# Patient Record
Sex: FEMALE | Race: BLACK OR AFRICAN AMERICAN | NOT HISPANIC OR LATINO | Employment: OTHER | URBAN - NONMETROPOLITAN AREA
[De-identification: names, ages, dates, MRNs, and addresses within clinical notes are randomized per-mention and may not be internally consistent; named-entity substitution may affect disease eponyms.]

---

## 2017-01-04 ENCOUNTER — OUTSIDE FACILITY SERVICE (OUTPATIENT)
Dept: FAMILY MEDICINE CLINIC | Facility: CLINIC | Age: 72
End: 2017-01-04

## 2017-01-04 PROCEDURE — G0179 MD RECERTIFICATION HHA PT: HCPCS | Performed by: INTERNAL MEDICINE

## 2017-01-09 ENCOUNTER — TELEPHONE (OUTPATIENT)
Dept: FAMILY MEDICINE CLINIC | Facility: CLINIC | Age: 72
End: 2017-01-09

## 2017-01-09 NOTE — TELEPHONE ENCOUNTER
----- Message from Talita Thomas sent at 1/9/2017  4:05 PM EST -----  Contact: stephany [facility]  Pt had a fall on Saturday with no physical injuries and need order for additional assesment post fall this week. Call them at [phone]

## 2017-02-06 ENCOUNTER — TELEPHONE (OUTPATIENT)
Dept: FAMILY MEDICINE CLINIC | Facility: CLINIC | Age: 72
End: 2017-02-06

## 2017-02-06 NOTE — TELEPHONE ENCOUNTER
Patient went to ER and is not feeling well she is the same but has appointment on 02/10 wanted us to be aware

## 2017-02-16 ENCOUNTER — TELEPHONE (OUTPATIENT)
Dept: FAMILY MEDICINE CLINIC | Facility: CLINIC | Age: 72
End: 2017-02-16

## 2017-02-16 NOTE — TELEPHONE ENCOUNTER
Laura with [facility] called and said Ms. Smith blood sugar is 550.  Patient is not symptomatic, however she just finished treatment for UTI.  Laura feels that her infection may not be gone.  Patient has an appointment with you in the clinic tomorrow, but if she does not make it Laura is going to collect another UA.  Is there anything else you would like for Ms. Smith to do at this time?

## 2017-02-17 ENCOUNTER — TELEPHONE (OUTPATIENT)
Dept: FAMILY MEDICINE CLINIC | Facility: CLINIC | Age: 72
End: 2017-02-17

## 2017-02-17 ENCOUNTER — OFFICE VISIT (OUTPATIENT)
Dept: FAMILY MEDICINE CLINIC | Facility: CLINIC | Age: 72
End: 2017-02-17

## 2017-02-17 ENCOUNTER — APPOINTMENT (OUTPATIENT)
Dept: ULTRASOUND IMAGING | Facility: HOSPITAL | Age: 72
End: 2017-02-17

## 2017-02-17 ENCOUNTER — HOSPITAL ENCOUNTER (OUTPATIENT)
Dept: ULTRASOUND IMAGING | Facility: HOSPITAL | Age: 72
Discharge: HOME | End: 2017-02-17
Admitting: INTERNAL MEDICINE

## 2017-02-17 VITALS
HEART RATE: 92 BPM | BODY MASS INDEX: 43.32 KG/M2 | WEIGHT: 276 LBS | SYSTOLIC BLOOD PRESSURE: 166 MMHG | TEMPERATURE: 98.9 F | DIASTOLIC BLOOD PRESSURE: 71 MMHG | OXYGEN SATURATION: 95 % | RESPIRATION RATE: 16 BRPM | HEIGHT: 67 IN

## 2017-02-17 DIAGNOSIS — R82.90 ABNORMAL FINDING IN URINE: ICD-10-CM

## 2017-02-17 DIAGNOSIS — IMO0002 UNCONTROLLED TYPE 2 DIABETES MELLITUS WITH OTHER CIRCULATORY COMPLICATION, WITH LONG-TERM CURRENT USE OF INSULIN: ICD-10-CM

## 2017-02-17 DIAGNOSIS — I25.118 CORONARY ARTERY DISEASE OF NATIVE HEART WITH STABLE ANGINA PECTORIS, UNSPECIFIED VESSEL OR LESION TYPE (HCC): Primary | ICD-10-CM

## 2017-02-17 DIAGNOSIS — R09.02 HYPOXIA: ICD-10-CM

## 2017-02-17 DIAGNOSIS — B35.6 TINEA CRURIS: ICD-10-CM

## 2017-02-17 DIAGNOSIS — R09.89 LEFT CAROTID BRUIT: Primary | ICD-10-CM

## 2017-02-17 DIAGNOSIS — I25.118 CORONARY ARTERY DISEASE OF NATIVE HEART WITH STABLE ANGINA PECTORIS, UNSPECIFIED VESSEL OR LESION TYPE (HCC): ICD-10-CM

## 2017-02-17 DIAGNOSIS — R09.89 LEFT CAROTID BRUIT: ICD-10-CM

## 2017-02-17 LAB
ALBUMIN SERPL-MCNC: 3.4 G/DL (ref 3.2–4.8)
ALBUMIN/GLOB SERPL: 1.1 G/DL (ref 1.5–2.5)
ALP SERPL-CCNC: 72 U/L (ref 25–100)
ALT SERPL W P-5'-P-CCNC: 9 U/L (ref 7–40)
ANION GAP SERPL CALCULATED.3IONS-SCNC: 5 MMOL/L (ref 3–11)
ARTICHOKE IGE QN: 107 MG/DL (ref 0–130)
AST SERPL-CCNC: 12 U/L (ref 0–33)
BACTERIA UR QL AUTO: ABNORMAL /HPF
BASOPHILS # BLD AUTO: 0.03 10*3/MM3 (ref 0–0.2)
BASOPHILS NFR BLD AUTO: 0.3 % (ref 0–1)
BILIRUB BLD-MCNC: NEGATIVE MG/DL
BILIRUB SERPL-MCNC: 0.5 MG/DL (ref 0.3–1.2)
BILIRUB UR QL STRIP: NEGATIVE
BUN BLD-MCNC: 14 MG/DL (ref 9–23)
BUN/CREAT SERPL: 15.6 (ref 7–25)
CALCIUM SPEC-SCNC: 9 MG/DL (ref 8.7–10.4)
CHLORIDE SERPL-SCNC: 101 MMOL/L (ref 99–109)
CHOLEST SERPL-MCNC: 183 MG/DL (ref 0–200)
CLARITY UR: CLEAR
CLARITY, POC: CLEAR
CO2 SERPL-SCNC: 32 MMOL/L (ref 20–31)
COLOR UR: YELLOW
COLOR UR: YELLOW
CREAT BLD-MCNC: 0.9 MG/DL (ref 0.6–1.3)
DEPRECATED RDW RBC AUTO: 48.9 FL (ref 37–54)
EOSINOPHIL # BLD AUTO: 0.12 10*3/MM3 (ref 0.1–0.3)
EOSINOPHIL NFR BLD AUTO: 1.3 % (ref 0–3)
ERYTHROCYTE [DISTWIDTH] IN BLOOD BY AUTOMATED COUNT: 14.9 % (ref 11.3–14.5)
GFR SERPL CREATININE-BSD FRML MDRD: 74 ML/MIN/1.73
GLOBULIN UR ELPH-MCNC: 3 GM/DL
GLUCOSE BLD-MCNC: 398 MG/DL (ref 70–100)
GLUCOSE UR STRIP-MCNC: ABNORMAL MG/DL
GLUCOSE UR STRIP-MCNC: ABNORMAL MG/DL
HBA1C MFR BLD: 11.4 % (ref 4.8–5.6)
HCT VFR BLD AUTO: 39 % (ref 34.5–44)
HDLC SERPL-MCNC: 41 MG/DL (ref 40–60)
HGB BLD-MCNC: 12.6 G/DL (ref 11.5–15.5)
HGB UR QL STRIP.AUTO: ABNORMAL
HYALINE CASTS UR QL AUTO: ABNORMAL /LPF
IMM GRANULOCYTES # BLD: 0.02 10*3/MM3 (ref 0–0.03)
IMM GRANULOCYTES NFR BLD: 0.2 % (ref 0–0.6)
KETONES UR QL STRIP: NEGATIVE
KETONES UR QL: NEGATIVE
LEUKOCYTE EST, POC: NEGATIVE
LEUKOCYTE ESTERASE UR QL STRIP.AUTO: NEGATIVE
LYMPHOCYTES # BLD AUTO: 1.77 10*3/MM3 (ref 0.6–4.8)
LYMPHOCYTES NFR BLD AUTO: 19.1 % (ref 24–44)
MCH RBC QN AUTO: 29.1 PG (ref 27–31)
MCHC RBC AUTO-ENTMCNC: 32.3 G/DL (ref 32–36)
MCV RBC AUTO: 90.1 FL (ref 80–99)
MONOCYTES # BLD AUTO: 0.7 10*3/MM3 (ref 0–1)
MONOCYTES NFR BLD AUTO: 7.5 % (ref 0–12)
NEUTROPHILS # BLD AUTO: 6.64 10*3/MM3 (ref 1.5–8.3)
NEUTROPHILS NFR BLD AUTO: 71.6 % (ref 41–71)
NITRITE UR QL STRIP: NEGATIVE
NITRITE UR-MCNC: NEGATIVE MG/ML
PH UR STRIP.AUTO: 6 [PH] (ref 5–8)
PH UR: 6 [PH] (ref 5–8)
PLATELET # BLD AUTO: 343 10*3/MM3 (ref 150–450)
PMV BLD AUTO: 10.9 FL (ref 6–12)
POTASSIUM BLD-SCNC: 4 MMOL/L (ref 3.5–5.5)
PROT SERPL-MCNC: 6.4 G/DL (ref 5.7–8.2)
PROT UR QL STRIP: ABNORMAL
PROT UR STRIP-MCNC: ABNORMAL MG/DL
RBC # BLD AUTO: 4.33 10*6/MM3 (ref 3.89–5.14)
RBC # UR STRIP: ABNORMAL /UL
RBC # UR: ABNORMAL /HPF
REF LAB TEST METHOD: ABNORMAL
SODIUM BLD-SCNC: 138 MMOL/L (ref 132–146)
SP GR UR STRIP: >=1.03 (ref 1–1.03)
SP GR UR: 1.02 (ref 1–1.03)
SQUAMOUS #/AREA URNS HPF: ABNORMAL /HPF
T4 FREE SERPL-MCNC: 1.17 NG/DL (ref 0.89–1.76)
TRIGL SERPL-MCNC: 240 MG/DL (ref 0–150)
TSH SERPL DL<=0.05 MIU/L-ACNC: 1.98 MIU/ML (ref 0.35–5.35)
UROBILINOGEN UR QL STRIP: ABNORMAL
UROBILINOGEN UR QL: NORMAL
VIT B12 BLD-MCNC: 802 PG/ML (ref 211–911)
WBC NRBC COR # BLD: 9.28 10*3/MM3 (ref 3.5–10.8)
WBC UR QL AUTO: ABNORMAL /HPF
YEAST URNS QL MICRO: ABNORMAL /HPF

## 2017-02-17 PROCEDURE — 84443 ASSAY THYROID STIM HORMONE: CPT | Performed by: INTERNAL MEDICINE

## 2017-02-17 PROCEDURE — 81003 URINALYSIS AUTO W/O SCOPE: CPT | Performed by: INTERNAL MEDICINE

## 2017-02-17 PROCEDURE — 80061 LIPID PANEL: CPT | Performed by: INTERNAL MEDICINE

## 2017-02-17 PROCEDURE — 82043 UR ALBUMIN QUANTITATIVE: CPT | Performed by: INTERNAL MEDICINE

## 2017-02-17 PROCEDURE — 80053 COMPREHEN METABOLIC PANEL: CPT | Performed by: INTERNAL MEDICINE

## 2017-02-17 PROCEDURE — 83036 HEMOGLOBIN GLYCOSYLATED A1C: CPT | Performed by: INTERNAL MEDICINE

## 2017-02-17 PROCEDURE — 81001 URINALYSIS AUTO W/SCOPE: CPT | Performed by: INTERNAL MEDICINE

## 2017-02-17 PROCEDURE — 84439 ASSAY OF FREE THYROXINE: CPT | Performed by: INTERNAL MEDICINE

## 2017-02-17 PROCEDURE — 87086 URINE CULTURE/COLONY COUNT: CPT | Performed by: INTERNAL MEDICINE

## 2017-02-17 PROCEDURE — 85025 COMPLETE CBC W/AUTO DIFF WBC: CPT | Performed by: INTERNAL MEDICINE

## 2017-02-17 PROCEDURE — 99214 OFFICE O/P EST MOD 30 MIN: CPT | Performed by: INTERNAL MEDICINE

## 2017-02-17 PROCEDURE — 82607 VITAMIN B-12: CPT | Performed by: INTERNAL MEDICINE

## 2017-02-17 PROCEDURE — 93880 EXTRACRANIAL BILAT STUDY: CPT

## 2017-02-17 RX ORDER — CLOTRIMAZOLE 1 G/ML
SOLUTION TOPICAL 2 TIMES DAILY
Qty: 60 ML | Refills: 11 | Status: SHIPPED | OUTPATIENT
Start: 2017-02-17 | End: 2017-02-17 | Stop reason: SDUPTHER

## 2017-02-17 RX ORDER — CLOTRIMAZOLE 1 G/ML
SOLUTION TOPICAL 2 TIMES DAILY
Qty: 60 ML | Refills: 11 | Status: SHIPPED | OUTPATIENT
Start: 2017-02-17 | End: 2017-12-27 | Stop reason: HOSPADM

## 2017-02-17 NOTE — PROGRESS NOTES
Please let them know the ua is clear of infection. Still waiting on cult.   Carotid us look good.

## 2017-02-17 NOTE — PROGRESS NOTES
"Subjective   Patient ID: Leila Smith is a 71 y.o. female Pt is here for management of multiple medical problems.  History of Present Illness  Pt is here for management of multiple medical problems.    Pt never got o2. Going and is now willing to get restudied and treat.    Get up all night to urinate.  Never had burning or dysruia.   Now with adult dipper rash.      BS poor control. Diet not going well.    Now with rash in groin and butt ox form adult diper.      The following portions of the patient's history were reviewed and updated as appropriate: allergies, current medications, past family history, past medical history, past social history, past surgical history and problem list.      Review of Systems   Constitutional: Positive for fatigue.   All other systems reviewed and are negative.      Objective     Visit Vitals   • /71 (BP Location: Right arm, Patient Position: Sitting, Cuff Size: Large Adult)   • Pulse 92   • Temp 98.9 °F (37.2 °C) (Oral)   • Resp 16   • Ht 67\" (170.2 cm)   • Wt 276 lb (125 kg)   • SpO2 95%   • BMI 43.23 kg/m2     Physical Exam  General Appearance:    Alert, cooperative, no distress, appears stated age   Head:    Normocephalic, without obvious abnormality, atraumatic   Eyes:    PERRL, conjunctiva/corneas clear, EOM's intact           Ears:    Normal TM's and external ear canals, both ears   Nose:   Nares normal, septum midline, mucosa normal, no drainage    or sinus tenderness   Throat:   Lips, mucosa, and tongue normal; teeth and gums normal   Neck:   Carotid bruit left. Supple, symmetrical, trachea midline, no adenopathy;        thyroid:  No enlargement/tenderness/nodules; no carotid    bruit or JVD   Back:     Symmetric, no curvature, ROM normal, no CVA tenderness   Lungs:     Clear to auscultation bilaterally, respirations unlabored   Chest wall:    No tenderness or deformity   Heart:    Regular rate and rhythm, S1 and S2 normal, no murmur, rub   or gallop   Abdomen:     " Soft, non-tender, bowel sounds active all four quadrants,     no masses, no organomegaly           Extremities:  + 4 pitting/. Aleksandra boot on.      Extremities normal, atraumatic, no cyanosis or edema   Pulses:   2+ and symmetric all extremities   Skin:   Skin color, texture, turgor normal, no rashes or lesions   Lymph nodes:   Cervical, supraclavicular, and axillary nodes normal   Neurologic:   CNII-XII intact. Normal strength, sensation and reflexes       throughout       Assessment/Plan      stopped smoking last summer.       Leila was seen today for diabetes mellitus, urinary tract infection and rash.    Diagnoses and all orders for this visit:    Left carotid bruit  -     Duplex Carotid Ultrasound CAR; Future    Coronary artery disease of native heart with stable angina pectoris, unspecified vessel or lesion type  -     MicroAlbumin, Urine, Random  -     Urinalysis With Microscopic  -     Urine Culture  -     Urinalysis  -     Urinalysis, Microscopic Only    Uncontrolled type 2 diabetes mellitus with other circulatory complication, with long-term current use of insulin  -     MicroAlbumin, Urine, Random  -     Urinalysis With Microscopic  -     Urine Culture  -     Urinalysis  -     Urinalysis, Microscopic Only    Abnormal finding in urine   -     Urine Culture  -     POCT urinalysis dipstick, automated    Tinea cruris  -     clotrimazole (LOTRIMIN) 1 % external solution; Apply  topically 2 (Two) Times a Day.    Hypoxia  -     Overnight Sleep Oximetry Study; Future      No Follow-up on file.                   Patient Instructions   Get D-Mannose from [employer] and take twice a day.

## 2017-02-19 LAB
BACTERIA SPEC AEROBE CULT: NORMAL
MICROALBUMIN UR-MCNC: 701 UG/ML

## 2017-02-20 ENCOUNTER — TELEPHONE (OUTPATIENT)
Dept: FAMILY MEDICINE CLINIC | Facility: CLINIC | Age: 72
End: 2017-02-20

## 2017-02-22 ENCOUNTER — TELEPHONE (OUTPATIENT)
Dept: FAMILY MEDICINE CLINIC | Facility: CLINIC | Age: 72
End: 2017-02-22

## 2017-02-22 RX ORDER — CLOTRIMAZOLE 1 %
CREAM (GRAM) TOPICAL 2 TIMES DAILY
Qty: 40 G | Refills: 3 | Status: SHIPPED | OUTPATIENT
Start: 2017-02-22 | End: 2017-12-27 | Stop reason: HOSPADM

## 2017-02-22 NOTE — TELEPHONE ENCOUNTER
----- Message from HAL Chan sent at 2/22/2017  2:20 PM EST -----  Contact: PATIENTS DAUGHTER  Patients daughter called stating that a Lomitrin solution was prescribed. Patient needs this in a cream form. Patient called the pharmacy and they had stated the only way they can give this to her in a cream form is if a new prescription sent over. Patient needs this sent to [facility] [address]. Thanks.

## 2017-03-01 RX ORDER — FLUCONAZOLE 150 MG/1
150 TABLET ORAL ONCE
Qty: 1 TABLET | Refills: 0 | Status: SHIPPED | OUTPATIENT
Start: 2017-03-01 | End: 2017-03-01

## 2017-03-22 ENCOUNTER — OUTSIDE FACILITY SERVICE (OUTPATIENT)
Dept: FAMILY MEDICINE CLINIC | Facility: CLINIC | Age: 72
End: 2017-03-22

## 2017-03-22 PROCEDURE — G0179 MD RECERTIFICATION HHA PT: HCPCS | Performed by: INTERNAL MEDICINE

## 2017-10-27 ENCOUNTER — APPOINTMENT (OUTPATIENT)
Dept: GENERAL RADIOLOGY | Facility: HOSPITAL | Age: 72
End: 2017-10-27

## 2017-10-27 ENCOUNTER — APPOINTMENT (OUTPATIENT)
Dept: CT IMAGING | Facility: HOSPITAL | Age: 72
End: 2017-10-27

## 2017-10-27 ENCOUNTER — HOSPITAL ENCOUNTER (EMERGENCY)
Facility: HOSPITAL | Age: 72
Discharge: HOME | End: 2017-10-27
Attending: EMERGENCY MEDICINE | Admitting: EMERGENCY MEDICINE

## 2017-10-27 VITALS
SYSTOLIC BLOOD PRESSURE: 132 MMHG | OXYGEN SATURATION: 97 % | TEMPERATURE: 97.5 F | WEIGHT: 240 LBS | DIASTOLIC BLOOD PRESSURE: 89 MMHG | BODY MASS INDEX: 36.37 KG/M2 | RESPIRATION RATE: 20 BRPM | HEART RATE: 59 BPM | HEIGHT: 68 IN

## 2017-10-27 DIAGNOSIS — N39.0 ACUTE UTI: Primary | ICD-10-CM

## 2017-10-27 LAB
ALBUMIN SERPL-MCNC: 3.4 G/DL (ref 3.2–4.8)
ALBUMIN/GLOB SERPL: 1 G/DL (ref 1.5–2.5)
ALP SERPL-CCNC: 156 U/L (ref 25–100)
ALT SERPL W P-5'-P-CCNC: 17 U/L (ref 7–40)
ANION GAP SERPL CALCULATED.3IONS-SCNC: 14 MMOL/L (ref 3–11)
AST SERPL-CCNC: 18 U/L (ref 0–33)
BACTERIA UR QL AUTO: ABNORMAL /HPF
BASOPHILS # BLD AUTO: 0.04 10*3/MM3 (ref 0–0.2)
BASOPHILS NFR BLD AUTO: 0.6 % (ref 0–1)
BILIRUB SERPL-MCNC: 0.9 MG/DL (ref 0.3–1.2)
BILIRUB UR QL STRIP: NEGATIVE
BNP SERPL-MCNC: 534 PG/ML (ref 0–100)
BUN BLD-MCNC: 23 MG/DL (ref 9–23)
BUN/CREAT SERPL: 19.2 (ref 7–25)
CALCIUM SPEC-SCNC: 8.9 MG/DL (ref 8.7–10.4)
CHLORIDE SERPL-SCNC: 110 MMOL/L (ref 99–109)
CLARITY UR: ABNORMAL
CO2 SERPL-SCNC: 16 MMOL/L (ref 20–31)
COLOR UR: YELLOW
CREAT BLD-MCNC: 1.2 MG/DL (ref 0.6–1.3)
DEPRECATED RDW RBC AUTO: 57.6 FL (ref 37–54)
EOSINOPHIL # BLD AUTO: 0.11 10*3/MM3 (ref 0–0.3)
EOSINOPHIL NFR BLD AUTO: 1.6 % (ref 0–3)
ERYTHROCYTE [DISTWIDTH] IN BLOOD BY AUTOMATED COUNT: 17.7 % (ref 11.3–14.5)
GFR SERPL CREATININE-BSD FRML MDRD: 53 ML/MIN/1.73
GLOBULIN UR ELPH-MCNC: 3.5 GM/DL
GLUCOSE BLD-MCNC: 142 MG/DL (ref 70–100)
GLUCOSE UR STRIP-MCNC: NEGATIVE MG/DL
HCT VFR BLD AUTO: 41.9 % (ref 34.5–44)
HGB BLD-MCNC: 13 G/DL (ref 11.5–15.5)
HGB UR QL STRIP.AUTO: ABNORMAL
HOLD SPECIMEN: NORMAL
HOLD SPECIMEN: NORMAL
HYALINE CASTS UR QL AUTO: ABNORMAL /LPF
IMM GRANULOCYTES # BLD: 0.03 10*3/MM3 (ref 0–0.03)
IMM GRANULOCYTES NFR BLD: 0.4 % (ref 0–0.6)
KETONES UR QL STRIP: NEGATIVE
LEUKOCYTE ESTERASE UR QL STRIP.AUTO: ABNORMAL
LIPASE SERPL-CCNC: 48 U/L (ref 6–51)
LYMPHOCYTES # BLD AUTO: 1.74 10*3/MM3 (ref 0.6–4.8)
LYMPHOCYTES NFR BLD AUTO: 25.6 % (ref 24–44)
MAGNESIUM SERPL-MCNC: 1.9 MG/DL (ref 1.3–2.7)
MCH RBC QN AUTO: 27.3 PG (ref 27–31)
MCHC RBC AUTO-ENTMCNC: 31 G/DL (ref 32–36)
MCV RBC AUTO: 88 FL (ref 80–99)
MONOCYTES # BLD AUTO: 0.57 10*3/MM3 (ref 0–1)
MONOCYTES NFR BLD AUTO: 8.4 % (ref 0–12)
NEUTROPHILS # BLD AUTO: 4.32 10*3/MM3 (ref 1.5–8.3)
NEUTROPHILS NFR BLD AUTO: 63.4 % (ref 41–71)
NITRITE UR QL STRIP: POSITIVE
PH UR STRIP.AUTO: 5.5 [PH] (ref 5–8)
PLAT MORPH BLD: NORMAL
PLATELET # BLD AUTO: 298 10*3/MM3 (ref 150–450)
PMV BLD AUTO: 9.7 FL (ref 6–12)
POTASSIUM BLD-SCNC: 4.8 MMOL/L (ref 3.5–5.5)
PROT SERPL-MCNC: 6.9 G/DL (ref 5.7–8.2)
PROT UR QL STRIP: ABNORMAL
RBC # BLD AUTO: 4.76 10*6/MM3 (ref 3.89–5.14)
RBC # UR: ABNORMAL /HPF
RBC MORPH BLD: NORMAL
REF LAB TEST METHOD: ABNORMAL
SODIUM BLD-SCNC: 140 MMOL/L (ref 132–146)
SP GR UR STRIP: 1.02 (ref 1–1.03)
SQUAMOUS #/AREA URNS HPF: ABNORMAL /HPF
TROPONIN I SERPL-MCNC: 0.03 NG/ML (ref 0–0.07)
TROPONIN I SERPL-MCNC: 0.04 NG/ML
TSH SERPL DL<=0.05 MIU/L-ACNC: 1.47 MIU/ML (ref 0.35–5.35)
UROBILINOGEN UR QL STRIP: ABNORMAL
WBC MORPH BLD: NORMAL
WBC NRBC COR # BLD: 6.81 10*3/MM3 (ref 3.5–10.8)
WBC UR QL AUTO: ABNORMAL /HPF
WHOLE BLOOD HOLD SPECIMEN: NORMAL
WHOLE BLOOD HOLD SPECIMEN: NORMAL

## 2017-10-27 PROCEDURE — 87186 SC STD MICRODIL/AGAR DIL: CPT | Performed by: EMERGENCY MEDICINE

## 2017-10-27 PROCEDURE — 25010000002 CEFTRIAXONE PER 250 MG: Performed by: NURSE PRACTITIONER

## 2017-10-27 PROCEDURE — 83735 ASSAY OF MAGNESIUM: CPT | Performed by: NURSE PRACTITIONER

## 2017-10-27 PROCEDURE — 80053 COMPREHEN METABOLIC PANEL: CPT | Performed by: EMERGENCY MEDICINE

## 2017-10-27 PROCEDURE — 81001 URINALYSIS AUTO W/SCOPE: CPT | Performed by: EMERGENCY MEDICINE

## 2017-10-27 PROCEDURE — 99284 EMERGENCY DEPT VISIT MOD MDM: CPT

## 2017-10-27 PROCEDURE — 85025 COMPLETE CBC W/AUTO DIFF WBC: CPT | Performed by: EMERGENCY MEDICINE

## 2017-10-27 PROCEDURE — 96365 THER/PROPH/DIAG IV INF INIT: CPT

## 2017-10-27 PROCEDURE — 83880 ASSAY OF NATRIURETIC PEPTIDE: CPT | Performed by: NURSE PRACTITIONER

## 2017-10-27 PROCEDURE — 87086 URINE CULTURE/COLONY COUNT: CPT | Performed by: EMERGENCY MEDICINE

## 2017-10-27 PROCEDURE — 84443 ASSAY THYROID STIM HORMONE: CPT | Performed by: NURSE PRACTITIONER

## 2017-10-27 PROCEDURE — P9612 CATHETERIZE FOR URINE SPEC: HCPCS

## 2017-10-27 PROCEDURE — 87077 CULTURE AEROBIC IDENTIFY: CPT | Performed by: EMERGENCY MEDICINE

## 2017-10-27 PROCEDURE — 96361 HYDRATE IV INFUSION ADD-ON: CPT

## 2017-10-27 PROCEDURE — 93005 ELECTROCARDIOGRAM TRACING: CPT | Performed by: EMERGENCY MEDICINE

## 2017-10-27 PROCEDURE — 84484 ASSAY OF TROPONIN QUANT: CPT | Performed by: EMERGENCY MEDICINE

## 2017-10-27 PROCEDURE — 83690 ASSAY OF LIPASE: CPT | Performed by: EMERGENCY MEDICINE

## 2017-10-27 PROCEDURE — 72131 CT LUMBAR SPINE W/O DYE: CPT

## 2017-10-27 PROCEDURE — 84484 ASSAY OF TROPONIN QUANT: CPT

## 2017-10-27 PROCEDURE — 74176 CT ABD & PELVIS W/O CONTRAST: CPT

## 2017-10-27 PROCEDURE — 85007 BL SMEAR W/DIFF WBC COUNT: CPT | Performed by: EMERGENCY MEDICINE

## 2017-10-27 PROCEDURE — 71010 HC CHEST PA OR AP: CPT

## 2017-10-27 RX ORDER — TRAMADOL HYDROCHLORIDE 50 MG/1
50 TABLET ORAL EVERY 8 HOURS PRN
Status: ON HOLD | COMMUNITY
End: 2017-12-27

## 2017-10-27 RX ORDER — MELATONIN 200 MCG
3 TABLET ORAL NIGHTLY
COMMUNITY

## 2017-10-27 RX ORDER — SODIUM CHLORIDE 0.9 % (FLUSH) 0.9 %
10 SYRINGE (ML) INJECTION AS NEEDED
Status: DISCONTINUED | OUTPATIENT
Start: 2017-10-27 | End: 2017-10-27 | Stop reason: HOSPADM

## 2017-10-27 RX ORDER — AMMONIUM LACTATE 120 MG/G
CREAM TOPICAL 2 TIMES DAILY PRN
COMMUNITY
End: 2017-12-27 | Stop reason: HOSPADM

## 2017-10-27 RX ORDER — FAMOTIDINE 20 MG/1
20 TABLET, FILM COATED ORAL DAILY
COMMUNITY
End: 2018-01-01

## 2017-10-27 RX ORDER — HEPARIN SODIUM 5000 [USP'U]/ML
5000 INJECTION, SOLUTION INTRAVENOUS; SUBCUTANEOUS EVERY 8 HOURS SCHEDULED
COMMUNITY
End: 2017-12-27 | Stop reason: HOSPADM

## 2017-10-27 RX ORDER — METOPROLOL SUCCINATE 200 MG/1
200 TABLET, EXTENDED RELEASE ORAL DAILY
COMMUNITY
End: 2017-12-27 | Stop reason: HOSPADM

## 2017-10-27 RX ORDER — SODIUM CHLORIDE 9 MG/ML
125 INJECTION, SOLUTION INTRAVENOUS CONTINUOUS
Status: DISCONTINUED | OUTPATIENT
Start: 2017-10-27 | End: 2017-10-27 | Stop reason: HOSPADM

## 2017-10-27 RX ORDER — DOCUSATE SODIUM 250 MG
250 CAPSULE ORAL DAILY
COMMUNITY
End: 2018-01-01

## 2017-10-27 RX ORDER — CEFTRIAXONE SODIUM 1 G/50ML
1 INJECTION, SOLUTION INTRAVENOUS ONCE
Status: COMPLETED | OUTPATIENT
Start: 2017-10-27 | End: 2017-10-27

## 2017-10-27 RX ORDER — ATORVASTATIN CALCIUM 10 MG/1
10 TABLET, FILM COATED ORAL NIGHTLY
COMMUNITY

## 2017-10-27 RX ORDER — ACETAMINOPHEN 500 MG
1000 TABLET ORAL EVERY 6 HOURS PRN
COMMUNITY
End: 2018-01-01 | Stop reason: HOSPADM

## 2017-10-27 RX ORDER — HYDROCODONE BITARTRATE AND ACETAMINOPHEN 5; 325 MG/1; MG/1
1 TABLET ORAL ONCE
Status: DISCONTINUED | OUTPATIENT
Start: 2017-10-27 | End: 2017-10-27 | Stop reason: HOSPADM

## 2017-10-27 RX ORDER — DILTIAZEM HYDROCHLORIDE 360 MG/1
360 CAPSULE, EXTENDED RELEASE ORAL DAILY
COMMUNITY
End: 2017-12-27 | Stop reason: HOSPADM

## 2017-10-27 RX ORDER — CEFDINIR 300 MG/1
300 CAPSULE ORAL 2 TIMES DAILY
Qty: 20 CAPSULE | Refills: 0 | Status: SHIPPED | OUTPATIENT
Start: 2017-10-27 | End: 2017-12-27 | Stop reason: HOSPADM

## 2017-10-27 RX ADMIN — CEFTRIAXONE SODIUM 1 G: 1 INJECTION, SOLUTION INTRAVENOUS at 14:26

## 2017-10-27 RX ADMIN — SODIUM CHLORIDE 125 ML/HR: 9 INJECTION, SOLUTION INTRAVENOUS at 12:12

## 2017-10-27 NOTE — DISCHARGE INSTRUCTIONS
Drink plenty of fluids and take medication as prescribed.  Follow with primary care provider in 2-3 days.  Return to ER with worsening of symptoms or development of new symptoms.

## 2017-10-27 NOTE — ED PROVIDER NOTES
Subjective   Patient is a 72 y.o. female presenting with back pain.   Back Pain   Location:  Lumbar spine  Quality:  Aching  Radiates to:  Does not radiate  Pain severity:  Severe  Pain is:  Same all the time  Onset quality:  Gradual  Duration:  2 days  Timing:  Constant  Progression:  Waxing and waning  Chronicity:  Recurrent  Context: not falling and not lifting heavy objects    Relieved by:  Nothing  Worsened by:  Movement  Ineffective treatments: Ultram.  Associated symptoms: abdominal pain    Associated symptoms: no bladder incontinence, no bowel incontinence, no chest pain, no dysuria, no fever, no numbness, no paresthesias and no tingling    Tramadol PTA.    Review of Systems   Constitutional: Negative for chills and fever.   Respiratory: Negative for chest tightness and shortness of breath.    Cardiovascular: Negative for chest pain.   Gastrointestinal: Positive for abdominal pain and constipation (Occasional). Negative for blood in stool, bowel incontinence, diarrhea and vomiting.   Genitourinary: Negative for bladder incontinence, dysuria, flank pain, frequency and hematuria.   Musculoskeletal: Positive for back pain.   Neurological: Negative for dizziness, tingling, light-headedness, numbness and paresthesias.   All other systems reviewed and are negative.      Past Medical History:   Diagnosis Date   • Atrial fibrillation    • Chronic ulcer of right leg    • Cognitive communication deficit    • COPD (chronic obstructive pulmonary disease)    • Diabetes mellitus    • Difficulty in walking    • Encephalopathy    • Generalized muscle weakness    • Hematuria    • Hyperlipidemia    • Hypertension    • Hypothyroidism    • Urinary tract infection        Allergies   Allergen Reactions   • Codeine    • Tetracyclines & Related        Past Surgical History:   Procedure Laterality Date   • CATARACT EXTRACTION     • CHOLECYSTECTOMY     • FOOT SURGERY Right    • HERNIA REPAIR     • HYSTERECTOMY     • TOTAL KNEE  ARTHROPLASTY Right        Family History   Problem Relation Age of Onset   • Stomach cancer Mother    • Alcohol abuse Other    • Cancer Other    • Diabetes Other    • Hypertension Other    • Other Other        Social History     Social History   • Marital status:      Spouse name: N/A   • Number of children: N/A   • Years of education: N/A     Social History Main Topics   • Smoking status: Unknown If Ever Smoked   • Smokeless tobacco: Never Used   • Alcohol use No   • Drug use: No   • Sexual activity: Defer     Other Topics Concern   • None     Social History Narrative   • None           Objective   Physical Exam   Constitutional: She appears well-developed and well-nourished.   HENT:   Right Ear: External ear normal.   Left Ear: External ear normal.   Eyes: Conjunctivae are normal.   Cardiovascular: Normal rate.  An irregular rhythm present.   No Lower extremity swelling   Pulmonary/Chest: Effort normal and breath sounds normal.   Abdominal: Soft. Bowel sounds are normal. There is generalized tenderness. There is no rebound, no guarding, no tenderness at McBurney's point and negative Dsouza's sign.   Neurological: She is alert. She is disoriented (place/time).   Skin: Skin is warm and dry.   Psychiatric: She has a normal mood and affect. Her behavior is normal.   Vitals reviewed.      Procedures         ED Course  ED Course      Family arrived. Updated on pt status.   1137: Pt reports improvement in pain. Pt has refused oral pain meds here.     1400: Pt resting quietly upright in bed in no distress. Pt reports that her pain is gone. Abdomen nontender, Lungs clear.     Discussed CT and lab findings with patient and family member.  We will send her back to the nursing home with prescription for antibiotics that she will need to take and follow-up with primary care provider in 2-3 days.  Family verbalized understanding            MDM    Final diagnoses:   Acute UTI            Meeta Marquez, APRN  10/27/17  1753

## 2017-10-29 LAB
BACTERIA SPEC AEROBE CULT: ABNORMAL
BACTERIA SPEC AEROBE CULT: ABNORMAL

## 2017-12-19 ENCOUNTER — APPOINTMENT (OUTPATIENT)
Dept: CT IMAGING | Facility: HOSPITAL | Age: 72
End: 2017-12-19

## 2017-12-19 ENCOUNTER — APPOINTMENT (OUTPATIENT)
Dept: GENERAL RADIOLOGY | Facility: HOSPITAL | Age: 72
End: 2017-12-19

## 2017-12-19 ENCOUNTER — HOSPITAL ENCOUNTER (INPATIENT)
Facility: HOSPITAL | Age: 72
LOS: 7 days | Discharge: NURSING, REHAB OR LONG-TERM CARE | End: 2017-12-27
Attending: EMERGENCY MEDICINE | Admitting: HOSPITALIST

## 2017-12-19 DIAGNOSIS — E11.59 TYPE 2 DIABETES MELLITUS WITH OTHER CIRCULATORY COMPLICATION, WITH LONG-TERM CURRENT USE OF INSULIN (HCC): ICD-10-CM

## 2017-12-19 DIAGNOSIS — N28.9 RENAL INSUFFICIENCY: ICD-10-CM

## 2017-12-19 DIAGNOSIS — R41.82 ALTERED MENTAL STATUS, UNSPECIFIED ALTERED MENTAL STATUS TYPE: Primary | ICD-10-CM

## 2017-12-19 DIAGNOSIS — N39.0 URINARY TRACT INFECTION IN FEMALE: ICD-10-CM

## 2017-12-19 DIAGNOSIS — Z79.4 TYPE 2 DIABETES MELLITUS WITH OTHER CIRCULATORY COMPLICATION, WITH LONG-TERM CURRENT USE OF INSULIN (HCC): ICD-10-CM

## 2017-12-19 DIAGNOSIS — E86.0 DEHYDRATION: ICD-10-CM

## 2017-12-19 DIAGNOSIS — R53.1 WEAKNESS: ICD-10-CM

## 2017-12-19 PROBLEM — N30.90 CYSTITIS: Status: ACTIVE | Noted: 2017-12-19

## 2017-12-19 PROBLEM — D72.9 NEUTROPHILIC LEUKOCYTOSIS: Status: ACTIVE | Noted: 2017-12-19

## 2017-12-19 LAB
ALBUMIN SERPL-MCNC: 3.7 G/DL (ref 3.2–4.8)
ALBUMIN/GLOB SERPL: 1.1 G/DL (ref 1.5–2.5)
ALP SERPL-CCNC: 195 U/L (ref 25–100)
ALT SERPL W P-5'-P-CCNC: 21 U/L (ref 7–40)
AMMONIA BLD-SCNC: 31 UMOL/L (ref 19–60)
ANION GAP SERPL CALCULATED.3IONS-SCNC: 13 MMOL/L (ref 3–11)
AST SERPL-CCNC: 17 U/L (ref 0–33)
BACTERIA UR QL AUTO: ABNORMAL /HPF
BASOPHILS # BLD AUTO: 0.02 10*3/MM3 (ref 0–0.2)
BASOPHILS NFR BLD AUTO: 0.2 % (ref 0–1)
BILIRUB SERPL-MCNC: 0.8 MG/DL (ref 0.3–1.2)
BILIRUB UR QL STRIP: NEGATIVE
BUN BLD-MCNC: 62 MG/DL (ref 9–23)
BUN/CREAT SERPL: 29.5 (ref 7–25)
CALCIUM SPEC-SCNC: 9.1 MG/DL (ref 8.7–10.4)
CHLORIDE SERPL-SCNC: 113 MMOL/L (ref 99–109)
CLARITY UR: ABNORMAL
CO2 SERPL-SCNC: 12 MMOL/L (ref 20–31)
COLOR UR: YELLOW
CREAT BLD-MCNC: 2.1 MG/DL (ref 0.6–1.3)
CRP SERPL-MCNC: 2.5 MG/DL (ref 0–1)
DEPRECATED RDW RBC AUTO: 54.2 FL (ref 37–54)
EOSINOPHIL # BLD AUTO: 0.01 10*3/MM3 (ref 0–0.3)
EOSINOPHIL NFR BLD AUTO: 0.1 % (ref 0–3)
ERYTHROCYTE [DISTWIDTH] IN BLOOD BY AUTOMATED COUNT: 17.2 % (ref 11.3–14.5)
GFR SERPL CREATININE-BSD FRML MDRD: 28 ML/MIN/1.73
GLOBULIN UR ELPH-MCNC: 3.4 GM/DL
GLUCOSE BLD-MCNC: 182 MG/DL (ref 70–100)
GLUCOSE BLDC GLUCOMTR-MCNC: 163 MG/DL (ref 70–130)
GLUCOSE UR STRIP-MCNC: NEGATIVE MG/DL
HCT VFR BLD AUTO: 54.2 % (ref 34.5–44)
HGB BLD-MCNC: 17.5 G/DL (ref 11.5–15.5)
HGB UR QL STRIP.AUTO: ABNORMAL
HOLD SPECIMEN: NORMAL
HYALINE CASTS UR QL AUTO: ABNORMAL /LPF
IMM GRANULOCYTES # BLD: 0.07 10*3/MM3 (ref 0–0.03)
IMM GRANULOCYTES NFR BLD: 0.6 % (ref 0–0.6)
KETONES UR QL STRIP: NEGATIVE
LEUKOCYTE ESTERASE UR QL STRIP.AUTO: ABNORMAL
LIPASE SERPL-CCNC: 57 U/L (ref 6–51)
LYMPHOCYTES # BLD AUTO: 1.67 10*3/MM3 (ref 0.6–4.8)
LYMPHOCYTES NFR BLD AUTO: 15.4 % (ref 24–44)
MAGNESIUM SERPL-MCNC: 2.3 MG/DL (ref 1.3–2.7)
MCH RBC QN AUTO: 28.1 PG (ref 27–31)
MCHC RBC AUTO-ENTMCNC: 32.3 G/DL (ref 32–36)
MCV RBC AUTO: 87.1 FL (ref 80–99)
MONOCYTES # BLD AUTO: 0.77 10*3/MM3 (ref 0–1)
MONOCYTES NFR BLD AUTO: 7.1 % (ref 0–12)
NEUTROPHILS # BLD AUTO: 8.29 10*3/MM3 (ref 1.5–8.3)
NEUTROPHILS NFR BLD AUTO: 76.6 % (ref 41–71)
NITRITE UR QL STRIP: NEGATIVE
PH UR STRIP.AUTO: <=5 [PH] (ref 5–8)
PLATELET # BLD AUTO: 231 10*3/MM3 (ref 150–450)
PMV BLD AUTO: 10.6 FL (ref 6–12)
POTASSIUM BLD-SCNC: 5.5 MMOL/L (ref 3.5–5.5)
PROT SERPL-MCNC: 7.1 G/DL (ref 5.7–8.2)
PROT UR QL STRIP: ABNORMAL
RBC # BLD AUTO: 6.22 10*6/MM3 (ref 3.89–5.14)
RBC # UR: ABNORMAL /HPF
REF LAB TEST METHOD: ABNORMAL
SODIUM BLD-SCNC: 138 MMOL/L (ref 132–146)
SP GR UR STRIP: 1.02 (ref 1–1.03)
SQUAMOUS #/AREA URNS HPF: ABNORMAL /HPF
UROBILINOGEN UR QL STRIP: ABNORMAL
WBC NRBC COR # BLD: 10.83 10*3/MM3 (ref 3.5–10.8)
WBC UR QL AUTO: ABNORMAL /HPF
WHOLE BLOOD HOLD SPECIMEN: NORMAL
YEAST URNS QL MICRO: ABNORMAL /HPF

## 2017-12-19 PROCEDURE — 83735 ASSAY OF MAGNESIUM: CPT | Performed by: EMERGENCY MEDICINE

## 2017-12-19 PROCEDURE — 25010000003 CEFTRIAXONE PER 250 MG: Performed by: EMERGENCY MEDICINE

## 2017-12-19 PROCEDURE — 99285 EMERGENCY DEPT VISIT HI MDM: CPT

## 2017-12-19 PROCEDURE — 70450 CT HEAD/BRAIN W/O DYE: CPT

## 2017-12-19 PROCEDURE — 99223 1ST HOSP IP/OBS HIGH 75: CPT | Performed by: HOSPITALIST

## 2017-12-19 PROCEDURE — 87106 FUNGI IDENTIFICATION YEAST: CPT | Performed by: EMERGENCY MEDICINE

## 2017-12-19 PROCEDURE — 87086 URINE CULTURE/COLONY COUNT: CPT | Performed by: EMERGENCY MEDICINE

## 2017-12-19 PROCEDURE — 85025 COMPLETE CBC W/AUTO DIFF WBC: CPT | Performed by: EMERGENCY MEDICINE

## 2017-12-19 PROCEDURE — 86140 C-REACTIVE PROTEIN: CPT | Performed by: EMERGENCY MEDICINE

## 2017-12-19 PROCEDURE — 83690 ASSAY OF LIPASE: CPT | Performed by: EMERGENCY MEDICINE

## 2017-12-19 PROCEDURE — 82140 ASSAY OF AMMONIA: CPT | Performed by: EMERGENCY MEDICINE

## 2017-12-19 PROCEDURE — P9612 CATHETERIZE FOR URINE SPEC: HCPCS

## 2017-12-19 PROCEDURE — 82962 GLUCOSE BLOOD TEST: CPT

## 2017-12-19 PROCEDURE — 80053 COMPREHEN METABOLIC PANEL: CPT | Performed by: EMERGENCY MEDICINE

## 2017-12-19 PROCEDURE — 71010 HC CHEST PA OR AP: CPT

## 2017-12-19 PROCEDURE — 81001 URINALYSIS AUTO W/SCOPE: CPT | Performed by: EMERGENCY MEDICINE

## 2017-12-19 RX ORDER — SODIUM CHLORIDE 0.9 % (FLUSH) 0.9 %
10 SYRINGE (ML) INJECTION AS NEEDED
Status: DISCONTINUED | OUTPATIENT
Start: 2017-12-19 | End: 2017-12-27 | Stop reason: HOSPADM

## 2017-12-19 RX ORDER — SODIUM CHLORIDE 9 MG/ML
125 INJECTION, SOLUTION INTRAVENOUS CONTINUOUS
Status: DISCONTINUED | OUTPATIENT
Start: 2017-12-19 | End: 2017-12-22

## 2017-12-19 RX ORDER — CEFTRIAXONE SODIUM 2 G/50ML
2 INJECTION, SOLUTION INTRAVENOUS ONCE
Status: COMPLETED | OUTPATIENT
Start: 2017-12-19 | End: 2017-12-19

## 2017-12-19 RX ADMIN — CEFTRIAXONE SODIUM 2 G: 2 INJECTION, SOLUTION INTRAVENOUS at 23:05

## 2017-12-19 RX ADMIN — SODIUM CHLORIDE 125 ML/HR: 9 INJECTION, SOLUTION INTRAVENOUS at 21:20

## 2017-12-19 RX ADMIN — SODIUM CHLORIDE 1000 ML: 9 INJECTION, SOLUTION INTRAVENOUS at 19:00

## 2017-12-19 RX ADMIN — SODIUM CHLORIDE 2070 ML: 9 INJECTION, SOLUTION INTRAVENOUS at 22:15

## 2017-12-20 PROBLEM — R41.82 ALTERED MENTAL STATUS: Status: ACTIVE | Noted: 2017-12-20

## 2017-12-20 PROBLEM — I48.91 ATRIAL FIBRILLATION (HCC): Status: ACTIVE | Noted: 2017-12-20

## 2017-12-20 LAB
ALBUMIN SERPL-MCNC: 3.3 G/DL (ref 3.2–4.8)
ANION GAP SERPL CALCULATED.3IONS-SCNC: 12 MMOL/L (ref 3–11)
BASOPHILS # BLD MANUAL: 0 10*3/MM3 (ref 0–0.2)
BASOPHILS NFR BLD AUTO: 0 % (ref 0–1)
BUN BLD-MCNC: 58 MG/DL (ref 9–23)
BUN/CREAT SERPL: 32.2 (ref 7–25)
CALCIUM SPEC-SCNC: 8.4 MG/DL (ref 8.7–10.4)
CHLORIDE SERPL-SCNC: 115 MMOL/L (ref 99–109)
CO2 SERPL-SCNC: 14 MMOL/L (ref 20–31)
CREAT BLD-MCNC: 1.8 MG/DL (ref 0.6–1.3)
DEPRECATED RDW RBC AUTO: 55.2 FL (ref 37–54)
EOSINOPHIL # BLD MANUAL: 0 10*3/MM3 (ref 0.1–0.3)
EOSINOPHIL NFR BLD MANUAL: 0 % (ref 0–3)
ERYTHROCYTE [DISTWIDTH] IN BLOOD BY AUTOMATED COUNT: 17.3 % (ref 11.3–14.5)
GFR SERPL CREATININE-BSD FRML MDRD: 33 ML/MIN/1.73
GLUCOSE BLD-MCNC: 127 MG/DL (ref 70–100)
GLUCOSE BLDC GLUCOMTR-MCNC: 133 MG/DL (ref 70–130)
GLUCOSE BLDC GLUCOMTR-MCNC: 168 MG/DL (ref 70–130)
GLUCOSE BLDC GLUCOMTR-MCNC: 240 MG/DL (ref 70–130)
GLUCOSE BLDC GLUCOMTR-MCNC: 99 MG/DL (ref 70–130)
HBA1C MFR BLD: 10.5 % (ref 4.8–5.6)
HCT VFR BLD AUTO: 51.8 % (ref 34.5–44)
HGB BLD-MCNC: 16.5 G/DL (ref 11.5–15.5)
HOLD SPECIMEN: NORMAL
HOLD SPECIMEN: NORMAL
INR PPP: 1.1
LARGE PLATELETS: ABNORMAL
LYMPHOCYTES # BLD MANUAL: 1.53 10*3/MM3 (ref 0.6–4.8)
LYMPHOCYTES NFR BLD MANUAL: 3 % (ref 0–12)
LYMPHOCYTES NFR BLD MANUAL: 9 % (ref 24–44)
MCH RBC QN AUTO: 28 PG (ref 27–31)
MCHC RBC AUTO-ENTMCNC: 31.9 G/DL (ref 32–36)
MCV RBC AUTO: 87.8 FL (ref 80–99)
MONOCYTES # BLD AUTO: 0.51 10*3/MM3 (ref 0–1)
NEUTROPHILS # BLD AUTO: 14.94 10*3/MM3 (ref 1.5–8.3)
NEUTROPHILS NFR BLD MANUAL: 86 % (ref 41–71)
NEUTS BAND NFR BLD MANUAL: 2 % (ref 0–5)
PHOSPHATE SERPL-MCNC: 4.5 MG/DL (ref 2.4–5.1)
PLATELET # BLD AUTO: 239 10*3/MM3 (ref 150–450)
PMV BLD AUTO: 10.6 FL (ref 6–12)
POTASSIUM BLD-SCNC: 5.4 MMOL/L (ref 3.5–5.5)
PROTHROMBIN TIME: 12 SECONDS (ref 9.6–11.5)
RBC # BLD AUTO: 5.9 10*6/MM3 (ref 3.89–5.14)
RBC MORPH BLD: NORMAL
SODIUM BLD-SCNC: 141 MMOL/L (ref 132–146)
WBC MORPH BLD: NORMAL
WBC NRBC COR # BLD: 16.98 10*3/MM3 (ref 3.5–10.8)
WHOLE BLOOD HOLD SPECIMEN: NORMAL

## 2017-12-20 PROCEDURE — 25010000002 CEFTRIAXONE PER 250 MG: Performed by: NURSE PRACTITIONER

## 2017-12-20 PROCEDURE — 83036 HEMOGLOBIN GLYCOSYLATED A1C: CPT | Performed by: HOSPITALIST

## 2017-12-20 PROCEDURE — 63710000001 INSULIN LISPRO (HUMAN) PER 5 UNITS: Performed by: HOSPITALIST

## 2017-12-20 PROCEDURE — 93010 ELECTROCARDIOGRAM REPORT: CPT | Performed by: INTERNAL MEDICINE

## 2017-12-20 PROCEDURE — 94799 UNLISTED PULMONARY SVC/PX: CPT

## 2017-12-20 PROCEDURE — 99232 SBSQ HOSP IP/OBS MODERATE 35: CPT | Performed by: FAMILY MEDICINE

## 2017-12-20 PROCEDURE — 25010000002 HEPARIN (PORCINE) PER 1000 UNITS: Performed by: HOSPITALIST

## 2017-12-20 PROCEDURE — 85007 BL SMEAR W/DIFF WBC COUNT: CPT | Performed by: HOSPITALIST

## 2017-12-20 PROCEDURE — 93005 ELECTROCARDIOGRAM TRACING: CPT | Performed by: FAMILY MEDICINE

## 2017-12-20 PROCEDURE — 85610 PROTHROMBIN TIME: CPT | Performed by: EMERGENCY MEDICINE

## 2017-12-20 PROCEDURE — 82962 GLUCOSE BLOOD TEST: CPT

## 2017-12-20 PROCEDURE — 80069 RENAL FUNCTION PANEL: CPT | Performed by: HOSPITALIST

## 2017-12-20 PROCEDURE — 85025 COMPLETE CBC W/AUTO DIFF WBC: CPT | Performed by: HOSPITALIST

## 2017-12-20 RX ORDER — CEFTRIAXONE SODIUM 1 G/50ML
1 INJECTION, SOLUTION INTRAVENOUS EVERY 24 HOURS
Status: DISCONTINUED | OUTPATIENT
Start: 2017-12-20 | End: 2017-12-21

## 2017-12-20 RX ORDER — BUDESONIDE AND FORMOTEROL FUMARATE DIHYDRATE 160; 4.5 UG/1; UG/1
2 AEROSOL RESPIRATORY (INHALATION)
Status: DISCONTINUED | OUTPATIENT
Start: 2017-12-20 | End: 2017-12-27 | Stop reason: HOSPADM

## 2017-12-20 RX ORDER — CARVEDILOL 12.5 MG/1
25 TABLET ORAL 2 TIMES DAILY WITH MEALS
Status: DISCONTINUED | OUTPATIENT
Start: 2017-12-20 | End: 2017-12-20

## 2017-12-20 RX ORDER — DILTIAZEM HYDROCHLORIDE 240 MG/1
240 CAPSULE, COATED, EXTENDED RELEASE ORAL
Status: DISCONTINUED | OUTPATIENT
Start: 2017-12-20 | End: 2017-12-22 | Stop reason: SDUPTHER

## 2017-12-20 RX ORDER — SODIUM CHLORIDE 9 MG/ML
50 INJECTION, SOLUTION INTRAVENOUS CONTINUOUS
Status: DISCONTINUED | OUTPATIENT
Start: 2017-12-20 | End: 2017-12-20

## 2017-12-20 RX ORDER — METOPROLOL SUCCINATE 100 MG/1
100 TABLET, EXTENDED RELEASE ORAL DAILY
Status: DISCONTINUED | OUTPATIENT
Start: 2017-12-20 | End: 2017-12-21

## 2017-12-20 RX ORDER — HEPARIN SODIUM 5000 [USP'U]/ML
5000 INJECTION, SOLUTION INTRAVENOUS; SUBCUTANEOUS EVERY 8 HOURS SCHEDULED
Status: DISCONTINUED | OUTPATIENT
Start: 2017-12-20 | End: 2017-12-27 | Stop reason: HOSPADM

## 2017-12-20 RX ORDER — DEXTROSE MONOHYDRATE 25 G/50ML
25 INJECTION, SOLUTION INTRAVENOUS
Status: DISCONTINUED | OUTPATIENT
Start: 2017-12-20 | End: 2017-12-27 | Stop reason: HOSPADM

## 2017-12-20 RX ORDER — ASPIRIN 81 MG/1
81 TABLET, CHEWABLE ORAL DAILY
Status: DISCONTINUED | OUTPATIENT
Start: 2017-12-20 | End: 2017-12-27 | Stop reason: HOSPADM

## 2017-12-20 RX ORDER — SODIUM CHLORIDE 0.9 % (FLUSH) 0.9 %
1-10 SYRINGE (ML) INJECTION AS NEEDED
Status: DISCONTINUED | OUTPATIENT
Start: 2017-12-20 | End: 2017-12-27 | Stop reason: HOSPADM

## 2017-12-20 RX ORDER — L.ACID,PARA/B.BIFIDUM/S.THERM 8B CELL
1 CAPSULE ORAL 3 TIMES DAILY
Status: DISCONTINUED | OUTPATIENT
Start: 2017-12-20 | End: 2017-12-27 | Stop reason: HOSPADM

## 2017-12-20 RX ORDER — ATORVASTATIN CALCIUM 10 MG/1
10 TABLET, FILM COATED ORAL NIGHTLY
Status: DISCONTINUED | OUTPATIENT
Start: 2017-12-20 | End: 2017-12-27 | Stop reason: HOSPADM

## 2017-12-20 RX ORDER — NICOTINE POLACRILEX 4 MG
15 LOZENGE BUCCAL
Status: DISCONTINUED | OUTPATIENT
Start: 2017-12-20 | End: 2017-12-27 | Stop reason: HOSPADM

## 2017-12-20 RX ADMIN — Medication 1 CAPSULE: at 16:51

## 2017-12-20 RX ADMIN — Medication 1 CAPSULE: at 21:05

## 2017-12-20 RX ADMIN — METOPROLOL SUCCINATE 100 MG: 100 TABLET, EXTENDED RELEASE ORAL at 15:35

## 2017-12-20 RX ADMIN — DILTIAZEM HYDROCHLORIDE 240 MG: 240 CAPSULE, COATED, EXTENDED RELEASE ORAL at 13:22

## 2017-12-20 RX ADMIN — BUDESONIDE AND FORMOTEROL FUMARATE DIHYDRATE 2 PUFF: 160; 4.5 AEROSOL RESPIRATORY (INHALATION) at 21:30

## 2017-12-20 RX ADMIN — HEPARIN SODIUM 5000 UNITS: 5000 INJECTION, SOLUTION INTRAVENOUS; SUBCUTANEOUS at 13:23

## 2017-12-20 RX ADMIN — HEPARIN SODIUM 5000 UNITS: 5000 INJECTION, SOLUTION INTRAVENOUS; SUBCUTANEOUS at 05:02

## 2017-12-20 RX ADMIN — INSULIN LISPRO 2 UNITS: 100 INJECTION, SOLUTION INTRAVENOUS; SUBCUTANEOUS at 16:51

## 2017-12-20 RX ADMIN — SODIUM CHLORIDE 125 ML/HR: 9 INJECTION, SOLUTION INTRAVENOUS at 01:47

## 2017-12-20 RX ADMIN — ASPIRIN 81 MG CHEWABLE TABLET 81 MG: 81 TABLET CHEWABLE at 13:22

## 2017-12-20 RX ADMIN — INSULIN LISPRO 4 UNITS: 100 INJECTION, SOLUTION INTRAVENOUS; SUBCUTANEOUS at 21:18

## 2017-12-20 RX ADMIN — CEFTRIAXONE SODIUM 1 G: 1 INJECTION, SOLUTION INTRAVENOUS at 21:16

## 2017-12-20 RX ADMIN — SILVER SULFADIAZINE: 10 CREAM TOPICAL at 21:18

## 2017-12-20 RX ADMIN — ATORVASTATIN CALCIUM 10 MG: 10 TABLET, FILM COATED ORAL at 21:17

## 2017-12-20 RX ADMIN — SODIUM CHLORIDE 3270 ML: 9 INJECTION, SOLUTION INTRAVENOUS at 01:44

## 2017-12-20 RX ADMIN — SODIUM CHLORIDE 125 ML/HR: 9 INJECTION, SOLUTION INTRAVENOUS at 18:09

## 2017-12-20 RX ADMIN — SODIUM CHLORIDE 125 ML/HR: 9 INJECTION, SOLUTION INTRAVENOUS at 12:27

## 2017-12-20 RX ADMIN — SILVER SULFADIAZINE: 10 CREAM TOPICAL at 15:12

## 2017-12-21 LAB
ANION GAP SERPL CALCULATED.3IONS-SCNC: 9 MMOL/L (ref 3–11)
BASOPHILS # BLD AUTO: 0.02 10*3/MM3 (ref 0–0.2)
BASOPHILS NFR BLD AUTO: 0.1 % (ref 0–1)
BUN BLD-MCNC: 40 MG/DL (ref 9–23)
BUN/CREAT SERPL: 33.3 (ref 7–25)
CALCIUM SPEC-SCNC: 8.2 MG/DL (ref 8.7–10.4)
CHLORIDE SERPL-SCNC: 119 MMOL/L (ref 99–109)
CO2 SERPL-SCNC: 16 MMOL/L (ref 20–31)
CREAT BLD-MCNC: 1.2 MG/DL (ref 0.6–1.3)
DEPRECATED RDW RBC AUTO: 55.5 FL (ref 37–54)
EOSINOPHIL # BLD AUTO: 0.03 10*3/MM3 (ref 0–0.3)
EOSINOPHIL NFR BLD AUTO: 0.2 % (ref 0–3)
ERYTHROCYTE [DISTWIDTH] IN BLOOD BY AUTOMATED COUNT: 17.3 % (ref 11.3–14.5)
GFR SERPL CREATININE-BSD FRML MDRD: 53 ML/MIN/1.73
GLUCOSE BLD-MCNC: 273 MG/DL (ref 70–100)
GLUCOSE BLDC GLUCOMTR-MCNC: 122 MG/DL (ref 70–130)
GLUCOSE BLDC GLUCOMTR-MCNC: 219 MG/DL (ref 70–130)
GLUCOSE BLDC GLUCOMTR-MCNC: 221 MG/DL (ref 70–130)
HCT VFR BLD AUTO: 43.7 % (ref 34.5–44)
HGB BLD-MCNC: 13.9 G/DL (ref 11.5–15.5)
IMM GRANULOCYTES # BLD: 0.09 10*3/MM3 (ref 0–0.03)
IMM GRANULOCYTES NFR BLD: 0.5 % (ref 0–0.6)
LYMPHOCYTES # BLD AUTO: 1.69 10*3/MM3 (ref 0.6–4.8)
LYMPHOCYTES NFR BLD AUTO: 9.6 % (ref 24–44)
MCH RBC QN AUTO: 27.8 PG (ref 27–31)
MCHC RBC AUTO-ENTMCNC: 31.8 G/DL (ref 32–36)
MCV RBC AUTO: 87.4 FL (ref 80–99)
MONOCYTES # BLD AUTO: 1.27 10*3/MM3 (ref 0–1)
MONOCYTES NFR BLD AUTO: 7.2 % (ref 0–12)
NEUTROPHILS # BLD AUTO: 14.57 10*3/MM3 (ref 1.5–8.3)
NEUTROPHILS NFR BLD AUTO: 82.4 % (ref 41–71)
PLATELET # BLD AUTO: 216 10*3/MM3 (ref 150–450)
PMV BLD AUTO: 10 FL (ref 6–12)
POTASSIUM BLD-SCNC: 4.5 MMOL/L (ref 3.5–5.5)
RBC # BLD AUTO: 5 10*6/MM3 (ref 3.89–5.14)
SODIUM BLD-SCNC: 144 MMOL/L (ref 132–146)
WBC NRBC COR # BLD: 17.67 10*3/MM3 (ref 3.5–10.8)

## 2017-12-21 PROCEDURE — 85025 COMPLETE CBC W/AUTO DIFF WBC: CPT | Performed by: FAMILY MEDICINE

## 2017-12-21 PROCEDURE — 99232 SBSQ HOSP IP/OBS MODERATE 35: CPT | Performed by: FAMILY MEDICINE

## 2017-12-21 PROCEDURE — 82962 GLUCOSE BLOOD TEST: CPT

## 2017-12-21 PROCEDURE — 94799 UNLISTED PULMONARY SVC/PX: CPT

## 2017-12-21 PROCEDURE — 25010000002 HEPARIN (PORCINE) PER 1000 UNITS: Performed by: HOSPITALIST

## 2017-12-21 PROCEDURE — 80048 BASIC METABOLIC PNL TOTAL CA: CPT | Performed by: FAMILY MEDICINE

## 2017-12-21 RX ORDER — METOPROLOL SUCCINATE 100 MG/1
100 TABLET, EXTENDED RELEASE ORAL DAILY
Status: DISCONTINUED | OUTPATIENT
Start: 2017-12-22 | End: 2017-12-26

## 2017-12-21 RX ORDER — METOPROLOL SUCCINATE 50 MG/1
50 TABLET, EXTENDED RELEASE ORAL ONCE
Status: DISCONTINUED | OUTPATIENT
Start: 2017-12-21 | End: 2017-12-21

## 2017-12-21 RX ADMIN — SILVER SULFADIAZINE: 10 CREAM TOPICAL at 08:20

## 2017-12-21 RX ADMIN — Medication 1 CAPSULE: at 08:20

## 2017-12-21 RX ADMIN — BUDESONIDE AND FORMOTEROL FUMARATE DIHYDRATE 2 PUFF: 160; 4.5 AEROSOL RESPIRATORY (INHALATION) at 19:36

## 2017-12-21 RX ADMIN — HEPARIN SODIUM 5000 UNITS: 5000 INJECTION, SOLUTION INTRAVENOUS; SUBCUTANEOUS at 14:32

## 2017-12-21 RX ADMIN — INSULIN LISPRO 3 UNITS: 100 INJECTION, SOLUTION INTRAVENOUS; SUBCUTANEOUS at 12:37

## 2017-12-21 RX ADMIN — INSULIN LISPRO 3 UNITS: 100 INJECTION, SOLUTION INTRAVENOUS; SUBCUTANEOUS at 16:52

## 2017-12-21 RX ADMIN — ASPIRIN 81 MG CHEWABLE TABLET 81 MG: 81 TABLET CHEWABLE at 08:20

## 2017-12-21 RX ADMIN — Medication 1 CAPSULE: at 16:52

## 2017-12-21 RX ADMIN — METOPROLOL SUCCINATE 100 MG: 100 TABLET, EXTENDED RELEASE ORAL at 08:20

## 2017-12-21 RX ADMIN — DILTIAZEM HYDROCHLORIDE 240 MG: 240 CAPSULE, COATED, EXTENDED RELEASE ORAL at 08:20

## 2017-12-22 ENCOUNTER — APPOINTMENT (OUTPATIENT)
Dept: CARDIOLOGY | Facility: HOSPITAL | Age: 72
End: 2017-12-22
Attending: FAMILY MEDICINE

## 2017-12-22 LAB
BASOPHILS # BLD AUTO: 0.02 10*3/MM3 (ref 0–0.2)
BASOPHILS NFR BLD AUTO: 0.2 % (ref 0–1)
BH CV ECHO MEAS - AO ROOT AREA (BSA CORRECTED): 1.6
BH CV ECHO MEAS - AO ROOT AREA: 7.5 CM^2
BH CV ECHO MEAS - AO ROOT DIAM: 3.1 CM
BH CV ECHO MEAS - BSA(HAYCOCK): 2 M^2
BH CV ECHO MEAS - BSA: 2 M^2
BH CV ECHO MEAS - BZI_BMI: 29.8 KILOGRAMS/M^2
BH CV ECHO MEAS - BZI_METRIC_HEIGHT: 170.2 CM
BH CV ECHO MEAS - BZI_METRIC_WEIGHT: 86.2 KG
BH CV ECHO MEAS - EDV(CUBED): 49.8 ML
BH CV ECHO MEAS - EDV(TEICH): 57.4 ML
BH CV ECHO MEAS - EF(CUBED): 67.1 %
BH CV ECHO MEAS - EF(TEICH): 59.5 %
BH CV ECHO MEAS - ESV(CUBED): 16.4 ML
BH CV ECHO MEAS - ESV(TEICH): 23.2 ML
BH CV ECHO MEAS - FS: 31 %
BH CV ECHO MEAS - IVS/LVPW: 1
BH CV ECHO MEAS - IVSD: 1.5 CM
BH CV ECHO MEAS - LA DIMENSION: 3.8 CM
BH CV ECHO MEAS - LA/AO: 1.2
BH CV ECHO MEAS - LV MASS(C)D: 200.5 GRAMS
BH CV ECHO MEAS - LV MASS(C)DI: 101.3 GRAMS/M^2
BH CV ECHO MEAS - LVIDD: 3.7 CM
BH CV ECHO MEAS - LVIDS: 2.5 CM
BH CV ECHO MEAS - LVOT AREA (M): 3.1 CM^2
BH CV ECHO MEAS - LVOT AREA: 3.1 CM^2
BH CV ECHO MEAS - LVOT DIAM: 2 CM
BH CV ECHO MEAS - LVPWD: 1.5 CM
BH CV ECHO MEAS - MV A MAX VEL: 40 CM/SEC
BH CV ECHO MEAS - MV DEC SLOPE: 450 CM/SEC^2
BH CV ECHO MEAS - MV DEC TIME: 0.16 SEC
BH CV ECHO MEAS - MV E MAX VEL: 110 CM/SEC
BH CV ECHO MEAS - MV E/A: 2.8
BH CV ECHO MEAS - MV P1/2T MAX VEL: 106 CM/SEC
BH CV ECHO MEAS - MV P1/2T: 69 MSEC
BH CV ECHO MEAS - MVA P1/2T LCG: 2.1 CM^2
BH CV ECHO MEAS - MVA(P1/2T): 3.2 CM^2
BH CV ECHO MEAS - PA ACC SLOPE: 908 CM/SEC^2
BH CV ECHO MEAS - PA ACC TIME: 0.09 SEC
BH CV ECHO MEAS - PA PR(ACCEL): 37.6 MMHG
BH CV ECHO MEAS - PI END-D VEL: 158 CM/SEC
BH CV ECHO MEAS - RAP SYSTOLE: 3 MMHG
BH CV ECHO MEAS - RV MAX PG: 1.6 MMHG
BH CV ECHO MEAS - RV V1 MAX: 62.7 CM/SEC
BH CV ECHO MEAS - RVSP: 23 MMHG
BH CV ECHO MEAS - SI(CUBED): 16.9 ML/M^2
BH CV ECHO MEAS - SI(TEICH): 17.3 ML/M^2
BH CV ECHO MEAS - SV(CUBED): 33.4 ML
BH CV ECHO MEAS - SV(TEICH): 34.2 ML
BH CV ECHO MEAS - TAPSE (>1.6): 8.63 CM2
BH CV ECHO MEAS - TR MAX VEL: 225 CM/SEC
BH CV XLRA - RV BASE: 4.6 CM
BH CV XLRA - RV LENGTH: 7.7 CM
BH CV XLRA - RV MID: 4.2 CM
BH CV XLRA - TDI S': 1.6 CM/SEC
DEPRECATED RDW RBC AUTO: 53.6 FL (ref 37–54)
EOSINOPHIL # BLD AUTO: 0.11 10*3/MM3 (ref 0–0.3)
EOSINOPHIL NFR BLD AUTO: 0.9 % (ref 0–3)
ERYTHROCYTE [DISTWIDTH] IN BLOOD BY AUTOMATED COUNT: 17.3 % (ref 11.3–14.5)
GLUCOSE BLDC GLUCOMTR-MCNC: 162 MG/DL (ref 70–130)
GLUCOSE BLDC GLUCOMTR-MCNC: 177 MG/DL (ref 70–130)
GLUCOSE BLDC GLUCOMTR-MCNC: 195 MG/DL (ref 70–130)
GLUCOSE BLDC GLUCOMTR-MCNC: 213 MG/DL (ref 70–130)
GLUCOSE BLDC GLUCOMTR-MCNC: 225 MG/DL (ref 70–130)
GLUCOSE BLDC GLUCOMTR-MCNC: 245 MG/DL (ref 70–130)
GLUCOSE BLDC GLUCOMTR-MCNC: 358 MG/DL (ref 70–130)
HCT VFR BLD AUTO: 45.4 % (ref 34.5–44)
HGB BLD-MCNC: 14.6 G/DL (ref 11.5–15.5)
IMM GRANULOCYTES # BLD: 0.08 10*3/MM3 (ref 0–0.03)
IMM GRANULOCYTES NFR BLD: 0.7 % (ref 0–0.6)
LV EF 2D ECHO EST: 70 %
LYMPHOCYTES # BLD AUTO: 1.85 10*3/MM3 (ref 0.6–4.8)
LYMPHOCYTES NFR BLD AUTO: 15.1 % (ref 24–44)
MAXIMAL PREDICTED HEART RATE: 146 BPM
MCH RBC QN AUTO: 27.5 PG (ref 27–31)
MCHC RBC AUTO-ENTMCNC: 32.2 G/DL (ref 32–36)
MCV RBC AUTO: 85.7 FL (ref 80–99)
MONOCYTES # BLD AUTO: 0.94 10*3/MM3 (ref 0–1)
MONOCYTES NFR BLD AUTO: 7.7 % (ref 0–12)
NEUTROPHILS # BLD AUTO: 9.26 10*3/MM3 (ref 1.5–8.3)
NEUTROPHILS NFR BLD AUTO: 75.4 % (ref 41–71)
PLATELET # BLD AUTO: 164 10*3/MM3 (ref 150–450)
PMV BLD AUTO: 10.7 FL (ref 6–12)
RBC # BLD AUTO: 5.3 10*6/MM3 (ref 3.89–5.14)
STRESS TARGET HR: 124 BPM
WBC NRBC COR # BLD: 12.26 10*3/MM3 (ref 3.5–10.8)

## 2017-12-22 PROCEDURE — 93005 ELECTROCARDIOGRAM TRACING: CPT | Performed by: NURSE PRACTITIONER

## 2017-12-22 PROCEDURE — 99222 1ST HOSP IP/OBS MODERATE 55: CPT | Performed by: INTERNAL MEDICINE

## 2017-12-22 PROCEDURE — 93306 TTE W/DOPPLER COMPLETE: CPT | Performed by: INTERNAL MEDICINE

## 2017-12-22 PROCEDURE — 25010000002 LORAZEPAM PER 2 MG: Performed by: FAMILY MEDICINE

## 2017-12-22 PROCEDURE — 93306 TTE W/DOPPLER COMPLETE: CPT

## 2017-12-22 PROCEDURE — 93010 ELECTROCARDIOGRAM REPORT: CPT | Performed by: INTERNAL MEDICINE

## 2017-12-22 PROCEDURE — 94799 UNLISTED PULMONARY SVC/PX: CPT

## 2017-12-22 PROCEDURE — 99233 SBSQ HOSP IP/OBS HIGH 50: CPT | Performed by: FAMILY MEDICINE

## 2017-12-22 PROCEDURE — 82962 GLUCOSE BLOOD TEST: CPT

## 2017-12-22 PROCEDURE — 94640 AIRWAY INHALATION TREATMENT: CPT

## 2017-12-22 PROCEDURE — 25010000002 HEPARIN (PORCINE) PER 1000 UNITS: Performed by: HOSPITALIST

## 2017-12-22 PROCEDURE — 63710000001 INSULIN DETEMIR PER 5 UNITS: Performed by: FAMILY MEDICINE

## 2017-12-22 PROCEDURE — 85025 COMPLETE CBC W/AUTO DIFF WBC: CPT | Performed by: FAMILY MEDICINE

## 2017-12-22 RX ORDER — LORAZEPAM 2 MG/ML
0.5 INJECTION INTRAMUSCULAR EVERY 6 HOURS PRN
Status: DISCONTINUED | OUTPATIENT
Start: 2017-12-22 | End: 2017-12-27 | Stop reason: HOSPADM

## 2017-12-22 RX ORDER — DILTIAZEM HCL IN NACL,ISO-OSM 125 MG/125
5-15 PLASTIC BAG, INJECTION (ML) INTRAVENOUS
Status: DISCONTINUED | OUTPATIENT
Start: 2017-12-22 | End: 2017-12-26

## 2017-12-22 RX ORDER — QUETIAPINE FUMARATE 25 MG/1
12.5 TABLET, FILM COATED ORAL EVERY 8 HOURS PRN
Status: DISCONTINUED | OUTPATIENT
Start: 2017-12-22 | End: 2017-12-27 | Stop reason: HOSPADM

## 2017-12-22 RX ADMIN — METOPROLOL SUCCINATE 100 MG: 100 TABLET, FILM COATED, EXTENDED RELEASE ORAL at 13:30

## 2017-12-22 RX ADMIN — INSULIN LISPRO 3 UNITS: 100 INJECTION, SOLUTION INTRAVENOUS; SUBCUTANEOUS at 01:25

## 2017-12-22 RX ADMIN — SILVER SULFADIAZINE: 10 CREAM TOPICAL at 09:19

## 2017-12-22 RX ADMIN — LORAZEPAM 0.5 MG: 2 INJECTION, SOLUTION INTRAMUSCULAR; INTRAVENOUS at 11:42

## 2017-12-22 RX ADMIN — DILTIAZEM HCL-SODIUM CHLORIDE IV SOLN 125 MG/125ML-0.9% 5 MG/HR: 125-0.9/125 SOLUTION at 01:55

## 2017-12-22 RX ADMIN — SILVER SULFADIAZINE: 10 CREAM TOPICAL at 02:06

## 2017-12-22 RX ADMIN — DILTIAZEM HCL-SODIUM CHLORIDE IV SOLN 125 MG/125ML-0.9% 5 MG/HR: 125-0.9/125 SOLUTION at 14:44

## 2017-12-22 RX ADMIN — Medication 1 CAPSULE: at 21:08

## 2017-12-22 RX ADMIN — INSULIN DETEMIR 10 UNITS: 100 INJECTION, SOLUTION SUBCUTANEOUS at 21:08

## 2017-12-22 RX ADMIN — INSULIN LISPRO 2 UNITS: 100 INJECTION, SOLUTION INTRAVENOUS; SUBCUTANEOUS at 13:30

## 2017-12-22 RX ADMIN — SILVER SULFADIAZINE: 10 CREAM TOPICAL at 21:09

## 2017-12-22 RX ADMIN — INSULIN LISPRO 6 UNITS: 100 INJECTION, SOLUTION INTRAVENOUS; SUBCUTANEOUS at 16:34

## 2017-12-22 RX ADMIN — INSULIN LISPRO 2 UNITS: 100 INJECTION, SOLUTION INTRAVENOUS; SUBCUTANEOUS at 21:08

## 2017-12-22 RX ADMIN — ATORVASTATIN CALCIUM 10 MG: 10 TABLET, FILM COATED ORAL at 21:08

## 2017-12-22 RX ADMIN — ATORVASTATIN CALCIUM 10 MG: 10 TABLET, FILM COATED ORAL at 02:05

## 2017-12-22 RX ADMIN — Medication 1 CAPSULE: at 02:05

## 2017-12-22 RX ADMIN — INSULIN LISPRO 3 UNITS: 100 INJECTION, SOLUTION INTRAVENOUS; SUBCUTANEOUS at 18:42

## 2017-12-22 RX ADMIN — BUDESONIDE AND FORMOTEROL FUMARATE DIHYDRATE 2 PUFF: 160; 4.5 AEROSOL RESPIRATORY (INHALATION) at 18:55

## 2017-12-23 LAB
ANION GAP SERPL CALCULATED.3IONS-SCNC: 9 MMOL/L (ref 3–11)
ARTICHOKE IGE QN: 52 MG/DL (ref 0–130)
BACTERIA SPEC AEROBE CULT: ABNORMAL
BACTERIA SPEC AEROBE CULT: ABNORMAL
BASOPHILS # BLD AUTO: 0.02 10*3/MM3 (ref 0–0.2)
BASOPHILS NFR BLD AUTO: 0.2 % (ref 0–1)
BUN BLD-MCNC: 22 MG/DL (ref 9–23)
BUN/CREAT SERPL: 27.5 (ref 7–25)
CA-I SERPL ISE-MCNC: 1.26 MMOL/L (ref 1.12–1.32)
CALCIUM SPEC-SCNC: 8.5 MG/DL (ref 8.7–10.4)
CHLORIDE SERPL-SCNC: 113 MMOL/L (ref 99–109)
CHOLEST SERPL-MCNC: 115 MG/DL (ref 0–200)
CO2 SERPL-SCNC: 18 MMOL/L (ref 20–31)
CREAT BLD-MCNC: 0.8 MG/DL (ref 0.6–1.3)
DEPRECATED RDW RBC AUTO: 56.7 FL (ref 37–54)
EOSINOPHIL # BLD AUTO: 0.17 10*3/MM3 (ref 0–0.3)
EOSINOPHIL NFR BLD AUTO: 1.4 % (ref 0–3)
ERYTHROCYTE [DISTWIDTH] IN BLOOD BY AUTOMATED COUNT: 17.7 % (ref 11.3–14.5)
GFR SERPL CREATININE-BSD FRML MDRD: 85 ML/MIN/1.73
GLUCOSE BLD-MCNC: 129 MG/DL (ref 70–100)
GLUCOSE BLDC GLUCOMTR-MCNC: 112 MG/DL (ref 70–130)
GLUCOSE BLDC GLUCOMTR-MCNC: 123 MG/DL (ref 70–130)
GLUCOSE BLDC GLUCOMTR-MCNC: 189 MG/DL (ref 70–130)
HCT VFR BLD AUTO: 44.7 % (ref 34.5–44)
HDLC SERPL-MCNC: 44 MG/DL (ref 40–60)
HGB BLD-MCNC: 14.2 G/DL (ref 11.5–15.5)
IMM GRANULOCYTES # BLD: 0.07 10*3/MM3 (ref 0–0.03)
IMM GRANULOCYTES NFR BLD: 0.6 % (ref 0–0.6)
LYMPHOCYTES # BLD AUTO: 2.26 10*3/MM3 (ref 0.6–4.8)
LYMPHOCYTES NFR BLD AUTO: 19 % (ref 24–44)
MAGNESIUM SERPL-MCNC: 1.4 MG/DL (ref 1.3–2.7)
MCH RBC QN AUTO: 28 PG (ref 27–31)
MCHC RBC AUTO-ENTMCNC: 31.8 G/DL (ref 32–36)
MCV RBC AUTO: 88.2 FL (ref 80–99)
MONOCYTES # BLD AUTO: 0.81 10*3/MM3 (ref 0–1)
MONOCYTES NFR BLD AUTO: 6.8 % (ref 0–12)
NEUTROPHILS # BLD AUTO: 8.56 10*3/MM3 (ref 1.5–8.3)
NEUTROPHILS NFR BLD AUTO: 72 % (ref 41–71)
PHOSPHATE SERPL-MCNC: 1.8 MG/DL (ref 2.4–5.1)
PLAT MORPH BLD: NORMAL
PLATELET # BLD AUTO: 233 10*3/MM3 (ref 150–450)
PMV BLD AUTO: 10.7 FL (ref 6–12)
POTASSIUM BLD-SCNC: 3.4 MMOL/L (ref 3.5–5.5)
RBC # BLD AUTO: 5.07 10*6/MM3 (ref 3.89–5.14)
RBC MORPH BLD: NORMAL
SODIUM BLD-SCNC: 140 MMOL/L (ref 132–146)
TRIGL SERPL-MCNC: 120 MG/DL (ref 0–150)
WBC MORPH BLD: NORMAL
WBC NRBC COR # BLD: 11.89 10*3/MM3 (ref 3.5–10.8)

## 2017-12-23 PROCEDURE — 63710000001 INSULIN DETEMIR PER 5 UNITS: Performed by: FAMILY MEDICINE

## 2017-12-23 PROCEDURE — 80048 BASIC METABOLIC PNL TOTAL CA: CPT | Performed by: FAMILY MEDICINE

## 2017-12-23 PROCEDURE — 82962 GLUCOSE BLOOD TEST: CPT

## 2017-12-23 PROCEDURE — 83735 ASSAY OF MAGNESIUM: CPT | Performed by: FAMILY MEDICINE

## 2017-12-23 PROCEDURE — 84100 ASSAY OF PHOSPHORUS: CPT | Performed by: FAMILY MEDICINE

## 2017-12-23 PROCEDURE — 99232 SBSQ HOSP IP/OBS MODERATE 35: CPT | Performed by: INTERNAL MEDICINE

## 2017-12-23 PROCEDURE — 82330 ASSAY OF CALCIUM: CPT | Performed by: FAMILY MEDICINE

## 2017-12-23 PROCEDURE — 85007 BL SMEAR W/DIFF WBC COUNT: CPT | Performed by: FAMILY MEDICINE

## 2017-12-23 PROCEDURE — 85025 COMPLETE CBC W/AUTO DIFF WBC: CPT | Performed by: FAMILY MEDICINE

## 2017-12-23 PROCEDURE — 99232 SBSQ HOSP IP/OBS MODERATE 35: CPT | Performed by: FAMILY MEDICINE

## 2017-12-23 PROCEDURE — 80061 LIPID PANEL: CPT | Performed by: PHYSICIAN ASSISTANT

## 2017-12-23 RX ORDER — MAGNESIUM SULFATE HEPTAHYDRATE 40 MG/ML
2 INJECTION, SOLUTION INTRAVENOUS AS NEEDED
Status: DISCONTINUED | OUTPATIENT
Start: 2017-12-23 | End: 2017-12-27 | Stop reason: HOSPADM

## 2017-12-23 RX ORDER — MAGNESIUM SULFATE HEPTAHYDRATE 40 MG/ML
4 INJECTION, SOLUTION INTRAVENOUS AS NEEDED
Status: DISCONTINUED | OUTPATIENT
Start: 2017-12-23 | End: 2017-12-27 | Stop reason: HOSPADM

## 2017-12-23 RX ORDER — POTASSIUM CHLORIDE 750 MG/1
40 CAPSULE, EXTENDED RELEASE ORAL AS NEEDED
Status: DISCONTINUED | OUTPATIENT
Start: 2017-12-23 | End: 2017-12-23

## 2017-12-23 RX ORDER — POTASSIUM CHLORIDE 1.5 G/1.77G
40 POWDER, FOR SOLUTION ORAL AS NEEDED
Status: DISCONTINUED | OUTPATIENT
Start: 2017-12-23 | End: 2017-12-23

## 2017-12-23 RX ORDER — POTASSIUM CHLORIDE 7.45 MG/ML
10 INJECTION INTRAVENOUS
Status: DISCONTINUED | OUTPATIENT
Start: 2017-12-23 | End: 2017-12-23

## 2017-12-23 RX ADMIN — QUETIAPINE FUMARATE 12.5 MG: 25 TABLET ORAL at 21:40

## 2017-12-23 RX ADMIN — Medication 1 CAPSULE: at 21:39

## 2017-12-23 RX ADMIN — ATORVASTATIN CALCIUM 10 MG: 10 TABLET, FILM COATED ORAL at 21:39

## 2017-12-23 RX ADMIN — INSULIN DETEMIR 10 UNITS: 100 INJECTION, SOLUTION SUBCUTANEOUS at 21:38

## 2017-12-23 RX ADMIN — SILVER SULFADIAZINE: 10 CREAM TOPICAL at 21:57

## 2017-12-23 RX ADMIN — Medication 1 CAPSULE: at 16:09

## 2017-12-23 RX ADMIN — INSULIN LISPRO 2 UNITS: 100 INJECTION, SOLUTION INTRAVENOUS; SUBCUTANEOUS at 13:38

## 2017-12-23 RX ADMIN — SILVER SULFADIAZINE: 10 CREAM TOPICAL at 08:56

## 2017-12-23 RX ADMIN — DILTIAZEM HCL-SODIUM CHLORIDE IV SOLN 125 MG/125ML-0.9% 5 MG/HR: 125-0.9/125 SOLUTION at 18:29

## 2017-12-24 LAB
GLUCOSE BLDC GLUCOMTR-MCNC: 103 MG/DL (ref 70–130)
GLUCOSE BLDC GLUCOMTR-MCNC: 180 MG/DL (ref 70–130)
GLUCOSE BLDC GLUCOMTR-MCNC: 235 MG/DL (ref 70–130)
GLUCOSE BLDC GLUCOMTR-MCNC: 259 MG/DL (ref 70–130)

## 2017-12-24 PROCEDURE — 99232 SBSQ HOSP IP/OBS MODERATE 35: CPT | Performed by: INTERNAL MEDICINE

## 2017-12-24 PROCEDURE — 25010000002 HEPARIN (PORCINE) PER 1000 UNITS: Performed by: HOSPITALIST

## 2017-12-24 PROCEDURE — 63710000001 INSULIN DETEMIR PER 5 UNITS: Performed by: FAMILY MEDICINE

## 2017-12-24 PROCEDURE — 94799 UNLISTED PULMONARY SVC/PX: CPT

## 2017-12-24 PROCEDURE — 99233 SBSQ HOSP IP/OBS HIGH 50: CPT | Performed by: NURSE PRACTITIONER

## 2017-12-24 PROCEDURE — 82962 GLUCOSE BLOOD TEST: CPT

## 2017-12-24 PROCEDURE — 94640 AIRWAY INHALATION TREATMENT: CPT

## 2017-12-24 RX ORDER — HALOPERIDOL 2 MG/ML
2 SOLUTION ORAL EVERY 6 HOURS PRN
Status: DISCONTINUED | OUTPATIENT
Start: 2017-12-24 | End: 2017-12-27 | Stop reason: HOSPADM

## 2017-12-24 RX ADMIN — INSULIN LISPRO 3 UNITS: 100 INJECTION, SOLUTION INTRAVENOUS; SUBCUTANEOUS at 12:45

## 2017-12-24 RX ADMIN — BUDESONIDE AND FORMOTEROL FUMARATE DIHYDRATE 2 PUFF: 160; 4.5 AEROSOL RESPIRATORY (INHALATION) at 08:54

## 2017-12-24 RX ADMIN — QUETIAPINE FUMARATE 12.5 MG: 25 TABLET ORAL at 21:58

## 2017-12-24 RX ADMIN — HEPARIN SODIUM 5000 UNITS: 5000 INJECTION, SOLUTION INTRAVENOUS; SUBCUTANEOUS at 21:59

## 2017-12-24 RX ADMIN — INSULIN DETEMIR 10 UNITS: 100 INJECTION, SOLUTION SUBCUTANEOUS at 21:57

## 2017-12-24 RX ADMIN — DILTIAZEM HCL-SODIUM CHLORIDE IV SOLN 125 MG/125ML-0.9% 10 MG/HR: 125-0.9/125 SOLUTION at 19:51

## 2017-12-24 RX ADMIN — SILVER SULFADIAZINE: 10 CREAM TOPICAL at 09:43

## 2017-12-24 RX ADMIN — INSULIN LISPRO 4 UNITS: 100 INJECTION, SOLUTION INTRAVENOUS; SUBCUTANEOUS at 18:29

## 2017-12-24 RX ADMIN — SILVER SULFADIAZINE: 10 CREAM TOPICAL at 21:59

## 2017-12-24 RX ADMIN — ATORVASTATIN CALCIUM 10 MG: 10 TABLET, FILM COATED ORAL at 21:58

## 2017-12-24 RX ADMIN — BUDESONIDE AND FORMOTEROL FUMARATE DIHYDRATE 2 PUFF: 160; 4.5 AEROSOL RESPIRATORY (INHALATION) at 19:31

## 2017-12-24 RX ADMIN — Medication 1 CAPSULE: at 21:58

## 2017-12-25 LAB
GLUCOSE BLDC GLUCOMTR-MCNC: 119 MG/DL (ref 70–130)
GLUCOSE BLDC GLUCOMTR-MCNC: 231 MG/DL (ref 70–130)
GLUCOSE BLDC GLUCOMTR-MCNC: 249 MG/DL (ref 70–130)
GLUCOSE BLDC GLUCOMTR-MCNC: 292 MG/DL (ref 70–130)

## 2017-12-25 PROCEDURE — 25010000002 HEPARIN (PORCINE) PER 1000 UNITS: Performed by: HOSPITALIST

## 2017-12-25 PROCEDURE — 82962 GLUCOSE BLOOD TEST: CPT

## 2017-12-25 PROCEDURE — 63710000001 INSULIN DETEMIR PER 5 UNITS: Performed by: FAMILY MEDICINE

## 2017-12-25 PROCEDURE — 63710000001 INSULIN LISPRO (HUMAN) PER 5 UNITS: Performed by: NURSE PRACTITIONER

## 2017-12-25 PROCEDURE — 99231 SBSQ HOSP IP/OBS SF/LOW 25: CPT | Performed by: INTERNAL MEDICINE

## 2017-12-25 PROCEDURE — 99232 SBSQ HOSP IP/OBS MODERATE 35: CPT | Performed by: NURSE PRACTITIONER

## 2017-12-25 PROCEDURE — 94799 UNLISTED PULMONARY SVC/PX: CPT

## 2017-12-25 PROCEDURE — 63710000001 INSULIN DETEMIR PER 5 UNITS: Performed by: NURSE PRACTITIONER

## 2017-12-25 RX ADMIN — INSULIN LISPRO 3 UNITS: 100 INJECTION, SOLUTION INTRAVENOUS; SUBCUTANEOUS at 11:41

## 2017-12-25 RX ADMIN — INSULIN LISPRO 3 UNITS: 100 INJECTION, SOLUTION INTRAVENOUS; SUBCUTANEOUS at 17:29

## 2017-12-25 RX ADMIN — INSULIN LISPRO 4 UNITS: 100 INJECTION, SOLUTION INTRAVENOUS; SUBCUTANEOUS at 17:29

## 2017-12-25 RX ADMIN — DILTIAZEM HCL-SODIUM CHLORIDE IV SOLN 125 MG/125ML-0.9% 10 MG/HR: 125-0.9/125 SOLUTION at 21:48

## 2017-12-25 RX ADMIN — METOPROLOL SUCCINATE 100 MG: 100 TABLET, FILM COATED, EXTENDED RELEASE ORAL at 11:43

## 2017-12-25 RX ADMIN — SILVER SULFADIAZINE: 10 CREAM TOPICAL at 09:22

## 2017-12-25 RX ADMIN — INSULIN DETEMIR 10 UNITS: 100 INJECTION, SOLUTION SUBCUTANEOUS at 21:22

## 2017-12-25 RX ADMIN — INSULIN LISPRO 3 UNITS: 100 INJECTION, SOLUTION INTRAVENOUS; SUBCUTANEOUS at 11:42

## 2017-12-25 RX ADMIN — SILVER SULFADIAZINE: 10 CREAM TOPICAL at 21:26

## 2017-12-25 RX ADMIN — BUDESONIDE AND FORMOTEROL FUMARATE DIHYDRATE 2 PUFF: 160; 4.5 AEROSOL RESPIRATORY (INHALATION) at 19:02

## 2017-12-25 RX ADMIN — INSULIN LISPRO 3 UNITS: 100 INJECTION, SOLUTION INTRAVENOUS; SUBCUTANEOUS at 21:23

## 2017-12-25 RX ADMIN — ASPIRIN 81 MG CHEWABLE TABLET 81 MG: 81 TABLET CHEWABLE at 11:42

## 2017-12-25 RX ADMIN — HEPARIN SODIUM 5000 UNITS: 5000 INJECTION, SOLUTION INTRAVENOUS; SUBCUTANEOUS at 21:22

## 2017-12-25 RX ADMIN — INSULIN DETEMIR 5 UNITS: 100 INJECTION, SOLUTION SUBCUTANEOUS at 17:31

## 2017-12-26 LAB
GLUCOSE BLDC GLUCOMTR-MCNC: 143 MG/DL (ref 70–130)
GLUCOSE BLDC GLUCOMTR-MCNC: 224 MG/DL (ref 70–130)
GLUCOSE BLDC GLUCOMTR-MCNC: 269 MG/DL (ref 70–130)
GLUCOSE BLDC GLUCOMTR-MCNC: 368 MG/DL (ref 70–130)

## 2017-12-26 PROCEDURE — 99231 SBSQ HOSP IP/OBS SF/LOW 25: CPT | Performed by: INTERNAL MEDICINE

## 2017-12-26 PROCEDURE — 63710000001 INSULIN LISPRO (HUMAN) PER 5 UNITS: Performed by: NURSE PRACTITIONER

## 2017-12-26 PROCEDURE — 25010000002 HEPARIN (PORCINE) PER 1000 UNITS: Performed by: HOSPITALIST

## 2017-12-26 PROCEDURE — 63710000001 INSULIN DETEMIR PER 5 UNITS: Performed by: NURSE PRACTITIONER

## 2017-12-26 PROCEDURE — 99233 SBSQ HOSP IP/OBS HIGH 50: CPT | Performed by: NURSE PRACTITIONER

## 2017-12-26 PROCEDURE — 94640 AIRWAY INHALATION TREATMENT: CPT

## 2017-12-26 PROCEDURE — 82962 GLUCOSE BLOOD TEST: CPT

## 2017-12-26 PROCEDURE — 63710000001 INSULIN DETEMIR PER 5 UNITS: Performed by: FAMILY MEDICINE

## 2017-12-26 RX ORDER — HALOPERIDOL 1 MG/1
0.5 TABLET ORAL EVERY 12 HOURS SCHEDULED
Status: DISCONTINUED | OUTPATIENT
Start: 2017-12-26 | End: 2017-12-27 | Stop reason: HOSPADM

## 2017-12-26 RX ORDER — DILTIAZEM HYDROCHLORIDE 60 MG/1
60 TABLET, FILM COATED ORAL EVERY 6 HOURS SCHEDULED
Status: DISCONTINUED | OUTPATIENT
Start: 2017-12-26 | End: 2017-12-27 | Stop reason: HOSPADM

## 2017-12-26 RX ORDER — SPIRONOLACTONE 50 MG/1
50 TABLET, FILM COATED ORAL DAILY
Status: DISCONTINUED | OUTPATIENT
Start: 2017-12-26 | End: 2017-12-27 | Stop reason: HOSPADM

## 2017-12-26 RX ADMIN — INSULIN LISPRO 5 UNITS: 100 INJECTION, SOLUTION INTRAVENOUS; SUBCUTANEOUS at 17:16

## 2017-12-26 RX ADMIN — METOPROLOL SUCCINATE 150 MG: 100 TABLET, EXTENDED RELEASE ORAL at 12:10

## 2017-12-26 RX ADMIN — SILVER SULFADIAZINE: 10 CREAM TOPICAL at 08:30

## 2017-12-26 RX ADMIN — INSULIN DETEMIR 5 UNITS: 100 INJECTION, SOLUTION SUBCUTANEOUS at 07:35

## 2017-12-26 RX ADMIN — HEPARIN SODIUM 5000 UNITS: 5000 INJECTION, SOLUTION INTRAVENOUS; SUBCUTANEOUS at 13:13

## 2017-12-26 RX ADMIN — ATORVASTATIN CALCIUM 10 MG: 10 TABLET, FILM COATED ORAL at 23:49

## 2017-12-26 RX ADMIN — SILVER SULFADIAZINE: 10 CREAM TOPICAL at 23:51

## 2017-12-26 RX ADMIN — Medication 1 CAPSULE: at 23:51

## 2017-12-26 RX ADMIN — INSULIN DETEMIR 5 UNITS: 100 INJECTION, SOLUTION SUBCUTANEOUS at 10:04

## 2017-12-26 RX ADMIN — Medication 1 CAPSULE: at 16:52

## 2017-12-26 RX ADMIN — INSULIN LISPRO 6 UNITS: 100 INJECTION, SOLUTION INTRAVENOUS; SUBCUTANEOUS at 12:11

## 2017-12-26 RX ADMIN — DILTIAZEM HCL-SODIUM CHLORIDE IV SOLN 125 MG/125ML-0.9% 10 MG/HR: 125-0.9/125 SOLUTION at 07:35

## 2017-12-26 RX ADMIN — BUDESONIDE AND FORMOTEROL FUMARATE DIHYDRATE 2 PUFF: 160; 4.5 AEROSOL RESPIRATORY (INHALATION) at 09:04

## 2017-12-26 RX ADMIN — INSULIN LISPRO 3 UNITS: 100 INJECTION, SOLUTION INTRAVENOUS; SUBCUTANEOUS at 08:29

## 2017-12-26 RX ADMIN — INSULIN LISPRO 5 UNITS: 100 INJECTION, SOLUTION INTRAVENOUS; SUBCUTANEOUS at 12:12

## 2017-12-26 RX ADMIN — INSULIN LISPRO 3 UNITS: 100 INJECTION, SOLUTION INTRAVENOUS; SUBCUTANEOUS at 17:15

## 2017-12-26 RX ADMIN — DILTIAZEM HYDROCHLORIDE 60 MG: 60 TABLET, FILM COATED ORAL at 12:09

## 2017-12-26 RX ADMIN — HEPARIN SODIUM 5000 UNITS: 5000 INJECTION, SOLUTION INTRAVENOUS; SUBCUTANEOUS at 06:22

## 2017-12-26 RX ADMIN — SPIRONOLACTONE 50 MG: 50 TABLET ORAL at 12:10

## 2017-12-26 RX ADMIN — HALOPERIDOL 0.5 MG: 1 TABLET ORAL at 13:13

## 2017-12-26 RX ADMIN — DILTIAZEM HYDROCHLORIDE 60 MG: 60 TABLET, FILM COATED ORAL at 17:15

## 2017-12-26 RX ADMIN — HALOPERIDOL 0.5 MG: 1 TABLET ORAL at 23:50

## 2017-12-27 VITALS
TEMPERATURE: 98.5 F | BODY MASS INDEX: 28.96 KG/M2 | SYSTOLIC BLOOD PRESSURE: 133 MMHG | DIASTOLIC BLOOD PRESSURE: 77 MMHG | OXYGEN SATURATION: 98 % | RESPIRATION RATE: 18 BRPM | HEART RATE: 80 BPM | WEIGHT: 191.1 LBS | HEIGHT: 68 IN

## 2017-12-27 LAB
GLUCOSE BLDC GLUCOMTR-MCNC: 250 MG/DL (ref 70–130)
GLUCOSE BLDC GLUCOMTR-MCNC: 316 MG/DL (ref 70–130)

## 2017-12-27 PROCEDURE — 63710000001 INSULIN DETEMIR PER 5 UNITS: Performed by: NURSE PRACTITIONER

## 2017-12-27 PROCEDURE — 25010000002 HEPARIN (PORCINE) PER 1000 UNITS: Performed by: HOSPITALIST

## 2017-12-27 PROCEDURE — 82962 GLUCOSE BLOOD TEST: CPT

## 2017-12-27 PROCEDURE — 99231 SBSQ HOSP IP/OBS SF/LOW 25: CPT | Performed by: INTERNAL MEDICINE

## 2017-12-27 PROCEDURE — 99239 HOSP IP/OBS DSCHRG MGMT >30: CPT | Performed by: NURSE PRACTITIONER

## 2017-12-27 RX ORDER — METOPROLOL SUCCINATE 50 MG/1
150 TABLET, EXTENDED RELEASE ORAL DAILY
Start: 2017-12-28 | End: 2018-01-01 | Stop reason: HOSPADM

## 2017-12-27 RX ORDER — TRAMADOL HYDROCHLORIDE 50 MG/1
50 TABLET ORAL EVERY 8 HOURS PRN
Qty: 9 TABLET | Refills: 0 | Status: SHIPPED | OUTPATIENT
Start: 2017-12-27 | End: 2018-01-01 | Stop reason: HOSPADM

## 2017-12-27 RX ORDER — DILTIAZEM HYDROCHLORIDE 60 MG/1
60 TABLET, FILM COATED ORAL EVERY 6 HOURS SCHEDULED
Start: 2017-12-27 | End: 2018-01-01 | Stop reason: HOSPADM

## 2017-12-27 RX ORDER — HALOPERIDOL 0.5 MG/1
0.5 TABLET ORAL EVERY 12 HOURS SCHEDULED
Start: 2017-12-27 | End: 2018-01-01

## 2017-12-27 RX ADMIN — INSULIN LISPRO 5 UNITS: 100 INJECTION, SOLUTION INTRAVENOUS; SUBCUTANEOUS at 11:52

## 2017-12-27 RX ADMIN — DILTIAZEM HYDROCHLORIDE 60 MG: 60 TABLET, FILM COATED ORAL at 11:52

## 2017-12-27 RX ADMIN — INSULIN LISPRO 5 UNITS: 100 INJECTION, SOLUTION INTRAVENOUS; SUBCUTANEOUS at 11:53

## 2017-12-27 RX ADMIN — SPIRONOLACTONE 50 MG: 50 TABLET ORAL at 08:27

## 2017-12-27 RX ADMIN — METOPROLOL SUCCINATE 150 MG: 100 TABLET, EXTENDED RELEASE ORAL at 08:27

## 2017-12-27 RX ADMIN — HALOPERIDOL 0.5 MG: 1 TABLET ORAL at 09:23

## 2017-12-27 RX ADMIN — INSULIN LISPRO 5 UNITS: 100 INJECTION, SOLUTION INTRAVENOUS; SUBCUTANEOUS at 08:28

## 2017-12-27 RX ADMIN — Medication 1 CAPSULE: at 08:27

## 2017-12-27 RX ADMIN — INSULIN DETEMIR 10 UNITS: 100 INJECTION, SOLUTION SUBCUTANEOUS at 06:17

## 2017-12-27 RX ADMIN — SILVER SULFADIAZINE: 10 CREAM TOPICAL at 08:27

## 2017-12-27 RX ADMIN — DILTIAZEM HYDROCHLORIDE 60 MG: 60 TABLET, FILM COATED ORAL at 06:17

## 2017-12-27 RX ADMIN — HEPARIN SODIUM 5000 UNITS: 5000 INJECTION, SOLUTION INTRAVENOUS; SUBCUTANEOUS at 06:17

## 2017-12-27 RX ADMIN — HEPARIN SODIUM 5000 UNITS: 5000 INJECTION, SOLUTION INTRAVENOUS; SUBCUTANEOUS at 13:27

## 2017-12-27 RX ADMIN — ASPIRIN 81 MG CHEWABLE TABLET 81 MG: 81 TABLET CHEWABLE at 08:27

## 2017-12-27 RX ADMIN — INSULIN LISPRO 4 UNITS: 100 INJECTION, SOLUTION INTRAVENOUS; SUBCUTANEOUS at 08:28

## 2017-12-28 LAB — GLUCOSE BLDC GLUCOMTR-MCNC: 145 MG/DL (ref 70–130)

## 2018-01-01 ENCOUNTER — APPOINTMENT (OUTPATIENT)
Dept: CARDIOLOGY | Facility: HOSPITAL | Age: 73
End: 2018-01-01

## 2018-01-01 ENCOUNTER — APPOINTMENT (OUTPATIENT)
Dept: ULTRASOUND IMAGING | Facility: HOSPITAL | Age: 73
End: 2018-01-01

## 2018-01-01 ENCOUNTER — APPOINTMENT (OUTPATIENT)
Dept: CARDIOLOGY | Facility: HOSPITAL | Age: 73
End: 2018-01-01
Attending: INTERNAL MEDICINE

## 2018-01-01 ENCOUNTER — APPOINTMENT (OUTPATIENT)
Dept: CT IMAGING | Facility: HOSPITAL | Age: 73
End: 2018-01-01

## 2018-01-01 ENCOUNTER — APPOINTMENT (OUTPATIENT)
Dept: ULTRASOUND IMAGING | Facility: HOSPITAL | Age: 73
End: 2018-01-01
Attending: INTERNAL MEDICINE

## 2018-01-01 ENCOUNTER — LAB REQUISITION (OUTPATIENT)
Dept: LAB | Facility: HOSPITAL | Age: 73
End: 2018-01-01

## 2018-01-01 ENCOUNTER — HOSPITAL ENCOUNTER (INPATIENT)
Facility: HOSPITAL | Age: 73
LOS: 16 days | Discharge: NURSING, REHAB OR LONG-TERM CARE | End: 2018-12-14
Attending: EMERGENCY MEDICINE | Admitting: INTERNAL MEDICINE

## 2018-01-01 ENCOUNTER — APPOINTMENT (OUTPATIENT)
Dept: GENERAL RADIOLOGY | Facility: HOSPITAL | Age: 73
End: 2018-01-01

## 2018-01-01 ENCOUNTER — HOSPITAL ENCOUNTER (INPATIENT)
Facility: HOSPITAL | Age: 73
LOS: 13 days | Discharge: NURSING, REHAB OR LONG-TERM CARE | End: 2018-02-13
Attending: EMERGENCY MEDICINE | Admitting: INTERNAL MEDICINE

## 2018-01-01 ENCOUNTER — HOSPITAL ENCOUNTER (INPATIENT)
Facility: HOSPITAL | Age: 73
LOS: 11 days | Discharge: NURSING, REHAB OR LONG-TERM CARE | End: 2018-07-13
Attending: EMERGENCY MEDICINE | Admitting: INTERNAL MEDICINE

## 2018-01-01 VITALS
SYSTOLIC BLOOD PRESSURE: 123 MMHG | BODY MASS INDEX: 27.21 KG/M2 | HEART RATE: 85 BPM | RESPIRATION RATE: 18 BRPM | HEIGHT: 65 IN | DIASTOLIC BLOOD PRESSURE: 91 MMHG | TEMPERATURE: 98.6 F | OXYGEN SATURATION: 95 % | WEIGHT: 163.31 LBS

## 2018-01-01 VITALS
WEIGHT: 191.6 LBS | BODY MASS INDEX: 26.82 KG/M2 | TEMPERATURE: 97.5 F | HEART RATE: 88 BPM | DIASTOLIC BLOOD PRESSURE: 102 MMHG | RESPIRATION RATE: 18 BRPM | HEIGHT: 71 IN | SYSTOLIC BLOOD PRESSURE: 149 MMHG | OXYGEN SATURATION: 80 %

## 2018-01-01 VITALS
OXYGEN SATURATION: 98 % | DIASTOLIC BLOOD PRESSURE: 50 MMHG | HEIGHT: 64 IN | BODY MASS INDEX: 29.19 KG/M2 | HEART RATE: 73 BPM | TEMPERATURE: 98.2 F | RESPIRATION RATE: 18 BRPM | SYSTOLIC BLOOD PRESSURE: 104 MMHG | WEIGHT: 171 LBS

## 2018-01-01 DIAGNOSIS — Z78.9 IMPAIRED MOBILITY AND ADLS: ICD-10-CM

## 2018-01-01 DIAGNOSIS — D72.829 LEUKOCYTOSIS, UNSPECIFIED TYPE: ICD-10-CM

## 2018-01-01 DIAGNOSIS — Z00.00 ROUTINE GENERAL MEDICAL EXAMINATION AT A HEALTH CARE FACILITY: ICD-10-CM

## 2018-01-01 DIAGNOSIS — N39.0 URINARY TRACT INFECTION WITH HEMATURIA, SITE UNSPECIFIED: ICD-10-CM

## 2018-01-01 DIAGNOSIS — R73.9 HYPERGLYCEMIA: ICD-10-CM

## 2018-01-01 DIAGNOSIS — Z86.79 HISTORY OF HYPERTENSION: ICD-10-CM

## 2018-01-01 DIAGNOSIS — I48.91 ATRIAL FIBRILLATION WITH RAPID VENTRICULAR RESPONSE (HCC): ICD-10-CM

## 2018-01-01 DIAGNOSIS — I48.91 ATRIAL FIBRILLATION WITH RVR (HCC): ICD-10-CM

## 2018-01-01 DIAGNOSIS — R11.10 VOMITING: ICD-10-CM

## 2018-01-01 DIAGNOSIS — R13.10 DYSPHAGIA, UNSPECIFIED TYPE: ICD-10-CM

## 2018-01-01 DIAGNOSIS — I47.20 VENTRICULAR TACHYCARDIA (HCC): ICD-10-CM

## 2018-01-01 DIAGNOSIS — E87.5 HYPERKALEMIA: ICD-10-CM

## 2018-01-01 DIAGNOSIS — E11.65 TYPE 2 DIABETES MELLITUS WITH HYPERGLYCEMIA, UNSPECIFIED LONG TERM INSULIN USE STATUS: ICD-10-CM

## 2018-01-01 DIAGNOSIS — Z74.09 IMPAIRED MOBILITY AND ADLS: ICD-10-CM

## 2018-01-01 DIAGNOSIS — Z87.440 HISTORY OF RECURRENT UTI (URINARY TRACT INFECTION): ICD-10-CM

## 2018-01-01 DIAGNOSIS — R41.82 ALTERED MENTAL STATUS, UNSPECIFIED ALTERED MENTAL STATUS TYPE: ICD-10-CM

## 2018-01-01 DIAGNOSIS — R31.9 URINARY TRACT INFECTION WITH HEMATURIA, SITE UNSPECIFIED: ICD-10-CM

## 2018-01-01 DIAGNOSIS — A41.9 SEPSIS DUE TO URINARY TRACT INFECTION (HCC): ICD-10-CM

## 2018-01-01 DIAGNOSIS — A41.9 SEPSIS, DUE TO UNSPECIFIED ORGANISM: ICD-10-CM

## 2018-01-01 DIAGNOSIS — Z74.09 IMPAIRED FUNCTIONAL MOBILITY, BALANCE, GAIT, AND ENDURANCE: ICD-10-CM

## 2018-01-01 DIAGNOSIS — N17.9 ACUTE RENAL FAILURE, UNSPECIFIED ACUTE RENAL FAILURE TYPE (HCC): Primary | ICD-10-CM

## 2018-01-01 DIAGNOSIS — Z74.09 IMPAIRED FUNCTIONAL MOBILITY, BALANCE, GAIT, AND ENDURANCE: Primary | ICD-10-CM

## 2018-01-01 DIAGNOSIS — R40.0 SOMNOLENCE: Primary | ICD-10-CM

## 2018-01-01 DIAGNOSIS — N39.0 SEPSIS DUE TO URINARY TRACT INFECTION (HCC): ICD-10-CM

## 2018-01-01 LAB
ALBUMIN SERPL-MCNC: 2.6 G/DL (ref 3.2–4.8)
ALBUMIN SERPL-MCNC: 2.7 G/DL (ref 3.2–4.8)
ALBUMIN SERPL-MCNC: 2.79 G/DL (ref 3.2–4.8)
ALBUMIN SERPL-MCNC: 2.8 G/DL (ref 3.2–4.8)
ALBUMIN SERPL-MCNC: 3 G/DL (ref 3.2–4.8)
ALBUMIN SERPL-MCNC: 3.04 G/DL (ref 3.2–4.8)
ALBUMIN SERPL-MCNC: 3.09 G/DL (ref 3.2–4.8)
ALBUMIN SERPL-MCNC: 3.17 G/DL (ref 3.2–4.8)
ALBUMIN SERPL-MCNC: 3.52 G/DL (ref 3.2–4.8)
ALBUMIN SERPL-MCNC: 3.7 G/DL (ref 3.2–4.8)
ALBUMIN/GLOB SERPL: 1 G/DL (ref 1.5–2.5)
ALBUMIN/GLOB SERPL: 1.1 G/DL (ref 1.5–2.5)
ALBUMIN/GLOB SERPL: 1.1 G/DL (ref 1.5–2.5)
ALP SERPL-CCNC: 114 U/L (ref 25–100)
ALP SERPL-CCNC: 140 U/L (ref 25–100)
ALP SERPL-CCNC: 141 U/L (ref 25–100)
ALP SERPL-CCNC: 163 U/L (ref 25–100)
ALP SERPL-CCNC: 177 U/L (ref 25–100)
ALP SERPL-CCNC: 204 U/L (ref 25–100)
ALP SERPL-CCNC: 206 U/L (ref 25–100)
ALP SERPL-CCNC: 284 U/L (ref 25–100)
ALT SERPL W P-5'-P-CCNC: 15 U/L (ref 7–40)
ALT SERPL W P-5'-P-CCNC: 16 U/L (ref 7–40)
ALT SERPL W P-5'-P-CCNC: 21 U/L (ref 7–40)
ALT SERPL W P-5'-P-CCNC: 22 U/L (ref 7–40)
ALT SERPL W P-5'-P-CCNC: 26 U/L (ref 7–40)
ALT SERPL W P-5'-P-CCNC: 34 U/L (ref 7–40)
ALT SERPL W P-5'-P-CCNC: 38 U/L (ref 7–40)
ALT SERPL W P-5'-P-CCNC: 38 U/L (ref 7–40)
AMMONIA BLD-SCNC: 30 UMOL/L (ref 19–60)
AMORPH URATE CRY URNS QL MICRO: ABNORMAL /HPF
AMPHET+METHAMPHET UR QL: NEGATIVE
AMPHETAMINES UR QL: NEGATIVE
ANION GAP SERPL CALCULATED.3IONS-SCNC: 10 MMOL/L (ref 3–11)
ANION GAP SERPL CALCULATED.3IONS-SCNC: 11 MMOL/L (ref 3–11)
ANION GAP SERPL CALCULATED.3IONS-SCNC: 12 MMOL/L (ref 3–11)
ANION GAP SERPL CALCULATED.3IONS-SCNC: 13 MMOL/L (ref 3–11)
ANION GAP SERPL CALCULATED.3IONS-SCNC: 13 MMOL/L (ref 3–11)
ANION GAP SERPL CALCULATED.3IONS-SCNC: 15 MMOL/L (ref 3–11)
ANION GAP SERPL CALCULATED.3IONS-SCNC: 15 MMOL/L (ref 3–11)
ANION GAP SERPL CALCULATED.3IONS-SCNC: 16 MMOL/L (ref 3–11)
ANION GAP SERPL CALCULATED.3IONS-SCNC: 16 MMOL/L (ref 3–11)
ANION GAP SERPL CALCULATED.3IONS-SCNC: 19 MMOL/L (ref 3–11)
ANION GAP SERPL CALCULATED.3IONS-SCNC: 4 MMOL/L (ref 3–11)
ANION GAP SERPL CALCULATED.3IONS-SCNC: 5 MMOL/L (ref 3–11)
ANION GAP SERPL CALCULATED.3IONS-SCNC: 5 MMOL/L (ref 3–11)
ANION GAP SERPL CALCULATED.3IONS-SCNC: 6 MMOL/L (ref 3–11)
ANION GAP SERPL CALCULATED.3IONS-SCNC: 7 MMOL/L (ref 3–11)
ANION GAP SERPL CALCULATED.3IONS-SCNC: 8 MMOL/L (ref 3–11)
ANION GAP SERPL CALCULATED.3IONS-SCNC: 9 MMOL/L (ref 3–11)
ANION GAP SERPL CALCULATED.3IONS-SCNC: 9 MMOL/L (ref 3–11)
APAP SERPL-MCNC: <10 MCG/ML (ref 0–30)
ARTERIAL PATENCY WRIST A: ABNORMAL
ASCENDING AORTA: 4.2 CM
AST SERPL-CCNC: 11 U/L (ref 0–33)
AST SERPL-CCNC: 14 U/L (ref 0–33)
AST SERPL-CCNC: 16 U/L (ref 0–33)
AST SERPL-CCNC: 18 U/L (ref 0–33)
AST SERPL-CCNC: 24 U/L (ref 0–33)
AST SERPL-CCNC: 27 U/L (ref 0–33)
AST SERPL-CCNC: 40 U/L (ref 0–33)
AST SERPL-CCNC: 43 U/L (ref 0–33)
ATMOSPHERIC PRESS: ABNORMAL MMHG
AV VENA CONTRACTA: 0.4 CM
BACTERIA BLD CULT: ABNORMAL
BACTERIA BLD CULT: NORMAL
BACTERIA SPEC AEROBE CULT: ABNORMAL
BACTERIA SPEC AEROBE CULT: NORMAL
BACTERIA UR QL AUTO: ABNORMAL /HPF
BARBITURATES UR QL SCN: NEGATIVE
BASE EXCESS BLDA CALC-SCNC: -15.5 MMOL/L (ref 0–2)
BASE EXCESS BLDA CALC-SCNC: -17.2 MMOL/L (ref 0–2)
BASE EXCESS BLDA CALC-SCNC: -7.8 MMOL/L (ref 0–2)
BASE EXCESS BLDA CALC-SCNC: -8.7 MMOL/L (ref 0–2)
BASOPHILS # BLD AUTO: 0.01 10*3/MM3 (ref 0–0.2)
BASOPHILS # BLD AUTO: 0.02 10*3/MM3 (ref 0–0.2)
BASOPHILS # BLD AUTO: 0.02 10*3/MM3 (ref 0–0.2)
BASOPHILS # BLD AUTO: 0.03 10*3/MM3 (ref 0–0.2)
BASOPHILS # BLD AUTO: 0.03 10*3/MM3 (ref 0–0.2)
BASOPHILS # BLD AUTO: 0.04 10*3/MM3 (ref 0–0.2)
BASOPHILS # BLD AUTO: 0.08 10*3/MM3 (ref 0–0.2)
BASOPHILS # BLD MANUAL: 0 10*3/MM3 (ref 0–0.2)
BASOPHILS # BLD MANUAL: 0.19 10*3/MM3 (ref 0–0.2)
BASOPHILS # BLD MANUAL: 0.19 10*3/MM3 (ref 0–0.2)
BASOPHILS NFR BLD AUTO: 0 % (ref 0–1)
BASOPHILS NFR BLD AUTO: 0.1 % (ref 0–1)
BASOPHILS NFR BLD AUTO: 0.2 % (ref 0–1)
BASOPHILS NFR BLD AUTO: 0.2 % (ref 0–1)
BASOPHILS NFR BLD AUTO: 0.7 % (ref 0–1)
BASOPHILS NFR BLD AUTO: 2 % (ref 0–1)
BASOPHILS NFR BLD AUTO: 2 % (ref 0–1)
BDY SITE: ABNORMAL
BENZODIAZ UR QL SCN: NEGATIVE
BH CV ECHO MEAS - AI DEC SLOPE: 294.5 CM/SEC^2
BH CV ECHO MEAS - AI DEC SLOPE: 392.8 CM/SEC^2
BH CV ECHO MEAS - AI MAX PG: 72.6 MMHG
BH CV ECHO MEAS - AI MAX PG: 73.2 MMHG
BH CV ECHO MEAS - AI MAX VEL: 425 CM/SEC
BH CV ECHO MEAS - AI MAX VEL: 426 CM/SEC
BH CV ECHO MEAS - AI P1/2T: 316.9 MSEC
BH CV ECHO MEAS - AI P1/2T: 423.7 MSEC
BH CV ECHO MEAS - AO MAX PG (FULL): 4.7 MMHG
BH CV ECHO MEAS - AO MAX PG (FULL): 5.3 MMHG
BH CV ECHO MEAS - AO MAX PG: 6 MMHG
BH CV ECHO MEAS - AO MAX PG: 6.8 MMHG
BH CV ECHO MEAS - AO MEAN PG (FULL): 2.4 MMHG
BH CV ECHO MEAS - AO MEAN PG: 3.2 MMHG
BH CV ECHO MEAS - AO ROOT AREA (BSA CORRECTED): 2
BH CV ECHO MEAS - AO ROOT AREA (BSA CORRECTED): 2.3
BH CV ECHO MEAS - AO ROOT AREA: 11.7 CM^2
BH CV ECHO MEAS - AO ROOT AREA: 11.8 CM^2
BH CV ECHO MEAS - AO ROOT DIAM: 3.9 CM
BH CV ECHO MEAS - AO ROOT DIAM: 3.9 CM
BH CV ECHO MEAS - AO V2 MAX: 124.2 CM/SEC
BH CV ECHO MEAS - AO V2 MAX: 130.9 CM/SEC
BH CV ECHO MEAS - AO V2 MEAN: 79.9 CM/SEC
BH CV ECHO MEAS - AO V2 VTI: 23.9 CM
BH CV ECHO MEAS - ASC AORTA: 4.2 CM
BH CV ECHO MEAS - AVA(I,A): 1.5 CM^2
BH CV ECHO MEAS - AVA(I,D): 1.5 CM^2
BH CV ECHO MEAS - AVA(V,A): 1.5 CM^2
BH CV ECHO MEAS - AVA(V,A): 1.7 CM^2
BH CV ECHO MEAS - AVA(V,D): 1.5 CM^2
BH CV ECHO MEAS - AVA(V,D): 1.7 CM^2
BH CV ECHO MEAS - BSA(HAYCOCK): 1.6 M^2
BH CV ECHO MEAS - BSA(HAYCOCK): 1.9 M^2
BH CV ECHO MEAS - BSA(HAYCOCK): 1.9 M^2
BH CV ECHO MEAS - BSA(HAYCOCK): 2.1 M^2
BH CV ECHO MEAS - BSA: 1.6 M^2
BH CV ECHO MEAS - BSA: 1.8 M^2
BH CV ECHO MEAS - BSA: 1.8 M^2
BH CV ECHO MEAS - BSA: 2 M^2
BH CV ECHO MEAS - BZI_BMI: 22.7 KILOGRAMS/M^2
BH CV ECHO MEAS - BZI_BMI: 27.1 KILOGRAMS/M^2
BH CV ECHO MEAS - BZI_BMI: 29.9 KILOGRAMS/M^2
BH CV ECHO MEAS - BZI_BMI: 33.3 KILOGRAMS/M^2
BH CV ECHO MEAS - BZI_METRIC_HEIGHT: 162 CM
BH CV ECHO MEAS - BZI_METRIC_HEIGHT: 162.6 CM
BH CV ECHO MEAS - BZI_METRIC_HEIGHT: 165.1 CM
BH CV ECHO MEAS - BZI_METRIC_HEIGHT: 165.1 CM
BH CV ECHO MEAS - BZI_METRIC_WEIGHT: 59.9 KG
BH CV ECHO MEAS - BZI_METRIC_WEIGHT: 73.9 KG
BH CV ECHO MEAS - BZI_METRIC_WEIGHT: 78.5 KG
BH CV ECHO MEAS - BZI_METRIC_WEIGHT: 90.7 KG
BH CV ECHO MEAS - EDV(CUBED): 29.9 ML
BH CV ECHO MEAS - EDV(CUBED): 47.1 ML
BH CV ECHO MEAS - EDV(MOD-SP2): 141 ML
BH CV ECHO MEAS - EDV(MOD-SP4): 118 ML
BH CV ECHO MEAS - EDV(TEICH): 38 ML
BH CV ECHO MEAS - EDV(TEICH): 54.8 ML
BH CV ECHO MEAS - EF(CUBED): 59.7 %
BH CV ECHO MEAS - EF(CUBED): 76.7 %
BH CV ECHO MEAS - EF(MOD-SP2): 46.1 %
BH CV ECHO MEAS - EF(MOD-SP4): 44.9 %
BH CV ECHO MEAS - EF(TEICH): 52.8 %
BH CV ECHO MEAS - EF(TEICH): 69.7 %
BH CV ECHO MEAS - ESV(CUBED): 11 ML
BH CV ECHO MEAS - ESV(CUBED): 12 ML
BH CV ECHO MEAS - ESV(MOD-SP2): 76 ML
BH CV ECHO MEAS - ESV(MOD-SP4): 65 ML
BH CV ECHO MEAS - ESV(TEICH): 16.6 ML
BH CV ECHO MEAS - ESV(TEICH): 18 ML
BH CV ECHO MEAS - FS: 26.2 %
BH CV ECHO MEAS - FS: 38.5 %
BH CV ECHO MEAS - IVS/LVPW: 0.8
BH CV ECHO MEAS - IVS/LVPW: 1.1
BH CV ECHO MEAS - IVSD: 1.5 CM
BH CV ECHO MEAS - IVSD: 1.6 CM
BH CV ECHO MEAS - LA DIMENSION: 4.1 CM
BH CV ECHO MEAS - LA DIMENSION: 4.7 CM
BH CV ECHO MEAS - LA/AO: 1.1
BH CV ECHO MEAS - LA/AO: 1.2
BH CV ECHO MEAS - LAD MAJOR: 6.3 CM
BH CV ECHO MEAS - LAT PEAK E' VEL: 4.6 CM/SEC
BH CV ECHO MEAS - LAT PEAK E' VEL: 6.3 CM/SEC
BH CV ECHO MEAS - LATERAL E/E' RATIO: 17.1
BH CV ECHO MEAS - LV DIASTOLIC VOL/BSA (35-75): 72 ML/M^2
BH CV ECHO MEAS - LV MASS(C)D: 201.6 GRAMS
BH CV ECHO MEAS - LV MASS(C)D: 219.1 GRAMS
BH CV ECHO MEAS - LV MASS(C)DI: 110.7 GRAMS/M^2
BH CV ECHO MEAS - LV MASS(C)DI: 123 GRAMS/M^2
BH CV ECHO MEAS - LV MAX PG: 1.3 MMHG
BH CV ECHO MEAS - LV MAX PG: 1.6 MMHG
BH CV ECHO MEAS - LV MEAN PG: 0.78 MMHG
BH CV ECHO MEAS - LV SYSTOLIC VOL/BSA (12-30): 39.6 ML/M^2
BH CV ECHO MEAS - LV V1 MAX: 56.9 CM/SEC
BH CV ECHO MEAS - LV V1 MAX: 62.3 CM/SEC
BH CV ECHO MEAS - LV V1 MEAN: 39.8 CM/SEC
BH CV ECHO MEAS - LV V1 VTI: 11.2 CM
BH CV ECHO MEAS - LVIDD: 3.1 CM
BH CV ECHO MEAS - LVIDD: 3.6 CM
BH CV ECHO MEAS - LVIDS: 2.2 CM
BH CV ECHO MEAS - LVIDS: 2.3 CM
BH CV ECHO MEAS - LVLD AP2: 7.4 CM
BH CV ECHO MEAS - LVLD AP4: 7.4 CM
BH CV ECHO MEAS - LVLS AP2: 6.6 CM
BH CV ECHO MEAS - LVLS AP4: 6.9 CM
BH CV ECHO MEAS - LVOT AREA (M): 3.1 CM^2
BH CV ECHO MEAS - LVOT AREA (M): 3.8 CM^2
BH CV ECHO MEAS - LVOT AREA: 3.2 CM^2
BH CV ECHO MEAS - LVOT AREA: 3.7 CM^2
BH CV ECHO MEAS - LVOT DIAM: 2 CM
BH CV ECHO MEAS - LVOT DIAM: 2.2 CM
BH CV ECHO MEAS - LVPWD: 1.5 CM
BH CV ECHO MEAS - LVPWD: 1.9 CM
BH CV ECHO MEAS - MED PEAK E' VEL: 3.95 CM/SEC
BH CV ECHO MEAS - MED PEAK E' VEL: 4.32 CM/SEC
BH CV ECHO MEAS - MEDIAL E/E' RATIO: 15.3
BH CV ECHO MEAS - MV A MAX VEL: 97.2 CM/SEC
BH CV ECHO MEAS - MV DEC TIME: 0.09 SEC
BH CV ECHO MEAS - MV DEC TIME: 0.14 SEC
BH CV ECHO MEAS - MV E MAX VEL: 125.4 CM/SEC
BH CV ECHO MEAS - MV E MAX VEL: 80.9 CM/SEC
BH CV ECHO MEAS - MV E/A: 0.83
BH CV ECHO MEAS - MV MAX PG: 3.3 MMHG
BH CV ECHO MEAS - MV MEAN PG: 2.2 MMHG
BH CV ECHO MEAS - MV V2 MAX: 90.5 CM/SEC
BH CV ECHO MEAS - MV V2 MEAN: 73.3 CM/SEC
BH CV ECHO MEAS - MV V2 VTI: 16.4 CM
BH CV ECHO MEAS - MVA(VTI): 2.2 CM^2
BH CV ECHO MEAS - PA ACC SLOPE: 422.4 CM/SEC^2
BH CV ECHO MEAS - PA ACC SLOPE: 564.8 CM/SEC^2
BH CV ECHO MEAS - PA ACC TIME: 0.1 SEC
BH CV ECHO MEAS - PA ACC TIME: 0.13 SEC
BH CV ECHO MEAS - PA MAX PG: 3.6 MMHG
BH CV ECHO MEAS - PA PR(ACCEL): 19.6 MMHG
BH CV ECHO MEAS - PA PR(ACCEL): 35 MMHG
BH CV ECHO MEAS - PA V2 MAX: 95 CM/SEC
BH CV ECHO MEAS - PI END-D VEL: 153.4 CM/SEC
BH CV ECHO MEAS - RAP SYSTOLE: 8 MMHG
BH CV ECHO MEAS - RVDD: 2.7 CM
BH CV ECHO MEAS - RVSP: 26.4 MMHG
BH CV ECHO MEAS - SI(AO): 169.6 ML/M^2
BH CV ECHO MEAS - SI(CUBED): 10.9 ML/M^2
BH CV ECHO MEAS - SI(CUBED): 18.3 ML/M^2
BH CV ECHO MEAS - SI(LVOT): 21.9 ML/M^2
BH CV ECHO MEAS - SI(MOD-SP2): 39.6 ML/M^2
BH CV ECHO MEAS - SI(MOD-SP4): 32.3 ML/M^2
BH CV ECHO MEAS - SI(TEICH): 12.2 ML/M^2
BH CV ECHO MEAS - SI(TEICH): 19.3 ML/M^2
BH CV ECHO MEAS - SV(AO): 278 ML
BH CV ECHO MEAS - SV(CUBED): 17.9 ML
BH CV ECHO MEAS - SV(CUBED): 36.1 ML
BH CV ECHO MEAS - SV(LVOT): 35.8 ML
BH CV ECHO MEAS - SV(MOD-SP2): 65 ML
BH CV ECHO MEAS - SV(MOD-SP4): 53 ML
BH CV ECHO MEAS - SV(TEICH): 20.1 ML
BH CV ECHO MEAS - SV(TEICH): 38.2 ML
BH CV ECHO MEAS - TAPSE (>1.6): 1.2 CM2
BH CV ECHO MEAS - TAPSE (>1.6): 2.3 CM2
BH CV ECHO MEAS - TR MAX PG: 18.4 MMHG
BH CV ECHO MEAS - TR MAX VEL: 214.6 CM/SEC
BH CV ECHO MEAS - TR MAX VEL: 261 CM/SEC
BH CV ECHO MEASUREMENTS AVERAGE E/E' RATIO: 18.92
BH CV UPPER VENOUS LEFT INTERNAL JUGULAR AUGMENT: NORMAL
BH CV UPPER VENOUS LEFT INTERNAL JUGULAR COMPRESS: NORMAL
BH CV UPPER VENOUS LEFT INTERNAL JUGULAR PHASIC: NORMAL
BH CV UPPER VENOUS LEFT INTERNAL JUGULAR SPONT: NORMAL
BH CV UPPER VENOUS RIGHT AXILLARY AUGMENT: NORMAL
BH CV UPPER VENOUS RIGHT AXILLARY COMPRESS: NORMAL
BH CV UPPER VENOUS RIGHT AXILLARY PHASIC: NORMAL
BH CV UPPER VENOUS RIGHT AXILLARY SPONT: NORMAL
BH CV UPPER VENOUS RIGHT BASILIC UPPER AUGMENT: NORMAL
BH CV UPPER VENOUS RIGHT BASILIC UPPER COMPRESS: NORMAL
BH CV UPPER VENOUS RIGHT BRACHIAL AUGMENT: NORMAL
BH CV UPPER VENOUS RIGHT BRACHIAL COMPRESS: NORMAL
BH CV UPPER VENOUS RIGHT CEPHALIC FOREARM COLOR: 1
BH CV UPPER VENOUS RIGHT CEPHALIC FOREARM COMPRESS: NORMAL
BH CV UPPER VENOUS RIGHT RADIAL AUGMENT: NORMAL
BH CV UPPER VENOUS RIGHT RADIAL COMPRESS: NORMAL
BH CV UPPER VENOUS RIGHT ULNAR AUGMENT: NORMAL
BH CV UPPER VENOUS RIGHT ULNAR COMPRESS: NORMAL
BH CV VAS BP LEFT ARM: NORMAL MMHG
BH CV VAS BP LEFT ARM: NORMAL MMHG
BH CV VAS BP RIGHT ARM: NORMAL MMHG
BH CV XLRA - RV BASE: 3.8 CM
BH CV XLRA - RV BASE: 4.6 CM
BH CV XLRA - RV LENGTH: 6.8 CM
BH CV XLRA - RV LENGTH: 7 CM
BH CV XLRA - RV MID: 3.1 CM
BH CV XLRA - RV MID: 4.1 CM
BH CV XLRA - TDI S': 10.8 CM/SEC
BH CV XLRA - TDI S': 9.76 CM/SEC
BILIRUB SERPL-MCNC: 0.4 MG/DL (ref 0.3–1.2)
BILIRUB SERPL-MCNC: 0.4 MG/DL (ref 0.3–1.2)
BILIRUB SERPL-MCNC: 0.5 MG/DL (ref 0.3–1.2)
BILIRUB SERPL-MCNC: 0.5 MG/DL (ref 0.3–1.2)
BILIRUB SERPL-MCNC: 0.6 MG/DL (ref 0.3–1.2)
BILIRUB SERPL-MCNC: 0.7 MG/DL (ref 0.3–1.2)
BILIRUB SERPL-MCNC: 0.8 MG/DL (ref 0.3–1.2)
BILIRUB SERPL-MCNC: 0.8 MG/DL (ref 0.3–1.2)
BILIRUB UR QL STRIP: NEGATIVE
BNP SERPL-MCNC: 441 PG/ML (ref 0–100)
BNP SERPL-MCNC: 925 PG/ML (ref 0–100)
BODY TEMPERATURE: 37 C
BUN BLD-MCNC: 120 MG/DL (ref 9–23)
BUN BLD-MCNC: 130 MG/DL (ref 9–23)
BUN BLD-MCNC: 135 MG/DL (ref 9–23)
BUN BLD-MCNC: 144 MG/DL (ref 9–23)
BUN BLD-MCNC: 149 MG/DL (ref 9–23)
BUN BLD-MCNC: 152 MG/DL (ref 9–23)
BUN BLD-MCNC: 175 MG/DL (ref 9–23)
BUN BLD-MCNC: 25 MG/DL (ref 9–23)
BUN BLD-MCNC: 26 MG/DL (ref 9–23)
BUN BLD-MCNC: 26 MG/DL (ref 9–23)
BUN BLD-MCNC: 30 MG/DL (ref 9–23)
BUN BLD-MCNC: 30 MG/DL (ref 9–23)
BUN BLD-MCNC: 31 MG/DL (ref 9–23)
BUN BLD-MCNC: 31 MG/DL (ref 9–23)
BUN BLD-MCNC: 32 MG/DL (ref 9–23)
BUN BLD-MCNC: 33 MG/DL (ref 9–23)
BUN BLD-MCNC: 34 MG/DL (ref 9–23)
BUN BLD-MCNC: 34 MG/DL (ref 9–23)
BUN BLD-MCNC: 43 MG/DL (ref 9–23)
BUN BLD-MCNC: 43 MG/DL (ref 9–23)
BUN BLD-MCNC: 48 MG/DL (ref 9–23)
BUN BLD-MCNC: 48 MG/DL (ref 9–23)
BUN BLD-MCNC: 51 MG/DL (ref 9–23)
BUN BLD-MCNC: 56 MG/DL (ref 9–23)
BUN BLD-MCNC: 63 MG/DL (ref 9–23)
BUN BLD-MCNC: 66 MG/DL (ref 9–23)
BUN BLD-MCNC: 76 MG/DL (ref 9–23)
BUN BLD-MCNC: 82 MG/DL (ref 9–23)
BUN BLD-MCNC: 85 MG/DL (ref 9–23)
BUN BLD-MCNC: 86 MG/DL (ref 9–23)
BUN BLD-MCNC: 94 MG/DL (ref 9–23)
BUN BLDA-MCNC: >140 MG/DL (ref 8–26)
BUN BLDA-MCNC: >140 MG/DL (ref 8–26)
BUN/CREAT SERPL: 19.9 (ref 7–25)
BUN/CREAT SERPL: 20 (ref 7–25)
BUN/CREAT SERPL: 20.4 (ref 7–25)
BUN/CREAT SERPL: 20.6 (ref 7–25)
BUN/CREAT SERPL: 21.1 (ref 7–25)
BUN/CREAT SERPL: 22.1 (ref 7–25)
BUN/CREAT SERPL: 22.9 (ref 7–25)
BUN/CREAT SERPL: 24.3 (ref 7–25)
BUN/CREAT SERPL: 25.3 (ref 7–25)
BUN/CREAT SERPL: 26.3 (ref 7–25)
BUN/CREAT SERPL: 26.8 (ref 7–25)
BUN/CREAT SERPL: 26.8 (ref 7–25)
BUN/CREAT SERPL: 27.4 (ref 7–25)
BUN/CREAT SERPL: 28.3 (ref 7–25)
BUN/CREAT SERPL: 30.4 (ref 7–25)
BUN/CREAT SERPL: 32 (ref 7–25)
BUN/CREAT SERPL: 32.3 (ref 7–25)
BUN/CREAT SERPL: 32.3 (ref 7–25)
BUN/CREAT SERPL: 32.8 (ref 7–25)
BUN/CREAT SERPL: 33 (ref 7–25)
BUN/CREAT SERPL: 33.3 (ref 7–25)
BUN/CREAT SERPL: 34.6 (ref 7–25)
BUN/CREAT SERPL: 34.9 (ref 7–25)
BUN/CREAT SERPL: 36.1 (ref 7–25)
BUN/CREAT SERPL: 36.2 (ref 7–25)
BUN/CREAT SERPL: 36.6 (ref 7–25)
BUN/CREAT SERPL: 37.3 (ref 7–25)
BUN/CREAT SERPL: 45.8 (ref 7–25)
BUN/CREAT SERPL: 47.2 (ref 7–25)
BUPRENORPHINE SERPL-MCNC: NEGATIVE NG/ML
C DIFF TOX GENS STL QL NAA+PROBE: NOT DETECTED
CA-I BLDA-SCNC: 1.28 MMOL/L (ref 1.2–1.32)
CA-I BLDA-SCNC: 1.31 MMOL/L (ref 1.2–1.32)
CALCIUM SPEC-SCNC: 6.9 MG/DL (ref 8.7–10.4)
CALCIUM SPEC-SCNC: 7.3 MG/DL (ref 8.7–10.4)
CALCIUM SPEC-SCNC: 7.5 MG/DL (ref 8.7–10.4)
CALCIUM SPEC-SCNC: 7.6 MG/DL (ref 8.7–10.4)
CALCIUM SPEC-SCNC: 7.7 MG/DL (ref 8.7–10.4)
CALCIUM SPEC-SCNC: 7.8 MG/DL (ref 8.7–10.4)
CALCIUM SPEC-SCNC: 7.9 MG/DL (ref 8.7–10.4)
CALCIUM SPEC-SCNC: 8 MG/DL (ref 8.7–10.4)
CALCIUM SPEC-SCNC: 8 MG/DL (ref 8.7–10.4)
CALCIUM SPEC-SCNC: 8.1 MG/DL (ref 8.7–10.4)
CALCIUM SPEC-SCNC: 8.2 MG/DL (ref 8.7–10.4)
CALCIUM SPEC-SCNC: 8.3 MG/DL (ref 8.7–10.4)
CALCIUM SPEC-SCNC: 8.4 MG/DL (ref 8.7–10.4)
CALCIUM SPEC-SCNC: 8.6 MG/DL (ref 8.7–10.4)
CALCIUM SPEC-SCNC: 8.7 MG/DL (ref 8.7–10.4)
CALCIUM SPEC-SCNC: 8.8 MG/DL (ref 8.7–10.4)
CALCIUM SPEC-SCNC: 8.9 MG/DL (ref 8.7–10.4)
CALCIUM SPEC-SCNC: 9.2 MG/DL (ref 8.7–10.4)
CALCIUM SPEC-SCNC: 9.3 MG/DL (ref 8.7–10.4)
CALCIUM SPEC-SCNC: 9.5 MG/DL (ref 8.7–10.4)
CALCIUM SPEC-SCNC: 9.7 MG/DL (ref 8.7–10.4)
CANNABINOIDS SERPL QL: NEGATIVE
CHLORIDE BLDA-SCNC: 128 MMOL/L (ref 98–109)
CHLORIDE BLDA-SCNC: 130 MMOL/L (ref 98–109)
CHLORIDE SERPL-SCNC: 105 MMOL/L (ref 99–109)
CHLORIDE SERPL-SCNC: 105 MMOL/L (ref 99–109)
CHLORIDE SERPL-SCNC: 107 MMOL/L (ref 99–109)
CHLORIDE SERPL-SCNC: 107 MMOL/L (ref 99–109)
CHLORIDE SERPL-SCNC: 108 MMOL/L (ref 99–109)
CHLORIDE SERPL-SCNC: 109 MMOL/L (ref 99–109)
CHLORIDE SERPL-SCNC: 109 MMOL/L (ref 99–109)
CHLORIDE SERPL-SCNC: 110 MMOL/L (ref 99–109)
CHLORIDE SERPL-SCNC: 110 MMOL/L (ref 99–109)
CHLORIDE SERPL-SCNC: 111 MMOL/L (ref 99–109)
CHLORIDE SERPL-SCNC: 113 MMOL/L (ref 99–109)
CHLORIDE SERPL-SCNC: 113 MMOL/L (ref 99–109)
CHLORIDE SERPL-SCNC: 114 MMOL/L (ref 99–109)
CHLORIDE SERPL-SCNC: 114 MMOL/L (ref 99–109)
CHLORIDE SERPL-SCNC: 115 MMOL/L (ref 99–109)
CHLORIDE SERPL-SCNC: 115 MMOL/L (ref 99–109)
CHLORIDE SERPL-SCNC: 116 MMOL/L (ref 99–109)
CHLORIDE SERPL-SCNC: 117 MMOL/L (ref 99–109)
CHLORIDE SERPL-SCNC: 118 MMOL/L (ref 99–109)
CHLORIDE SERPL-SCNC: 119 MMOL/L (ref 99–109)
CHLORIDE SERPL-SCNC: 119 MMOL/L (ref 99–109)
CHLORIDE SERPL-SCNC: 120 MMOL/L (ref 99–109)
CHLORIDE SERPL-SCNC: 121 MMOL/L (ref 99–109)
CHLORIDE SERPL-SCNC: 123 MMOL/L (ref 99–109)
CHOLEST SERPL-MCNC: 113 MG/DL (ref 0–200)
CHOLEST SERPL-MCNC: 91 MG/DL (ref 0–200)
CK SERPL-CCNC: 34 U/L (ref 26–174)
CLARITY UR: ABNORMAL
CO2 BLDA-SCNC: 10.2 MMOL/L (ref 22–33)
CO2 BLDA-SCNC: 13 MMOL/L (ref 24–29)
CO2 BLDA-SCNC: 13 MMOL/L (ref 24–29)
CO2 BLDA-SCNC: 14.5 MMOL/L (ref 22–33)
CO2 BLDA-SCNC: 9.5 MMOL/L (ref 22–33)
CO2 SERPL-SCNC: 11 MMOL/L (ref 20–31)
CO2 SERPL-SCNC: 12 MMOL/L (ref 20–31)
CO2 SERPL-SCNC: 13 MMOL/L (ref 20–31)
CO2 SERPL-SCNC: 14 MMOL/L (ref 20–31)
CO2 SERPL-SCNC: 16 MMOL/L (ref 20–31)
CO2 SERPL-SCNC: 17 MMOL/L (ref 20–31)
CO2 SERPL-SCNC: 18 MMOL/L (ref 20–31)
CO2 SERPL-SCNC: 18 MMOL/L (ref 20–31)
CO2 SERPL-SCNC: 19 MMOL/L (ref 20–31)
CO2 SERPL-SCNC: 20 MMOL/L (ref 20–31)
CO2 SERPL-SCNC: 20 MMOL/L (ref 20–31)
CO2 SERPL-SCNC: 22 MMOL/L (ref 20–31)
CO2 SERPL-SCNC: 23 MMOL/L (ref 20–31)
CO2 SERPL-SCNC: 23 MMOL/L (ref 20–31)
CO2 SERPL-SCNC: 24 MMOL/L (ref 20–31)
CO2 SERPL-SCNC: 25 MMOL/L (ref 20–31)
CO2 SERPL-SCNC: 25 MMOL/L (ref 20–31)
CO2 SERPL-SCNC: 26 MMOL/L (ref 20–31)
CO2 SERPL-SCNC: 26 MMOL/L (ref 20–31)
CO2 SERPL-SCNC: 27 MMOL/L (ref 20–31)
CO2 SERPL-SCNC: 28 MMOL/L (ref 20–31)
CO2 SERPL-SCNC: 31 MMOL/L (ref 20–31)
COARSE GRAN CASTS URNS QL MICRO: ABNORMAL /LPF
COCAINE UR QL: NEGATIVE
COHGB MFR BLD: 1.2 % (ref 0–2)
COHGB MFR BLD: 1.2 % (ref 0–2)
COHGB MFR BLD: 1.3 % (ref 0–2)
COHGB MFR BLD: 1.3 % (ref 0–2)
COLOR UR: ABNORMAL
COLOR UR: ABNORMAL
COLOR UR: YELLOW
CREAT BLD-MCNC: 0.96 MG/DL (ref 0.6–1.3)
CREAT BLD-MCNC: 0.99 MG/DL (ref 0.6–1.3)
CREAT BLD-MCNC: 1.09 MG/DL (ref 0.6–1.3)
CREAT BLD-MCNC: 1.1 MG/DL (ref 0.6–1.3)
CREAT BLD-MCNC: 1.3 MG/DL (ref 0.6–1.3)
CREAT BLD-MCNC: 1.31 MG/DL (ref 0.6–1.3)
CREAT BLD-MCNC: 1.4 MG/DL (ref 0.6–1.3)
CREAT BLD-MCNC: 1.4 MG/DL (ref 0.6–1.3)
CREAT BLD-MCNC: 1.42 MG/DL (ref 0.6–1.3)
CREAT BLD-MCNC: 1.47 MG/DL (ref 0.6–1.3)
CREAT BLD-MCNC: 1.5 MG/DL (ref 0.6–1.3)
CREAT BLD-MCNC: 1.53 MG/DL (ref 0.6–1.3)
CREAT BLD-MCNC: 1.55 MG/DL (ref 0.6–1.3)
CREAT BLD-MCNC: 1.7 MG/DL (ref 0.6–1.3)
CREAT BLD-MCNC: 1.71 MG/DL (ref 0.6–1.3)
CREAT BLD-MCNC: 1.75 MG/DL (ref 0.6–1.3)
CREAT BLD-MCNC: 1.9 MG/DL (ref 0.6–1.3)
CREAT BLD-MCNC: 2 MG/DL (ref 0.6–1.3)
CREAT BLD-MCNC: 2.02 MG/DL (ref 0.6–1.3)
CREAT BLD-MCNC: 2.07 MG/DL (ref 0.6–1.3)
CREAT BLD-MCNC: 2.2 MG/DL (ref 0.6–1.3)
CREAT BLD-MCNC: 2.6 MG/DL (ref 0.6–1.3)
CREAT BLD-MCNC: 2.69 MG/DL (ref 0.6–1.3)
CREAT BLD-MCNC: 3.05 MG/DL (ref 0.6–1.3)
CREAT BLD-MCNC: 3.21 MG/DL (ref 0.6–1.3)
CREAT BLD-MCNC: 3.6 MG/DL (ref 0.6–1.3)
CREAT BLD-MCNC: 3.6 MG/DL (ref 0.6–1.3)
CREAT BLD-MCNC: 3.82 MG/DL (ref 0.6–1.3)
CREAT BLD-MCNC: 3.9 MG/DL (ref 0.6–1.3)
CREAT BLD-MCNC: 4.27 MG/DL (ref 0.6–1.3)
CREAT BLD-MCNC: 4.7 MG/DL (ref 0.6–1.3)
CREAT BLDA-MCNC: 4.1 MG/DL (ref 0.6–1.3)
CREAT BLDA-MCNC: 4.8 MG/DL (ref 0.6–1.3)
CREAT UR-MCNC: 37 MG/DL
CREAT UR-MCNC: 83.5 MG/DL
CRP SERPL-MCNC: 2.2 MG/DL (ref 0–1)
CRP SERPL-MCNC: 7.84 MG/DL (ref 0–1)
D-LACTATE SERPL-SCNC: 2.6 MMOL/L (ref 0.5–2)
D-LACTATE SERPL-SCNC: 2.7 MMOL/L (ref 0.5–2)
D-LACTATE SERPL-SCNC: 2.8 MMOL/L (ref 0.5–2)
D-LACTATE SERPL-SCNC: 3.6 MMOL/L (ref 0.5–2)
D-LACTATE SERPL-SCNC: 3.8 MMOL/L (ref 0.5–2)
D-LACTATE SERPL-SCNC: 4.6 MMOL/L (ref 0.5–2)
D-LACTATE SERPL-SCNC: 6.3 MMOL/L (ref 0.5–2)
DEPRECATED RDW RBC AUTO: 54.3 FL (ref 37–54)
DEPRECATED RDW RBC AUTO: 55.6 FL (ref 37–54)
DEPRECATED RDW RBC AUTO: 56.5 FL (ref 37–54)
DEPRECATED RDW RBC AUTO: 56.7 FL (ref 37–54)
DEPRECATED RDW RBC AUTO: 58.6 FL (ref 37–54)
DEPRECATED RDW RBC AUTO: 59.5 FL (ref 37–54)
DEPRECATED RDW RBC AUTO: 60.3 FL (ref 37–54)
DEPRECATED RDW RBC AUTO: 61.4 FL (ref 37–54)
DEPRECATED RDW RBC AUTO: 61.8 FL (ref 37–54)
DEPRECATED RDW RBC AUTO: 62.1 FL (ref 37–54)
DEPRECATED RDW RBC AUTO: 62.2 FL (ref 37–54)
DEPRECATED RDW RBC AUTO: 62.6 FL (ref 37–54)
DEPRECATED RDW RBC AUTO: 63 FL (ref 37–54)
DEPRECATED RDW RBC AUTO: 63.3 FL (ref 37–54)
DEPRECATED RDW RBC AUTO: 63.9 FL (ref 37–54)
DEPRECATED RDW RBC AUTO: 64 FL (ref 37–54)
DEPRECATED RDW RBC AUTO: 65.9 FL (ref 37–54)
DEPRECATED RDW RBC AUTO: 66.1 FL (ref 37–54)
DEPRECATED RDW RBC AUTO: 66.2 FL (ref 37–54)
DEPRECATED RDW RBC AUTO: 66.5 FL (ref 37–54)
DEPRECATED RDW RBC AUTO: 67.2 FL (ref 37–54)
DEPRECATED RDW RBC AUTO: 70.1 FL (ref 37–54)
E/E' RATIO: 24.4
EOSINOPHIL # BLD AUTO: 0 10*3/MM3 (ref 0–0.3)
EOSINOPHIL # BLD AUTO: 0 10*3/MM3 (ref 0–0.3)
EOSINOPHIL # BLD AUTO: 0.01 10*3/MM3 (ref 0–0.3)
EOSINOPHIL # BLD AUTO: 0.01 10*3/MM3 (ref 0–0.3)
EOSINOPHIL # BLD AUTO: 0.04 10*3/MM3 (ref 0–0.3)
EOSINOPHIL # BLD AUTO: 0.11 10*3/MM3 (ref 0–0.3)
EOSINOPHIL # BLD AUTO: 0.12 10*3/MM3 (ref 0–0.3)
EOSINOPHIL # BLD AUTO: 0.16 10*3/MM3 (ref 0–0.3)
EOSINOPHIL # BLD AUTO: 0.19 10*3/MM3 (ref 0–0.3)
EOSINOPHIL # BLD AUTO: 0.24 10*3/MM3 (ref 0–0.3)
EOSINOPHIL # BLD AUTO: 0.26 10*3/MM3 (ref 0–0.3)
EOSINOPHIL # BLD MANUAL: 0 10*3/MM3 (ref 0.1–0.3)
EOSINOPHIL # BLD MANUAL: 0.09 10*3/MM3 (ref 0.1–0.3)
EOSINOPHIL # BLD MANUAL: 0.38 10*3/MM3 (ref 0.1–0.3)
EOSINOPHIL NFR BLD AUTO: 0 % (ref 0–3)
EOSINOPHIL NFR BLD AUTO: 0 % (ref 0–3)
EOSINOPHIL NFR BLD AUTO: 0.1 % (ref 0–3)
EOSINOPHIL NFR BLD AUTO: 0.1 % (ref 0–3)
EOSINOPHIL NFR BLD AUTO: 0.3 % (ref 0–3)
EOSINOPHIL NFR BLD AUTO: 0.7 % (ref 0–3)
EOSINOPHIL NFR BLD AUTO: 0.9 % (ref 0–3)
EOSINOPHIL NFR BLD AUTO: 1.2 % (ref 0–3)
EOSINOPHIL NFR BLD AUTO: 1.4 % (ref 0–3)
EOSINOPHIL NFR BLD AUTO: 1.4 % (ref 0–3)
EOSINOPHIL NFR BLD AUTO: 1.7 % (ref 0–3)
EOSINOPHIL NFR BLD MANUAL: 0 % (ref 0–3)
EOSINOPHIL NFR BLD MANUAL: 1 % (ref 0–3)
EOSINOPHIL NFR BLD MANUAL: 4 % (ref 0–3)
ERYTHROCYTE [DISTWIDTH] IN BLOOD BY AUTOMATED COUNT: 16.1 % (ref 11.3–14.5)
ERYTHROCYTE [DISTWIDTH] IN BLOOD BY AUTOMATED COUNT: 17.5 % (ref 11.3–14.5)
ERYTHROCYTE [DISTWIDTH] IN BLOOD BY AUTOMATED COUNT: 17.5 % (ref 11.3–14.5)
ERYTHROCYTE [DISTWIDTH] IN BLOOD BY AUTOMATED COUNT: 17.8 % (ref 11.3–14.5)
ERYTHROCYTE [DISTWIDTH] IN BLOOD BY AUTOMATED COUNT: 18.3 % (ref 11.3–14.5)
ERYTHROCYTE [DISTWIDTH] IN BLOOD BY AUTOMATED COUNT: 18.4 % (ref 11.3–14.5)
ERYTHROCYTE [DISTWIDTH] IN BLOOD BY AUTOMATED COUNT: 18.7 % (ref 11.3–14.5)
ERYTHROCYTE [DISTWIDTH] IN BLOOD BY AUTOMATED COUNT: 18.7 % (ref 11.3–14.5)
ERYTHROCYTE [DISTWIDTH] IN BLOOD BY AUTOMATED COUNT: 18.8 % (ref 11.3–14.5)
ERYTHROCYTE [DISTWIDTH] IN BLOOD BY AUTOMATED COUNT: 19 % (ref 11.3–14.5)
ERYTHROCYTE [DISTWIDTH] IN BLOOD BY AUTOMATED COUNT: 19.1 % (ref 11.3–14.5)
ERYTHROCYTE [DISTWIDTH] IN BLOOD BY AUTOMATED COUNT: 19.2 % (ref 11.3–14.5)
ERYTHROCYTE [DISTWIDTH] IN BLOOD BY AUTOMATED COUNT: 19.3 % (ref 11.3–14.5)
ERYTHROCYTE [DISTWIDTH] IN BLOOD BY AUTOMATED COUNT: 19.4 % (ref 11.3–14.5)
ERYTHROCYTE [DISTWIDTH] IN BLOOD BY AUTOMATED COUNT: 19.6 % (ref 11.3–14.5)
ERYTHROCYTE [DISTWIDTH] IN BLOOD BY AUTOMATED COUNT: 19.6 % (ref 11.3–14.5)
ERYTHROCYTE [DISTWIDTH] IN BLOOD BY AUTOMATED COUNT: 19.9 % (ref 11.3–14.5)
ERYTHROCYTE [DISTWIDTH] IN BLOOD BY AUTOMATED COUNT: 20.1 % (ref 11.3–14.5)
ERYTHROCYTE [DISTWIDTH] IN BLOOD BY AUTOMATED COUNT: 20.2 % (ref 11.3–14.5)
ERYTHROCYTE [DISTWIDTH] IN BLOOD BY AUTOMATED COUNT: 20.2 % (ref 11.3–14.5)
ERYTHROCYTE [DISTWIDTH] IN BLOOD BY AUTOMATED COUNT: 20.3 % (ref 11.3–14.5)
ERYTHROCYTE [DISTWIDTH] IN BLOOD BY AUTOMATED COUNT: 20.6 % (ref 11.3–14.5)
ERYTHROCYTE [SEDIMENTATION RATE] IN BLOOD: 74 MM/HR (ref 0–30)
ETHANOL BLD-MCNC: <10 MG/DL (ref 0–10)
FLUAV SUBTYP SPEC NAA+PROBE: NOT DETECTED
FLUBV RNA ISLT QL NAA+PROBE: NOT DETECTED
GFR SERPL CREATININE-BSD FRML MDRD: 11 ML/MIN/1.73
GFR SERPL CREATININE-BSD FRML MDRD: 12 ML/MIN/1.73
GFR SERPL CREATININE-BSD FRML MDRD: 14 ML/MIN/1.73
GFR SERPL CREATININE-BSD FRML MDRD: 14 ML/MIN/1.73
GFR SERPL CREATININE-BSD FRML MDRD: 15 ML/MIN/1.73
GFR SERPL CREATININE-BSD FRML MDRD: 15 ML/MIN/1.73
GFR SERPL CREATININE-BSD FRML MDRD: 17 ML/MIN/1.73
GFR SERPL CREATININE-BSD FRML MDRD: 18 ML/MIN/1.73
GFR SERPL CREATININE-BSD FRML MDRD: 21 ML/MIN/1.73
GFR SERPL CREATININE-BSD FRML MDRD: 22 ML/MIN/1.73
GFR SERPL CREATININE-BSD FRML MDRD: 26 ML/MIN/1.73
GFR SERPL CREATININE-BSD FRML MDRD: 28 ML/MIN/1.73
GFR SERPL CREATININE-BSD FRML MDRD: 30 ML/MIN/1.73
GFR SERPL CREATININE-BSD FRML MDRD: 31 ML/MIN/1.73
GFR SERPL CREATININE-BSD FRML MDRD: 34 ML/MIN/1.73
GFR SERPL CREATININE-BSD FRML MDRD: 35 ML/MIN/1.73
GFR SERPL CREATININE-BSD FRML MDRD: 36 ML/MIN/1.73
GFR SERPL CREATININE-BSD FRML MDRD: 40 ML/MIN/1.73
GFR SERPL CREATININE-BSD FRML MDRD: 40 ML/MIN/1.73
GFR SERPL CREATININE-BSD FRML MDRD: 41 ML/MIN/1.73
GFR SERPL CREATININE-BSD FRML MDRD: 42 ML/MIN/1.73
GFR SERPL CREATININE-BSD FRML MDRD: 44 ML/MIN/1.73
GFR SERPL CREATININE-BSD FRML MDRD: 45 ML/MIN/1.73
GFR SERPL CREATININE-BSD FRML MDRD: 45 ML/MIN/1.73
GFR SERPL CREATININE-BSD FRML MDRD: 48 ML/MIN/1.73
GFR SERPL CREATININE-BSD FRML MDRD: 49 ML/MIN/1.73
GFR SERPL CREATININE-BSD FRML MDRD: 59 ML/MIN/1.73
GFR SERPL CREATININE-BSD FRML MDRD: 66 ML/MIN/1.73
GFR SERPL CREATININE-BSD FRML MDRD: 69 ML/MIN/1.73
GLOBULIN UR ELPH-MCNC: 2.5 GM/DL
GLOBULIN UR ELPH-MCNC: 2.8 GM/DL
GLOBULIN UR ELPH-MCNC: 3.1 GM/DL
GLOBULIN UR ELPH-MCNC: 3.2 GM/DL
GLOBULIN UR ELPH-MCNC: 3.3 GM/DL
GLOBULIN UR ELPH-MCNC: 3.5 GM/DL
GLUCOSE BLD-MCNC: 101 MG/DL (ref 70–100)
GLUCOSE BLD-MCNC: 114 MG/DL (ref 70–100)
GLUCOSE BLD-MCNC: 122 MG/DL (ref 70–100)
GLUCOSE BLD-MCNC: 125 MG/DL (ref 70–100)
GLUCOSE BLD-MCNC: 133 MG/DL (ref 70–100)
GLUCOSE BLD-MCNC: 138 MG/DL (ref 70–100)
GLUCOSE BLD-MCNC: 163 MG/DL (ref 70–100)
GLUCOSE BLD-MCNC: 168 MG/DL (ref 70–100)
GLUCOSE BLD-MCNC: 172 MG/DL (ref 70–100)
GLUCOSE BLD-MCNC: 179 MG/DL (ref 70–100)
GLUCOSE BLD-MCNC: 182 MG/DL (ref 70–100)
GLUCOSE BLD-MCNC: 210 MG/DL (ref 70–100)
GLUCOSE BLD-MCNC: 214 MG/DL (ref 70–100)
GLUCOSE BLD-MCNC: 214 MG/DL (ref 70–100)
GLUCOSE BLD-MCNC: 229 MG/DL (ref 70–100)
GLUCOSE BLD-MCNC: 237 MG/DL (ref 70–100)
GLUCOSE BLD-MCNC: 253 MG/DL (ref 70–100)
GLUCOSE BLD-MCNC: 284 MG/DL (ref 70–100)
GLUCOSE BLD-MCNC: 297 MG/DL (ref 70–100)
GLUCOSE BLD-MCNC: 300 MG/DL (ref 70–100)
GLUCOSE BLD-MCNC: 311 MG/DL (ref 70–100)
GLUCOSE BLD-MCNC: 324 MG/DL (ref 70–100)
GLUCOSE BLD-MCNC: 327 MG/DL (ref 70–100)
GLUCOSE BLD-MCNC: 329 MG/DL (ref 70–100)
GLUCOSE BLD-MCNC: 350 MG/DL (ref 70–100)
GLUCOSE BLD-MCNC: 381 MG/DL (ref 70–100)
GLUCOSE BLD-MCNC: 384 MG/DL (ref 70–100)
GLUCOSE BLD-MCNC: 84 MG/DL (ref 70–100)
GLUCOSE BLD-MCNC: 90 MG/DL (ref 70–100)
GLUCOSE BLD-MCNC: 93 MG/DL (ref 70–100)
GLUCOSE BLD-MCNC: 99 MG/DL (ref 70–100)
GLUCOSE BLDC GLUCOMTR-MCNC: 100 MG/DL (ref 70–130)
GLUCOSE BLDC GLUCOMTR-MCNC: 100 MG/DL (ref 70–130)
GLUCOSE BLDC GLUCOMTR-MCNC: 101 MG/DL (ref 70–130)
GLUCOSE BLDC GLUCOMTR-MCNC: 105 MG/DL (ref 70–130)
GLUCOSE BLDC GLUCOMTR-MCNC: 108 MG/DL (ref 70–130)
GLUCOSE BLDC GLUCOMTR-MCNC: 111 MG/DL (ref 70–130)
GLUCOSE BLDC GLUCOMTR-MCNC: 112 MG/DL (ref 70–130)
GLUCOSE BLDC GLUCOMTR-MCNC: 118 MG/DL (ref 70–130)
GLUCOSE BLDC GLUCOMTR-MCNC: 120 MG/DL (ref 70–130)
GLUCOSE BLDC GLUCOMTR-MCNC: 121 MG/DL (ref 70–130)
GLUCOSE BLDC GLUCOMTR-MCNC: 121 MG/DL (ref 70–130)
GLUCOSE BLDC GLUCOMTR-MCNC: 122 MG/DL (ref 70–130)
GLUCOSE BLDC GLUCOMTR-MCNC: 125 MG/DL (ref 70–130)
GLUCOSE BLDC GLUCOMTR-MCNC: 125 MG/DL (ref 70–130)
GLUCOSE BLDC GLUCOMTR-MCNC: 127 MG/DL (ref 70–130)
GLUCOSE BLDC GLUCOMTR-MCNC: 128 MG/DL (ref 70–130)
GLUCOSE BLDC GLUCOMTR-MCNC: 129 MG/DL (ref 70–130)
GLUCOSE BLDC GLUCOMTR-MCNC: 129 MG/DL (ref 70–130)
GLUCOSE BLDC GLUCOMTR-MCNC: 130 MG/DL (ref 70–130)
GLUCOSE BLDC GLUCOMTR-MCNC: 131 MG/DL (ref 70–130)
GLUCOSE BLDC GLUCOMTR-MCNC: 132 MG/DL (ref 70–130)
GLUCOSE BLDC GLUCOMTR-MCNC: 132 MG/DL (ref 70–130)
GLUCOSE BLDC GLUCOMTR-MCNC: 134 MG/DL (ref 70–130)
GLUCOSE BLDC GLUCOMTR-MCNC: 136 MG/DL (ref 70–130)
GLUCOSE BLDC GLUCOMTR-MCNC: 136 MG/DL (ref 70–130)
GLUCOSE BLDC GLUCOMTR-MCNC: 138 MG/DL (ref 70–130)
GLUCOSE BLDC GLUCOMTR-MCNC: 139 MG/DL (ref 70–130)
GLUCOSE BLDC GLUCOMTR-MCNC: 140 MG/DL (ref 70–130)
GLUCOSE BLDC GLUCOMTR-MCNC: 140 MG/DL (ref 70–130)
GLUCOSE BLDC GLUCOMTR-MCNC: 141 MG/DL (ref 70–130)
GLUCOSE BLDC GLUCOMTR-MCNC: 141 MG/DL (ref 70–130)
GLUCOSE BLDC GLUCOMTR-MCNC: 143 MG/DL (ref 70–130)
GLUCOSE BLDC GLUCOMTR-MCNC: 144 MG/DL (ref 70–130)
GLUCOSE BLDC GLUCOMTR-MCNC: 146 MG/DL (ref 70–130)
GLUCOSE BLDC GLUCOMTR-MCNC: 148 MG/DL (ref 70–130)
GLUCOSE BLDC GLUCOMTR-MCNC: 149 MG/DL (ref 70–130)
GLUCOSE BLDC GLUCOMTR-MCNC: 150 MG/DL (ref 70–130)
GLUCOSE BLDC GLUCOMTR-MCNC: 151 MG/DL (ref 70–130)
GLUCOSE BLDC GLUCOMTR-MCNC: 152 MG/DL (ref 70–130)
GLUCOSE BLDC GLUCOMTR-MCNC: 152 MG/DL (ref 70–130)
GLUCOSE BLDC GLUCOMTR-MCNC: 157 MG/DL (ref 70–130)
GLUCOSE BLDC GLUCOMTR-MCNC: 157 MG/DL (ref 70–130)
GLUCOSE BLDC GLUCOMTR-MCNC: 159 MG/DL (ref 70–130)
GLUCOSE BLDC GLUCOMTR-MCNC: 159 MG/DL (ref 70–130)
GLUCOSE BLDC GLUCOMTR-MCNC: 160 MG/DL (ref 70–130)
GLUCOSE BLDC GLUCOMTR-MCNC: 161 MG/DL (ref 70–130)
GLUCOSE BLDC GLUCOMTR-MCNC: 161 MG/DL (ref 70–130)
GLUCOSE BLDC GLUCOMTR-MCNC: 163 MG/DL (ref 70–130)
GLUCOSE BLDC GLUCOMTR-MCNC: 164 MG/DL (ref 70–130)
GLUCOSE BLDC GLUCOMTR-MCNC: 166 MG/DL (ref 70–130)
GLUCOSE BLDC GLUCOMTR-MCNC: 167 MG/DL (ref 70–130)
GLUCOSE BLDC GLUCOMTR-MCNC: 168 MG/DL (ref 70–130)
GLUCOSE BLDC GLUCOMTR-MCNC: 168 MG/DL (ref 70–130)
GLUCOSE BLDC GLUCOMTR-MCNC: 169 MG/DL (ref 70–130)
GLUCOSE BLDC GLUCOMTR-MCNC: 170 MG/DL (ref 70–130)
GLUCOSE BLDC GLUCOMTR-MCNC: 170 MG/DL (ref 70–130)
GLUCOSE BLDC GLUCOMTR-MCNC: 171 MG/DL (ref 70–130)
GLUCOSE BLDC GLUCOMTR-MCNC: 172 MG/DL (ref 70–130)
GLUCOSE BLDC GLUCOMTR-MCNC: 173 MG/DL (ref 70–130)
GLUCOSE BLDC GLUCOMTR-MCNC: 175 MG/DL (ref 70–130)
GLUCOSE BLDC GLUCOMTR-MCNC: 178 MG/DL (ref 70–130)
GLUCOSE BLDC GLUCOMTR-MCNC: 179 MG/DL (ref 70–130)
GLUCOSE BLDC GLUCOMTR-MCNC: 181 MG/DL (ref 70–130)
GLUCOSE BLDC GLUCOMTR-MCNC: 182 MG/DL (ref 70–130)
GLUCOSE BLDC GLUCOMTR-MCNC: 182 MG/DL (ref 70–130)
GLUCOSE BLDC GLUCOMTR-MCNC: 187 MG/DL (ref 70–130)
GLUCOSE BLDC GLUCOMTR-MCNC: 189 MG/DL (ref 70–130)
GLUCOSE BLDC GLUCOMTR-MCNC: 190 MG/DL (ref 70–130)
GLUCOSE BLDC GLUCOMTR-MCNC: 191 MG/DL (ref 70–130)
GLUCOSE BLDC GLUCOMTR-MCNC: 192 MG/DL (ref 70–130)
GLUCOSE BLDC GLUCOMTR-MCNC: 195 MG/DL (ref 70–130)
GLUCOSE BLDC GLUCOMTR-MCNC: 197 MG/DL (ref 70–130)
GLUCOSE BLDC GLUCOMTR-MCNC: 198 MG/DL (ref 70–130)
GLUCOSE BLDC GLUCOMTR-MCNC: 198 MG/DL (ref 70–130)
GLUCOSE BLDC GLUCOMTR-MCNC: 200 MG/DL (ref 70–130)
GLUCOSE BLDC GLUCOMTR-MCNC: 206 MG/DL (ref 70–130)
GLUCOSE BLDC GLUCOMTR-MCNC: 210 MG/DL (ref 70–130)
GLUCOSE BLDC GLUCOMTR-MCNC: 210 MG/DL (ref 70–130)
GLUCOSE BLDC GLUCOMTR-MCNC: 213 MG/DL (ref 70–130)
GLUCOSE BLDC GLUCOMTR-MCNC: 214 MG/DL (ref 70–130)
GLUCOSE BLDC GLUCOMTR-MCNC: 215 MG/DL (ref 70–130)
GLUCOSE BLDC GLUCOMTR-MCNC: 215 MG/DL (ref 70–130)
GLUCOSE BLDC GLUCOMTR-MCNC: 216 MG/DL (ref 70–130)
GLUCOSE BLDC GLUCOMTR-MCNC: 217 MG/DL (ref 70–130)
GLUCOSE BLDC GLUCOMTR-MCNC: 218 MG/DL (ref 70–130)
GLUCOSE BLDC GLUCOMTR-MCNC: 220 MG/DL (ref 70–130)
GLUCOSE BLDC GLUCOMTR-MCNC: 221 MG/DL (ref 70–130)
GLUCOSE BLDC GLUCOMTR-MCNC: 223 MG/DL (ref 70–130)
GLUCOSE BLDC GLUCOMTR-MCNC: 228 MG/DL (ref 70–130)
GLUCOSE BLDC GLUCOMTR-MCNC: 229 MG/DL (ref 70–130)
GLUCOSE BLDC GLUCOMTR-MCNC: 229 MG/DL (ref 70–130)
GLUCOSE BLDC GLUCOMTR-MCNC: 230 MG/DL (ref 70–130)
GLUCOSE BLDC GLUCOMTR-MCNC: 233 MG/DL (ref 70–130)
GLUCOSE BLDC GLUCOMTR-MCNC: 234 MG/DL (ref 70–130)
GLUCOSE BLDC GLUCOMTR-MCNC: 235 MG/DL (ref 70–130)
GLUCOSE BLDC GLUCOMTR-MCNC: 237 MG/DL (ref 70–130)
GLUCOSE BLDC GLUCOMTR-MCNC: 238 MG/DL (ref 70–130)
GLUCOSE BLDC GLUCOMTR-MCNC: 241 MG/DL (ref 70–130)
GLUCOSE BLDC GLUCOMTR-MCNC: 246 MG/DL (ref 70–130)
GLUCOSE BLDC GLUCOMTR-MCNC: 247 MG/DL (ref 70–130)
GLUCOSE BLDC GLUCOMTR-MCNC: 251 MG/DL (ref 70–130)
GLUCOSE BLDC GLUCOMTR-MCNC: 252 MG/DL (ref 70–130)
GLUCOSE BLDC GLUCOMTR-MCNC: 256 MG/DL (ref 70–130)
GLUCOSE BLDC GLUCOMTR-MCNC: 257 MG/DL (ref 70–130)
GLUCOSE BLDC GLUCOMTR-MCNC: 257 MG/DL (ref 70–130)
GLUCOSE BLDC GLUCOMTR-MCNC: 258 MG/DL (ref 70–130)
GLUCOSE BLDC GLUCOMTR-MCNC: 258 MG/DL (ref 70–130)
GLUCOSE BLDC GLUCOMTR-MCNC: 260 MG/DL (ref 70–130)
GLUCOSE BLDC GLUCOMTR-MCNC: 262 MG/DL (ref 70–130)
GLUCOSE BLDC GLUCOMTR-MCNC: 263 MG/DL (ref 70–130)
GLUCOSE BLDC GLUCOMTR-MCNC: 264 MG/DL (ref 70–130)
GLUCOSE BLDC GLUCOMTR-MCNC: 264 MG/DL (ref 70–130)
GLUCOSE BLDC GLUCOMTR-MCNC: 267 MG/DL (ref 70–130)
GLUCOSE BLDC GLUCOMTR-MCNC: 268 MG/DL (ref 70–130)
GLUCOSE BLDC GLUCOMTR-MCNC: 269 MG/DL (ref 70–130)
GLUCOSE BLDC GLUCOMTR-MCNC: 269 MG/DL (ref 70–130)
GLUCOSE BLDC GLUCOMTR-MCNC: 270 MG/DL (ref 70–130)
GLUCOSE BLDC GLUCOMTR-MCNC: 271 MG/DL (ref 70–130)
GLUCOSE BLDC GLUCOMTR-MCNC: 274 MG/DL (ref 70–130)
GLUCOSE BLDC GLUCOMTR-MCNC: 277 MG/DL (ref 70–130)
GLUCOSE BLDC GLUCOMTR-MCNC: 278 MG/DL (ref 70–130)
GLUCOSE BLDC GLUCOMTR-MCNC: 279 MG/DL (ref 70–130)
GLUCOSE BLDC GLUCOMTR-MCNC: 284 MG/DL (ref 70–130)
GLUCOSE BLDC GLUCOMTR-MCNC: 285 MG/DL (ref 70–130)
GLUCOSE BLDC GLUCOMTR-MCNC: 286 MG/DL (ref 70–130)
GLUCOSE BLDC GLUCOMTR-MCNC: 287 MG/DL (ref 70–130)
GLUCOSE BLDC GLUCOMTR-MCNC: 287 MG/DL (ref 70–130)
GLUCOSE BLDC GLUCOMTR-MCNC: 291 MG/DL (ref 70–130)
GLUCOSE BLDC GLUCOMTR-MCNC: 293 MG/DL (ref 70–130)
GLUCOSE BLDC GLUCOMTR-MCNC: 293 MG/DL (ref 70–130)
GLUCOSE BLDC GLUCOMTR-MCNC: 294 MG/DL (ref 70–130)
GLUCOSE BLDC GLUCOMTR-MCNC: 294 MG/DL (ref 70–130)
GLUCOSE BLDC GLUCOMTR-MCNC: 296 MG/DL (ref 70–130)
GLUCOSE BLDC GLUCOMTR-MCNC: 300 MG/DL (ref 70–130)
GLUCOSE BLDC GLUCOMTR-MCNC: 303 MG/DL (ref 70–130)
GLUCOSE BLDC GLUCOMTR-MCNC: 307 MG/DL (ref 70–130)
GLUCOSE BLDC GLUCOMTR-MCNC: 311 MG/DL (ref 70–130)
GLUCOSE BLDC GLUCOMTR-MCNC: 319 MG/DL (ref 70–130)
GLUCOSE BLDC GLUCOMTR-MCNC: 319 MG/DL (ref 70–130)
GLUCOSE BLDC GLUCOMTR-MCNC: 322 MG/DL (ref 70–130)
GLUCOSE BLDC GLUCOMTR-MCNC: 325 MG/DL (ref 70–130)
GLUCOSE BLDC GLUCOMTR-MCNC: 348 MG/DL (ref 70–130)
GLUCOSE BLDC GLUCOMTR-MCNC: 351 MG/DL (ref 70–130)
GLUCOSE BLDC GLUCOMTR-MCNC: 356 MG/DL (ref 70–130)
GLUCOSE BLDC GLUCOMTR-MCNC: 372 MG/DL (ref 70–130)
GLUCOSE BLDC GLUCOMTR-MCNC: 425 MG/DL (ref 70–130)
GLUCOSE BLDC GLUCOMTR-MCNC: 62 MG/DL (ref 70–130)
GLUCOSE BLDC GLUCOMTR-MCNC: 64 MG/DL (ref 70–130)
GLUCOSE BLDC GLUCOMTR-MCNC: 70 MG/DL (ref 70–130)
GLUCOSE BLDC GLUCOMTR-MCNC: 72 MG/DL (ref 70–130)
GLUCOSE BLDC GLUCOMTR-MCNC: 72 MG/DL (ref 70–130)
GLUCOSE BLDC GLUCOMTR-MCNC: 73 MG/DL (ref 70–130)
GLUCOSE BLDC GLUCOMTR-MCNC: 73 MG/DL (ref 70–130)
GLUCOSE BLDC GLUCOMTR-MCNC: 75 MG/DL (ref 70–130)
GLUCOSE BLDC GLUCOMTR-MCNC: 78 MG/DL (ref 70–130)
GLUCOSE BLDC GLUCOMTR-MCNC: 79 MG/DL (ref 70–130)
GLUCOSE BLDC GLUCOMTR-MCNC: 83 MG/DL (ref 70–130)
GLUCOSE BLDC GLUCOMTR-MCNC: 84 MG/DL (ref 70–130)
GLUCOSE BLDC GLUCOMTR-MCNC: 86 MG/DL (ref 70–130)
GLUCOSE BLDC GLUCOMTR-MCNC: 86 MG/DL (ref 70–130)
GLUCOSE BLDC GLUCOMTR-MCNC: 87 MG/DL (ref 70–130)
GLUCOSE BLDC GLUCOMTR-MCNC: 88 MG/DL (ref 70–130)
GLUCOSE BLDC GLUCOMTR-MCNC: 90 MG/DL (ref 70–130)
GLUCOSE BLDC GLUCOMTR-MCNC: 90 MG/DL (ref 70–130)
GLUCOSE BLDC GLUCOMTR-MCNC: 92 MG/DL (ref 70–130)
GLUCOSE BLDC GLUCOMTR-MCNC: 94 MG/DL (ref 70–130)
GLUCOSE BLDC GLUCOMTR-MCNC: 97 MG/DL (ref 70–130)
GLUCOSE BLDC GLUCOMTR-MCNC: 97 MG/DL (ref 70–130)
GLUCOSE BLDC GLUCOMTR-MCNC: 99 MG/DL (ref 70–130)
GLUCOSE BLDC GLUCOMTR-MCNC: 99 MG/DL (ref 70–130)
GLUCOSE UR STRIP-MCNC: ABNORMAL MG/DL
GLUCOSE UR STRIP-MCNC: ABNORMAL MG/DL
GLUCOSE UR STRIP-MCNC: NEGATIVE MG/DL
GRAM STN SPEC: ABNORMAL
GRAM STN SPEC: NORMAL
HBA1C MFR BLD: 10.2 % (ref 4.8–5.6)
HBA1C MFR BLD: 7.9 % (ref 4.8–5.6)
HBA1C MFR BLD: 9.3 % (ref 4.8–5.6)
HCO3 BLDA-SCNC: 13.9 MMOL/L (ref 20–26)
HCO3 BLDA-SCNC: 15.5 MMOL/L (ref 20–26)
HCO3 BLDA-SCNC: 8.8 MMOL/L (ref 20–26)
HCO3 BLDA-SCNC: 9.5 MMOL/L (ref 20–26)
HCT VFR BLD AUTO: 33.2 % (ref 34.5–44)
HCT VFR BLD AUTO: 33.6 % (ref 34.5–44)
HCT VFR BLD AUTO: 34.2 % (ref 34.5–44)
HCT VFR BLD AUTO: 34.3 % (ref 34.5–44)
HCT VFR BLD AUTO: 34.3 % (ref 34.5–44)
HCT VFR BLD AUTO: 34.8 % (ref 34.5–44)
HCT VFR BLD AUTO: 34.9 % (ref 34.5–44)
HCT VFR BLD AUTO: 35 % (ref 34.5–44)
HCT VFR BLD AUTO: 36.3 % (ref 34.5–44)
HCT VFR BLD AUTO: 36.6 % (ref 34.5–44)
HCT VFR BLD AUTO: 37.1 % (ref 34.5–44)
HCT VFR BLD AUTO: 37.6 % (ref 34.5–44)
HCT VFR BLD AUTO: 38.8 % (ref 34.5–44)
HCT VFR BLD AUTO: 39.3 % (ref 34.5–44)
HCT VFR BLD AUTO: 40.2 % (ref 34.5–44)
HCT VFR BLD AUTO: 40.6 % (ref 34.5–44)
HCT VFR BLD AUTO: 41.1 % (ref 34.5–44)
HCT VFR BLD AUTO: 41.2 % (ref 34.5–44)
HCT VFR BLD AUTO: 41.4 % (ref 34.5–44)
HCT VFR BLD AUTO: 41.6 % (ref 34.5–44)
HCT VFR BLD AUTO: 42.8 % (ref 34.5–44)
HCT VFR BLD AUTO: 42.9 % (ref 34.5–44)
HCT VFR BLD AUTO: 43.7 % (ref 34.5–44)
HCT VFR BLD AUTO: 47.3 % (ref 34.5–44)
HCT VFR BLD AUTO: 49.3 % (ref 34.5–44)
HCT VFR BLD AUTO: 55.5 % (ref 34.5–44)
HCT VFR BLD CALC: 37.1 %
HCT VFR BLD CALC: 45.1 %
HCT VFR BLD CALC: 48.4 %
HCT VFR BLD CALC: 49.9 %
HCT VFR BLDA CALC: 41 % (ref 38–51)
HCT VFR BLDA CALC: 48 % (ref 38–51)
HGB BLD-MCNC: 10 G/DL (ref 11.5–15.5)
HGB BLD-MCNC: 10.4 G/DL (ref 11.5–15.5)
HGB BLD-MCNC: 10.5 G/DL (ref 11.5–15.5)
HGB BLD-MCNC: 11.1 G/DL (ref 11.5–15.5)
HGB BLD-MCNC: 11.1 G/DL (ref 11.5–15.5)
HGB BLD-MCNC: 11.2 G/DL (ref 11.5–15.5)
HGB BLD-MCNC: 11.3 G/DL (ref 11.5–15.5)
HGB BLD-MCNC: 11.5 G/DL (ref 11.5–15.5)
HGB BLD-MCNC: 12.4 G/DL (ref 11.5–15.5)
HGB BLD-MCNC: 12.4 G/DL (ref 11.5–15.5)
HGB BLD-MCNC: 12.5 G/DL (ref 11.5–15.5)
HGB BLD-MCNC: 12.6 G/DL (ref 11.5–15.5)
HGB BLD-MCNC: 12.9 G/DL (ref 11.5–15.5)
HGB BLD-MCNC: 13 G/DL (ref 11.5–15.5)
HGB BLD-MCNC: 13.1 G/DL (ref 11.5–15.5)
HGB BLD-MCNC: 13.3 G/DL (ref 11.5–15.5)
HGB BLD-MCNC: 13.4 G/DL (ref 11.5–15.5)
HGB BLD-MCNC: 14.1 G/DL (ref 11.5–15.5)
HGB BLD-MCNC: 14.5 G/DL (ref 11.5–15.5)
HGB BLD-MCNC: 14.8 G/DL (ref 11.5–15.5)
HGB BLD-MCNC: 18.1 G/DL (ref 11.5–15.5)
HGB BLD-MCNC: 9.8 G/DL (ref 11.5–15.5)
HGB BLD-MCNC: 9.9 G/DL (ref 11.5–15.5)
HGB BLDA-MCNC: 12.1 G/DL (ref 14–18)
HGB BLDA-MCNC: 13.9 G/DL (ref 12–17)
HGB BLDA-MCNC: 14.7 G/DL (ref 14–18)
HGB BLDA-MCNC: 15.8 G/DL (ref 14–18)
HGB BLDA-MCNC: 16.3 G/DL (ref 12–17)
HGB BLDA-MCNC: 16.3 G/DL (ref 14–18)
HGB UR QL STRIP.AUTO: ABNORMAL
HGB UR QL STRIP.AUTO: NEGATIVE
HOLD SPECIMEN: NORMAL
HOROWITZ INDEX BLD+IHG-RTO: 21 %
HOROWITZ INDEX BLD+IHG-RTO: 21 %
HOROWITZ INDEX BLD+IHG-RTO: 36 %
HOROWITZ INDEX BLD+IHG-RTO: 44 %
HYALINE CASTS UR QL AUTO: ABNORMAL /LPF
HYPOCHROMIA BLD QL: ABNORMAL
HYPOCHROMIA BLD QL: NORMAL
HYPOCHROMIA BLD QL: NORMAL
IMM GRANULOCYTES # BLD: 0.05 10*3/MM3 (ref 0–0.03)
IMM GRANULOCYTES # BLD: 0.05 10*3/MM3 (ref 0–0.03)
IMM GRANULOCYTES # BLD: 0.07 10*3/MM3 (ref 0–0.03)
IMM GRANULOCYTES # BLD: 0.12 10*3/MM3 (ref 0–0.03)
IMM GRANULOCYTES # BLD: 0.13 10*3/MM3 (ref 0–0.03)
IMM GRANULOCYTES # BLD: 0.13 10*3/MM3 (ref 0–0.03)
IMM GRANULOCYTES # BLD: 0.19 10*3/MM3 (ref 0–0.03)
IMM GRANULOCYTES # BLD: 0.2 10*3/MM3 (ref 0–0.03)
IMM GRANULOCYTES # BLD: 0.23 10*3/MM3 (ref 0–0.03)
IMM GRANULOCYTES # BLD: 0.29 10*3/MM3 (ref 0–0.03)
IMM GRANULOCYTES # BLD: 0.31 10*3/MM3 (ref 0–0.03)
IMM GRANULOCYTES NFR BLD: 0.4 % (ref 0–0.6)
IMM GRANULOCYTES NFR BLD: 0.4 % (ref 0–0.6)
IMM GRANULOCYTES NFR BLD: 0.7 % (ref 0–0.6)
IMM GRANULOCYTES NFR BLD: 0.8 % (ref 0–0.6)
IMM GRANULOCYTES NFR BLD: 0.9 % (ref 0–0.6)
IMM GRANULOCYTES NFR BLD: 1 % (ref 0–0.6)
IMM GRANULOCYTES NFR BLD: 1.3 % (ref 0–0.6)
IMM GRANULOCYTES NFR BLD: 1.7 % (ref 0–0.6)
IMM GRANULOCYTES NFR BLD: 1.7 % (ref 0–0.6)
INR PPP: 1.01 (ref 0.91–1.09)
INR PPP: 1.15
ISOLATED FROM: ABNORMAL
KETONES UR QL STRIP: NEGATIVE
LEFT ATRIUM VOLUME INDEX: 39.9 ML/M2
LEFT ATRIUM VOLUME INDEX: 40.3 ML/M^2
LEFT ATRIUM VOLUME: 66 ML
LEUKOCYTE ESTERASE UR QL STRIP.AUTO: ABNORMAL
LIPASE SERPL-CCNC: 44 U/L (ref 6–51)
LV EF 2D ECHO EST: 55 %
LV EF 2D ECHO EST: 55 %
LYMPHOCYTES # BLD AUTO: 0.81 10*3/MM3 (ref 0.6–4.8)
LYMPHOCYTES # BLD AUTO: 1.07 10*3/MM3 (ref 0.6–4.8)
LYMPHOCYTES # BLD AUTO: 1.12 10*3/MM3 (ref 0.6–4.8)
LYMPHOCYTES # BLD AUTO: 1.13 10*3/MM3 (ref 0.6–4.8)
LYMPHOCYTES # BLD AUTO: 1.16 10*3/MM3 (ref 0.6–4.8)
LYMPHOCYTES # BLD AUTO: 1.22 10*3/MM3 (ref 0.6–4.8)
LYMPHOCYTES # BLD AUTO: 1.36 10*3/MM3 (ref 0.6–4.8)
LYMPHOCYTES # BLD AUTO: 1.43 10*3/MM3 (ref 0.6–4.8)
LYMPHOCYTES # BLD AUTO: 1.66 10*3/MM3 (ref 0.6–4.8)
LYMPHOCYTES # BLD AUTO: 1.77 10*3/MM3 (ref 0.6–4.8)
LYMPHOCYTES # BLD AUTO: 1.96 10*3/MM3 (ref 0.6–4.8)
LYMPHOCYTES # BLD MANUAL: 1.47 10*3/MM3 (ref 0.6–4.8)
LYMPHOCYTES # BLD MANUAL: 1.62 10*3/MM3 (ref 0.6–4.8)
LYMPHOCYTES # BLD MANUAL: 1.86 10*3/MM3 (ref 0.6–4.8)
LYMPHOCYTES NFR BLD AUTO: 11.3 % (ref 24–44)
LYMPHOCYTES NFR BLD AUTO: 11.4 % (ref 24–44)
LYMPHOCYTES NFR BLD AUTO: 14.6 % (ref 24–44)
LYMPHOCYTES NFR BLD AUTO: 15.7 % (ref 24–44)
LYMPHOCYTES NFR BLD AUTO: 2.8 % (ref 24–44)
LYMPHOCYTES NFR BLD AUTO: 6.2 % (ref 24–44)
LYMPHOCYTES NFR BLD AUTO: 6.5 % (ref 24–44)
LYMPHOCYTES NFR BLD AUTO: 7 % (ref 24–44)
LYMPHOCYTES NFR BLD AUTO: 7.1 % (ref 24–44)
LYMPHOCYTES NFR BLD AUTO: 7.9 % (ref 24–44)
LYMPHOCYTES NFR BLD AUTO: 8.5 % (ref 24–44)
LYMPHOCYTES NFR BLD MANUAL: 12 % (ref 24–44)
LYMPHOCYTES NFR BLD MANUAL: 17 % (ref 24–44)
LYMPHOCYTES NFR BLD MANUAL: 20 % (ref 24–44)
LYMPHOCYTES NFR BLD MANUAL: 4 % (ref 0–12)
LYMPHOCYTES NFR BLD MANUAL: 9 % (ref 0–12)
LYMPHOCYTES NFR BLD MANUAL: 9 % (ref 0–12)
MAGNESIUM SERPL-MCNC: 1.6 MG/DL (ref 1.3–2.7)
MAGNESIUM SERPL-MCNC: 1.6 MG/DL (ref 1.3–2.7)
MAGNESIUM SERPL-MCNC: 1.7 MG/DL (ref 1.3–2.7)
MAGNESIUM SERPL-MCNC: 1.7 MG/DL (ref 1.3–2.7)
MAGNESIUM SERPL-MCNC: 1.8 MG/DL (ref 1.3–2.7)
MAGNESIUM SERPL-MCNC: 1.9 MG/DL (ref 1.3–2.7)
MAGNESIUM SERPL-MCNC: 1.9 MG/DL (ref 1.3–2.7)
MAGNESIUM SERPL-MCNC: 2 MG/DL (ref 1.3–2.7)
MAGNESIUM SERPL-MCNC: 2.1 MG/DL (ref 1.3–2.7)
MAGNESIUM SERPL-MCNC: 2.2 MG/DL (ref 1.3–2.7)
MAGNESIUM SERPL-MCNC: 2.2 MG/DL (ref 1.3–2.7)
MAGNESIUM SERPL-MCNC: 2.3 MG/DL (ref 1.3–2.7)
MAGNESIUM SERPL-MCNC: 2.7 MG/DL (ref 1.3–2.7)
MCH RBC QN AUTO: 26.1 PG (ref 27–31)
MCH RBC QN AUTO: 26.4 PG (ref 27–31)
MCH RBC QN AUTO: 26.4 PG (ref 27–31)
MCH RBC QN AUTO: 26.5 PG (ref 27–31)
MCH RBC QN AUTO: 26.6 PG (ref 27–31)
MCH RBC QN AUTO: 26.7 PG (ref 27–31)
MCH RBC QN AUTO: 26.9 PG (ref 27–31)
MCH RBC QN AUTO: 27.1 PG (ref 27–31)
MCH RBC QN AUTO: 27.3 PG (ref 27–31)
MCH RBC QN AUTO: 27.8 PG (ref 27–31)
MCH RBC QN AUTO: 27.9 PG (ref 27–31)
MCH RBC QN AUTO: 28.1 PG (ref 27–31)
MCH RBC QN AUTO: 28.2 PG (ref 27–31)
MCH RBC QN AUTO: 28.5 PG (ref 27–31)
MCH RBC QN AUTO: 28.6 PG (ref 27–31)
MCH RBC QN AUTO: 29.1 PG (ref 27–31)
MCH RBC QN AUTO: 29.5 PG (ref 27–31)
MCHC RBC AUTO-ENTMCNC: 29.2 G/DL (ref 32–36)
MCHC RBC AUTO-ENTMCNC: 29.2 G/DL (ref 32–36)
MCHC RBC AUTO-ENTMCNC: 29.5 G/DL (ref 32–36)
MCHC RBC AUTO-ENTMCNC: 29.6 G/DL (ref 32–36)
MCHC RBC AUTO-ENTMCNC: 29.8 G/DL (ref 32–36)
MCHC RBC AUTO-ENTMCNC: 29.8 G/DL (ref 32–36)
MCHC RBC AUTO-ENTMCNC: 29.9 G/DL (ref 32–36)
MCHC RBC AUTO-ENTMCNC: 30 G/DL (ref 32–36)
MCHC RBC AUTO-ENTMCNC: 30.1 G/DL (ref 32–36)
MCHC RBC AUTO-ENTMCNC: 30.2 G/DL (ref 32–36)
MCHC RBC AUTO-ENTMCNC: 30.3 G/DL (ref 32–36)
MCHC RBC AUTO-ENTMCNC: 30.4 G/DL (ref 32–36)
MCHC RBC AUTO-ENTMCNC: 30.6 G/DL (ref 32–36)
MCHC RBC AUTO-ENTMCNC: 30.6 G/DL (ref 32–36)
MCHC RBC AUTO-ENTMCNC: 30.7 G/DL (ref 32–36)
MCHC RBC AUTO-ENTMCNC: 30.7 G/DL (ref 32–36)
MCHC RBC AUTO-ENTMCNC: 30.8 G/DL (ref 32–36)
MCHC RBC AUTO-ENTMCNC: 31 G/DL (ref 32–36)
MCHC RBC AUTO-ENTMCNC: 31.5 G/DL (ref 32–36)
MCHC RBC AUTO-ENTMCNC: 31.6 G/DL (ref 32–36)
MCHC RBC AUTO-ENTMCNC: 32.1 G/DL (ref 32–36)
MCHC RBC AUTO-ENTMCNC: 32.3 G/DL (ref 32–36)
MCHC RBC AUTO-ENTMCNC: 32.4 G/DL (ref 32–36)
MCHC RBC AUTO-ENTMCNC: 32.6 G/DL (ref 32–36)
MCV RBC AUTO: 86.8 FL (ref 80–99)
MCV RBC AUTO: 87.1 FL (ref 80–99)
MCV RBC AUTO: 87.6 FL (ref 80–99)
MCV RBC AUTO: 87.9 FL (ref 80–99)
MCV RBC AUTO: 88.1 FL (ref 80–99)
MCV RBC AUTO: 88.1 FL (ref 80–99)
MCV RBC AUTO: 88.4 FL (ref 80–99)
MCV RBC AUTO: 88.6 FL (ref 80–99)
MCV RBC AUTO: 88.9 FL (ref 80–99)
MCV RBC AUTO: 89 FL (ref 80–99)
MCV RBC AUTO: 89 FL (ref 80–99)
MCV RBC AUTO: 89.1 FL (ref 80–99)
MCV RBC AUTO: 89.2 FL (ref 80–99)
MCV RBC AUTO: 89.3 FL (ref 80–99)
MCV RBC AUTO: 89.4 FL (ref 80–99)
MCV RBC AUTO: 89.5 FL (ref 80–99)
MCV RBC AUTO: 89.8 FL (ref 80–99)
MCV RBC AUTO: 90 FL (ref 80–99)
MCV RBC AUTO: 90.2 FL (ref 80–99)
MCV RBC AUTO: 90.4 FL (ref 80–99)
MCV RBC AUTO: 90.6 FL (ref 80–99)
MCV RBC AUTO: 90.8 FL (ref 80–99)
MCV RBC AUTO: 91.8 FL (ref 80–99)
MCV RBC AUTO: 92.7 FL (ref 80–99)
METAMYELOCYTES NFR BLD MANUAL: 1 % (ref 0–0)
METHADONE UR QL SCN: NEGATIVE
METHGB BLD QL: 0.4 % (ref 0–1.5)
METHGB BLD QL: 0.6 % (ref 0–1.5)
METHGB BLD QL: 0.7 % (ref 0–1.5)
METHGB BLD QL: 0.8 % (ref 0–1.5)
MODALITY: ABNORMAL
MONOCYTES # BLD AUTO: 0.46 10*3/MM3 (ref 0–1)
MONOCYTES # BLD AUTO: 0.46 10*3/MM3 (ref 0–1)
MONOCYTES # BLD AUTO: 0.49 10*3/MM3 (ref 0–1)
MONOCYTES # BLD AUTO: 0.61 10*3/MM3 (ref 0–1)
MONOCYTES # BLD AUTO: 0.74 10*3/MM3 (ref 0–1)
MONOCYTES # BLD AUTO: 0.84 10*3/MM3 (ref 0–1)
MONOCYTES # BLD AUTO: 0.86 10*3/MM3 (ref 0–1)
MONOCYTES # BLD AUTO: 0.89 10*3/MM3 (ref 0–1)
MONOCYTES # BLD AUTO: 0.93 10*3/MM3 (ref 0–1)
MONOCYTES # BLD AUTO: 0.96 10*3/MM3 (ref 0–1)
MONOCYTES # BLD AUTO: 1.04 10*3/MM3 (ref 0–1)
MONOCYTES # BLD AUTO: 1.09 10*3/MM3 (ref 0–1)
MONOCYTES # BLD AUTO: 1.42 10*3/MM3 (ref 0–1)
MONOCYTES # BLD AUTO: 1.42 10*3/MM3 (ref 0–1)
MONOCYTES NFR BLD AUTO: 1.8 % (ref 0–12)
MONOCYTES NFR BLD AUTO: 4 % (ref 0–12)
MONOCYTES NFR BLD AUTO: 4.3 % (ref 0–12)
MONOCYTES NFR BLD AUTO: 5.2 % (ref 0–12)
MONOCYTES NFR BLD AUTO: 5.4 % (ref 0–12)
MONOCYTES NFR BLD AUTO: 5.6 % (ref 0–12)
MONOCYTES NFR BLD AUTO: 6 % (ref 0–12)
MONOCYTES NFR BLD AUTO: 6.5 % (ref 0–12)
MONOCYTES NFR BLD AUTO: 8 % (ref 0–12)
MONOCYTES NFR BLD AUTO: 8.3 % (ref 0–12)
MONOCYTES NFR BLD AUTO: 8.9 % (ref 0–12)
MYELOCYTES NFR BLD MANUAL: 1 % (ref 0–0)
NEUTROPHILS # BLD AUTO: 10.25 10*3/MM3 (ref 1.5–8.3)
NEUTROPHILS # BLD AUTO: 10.3 10*3/MM3 (ref 1.5–8.3)
NEUTROPHILS # BLD AUTO: 11.16 10*3/MM3 (ref 1.5–8.3)
NEUTROPHILS # BLD AUTO: 13.08 10*3/MM3 (ref 1.5–8.3)
NEUTROPHILS # BLD AUTO: 13.96 10*3/MM3 (ref 1.5–8.3)
NEUTROPHILS # BLD AUTO: 14.66 10*3/MM3 (ref 1.5–8.3)
NEUTROPHILS # BLD AUTO: 15.03 10*3/MM3 (ref 1.5–8.3)
NEUTROPHILS # BLD AUTO: 18.74 10*3/MM3 (ref 1.5–8.3)
NEUTROPHILS # BLD AUTO: 38.81 10*3/MM3 (ref 1.5–8.3)
NEUTROPHILS # BLD AUTO: 6.24 10*3/MM3 (ref 1.5–8.3)
NEUTROPHILS # BLD AUTO: 6.38 10*3/MM3 (ref 1.5–8.3)
NEUTROPHILS # BLD AUTO: 8.19 10*3/MM3 (ref 1.5–8.3)
NEUTROPHILS # BLD AUTO: 8.79 10*3/MM3 (ref 1.5–8.3)
NEUTROPHILS # BLD AUTO: 9 10*3/MM3 (ref 1.5–8.3)
NEUTROPHILS NFR BLD AUTO: 72.8 % (ref 41–71)
NEUTROPHILS NFR BLD AUTO: 77.2 % (ref 41–71)
NEUTROPHILS NFR BLD AUTO: 81.5 % (ref 41–71)
NEUTROPHILS NFR BLD AUTO: 81.5 % (ref 41–71)
NEUTROPHILS NFR BLD AUTO: 82.4 % (ref 41–71)
NEUTROPHILS NFR BLD AUTO: 83.6 % (ref 41–71)
NEUTROPHILS NFR BLD AUTO: 85.3 % (ref 41–71)
NEUTROPHILS NFR BLD AUTO: 85.7 % (ref 41–71)
NEUTROPHILS NFR BLD AUTO: 86.8 % (ref 41–71)
NEUTROPHILS NFR BLD AUTO: 88.1 % (ref 41–71)
NEUTROPHILS NFR BLD AUTO: 95.3 % (ref 41–71)
NEUTROPHILS NFR BLD MANUAL: 66 % (ref 41–71)
NEUTROPHILS NFR BLD MANUAL: 67 % (ref 41–71)
NEUTROPHILS NFR BLD MANUAL: 84 % (ref 41–71)
NEUTS BAND NFR BLD MANUAL: 1 % (ref 0–5)
NITRITE UR QL STRIP: NEGATIVE
NOTE: ABNORMAL
NRBC BLD MANUAL-RTO: 0 /100 WBC (ref 0–0)
NRBC BLD MANUAL-RTO: 0.2 /100 WBC (ref 0–0)
OPIATES UR QL: NEGATIVE
OXYCODONE UR QL SCN: NEGATIVE
OXYHGB MFR BLDV: 96.3 % (ref 94–99)
OXYHGB MFR BLDV: 96.7 % (ref 94–99)
OXYHGB MFR BLDV: 96.9 % (ref 94–99)
OXYHGB MFR BLDV: 97.4 % (ref 94–99)
PCO2 BLDA: 22 MM HG (ref 35–45)
PCO2 BLDA: 22.4 MM HG (ref 35–45)
PCO2 BLDA: 22.7 MM HG (ref 35–45)
PCO2 BLDA: 25.3 MM HG (ref 35–45)
PCO2 TEMP ADJ BLD: 25.3 MM HG (ref 35–45)
PCP UR QL SCN: NEGATIVE
PH BLDA: 7.2 PH UNITS (ref 7.35–7.45)
PH BLDA: 7.25 PH UNITS (ref 7.35–7.45)
PH BLDA: 7.39 PH UNITS (ref 7.35–7.45)
PH BLDA: 7.4 PH UNITS (ref 7.35–7.45)
PH UR STRIP.AUTO: <=5 [PH] (ref 5–8)
PH, TEMP CORRECTED: 7.39 PH UNITS
PHOSPHATE SERPL-MCNC: 1.1 MG/DL (ref 2.4–5.1)
PHOSPHATE SERPL-MCNC: 1.2 MG/DL (ref 2.4–5.1)
PHOSPHATE SERPL-MCNC: 1.6 MG/DL (ref 2.4–5.1)
PHOSPHATE SERPL-MCNC: 1.9 MG/DL (ref 2.4–5.1)
PHOSPHATE SERPL-MCNC: 2.3 MG/DL (ref 2.4–5.1)
PHOSPHATE SERPL-MCNC: 2.3 MG/DL (ref 2.4–5.1)
PHOSPHATE SERPL-MCNC: 2.4 MG/DL (ref 2.4–5.1)
PHOSPHATE SERPL-MCNC: 2.4 MG/DL (ref 2.4–5.1)
PHOSPHATE SERPL-MCNC: 2.6 MG/DL (ref 2.4–5.1)
PHOSPHATE SERPL-MCNC: 2.9 MG/DL (ref 2.4–5.1)
PHOSPHATE SERPL-MCNC: 3.4 MG/DL (ref 2.4–5.1)
PHOSPHATE SERPL-MCNC: 4 MG/DL (ref 2.4–5.1)
PHOSPHATE SERPL-MCNC: 4.3 MG/DL (ref 2.4–5.1)
PHOSPHATE SERPL-MCNC: 4.9 MG/DL (ref 2.4–5.1)
PLAT MORPH BLD: NORMAL
PLATELET # BLD AUTO: 171 10*3/MM3 (ref 150–450)
PLATELET # BLD AUTO: 179 10*3/MM3 (ref 150–450)
PLATELET # BLD AUTO: 186 10*3/MM3 (ref 150–450)
PLATELET # BLD AUTO: 192 10*3/MM3 (ref 150–450)
PLATELET # BLD AUTO: 192 10*3/MM3 (ref 150–450)
PLATELET # BLD AUTO: 195 10*3/MM3 (ref 150–450)
PLATELET # BLD AUTO: 201 10*3/MM3 (ref 150–450)
PLATELET # BLD AUTO: 209 10*3/MM3 (ref 150–450)
PLATELET # BLD AUTO: 210 10*3/MM3 (ref 150–450)
PLATELET # BLD AUTO: 237 10*3/MM3 (ref 150–450)
PLATELET # BLD AUTO: 249 10*3/MM3 (ref 150–450)
PLATELET # BLD AUTO: 262 10*3/MM3 (ref 150–450)
PLATELET # BLD AUTO: 271 10*3/MM3 (ref 150–450)
PLATELET # BLD AUTO: 273 10*3/MM3 (ref 150–450)
PLATELET # BLD AUTO: 282 10*3/MM3 (ref 150–450)
PLATELET # BLD AUTO: 314 10*3/MM3 (ref 150–450)
PLATELET # BLD AUTO: 331 10*3/MM3 (ref 150–450)
PLATELET # BLD AUTO: 343 10*3/MM3 (ref 150–450)
PLATELET # BLD AUTO: 368 10*3/MM3 (ref 150–450)
PLATELET # BLD AUTO: 398 10*3/MM3 (ref 150–450)
PLATELET # BLD AUTO: 455 10*3/MM3 (ref 150–450)
PLATELET # BLD AUTO: 462 10*3/MM3 (ref 150–450)
PLATELET # BLD AUTO: 502 10*3/MM3 (ref 150–450)
PLATELET # BLD AUTO: 505 10*3/MM3 (ref 150–450)
PMV BLD AUTO: 10 FL (ref 6–12)
PMV BLD AUTO: 10 FL (ref 6–12)
PMV BLD AUTO: 10.2 FL (ref 6–12)
PMV BLD AUTO: 10.4 FL (ref 6–12)
PMV BLD AUTO: 10.5 FL (ref 6–12)
PMV BLD AUTO: 10.7 FL (ref 6–12)
PMV BLD AUTO: 10.8 FL (ref 6–12)
PMV BLD AUTO: 11.1 FL (ref 6–12)
PMV BLD AUTO: 11.3 FL (ref 6–12)
PMV BLD AUTO: 11.3 FL (ref 6–12)
PMV BLD AUTO: 11.4 FL (ref 6–12)
PMV BLD AUTO: 11.4 FL (ref 6–12)
PMV BLD AUTO: 11.5 FL (ref 6–12)
PMV BLD AUTO: 11.6 FL (ref 6–12)
PMV BLD AUTO: 11.6 FL (ref 6–12)
PMV BLD AUTO: 11.7 FL (ref 6–12)
PMV BLD AUTO: 11.7 FL (ref 6–12)
PMV BLD AUTO: 9.3 FL (ref 6–12)
PMV BLD AUTO: 9.5 FL (ref 6–12)
PMV BLD AUTO: 9.5 FL (ref 6–12)
PMV BLD AUTO: 9.7 FL (ref 6–12)
PMV BLD AUTO: 9.9 FL (ref 6–12)
PO2 BLDA: 129 MM HG (ref 83–108)
PO2 BLDA: 148 MM HG (ref 83–108)
PO2 BLDA: 93 MM HG (ref 83–108)
PO2 BLDA: 94.9 MM HG (ref 83–108)
PO2 TEMP ADJ BLD: 94.9 MM HG (ref 83–108)
POLYCHROMASIA BLD QL SMEAR: ABNORMAL
POTASSIUM BLD-SCNC: 3 MMOL/L (ref 3.5–5.5)
POTASSIUM BLD-SCNC: 3.1 MMOL/L (ref 3.5–5.5)
POTASSIUM BLD-SCNC: 3.3 MMOL/L (ref 3.5–5.5)
POTASSIUM BLD-SCNC: 3.5 MMOL/L (ref 3.5–5.5)
POTASSIUM BLD-SCNC: 3.5 MMOL/L (ref 3.5–5.5)
POTASSIUM BLD-SCNC: 3.6 MMOL/L (ref 3.5–5.5)
POTASSIUM BLD-SCNC: 3.7 MMOL/L (ref 3.5–5.5)
POTASSIUM BLD-SCNC: 3.8 MMOL/L (ref 3.5–5.5)
POTASSIUM BLD-SCNC: 4 MMOL/L (ref 3.5–5.5)
POTASSIUM BLD-SCNC: 4 MMOL/L (ref 3.5–5.5)
POTASSIUM BLD-SCNC: 4.1 MMOL/L (ref 3.5–5.5)
POTASSIUM BLD-SCNC: 4.1 MMOL/L (ref 3.5–5.5)
POTASSIUM BLD-SCNC: 4.2 MMOL/L (ref 3.5–5.5)
POTASSIUM BLD-SCNC: 4.3 MMOL/L (ref 3.5–5.5)
POTASSIUM BLD-SCNC: 4.3 MMOL/L (ref 3.5–5.5)
POTASSIUM BLD-SCNC: 4.4 MMOL/L (ref 3.5–5.5)
POTASSIUM BLD-SCNC: 4.4 MMOL/L (ref 3.5–5.5)
POTASSIUM BLD-SCNC: 4.5 MMOL/L (ref 3.5–5.5)
POTASSIUM BLD-SCNC: 4.8 MMOL/L (ref 3.5–5.5)
POTASSIUM BLD-SCNC: 5 MMOL/L (ref 3.5–5.5)
POTASSIUM BLD-SCNC: 5.1 MMOL/L (ref 3.5–5.5)
POTASSIUM BLD-SCNC: 5.2 MMOL/L (ref 3.5–5.5)
POTASSIUM BLD-SCNC: 5.2 MMOL/L (ref 3.5–5.5)
POTASSIUM BLD-SCNC: 5.4 MMOL/L (ref 3.5–5.5)
POTASSIUM BLD-SCNC: 5.5 MMOL/L (ref 3.5–5.5)
POTASSIUM BLD-SCNC: 6.2 MMOL/L (ref 3.5–5.5)
POTASSIUM BLD-SCNC: 6.6 MMOL/L (ref 3.5–5.5)
POTASSIUM BLD-SCNC: 6.7 MMOL/L (ref 3.5–5.5)
POTASSIUM BLD-SCNC: 7.3 MMOL/L (ref 3.5–5.5)
POTASSIUM BLDA-SCNC: 5.6 MMOL/L (ref 3.5–4.9)
POTASSIUM BLDA-SCNC: 7.7 MMOL/L (ref 3.5–4.9)
PREALB SERPL-MCNC: 11 MG/DL (ref 10–40)
PROCALCITONIN SERPL-MCNC: 0.54 NG/ML
PROCALCITONIN SERPL-MCNC: 1.06 NG/ML
PROPOXYPH UR QL: NEGATIVE
PROT SERPL-MCNC: 5 G/DL (ref 5.7–8.2)
PROT SERPL-MCNC: 5.1 G/DL (ref 5.7–8.2)
PROT SERPL-MCNC: 5.3 G/DL (ref 5.7–8.2)
PROT SERPL-MCNC: 5.9 G/DL (ref 5.7–8.2)
PROT SERPL-MCNC: 6.1 G/DL (ref 5.7–8.2)
PROT SERPL-MCNC: 6.4 G/DL (ref 5.7–8.2)
PROT SERPL-MCNC: 7 G/DL (ref 5.7–8.2)
PROT SERPL-MCNC: 7 G/DL (ref 5.7–8.2)
PROT UR QL STRIP: ABNORMAL
PROT UR QL STRIP: NEGATIVE
PROT UR QL STRIP: NEGATIVE
PROT UR-MCNC: 52 MG/DL (ref 1–14)
PROTHROMBIN TIME: 10.6 SECONDS (ref 9.6–11.5)
PROTHROMBIN TIME: 12.6 SECONDS (ref 9.6–11.5)
RBC # BLD AUTO: 3.7 10*6/MM3 (ref 3.89–5.14)
RBC # BLD AUTO: 3.79 10*6/MM3 (ref 3.89–5.14)
RBC # BLD AUTO: 3.79 10*6/MM3 (ref 3.89–5.14)
RBC # BLD AUTO: 3.91 10*6/MM3 (ref 3.89–5.14)
RBC # BLD AUTO: 3.91 10*6/MM3 (ref 3.89–5.14)
RBC # BLD AUTO: 3.94 10*6/MM3 (ref 3.89–5.14)
RBC # BLD AUTO: 3.95 10*6/MM3 (ref 3.89–5.14)
RBC # BLD AUTO: 4.07 10*6/MM3 (ref 3.89–5.14)
RBC # BLD AUTO: 4.16 10*6/MM3 (ref 3.89–5.14)
RBC # BLD AUTO: 4.21 10*6/MM3 (ref 3.89–5.14)
RBC # BLD AUTO: 4.32 10*6/MM3 (ref 3.89–5.14)
RBC # BLD AUTO: 4.38 10*6/MM3 (ref 3.89–5.14)
RBC # BLD AUTO: 4.39 10*6/MM3 (ref 3.89–5.14)
RBC # BLD AUTO: 4.48 10*6/MM3 (ref 3.89–5.14)
RBC # BLD AUTO: 4.58 10*6/MM3 (ref 3.89–5.14)
RBC # BLD AUTO: 4.62 10*6/MM3 (ref 3.89–5.14)
RBC # BLD AUTO: 4.67 10*6/MM3 (ref 3.89–5.14)
RBC # BLD AUTO: 4.71 10*6/MM3 (ref 3.89–5.14)
RBC # BLD AUTO: 4.84 10*6/MM3 (ref 3.89–5.14)
RBC # BLD AUTO: 4.87 10*6/MM3 (ref 3.89–5.14)
RBC # BLD AUTO: 4.93 10*6/MM3 (ref 3.89–5.14)
RBC # BLD AUTO: 5.32 10*6/MM3 (ref 3.89–5.14)
RBC # BLD AUTO: 5.32 10*6/MM3 (ref 3.89–5.14)
RBC # BLD AUTO: 6.21 10*6/MM3 (ref 3.89–5.14)
RBC # UR: ABNORMAL /HPF
RBC MORPH BLD: NORMAL
REF LAB TEST METHOD: ABNORMAL
SALICYLATES SERPL-MCNC: 2.6 MG/DL (ref 0–29)
SAO2 % BLDCOA: 96.9 %
SAO2 % BLDCOA: 96.9 %
SODIUM BLD-SCNC: 135 MMOL/L (ref 132–146)
SODIUM BLD-SCNC: 138 MMOL/L (ref 132–146)
SODIUM BLD-SCNC: 139 MMOL/L (ref 132–146)
SODIUM BLD-SCNC: 140 MMOL/L (ref 132–146)
SODIUM BLD-SCNC: 141 MMOL/L (ref 132–146)
SODIUM BLD-SCNC: 142 MMOL/L (ref 132–146)
SODIUM BLD-SCNC: 143 MMOL/L (ref 132–146)
SODIUM BLD-SCNC: 143 MMOL/L (ref 132–146)
SODIUM BLD-SCNC: 144 MMOL/L (ref 132–146)
SODIUM BLD-SCNC: 144 MMOL/L (ref 132–146)
SODIUM BLD-SCNC: 145 MMOL/L (ref 132–146)
SODIUM BLD-SCNC: 146 MMOL/L (ref 132–146)
SODIUM BLD-SCNC: 147 MMOL/L (ref 132–146)
SODIUM BLD-SCNC: 150 MMOL/L (ref 132–146)
SODIUM BLD-SCNC: 151 MMOL/L (ref 132–146)
SODIUM BLDA-SCNC: 150 MMOL/L (ref 138–146)
SODIUM BLDA-SCNC: 154 MMOL/L (ref 138–146)
SODIUM UR-SCNC: 60 MMOL/L (ref 30–90)
SP GR UR STRIP: 1.02 (ref 1–1.03)
SQUAMOUS #/AREA URNS HPF: ABNORMAL /HPF
T4 FREE SERPL-MCNC: 1.53 NG/DL (ref 0.89–1.76)
TRICYCLICS UR QL SCN: NEGATIVE
TRIGL SERPL-MCNC: 137 MG/DL (ref 0–150)
TRIGL SERPL-MCNC: 161 MG/DL (ref 0–150)
TROPONIN I SERPL-MCNC: 0.05 NG/ML (ref 0–0.07)
TROPONIN I SERPL-MCNC: 0.06 NG/ML (ref 0–0.07)
TROPONIN I SERPL-MCNC: 4.39 NG/ML (ref 0–0.07)
TROPONIN I SERPL-MCNC: 5.43 NG/ML
TSH SERPL DL<=0.05 MIU/L-ACNC: 0.92 MIU/ML (ref 0.35–5.35)
TSH SERPL DL<=0.05 MIU/L-ACNC: 1.04 MIU/ML (ref 0.35–5.35)
TSH SERPL DL<=0.05 MIU/L-ACNC: 1.27 MIU/ML (ref 0.35–5.35)
TSH SERPL DL<=0.05 MIU/L-ACNC: 2.38 MIU/ML (ref 0.35–5.35)
UROBILINOGEN UR QL STRIP: ABNORMAL
VALPROATE SERPL-MCNC: 1 MCG/ML (ref 50–150)
VANCOMYCIN SERPL-MCNC: 13.3 MCG/ML (ref 5–40)
VANCOMYCIN SERPL-MCNC: 13.6 MCG/ML (ref 5–40)
VANCOMYCIN SERPL-MCNC: 14.5 MCG/ML (ref 5–40)
VANCOMYCIN SERPL-MCNC: 21.1 MCG/ML (ref 5–40)
VANCOMYCIN SERPL-MCNC: 21.4 MCG/ML (ref 5–40)
VANCOMYCIN SERPL-MCNC: 22.8 MCG/ML (ref 5–40)
VANCOMYCIN SERPL-MCNC: 27 MCG/ML (ref 5–40)
VENTILATOR MODE: ABNORMAL
WBC MORPH BLD: NORMAL
WBC NRBC COR # BLD: 10.48 10*3/MM3 (ref 3.5–10.8)
WBC NRBC COR # BLD: 10.76 10*3/MM3 (ref 3.5–10.8)
WBC NRBC COR # BLD: 11.25 10*3/MM3 (ref 3.5–10.8)
WBC NRBC COR # BLD: 11.38 10*3/MM3 (ref 3.5–10.8)
WBC NRBC COR # BLD: 11.63 10*3/MM3 (ref 3.5–10.8)
WBC NRBC COR # BLD: 12.26 10*3/MM3 (ref 3.5–10.8)
WBC NRBC COR # BLD: 13.06 10*3/MM3 (ref 3.5–10.8)
WBC NRBC COR # BLD: 13.15 10*3/MM3 (ref 3.5–10.8)
WBC NRBC COR # BLD: 13.67 10*3/MM3 (ref 3.5–10.8)
WBC NRBC COR # BLD: 15.87 10*3/MM3 (ref 3.5–10.8)
WBC NRBC COR # BLD: 16.15 10*3/MM3 (ref 3.5–10.8)
WBC NRBC COR # BLD: 17.14 10*3/MM3 (ref 3.5–10.8)
WBC NRBC COR # BLD: 17.18 10*3/MM3 (ref 3.5–10.8)
WBC NRBC COR # BLD: 17.29 10*3/MM3 (ref 3.5–10.8)
WBC NRBC COR # BLD: 21.88 10*3/MM3 (ref 3.5–10.8)
WBC NRBC COR # BLD: 28.75 10*3/MM3 (ref 3.5–10.8)
WBC NRBC COR # BLD: 33.37 10*3/MM3 (ref 3.5–10.8)
WBC NRBC COR # BLD: 38.4 10*3/MM3 (ref 3.5–10.8)
WBC NRBC COR # BLD: 40.71 10*3/MM3 (ref 3.5–10.8)
WBC NRBC COR # BLD: 9.17 10*3/MM3 (ref 3.5–10.8)
WBC NRBC COR # BLD: 9.32 10*3/MM3 (ref 3.5–10.8)
WBC NRBC COR # BLD: 9.52 10*3/MM3 (ref 3.5–10.8)
WBC NRBC COR # BLD: 9.59 10*3/MM3 (ref 3.5–10.8)
WBC NRBC COR # BLD: 9.74 10*3/MM3 (ref 3.5–10.8)
WBC UR QL AUTO: ABNORMAL /HPF
WHOLE BLOOD HOLD SPECIMEN: NORMAL
YEAST URNS QL MICRO: ABNORMAL /HPF
YEAST URNS QL MICRO: ABNORMAL /HPF

## 2018-01-01 PROCEDURE — 80202 ASSAY OF VANCOMYCIN: CPT | Performed by: INTERNAL MEDICINE

## 2018-01-01 PROCEDURE — 25010000002 PIPERACILLIN SOD-TAZOBACTAM PER 1 G: Performed by: INTERNAL MEDICINE

## 2018-01-01 PROCEDURE — 36556 INSERT NON-TUNNEL CV CATH: CPT | Performed by: NURSE PRACTITIONER

## 2018-01-01 PROCEDURE — 63710000001 INSULIN LISPRO (HUMAN) PER 5 UNITS: Performed by: INTERNAL MEDICINE

## 2018-01-01 PROCEDURE — 99232 SBSQ HOSP IP/OBS MODERATE 35: CPT | Performed by: INTERNAL MEDICINE

## 2018-01-01 PROCEDURE — 83605 ASSAY OF LACTIC ACID: CPT | Performed by: EMERGENCY MEDICINE

## 2018-01-01 PROCEDURE — 93325 DOPPLER ECHO COLOR FLOW MAPG: CPT

## 2018-01-01 PROCEDURE — 85018 HEMOGLOBIN: CPT | Performed by: FAMILY MEDICINE

## 2018-01-01 PROCEDURE — C1894 INTRO/SHEATH, NON-LASER: HCPCS

## 2018-01-01 PROCEDURE — 84100 ASSAY OF PHOSPHORUS: CPT | Performed by: INTERNAL MEDICINE

## 2018-01-01 PROCEDURE — 84443 ASSAY THYROID STIM HORMONE: CPT | Performed by: NURSE PRACTITIONER

## 2018-01-01 PROCEDURE — 99232 SBSQ HOSP IP/OBS MODERATE 35: CPT | Performed by: NURSE PRACTITIONER

## 2018-01-01 PROCEDURE — 99225 PR SBSQ OBSERVATION CARE/DAY 25 MINUTES: CPT | Performed by: FAMILY MEDICINE

## 2018-01-01 PROCEDURE — 80202 ASSAY OF VANCOMYCIN: CPT

## 2018-01-01 PROCEDURE — 99233 SBSQ HOSP IP/OBS HIGH 50: CPT | Performed by: INTERNAL MEDICINE

## 2018-01-01 PROCEDURE — 87040 BLOOD CULTURE FOR BACTERIA: CPT | Performed by: INTERNAL MEDICINE

## 2018-01-01 PROCEDURE — 85610 PROTHROMBIN TIME: CPT | Performed by: EMERGENCY MEDICINE

## 2018-01-01 PROCEDURE — 80048 BASIC METABOLIC PNL TOTAL CA: CPT | Performed by: FAMILY MEDICINE

## 2018-01-01 PROCEDURE — 81001 URINALYSIS AUTO W/SCOPE: CPT | Performed by: EMERGENCY MEDICINE

## 2018-01-01 PROCEDURE — 76775 US EXAM ABDO BACK WALL LIM: CPT

## 2018-01-01 PROCEDURE — 82962 GLUCOSE BLOOD TEST: CPT

## 2018-01-01 PROCEDURE — 25010000002 VANCOMYCIN 10 G RECONSTITUTED SOLUTION: Performed by: INTERNAL MEDICINE

## 2018-01-01 PROCEDURE — 25010000002 HEPARIN (PORCINE) PER 1000 UNITS: Performed by: INTERNAL MEDICINE

## 2018-01-01 PROCEDURE — 25010000002 HALOPERIDOL LACTATE PER 5 MG: Performed by: NURSE PRACTITIONER

## 2018-01-01 PROCEDURE — 93312 ECHO TRANSESOPHAGEAL: CPT | Performed by: INTERNAL MEDICINE

## 2018-01-01 PROCEDURE — 83735 ASSAY OF MAGNESIUM: CPT

## 2018-01-01 PROCEDURE — 82805 BLOOD GASES W/O2 SATURATION: CPT | Performed by: NURSE PRACTITIONER

## 2018-01-01 PROCEDURE — 94640 AIRWAY INHALATION TREATMENT: CPT

## 2018-01-01 PROCEDURE — 63710000001 INSULIN DETEMIR PER 5 UNITS: Performed by: INTERNAL MEDICINE

## 2018-01-01 PROCEDURE — 25010000002 CALCIUM GLUCONATE PER 10 ML: Performed by: EMERGENCY MEDICINE

## 2018-01-01 PROCEDURE — 71045 X-RAY EXAM CHEST 1 VIEW: CPT

## 2018-01-01 PROCEDURE — C1751 CATH, INF, PER/CENT/MIDLINE: HCPCS

## 2018-01-01 PROCEDURE — 85025 COMPLETE CBC W/AUTO DIFF WBC: CPT | Performed by: INTERNAL MEDICINE

## 2018-01-01 PROCEDURE — 87077 CULTURE AEROBIC IDENTIFY: CPT | Performed by: EMERGENCY MEDICINE

## 2018-01-01 PROCEDURE — 97110 THERAPEUTIC EXERCISES: CPT

## 2018-01-01 PROCEDURE — 93005 ELECTROCARDIOGRAM TRACING: CPT | Performed by: EMERGENCY MEDICINE

## 2018-01-01 PROCEDURE — 83735 ASSAY OF MAGNESIUM: CPT | Performed by: EMERGENCY MEDICINE

## 2018-01-01 PROCEDURE — 80053 COMPREHEN METABOLIC PANEL: CPT | Performed by: EMERGENCY MEDICINE

## 2018-01-01 PROCEDURE — 92610 EVALUATE SWALLOWING FUNCTION: CPT

## 2018-01-01 PROCEDURE — 93320 DOPPLER ECHO COMPLETE: CPT

## 2018-01-01 PROCEDURE — 85025 COMPLETE CBC W/AUTO DIFF WBC: CPT | Performed by: EMERGENCY MEDICINE

## 2018-01-01 PROCEDURE — 84100 ASSAY OF PHOSPHORUS: CPT

## 2018-01-01 PROCEDURE — 25010000002 VANCOMYCIN PER 500 MG

## 2018-01-01 PROCEDURE — 25010000002 MAGNESIUM SULFATE 2 GM/50ML SOLUTION: Performed by: NURSE PRACTITIONER

## 2018-01-01 PROCEDURE — 99239 HOSP IP/OBS DSCHRG MGMT >30: CPT | Performed by: NURSE PRACTITIONER

## 2018-01-01 PROCEDURE — 80053 COMPREHEN METABOLIC PANEL: CPT | Performed by: FAMILY MEDICINE

## 2018-01-01 PROCEDURE — 83605 ASSAY OF LACTIC ACID: CPT | Performed by: NURSE PRACTITIONER

## 2018-01-01 PROCEDURE — 84132 ASSAY OF SERUM POTASSIUM: CPT | Performed by: EMERGENCY MEDICINE

## 2018-01-01 PROCEDURE — 93306 TTE W/DOPPLER COMPLETE: CPT | Performed by: INTERNAL MEDICINE

## 2018-01-01 PROCEDURE — 81001 URINALYSIS AUTO W/SCOPE: CPT | Performed by: INTERNAL MEDICINE

## 2018-01-01 PROCEDURE — 97530 THERAPEUTIC ACTIVITIES: CPT | Performed by: PHYSICAL THERAPIST

## 2018-01-01 PROCEDURE — 93971 EXTREMITY STUDY: CPT | Performed by: INTERNAL MEDICINE

## 2018-01-01 PROCEDURE — 87176 TISSUE HOMOGENIZATION CULTR: CPT | Performed by: SURGERY

## 2018-01-01 PROCEDURE — 85027 COMPLETE CBC AUTOMATED: CPT | Performed by: FAMILY MEDICINE

## 2018-01-01 PROCEDURE — 83690 ASSAY OF LIPASE: CPT | Performed by: EMERGENCY MEDICINE

## 2018-01-01 PROCEDURE — 85007 BL SMEAR W/DIFF WBC COUNT: CPT

## 2018-01-01 PROCEDURE — 84443 ASSAY THYROID STIM HORMONE: CPT | Performed by: INTERNAL MEDICINE

## 2018-01-01 PROCEDURE — 80069 RENAL FUNCTION PANEL: CPT | Performed by: INTERNAL MEDICINE

## 2018-01-01 PROCEDURE — 84484 ASSAY OF TROPONIN QUANT: CPT | Performed by: INTERNAL MEDICINE

## 2018-01-01 PROCEDURE — 83735 ASSAY OF MAGNESIUM: CPT | Performed by: NURSE PRACTITIONER

## 2018-01-01 PROCEDURE — 83036 HEMOGLOBIN GLYCOSYLATED A1C: CPT | Performed by: NURSE PRACTITIONER

## 2018-01-01 PROCEDURE — 84145 PROCALCITONIN (PCT): CPT | Performed by: EMERGENCY MEDICINE

## 2018-01-01 PROCEDURE — 63710000001 INSULIN REGULAR HUMAN PER 5 UNITS: Performed by: NURSE PRACTITIONER

## 2018-01-01 PROCEDURE — 97530 THERAPEUTIC ACTIVITIES: CPT

## 2018-01-01 PROCEDURE — 25010000002 PIPERACILLIN SOD-TAZOBACTAM PER 1 G

## 2018-01-01 PROCEDURE — 25010000002 DAPTOMYCIN PER 1 MG: Performed by: INTERNAL MEDICINE

## 2018-01-01 PROCEDURE — 25010000002 MAGNESIUM SULFATE 2 GM/50ML SOLUTION: Performed by: INTERNAL MEDICINE

## 2018-01-01 PROCEDURE — 93005 ELECTROCARDIOGRAM TRACING: CPT | Performed by: NURSE PRACTITIONER

## 2018-01-01 PROCEDURE — 63710000001 INSULIN REGULAR HUMAN PER 5 UNITS: Performed by: HOSPITALIST

## 2018-01-01 PROCEDURE — 80053 COMPREHEN METABOLIC PANEL: CPT | Performed by: INTERNAL MEDICINE

## 2018-01-01 PROCEDURE — 25010000002 AMIODARONE IN DEXTROSE 5% 360-4.14 MG/200ML-% SOLUTION: Performed by: FAMILY MEDICINE

## 2018-01-01 PROCEDURE — 85007 BL SMEAR W/DIFF WBC COUNT: CPT | Performed by: INTERNAL MEDICINE

## 2018-01-01 PROCEDURE — 25010000002 HEPARIN (PORCINE) PER 1000 UNITS: Performed by: NURSE PRACTITIONER

## 2018-01-01 PROCEDURE — 93971 EXTREMITY STUDY: CPT

## 2018-01-01 PROCEDURE — 87186 SC STD MICRODIL/AGAR DIL: CPT | Performed by: EMERGENCY MEDICINE

## 2018-01-01 PROCEDURE — 99285 EMERGENCY DEPT VISIT HI MDM: CPT

## 2018-01-01 PROCEDURE — 99291 CRITICAL CARE FIRST HOUR: CPT | Performed by: INTERNAL MEDICINE

## 2018-01-01 PROCEDURE — 80048 BASIC METABOLIC PNL TOTAL CA: CPT | Performed by: INTERNAL MEDICINE

## 2018-01-01 PROCEDURE — 85027 COMPLETE CBC AUTOMATED: CPT | Performed by: INTERNAL MEDICINE

## 2018-01-01 PROCEDURE — 25010000002 INSULIN REGULAR HUMAN PER 5 UNITS

## 2018-01-01 PROCEDURE — 87106 FUNGI IDENTIFICATION YEAST: CPT | Performed by: INTERNAL MEDICINE

## 2018-01-01 PROCEDURE — 80053 COMPREHEN METABOLIC PANEL: CPT

## 2018-01-01 PROCEDURE — 99233 SBSQ HOSP IP/OBS HIGH 50: CPT | Performed by: NURSE PRACTITIONER

## 2018-01-01 PROCEDURE — 83735 ASSAY OF MAGNESIUM: CPT | Performed by: INTERNAL MEDICINE

## 2018-01-01 PROCEDURE — 87147 CULTURE TYPE IMMUNOLOGIC: CPT | Performed by: OTOLARYNGOLOGY

## 2018-01-01 PROCEDURE — 63710000001 INSULIN LISPRO (HUMAN) PER 5 UNITS: Performed by: NURSE PRACTITIONER

## 2018-01-01 PROCEDURE — 84100 ASSAY OF PHOSPHORUS: CPT | Performed by: NURSE PRACTITIONER

## 2018-01-01 PROCEDURE — 36600 WITHDRAWAL OF ARTERIAL BLOOD: CPT | Performed by: NURSE PRACTITIONER

## 2018-01-01 PROCEDURE — 63710000001 INSULIN DETEMIR PER 5 UNITS: Performed by: HOSPITALIST

## 2018-01-01 PROCEDURE — 93010 ELECTROCARDIOGRAM REPORT: CPT | Performed by: INTERNAL MEDICINE

## 2018-01-01 PROCEDURE — 87086 URINE CULTURE/COLONY COUNT: CPT | Performed by: EMERGENCY MEDICINE

## 2018-01-01 PROCEDURE — 84134 ASSAY OF PREALBUMIN: CPT

## 2018-01-01 PROCEDURE — P9612 CATHETERIZE FOR URINE SPEC: HCPCS

## 2018-01-01 PROCEDURE — 25010000002 PIPERACILLIN SOD-TAZOBACTAM PER 1 G: Performed by: EMERGENCY MEDICINE

## 2018-01-01 PROCEDURE — 85014 HEMATOCRIT: CPT | Performed by: FAMILY MEDICINE

## 2018-01-01 PROCEDURE — G8979 MOBILITY GOAL STATUS: HCPCS

## 2018-01-01 PROCEDURE — 84478 ASSAY OF TRIGLYCERIDES: CPT

## 2018-01-01 PROCEDURE — 25010000002 MIDAZOLAM PER 1 MG: Performed by: INTERNAL MEDICINE

## 2018-01-01 PROCEDURE — 85007 BL SMEAR W/DIFF WBC COUNT: CPT | Performed by: EMERGENCY MEDICINE

## 2018-01-01 PROCEDURE — 82465 ASSAY BLD/SERUM CHOLESTEROL: CPT

## 2018-01-01 PROCEDURE — 25010000002 AMIODARONE IN DEXTROSE 5% 360-4.14 MG/200ML-% SOLUTION: Performed by: INTERNAL MEDICINE

## 2018-01-01 PROCEDURE — 94799 UNLISTED PULMONARY SVC/PX: CPT

## 2018-01-01 PROCEDURE — 99223 1ST HOSP IP/OBS HIGH 75: CPT | Performed by: INTERNAL MEDICINE

## 2018-01-01 PROCEDURE — 25010000002 VANCOMYCIN: Performed by: EMERGENCY MEDICINE

## 2018-01-01 PROCEDURE — 63710000001 INSULIN REGULAR HUMAN PER 5 UNITS: Performed by: INTERNAL MEDICINE

## 2018-01-01 PROCEDURE — 97162 PT EVAL MOD COMPLEX 30 MIN: CPT

## 2018-01-01 PROCEDURE — 93306 TTE W/DOPPLER COMPLETE: CPT

## 2018-01-01 PROCEDURE — 85025 COMPLETE CBC W/AUTO DIFF WBC: CPT | Performed by: NURSE PRACTITIONER

## 2018-01-01 PROCEDURE — 87205 SMEAR GRAM STAIN: CPT | Performed by: SURGERY

## 2018-01-01 PROCEDURE — 80307 DRUG TEST PRSMV CHEM ANLYZR: CPT | Performed by: EMERGENCY MEDICINE

## 2018-01-01 PROCEDURE — 05HN33Z INSERTION OF INFUSION DEVICE INTO LEFT INTERNAL JUGULAR VEIN, PERCUTANEOUS APPROACH: ICD-10-PCS | Performed by: INTERNAL MEDICINE

## 2018-01-01 PROCEDURE — 87150 DNA/RNA AMPLIFIED PROBE: CPT | Performed by: EMERGENCY MEDICINE

## 2018-01-01 PROCEDURE — 74230 X-RAY XM SWLNG FUNCJ C+: CPT

## 2018-01-01 PROCEDURE — 36600 WITHDRAWAL OF ARTERIAL BLOOD: CPT

## 2018-01-01 PROCEDURE — 25010000002 AMIODARONE PER 30 MG: Performed by: EMERGENCY MEDICINE

## 2018-01-01 PROCEDURE — 92611 MOTION FLUOROSCOPY/SWALLOW: CPT

## 2018-01-01 PROCEDURE — 99291 CRITICAL CARE FIRST HOUR: CPT

## 2018-01-01 PROCEDURE — 99233 SBSQ HOSP IP/OBS HIGH 50: CPT | Performed by: FAMILY MEDICINE

## 2018-01-01 PROCEDURE — 51702 INSERT TEMP BLADDER CATH: CPT

## 2018-01-01 PROCEDURE — 80048 BASIC METABOLIC PNL TOTAL CA: CPT | Performed by: HOSPITALIST

## 2018-01-01 PROCEDURE — 84300 ASSAY OF URINE SODIUM: CPT | Performed by: INTERNAL MEDICINE

## 2018-01-01 PROCEDURE — 92526 ORAL FUNCTION THERAPY: CPT

## 2018-01-01 PROCEDURE — 82570 ASSAY OF URINE CREATININE: CPT | Performed by: INTERNAL MEDICINE

## 2018-01-01 PROCEDURE — 80164 ASSAY DIPROPYLACETIC ACD TOT: CPT | Performed by: NURSE PRACTITIONER

## 2018-01-01 PROCEDURE — 87493 C DIFF AMPLIFIED PROBE: CPT | Performed by: NURSE PRACTITIONER

## 2018-01-01 PROCEDURE — 85025 COMPLETE CBC W/AUTO DIFF WBC: CPT | Performed by: HOSPITALIST

## 2018-01-01 PROCEDURE — 86140 C-REACTIVE PROTEIN: CPT

## 2018-01-01 PROCEDURE — 93312 ECHO TRANSESOPHAGEAL: CPT

## 2018-01-01 PROCEDURE — 87086 URINE CULTURE/COLONY COUNT: CPT | Performed by: INTERNAL MEDICINE

## 2018-01-01 PROCEDURE — 99222 1ST HOSP IP/OBS MODERATE 55: CPT | Performed by: INTERNAL MEDICINE

## 2018-01-01 PROCEDURE — 85014 HEMATOCRIT: CPT

## 2018-01-01 PROCEDURE — 25010000002 CEFTRIAXONE PER 250 MG: Performed by: NURSE PRACTITIONER

## 2018-01-01 PROCEDURE — 84484 ASSAY OF TROPONIN QUANT: CPT

## 2018-01-01 PROCEDURE — 87106 FUNGI IDENTIFICATION YEAST: CPT | Performed by: EMERGENCY MEDICINE

## 2018-01-01 PROCEDURE — 05HY33Z INSERTION OF INFUSION DEVICE INTO UPPER VEIN, PERCUTANEOUS APPROACH: ICD-10-PCS | Performed by: HOSPITALIST

## 2018-01-01 PROCEDURE — 94760 N-INVAS EAR/PLS OXIMETRY 1: CPT

## 2018-01-01 PROCEDURE — 87147 CULTURE TYPE IMMUNOLOGIC: CPT | Performed by: EMERGENCY MEDICINE

## 2018-01-01 PROCEDURE — 70450 CT HEAD/BRAIN W/O DYE: CPT

## 2018-01-01 PROCEDURE — 85007 BL SMEAR W/DIFF WBC COUNT: CPT | Performed by: HOSPITALIST

## 2018-01-01 PROCEDURE — 80306 DRUG TEST PRSMV INSTRMNT: CPT | Performed by: EMERGENCY MEDICINE

## 2018-01-01 PROCEDURE — 85025 COMPLETE CBC W/AUTO DIFF WBC: CPT

## 2018-01-01 PROCEDURE — 25010000002 VANCOMYCIN

## 2018-01-01 PROCEDURE — 87205 SMEAR GRAM STAIN: CPT | Performed by: OTOLARYNGOLOGY

## 2018-01-01 PROCEDURE — 25010000002 AMIODARONE IN DEXTROSE 5% 360-4.14 MG/200ML-% SOLUTION: Performed by: EMERGENCY MEDICINE

## 2018-01-01 PROCEDURE — B24BZZ4 ULTRASONOGRAPHY OF HEART WITH AORTA, TRANSESOPHAGEAL: ICD-10-PCS | Performed by: INTERNAL MEDICINE

## 2018-01-01 PROCEDURE — 87040 BLOOD CULTURE FOR BACTERIA: CPT | Performed by: EMERGENCY MEDICINE

## 2018-01-01 PROCEDURE — 25010000002 MAGNESIUM SULFATE 2 GM/50ML SOLUTION

## 2018-01-01 PROCEDURE — 97165 OT EVAL LOW COMPLEX 30 MIN: CPT

## 2018-01-01 PROCEDURE — 97530 THERAPEUTIC ACTIVITIES: CPT | Performed by: OCCUPATIONAL THERAPIST

## 2018-01-01 PROCEDURE — 25010000002 AMIODARONE IN DEXTROSE 5% 150-4.21 MG/100ML-% SOLUTION: Performed by: FAMILY MEDICINE

## 2018-01-01 PROCEDURE — 84132 ASSAY OF SERUM POTASSIUM: CPT | Performed by: INTERNAL MEDICINE

## 2018-01-01 PROCEDURE — 83036 HEMOGLOBIN GLYCOSYLATED A1C: CPT | Performed by: INTERNAL MEDICINE

## 2018-01-01 PROCEDURE — 84156 ASSAY OF PROTEIN URINE: CPT | Performed by: INTERNAL MEDICINE

## 2018-01-01 PROCEDURE — 99233 SBSQ HOSP IP/OBS HIGH 50: CPT | Performed by: HOSPITALIST

## 2018-01-01 PROCEDURE — 93320 DOPPLER ECHO COMPLETE: CPT | Performed by: INTERNAL MEDICINE

## 2018-01-01 PROCEDURE — 97535 SELF CARE MNGMENT TRAINING: CPT | Performed by: OCCUPATIONAL THERAPIST

## 2018-01-01 PROCEDURE — 86140 C-REACTIVE PROTEIN: CPT | Performed by: INTERNAL MEDICINE

## 2018-01-01 PROCEDURE — 87502 INFLUENZA DNA AMP PROBE: CPT | Performed by: INTERNAL MEDICINE

## 2018-01-01 PROCEDURE — B544ZZA ULTRASONOGRAPHY OF LEFT JUGULAR VEINS, GUIDANCE: ICD-10-PCS | Performed by: INTERNAL MEDICINE

## 2018-01-01 PROCEDURE — 93321 DOPPLER ECHO F-UP/LMTD STD: CPT | Performed by: INTERNAL MEDICINE

## 2018-01-01 PROCEDURE — 87070 CULTURE OTHR SPECIMN AEROBIC: CPT | Performed by: OTOLARYNGOLOGY

## 2018-01-01 PROCEDURE — 63710000001 INSULIN REGULAR HUMAN PER 5 UNITS: Performed by: EMERGENCY MEDICINE

## 2018-01-01 PROCEDURE — 87070 CULTURE OTHR SPECIMN AEROBIC: CPT | Performed by: SURGERY

## 2018-01-01 PROCEDURE — 76882 US LMTD JT/FCL EVL NVASC XTR: CPT

## 2018-01-01 PROCEDURE — 80048 BASIC METABOLIC PNL TOTAL CA: CPT

## 2018-01-01 PROCEDURE — 84439 ASSAY OF FREE THYROXINE: CPT | Performed by: EMERGENCY MEDICINE

## 2018-01-01 PROCEDURE — 83735 ASSAY OF MAGNESIUM: CPT | Performed by: HOSPITALIST

## 2018-01-01 PROCEDURE — 82550 ASSAY OF CK (CPK): CPT | Performed by: INTERNAL MEDICINE

## 2018-01-01 PROCEDURE — 25010000002 CEFTRIAXONE PER 250 MG: Performed by: EMERGENCY MEDICINE

## 2018-01-01 PROCEDURE — 88312 SPECIAL STAINS GROUP 1: CPT | Performed by: EMERGENCY MEDICINE

## 2018-01-01 PROCEDURE — 80047 BASIC METABLC PNL IONIZED CA: CPT

## 2018-01-01 PROCEDURE — G8978 MOBILITY CURRENT STATUS: HCPCS

## 2018-01-01 PROCEDURE — 83880 ASSAY OF NATRIURETIC PEPTIDE: CPT | Performed by: EMERGENCY MEDICINE

## 2018-01-01 PROCEDURE — 97166 OT EVAL MOD COMPLEX 45 MIN: CPT

## 2018-01-01 PROCEDURE — 97535 SELF CARE MNGMENT TRAINING: CPT

## 2018-01-01 PROCEDURE — 93325 DOPPLER ECHO COLOR FLOW MAPG: CPT | Performed by: INTERNAL MEDICINE

## 2018-01-01 PROCEDURE — 93005 ELECTROCARDIOGRAM TRACING: CPT | Performed by: FAMILY MEDICINE

## 2018-01-01 PROCEDURE — 85652 RBC SED RATE AUTOMATED: CPT | Performed by: INTERNAL MEDICINE

## 2018-01-01 PROCEDURE — 82805 BLOOD GASES W/O2 SATURATION: CPT

## 2018-01-01 PROCEDURE — 25010000002 DIGOXIN PER 500 MCG: Performed by: NURSE PRACTITIONER

## 2018-01-01 PROCEDURE — 82140 ASSAY OF AMMONIA: CPT | Performed by: INTERNAL MEDICINE

## 2018-01-01 PROCEDURE — 84443 ASSAY THYROID STIM HORMONE: CPT | Performed by: EMERGENCY MEDICINE

## 2018-01-01 PROCEDURE — 70490 CT SOFT TISSUE NECK W/O DYE: CPT

## 2018-01-01 PROCEDURE — 84145 PROCALCITONIN (PCT): CPT | Performed by: NURSE PRACTITIONER

## 2018-01-01 PROCEDURE — 74018 RADEX ABDOMEN 1 VIEW: CPT

## 2018-01-01 PROCEDURE — 0H9KXZZ DRAINAGE OF RIGHT LOWER LEG SKIN, EXTERNAL APPROACH: ICD-10-PCS | Performed by: SURGERY

## 2018-01-01 PROCEDURE — 93321 DOPPLER ECHO F-UP/LMTD STD: CPT

## 2018-01-01 PROCEDURE — 74176 CT ABD & PELVIS W/O CONTRAST: CPT

## 2018-01-01 PROCEDURE — 85610 PROTHROMBIN TIME: CPT | Performed by: FAMILY MEDICINE

## 2018-01-01 PROCEDURE — 84132 ASSAY OF SERUM POTASSIUM: CPT

## 2018-01-01 RX ORDER — NICOTINE POLACRILEX 4 MG
15 LOZENGE BUCCAL
Status: DISCONTINUED | OUTPATIENT
Start: 2018-01-01 | End: 2018-01-01 | Stop reason: HOSPADM

## 2018-01-01 RX ORDER — FAMOTIDINE 10 MG/ML
20 INJECTION, SOLUTION INTRAVENOUS DAILY
Status: DISCONTINUED | OUTPATIENT
Start: 2018-01-01 | End: 2018-01-01

## 2018-01-01 RX ORDER — HALOPERIDOL 1 MG/1
1 TABLET ORAL 2 TIMES DAILY
COMMUNITY
End: 2018-01-01

## 2018-01-01 RX ORDER — IPRATROPIUM BROMIDE AND ALBUTEROL SULFATE 2.5; .5 MG/3ML; MG/3ML
3 SOLUTION RESPIRATORY (INHALATION)
Status: DISCONTINUED | OUTPATIENT
Start: 2018-01-01 | End: 2018-01-01

## 2018-01-01 RX ORDER — DILTIAZEM HYDROCHLORIDE 240 MG/1
240 CAPSULE, COATED, EXTENDED RELEASE ORAL
Status: DISCONTINUED | OUTPATIENT
Start: 2018-01-01 | End: 2018-01-01

## 2018-01-01 RX ORDER — ONDANSETRON 2 MG/ML
4 INJECTION INTRAMUSCULAR; INTRAVENOUS EVERY 6 HOURS PRN
Status: DISCONTINUED | OUTPATIENT
Start: 2018-01-01 | End: 2018-01-01 | Stop reason: HOSPADM

## 2018-01-01 RX ORDER — DIVALPROEX SODIUM 125 MG/1
125 CAPSULE, COATED PELLETS ORAL NIGHTLY
Status: ON HOLD | COMMUNITY
End: 2019-01-01

## 2018-01-01 RX ORDER — METOPROLOL TARTRATE 50 MG/1
50 TABLET, FILM COATED ORAL EVERY 12 HOURS SCHEDULED
Status: DISCONTINUED | OUTPATIENT
Start: 2018-01-01 | End: 2018-01-01

## 2018-01-01 RX ORDER — FAMOTIDINE 10 MG/ML
20 INJECTION, SOLUTION INTRAVENOUS EVERY 12 HOURS SCHEDULED
Status: DISCONTINUED | OUTPATIENT
Start: 2018-01-01 | End: 2018-01-01

## 2018-01-01 RX ORDER — DEXTROSE MONOHYDRATE 25 G/50ML
25 INJECTION, SOLUTION INTRAVENOUS
Status: DISCONTINUED | OUTPATIENT
Start: 2018-01-01 | End: 2018-01-01 | Stop reason: SDUPTHER

## 2018-01-01 RX ORDER — SODIUM CHLORIDE 9 MG/ML
100 INJECTION, SOLUTION INTRAVENOUS CONTINUOUS
Status: DISCONTINUED | OUTPATIENT
Start: 2018-01-01 | End: 2018-01-01

## 2018-01-01 RX ORDER — FAMOTIDINE 20 MG/1
20 TABLET, FILM COATED ORAL DAILY
Status: DISCONTINUED | OUTPATIENT
Start: 2018-01-01 | End: 2018-01-01 | Stop reason: HOSPADM

## 2018-01-01 RX ORDER — CEFTRIAXONE SODIUM 1 G/50ML
1 INJECTION, SOLUTION INTRAVENOUS EVERY 24 HOURS
Status: DISCONTINUED | OUTPATIENT
Start: 2018-01-01 | End: 2018-01-01

## 2018-01-01 RX ORDER — PHENYLEPHRINE HCL IN 0.9% NACL 0.5 MG/5ML
.5-3 SYRINGE (ML) INTRAVENOUS
Status: DISCONTINUED | OUTPATIENT
Start: 2018-01-01 | End: 2018-01-01 | Stop reason: SDUPTHER

## 2018-01-01 RX ORDER — DILTIAZEM HCL IN NACL,ISO-OSM 125 MG/125
5-15 PLASTIC BAG, INJECTION (ML) INTRAVENOUS
Status: DISCONTINUED | OUTPATIENT
Start: 2018-01-01 | End: 2018-01-01

## 2018-01-01 RX ORDER — DEXTROSE MONOHYDRATE 25 G/50ML
25 INJECTION, SOLUTION INTRAVENOUS
Status: DISCONTINUED | OUTPATIENT
Start: 2018-01-01 | End: 2018-01-01

## 2018-01-01 RX ORDER — SODIUM CHLORIDE 0.9 % (FLUSH) 0.9 %
10 SYRINGE (ML) INJECTION AS NEEDED
Status: DISCONTINUED | OUTPATIENT
Start: 2018-01-01 | End: 2018-01-01 | Stop reason: HOSPADM

## 2018-01-01 RX ORDER — ESCITALOPRAM OXALATE 10 MG/1
10 TABLET ORAL DAILY
COMMUNITY
End: 2018-01-01

## 2018-01-01 RX ORDER — MAGNESIUM SULFATE HEPTAHYDRATE 40 MG/ML
2 INJECTION, SOLUTION INTRAVENOUS ONCE
Status: COMPLETED | OUTPATIENT
Start: 2018-01-01 | End: 2018-01-01

## 2018-01-01 RX ORDER — MAGNESIUM SULFATE HEPTAHYDRATE 40 MG/ML
2 INJECTION, SOLUTION INTRAVENOUS AS NEEDED
Status: DISCONTINUED | OUTPATIENT
Start: 2018-01-01 | End: 2018-01-01 | Stop reason: HOSPADM

## 2018-01-01 RX ORDER — ASPIRIN 81 MG/1
324 TABLET, CHEWABLE ORAL DAILY
Status: DISCONTINUED | OUTPATIENT
Start: 2018-01-01 | End: 2018-01-01

## 2018-01-01 RX ORDER — DEXTROSE MONOHYDRATE 25 G/50ML
25 INJECTION, SOLUTION INTRAVENOUS
Status: DISCONTINUED | OUTPATIENT
Start: 2018-01-01 | End: 2018-01-01 | Stop reason: HOSPADM

## 2018-01-01 RX ORDER — ASPIRIN 81 MG/1
81 TABLET ORAL DAILY
Status: DISCONTINUED | OUTPATIENT
Start: 2018-01-01 | End: 2018-01-01 | Stop reason: HOSPADM

## 2018-01-01 RX ORDER — NICOTINE POLACRILEX 4 MG
15 LOZENGE BUCCAL
Status: DISCONTINUED | OUTPATIENT
Start: 2018-01-01 | End: 2018-01-01 | Stop reason: SDUPTHER

## 2018-01-01 RX ORDER — AMLODIPINE BESYLATE 2.5 MG/1
2.5 TABLET ORAL
Status: DISCONTINUED | OUTPATIENT
Start: 2018-01-01 | End: 2018-01-01

## 2018-01-01 RX ORDER — VANCOMYCIN HYDROCHLORIDE 1 G/200ML
1000 INJECTION, SOLUTION INTRAVENOUS ONCE
Status: COMPLETED | OUTPATIENT
Start: 2018-01-01 | End: 2018-01-01

## 2018-01-01 RX ORDER — DEXTROSE MONOHYDRATE 50 MG/ML
75 INJECTION, SOLUTION INTRAVENOUS CONTINUOUS
Status: DISCONTINUED | OUTPATIENT
Start: 2018-01-01 | End: 2018-01-01

## 2018-01-01 RX ORDER — DILTIAZEM HYDROCHLORIDE 60 MG/1
60 TABLET, FILM COATED ORAL EVERY 6 HOURS SCHEDULED
Status: DISCONTINUED | OUTPATIENT
Start: 2018-01-01 | End: 2018-01-01 | Stop reason: HOSPADM

## 2018-01-01 RX ORDER — MIDAZOLAM HYDROCHLORIDE 1 MG/ML
INJECTION INTRAMUSCULAR; INTRAVENOUS
Status: COMPLETED | OUTPATIENT
Start: 2018-01-01 | End: 2018-01-01

## 2018-01-01 RX ORDER — SODIUM CHLORIDE 0.9 % (FLUSH) 0.9 %
1-10 SYRINGE (ML) INJECTION AS NEEDED
Status: DISCONTINUED | OUTPATIENT
Start: 2018-01-01 | End: 2018-01-01 | Stop reason: HOSPADM

## 2018-01-01 RX ORDER — IPRATROPIUM BROMIDE AND ALBUTEROL SULFATE 2.5; .5 MG/3ML; MG/3ML
3 SOLUTION RESPIRATORY (INHALATION) EVERY 6 HOURS PRN
Status: DISCONTINUED | OUTPATIENT
Start: 2018-01-01 | End: 2018-01-01 | Stop reason: HOSPADM

## 2018-01-01 RX ORDER — FLUCONAZOLE 200 MG/1
200 TABLET ORAL EVERY 24 HOURS
Status: DISPENSED | OUTPATIENT
Start: 2018-01-01 | End: 2018-01-01

## 2018-01-01 RX ORDER — DEXTROSE MONOHYDRATE 25 G/50ML
25 INJECTION, SOLUTION INTRAVENOUS ONCE
Status: COMPLETED | OUTPATIENT
Start: 2018-01-01 | End: 2018-01-01

## 2018-01-01 RX ORDER — LEVOFLOXACIN 500 MG/1
500 TABLET, FILM COATED ORAL DAILY
COMMUNITY
Start: 2018-01-01 | End: 2018-01-01 | Stop reason: HOSPADM

## 2018-01-01 RX ORDER — HYDRALAZINE HYDROCHLORIDE 10 MG/1
10 TABLET, FILM COATED ORAL EVERY 8 HOURS PRN
Status: DISCONTINUED | OUTPATIENT
Start: 2018-01-01 | End: 2018-01-01 | Stop reason: HOSPADM

## 2018-01-01 RX ORDER — BUDESONIDE AND FORMOTEROL FUMARATE DIHYDRATE 160; 4.5 UG/1; UG/1
2 AEROSOL RESPIRATORY (INHALATION)
Status: DISCONTINUED | OUTPATIENT
Start: 2018-01-01 | End: 2018-01-01

## 2018-01-01 RX ORDER — AMIODARONE HYDROCHLORIDE 50 MG/ML
150 INJECTION, SOLUTION INTRAVENOUS ONCE
Status: COMPLETED | OUTPATIENT
Start: 2018-01-01 | End: 2018-01-01

## 2018-01-01 RX ORDER — AMIODARONE HYDROCHLORIDE 200 MG/1
200 TABLET ORAL
Status: DISCONTINUED | OUTPATIENT
Start: 2018-01-01 | End: 2018-01-01

## 2018-01-01 RX ORDER — DEXTROSE MONOHYDRATE 50 MG/ML
75 INJECTION, SOLUTION INTRAVENOUS ONCE
Status: COMPLETED | OUTPATIENT
Start: 2018-01-01 | End: 2018-01-01

## 2018-01-01 RX ORDER — SODIUM CHLORIDE, SODIUM LACTATE, POTASSIUM CHLORIDE, CALCIUM CHLORIDE 600; 310; 30; 20 MG/100ML; MG/100ML; MG/100ML; MG/100ML
100 INJECTION, SOLUTION INTRAVENOUS CONTINUOUS
Status: DISCONTINUED | OUTPATIENT
Start: 2018-01-01 | End: 2018-01-01

## 2018-01-01 RX ORDER — LOPERAMIDE HYDROCHLORIDE 2 MG/1
2 CAPSULE ORAL 3 TIMES DAILY PRN
Status: DISCONTINUED | OUTPATIENT
Start: 2018-01-01 | End: 2018-01-01 | Stop reason: HOSPADM

## 2018-01-01 RX ORDER — ALBUTEROL SULFATE 2.5 MG/3ML
2.5 SOLUTION RESPIRATORY (INHALATION) ONCE
Status: COMPLETED | OUTPATIENT
Start: 2018-01-01 | End: 2018-01-01

## 2018-01-01 RX ORDER — ONDANSETRON 2 MG/ML
4 INJECTION INTRAMUSCULAR; INTRAVENOUS EVERY 6 HOURS PRN
Status: DISCONTINUED | OUTPATIENT
Start: 2018-01-01 | End: 2018-01-01 | Stop reason: ALTCHOICE

## 2018-01-01 RX ORDER — ATORVASTATIN CALCIUM 10 MG/1
10 TABLET, FILM COATED ORAL NIGHTLY
Status: DISCONTINUED | OUTPATIENT
Start: 2018-01-01 | End: 2018-01-01 | Stop reason: HOSPADM

## 2018-01-01 RX ORDER — ACETAMINOPHEN 650 MG
TABLET, EXTENDED RELEASE ORAL DAILY
Status: DISCONTINUED | OUTPATIENT
Start: 2018-01-01 | End: 2018-01-01

## 2018-01-01 RX ORDER — CEFTRIAXONE SODIUM 1 G/50ML
1 INJECTION, SOLUTION INTRAVENOUS ONCE
Status: COMPLETED | OUTPATIENT
Start: 2018-01-01 | End: 2018-01-01

## 2018-01-01 RX ORDER — DOXYCYCLINE 100 MG/1
100 CAPSULE ORAL EVERY 12 HOURS SCHEDULED
Status: DISCONTINUED | OUTPATIENT
Start: 2018-01-01 | End: 2018-01-01 | Stop reason: HOSPADM

## 2018-01-01 RX ORDER — FAMOTIDINE 20 MG/1
20 TABLET, FILM COATED ORAL 2 TIMES DAILY
Status: DISCONTINUED | OUTPATIENT
Start: 2018-01-01 | End: 2018-01-01

## 2018-01-01 RX ORDER — METOPROLOL TARTRATE 50 MG/1
50 TABLET, FILM COATED ORAL EVERY 12 HOURS SCHEDULED
Status: DISCONTINUED | OUTPATIENT
Start: 2018-01-01 | End: 2018-01-01 | Stop reason: HOSPADM

## 2018-01-01 RX ORDER — LOPERAMIDE HYDROCHLORIDE 2 MG/1
2 CAPSULE ORAL 3 TIMES DAILY
Status: DISCONTINUED | OUTPATIENT
Start: 2018-01-01 | End: 2018-01-01 | Stop reason: HOSPADM

## 2018-01-01 RX ORDER — POTASSIUM CHLORIDE 1.5 G/1.77G
40 POWDER, FOR SOLUTION ORAL ONCE
Status: COMPLETED | OUTPATIENT
Start: 2018-01-01 | End: 2018-01-01

## 2018-01-01 RX ORDER — ACETAMINOPHEN 325 MG/1
650 TABLET ORAL EVERY 4 HOURS PRN
Status: DISCONTINUED | OUTPATIENT
Start: 2018-01-01 | End: 2018-01-01 | Stop reason: HOSPADM

## 2018-01-01 RX ORDER — SODIUM CHLORIDE 9 MG/ML
150 INJECTION, SOLUTION INTRAVENOUS CONTINUOUS
Status: DISCONTINUED | OUTPATIENT
Start: 2018-01-01 | End: 2018-01-01

## 2018-01-01 RX ORDER — ONDANSETRON 4 MG/1
4 TABLET, FILM COATED ORAL EVERY 6 HOURS PRN
Status: DISCONTINUED | OUTPATIENT
Start: 2018-01-01 | End: 2018-01-01 | Stop reason: ALTCHOICE

## 2018-01-01 RX ORDER — CASTOR OIL AND BALSAM, PERU 788; 87 MG/G; MG/G
1 OINTMENT TOPICAL EVERY 12 HOURS SCHEDULED
Start: 2018-01-01

## 2018-01-01 RX ORDER — SULFAMETHOXAZOLE AND TRIMETHOPRIM 200; 40 MG/5ML; MG/5ML
10 SUSPENSION ORAL 2 TIMES DAILY
Qty: 280 ML | Refills: 0
Start: 2018-01-01 | End: 2018-01-01

## 2018-01-01 RX ORDER — MAGNESIUM SULFATE HEPTAHYDRATE 40 MG/ML
4 INJECTION, SOLUTION INTRAVENOUS AS NEEDED
Status: DISCONTINUED | OUTPATIENT
Start: 2018-01-01 | End: 2018-01-01 | Stop reason: HOSPADM

## 2018-01-01 RX ORDER — CASTOR OIL AND BALSAM, PERU 788; 87 MG/G; MG/G
OINTMENT TOPICAL EVERY 12 HOURS SCHEDULED
Status: DISCONTINUED | OUTPATIENT
Start: 2018-01-01 | End: 2018-01-01 | Stop reason: HOSPADM

## 2018-01-01 RX ORDER — SODIUM CHLORIDE 0.9 % (FLUSH) 0.9 %
10 SYRINGE (ML) INJECTION EVERY 12 HOURS SCHEDULED
Status: DISCONTINUED | OUTPATIENT
Start: 2018-01-01 | End: 2018-01-01 | Stop reason: HOSPADM

## 2018-01-01 RX ORDER — SODIUM CHLORIDE 0.9 % (FLUSH) 0.9 %
3-10 SYRINGE (ML) INJECTION AS NEEDED
Status: DISCONTINUED | OUTPATIENT
Start: 2018-01-01 | End: 2018-01-01 | Stop reason: HOSPADM

## 2018-01-01 RX ORDER — POTASSIUM CHLORIDE 7.45 MG/ML
10 INJECTION INTRAVENOUS
Status: DISCONTINUED | OUTPATIENT
Start: 2018-01-01 | End: 2018-01-01 | Stop reason: HOSPADM

## 2018-01-01 RX ORDER — FAMOTIDINE 20 MG/1
20 TABLET, FILM COATED ORAL DAILY
Status: DISCONTINUED | OUTPATIENT
Start: 2018-01-01 | End: 2018-01-01

## 2018-01-01 RX ORDER — AMMONIUM LACTATE 120 MG/G
1 CREAM TOPICAL 2 TIMES DAILY
COMMUNITY
End: 2018-01-01 | Stop reason: HOSPADM

## 2018-01-01 RX ORDER — DEXTROSE AND SODIUM CHLORIDE 5; .45 G/100ML; G/100ML
75 INJECTION, SOLUTION INTRAVENOUS CONTINUOUS
Status: DISCONTINUED | OUTPATIENT
Start: 2018-01-01 | End: 2018-01-01

## 2018-01-01 RX ORDER — VANCOMYCIN HYDROCHLORIDE 1 G/200ML
1000 INJECTION, SOLUTION INTRAVENOUS EVERY 24 HOURS
Status: DISCONTINUED | OUTPATIENT
Start: 2018-01-01 | End: 2018-01-01

## 2018-01-01 RX ORDER — AMLODIPINE BESYLATE 5 MG/1
5 TABLET ORAL
Status: DISCONTINUED | OUTPATIENT
Start: 2018-01-01 | End: 2018-01-01

## 2018-01-01 RX ORDER — DIGOXIN 0.25 MG/ML
125 INJECTION INTRAMUSCULAR; INTRAVENOUS
Status: COMPLETED | OUTPATIENT
Start: 2018-01-01 | End: 2018-01-01

## 2018-01-01 RX ORDER — SODIUM CHLORIDE 9 MG/ML
75 INJECTION, SOLUTION INTRAVENOUS CONTINUOUS
Status: DISCONTINUED | OUTPATIENT
Start: 2018-01-01 | End: 2018-01-01

## 2018-01-01 RX ORDER — HALOPERIDOL 5 MG/ML
1 INJECTION INTRAMUSCULAR 2 TIMES DAILY
Status: DISCONTINUED | OUTPATIENT
Start: 2018-01-01 | End: 2018-01-01

## 2018-01-01 RX ORDER — ESCITALOPRAM OXALATE 20 MG/1
20 TABLET ORAL DAILY
COMMUNITY
End: 2019-01-01 | Stop reason: ALTCHOICE

## 2018-01-01 RX ORDER — AMIODARONE HYDROCHLORIDE 200 MG/1
200 TABLET ORAL EVERY 12 HOURS SCHEDULED
Status: DISCONTINUED | OUTPATIENT
Start: 2018-01-01 | End: 2018-01-01

## 2018-01-01 RX ORDER — DILTIAZEM HYDROCHLORIDE 240 MG/1
240 CAPSULE, COATED, EXTENDED RELEASE ORAL
Status: DISCONTINUED | OUTPATIENT
Start: 2018-01-01 | End: 2018-01-01 | Stop reason: HOSPADM

## 2018-01-01 RX ORDER — HALOPERIDOL 1 MG/1
1 TABLET ORAL EVERY 12 HOURS SCHEDULED
Status: DISCONTINUED | OUTPATIENT
Start: 2018-01-01 | End: 2018-01-01 | Stop reason: HOSPADM

## 2018-01-01 RX ORDER — DEXTROSE MONOHYDRATE 25 G/50ML
25-50 INJECTION, SOLUTION INTRAVENOUS
Status: DISCONTINUED | OUTPATIENT
Start: 2018-01-01 | End: 2018-01-01 | Stop reason: CLARIF

## 2018-01-01 RX ORDER — L. ACIDOPHILUS/L.BULGARICUS 1MM CELL
1 TABLET ORAL 3 TIMES DAILY
COMMUNITY
End: 2018-01-01 | Stop reason: HOSPADM

## 2018-01-01 RX ORDER — HEPARIN SODIUM 5000 [USP'U]/ML
5000 INJECTION, SOLUTION INTRAVENOUS; SUBCUTANEOUS EVERY 8 HOURS SCHEDULED
Status: DISCONTINUED | OUTPATIENT
Start: 2018-01-01 | End: 2018-01-01

## 2018-01-01 RX ORDER — ACETAMINOPHEN 325 MG/1
650 TABLET ORAL EVERY 6 HOURS PRN
Start: 2018-01-01

## 2018-01-01 RX ORDER — SODIUM CHLORIDE 0.9 % (FLUSH) 0.9 %
3 SYRINGE (ML) INJECTION EVERY 12 HOURS SCHEDULED
Status: DISCONTINUED | OUTPATIENT
Start: 2018-01-01 | End: 2018-01-01

## 2018-01-01 RX ORDER — MAGNESIUM SULFATE 1 G/100ML
1 INJECTION INTRAVENOUS AS NEEDED
Status: DISCONTINUED | OUTPATIENT
Start: 2018-01-01 | End: 2018-01-01 | Stop reason: HOSPADM

## 2018-01-01 RX ORDER — POTASSIUM CHLORIDE 1.5 G/1.77G
40 POWDER, FOR SOLUTION ORAL AS NEEDED
Status: DISCONTINUED | OUTPATIENT
Start: 2018-01-01 | End: 2018-01-01 | Stop reason: HOSPADM

## 2018-01-01 RX ORDER — ESMOLOL HYDROCHLORIDE 10 MG/ML
50-300 INJECTION, SOLUTION INTRAVENOUS
Status: DISCONTINUED | OUTPATIENT
Start: 2018-01-01 | End: 2018-01-01

## 2018-01-01 RX ORDER — THIAMINE MONONITRATE (VIT B1) 100 MG
100 TABLET ORAL DAILY
Status: DISCONTINUED | OUTPATIENT
Start: 2018-01-01 | End: 2018-01-01 | Stop reason: HOSPADM

## 2018-01-01 RX ORDER — ONDANSETRON 4 MG/1
4 TABLET, FILM COATED ORAL EVERY 8 HOURS PRN
COMMUNITY

## 2018-01-01 RX ORDER — ASPIRIN 300 MG/1
300 SUPPOSITORY RECTAL ONCE
Status: COMPLETED | OUTPATIENT
Start: 2018-01-01 | End: 2018-01-01

## 2018-01-01 RX ORDER — ACETAMINOPHEN 325 MG/1
650 TABLET ORAL EVERY 6 HOURS PRN
Status: DISCONTINUED | OUTPATIENT
Start: 2018-01-01 | End: 2018-01-01 | Stop reason: HOSPADM

## 2018-01-01 RX ORDER — ASPIRIN 81 MG/1
81 TABLET, CHEWABLE ORAL DAILY
COMMUNITY

## 2018-01-01 RX ORDER — NICOTINE POLACRILEX 4 MG
15 LOZENGE BUCCAL
Status: DISCONTINUED | OUTPATIENT
Start: 2018-01-01 | End: 2018-01-01

## 2018-01-01 RX ORDER — SULFAMETHOXAZOLE AND TRIMETHOPRIM 800; 160 MG/1; MG/1
0.5 TABLET ORAL EVERY 12 HOURS
Status: DISCONTINUED | OUTPATIENT
Start: 2018-01-01 | End: 2018-01-01 | Stop reason: HOSPADM

## 2018-01-01 RX ORDER — POTASSIUM CHLORIDE 750 MG/1
40 CAPSULE, EXTENDED RELEASE ORAL AS NEEDED
Status: DISCONTINUED | OUTPATIENT
Start: 2018-01-01 | End: 2018-01-01 | Stop reason: HOSPADM

## 2018-01-01 RX ORDER — ATORVASTATIN CALCIUM 10 MG/1
10 TABLET, FILM COATED ORAL NIGHTLY
Status: DISCONTINUED | OUTPATIENT
Start: 2018-01-01 | End: 2018-01-01

## 2018-01-01 RX ORDER — DILTIAZEM HYDROCHLORIDE 60 MG/1
60 TABLET, FILM COATED ORAL EVERY 6 HOURS SCHEDULED
Start: 2018-01-01

## 2018-01-01 RX ORDER — ALBUTEROL SULFATE 2.5 MG/3ML
10 SOLUTION RESPIRATORY (INHALATION) ONCE
Status: COMPLETED | OUTPATIENT
Start: 2018-01-01 | End: 2018-01-01

## 2018-01-01 RX ORDER — VANCOMYCIN HYDROCHLORIDE 1 G/200ML
1000 INJECTION, SOLUTION INTRAVENOUS
Status: DISCONTINUED | OUTPATIENT
Start: 2018-01-01 | End: 2018-01-01

## 2018-01-01 RX ORDER — DILTIAZEM HYDROCHLORIDE 60 MG/1
60 TABLET, FILM COATED ORAL ONCE
Status: COMPLETED | OUTPATIENT
Start: 2018-01-01 | End: 2018-01-01

## 2018-01-01 RX ORDER — DILTIAZEM HYDROCHLORIDE 60 MG/1
60 TABLET, FILM COATED ORAL EVERY 6 HOURS SCHEDULED
Status: DISCONTINUED | OUTPATIENT
Start: 2018-01-01 | End: 2018-01-01

## 2018-01-01 RX ORDER — PHENYLEPHRINE HCL IN 0.9% NACL 0.5 MG/5ML
.5-3 SYRINGE (ML) INTRAVENOUS
Status: DISCONTINUED | OUTPATIENT
Start: 2018-01-01 | End: 2018-01-01

## 2018-01-01 RX ORDER — AMMONIUM LACTATE 12 G/100G
1 LOTION TOPICAL 2 TIMES DAILY PRN
COMMUNITY

## 2018-01-01 RX ORDER — RANITIDINE 150 MG/1
150 TABLET ORAL DAILY
COMMUNITY
End: 2018-01-01 | Stop reason: HOSPADM

## 2018-01-01 RX ORDER — LANOLIN ALCOHOL/MO/W.PET/CERES
100 CREAM (GRAM) TOPICAL DAILY
Start: 2018-01-01

## 2018-01-01 RX ORDER — HALOPERIDOL 1 MG/1
1 TABLET ORAL 2 TIMES DAILY
Status: DISCONTINUED | OUTPATIENT
Start: 2018-01-01 | End: 2018-01-01

## 2018-01-01 RX ORDER — ACETAMINOPHEN 650 MG/1
650 SUPPOSITORY RECTAL EVERY 4 HOURS PRN
Status: DISCONTINUED | OUTPATIENT
Start: 2018-01-01 | End: 2018-01-01 | Stop reason: HOSPADM

## 2018-01-01 RX ORDER — DEXTROSE MONOHYDRATE 25 G/50ML
50 INJECTION, SOLUTION INTRAVENOUS ONCE
Status: COMPLETED | OUTPATIENT
Start: 2018-01-01 | End: 2018-01-01

## 2018-01-01 RX ORDER — METOPROLOL TARTRATE 50 MG/1
50 TABLET, FILM COATED ORAL EVERY 12 HOURS SCHEDULED
Start: 2018-01-01

## 2018-01-01 RX ORDER — DILTIAZEM HYDROCHLORIDE 240 MG/1
240 CAPSULE, COATED, EXTENDED RELEASE ORAL
Start: 2018-01-01 | End: 2018-01-01 | Stop reason: HOSPADM

## 2018-01-01 RX ORDER — ASPIRIN 81 MG/1
81 TABLET, CHEWABLE ORAL DAILY
Status: DISCONTINUED | OUTPATIENT
Start: 2018-01-01 | End: 2018-01-01 | Stop reason: HOSPADM

## 2018-01-01 RX ORDER — LIDOCAINE HYDROCHLORIDE 40 MG/ML
1 SOLUTION TOPICAL ONCE
Status: DISCONTINUED | OUTPATIENT
Start: 2018-01-01 | End: 2018-01-01

## 2018-01-01 RX ORDER — LOPERAMIDE HYDROCHLORIDE 2 MG/1
2 CAPSULE ORAL 3 TIMES DAILY
Start: 2018-01-01

## 2018-01-01 RX ORDER — SODIUM CHLORIDE 9 MG/ML
100 INJECTION, SOLUTION INTRAVENOUS CONTINUOUS
Status: DISCONTINUED | OUTPATIENT
Start: 2018-01-01 | End: 2018-01-01 | Stop reason: HOSPADM

## 2018-01-01 RX ORDER — SODIUM CHLORIDE 450 MG/100ML
75 INJECTION, SOLUTION INTRAVENOUS CONTINUOUS
Status: DISCONTINUED | OUTPATIENT
Start: 2018-01-01 | End: 2018-01-01

## 2018-01-01 RX ADMIN — HEPARIN SODIUM 5000 UNITS: 5000 INJECTION, SOLUTION INTRAVENOUS; SUBCUTANEOUS at 05:18

## 2018-01-01 RX ADMIN — HALOPERIDOL 1 MG: 1 TABLET ORAL at 08:39

## 2018-01-01 RX ADMIN — DILTIAZEM HYDROCHLORIDE 240 MG: 240 CAPSULE, COATED, EXTENDED RELEASE ORAL at 08:35

## 2018-01-01 RX ADMIN — POVIDONE-IODINE: 10 SOLUTION TOPICAL at 09:00

## 2018-01-01 RX ADMIN — ATORVASTATIN CALCIUM 10 MG: 10 TABLET, FILM COATED ORAL at 20:52

## 2018-01-01 RX ADMIN — IPRATROPIUM BROMIDE AND ALBUTEROL SULFATE 3 ML: .5; 3 SOLUTION RESPIRATORY (INHALATION) at 13:08

## 2018-01-01 RX ADMIN — INSULIN DETEMIR 10 UNITS: 100 INJECTION, SOLUTION SUBCUTANEOUS at 21:04

## 2018-01-01 RX ADMIN — DILTIAZEM HYDROCHLORIDE 60 MG: 60 TABLET, FILM COATED ORAL at 00:08

## 2018-01-01 RX ADMIN — METOPROLOL TARTRATE 50 MG: 50 TABLET ORAL at 21:19

## 2018-01-01 RX ADMIN — SODIUM CHLORIDE 150 ML/HR: 9 INJECTION, SOLUTION INTRAVENOUS at 22:45

## 2018-01-01 RX ADMIN — Medication 100 MG: at 09:05

## 2018-01-01 RX ADMIN — HALOPERIDOL 1 MG: 1 TABLET ORAL at 08:29

## 2018-01-01 RX ADMIN — Medication 100 MG: at 09:11

## 2018-01-01 RX ADMIN — DILTIAZEM HYDROCHLORIDE 60 MG: 60 TABLET, FILM COATED ORAL at 23:53

## 2018-01-01 RX ADMIN — INSULIN DETEMIR 10 UNITS: 100 INJECTION, SOLUTION SUBCUTANEOUS at 22:32

## 2018-01-01 RX ADMIN — DOXYCYCLINE 100 MG: 100 CAPSULE ORAL at 10:43

## 2018-01-01 RX ADMIN — ASPIRIN 81 MG: 81 TABLET, COATED ORAL at 17:19

## 2018-01-01 RX ADMIN — SODIUM CHLORIDE 1000 ML: 9 INJECTION, SOLUTION INTRAVENOUS at 15:33

## 2018-01-01 RX ADMIN — DILTIAZEM HYDROCHLORIDE 60 MG: 60 TABLET, FILM COATED ORAL at 12:05

## 2018-01-01 RX ADMIN — INSULIN LISPRO 4 UNITS: 100 INJECTION, SOLUTION INTRAVENOUS; SUBCUTANEOUS at 20:55

## 2018-01-01 RX ADMIN — CALCIUM GLUCONATE 1 G: 94 INJECTION, SOLUTION INTRAVENOUS at 15:41

## 2018-01-01 RX ADMIN — ASPIRIN 81 MG: 81 TABLET, COATED ORAL at 08:34

## 2018-01-01 RX ADMIN — METOPROLOL TARTRATE 50 MG: 50 TABLET ORAL at 21:16

## 2018-01-01 RX ADMIN — TAZOBACTAM SODIUM AND PIPERACILLIN SODIUM 3.38 G: 375; 3 INJECTION, SOLUTION INTRAVENOUS at 05:04

## 2018-01-01 RX ADMIN — CASTOR OIL AND BALSAM, PERU: 788; 87 OINTMENT TOPICAL at 10:18

## 2018-01-01 RX ADMIN — METOPROLOL TARTRATE 50 MG: 50 TABLET ORAL at 21:09

## 2018-01-01 RX ADMIN — METOPROLOL TARTRATE 5 MG: 5 INJECTION, SOLUTION INTRAVENOUS at 06:03

## 2018-01-01 RX ADMIN — INSULIN LISPRO 3 UNITS: 100 INJECTION, SOLUTION INTRAVENOUS; SUBCUTANEOUS at 17:17

## 2018-01-01 RX ADMIN — SODIUM CHLORIDE, PRESERVATIVE FREE 10 ML: 5 INJECTION INTRAVENOUS at 21:54

## 2018-01-01 RX ADMIN — INSULIN LISPRO 3 UNITS: 100 INJECTION, SOLUTION INTRAVENOUS; SUBCUTANEOUS at 14:13

## 2018-01-01 RX ADMIN — FLUCONAZOLE 200 MG: 200 TABLET ORAL at 08:02

## 2018-01-01 RX ADMIN — HEPARIN SODIUM 5000 UNITS: 5000 INJECTION, SOLUTION INTRAVENOUS; SUBCUTANEOUS at 05:03

## 2018-01-01 RX ADMIN — INSULIN DETEMIR 10 UNITS: 100 INJECTION, SOLUTION SUBCUTANEOUS at 08:28

## 2018-01-01 RX ADMIN — AMLODIPINE BESYLATE 5 MG: 5 TABLET ORAL at 08:21

## 2018-01-01 RX ADMIN — SODIUM CHLORIDE, PRESERVATIVE FREE 3 ML: 5 INJECTION INTRAVENOUS at 08:27

## 2018-01-01 RX ADMIN — DILTIAZEM HYDROCHLORIDE 240 MG: 240 CAPSULE, COATED, EXTENDED RELEASE ORAL at 08:18

## 2018-01-01 RX ADMIN — DOXYCYCLINE 100 MG: 100 CAPSULE ORAL at 21:25

## 2018-01-01 RX ADMIN — METOPROLOL TARTRATE 5 MG: 5 INJECTION, SOLUTION INTRAVENOUS at 22:45

## 2018-01-01 RX ADMIN — INSULIN LISPRO 4 UNITS: 100 INJECTION, SOLUTION INTRAVENOUS; SUBCUTANEOUS at 13:00

## 2018-01-01 RX ADMIN — DILTIAZEM HYDROCHLORIDE 60 MG: 60 TABLET, FILM COATED ORAL at 12:03

## 2018-01-01 RX ADMIN — DILTIAZEM HYDROCHLORIDE 60 MG: 60 TABLET, FILM COATED ORAL at 16:54

## 2018-01-01 RX ADMIN — METOPROLOL TARTRATE 5 MG: 5 INJECTION, SOLUTION INTRAVENOUS at 05:33

## 2018-01-01 RX ADMIN — LIDOCAINE HYDROCHLORIDE 1 ML: 10; .005 INJECTION, SOLUTION EPIDURAL; INFILTRATION; INTRACAUDAL; PERINEURAL at 17:39

## 2018-01-01 RX ADMIN — TAZOBACTAM SODIUM AND PIPERACILLIN SODIUM 3.38 G: 375; 3 INJECTION, SOLUTION INTRAVENOUS at 02:15

## 2018-01-01 RX ADMIN — METOPROLOL TARTRATE 50 MG: 50 TABLET ORAL at 09:19

## 2018-01-01 RX ADMIN — METOPROLOL TARTRATE 50 MG: 50 TABLET ORAL at 08:39

## 2018-01-01 RX ADMIN — DILTIAZEM HYDROCHLORIDE 60 MG: 60 TABLET, FILM COATED ORAL at 05:30

## 2018-01-01 RX ADMIN — INSULIN HUMAN 5 UNITS: 100 INJECTION, SOLUTION PARENTERAL at 17:44

## 2018-01-01 RX ADMIN — DILTIAZEM HYDROCHLORIDE 60 MG: 60 TABLET, FILM COATED ORAL at 06:33

## 2018-01-01 RX ADMIN — METOPROLOL TARTRATE 50 MG: 50 TABLET ORAL at 22:11

## 2018-01-01 RX ADMIN — FAMOTIDINE 20 MG: 10 INJECTION, SOLUTION INTRAVENOUS at 08:21

## 2018-01-01 RX ADMIN — INSULIN HUMAN 8 UNITS: 100 INJECTION, SOLUTION PARENTERAL at 11:41

## 2018-01-01 RX ADMIN — TAZOBACTAM SODIUM AND PIPERACILLIN SODIUM 3.38 G: 375; 3 INJECTION, SOLUTION INTRAVENOUS at 18:01

## 2018-01-01 RX ADMIN — ATORVASTATIN CALCIUM 10 MG: 10 TABLET, FILM COATED ORAL at 22:13

## 2018-01-01 RX ADMIN — SODIUM CHLORIDE 100 ML/HR: 9 INJECTION, SOLUTION INTRAVENOUS at 22:38

## 2018-01-01 RX ADMIN — HEPARIN SODIUM 5000 UNITS: 5000 INJECTION, SOLUTION INTRAVENOUS; SUBCUTANEOUS at 16:41

## 2018-01-01 RX ADMIN — METOPROLOL TARTRATE 50 MG: 50 TABLET ORAL at 00:00

## 2018-01-01 RX ADMIN — SODIUM CHLORIDE 60 ML/HR: 4.5 INJECTION, SOLUTION INTRAVENOUS at 04:43

## 2018-01-01 RX ADMIN — CASTOR OIL AND BALSAM, PERU: 788; 87 OINTMENT TOPICAL at 20:26

## 2018-01-01 RX ADMIN — ATORVASTATIN CALCIUM 10 MG: 10 TABLET, FILM COATED ORAL at 21:42

## 2018-01-01 RX ADMIN — APIXABAN 5 MG: 5 TABLET, FILM COATED ORAL at 08:39

## 2018-01-01 RX ADMIN — AMIODARONE HYDROCHLORIDE 1 MG/MIN: 1.8 INJECTION, SOLUTION INTRAVENOUS at 18:27

## 2018-01-01 RX ADMIN — DILTIAZEM HYDROCHLORIDE 60 MG: 60 TABLET, FILM COATED ORAL at 17:43

## 2018-01-01 RX ADMIN — SODIUM BICARBONATE 75 ML/HR: 84 INJECTION, SOLUTION INTRAVENOUS at 22:35

## 2018-01-01 RX ADMIN — ASPIRIN 300 MG: 300 SUPPOSITORY RECTAL at 18:14

## 2018-01-01 RX ADMIN — INSULIN HUMAN 5 UNITS: 100 INJECTION, SOLUTION PARENTERAL at 00:18

## 2018-01-01 RX ADMIN — INSULIN HUMAN 10 UNITS: 100 INJECTION, SOLUTION PARENTERAL at 06:12

## 2018-01-01 RX ADMIN — MIDAZOLAM HYDROCHLORIDE 2 MG: 1 INJECTION, SOLUTION INTRAMUSCULAR; INTRAVENOUS at 10:01

## 2018-01-01 RX ADMIN — INSULIN HUMAN 8 UNITS: 100 INJECTION, SOLUTION PARENTERAL at 00:13

## 2018-01-01 RX ADMIN — DILTIAZEM HYDROCHLORIDE 240 MG: 240 CAPSULE, COATED, EXTENDED RELEASE ORAL at 09:17

## 2018-01-01 RX ADMIN — INSULIN HUMAN 5 UNITS: 100 INJECTION, SOLUTION PARENTERAL at 11:49

## 2018-01-01 RX ADMIN — APIXABAN 5 MG: 5 TABLET, FILM COATED ORAL at 11:14

## 2018-01-01 RX ADMIN — DILTIAZEM HYDROCHLORIDE 60 MG: 60 TABLET, FILM COATED ORAL at 17:15

## 2018-01-01 RX ADMIN — ASPIRIN 81 MG: 81 TABLET, COATED ORAL at 10:13

## 2018-01-01 RX ADMIN — AMLODIPINE BESYLATE 5 MG: 5 TABLET ORAL at 10:13

## 2018-01-01 RX ADMIN — INSULIN LISPRO 8 UNITS: 100 INJECTION, SOLUTION INTRAVENOUS; SUBCUTANEOUS at 17:16

## 2018-01-01 RX ADMIN — METOPROLOL TARTRATE 50 MG: 50 TABLET ORAL at 09:22

## 2018-01-01 RX ADMIN — SODIUM CHLORIDE, POTASSIUM CHLORIDE, SODIUM LACTATE AND CALCIUM CHLORIDE 100 ML/HR: 600; 310; 30; 20 INJECTION, SOLUTION INTRAVENOUS at 07:49

## 2018-01-01 RX ADMIN — APIXABAN 5 MG: 5 TABLET, FILM COATED ORAL at 22:07

## 2018-01-01 RX ADMIN — DILTIAZEM HYDROCHLORIDE 60 MG: 60 TABLET, FILM COATED ORAL at 12:29

## 2018-01-01 RX ADMIN — VANCOMYCIN HYDROCHLORIDE 1750 MG: 10 INJECTION, POWDER, LYOPHILIZED, FOR SOLUTION INTRAVENOUS at 14:42

## 2018-01-01 RX ADMIN — DILTIAZEM HYDROCHLORIDE 60 MG: 60 TABLET, FILM COATED ORAL at 12:26

## 2018-01-01 RX ADMIN — DEXTROSE MONOHYDRATE 50 ML: 25 INJECTION, SOLUTION INTRAVENOUS at 15:39

## 2018-01-01 RX ADMIN — FAMOTIDINE 20 MG: 10 INJECTION, SOLUTION INTRAVENOUS at 09:00

## 2018-01-01 RX ADMIN — DILTIAZEM HYDROCHLORIDE 60 MG: 60 TABLET, FILM COATED ORAL at 06:17

## 2018-01-01 RX ADMIN — DEXTROSE AND SODIUM CHLORIDE 75 ML/HR: 5; 450 INJECTION, SOLUTION INTRAVENOUS at 22:31

## 2018-01-01 RX ADMIN — INSULIN DETEMIR 15 UNITS: 100 INJECTION, SOLUTION SUBCUTANEOUS at 21:49

## 2018-01-01 RX ADMIN — APIXABAN 2.5 MG: 2.5 TABLET, FILM COATED ORAL at 00:00

## 2018-01-01 RX ADMIN — SODIUM CHLORIDE 250 ML: 9 INJECTION, SOLUTION INTRAVENOUS at 10:40

## 2018-01-01 RX ADMIN — TAZOBACTAM SODIUM AND PIPERACILLIN SODIUM 3.38 G: 375; 3 INJECTION, SOLUTION INTRAVENOUS at 23:10

## 2018-01-01 RX ADMIN — APIXABAN 5 MG: 5 TABLET, FILM COATED ORAL at 21:42

## 2018-01-01 RX ADMIN — METOPROLOL TARTRATE 50 MG: 50 TABLET ORAL at 20:22

## 2018-01-01 RX ADMIN — METOPROLOL TARTRATE 50 MG: 50 TABLET ORAL at 20:11

## 2018-01-01 RX ADMIN — INSULIN DETEMIR 15 UNITS: 100 INJECTION, SOLUTION SUBCUTANEOUS at 20:38

## 2018-01-01 RX ADMIN — APIXABAN 5 MG: 5 TABLET, FILM COATED ORAL at 21:50

## 2018-01-01 RX ADMIN — DILTIAZEM HYDROCHLORIDE 60 MG: 60 TABLET, FILM COATED ORAL at 00:12

## 2018-01-01 RX ADMIN — ASPIRIN 81 MG CHEWABLE TABLET 81 MG: 81 TABLET CHEWABLE at 08:28

## 2018-01-01 RX ADMIN — CASTOR OIL AND BALSAM, PERU: 788; 87 OINTMENT TOPICAL at 10:58

## 2018-01-01 RX ADMIN — DILTIAZEM HYDROCHLORIDE 240 MG: 240 CAPSULE, COATED, EXTENDED RELEASE ORAL at 18:27

## 2018-01-01 RX ADMIN — CASTOR OIL AND BALSAM, PERU: 788; 87 OINTMENT TOPICAL at 21:55

## 2018-01-01 RX ADMIN — SODIUM CHLORIDE, PRESERVATIVE FREE 3 ML: 5 INJECTION INTRAVENOUS at 10:18

## 2018-01-01 RX ADMIN — METOPROLOL TARTRATE 5 MG: 5 INJECTION, SOLUTION INTRAVENOUS at 23:19

## 2018-01-01 RX ADMIN — METOPROLOL TARTRATE 5 MG: 5 INJECTION, SOLUTION INTRAVENOUS at 12:02

## 2018-01-01 RX ADMIN — SODIUM CHLORIDE, PRESERVATIVE FREE 3 ML: 5 INJECTION INTRAVENOUS at 09:20

## 2018-01-01 RX ADMIN — LOPERAMIDE HYDROCHLORIDE 2 MG: 2 CAPSULE ORAL at 16:54

## 2018-01-01 RX ADMIN — Medication 100 MG: at 08:01

## 2018-01-01 RX ADMIN — INSULIN HUMAN 10 UNITS: 100 INJECTION, SOLUTION PARENTERAL at 12:03

## 2018-01-01 RX ADMIN — POVIDONE-IODINE: 10 SOLUTION TOPICAL at 15:30

## 2018-01-01 RX ADMIN — FAMOTIDINE 20 MG: 10 INJECTION, SOLUTION INTRAVENOUS at 20:45

## 2018-01-01 RX ADMIN — SODIUM CHLORIDE 50 ML/HR: 9 INJECTION, SOLUTION INTRAVENOUS at 18:30

## 2018-01-01 RX ADMIN — DAPTOMYCIN 500 MG: 500 INJECTION, POWDER, LYOPHILIZED, FOR SOLUTION INTRAVENOUS at 12:29

## 2018-01-01 RX ADMIN — INSULIN DETEMIR 15 UNITS: 100 INJECTION, SOLUTION SUBCUTANEOUS at 10:39

## 2018-01-01 RX ADMIN — FAMOTIDINE 20 MG: 20 TABLET, FILM COATED ORAL at 09:17

## 2018-01-01 RX ADMIN — TAZOBACTAM SODIUM AND PIPERACILLIN SODIUM 3.38 G: 375; 3 INJECTION, SOLUTION INTRAVENOUS at 01:21

## 2018-01-01 RX ADMIN — INSULIN HUMAN 5 UNITS: 100 INJECTION, SOLUTION PARENTERAL at 00:37

## 2018-01-01 RX ADMIN — METOPROLOL TARTRATE 50 MG: 50 TABLET ORAL at 20:27

## 2018-01-01 RX ADMIN — TAZOBACTAM SODIUM AND PIPERACILLIN SODIUM 3.38 G: 375; 3 INJECTION, SOLUTION INTRAVENOUS at 03:50

## 2018-01-01 RX ADMIN — DILTIAZEM HYDROCHLORIDE 60 MG: 60 TABLET, FILM COATED ORAL at 05:19

## 2018-01-01 RX ADMIN — INSULIN LISPRO 5 UNITS: 100 INJECTION, SOLUTION INTRAVENOUS; SUBCUTANEOUS at 20:53

## 2018-01-01 RX ADMIN — ATORVASTATIN CALCIUM 10 MG: 10 TABLET, FILM COATED ORAL at 21:22

## 2018-01-01 RX ADMIN — METOPROLOL TARTRATE 5 MG: 5 INJECTION, SOLUTION INTRAVENOUS at 11:00

## 2018-01-01 RX ADMIN — DILTIAZEM HYDROCHLORIDE 60 MG: 60 TABLET, FILM COATED ORAL at 17:46

## 2018-01-01 RX ADMIN — CASTOR OIL AND BALSAM, PERU: 788; 87 OINTMENT TOPICAL at 11:44

## 2018-01-01 RX ADMIN — SODIUM CHLORIDE, PRESERVATIVE FREE 10 ML: 5 INJECTION INTRAVENOUS at 21:25

## 2018-01-01 RX ADMIN — INSULIN DETEMIR 13 UNITS: 100 INJECTION, SOLUTION SUBCUTANEOUS at 09:18

## 2018-01-01 RX ADMIN — SODIUM BICARBONATE 50 ML/HR: 84 INJECTION, SOLUTION INTRAVENOUS at 19:50

## 2018-01-01 RX ADMIN — TAZOBACTAM SODIUM AND PIPERACILLIN SODIUM 3.38 G: 375; 3 INJECTION, SOLUTION INTRAVENOUS at 17:49

## 2018-01-01 RX ADMIN — INSULIN LISPRO 2 UNITS: 100 INJECTION, SOLUTION INTRAVENOUS; SUBCUTANEOUS at 11:43

## 2018-01-01 RX ADMIN — DILTIAZEM HYDROCHLORIDE 60 MG: 60 TABLET, FILM COATED ORAL at 18:10

## 2018-01-01 RX ADMIN — HALOPERIDOL 1 MG: 1 TABLET ORAL at 20:46

## 2018-01-01 RX ADMIN — DILTIAZEM HYDROCHLORIDE 60 MG: 60 TABLET, FILM COATED ORAL at 00:11

## 2018-01-01 RX ADMIN — INSULIN LISPRO 10 UNITS: 100 INJECTION, SOLUTION INTRAVENOUS; SUBCUTANEOUS at 12:20

## 2018-01-01 RX ADMIN — AMIODARONE HYDROCHLORIDE 200 MG: 200 TABLET ORAL at 20:26

## 2018-01-01 RX ADMIN — CASTOR OIL AND BALSAM, PERU: 788; 87 OINTMENT TOPICAL at 21:25

## 2018-01-01 RX ADMIN — INSULIN HUMAN 5 UNITS: 100 INJECTION, SOLUTION PARENTERAL at 06:12

## 2018-01-01 RX ADMIN — DILTIAZEM HYDROCHLORIDE 60 MG: 60 TABLET, FILM COATED ORAL at 14:12

## 2018-01-01 RX ADMIN — DEXTROSE MONOHYDRATE 75 ML/HR: 50 INJECTION, SOLUTION INTRAVENOUS at 23:36

## 2018-01-01 RX ADMIN — APIXABAN 2.5 MG: 2.5 TABLET, FILM COATED ORAL at 09:11

## 2018-01-01 RX ADMIN — HEPARIN SODIUM 5000 UNITS: 5000 INJECTION, SOLUTION INTRAVENOUS; SUBCUTANEOUS at 14:21

## 2018-01-01 RX ADMIN — INSULIN LISPRO 2 UNITS: 100 INJECTION, SOLUTION INTRAVENOUS; SUBCUTANEOUS at 12:00

## 2018-01-01 RX ADMIN — DILTIAZEM HYDROCHLORIDE 60 MG: 60 TABLET, FILM COATED ORAL at 05:04

## 2018-01-01 RX ADMIN — FLUCONAZOLE 200 MG: 200 TABLET ORAL at 09:00

## 2018-01-01 RX ADMIN — TAZOBACTAM SODIUM AND PIPERACILLIN SODIUM 3.38 G: 375; 3 INJECTION, SOLUTION INTRAVENOUS at 01:58

## 2018-01-01 RX ADMIN — DILTIAZEM HYDROCHLORIDE 60 MG: 60 TABLET, FILM COATED ORAL at 18:34

## 2018-01-01 RX ADMIN — Medication 100 MG: at 08:21

## 2018-01-01 RX ADMIN — TAZOBACTAM SODIUM AND PIPERACILLIN SODIUM 3.38 G: 375; 3 INJECTION, SOLUTION INTRAVENOUS at 10:06

## 2018-01-01 RX ADMIN — INSULIN HUMAN 5 UNITS: 100 INJECTION, SOLUTION PARENTERAL at 05:43

## 2018-01-01 RX ADMIN — APIXABAN 5 MG: 5 TABLET, FILM COATED ORAL at 20:18

## 2018-01-01 RX ADMIN — METOPROLOL TARTRATE 50 MG: 50 TABLET ORAL at 21:05

## 2018-01-01 RX ADMIN — DILTIAZEM HYDROCHLORIDE 60 MG: 60 TABLET, FILM COATED ORAL at 05:46

## 2018-01-01 RX ADMIN — METOPROLOL TARTRATE 50 MG: 50 TABLET ORAL at 21:28

## 2018-01-01 RX ADMIN — APIXABAN 5 MG: 5 TABLET, FILM COATED ORAL at 10:16

## 2018-01-01 RX ADMIN — INSULIN LISPRO 2 UNITS: 100 INJECTION, SOLUTION INTRAVENOUS; SUBCUTANEOUS at 20:50

## 2018-01-01 RX ADMIN — DAPTOMYCIN 500 MG: 500 INJECTION, POWDER, LYOPHILIZED, FOR SOLUTION INTRAVENOUS at 12:08

## 2018-01-01 RX ADMIN — ASPIRIN 81 MG: 81 TABLET, COATED ORAL at 09:05

## 2018-01-01 RX ADMIN — SODIUM CHLORIDE 500 ML: 9 INJECTION, SOLUTION INTRAVENOUS at 17:59

## 2018-01-01 RX ADMIN — AMIODARONE HYDROCHLORIDE 200 MG: 200 TABLET ORAL at 09:19

## 2018-01-01 RX ADMIN — MIDAZOLAM HYDROCHLORIDE 2 MG: 1 INJECTION, SOLUTION INTRAMUSCULAR; INTRAVENOUS at 10:11

## 2018-01-01 RX ADMIN — APIXABAN 5 MG: 5 TABLET, FILM COATED ORAL at 21:09

## 2018-01-01 RX ADMIN — METOPROLOL TARTRATE 50 MG: 50 TABLET ORAL at 10:39

## 2018-01-01 RX ADMIN — DILTIAZEM HYDROCHLORIDE 60 MG: 60 TABLET, FILM COATED ORAL at 12:24

## 2018-01-01 RX ADMIN — HEPARIN SODIUM 5000 UNITS: 5000 INJECTION, SOLUTION INTRAVENOUS; SUBCUTANEOUS at 14:51

## 2018-01-01 RX ADMIN — DILTIAZEM HYDROCHLORIDE 60 MG: 60 TABLET, FILM COATED ORAL at 17:48

## 2018-01-01 RX ADMIN — FAMOTIDINE 20 MG: 20 TABLET ORAL at 08:18

## 2018-01-01 RX ADMIN — BARIUM SULFATE 100 ML: 0.81 POWDER, FOR SUSPENSION ORAL at 13:49

## 2018-01-01 RX ADMIN — DILTIAZEM HYDROCHLORIDE 60 MG: 60 TABLET, FILM COATED ORAL at 07:05

## 2018-01-01 RX ADMIN — APIXABAN 5 MG: 5 TABLET, FILM COATED ORAL at 09:22

## 2018-01-01 RX ADMIN — METOPROLOL TARTRATE 50 MG: 50 TABLET ORAL at 08:18

## 2018-01-01 RX ADMIN — DILTIAZEM HYDROCHLORIDE 60 MG: 60 TABLET, FILM COATED ORAL at 00:27

## 2018-01-01 RX ADMIN — MAGNESIUM SULFATE HEPTAHYDRATE 2 G: 40 INJECTION, SOLUTION INTRAVENOUS at 06:37

## 2018-01-01 RX ADMIN — DILTIAZEM HYDROCHLORIDE 60 MG: 60 TABLET, FILM COATED ORAL at 23:13

## 2018-01-01 RX ADMIN — DAPTOMYCIN 500 MG: 500 INJECTION, POWDER, LYOPHILIZED, FOR SOLUTION INTRAVENOUS at 09:57

## 2018-01-01 RX ADMIN — METOPROLOL TARTRATE 50 MG: 50 TABLET ORAL at 08:34

## 2018-01-01 RX ADMIN — HALOPERIDOL 1 MG: 1 TABLET ORAL at 21:30

## 2018-01-01 RX ADMIN — DILTIAZEM HYDROCHLORIDE 60 MG: 60 TABLET, FILM COATED ORAL at 14:20

## 2018-01-01 RX ADMIN — SODIUM CHLORIDE 75 ML/HR: 4.5 INJECTION, SOLUTION INTRAVENOUS at 09:50

## 2018-01-01 RX ADMIN — APIXABAN 2.5 MG: 2.5 TABLET, FILM COATED ORAL at 22:38

## 2018-01-01 RX ADMIN — SODIUM CHLORIDE, POTASSIUM CHLORIDE, SODIUM LACTATE AND CALCIUM CHLORIDE 100 ML/HR: 600; 310; 30; 20 INJECTION, SOLUTION INTRAVENOUS at 21:54

## 2018-01-01 RX ADMIN — INSULIN LISPRO 4 UNITS: 100 INJECTION, SOLUTION INTRAVENOUS; SUBCUTANEOUS at 21:06

## 2018-01-01 RX ADMIN — TAZOBACTAM SODIUM AND PIPERACILLIN SODIUM 3.38 G: 375; 3 INJECTION, SOLUTION INTRAVENOUS at 17:46

## 2018-01-01 RX ADMIN — INSULIN LISPRO 3 UNITS: 100 INJECTION, SOLUTION INTRAVENOUS; SUBCUTANEOUS at 09:20

## 2018-01-01 RX ADMIN — INSULIN LISPRO 2 UNITS: 100 INJECTION, SOLUTION INTRAVENOUS; SUBCUTANEOUS at 17:26

## 2018-01-01 RX ADMIN — SODIUM BICARBONATE 50 MEQ: 84 INJECTION INTRAVENOUS at 12:16

## 2018-01-01 RX ADMIN — CASTOR OIL AND BALSAM, PERU: 788; 87 OINTMENT TOPICAL at 08:22

## 2018-01-01 RX ADMIN — SODIUM CHLORIDE, PRESERVATIVE FREE 10 ML: 5 INJECTION INTRAVENOUS at 21:00

## 2018-01-01 RX ADMIN — METOPROLOL TARTRATE 50 MG: 50 TABLET ORAL at 08:28

## 2018-01-01 RX ADMIN — HEPARIN SODIUM 5000 UNITS: 5000 INJECTION, SOLUTION INTRAVENOUS; SUBCUTANEOUS at 21:03

## 2018-01-01 RX ADMIN — Medication 125 ML/HR: at 22:17

## 2018-01-01 RX ADMIN — DILTIAZEM HYDROCHLORIDE 60 MG: 60 TABLET, FILM COATED ORAL at 00:20

## 2018-01-01 RX ADMIN — TAZOBACTAM SODIUM AND PIPERACILLIN SODIUM 3.38 G: 375; 3 INJECTION, SOLUTION INTRAVENOUS at 17:38

## 2018-01-01 RX ADMIN — ASPIRIN 81 MG: 81 TABLET, COATED ORAL at 09:19

## 2018-01-01 RX ADMIN — POVIDONE-IODINE: 10 SOLUTION TOPICAL at 09:13

## 2018-01-01 RX ADMIN — DILTIAZEM HYDROCHLORIDE 60 MG: 60 TABLET, FILM COATED ORAL at 23:10

## 2018-01-01 RX ADMIN — IPRATROPIUM BROMIDE AND ALBUTEROL SULFATE 3 ML: .5; 3 SOLUTION RESPIRATORY (INHALATION) at 19:03

## 2018-01-01 RX ADMIN — HALOPERIDOL 1 MG: 1 TABLET ORAL at 22:06

## 2018-01-01 RX ADMIN — METOPROLOL TARTRATE 50 MG: 50 TABLET ORAL at 21:22

## 2018-01-01 RX ADMIN — INSULIN DETEMIR 15 UNITS: 100 INJECTION, SOLUTION SUBCUTANEOUS at 21:19

## 2018-01-01 RX ADMIN — APIXABAN 5 MG: 5 TABLET, FILM COATED ORAL at 08:35

## 2018-01-01 RX ADMIN — ACETAMINOPHEN 650 MG: 325 TABLET, FILM COATED ORAL at 23:49

## 2018-01-01 RX ADMIN — INSULIN DETEMIR 10 UNITS: 100 INJECTION, SOLUTION SUBCUTANEOUS at 21:21

## 2018-01-01 RX ADMIN — AMIODARONE HYDROCHLORIDE 0.5 MG/MIN: 1.8 INJECTION, SOLUTION INTRAVENOUS at 06:19

## 2018-01-01 RX ADMIN — DILTIAZEM HYDROCHLORIDE 60 MG: 60 TABLET, FILM COATED ORAL at 12:00

## 2018-01-01 RX ADMIN — MAGNESIUM SULFATE HEPTAHYDRATE 2 G: 40 INJECTION, SOLUTION INTRAVENOUS at 09:59

## 2018-01-01 RX ADMIN — SODIUM CHLORIDE, PRESERVATIVE FREE 10 ML: 5 INJECTION INTRAVENOUS at 08:35

## 2018-01-01 RX ADMIN — INSULIN HUMAN 10 UNITS: 100 INJECTION, SOLUTION PARENTERAL at 20:37

## 2018-01-01 RX ADMIN — FAMOTIDINE 20 MG: 20 TABLET, FILM COATED ORAL at 08:35

## 2018-01-01 RX ADMIN — TAZOBACTAM SODIUM AND PIPERACILLIN SODIUM 3.38 G: 375; 3 INJECTION, SOLUTION INTRAVENOUS at 09:48

## 2018-01-01 RX ADMIN — HEPARIN SODIUM 5000 UNITS: 5000 INJECTION, SOLUTION INTRAVENOUS; SUBCUTANEOUS at 22:43

## 2018-01-01 RX ADMIN — INSULIN DETEMIR 15 UNITS: 100 INJECTION, SOLUTION SUBCUTANEOUS at 10:45

## 2018-01-01 RX ADMIN — FAMOTIDINE 20 MG: 20 TABLET, FILM COATED ORAL at 21:22

## 2018-01-01 RX ADMIN — IPRATROPIUM BROMIDE AND ALBUTEROL SULFATE 3 ML: .5; 3 SOLUTION RESPIRATORY (INHALATION) at 07:46

## 2018-01-01 RX ADMIN — POTASSIUM CHLORIDE 40 MEQ: 1.5 POWDER, FOR SOLUTION ORAL at 17:14

## 2018-01-01 RX ADMIN — Medication 100 MG: at 10:39

## 2018-01-01 RX ADMIN — FAMOTIDINE 20 MG: 20 TABLET, FILM COATED ORAL at 08:16

## 2018-01-01 RX ADMIN — INSULIN DETEMIR 12 UNITS: 100 INJECTION, SOLUTION SUBCUTANEOUS at 21:16

## 2018-01-01 RX ADMIN — LOPERAMIDE HYDROCHLORIDE 2 MG: 2 CAPSULE ORAL at 21:25

## 2018-01-01 RX ADMIN — ATORVASTATIN CALCIUM 10 MG: 10 TABLET, FILM COATED ORAL at 21:05

## 2018-01-01 RX ADMIN — TAZOBACTAM SODIUM AND PIPERACILLIN SODIUM 3.38 G: 375; 3 INJECTION, SOLUTION INTRAVENOUS at 09:49

## 2018-01-01 RX ADMIN — AMLODIPINE BESYLATE 2.5 MG: 2.5 TABLET ORAL at 10:18

## 2018-01-01 RX ADMIN — DILTIAZEM HYDROCHLORIDE 60 MG: 60 TABLET, FILM COATED ORAL at 05:48

## 2018-01-01 RX ADMIN — HEPARIN SODIUM 5000 UNITS: 5000 INJECTION, SOLUTION INTRAVENOUS; SUBCUTANEOUS at 21:26

## 2018-01-01 RX ADMIN — DILTIAZEM HYDROCHLORIDE 60 MG: 60 TABLET, FILM COATED ORAL at 02:46

## 2018-01-01 RX ADMIN — TAZOBACTAM SODIUM AND PIPERACILLIN SODIUM 3.38 G: 375; 3 INJECTION, SOLUTION INTRAVENOUS at 04:17

## 2018-01-01 RX ADMIN — DILTIAZEM HYDROCHLORIDE 60 MG: 60 TABLET, FILM COATED ORAL at 12:18

## 2018-01-01 RX ADMIN — AMIODARONE HYDROCHLORIDE 200 MG: 200 TABLET ORAL at 10:46

## 2018-01-01 RX ADMIN — FAMOTIDINE 20 MG: 10 INJECTION, SOLUTION INTRAVENOUS at 09:19

## 2018-01-01 RX ADMIN — TAZOBACTAM SODIUM AND PIPERACILLIN SODIUM 3.38 G: 375; 3 INJECTION, SOLUTION INTRAVENOUS at 04:15

## 2018-01-01 RX ADMIN — SODIUM BICARBONATE 50 MEQ: 84 INJECTION INTRAVENOUS at 19:16

## 2018-01-01 RX ADMIN — INSULIN HUMAN 5 UNITS: 100 INJECTION, SOLUTION PARENTERAL at 18:06

## 2018-01-01 RX ADMIN — FAMOTIDINE 20 MG: 20 TABLET, FILM COATED ORAL at 08:17

## 2018-01-01 RX ADMIN — INSULIN LISPRO 3 UNITS: 100 INJECTION, SOLUTION INTRAVENOUS; SUBCUTANEOUS at 16:49

## 2018-01-01 RX ADMIN — INSULIN LISPRO 5 UNITS: 100 INJECTION, SOLUTION INTRAVENOUS; SUBCUTANEOUS at 12:33

## 2018-01-01 RX ADMIN — DILTIAZEM HYDROCHLORIDE 60 MG: 60 TABLET, FILM COATED ORAL at 23:57

## 2018-01-01 RX ADMIN — METOPROLOL TARTRATE 50 MG: 50 TABLET ORAL at 22:37

## 2018-01-01 RX ADMIN — DILTIAZEM HYDROCHLORIDE 60 MG: 60 TABLET, FILM COATED ORAL at 06:00

## 2018-01-01 RX ADMIN — AMLODIPINE BESYLATE 5 MG: 5 TABLET ORAL at 10:46

## 2018-01-01 RX ADMIN — SODIUM CHLORIDE 100 ML/HR: 9 INJECTION, SOLUTION INTRAVENOUS at 12:17

## 2018-01-01 RX ADMIN — DILTIAZEM HYDROCHLORIDE 60 MG: 60 TABLET, FILM COATED ORAL at 13:17

## 2018-01-01 RX ADMIN — CASTOR OIL AND BALSAM, PERU: 788; 87 OINTMENT TOPICAL at 20:38

## 2018-01-01 RX ADMIN — DEXTROSE AND SODIUM CHLORIDE 75 ML/HR: 5; 450 INJECTION, SOLUTION INTRAVENOUS at 06:56

## 2018-01-01 RX ADMIN — INSULIN DETEMIR 15 UNITS: 100 INJECTION, SOLUTION SUBCUTANEOUS at 10:15

## 2018-01-01 RX ADMIN — INSULIN HUMAN 14 UNITS: 100 INJECTION, SOLUTION PARENTERAL at 23:41

## 2018-01-01 RX ADMIN — APIXABAN 5 MG: 5 TABLET, FILM COATED ORAL at 08:28

## 2018-01-01 RX ADMIN — METOPROLOL TARTRATE 50 MG: 50 TABLET ORAL at 09:05

## 2018-01-01 RX ADMIN — Medication 125 ML/HR: at 06:20

## 2018-01-01 RX ADMIN — INSULIN LISPRO 5 UNITS: 100 INJECTION, SOLUTION INTRAVENOUS; SUBCUTANEOUS at 12:10

## 2018-01-01 RX ADMIN — INSULIN LISPRO 5 UNITS: 100 INJECTION, SOLUTION INTRAVENOUS; SUBCUTANEOUS at 16:54

## 2018-01-01 RX ADMIN — Medication 125 ML/HR: at 15:35

## 2018-01-01 RX ADMIN — VANCOMYCIN HYDROCHLORIDE 1000 MG: 1 INJECTION, SOLUTION INTRAVENOUS at 13:48

## 2018-01-01 RX ADMIN — VANCOMYCIN HYDROCHLORIDE 1000 MG: 1 INJECTION, SOLUTION INTRAVENOUS at 10:42

## 2018-01-01 RX ADMIN — Medication 100 MG: at 08:26

## 2018-01-01 RX ADMIN — IPRATROPIUM BROMIDE AND ALBUTEROL SULFATE 3 ML: .5; 3 SOLUTION RESPIRATORY (INHALATION) at 19:09

## 2018-01-01 RX ADMIN — TAZOBACTAM SODIUM AND PIPERACILLIN SODIUM 4.5 G: 500; 4 INJECTION, SOLUTION INTRAVENOUS at 20:11

## 2018-01-01 RX ADMIN — TAZOBACTAM SODIUM AND PIPERACILLIN SODIUM 3.38 G: 375; 3 INJECTION, SOLUTION INTRAVENOUS at 22:03

## 2018-01-01 RX ADMIN — INSULIN LISPRO 2 UNITS: 100 INJECTION, SOLUTION INTRAVENOUS; SUBCUTANEOUS at 21:29

## 2018-01-01 RX ADMIN — Medication 100 MG: at 08:34

## 2018-01-01 RX ADMIN — DILTIAZEM HYDROCHLORIDE 60 MG: 60 TABLET, FILM COATED ORAL at 23:49

## 2018-01-01 RX ADMIN — HEPARIN SODIUM 5000 UNITS: 5000 INJECTION, SOLUTION INTRAVENOUS; SUBCUTANEOUS at 05:33

## 2018-01-01 RX ADMIN — SODIUM CHLORIDE 100 ML/HR: 9 INJECTION, SOLUTION INTRAVENOUS at 18:48

## 2018-01-01 RX ADMIN — HALOPERIDOL 1 MG: 1 TABLET ORAL at 10:18

## 2018-01-01 RX ADMIN — DAPTOMYCIN 500 MG: 500 INJECTION, POWDER, LYOPHILIZED, FOR SOLUTION INTRAVENOUS at 12:56

## 2018-01-01 RX ADMIN — METOPROLOL TARTRATE 50 MG: 50 TABLET ORAL at 22:24

## 2018-01-01 RX ADMIN — DEXTROSE MONOHYDRATE 25 G: 25 INJECTION, SOLUTION INTRAVENOUS at 05:03

## 2018-01-01 RX ADMIN — AMIODARONE HYDROCHLORIDE 200 MG: 200 TABLET ORAL at 08:01

## 2018-01-01 RX ADMIN — METOPROLOL TARTRATE 50 MG: 50 TABLET ORAL at 20:51

## 2018-01-01 RX ADMIN — INSULIN DETEMIR 13 UNITS: 100 INJECTION, SOLUTION SUBCUTANEOUS at 09:52

## 2018-01-01 RX ADMIN — DILTIAZEM HYDROCHLORIDE 60 MG: 60 TABLET, FILM COATED ORAL at 17:22

## 2018-01-01 RX ADMIN — ASPIRIN 81 MG 324 MG: 81 TABLET ORAL at 08:59

## 2018-01-01 RX ADMIN — SODIUM CHLORIDE, PRESERVATIVE FREE 10 ML: 5 INJECTION INTRAVENOUS at 20:23

## 2018-01-01 RX ADMIN — DILTIAZEM HYDROCHLORIDE 60 MG: 60 TABLET, FILM COATED ORAL at 05:34

## 2018-01-01 RX ADMIN — TAZOBACTAM SODIUM AND PIPERACILLIN SODIUM 3.38 G: 375; 3 INJECTION, SOLUTION INTRAVENOUS at 20:18

## 2018-01-01 RX ADMIN — DILTIAZEM HYDROCHLORIDE 60 MG: 60 TABLET, FILM COATED ORAL at 17:38

## 2018-01-01 RX ADMIN — CASTOR OIL AND BALSAM, PERU: 788; 87 OINTMENT TOPICAL at 21:49

## 2018-01-01 RX ADMIN — HEPARIN SODIUM 5000 UNITS: 5000 INJECTION, SOLUTION INTRAVENOUS; SUBCUTANEOUS at 13:18

## 2018-01-01 RX ADMIN — APIXABAN 5 MG: 5 TABLET, FILM COATED ORAL at 20:53

## 2018-01-01 RX ADMIN — METOPROLOL TARTRATE 25 MG: 25 TABLET ORAL at 09:23

## 2018-01-01 RX ADMIN — MAGNESIUM SULFATE HEPTAHYDRATE 2 G: 40 INJECTION, SOLUTION INTRAVENOUS at 08:22

## 2018-01-01 RX ADMIN — FAMOTIDINE 20 MG: 10 INJECTION, SOLUTION INTRAVENOUS at 20:17

## 2018-01-01 RX ADMIN — DILTIAZEM HYDROCHLORIDE 60 MG: 60 TABLET, FILM COATED ORAL at 12:56

## 2018-01-01 RX ADMIN — ATORVASTATIN CALCIUM 10 MG: 10 TABLET, FILM COATED ORAL at 20:50

## 2018-01-01 RX ADMIN — CASTOR OIL AND BALSAM, PERU: 788; 87 OINTMENT TOPICAL at 10:14

## 2018-01-01 RX ADMIN — DILTIAZEM HYDROCHLORIDE 60 MG: 60 TABLET, FILM COATED ORAL at 22:46

## 2018-01-01 RX ADMIN — Medication 100 MG: at 10:17

## 2018-01-01 RX ADMIN — METOPROLOL TARTRATE 50 MG: 50 TABLET ORAL at 10:17

## 2018-01-01 RX ADMIN — APIXABAN 5 MG: 5 TABLET, FILM COATED ORAL at 10:04

## 2018-01-01 RX ADMIN — METOPROLOL TARTRATE 50 MG: 50 TABLET ORAL at 08:02

## 2018-01-01 RX ADMIN — AMLODIPINE BESYLATE 5 MG: 5 TABLET ORAL at 09:10

## 2018-01-01 RX ADMIN — SODIUM CHLORIDE 150 ML/HR: 9 INJECTION, SOLUTION INTRAVENOUS at 08:53

## 2018-01-01 RX ADMIN — FAMOTIDINE 20 MG: 20 TABLET ORAL at 09:22

## 2018-01-01 RX ADMIN — APIXABAN 5 MG: 5 TABLET, FILM COATED ORAL at 20:27

## 2018-01-01 RX ADMIN — METOPROLOL TARTRATE 12.5 MG: 25 TABLET, FILM COATED ORAL at 08:00

## 2018-01-01 RX ADMIN — INSULIN LISPRO 4 UNITS: 100 INJECTION, SOLUTION INTRAVENOUS; SUBCUTANEOUS at 13:30

## 2018-01-01 RX ADMIN — FLUCONAZOLE 200 MG: 200 TABLET ORAL at 09:17

## 2018-01-01 RX ADMIN — DILTIAZEM HYDROCHLORIDE 60 MG: 60 TABLET, FILM COATED ORAL at 00:45

## 2018-01-01 RX ADMIN — INSULIN HUMAN 10 UNITS: 100 INJECTION, SOLUTION PARENTERAL at 18:07

## 2018-01-01 RX ADMIN — CALCIUM GLUCONATE: 98 INJECTION, SOLUTION INTRAVENOUS at 20:06

## 2018-01-01 RX ADMIN — CASTOR OIL AND BALSAM, PERU: 788; 87 OINTMENT TOPICAL at 23:09

## 2018-01-01 RX ADMIN — AMIODARONE HYDROCHLORIDE 150 MG: 1.5 INJECTION, SOLUTION INTRAVENOUS at 20:19

## 2018-01-01 RX ADMIN — SODIUM CHLORIDE 250 ML: 9 INJECTION, SOLUTION INTRAVENOUS at 05:25

## 2018-01-01 RX ADMIN — ATORVASTATIN CALCIUM 10 MG: 10 TABLET, FILM COATED ORAL at 20:18

## 2018-01-01 RX ADMIN — METOPROLOL TARTRATE 50 MG: 50 TABLET ORAL at 10:22

## 2018-01-01 RX ADMIN — INSULIN HUMAN 5 UNITS: 100 INJECTION, SOLUTION PARENTERAL at 23:57

## 2018-01-01 RX ADMIN — TAZOBACTAM SODIUM AND PIPERACILLIN SODIUM 3.38 G: 375; 3 INJECTION, SOLUTION INTRAVENOUS at 05:19

## 2018-01-01 RX ADMIN — INSULIN LISPRO 2 UNITS: 100 INJECTION, SOLUTION INTRAVENOUS; SUBCUTANEOUS at 11:10

## 2018-01-01 RX ADMIN — SODIUM CHLORIDE, PRESERVATIVE FREE 3 ML: 5 INJECTION INTRAVENOUS at 21:28

## 2018-01-01 RX ADMIN — ASPIRIN 81 MG CHEWABLE TABLET 81 MG: 81 TABLET CHEWABLE at 11:14

## 2018-01-01 RX ADMIN — DILTIAZEM HYDROCHLORIDE 60 MG: 60 TABLET, FILM COATED ORAL at 22:37

## 2018-01-01 RX ADMIN — CASTOR OIL AND BALSAM, PERU: 788; 87 OINTMENT TOPICAL at 20:23

## 2018-01-01 RX ADMIN — DILTIAZEM HYDROCHLORIDE 60 MG: 60 TABLET, FILM COATED ORAL at 17:09

## 2018-01-01 RX ADMIN — INSULIN LISPRO 2 UNITS: 100 INJECTION, SOLUTION INTRAVENOUS; SUBCUTANEOUS at 08:50

## 2018-01-01 RX ADMIN — METOPROLOL TARTRATE 12.5 MG: 25 TABLET, FILM COATED ORAL at 20:26

## 2018-01-01 RX ADMIN — ASPIRIN 81 MG: 81 TABLET, COATED ORAL at 08:26

## 2018-01-01 RX ADMIN — FAMOTIDINE 20 MG: 20 TABLET ORAL at 10:17

## 2018-01-01 RX ADMIN — VANCOMYCIN HYDROCHLORIDE 1250 MG: 10 INJECTION, POWDER, LYOPHILIZED, FOR SOLUTION INTRAVENOUS at 10:07

## 2018-01-01 RX ADMIN — INSULIN HUMAN 10 UNITS: 100 INJECTION, SOLUTION PARENTERAL at 06:05

## 2018-01-01 RX ADMIN — CASTOR OIL AND BALSAM, PERU: 788; 87 OINTMENT TOPICAL at 10:28

## 2018-01-01 RX ADMIN — ATORVASTATIN CALCIUM 10 MG: 10 TABLET, FILM COATED ORAL at 20:39

## 2018-01-01 RX ADMIN — SODIUM CHLORIDE 1000 ML: 9 INJECTION, SOLUTION INTRAVENOUS at 12:11

## 2018-01-01 RX ADMIN — DILTIAZEM HYDROCHLORIDE 60 MG: 60 TABLET, FILM COATED ORAL at 13:56

## 2018-01-01 RX ADMIN — SODIUM CHLORIDE, PRESERVATIVE FREE 10 ML: 5 INJECTION INTRAVENOUS at 20:38

## 2018-01-01 RX ADMIN — INSULIN HUMAN 3 UNITS: 100 INJECTION, SOLUTION PARENTERAL at 12:20

## 2018-01-01 RX ADMIN — HEPARIN SODIUM 5000 UNITS: 5000 INJECTION, SOLUTION INTRAVENOUS; SUBCUTANEOUS at 21:28

## 2018-01-01 RX ADMIN — INSULIN LISPRO 3 UNITS: 100 INJECTION, SOLUTION INTRAVENOUS; SUBCUTANEOUS at 18:26

## 2018-01-01 RX ADMIN — DILTIAZEM HYDROCHLORIDE 60 MG: 60 TABLET, FILM COATED ORAL at 06:03

## 2018-01-01 RX ADMIN — DILTIAZEM HYDROCHLORIDE 60 MG: 60 TABLET, FILM COATED ORAL at 12:21

## 2018-01-01 RX ADMIN — DILTIAZEM HYDROCHLORIDE 60 MG: 60 TABLET, FILM COATED ORAL at 10:43

## 2018-01-01 RX ADMIN — APIXABAN 5 MG: 5 TABLET, FILM COATED ORAL at 22:13

## 2018-01-01 RX ADMIN — METOPROLOL TARTRATE 5 MG: 5 INJECTION, SOLUTION INTRAVENOUS at 00:20

## 2018-01-01 RX ADMIN — SODIUM CHLORIDE 1000 ML: 9 INJECTION, SOLUTION INTRAVENOUS at 17:46

## 2018-01-01 RX ADMIN — SODIUM CHLORIDE 1000 ML: 9 INJECTION, SOLUTION INTRAVENOUS at 18:50

## 2018-01-01 RX ADMIN — APIXABAN 5 MG: 5 TABLET, FILM COATED ORAL at 21:06

## 2018-01-01 RX ADMIN — DAPTOMYCIN 500 MG: 500 INJECTION, POWDER, LYOPHILIZED, FOR SOLUTION INTRAVENOUS at 13:18

## 2018-01-01 RX ADMIN — INSULIN HUMAN 10 UNITS: 100 INJECTION, SOLUTION PARENTERAL at 00:12

## 2018-01-01 RX ADMIN — SODIUM CHLORIDE, PRESERVATIVE FREE 3 ML: 5 INJECTION INTRAVENOUS at 20:11

## 2018-01-01 RX ADMIN — INSULIN DETEMIR 5 UNITS: 100 INJECTION, SOLUTION SUBCUTANEOUS at 21:26

## 2018-01-01 RX ADMIN — INSULIN LISPRO 6 UNITS: 100 INJECTION, SOLUTION INTRAVENOUS; SUBCUTANEOUS at 21:55

## 2018-01-01 RX ADMIN — DILTIAZEM HYDROCHLORIDE 60 MG: 60 TABLET, FILM COATED ORAL at 00:04

## 2018-01-01 RX ADMIN — HALOPERIDOL 1 MG: 1 TABLET ORAL at 08:16

## 2018-01-01 RX ADMIN — DILTIAZEM HYDROCHLORIDE 60 MG: 60 TABLET, FILM COATED ORAL at 00:58

## 2018-01-01 RX ADMIN — DILTIAZEM HYDROCHLORIDE 60 MG: 60 TABLET, FILM COATED ORAL at 13:57

## 2018-01-01 RX ADMIN — TAZOBACTAM SODIUM AND PIPERACILLIN SODIUM 3.38 G: 375; 3 INJECTION, SOLUTION INTRAVENOUS at 16:50

## 2018-01-01 RX ADMIN — IPRATROPIUM BROMIDE AND ALBUTEROL SULFATE 3 ML: .5; 3 SOLUTION RESPIRATORY (INHALATION) at 00:02

## 2018-01-01 RX ADMIN — SODIUM BICARBONATE 75 ML/HR: 84 INJECTION, SOLUTION INTRAVENOUS at 14:10

## 2018-01-01 RX ADMIN — Medication 100 MG: at 09:19

## 2018-01-01 RX ADMIN — INSULIN LISPRO 12 UNITS: 100 INJECTION, SOLUTION INTRAVENOUS; SUBCUTANEOUS at 20:40

## 2018-01-01 RX ADMIN — IPRATROPIUM BROMIDE AND ALBUTEROL SULFATE 3 ML: .5; 3 SOLUTION RESPIRATORY (INHALATION) at 06:55

## 2018-01-01 RX ADMIN — INSULIN HUMAN 8 UNITS: 100 INJECTION, SOLUTION PARENTERAL at 12:20

## 2018-01-01 RX ADMIN — CASTOR OIL AND BALSAM, PERU: 788; 87 OINTMENT TOPICAL at 08:40

## 2018-01-01 RX ADMIN — INSULIN DETEMIR 15 UNITS: 100 INJECTION, SOLUTION SUBCUTANEOUS at 08:41

## 2018-01-01 RX ADMIN — FAMOTIDINE 20 MG: 10 INJECTION, SOLUTION INTRAVENOUS at 08:30

## 2018-01-01 RX ADMIN — ATORVASTATIN CALCIUM 10 MG: 10 TABLET, FILM COATED ORAL at 21:09

## 2018-01-01 RX ADMIN — METOPROLOL TARTRATE 50 MG: 50 TABLET ORAL at 10:44

## 2018-01-01 RX ADMIN — CASTOR OIL AND BALSAM, PERU: 788; 87 OINTMENT TOPICAL at 10:24

## 2018-01-01 RX ADMIN — DAPTOMYCIN 500 MG: 500 INJECTION, POWDER, LYOPHILIZED, FOR SOLUTION INTRAVENOUS at 09:17

## 2018-01-01 RX ADMIN — SODIUM CHLORIDE 500 ML: 9 INJECTION, SOLUTION INTRAVENOUS at 21:09

## 2018-01-01 RX ADMIN — INSULIN LISPRO 4 UNITS: 100 INJECTION, SOLUTION INTRAVENOUS; SUBCUTANEOUS at 21:03

## 2018-01-01 RX ADMIN — INSULIN DETEMIR 15 UNITS: 100 INJECTION, SOLUTION SUBCUTANEOUS at 10:59

## 2018-01-01 RX ADMIN — DAPTOMYCIN 500 MG: 500 INJECTION, POWDER, LYOPHILIZED, FOR SOLUTION INTRAVENOUS at 08:35

## 2018-01-01 RX ADMIN — INSULIN HUMAN 10 UNITS: 100 INJECTION, SOLUTION PARENTERAL at 05:43

## 2018-01-01 RX ADMIN — SODIUM CHLORIDE 75 ML/HR: 4.5 INJECTION, SOLUTION INTRAVENOUS at 03:19

## 2018-01-01 RX ADMIN — INSULIN DETEMIR 15 UNITS: 100 INJECTION, SOLUTION SUBCUTANEOUS at 09:24

## 2018-01-01 RX ADMIN — CASTOR OIL AND BALSAM, PERU: 788; 87 OINTMENT TOPICAL at 09:05

## 2018-01-01 RX ADMIN — INSULIN DETEMIR 15 UNITS: 100 INJECTION, SOLUTION SUBCUTANEOUS at 08:37

## 2018-01-01 RX ADMIN — SODIUM CHLORIDE 500 ML: 9 INJECTION, SOLUTION INTRAVENOUS at 14:50

## 2018-01-01 RX ADMIN — ASPIRIN 81 MG 324 MG: 81 TABLET ORAL at 08:30

## 2018-01-01 RX ADMIN — INSULIN LISPRO 8 UNITS: 100 INJECTION, SOLUTION INTRAVENOUS; SUBCUTANEOUS at 08:27

## 2018-01-01 RX ADMIN — DILTIAZEM HYDROCHLORIDE 60 MG: 60 TABLET, FILM COATED ORAL at 11:08

## 2018-01-01 RX ADMIN — APIXABAN 5 MG: 5 TABLET, FILM COATED ORAL at 20:50

## 2018-01-01 RX ADMIN — SODIUM BICARBONATE 100 ML/HR: 84 INJECTION, SOLUTION INTRAVENOUS at 22:56

## 2018-01-01 RX ADMIN — ATORVASTATIN CALCIUM 10 MG: 10 TABLET, FILM COATED ORAL at 21:28

## 2018-01-01 RX ADMIN — AMIODARONE HYDROCHLORIDE 0.5 MG/MIN: 1.8 INJECTION, SOLUTION INTRAVENOUS at 07:31

## 2018-01-01 RX ADMIN — APIXABAN 5 MG: 5 TABLET, FILM COATED ORAL at 08:50

## 2018-01-01 RX ADMIN — DILTIAZEM HYDROCHLORIDE 60 MG: 60 TABLET, FILM COATED ORAL at 05:33

## 2018-01-01 RX ADMIN — SODIUM CHLORIDE 250 ML: 9 INJECTION, SOLUTION INTRAVENOUS at 03:01

## 2018-01-01 RX ADMIN — METOPROLOL TARTRATE 50 MG: 50 TABLET ORAL at 09:10

## 2018-01-01 RX ADMIN — HEPARIN SODIUM 5000 UNITS: 5000 INJECTION, SOLUTION INTRAVENOUS; SUBCUTANEOUS at 05:31

## 2018-01-01 RX ADMIN — INSULIN HUMAN 8 UNITS: 100 INJECTION, SOLUTION PARENTERAL at 00:09

## 2018-01-01 RX ADMIN — DILTIAZEM HYDROCHLORIDE 60 MG: 60 TABLET, FILM COATED ORAL at 18:25

## 2018-01-01 RX ADMIN — Medication 100 MG: at 10:43

## 2018-01-01 RX ADMIN — INSULIN LISPRO 8 UNITS: 100 INJECTION, SOLUTION INTRAVENOUS; SUBCUTANEOUS at 17:20

## 2018-01-01 RX ADMIN — ASPIRIN 81 MG 324 MG: 81 TABLET ORAL at 08:21

## 2018-01-01 RX ADMIN — ATORVASTATIN CALCIUM 10 MG: 10 TABLET, FILM COATED ORAL at 20:55

## 2018-01-01 RX ADMIN — METOPROLOL TARTRATE 50 MG: 50 TABLET ORAL at 22:46

## 2018-01-01 RX ADMIN — DILTIAZEM HYDROCHLORIDE 60 MG: 60 TABLET, FILM COATED ORAL at 18:36

## 2018-01-01 RX ADMIN — DILTIAZEM HYDROCHLORIDE 60 MG: 60 TABLET, FILM COATED ORAL at 05:43

## 2018-01-01 RX ADMIN — METOPROLOL TARTRATE 50 MG: 50 TABLET ORAL at 21:42

## 2018-01-01 RX ADMIN — METOPROLOL TARTRATE 50 MG: 50 TABLET ORAL at 20:39

## 2018-01-01 RX ADMIN — FAMOTIDINE 20 MG: 20 TABLET, FILM COATED ORAL at 22:07

## 2018-01-01 RX ADMIN — DILTIAZEM HYDROCHLORIDE 60 MG: 60 TABLET, FILM COATED ORAL at 18:50

## 2018-01-01 RX ADMIN — POVIDONE-IODINE 1 APPLICATION: 10 SOLUTION TOPICAL at 11:52

## 2018-01-01 RX ADMIN — INSULIN LISPRO 4 UNITS: 100 INJECTION, SOLUTION INTRAVENOUS; SUBCUTANEOUS at 11:47

## 2018-01-01 RX ADMIN — INSULIN HUMAN 8 UNITS: 100 INJECTION, SOLUTION PARENTERAL at 00:08

## 2018-01-01 RX ADMIN — Medication 100 MG: at 09:50

## 2018-01-01 RX ADMIN — TAZOBACTAM SODIUM AND PIPERACILLIN SODIUM 3.38 G: 375; 3 INJECTION, SOLUTION INTRAVENOUS at 10:02

## 2018-01-01 RX ADMIN — DILTIAZEM HYDROCHLORIDE 60 MG: 60 TABLET, FILM COATED ORAL at 06:14

## 2018-01-01 RX ADMIN — INSULIN LISPRO 2 UNITS: 100 INJECTION, SOLUTION INTRAVENOUS; SUBCUTANEOUS at 17:09

## 2018-01-01 RX ADMIN — INSULIN HUMAN 5 UNITS: 100 INJECTION, SOLUTION PARENTERAL at 00:04

## 2018-01-01 RX ADMIN — METOPROLOL TARTRATE 5 MG: 5 INJECTION, SOLUTION INTRAVENOUS at 11:46

## 2018-01-01 RX ADMIN — TAZOBACTAM SODIUM AND PIPERACILLIN SODIUM 3.38 G: 375; 3 INJECTION, SOLUTION INTRAVENOUS at 04:08

## 2018-01-01 RX ADMIN — CASTOR OIL AND BALSAM, PERU: 788; 87 OINTMENT TOPICAL at 09:11

## 2018-01-01 RX ADMIN — IPRATROPIUM BROMIDE AND ALBUTEROL SULFATE 3 ML: .5; 3 SOLUTION RESPIRATORY (INHALATION) at 13:20

## 2018-01-01 RX ADMIN — INSULIN DETEMIR 15 UNITS: 100 INJECTION, SOLUTION SUBCUTANEOUS at 10:04

## 2018-01-01 RX ADMIN — AMIODARONE HYDROCHLORIDE 150 MG: 50 INJECTION, SOLUTION INTRAVENOUS at 11:48

## 2018-01-01 RX ADMIN — APIXABAN 5 MG: 5 TABLET, FILM COATED ORAL at 08:16

## 2018-01-01 RX ADMIN — TAZOBACTAM SODIUM AND PIPERACILLIN SODIUM 3.38 G: 375; 3 INJECTION, SOLUTION INTRAVENOUS at 10:00

## 2018-01-01 RX ADMIN — SODIUM CHLORIDE, POTASSIUM CHLORIDE, SODIUM LACTATE AND CALCIUM CHLORIDE 100 ML/HR: 600; 310; 30; 20 INJECTION, SOLUTION INTRAVENOUS at 23:13

## 2018-01-01 RX ADMIN — DILTIAZEM HYDROCHLORIDE 60 MG: 60 TABLET, FILM COATED ORAL at 11:50

## 2018-01-01 RX ADMIN — DEXTROSE AND SODIUM CHLORIDE 75 ML/HR: 5; 450 INJECTION, SOLUTION INTRAVENOUS at 11:11

## 2018-01-01 RX ADMIN — INSULIN LISPRO 3 UNITS: 100 INJECTION, SOLUTION INTRAVENOUS; SUBCUTANEOUS at 12:03

## 2018-01-01 RX ADMIN — INSULIN HUMAN 8 UNITS: 100 INJECTION, SOLUTION PARENTERAL at 11:49

## 2018-01-01 RX ADMIN — FAMOTIDINE 20 MG: 20 TABLET ORAL at 08:28

## 2018-01-01 RX ADMIN — DILTIAZEM HYDROCHLORIDE 60 MG: 60 TABLET, FILM COATED ORAL at 17:31

## 2018-01-01 RX ADMIN — ALBUTEROL SULFATE 2.5 MG: 2.5 SOLUTION RESPIRATORY (INHALATION) at 17:39

## 2018-01-01 RX ADMIN — TAZOBACTAM SODIUM AND PIPERACILLIN SODIUM 3.38 G: 375; 3 INJECTION, SOLUTION INTRAVENOUS at 09:13

## 2018-01-01 RX ADMIN — DILTIAZEM HYDROCHLORIDE 60 MG: 60 TABLET, FILM COATED ORAL at 17:10

## 2018-01-01 RX ADMIN — INSULIN LISPRO 3 UNITS: 100 INJECTION, SOLUTION INTRAVENOUS; SUBCUTANEOUS at 17:51

## 2018-01-01 RX ADMIN — METOPROLOL TARTRATE 50 MG: 50 TABLET ORAL at 20:52

## 2018-01-01 RX ADMIN — POVIDONE-IODINE: 10 SOLUTION TOPICAL at 14:47

## 2018-01-01 RX ADMIN — POTASSIUM PHOSPHATE, MONOBASIC AND POTASSIUM PHOSPHATE, DIBASIC 45 MMOL: 224; 236 INJECTION, SOLUTION INTRAVENOUS at 17:13

## 2018-01-01 RX ADMIN — MAGNESIUM SULFATE IN WATER 4 G: 40 INJECTION, SOLUTION INTRAVENOUS at 12:18

## 2018-01-01 RX ADMIN — INSULIN LISPRO 10 UNITS: 100 INJECTION, SOLUTION INTRAVENOUS; SUBCUTANEOUS at 17:31

## 2018-01-01 RX ADMIN — LOPERAMIDE HYDROCHLORIDE 2 MG: 2 CAPSULE ORAL at 10:44

## 2018-01-01 RX ADMIN — DILTIAZEM HYDROCHLORIDE 60 MG: 60 TABLET, FILM COATED ORAL at 17:56

## 2018-01-01 RX ADMIN — SODIUM CHLORIDE 75 ML/HR: 9 INJECTION, SOLUTION INTRAVENOUS at 18:57

## 2018-01-01 RX ADMIN — DILTIAZEM HYDROCHLORIDE 240 MG: 240 CAPSULE, COATED, EXTENDED RELEASE ORAL at 12:10

## 2018-01-01 RX ADMIN — DILTIAZEM HYDROCHLORIDE 60 MG: 60 TABLET, FILM COATED ORAL at 18:58

## 2018-01-01 RX ADMIN — DILTIAZEM HYDROCHLORIDE 60 MG: 60 TABLET, FILM COATED ORAL at 23:58

## 2018-01-01 RX ADMIN — LOPERAMIDE HYDROCHLORIDE 2 MG: 2 CAPSULE ORAL at 22:37

## 2018-01-01 RX ADMIN — TAZOBACTAM SODIUM AND PIPERACILLIN SODIUM 3.38 G: 375; 3 INJECTION, SOLUTION INTRAVENOUS at 18:10

## 2018-01-01 RX ADMIN — APIXABAN 5 MG: 5 TABLET, FILM COATED ORAL at 09:18

## 2018-01-01 RX ADMIN — DAPTOMYCIN 500 MG: 500 INJECTION, POWDER, LYOPHILIZED, FOR SOLUTION INTRAVENOUS at 08:18

## 2018-01-01 RX ADMIN — CASTOR OIL AND BALSAM, PERU: 788; 87 OINTMENT TOPICAL at 09:24

## 2018-01-01 RX ADMIN — INSULIN DETEMIR 15 UNITS: 100 INJECTION, SOLUTION SUBCUTANEOUS at 22:17

## 2018-01-01 RX ADMIN — APIXABAN 2.5 MG: 2.5 TABLET, FILM COATED ORAL at 10:39

## 2018-01-01 RX ADMIN — METHOHEXITAL SODIUM 30 MG: 500 INJECTION, POWDER, LYOPHILIZED, FOR SOLUTION INTRAMUSCULAR; INTRAVENOUS; RECTAL at 10:01

## 2018-01-01 RX ADMIN — DIGOXIN 125 MCG: 0.25 INJECTION INTRAMUSCULAR; INTRAVENOUS at 03:55

## 2018-01-01 RX ADMIN — HALOPERIDOL 1 MG: 1 TABLET ORAL at 20:53

## 2018-01-01 RX ADMIN — ASPIRIN 81 MG CHEWABLE TABLET 81 MG: 81 TABLET CHEWABLE at 09:12

## 2018-01-01 RX ADMIN — IPRATROPIUM BROMIDE AND ALBUTEROL SULFATE 3 ML: .5; 3 SOLUTION RESPIRATORY (INHALATION) at 20:09

## 2018-01-01 RX ADMIN — INSULIN HUMAN 8 UNITS: 100 INJECTION, SOLUTION PARENTERAL at 18:43

## 2018-01-01 RX ADMIN — AMIODARONE HYDROCHLORIDE 200 MG: 200 TABLET ORAL at 08:26

## 2018-01-01 RX ADMIN — DILTIAZEM HYDROCHLORIDE 60 MG: 60 TABLET, FILM COATED ORAL at 05:03

## 2018-01-01 RX ADMIN — METOPROLOL TARTRATE 50 MG: 50 TABLET ORAL at 09:50

## 2018-01-01 RX ADMIN — DILTIAZEM HYDROCHLORIDE 60 MG: 60 TABLET, FILM COATED ORAL at 18:27

## 2018-01-01 RX ADMIN — HALOPERIDOL LACTATE 1 MG: 5 INJECTION, SOLUTION INTRAMUSCULAR at 20:17

## 2018-01-01 RX ADMIN — APIXABAN 5 MG: 5 TABLET, FILM COATED ORAL at 09:23

## 2018-01-01 RX ADMIN — METOPROLOL TARTRATE 50 MG: 50 TABLET ORAL at 21:51

## 2018-01-01 RX ADMIN — INSULIN LISPRO 4 UNITS: 100 INJECTION, SOLUTION INTRAVENOUS; SUBCUTANEOUS at 12:01

## 2018-01-01 RX ADMIN — APIXABAN 5 MG: 5 TABLET, FILM COATED ORAL at 12:43

## 2018-01-01 RX ADMIN — LOPERAMIDE HYDROCHLORIDE 2 MG: 2 CAPSULE ORAL at 22:24

## 2018-01-01 RX ADMIN — TAZOBACTAM SODIUM AND PIPERACILLIN SODIUM 4.5 G: 500; 4 INJECTION, SOLUTION INTRAVENOUS at 15:33

## 2018-01-01 RX ADMIN — HEPARIN SODIUM 5000 UNITS: 5000 INJECTION, SOLUTION INTRAVENOUS; SUBCUTANEOUS at 14:09

## 2018-01-01 RX ADMIN — TAZOBACTAM SODIUM AND PIPERACILLIN SODIUM 3.38 G: 375; 3 INJECTION, SOLUTION INTRAVENOUS at 09:19

## 2018-01-01 RX ADMIN — DILTIAZEM HYDROCHLORIDE 60 MG: 60 TABLET, FILM COATED ORAL at 17:16

## 2018-01-01 RX ADMIN — Medication 100 MG: at 10:12

## 2018-01-01 RX ADMIN — AMLODIPINE BESYLATE 5 MG: 5 TABLET ORAL at 09:05

## 2018-01-01 RX ADMIN — INSULIN LISPRO 3 UNITS: 100 INJECTION, SOLUTION INTRAVENOUS; SUBCUTANEOUS at 12:01

## 2018-01-01 RX ADMIN — INSULIN HUMAN 5 UNITS: 100 INJECTION, SOLUTION PARENTERAL at 17:45

## 2018-01-01 RX ADMIN — HALOPERIDOL 1 MG: 1 TABLET ORAL at 21:37

## 2018-01-01 RX ADMIN — METHOHEXITAL SODIUM 30 MG: 500 INJECTION, POWDER, LYOPHILIZED, FOR SOLUTION INTRAMUSCULAR; INTRAVENOUS; RECTAL at 10:11

## 2018-01-01 RX ADMIN — DILTIAZEM HYDROCHLORIDE 60 MG: 60 TABLET, FILM COATED ORAL at 00:15

## 2018-01-01 RX ADMIN — DILTIAZEM HYDROCHLORIDE 60 MG: 60 TABLET, FILM COATED ORAL at 11:37

## 2018-01-01 RX ADMIN — ATORVASTATIN CALCIUM 10 MG: 10 TABLET, FILM COATED ORAL at 21:37

## 2018-01-01 RX ADMIN — HEPARIN SODIUM 5000 UNITS: 5000 INJECTION, SOLUTION INTRAVENOUS; SUBCUTANEOUS at 15:47

## 2018-01-01 RX ADMIN — INSULIN LISPRO 7 UNITS: 100 INJECTION, SOLUTION INTRAVENOUS; SUBCUTANEOUS at 07:57

## 2018-01-01 RX ADMIN — ASPIRIN 81 MG: 81 TABLET, COATED ORAL at 08:00

## 2018-01-01 RX ADMIN — METOPROLOL TARTRATE 50 MG: 50 TABLET ORAL at 09:00

## 2018-01-01 RX ADMIN — DILTIAZEM HYDROCHLORIDE 60 MG: 60 TABLET, FILM COATED ORAL at 06:05

## 2018-01-01 RX ADMIN — AMIODARONE HYDROCHLORIDE 200 MG: 200 TABLET ORAL at 11:37

## 2018-01-01 RX ADMIN — FAMOTIDINE 20 MG: 10 INJECTION, SOLUTION INTRAVENOUS at 22:16

## 2018-01-01 RX ADMIN — DILTIAZEM HYDROCHLORIDE 5 MG/HR: 5 INJECTION INTRAVENOUS at 11:32

## 2018-01-01 RX ADMIN — DEXTROSE MONOHYDRATE 75 ML/HR: 50 INJECTION, SOLUTION INTRAVENOUS at 10:06

## 2018-01-01 RX ADMIN — HEPARIN SODIUM 5000 UNITS: 5000 INJECTION, SOLUTION INTRAVENOUS; SUBCUTANEOUS at 13:02

## 2018-01-01 RX ADMIN — INSULIN DETEMIR 15 UNITS: 100 INJECTION, SOLUTION SUBCUTANEOUS at 20:23

## 2018-01-01 RX ADMIN — FAMOTIDINE 20 MG: 10 INJECTION, SOLUTION INTRAVENOUS at 09:01

## 2018-01-01 RX ADMIN — DAPTOMYCIN 500 MG: 500 INJECTION, POWDER, LYOPHILIZED, FOR SOLUTION INTRAVENOUS at 12:24

## 2018-01-01 RX ADMIN — POTASSIUM CHLORIDE 40 MEQ: 1.5 POWDER, FOR SOLUTION ORAL at 09:51

## 2018-01-01 RX ADMIN — METOPROLOL TARTRATE 5 MG: 5 INJECTION, SOLUTION INTRAVENOUS at 05:03

## 2018-01-01 RX ADMIN — FAMOTIDINE 20 MG: 20 TABLET, FILM COATED ORAL at 20:42

## 2018-01-01 RX ADMIN — ATORVASTATIN CALCIUM 10 MG: 10 TABLET, FILM COATED ORAL at 20:42

## 2018-01-01 RX ADMIN — INSULIN LISPRO 4 UNITS: 100 INJECTION, SOLUTION INTRAVENOUS; SUBCUTANEOUS at 21:27

## 2018-01-01 RX ADMIN — CASTOR OIL AND BALSAM, PERU: 788; 87 OINTMENT TOPICAL at 21:28

## 2018-01-01 RX ADMIN — HALOPERIDOL 1 MG: 1 TABLET ORAL at 21:16

## 2018-01-01 RX ADMIN — ATORVASTATIN CALCIUM 10 MG: 10 TABLET, FILM COATED ORAL at 20:29

## 2018-01-01 RX ADMIN — DAPTOMYCIN 500 MG: 500 INJECTION, POWDER, LYOPHILIZED, FOR SOLUTION INTRAVENOUS at 11:59

## 2018-01-01 RX ADMIN — IPRATROPIUM BROMIDE AND ALBUTEROL SULFATE 3 ML: .5; 3 SOLUTION RESPIRATORY (INHALATION) at 00:40

## 2018-01-01 RX ADMIN — APIXABAN 2.5 MG: 2.5 TABLET, FILM COATED ORAL at 10:43

## 2018-01-01 RX ADMIN — SODIUM CHLORIDE, PRESERVATIVE FREE 10 ML: 5 INJECTION INTRAVENOUS at 22:33

## 2018-01-01 RX ADMIN — ASPIRIN 81 MG: 81 TABLET, COATED ORAL at 09:11

## 2018-01-01 RX ADMIN — FAMOTIDINE 20 MG: 10 INJECTION, SOLUTION INTRAVENOUS at 09:12

## 2018-01-01 RX ADMIN — ASPIRIN 81 MG: 81 TABLET, COATED ORAL at 10:46

## 2018-01-01 RX ADMIN — METOPROLOL TARTRATE 50 MG: 50 TABLET ORAL at 10:03

## 2018-01-01 RX ADMIN — INSULIN DETEMIR 12 UNITS: 100 INJECTION, SOLUTION SUBCUTANEOUS at 22:45

## 2018-01-01 RX ADMIN — FAMOTIDINE 20 MG: 20 TABLET, FILM COATED ORAL at 10:03

## 2018-01-01 RX ADMIN — CASTOR OIL AND BALSAM, PERU: 788; 87 OINTMENT TOPICAL at 09:19

## 2018-01-01 RX ADMIN — SODIUM CHLORIDE 1000 ML: 9 INJECTION, SOLUTION INTRAVENOUS at 12:19

## 2018-01-01 RX ADMIN — AMIODARONE HYDROCHLORIDE 1 MG/MIN: 1.8 INJECTION, SOLUTION INTRAVENOUS at 17:20

## 2018-01-01 RX ADMIN — DAPTOMYCIN 500 MG: 500 INJECTION, POWDER, LYOPHILIZED, FOR SOLUTION INTRAVENOUS at 12:18

## 2018-01-01 RX ADMIN — HALOPERIDOL 1 MG: 1 TABLET ORAL at 08:49

## 2018-01-01 RX ADMIN — TAZOBACTAM SODIUM AND PIPERACILLIN SODIUM 3.38 G: 375; 3 INJECTION, SOLUTION INTRAVENOUS at 15:11

## 2018-01-01 RX ADMIN — APIXABAN 5 MG: 5 TABLET, FILM COATED ORAL at 08:18

## 2018-01-01 RX ADMIN — DILTIAZEM HYDROCHLORIDE 60 MG: 60 TABLET, FILM COATED ORAL at 00:09

## 2018-01-01 RX ADMIN — INSULIN DETEMIR 10 UNITS: 100 INJECTION, SOLUTION SUBCUTANEOUS at 11:50

## 2018-01-01 RX ADMIN — ASPIRIN 81 MG: 81 TABLET, COATED ORAL at 10:18

## 2018-01-01 RX ADMIN — METOPROLOL TARTRATE 25 MG: 25 TABLET ORAL at 21:04

## 2018-01-01 RX ADMIN — CASTOR OIL AND BALSAM, PERU: 788; 87 OINTMENT TOPICAL at 08:00

## 2018-01-01 RX ADMIN — INSULIN LISPRO 3 UNITS: 100 INJECTION, SOLUTION INTRAVENOUS; SUBCUTANEOUS at 08:20

## 2018-01-01 RX ADMIN — AMIODARONE HYDROCHLORIDE 0.5 MG/MIN: 1.8 INJECTION, SOLUTION INTRAVENOUS at 20:27

## 2018-01-01 RX ADMIN — FAMOTIDINE 20 MG: 20 TABLET, FILM COATED ORAL at 08:02

## 2018-01-01 RX ADMIN — HALOPERIDOL 1 MG: 1 TABLET ORAL at 09:23

## 2018-01-01 RX ADMIN — LOPERAMIDE HYDROCHLORIDE 2 MG: 2 CAPSULE ORAL at 10:39

## 2018-01-01 RX ADMIN — INSULIN LISPRO 3 UNITS: 100 INJECTION, SOLUTION INTRAVENOUS; SUBCUTANEOUS at 11:10

## 2018-01-01 RX ADMIN — INSULIN HUMAN 5 UNITS: 100 INJECTION, SOLUTION PARENTERAL at 11:42

## 2018-01-01 RX ADMIN — DILTIAZEM HYDROCHLORIDE 60 MG: 60 TABLET, FILM COATED ORAL at 17:42

## 2018-01-01 RX ADMIN — INSULIN LISPRO 2 UNITS: 100 INJECTION, SOLUTION INTRAVENOUS; SUBCUTANEOUS at 17:39

## 2018-01-01 RX ADMIN — FAMOTIDINE 20 MG: 10 INJECTION, SOLUTION INTRAVENOUS at 20:36

## 2018-01-01 RX ADMIN — APIXABAN 5 MG: 5 TABLET, FILM COATED ORAL at 22:46

## 2018-01-01 RX ADMIN — CASTOR OIL AND BALSAM, PERU: 788; 87 OINTMENT TOPICAL at 20:11

## 2018-01-01 RX ADMIN — ATORVASTATIN CALCIUM 10 MG: 10 TABLET, FILM COATED ORAL at 20:27

## 2018-01-01 RX ADMIN — HALOPERIDOL LACTATE 1 MG: 5 INJECTION, SOLUTION INTRAMUSCULAR at 08:50

## 2018-01-01 RX ADMIN — CASTOR OIL AND BALSAM, PERU: 788; 87 OINTMENT TOPICAL at 08:26

## 2018-01-01 RX ADMIN — DILTIAZEM HYDROCHLORIDE 60 MG: 60 TABLET, FILM COATED ORAL at 13:29

## 2018-01-01 RX ADMIN — TAZOBACTAM SODIUM AND PIPERACILLIN SODIUM 3.38 G: 375; 3 INJECTION, SOLUTION INTRAVENOUS at 09:50

## 2018-01-01 RX ADMIN — DILTIAZEM HYDROCHLORIDE 60 MG: 60 TABLET, FILM COATED ORAL at 05:45

## 2018-01-01 RX ADMIN — CEFTRIAXONE SODIUM 1 G: 1 INJECTION, SOLUTION INTRAVENOUS at 22:39

## 2018-01-01 RX ADMIN — CEFTRIAXONE SODIUM 1 G: 1 INJECTION, SOLUTION INTRAVENOUS at 14:51

## 2018-01-01 RX ADMIN — APIXABAN 5 MG: 5 TABLET, FILM COATED ORAL at 20:28

## 2018-01-01 RX ADMIN — INSULIN DETEMIR 15 UNITS: 100 INJECTION, SOLUTION SUBCUTANEOUS at 09:26

## 2018-01-01 RX ADMIN — CASTOR OIL AND BALSAM, PERU: 788; 87 OINTMENT TOPICAL at 23:10

## 2018-01-01 RX ADMIN — INSULIN LISPRO 5 UNITS: 100 INJECTION, SOLUTION INTRAVENOUS; SUBCUTANEOUS at 18:58

## 2018-01-01 RX ADMIN — INSULIN HUMAN 5 UNITS: 100 INJECTION, SOLUTION PARENTERAL at 12:03

## 2018-01-01 RX ADMIN — AMIODARONE HYDROCHLORIDE 0.5 MG/MIN: 1.8 INJECTION, SOLUTION INTRAVENOUS at 05:03

## 2018-01-01 RX ADMIN — SODIUM CHLORIDE, PRESERVATIVE FREE 3 ML: 5 INJECTION INTRAVENOUS at 20:51

## 2018-01-01 RX ADMIN — ASPIRIN 81 MG CHEWABLE TABLET 81 MG: 81 TABLET CHEWABLE at 08:49

## 2018-01-01 RX ADMIN — SODIUM CHLORIDE, PRESERVATIVE FREE 3 ML: 5 INJECTION INTRAVENOUS at 21:50

## 2018-01-01 RX ADMIN — INSULIN DETEMIR 5 UNITS: 100 INJECTION, SOLUTION SUBCUTANEOUS at 17:38

## 2018-01-01 RX ADMIN — INSULIN DETEMIR 5 UNITS: 100 INJECTION, SOLUTION SUBCUTANEOUS at 22:28

## 2018-01-01 RX ADMIN — APIXABAN 5 MG: 5 TABLET, FILM COATED ORAL at 09:50

## 2018-01-01 RX ADMIN — FAMOTIDINE 20 MG: 10 INJECTION, SOLUTION INTRAVENOUS at 11:14

## 2018-01-01 RX ADMIN — Medication 100 ML/HR: at 15:08

## 2018-01-01 RX ADMIN — INSULIN LISPRO 8 UNITS: 100 INJECTION, SOLUTION INTRAVENOUS; SUBCUTANEOUS at 21:25

## 2018-01-01 RX ADMIN — TAZOBACTAM SODIUM AND PIPERACILLIN SODIUM 3.38 G: 375; 3 INJECTION, SOLUTION INTRAVENOUS at 11:25

## 2018-01-01 RX ADMIN — INSULIN HUMAN 8 UNITS: 100 INJECTION, SOLUTION PARENTERAL at 00:15

## 2018-01-01 RX ADMIN — APIXABAN 5 MG: 5 TABLET, FILM COATED ORAL at 21:28

## 2018-01-01 RX ADMIN — METOPROLOL TARTRATE 50 MG: 50 TABLET ORAL at 08:26

## 2018-01-01 RX ADMIN — INSULIN HUMAN 3 UNITS: 100 INJECTION, SOLUTION PARENTERAL at 18:16

## 2018-01-01 RX ADMIN — ALBUTEROL SULFATE 2.5 MG: 2.5 SOLUTION RESPIRATORY (INHALATION) at 12:31

## 2018-01-01 RX ADMIN — METOPROLOL TARTRATE 5 MG: 5 INJECTION, SOLUTION INTRAVENOUS at 12:06

## 2018-01-01 RX ADMIN — DEXTROSE MONOHYDRATE 25 G: 25 INJECTION, SOLUTION INTRAVENOUS at 12:15

## 2018-01-01 RX ADMIN — METOPROLOL TARTRATE 50 MG: 50 TABLET ORAL at 09:17

## 2018-01-01 RX ADMIN — INSULIN HUMAN 8 UNITS: 100 INJECTION, SOLUTION PARENTERAL at 17:43

## 2018-01-01 RX ADMIN — HEPARIN SODIUM 5000 UNITS: 5000 INJECTION, SOLUTION INTRAVENOUS; SUBCUTANEOUS at 21:43

## 2018-01-01 RX ADMIN — APIXABAN 5 MG: 5 TABLET, FILM COATED ORAL at 21:22

## 2018-01-01 RX ADMIN — FAMOTIDINE 20 MG: 20 TABLET, FILM COATED ORAL at 09:51

## 2018-01-01 RX ADMIN — INSULIN HUMAN 10 UNITS: 100 INJECTION, SOLUTION PARENTERAL at 15:38

## 2018-01-01 RX ADMIN — SODIUM CHLORIDE 6.3 UNITS/HR: 9 INJECTION, SOLUTION INTRAVENOUS at 20:55

## 2018-01-01 RX ADMIN — DILTIAZEM HYDROCHLORIDE 60 MG: 60 TABLET, FILM COATED ORAL at 23:37

## 2018-01-01 RX ADMIN — DILTIAZEM HYDROCHLORIDE 60 MG: 60 TABLET, FILM COATED ORAL at 11:16

## 2018-01-01 RX ADMIN — VANCOMYCIN HYDROCHLORIDE 1000 MG: 1 INJECTION, SOLUTION INTRAVENOUS at 14:02

## 2018-01-01 RX ADMIN — DILTIAZEM HYDROCHLORIDE 60 MG: 60 TABLET, FILM COATED ORAL at 05:36

## 2018-01-01 RX ADMIN — INSULIN HUMAN 5 UNITS: 100 INJECTION, SOLUTION PARENTERAL at 23:43

## 2018-01-01 RX ADMIN — INSULIN HUMAN 8 UNITS: 100 INJECTION, SOLUTION PARENTERAL at 00:37

## 2018-01-01 RX ADMIN — FAMOTIDINE 20 MG: 20 TABLET, FILM COATED ORAL at 08:39

## 2018-01-01 RX ADMIN — METOPROLOL TARTRATE 50 MG: 50 TABLET ORAL at 10:13

## 2018-01-01 RX ADMIN — APIXABAN 5 MG: 5 TABLET, FILM COATED ORAL at 20:39

## 2018-01-01 RX ADMIN — PATIROMER 2 PACKET: 8.4 POWDER, FOR SUSPENSION ORAL at 15:06

## 2018-01-01 RX ADMIN — AMIODARONE HYDROCHLORIDE 1 MG/MIN: 1.8 INJECTION, SOLUTION INTRAVENOUS at 22:57

## 2018-01-01 RX ADMIN — ASPIRIN 81 MG CHEWABLE TABLET 81 MG: 81 TABLET CHEWABLE at 08:18

## 2018-01-01 RX ADMIN — ASPIRIN 81 MG: 81 TABLET, COATED ORAL at 10:39

## 2018-01-01 RX ADMIN — INSULIN LISPRO 8 UNITS: 100 INJECTION, SOLUTION INTRAVENOUS; SUBCUTANEOUS at 18:48

## 2018-01-01 RX ADMIN — CASTOR OIL AND BALSAM, PERU: 788; 87 OINTMENT TOPICAL at 22:30

## 2018-01-01 RX ADMIN — METOPROLOL TARTRATE 50 MG: 50 TABLET ORAL at 22:13

## 2018-01-01 RX ADMIN — SODIUM CHLORIDE, PRESERVATIVE FREE 10 ML: 5 INJECTION INTRAVENOUS at 22:38

## 2018-01-01 RX ADMIN — Medication 100 MG: at 10:44

## 2018-01-01 RX ADMIN — SODIUM CHLORIDE 75 ML/HR: 9 INJECTION, SOLUTION INTRAVENOUS at 22:23

## 2018-01-01 RX ADMIN — INSULIN LISPRO 3 UNITS: 100 INJECTION, SOLUTION INTRAVENOUS; SUBCUTANEOUS at 11:46

## 2018-01-01 RX ADMIN — HALOPERIDOL 1 MG: 1 TABLET ORAL at 08:18

## 2018-01-01 RX ADMIN — INSULIN LISPRO 4 UNITS: 100 INJECTION, SOLUTION INTRAVENOUS; SUBCUTANEOUS at 17:46

## 2018-01-01 RX ADMIN — AMIODARONE HYDROCHLORIDE 1 MG/MIN: 1.8 INJECTION, SOLUTION INTRAVENOUS at 22:58

## 2018-01-01 RX ADMIN — DILTIAZEM HYDROCHLORIDE 60 MG: 60 TABLET, FILM COATED ORAL at 05:37

## 2018-01-01 RX ADMIN — FAMOTIDINE 20 MG: 20 TABLET, FILM COATED ORAL at 08:49

## 2018-01-01 RX ADMIN — SODIUM CHLORIDE 75 ML/HR: 4.5 INJECTION, SOLUTION INTRAVENOUS at 14:21

## 2018-01-01 RX ADMIN — IPRATROPIUM BROMIDE AND ALBUTEROL SULFATE 3 ML: .5; 3 SOLUTION RESPIRATORY (INHALATION) at 07:28

## 2018-01-01 RX ADMIN — HALOPERIDOL 1 MG: 1 TABLET ORAL at 22:46

## 2018-01-01 RX ADMIN — DILTIAZEM HYDROCHLORIDE 240 MG: 240 CAPSULE, COATED, EXTENDED RELEASE ORAL at 09:51

## 2018-01-01 RX ADMIN — SODIUM CHLORIDE 1000 ML: 9 INJECTION, SOLUTION INTRAVENOUS at 16:52

## 2018-01-01 RX ADMIN — INSULIN HUMAN 8 UNITS: 100 INJECTION, SOLUTION PARENTERAL at 05:47

## 2018-01-01 RX ADMIN — BARIUM SULFATE 20 ML: 400 PASTE ORAL at 13:49

## 2018-01-01 RX ADMIN — DILTIAZEM HYDROCHLORIDE 60 MG: 60 TABLET, FILM COATED ORAL at 22:24

## 2018-01-01 RX ADMIN — INSULIN LISPRO 4 UNITS: 100 INJECTION, SOLUTION INTRAVENOUS; SUBCUTANEOUS at 20:43

## 2018-01-01 RX ADMIN — INSULIN HUMAN 5 UNITS: 100 INJECTION, SOLUTION PARENTERAL at 11:40

## 2018-01-01 RX ADMIN — SODIUM CHLORIDE 25 ML/HR: 9 INJECTION, SOLUTION INTRAVENOUS at 09:00

## 2018-01-01 RX ADMIN — SODIUM CHLORIDE, POTASSIUM CHLORIDE, SODIUM LACTATE AND CALCIUM CHLORIDE 1000 ML: 600; 310; 30; 20 INJECTION, SOLUTION INTRAVENOUS at 10:56

## 2018-01-01 RX ADMIN — SODIUM CHLORIDE 4.2 UNITS/HR: 9 INJECTION, SOLUTION INTRAVENOUS at 00:13

## 2018-01-01 RX ADMIN — AMIODARONE HYDROCHLORIDE 0.5 MG/MIN: 1.8 INJECTION, SOLUTION INTRAVENOUS at 20:32

## 2018-01-01 RX ADMIN — HEPARIN SODIUM 5000 UNITS: 5000 INJECTION, SOLUTION INTRAVENOUS; SUBCUTANEOUS at 05:56

## 2018-01-01 RX ADMIN — INSULIN LISPRO 8 UNITS: 100 INJECTION, SOLUTION INTRAVENOUS; SUBCUTANEOUS at 17:30

## 2018-01-01 RX ADMIN — ASPIRIN 81 MG CHEWABLE TABLET 81 MG: 81 TABLET CHEWABLE at 10:17

## 2018-01-01 RX ADMIN — HEPARIN SODIUM 5000 UNITS: 5000 INJECTION, SOLUTION INTRAVENOUS; SUBCUTANEOUS at 06:03

## 2018-01-01 RX ADMIN — INSULIN LISPRO 8 UNITS: 100 INJECTION, SOLUTION INTRAVENOUS; SUBCUTANEOUS at 12:26

## 2018-01-01 RX ADMIN — INSULIN DETEMIR 10 UNITS: 100 INJECTION, SOLUTION SUBCUTANEOUS at 09:16

## 2018-01-01 RX ADMIN — SODIUM CHLORIDE, POTASSIUM CHLORIDE, SODIUM LACTATE AND CALCIUM CHLORIDE 100 ML/HR: 600; 310; 30; 20 INJECTION, SOLUTION INTRAVENOUS at 11:59

## 2018-01-01 RX ADMIN — INSULIN DETEMIR 13 UNITS: 100 INJECTION, SOLUTION SUBCUTANEOUS at 08:18

## 2018-01-01 RX ADMIN — INSULIN LISPRO 3 UNITS: 100 INJECTION, SOLUTION INTRAVENOUS; SUBCUTANEOUS at 17:39

## 2018-01-01 RX ADMIN — INSULIN HUMAN 10 UNITS: 100 INJECTION, SOLUTION PARENTERAL at 12:15

## 2018-01-01 RX ADMIN — ASPIRIN 81 MG CHEWABLE TABLET 81 MG: 81 TABLET CHEWABLE at 08:50

## 2018-01-01 RX ADMIN — INSULIN LISPRO 3 UNITS: 100 INJECTION, SOLUTION INTRAVENOUS; SUBCUTANEOUS at 09:29

## 2018-01-01 RX ADMIN — DIGOXIN 125 MCG: 0.25 INJECTION INTRAMUSCULAR; INTRAVENOUS at 00:20

## 2018-01-01 RX ADMIN — DILTIAZEM HYDROCHLORIDE 60 MG: 60 TABLET, FILM COATED ORAL at 00:25

## 2018-01-01 RX ADMIN — INSULIN LISPRO 2 UNITS: 100 INJECTION, SOLUTION INTRAVENOUS; SUBCUTANEOUS at 20:28

## 2018-01-01 RX ADMIN — AMLODIPINE BESYLATE 2.5 MG: 2.5 TABLET ORAL at 15:11

## 2018-01-01 RX ADMIN — INSULIN HUMAN 5 UNITS: 100 INJECTION, SOLUTION PARENTERAL at 18:39

## 2018-01-01 RX ADMIN — FAMOTIDINE 20 MG: 20 TABLET, FILM COATED ORAL at 09:23

## 2018-01-01 RX ADMIN — ASPIRIN 81 MG: 81 TABLET, COATED ORAL at 10:57

## 2018-01-01 RX ADMIN — METOPROLOL TARTRATE 5 MG: 5 INJECTION, SOLUTION INTRAVENOUS at 17:48

## 2018-01-01 RX ADMIN — SODIUM CHLORIDE, PRESERVATIVE FREE 10 ML: 5 INJECTION INTRAVENOUS at 10:19

## 2018-01-01 RX ADMIN — HALOPERIDOL 1 MG: 1 TABLET ORAL at 11:14

## 2018-01-01 RX ADMIN — DILTIAZEM HYDROCHLORIDE 5 MG/HR: 5 INJECTION INTRAVENOUS at 05:49

## 2018-01-01 RX ADMIN — HALOPERIDOL LACTATE 1 MG: 5 INJECTION, SOLUTION INTRAMUSCULAR at 09:12

## 2018-01-01 RX ADMIN — ALBUTEROL SULFATE 10 MG: 2.5 SOLUTION RESPIRATORY (INHALATION) at 15:15

## 2018-01-01 RX ADMIN — SODIUM CHLORIDE 1000 ML: 9 INJECTION, SOLUTION INTRAVENOUS at 17:06

## 2018-01-01 RX ADMIN — SODIUM CHLORIDE, PRESERVATIVE FREE 10 ML: 5 INJECTION INTRAVENOUS at 10:28

## 2018-01-01 RX ADMIN — DILTIAZEM HYDROCHLORIDE 60 MG: 60 TABLET, FILM COATED ORAL at 01:35

## 2018-01-01 RX ADMIN — HEPARIN SODIUM 5000 UNITS: 5000 INJECTION, SOLUTION INTRAVENOUS; SUBCUTANEOUS at 06:05

## 2018-01-01 RX ADMIN — ASPIRIN 81 MG: 81 TABLET, COATED ORAL at 10:44

## 2018-01-01 RX ADMIN — DILTIAZEM HYDROCHLORIDE 60 MG: 60 TABLET, FILM COATED ORAL at 18:01

## 2018-01-01 RX ADMIN — METOPROLOL TARTRATE 50 MG: 50 TABLET ORAL at 08:21

## 2018-01-01 RX ADMIN — INSULIN LISPRO 4 UNITS: 100 INJECTION, SOLUTION INTRAVENOUS; SUBCUTANEOUS at 22:58

## 2018-01-01 RX ADMIN — ASPIRIN 81 MG 324 MG: 81 TABLET ORAL at 09:19

## 2018-01-01 RX ADMIN — SODIUM CHLORIDE, PRESERVATIVE FREE 10 ML: 5 INJECTION INTRAVENOUS at 09:11

## 2018-01-01 RX ADMIN — IPRATROPIUM BROMIDE AND ALBUTEROL SULFATE 3 ML: .5; 3 SOLUTION RESPIRATORY (INHALATION) at 13:38

## 2018-01-01 RX ADMIN — DILTIAZEM HYDROCHLORIDE 60 MG: 60 TABLET, FILM COATED ORAL at 06:12

## 2018-01-01 RX ADMIN — AMIODARONE HYDROCHLORIDE 200 MG: 200 TABLET ORAL at 10:17

## 2018-01-01 RX ADMIN — APIXABAN 2.5 MG: 2.5 TABLET, FILM COATED ORAL at 22:24

## 2018-01-01 RX ADMIN — SODIUM BICARBONATE 75 ML/HR: 84 INJECTION, SOLUTION INTRAVENOUS at 10:08

## 2018-01-01 RX ADMIN — DILTIAZEM HYDROCHLORIDE 60 MG: 60 TABLET, FILM COATED ORAL at 11:47

## 2018-01-01 RX ADMIN — SODIUM CHLORIDE, PRESERVATIVE FREE 3 ML: 5 INJECTION INTRAVENOUS at 16:45

## 2018-01-01 RX ADMIN — POVIDONE-IODINE: 10 SOLUTION TOPICAL at 11:22

## 2018-01-01 RX ADMIN — INSULIN HUMAN 10 UNITS: 100 INJECTION, SOLUTION PARENTERAL at 17:44

## 2018-01-01 RX ADMIN — INSULIN DETEMIR 5 UNITS: 100 INJECTION, SOLUTION SUBCUTANEOUS at 21:43

## 2018-01-01 RX ADMIN — DILTIAZEM HYDROCHLORIDE 60 MG: 60 TABLET, FILM COATED ORAL at 23:19

## 2018-01-01 RX ADMIN — INSULIN HUMAN 5 UNITS: 100 INJECTION, SOLUTION PARENTERAL at 05:47

## 2018-01-01 RX ADMIN — HALOPERIDOL 1 MG: 1 TABLET ORAL at 09:22

## 2018-01-01 RX ADMIN — DILTIAZEM HYDROCHLORIDE 60 MG: 60 TABLET, FILM COATED ORAL at 05:23

## 2018-01-01 RX ADMIN — INSULIN LISPRO 4 UNITS: 100 INJECTION, SOLUTION INTRAVENOUS; SUBCUTANEOUS at 08:17

## 2018-01-01 RX ADMIN — TAZOBACTAM SODIUM AND PIPERACILLIN SODIUM 3.38 G: 375; 3 INJECTION, SOLUTION INTRAVENOUS at 16:54

## 2018-01-01 RX ADMIN — DILTIAZEM HYDROCHLORIDE 60 MG: 60 TABLET, FILM COATED ORAL at 00:48

## 2018-01-01 RX ADMIN — AMLODIPINE BESYLATE 5 MG: 5 TABLET ORAL at 08:34

## 2018-01-01 RX ADMIN — AMIODARONE HYDROCHLORIDE 1 MG/MIN: 1.8 INJECTION, SOLUTION INTRAVENOUS at 11:59

## 2018-01-01 RX ADMIN — HALOPERIDOL 1 MG: 1 TABLET ORAL at 20:39

## 2018-01-01 RX ADMIN — INSULIN LISPRO 3 UNITS: 100 INJECTION, SOLUTION INTRAVENOUS; SUBCUTANEOUS at 12:37

## 2018-01-01 RX ADMIN — SODIUM CHLORIDE, PRESERVATIVE FREE 10 ML: 5 INJECTION INTRAVENOUS at 09:04

## 2018-01-01 RX ADMIN — HEPARIN SODIUM 5000 UNITS: 5000 INJECTION, SOLUTION INTRAVENOUS; SUBCUTANEOUS at 07:03

## 2018-01-01 RX ADMIN — SODIUM CHLORIDE 100 ML/HR: 9 INJECTION, SOLUTION INTRAVENOUS at 04:21

## 2018-01-01 RX ADMIN — INSULIN HUMAN 5 UNITS: 100 INJECTION, SOLUTION PARENTERAL at 00:16

## 2018-01-01 RX ADMIN — INSULIN LISPRO 2 UNITS: 100 INJECTION, SOLUTION INTRAVENOUS; SUBCUTANEOUS at 17:48

## 2018-01-01 RX ADMIN — METOPROLOL TARTRATE 5 MG: 5 INJECTION, SOLUTION INTRAVENOUS at 18:01

## 2018-01-01 RX ADMIN — ASPIRIN 81 MG 324 MG: 81 TABLET ORAL at 08:02

## 2018-01-01 RX ADMIN — TAZOBACTAM SODIUM AND PIPERACILLIN SODIUM 3.38 G: 375; 3 INJECTION, SOLUTION INTRAVENOUS at 21:36

## 2018-01-01 RX ADMIN — DILTIAZEM HYDROCHLORIDE 60 MG: 60 TABLET, FILM COATED ORAL at 06:01

## 2018-01-01 RX ADMIN — ASPIRIN 81 MG: 81 TABLET, COATED ORAL at 09:10

## 2018-01-01 RX ADMIN — IPRATROPIUM BROMIDE AND ALBUTEROL SULFATE 3 ML: .5; 3 SOLUTION RESPIRATORY (INHALATION) at 13:57

## 2018-01-01 RX ADMIN — METOPROLOL TARTRATE 5 MG: 5 INJECTION, SOLUTION INTRAVENOUS at 23:36

## 2018-01-01 RX ADMIN — INSULIN DETEMIR 10 UNITS: 100 INJECTION, SOLUTION SUBCUTANEOUS at 20:52

## 2018-01-01 RX ADMIN — DILTIAZEM HYDROCHLORIDE 60 MG: 60 TABLET, FILM COATED ORAL at 18:59

## 2018-01-01 RX ADMIN — SODIUM CHLORIDE, PRESERVATIVE FREE 10 ML: 5 INJECTION INTRAVENOUS at 10:58

## 2018-01-01 RX ADMIN — DILTIAZEM HYDROCHLORIDE 60 MG: 60 TABLET, FILM COATED ORAL at 12:59

## 2018-01-01 RX ADMIN — INSULIN DETEMIR 13 UNITS: 100 INJECTION, SOLUTION SUBCUTANEOUS at 08:35

## 2018-01-01 RX ADMIN — METHOHEXITAL SODIUM 20 MG: 500 INJECTION, POWDER, LYOPHILIZED, FOR SOLUTION INTRAMUSCULAR; INTRAVENOUS; RECTAL at 10:10

## 2018-01-01 RX ADMIN — METHOHEXITAL SODIUM 10 MG: 500 INJECTION, POWDER, LYOPHILIZED, FOR SOLUTION INTRAMUSCULAR; INTRAVENOUS; RECTAL at 10:13

## 2018-01-01 RX ADMIN — INSULIN LISPRO 3 UNITS: 100 INJECTION, SOLUTION INTRAVENOUS; SUBCUTANEOUS at 16:54

## 2018-01-01 RX ADMIN — CASTOR OIL AND BALSAM, PERU: 788; 87 OINTMENT TOPICAL at 22:13

## 2018-01-01 RX ADMIN — DILTIAZEM HYDROCHLORIDE 60 MG: 60 TABLET, FILM COATED ORAL at 11:59

## 2018-01-01 RX ADMIN — ATORVASTATIN CALCIUM 10 MG: 10 TABLET, FILM COATED ORAL at 22:07

## 2018-01-01 RX ADMIN — APIXABAN 5 MG: 5 TABLET, FILM COATED ORAL at 21:19

## 2018-01-01 RX ADMIN — METOPROLOL TARTRATE 25 MG: 25 TABLET ORAL at 14:35

## 2018-01-01 RX ADMIN — ASPIRIN 81 MG: 81 TABLET, COATED ORAL at 08:01

## 2018-01-01 RX ADMIN — INSULIN HUMAN 5 UNITS: 100 INJECTION, SOLUTION PARENTERAL at 07:13

## 2018-01-01 RX ADMIN — FLUCONAZOLE 200 MG: 200 TABLET ORAL at 10:03

## 2018-01-01 RX ADMIN — INSULIN LISPRO 2 UNITS: 100 INJECTION, SOLUTION INTRAVENOUS; SUBCUTANEOUS at 14:00

## 2018-01-01 RX ADMIN — POTASSIUM CHLORIDE 40 MEQ: 1.5 POWDER, FOR SOLUTION ORAL at 12:19

## 2018-01-01 RX ADMIN — CASTOR OIL AND BALSAM, PERU: 788; 87 OINTMENT TOPICAL at 21:42

## 2018-01-01 RX ADMIN — METOPROLOL TARTRATE 50 MG: 50 TABLET ORAL at 21:50

## 2018-01-01 RX ADMIN — SODIUM CHLORIDE, PRESERVATIVE FREE 3 ML: 5 INJECTION INTRAVENOUS at 20:26

## 2018-01-01 RX ADMIN — DEXTROSE AND SODIUM CHLORIDE 75 ML/HR: 5; 450 INJECTION, SOLUTION INTRAVENOUS at 01:17

## 2018-01-01 RX ADMIN — APIXABAN 5 MG: 5 TABLET, FILM COATED ORAL at 21:37

## 2018-01-01 RX ADMIN — HEPARIN SODIUM 5000 UNITS: 5000 INJECTION, SOLUTION INTRAVENOUS; SUBCUTANEOUS at 22:14

## 2018-01-01 RX ADMIN — ATORVASTATIN CALCIUM 10 MG: 10 TABLET, FILM COATED ORAL at 21:16

## 2018-01-01 RX ADMIN — INSULIN DETEMIR 15 UNITS: 100 INJECTION, SOLUTION SUBCUTANEOUS at 21:50

## 2018-01-01 RX ADMIN — INSULIN LISPRO 2 UNITS: 100 INJECTION, SOLUTION INTRAVENOUS; SUBCUTANEOUS at 20:59

## 2018-01-01 RX ADMIN — APIXABAN 5 MG: 5 TABLET, FILM COATED ORAL at 21:16

## 2018-01-01 RX ADMIN — DILTIAZEM HYDROCHLORIDE 60 MG: 60 TABLET, FILM COATED ORAL at 18:38

## 2018-01-01 RX ADMIN — MAGNESIUM SULFATE HEPTAHYDRATE 2 G: 40 INJECTION, SOLUTION INTRAVENOUS at 09:50

## 2018-01-01 RX ADMIN — DILTIAZEM HYDROCHLORIDE 60 MG: 60 TABLET, FILM COATED ORAL at 12:49

## 2018-01-01 RX ADMIN — ATORVASTATIN CALCIUM 10 MG: 10 TABLET, FILM COATED ORAL at 21:03

## 2018-01-01 RX ADMIN — INSULIN LISPRO 3 UNITS: 100 INJECTION, SOLUTION INTRAVENOUS; SUBCUTANEOUS at 08:22

## 2018-01-01 RX ADMIN — INSULIN LISPRO 4 UNITS: 100 INJECTION, SOLUTION INTRAVENOUS; SUBCUTANEOUS at 08:58

## 2018-01-01 RX ADMIN — ASPIRIN 81 MG CHEWABLE TABLET 81 MG: 81 TABLET CHEWABLE at 08:16

## 2018-01-01 RX ADMIN — HEPARIN SODIUM 5000 UNITS: 5000 INJECTION, SOLUTION INTRAVENOUS; SUBCUTANEOUS at 14:35

## 2018-01-01 RX ADMIN — ASPIRIN 81 MG CHEWABLE TABLET 81 MG: 81 TABLET CHEWABLE at 09:22

## 2018-01-01 RX ADMIN — CALCIUM GLUCONATE: 94 INJECTION, SOLUTION INTRAVENOUS at 12:48

## 2018-01-01 RX ADMIN — INSULIN DETEMIR 15 UNITS: 100 INJECTION, SOLUTION SUBCUTANEOUS at 09:14

## 2018-01-01 RX ADMIN — ESMOLOL HYDROCHLORIDE 50 MCG/KG/MIN: 10 INJECTION INTRAVENOUS at 18:54

## 2018-01-01 RX ADMIN — ATORVASTATIN CALCIUM 10 MG: 10 TABLET, FILM COATED ORAL at 22:46

## 2018-01-01 RX ADMIN — DILTIAZEM HYDROCHLORIDE 60 MG: 60 TABLET, FILM COATED ORAL at 17:47

## 2018-01-01 RX ADMIN — VANCOMYCIN HYDROCHLORIDE 1000 MG: 1 INJECTION, SOLUTION INTRAVENOUS at 11:57

## 2018-01-01 RX ADMIN — METOPROLOL TARTRATE 5 MG: 5 INJECTION, SOLUTION INTRAVENOUS at 07:02

## 2018-01-01 RX ADMIN — POTASSIUM PHOSPHATE, MONOBASIC AND POTASSIUM PHOSPHATE, DIBASIC 45 MMOL: 224; 236 INJECTION, SOLUTION INTRAVENOUS at 12:01

## 2018-01-01 RX ADMIN — METOPROLOL TARTRATE 12.5 MG: 25 TABLET, FILM COATED ORAL at 23:08

## 2018-01-01 RX ADMIN — ASPIRIN 81 MG CHEWABLE TABLET 81 MG: 81 TABLET CHEWABLE at 08:40

## 2018-01-01 RX ADMIN — INSULIN HUMAN 8 UNITS: 100 INJECTION, SOLUTION PARENTERAL at 07:14

## 2018-01-01 RX ADMIN — DAPTOMYCIN 500 MG: 500 INJECTION, POWDER, LYOPHILIZED, FOR SOLUTION INTRAVENOUS at 13:03

## 2018-01-01 RX ADMIN — INSULIN LISPRO 3 UNITS: 100 INJECTION, SOLUTION INTRAVENOUS; SUBCUTANEOUS at 11:37

## 2018-01-01 RX ADMIN — INSULIN HUMAN 5 UNITS: 100 INJECTION, SOLUTION PARENTERAL at 06:06

## 2018-01-01 RX ADMIN — HALOPERIDOL 1 MG: 1 TABLET ORAL at 20:42

## 2018-01-01 RX ADMIN — ATORVASTATIN CALCIUM 10 MG: 10 TABLET, FILM COATED ORAL at 20:45

## 2018-01-01 RX ADMIN — APIXABAN 2.5 MG: 2.5 TABLET, FILM COATED ORAL at 21:25

## 2018-01-01 RX ADMIN — SODIUM CHLORIDE 75 ML/HR: 9 INJECTION, SOLUTION INTRAVENOUS at 09:11

## 2018-01-01 RX ADMIN — DAPTOMYCIN 500 MG: 500 INJECTION, POWDER, LYOPHILIZED, FOR SOLUTION INTRAVENOUS at 12:46

## 2018-01-01 RX ADMIN — DILTIAZEM HYDROCHLORIDE 60 MG: 60 TABLET, FILM COATED ORAL at 05:54

## 2018-01-01 RX ADMIN — CASTOR OIL AND BALSAM, PERU: 788; 87 OINTMENT TOPICAL at 22:31

## 2018-01-01 RX ADMIN — VANCOMYCIN HYDROCHLORIDE 1750 MG: 10 INJECTION, POWDER, LYOPHILIZED, FOR SOLUTION INTRAVENOUS at 15:32

## 2018-01-01 RX ADMIN — INSULIN LISPRO 3 UNITS: 100 INJECTION, SOLUTION INTRAVENOUS; SUBCUTANEOUS at 22:07

## 2018-01-01 RX ADMIN — Medication 100 ML/HR: at 01:44

## 2018-01-01 RX ADMIN — IPRATROPIUM BROMIDE AND ALBUTEROL SULFATE 3 ML: .5; 3 SOLUTION RESPIRATORY (INHALATION) at 00:05

## 2018-01-01 RX ADMIN — APIXABAN 5 MG: 5 TABLET, FILM COATED ORAL at 09:12

## 2018-01-01 RX ADMIN — INSULIN DETEMIR 15 UNITS: 100 INJECTION, SOLUTION SUBCUTANEOUS at 22:31

## 2018-01-01 RX ADMIN — METOPROLOL TARTRATE 50 MG: 50 TABLET ORAL at 20:50

## 2018-01-01 RX ADMIN — INSULIN DETEMIR 5 UNITS: 100 INJECTION, SOLUTION SUBCUTANEOUS at 23:09

## 2018-01-01 RX ADMIN — DEXTROSE MONOHYDRATE 75 ML/HR: 50 INJECTION, SOLUTION INTRAVENOUS at 09:22

## 2018-01-01 RX ADMIN — APIXABAN 5 MG: 5 TABLET, FILM COATED ORAL at 20:43

## 2018-01-01 RX ADMIN — INSULIN DETEMIR 15 UNITS: 100 INJECTION, SOLUTION SUBCUTANEOUS at 08:01

## 2018-01-01 RX ADMIN — INSULIN HUMAN 10 UNITS: 100 INJECTION, SOLUTION PARENTERAL at 11:42

## 2018-01-01 RX ADMIN — SODIUM CHLORIDE, PRESERVATIVE FREE 3 ML: 5 INJECTION INTRAVENOUS at 22:13

## 2018-01-31 PROBLEM — A41.9 SEPSIS (HCC): Status: ACTIVE | Noted: 2018-01-01

## 2018-01-31 PROBLEM — N17.9 ACUTE RENAL FAILURE (HCC): Status: ACTIVE | Noted: 2018-01-01

## 2018-01-31 PROBLEM — R77.8 ELEVATED TROPONIN: Status: ACTIVE | Noted: 2018-01-01

## 2018-01-31 PROBLEM — E87.5 HYPERKALEMIA: Status: ACTIVE | Noted: 2018-01-01

## 2018-01-31 PROBLEM — N39.0 SEPSIS DUE TO URINARY TRACT INFECTION (HCC): Status: ACTIVE | Noted: 2018-01-01

## 2018-01-31 PROBLEM — D72.829 LEUKOCYTOSIS: Status: ACTIVE | Noted: 2018-01-01

## 2018-02-01 NOTE — CONSULTS
NAL Consult Note    Leila Smith  1945  [id number]    Date of Admit:  1/31/2018    Date of Consult: 2/1/2018        Requesting Provider: No ref. provider found  Evaluating Physician: Zane Muniz MD        Reason for Consultation: Acute kidney injury    History of present illness:    Patient is a 72 y.o.  Yr old female who is mentally challenged at baseline and who presents from [facility] EMS for altered mental status. The patient's daughter states that she was called by the rehab when the patient was more lethargic than her baseline which is minimally verbal, and occasionally confused though the patient was able to live at home by herself until recently. Patient has a history of COPD, CHF, A fib, DM2, and frequent UTIs.  Her baseline creatinine 0.8 but on arrival her BUN was 150 and her creatinine was about 4 and urine output was very low with low blood pressure    Past Medical History:   Diagnosis Date   • Atrial fibrillation    • Chronic ulcer of right leg    • Cognitive communication deficit    • COPD (chronic obstructive pulmonary disease)    • Diabetes mellitus    • Difficulty in walking    • Encephalopathy    • Generalized muscle weakness    • Hematuria    • Hyperlipidemia    • Hypertension    • Hypothyroidism    • Urinary tract infection        Past Surgical History:   Procedure Laterality Date   • CATARACT EXTRACTION     • CHOLECYSTECTOMY     • FOOT SURGERY Right    • HERNIA REPAIR     • HYSTERECTOMY     • TOTAL KNEE ARTHROPLASTY Right        Social History     Social History   • Marital status:      Spouse name: N/A   • Number of children: N/A   • Years of education: N/A     Social History Main Topics   • Smoking status: Former Smoker     Packs/day: 0.00     Years: 50.00     Types: Cigarettes   • Smokeless tobacco: Never Used   • Alcohol use No   • Drug use: No   • Sexual activity: Defer     Other Topics Concern   • None     Social History Narrative       family history  includes Alcohol abuse in her other; Cancer in her other; Diabetes in her other; Hypertension in her other; Other in her other; Stomach cancer in her mother.    Allergies   Allergen Reactions   • Codeine    • Tetracyclines & Related        Medication:    Current Facility-Administered Medications:   •  acetaminophen (TYLENOL) tablet 650 mg, 650 mg, Oral, Q4H PRN **OR** acetaminophen (TYLENOL) suppository 650 mg, 650 mg, Rectal, Q4H PRN, DANIEL Ludwig  •  aspirin chewable tablet 324 mg, 324 mg, Oral, Daily, DANIEL Ludwig  •  atorvastatin (LIPITOR) tablet 10 mg, 10 mg, Oral, Nightly, DANIEL Mustafa  •  cefTRIAXone (ROCEPHIN) IVPB 1 g, 1 g, Intravenous, Q24H, DANIEL Ludwig, Last Rate: 100 mL/hr at 01/31/18 2239, 1 g at 01/31/18 2239  •  dextrose (D50W) solution 25 g, 25 g, Intravenous, Q15 Min PRN, DANIEL Mustafa  •  dextrose (GLUTOSE) oral gel 15 g, 15 g, Oral, Q15 Min PRN, DANIEL Mustafa  •  diltiaZEM (CARDIZEM) 125mg/125 mL infusion, 5-15 mg/hr, Intravenous, Titrated, DANIEL Person, Last Rate: 2.5 mL/hr at 02/01/18 1144, 2.5 mg/hr at 02/01/18 1144  •  diltiaZEM (CARDIZEM) tablet 60 mg, 60 mg, Oral, Q6H, DANIEL Ludwig, 60 mg at 02/01/18 1108  •  famotidine (PEPCID) injection 20 mg, 20 mg, Intravenous, Q12H, DANIEL Mustafa, 20 mg at 02/01/18 0900  •  heparin (porcine) 5000 UNIT/ML injection 5,000 Units, 5,000 Units, Subcutaneous, Q8H, DANIEL Ludwig, 5,000 Units at 02/01/18 0703  •  insulin regular (HumuLIN R,NovoLIN R) 100 Units in sodium chloride 0.9 % 100 mL (1 Units/mL) infusion, 0-50 Units/hr, Intravenous, Titrated, Gadiel Alvarado, [facility], Last Rate: 1.6 mL/hr at 02/01/18 1108, 1.6 Units/hr at 02/01/18 1108  •  ipratropium-albuterol (DUO-NEB) nebulizer solution 3 mL, 3 mL, Nebulization, Q6H - RT, Alexandra Galdamez, DANIEL, 3 mL at 02/01/18 0746  •  lactated ringers bolus 1,000 mL, 1,000 mL, Intravenous, Once, Jasmina  V. Darrion, DO, Last Rate: 1,000 mL/hr at 02/01/18 1056, 1,000 mL at 02/01/18 1056  •  magnesium sulfate 4 gram infusion - Mg less than or equal to 1mg/dL, 4 g, Intravenous, PRN **OR** magnesium sulfate 3 gram infusion (1gm x 3) - Mg 1.1 - 1.5 mg/dL, 1 g, Intravenous, PRN **OR** Magnesium Sulfate 2 gram infusion- Mg 1.6 - 1.9 mg/dL, 2 g, Intravenous, PRN, DANIEL Mustafa, Last Rate: 25 mL/hr at 02/01/18 0637, 2 g at 02/01/18 0637  •  metoprolol tartrate (LOPRESSOR) injection 5 mg, 5 mg, Intravenous, Q6H, DANIEL Mustafa, 5 mg at 02/01/18 1100  •  phenylephrine (TRESA-SYNEPHRINE) 50 mg/250 mL (0.2 mg/mL) in 0.9% NS  infusion, 0.5-3 mcg/kg/min, Intravenous, Titrated, DANIEL Mustafa  •  sodium bicarbonate 8.4 % 150 mEq in dextrose (D5W) 5 % 1,000 mL infusion (greater than 75 mEq), 75 mL/hr, Intravenous, Continuous, DANIEL Mustafa, Last Rate: 75 mL/hr at 02/01/18 1008, 75 mL/hr at 02/01/18 1008  •  sodium chloride 0.9 % flush 1-10 mL, 1-10 mL, Intravenous, PRN, DANIEL Ludwig  •  sodium chloride 0.9 % flush 10 mL, 10 mL, Intravenous, PRN, Blayne Moe MD  •  sodium chloride 0.9 % infusion, 25 mL/hr, Intravenous, Continuous, DANIEL Mustafa, Last Rate: 25 mL/hr at 02/01/18 0900, 25 mL/hr at 02/01/18 0900    Prescriptions Prior to Admission   Medication Sig Dispense Refill Last Dose   • acetaminophen (TYLENOL) 500 MG tablet Take 1,000 mg by mouth Every 6 (Six) Hours As Needed for Mild Pain .      • acidophilus (FLORANEX) tablet tablet Take 1 tablet by mouth 3 (Three) Times a Day.      • ammonium lactate (AMLACTIN) 12 % cream Apply 1 application topically 2 (Two) Times a Day.      • Artificial Tear Solution (TEARS NATURALE OP) Apply 1 application to eye 2 (Two) Times a Day.      • aspirin 81 MG chewable tablet Chew 81 mg Daily.      • atorvastatin (LIPITOR) 10 MG tablet Take 10 mg by mouth Every Night.      • diltiaZEM (CARDIZEM) 60 MG tablet Take 1 tablet by mouth Every 6 (Six) Hours.     "  • famotidine (PEPCID) 20 MG tablet Take 20 mg by mouth Daily.      • haloperidol (HALDOL) 0.5 MG tablet Take 1 tablet by mouth Every 12 (Twelve) Hours.      • Melatonin 3 MG tablet Take 3 mg by mouth Every Night.      • metoprolol succinate XL (TOPROL-XL) 50 MG 24 hr tablet Take 3 tablets by mouth Daily.      • mometasone-formoterol (DULERA) 100-5 MCG/ACT inhaler Inhale 2 puffs 2 (Two) Times a Day.      • spironolactone (ALDACTONE) 50 MG tablet Take 1 tablet by mouth daily. 30 tablet 5 Taking   • traMADol (ULTRAM) 50 MG tablet Take 1 tablet by mouth Every 8 (Eight) Hours As Needed for Moderate Pain . 9 tablet 0        Review of Systems:    She denies any pain or shortness of breath but unable to get much of the other review of system due to mental status baseline    Physical Exam:   Vital Signs   Blood pressure 95/83, pulse 106, temperature 97.5 °F (36.4 °C), temperature source Core, resp. rate 18, height 165.1 cm (65\"), weight 90.7 kg (200 lb), SpO2 97 %.     GENERAL: Awake and alert, in no acute distress.   HEENT: Normocephalic, atraumatic.  PER.  No conjunctivitis. No icterus. Oropharynx clear without evidence of thrush or exudate. No evidence of peridontal disease.  Dry mucosa   NECK: Supple without nuchal rigidity. No mass.  LYMPH: No painful cervical, axillary or inguinal lymphadenopathy.  HEART: RRR; No murmur, rubs, gallops. No bruits in neck, abdomen, or groins, no edema  LUNGS: Normal respiratory effort. Nonlabored. No dullness.  No crepitus.  Clear to auscultation bilaterally without wheezing, rales, rhonchi.  ABDOMEN: Soft, nontender, nondistended. Positive bowel sounds. No rebound or guarding. NO mass or HSM.  JOINTS:  Full range of motion, no redness or tenderness.  EXT:  She does not have much swelling right now but seems like her that she probably had quite a bit of swelling in the past with chronic skin changes present  :  External genitalia without swelling or lesion  SKIN: Warm and dry without " rash  NEURO: Oriented to PPT. No focal neurological deficits. Strength equal bilateral  PSYCHIATRIC: Normal insight and judgement. Cooperative with PE    Laboratory Data    Results from last 7 days  Lab Units 02/01/18  0344 01/31/18  1337   HEMOGLOBIN g/dL 14.1 18.1*   HEMATOCRIT % 43.7 55.5*       Results from last 7 days  Lab Units 02/01/18  0344 01/31/18  2353 01/31/18  1939 01/31/18  1337   SODIUM mmol/L 142 141 142 141   POTASSIUM mmol/L 4.3 5.1 4.8 6.6*   CHLORIDE mmol/L 117* 117* 117* 113*   CO2 mmol/L 17.0* 12.0* 14.0* 13.0*   BUN mg/dL 130* 120* 135* 152*   CREATININE mg/dL 3.60* 3.60* 3.90* 4.70*   CALCIUM mg/dL 8.3* 8.1* 8.3* 9.7   PHOSPHORUS mg/dL 4.3  --   --   --    MAGNESIUM mg/dL 1.7  --   --  2.2   ALBUMIN g/dL  --   --   --  3.70       Results from last 7 days  Lab Units 02/01/18  0344   GLUCOSE mg/dL 237*       Results from last 7 days  Lab Units 01/31/18  1643   COLOR UA  Yellow   CLARITY UA  Cloudy*   PH, URINE  <=5.0   SPECIFIC GRAVITY, URINE  1.025   GLUCOSE UA  100 mg/dL (Trace)*   KETONES UA  Negative   BILIRUBIN UA  Negative   PROTEIN UA  Negative   BLOOD UA  Trace*   LEUKOCYTES UA  Small (1+)*   NITRITE UA  Negative       Results from last 7 days  Lab Units 01/31/18  1337   ALK PHOS U/L 284*   BILIRUBIN mg/dL 0.8   ALT (SGPT) U/L 34   AST (SGOT) U/L 40*     Estimated Creatinine Clearance: 15.3 mL/min (by C-G formula based on Cr of 3.6).    Radiology:      Renal Imaging:    I personally read  the radiographic studies    Impression:   Acute kidney injury-likely acute kidney injury due to volume depletion and ATN.  Creatinine is improving with fluid resuscitation  Chronic kidney disease-her baseline creatinine is about 0.8 just within the last 6-8 weeks.  She possibly has baseline diabetic nephropathy  Metabolic acidosis-improving with IV fluids  Hemoconcentration-hematocrit is quite high likely because awaiting depletion  Hypertension blood pressure is very low needing IV fluids    PLAN:  Thank you for asking us to see Leila Smith, I recommend the following:   At this time continue IV fluids for the time being no need for renal replacement therapy.  She is quite hemoconcentrated continue monitor electrolytes avoid any nephrotoxic agents and adjust medication according to creatinine clearance  Discussed the case with staff RN    Zane Muniz MD  2/1/2018  11:48 AM

## 2018-02-01 NOTE — PLAN OF CARE
Problem: Patient Care Overview (Adult)  Goal: Plan of Care Review   02/01/18 0745   Coping/Psychosocial Response Interventions   Plan Of Care Reviewed With daughter   Patient Care Overview   Progress improving   Outcome Evaluation   Outcome Summary/Follow up Plan Pt's labs this a.m. are improved. Pt was started on an insulin gtt, last night, her bicarb gtt was weaned this a.m. Pt's heartrate is still in the 100's-140's after receiving 2 doses of dixogin and lopressor IV. Pt's mag was replaced. Pt does not follow commands and can become easily agitated and restless. Daughter called this a.m and was updated.        Problem: Renal Failure/Kidney Injury, Acute (Adult)  Goal: Signs and Symptoms of Listed Potential Problems Will be Absent or Manageable (Renal Failure/Kidney Injury, Acute)  Outcome: Ongoing (interventions implemented as appropriate)   02/01/18 0745   Renal Failure/Kidney Injury, Acute   Problems Assessed (Acute Renal Failure/Kidney Injury) acid-base imbalance;electrolyte imbalance;fluid imbalance   Problems Present (Acute Renal Failure/Kidney Injury) acid-base imbalance;electrolyte imbalance;fluid imbalance       Problem: Cardiac Output, Decreased (Adult)  Goal: Identify Related Risk Factors and Signs and Symptoms  Outcome: Ongoing (interventions implemented as appropriate)   01/31/18 2015   Cardiac Output, Decreased   Cardiac Output, Decreased: Related Risk Factors heart rate altered;heart rhythm altered     Goal: Adequate Cardiac Output/Effective Tissue Perfusion  Outcome: Ongoing (interventions implemented as appropriate)   01/31/18 2015   Cardiac Output, Decreased (Adult)   Adequate Cardiac Output/Effective Tissue Perfusion making progress toward outcome       Problem: Sepsis (Adult)  Goal: Signs and Symptoms of Listed Potential Problems Will be Absent or Manageable (Sepsis)  Outcome: Ongoing (interventions implemented as appropriate)      Problem: Skin Integrity Impairment, Risk/Actual (Adult)  Goal:  Identify Related Risk Factors and Signs and Symptoms  Outcome: Ongoing (interventions implemented as appropriate)   02/01/18 0745   Skin Integrity Impairment, Risk/Actual   Skin Integrity Impairment, Risk/Actual: Related Risk Factors cognitive impairment;age extremes;infection/disease process;metabolic imbalance;moisture;tissue perfusion impaired     Goal: Skin Integrity/Wound Healing  Outcome: Ongoing (interventions implemented as appropriate)   02/01/18 0745   Skin Integrity Impairment, Risk/Actual (Adult)   Skin Integrity/Wound Healing making progress toward outcome       Problem: Fall Risk (Adult)  Goal: Identify Related Risk Factors and Signs and Symptoms  Outcome: Ongoing (interventions implemented as appropriate)   02/01/18 0745   Fall Risk   Fall Risk: Related Risk Factors age-related changes;confusion/agitation;gait/mobility problems     Goal: Absence of Falls  Outcome: Ongoing (interventions implemented as appropriate)   02/01/18 0745   Fall Risk (Adult)   Absence of Falls making progress toward outcome

## 2018-02-01 NOTE — PAYOR COMM NOTE
"Ana Johnson RN Utilization Review [phone]  Fax# [phone]  Ref# [id number]       Leila Smith (72 y.o. Female)     Date of Birth Social Security Number Address Home Phone MRN    1945  [id number] [facility] [address] [phone] [id number]    Yazidi Marital Status          Unknown        Admission Date Admission Type Admitting Provider Attending Provider Department, Room/Bed    1/31/18 Emergency Brian Samson MD Case, Theresa V., DO [facility] 2A ICU, N201/1    Discharge Date Discharge Disposition Discharge Destination                      Attending Provider: Jasmina Maier DO     Allergies:  Codeine, Tetracyclines & Related    Isolation:  None   Infection:  None   Code Status:  FULL    Ht:  165.1 cm (65\")   Wt:  90.7 kg (200 lb)    Admission Cmt:  None   Principal Problem:  Sepsis due to urinary tract infection [A41.9,N39.0]                 Active Insurance as of 1/31/2018     Primary Coverage     Payor Plan Insurance Group Employer/Plan Group    ANTHEM MEDICARE REPLACEMENT ANTHEM MEDICARE ADVANTAGE [id number]     Payor Plan Address Payor Plan Phone Number Effective From Effective To    [address] [phone] 1/1/2017     [place] [address]       Subscriber Name Subscriber Birth Date Member ID       LEILA SMITH 1945 [id number]                 Emergency Contacts      (Rel.) Home Phone Work Phone Mobile Phone    JayyBhavnamillicent (Daughter) [phone] -- --    Kleber Johsnon (Son) [phone] -- --               History & Physical      Brian Samson MD at 1/31/2018  3:48 PM            Chief Complaint     Sepsis due to urinary tract infection    History of Present Illness     Ms. Smith is a 72 year old female who is mentally challenged at baseline and who presents from [facility] via EMS for altered mental status. The patient's daughter states that she was called by the rehab when the patient was more " lethargic than her baseline which is minimally verbal, and occasionally confused though the patient was able to live at home by herself until recently. Patient has a history of COPD, CHF, A fib, DM2, and frequent UTIs. Patient's daughter states that she has declined quickly since colectomy with colostomy placement at an outlying facility in April 2017.     VS on presentation to ED: HR 84 (now 140s), /69, RR 22, O2 98% on 4 L per NC, T 97.5. Patient received 500 ml NS bolus, Rocephin, albuterol neb, ASA, 10 units insulin, calcium gluconate, and D50,. Troponin 4.39 , , Cr 4.7, , K+ 6.6, lactic 3.6, WBC 17.18, UDS negative, PT/INR 12.6/1.15. Patient is arousable and will answer yes or no questions. Denies pain. Code status was discussed with the patient's daughter who states she doesn't know her mother's wishes and feels unable to make a decision at this time.     Problem List, Surgical History, Family, Social History, and ROS     Patient Active Problem List   Diagnosis   • Diabetes mellitus, type 2   • Hypertension   • Hyperlipidemia   • Hypothyroidism   • Chronic obstructive pulmonary disease   • CAD (coronary artery disease)   • History of edema   • History of obesity   • Venous insufficiency   • Open wound of lower extremity   • Urinary tract infection   • T2DM (type 2 diabetes mellitus)   • Dyspnea on exertion   • Peripheral vascular disease   • Obstructive sleep apnea syndrome   • Ulcer of lower extremity   • Hypoxemia   • Hematuria   • Diabetic peripheral neuropathy   • Edema   • Chronic obstructive pulmonary disease with acute exacerbation   • Congestive heart failure   • Arthritis   • Neutrophilic leukocytosis   • Cystitis   • Atrial fibrillation with rapid ventricular response   • Altered mental status   • Sepsis due to urinary tract infection   • Acute renal failure   • Hyperkalemia   • Leukocytosis   • Elevated troponin     Past Surgical History:   Procedure Laterality Date   • CATARACT  "EXTRACTION     • CHOLECYSTECTOMY     • FOOT SURGERY Right    • HERNIA REPAIR     • HYSTERECTOMY     • TOTAL KNEE ARTHROPLASTY Right        Allergies   Allergen Reactions   • Codeine    • Tetracyclines & Related      No current facility-administered medications on file prior to encounter.      Current Outpatient Prescriptions on File Prior to Encounter   Medication Sig   • acetaminophen (TYLENOL) 500 MG tablet Take 1,000 mg by mouth Every 6 (Six) Hours As Needed for Mild Pain .   • atorvastatin (LIPITOR) 10 MG tablet Take 10 mg by mouth Every Night.   • diltiaZEM (CARDIZEM) 60 MG tablet Take 1 tablet by mouth Every 6 (Six) Hours.   • famotidine (PEPCID) 20 MG tablet Take 20 mg by mouth Daily.   • haloperidol (HALDOL) 0.5 MG tablet Take 1 tablet by mouth Every 12 (Twelve) Hours.   • Melatonin 3 MG tablet Take 3 mg by mouth Every Night.   • metoprolol succinate XL (TOPROL-XL) 50 MG 24 hr tablet Take 3 tablets by mouth Daily.   • mometasone-formoterol (DULERA) 100-5 MCG/ACT inhaler Inhale 2 puffs 2 (Two) Times a Day.   • spironolactone (ALDACTONE) 50 MG tablet Take 1 tablet by mouth daily.   • traMADol (ULTRAM) 50 MG tablet Take 1 tablet by mouth Every 8 (Eight) Hours As Needed for Moderate Pain .   • [DISCONTINUED] docusate sodium (COLACE) 250 MG capsule Take 250 mg by mouth Daily.   • [DISCONTINUED] aspirin (ASPIRIN LOW DOSE) 81 MG tablet Take  by mouth daily.   • [DISCONTINUED] BD INSULIN SYRINGE ULTRAFINE 31G X 15/64\" 0.5 ML misc USE AS DIRECTED.   • [DISCONTINUED] budesonide-formoterol (SYMBICORT) 160-4.5 MCG/ACT inhaler Symbicort 160-4.5 MCG/ACT Inhalation Aerosol; Patient Sig: Symbicort 160-4.5 MCG/ACT Inhalation Aerosol INHALE 2 PUFFS TWICE A DAY. RINSE MOUTH AFTER USE; 3; 3; 24-Apr-2015; Active   • [DISCONTINUED] glucose blood test strip 3 (three) times a day.   • [DISCONTINUED] glucose blood test strip Use as instructed   • [DISCONTINUED] insulin detemir (LEVEMIR) 100 UNIT/ML injection Inject 10 Units under " "the skin Every Night.   • [DISCONTINUED] insulin detemir (LEVEMIR) 100 UNIT/ML injection Inject 10 Units under the skin Every Morning.   • [DISCONTINUED] insulin lispro (humaLOG) 100 UNIT/ML injection Inject 5 Units under the skin 3 (Three) Times a Day With Meals.   • [DISCONTINUED] insulin lispro (humaLOG) 100 UNIT/ML injection Inject 0-7 Units under the skin 4 (Four) Times a Day With Meals & at Bedtime.   • [DISCONTINUED] Insulin Pen Needle (EASY TOUCH PEN NEEDLES) 31G X 8 MM misc    • [DISCONTINUED] Insulin Syringe-Needle U-100 (B-D INS SYR ULTRAFINE 1CC/31G) 31G X 5/16\" 1 ML misc 80 Units Daily.   • [DISCONTINUED] Lactobacillus (FLORANEX PO) Take 1 tablet by mouth 3 (Three) Times a Day As Needed.   • [DISCONTINUED] silver sulfadiazine (SILVADENE, SSD) 1 % cream Apply  topically 2 (two) times a day.     MEDICATION LIST AND ALLERGIES REVIEWED.    Family History   Problem Relation Age of Onset   • Stomach cancer Mother    • Alcohol abuse Other    • Cancer Other    • Diabetes Other    • Hypertension Other    • Other Other      Social History   Substance Use Topics   • Smoking status: Former Smoker     Packs/day: 0.00     Years: 50.00     Types: Cigarettes   • Smokeless tobacco: Never Used   • Alcohol use No     Social History     Social History Narrative     FAMILY AND SOCIAL HISTORY REVIEWED.    Review of Systems   Unable to perform ROS: Mental status change     ALL OTHER SYSTEMS REVIEWED AND ARE NEGATIVE.    Physical Exam and Clinical Information   BP (!) 113/101  Pulse (!) 144  Temp 97.5 °F (36.4 °C) (Rectal)   Resp 28  Ht 165.1 cm (65\")  Wt 90.7 kg (200 lb)  SpO2 96%  BMI 33.28 kg/m2  Physical Exam  Objective:  General Appearance:  Ill-appearing and uncomfortable.    Vital signs: (most recent): Blood pressure (!) 113/101, pulse (!) 144, temperature 97.5 °F (36.4 °C), temperature source Rectal, resp. rate 28, height 165.1 cm (65\"), weight 90.7 kg (200 lb), SpO2 96 %.    HEENT: Normal HEENT exam.    Heart: " Irregular rhythm.    Chest: Symmetric chest wall expansion.   Extremities: There is dependent edema.  (Chronic venous stasis changes)  Neurological: (Confused, drowsy, and agitated at times).                Results from last 7 days  Lab Units 01/31/18  1337   WBC 10*3/mm3 17.18*   HEMOGLOBIN g/dL 18.1*   PLATELETS 10*3/mm3 262       Results from last 7 days  Lab Units 01/31/18  1337   SODIUM mmol/L 141   POTASSIUM mmol/L 6.6*   CO2 mmol/L 13.0*   BUN mg/dL 152*   CREATININE mg/dL 4.70*   MAGNESIUM mg/dL 2.2   GLUCOSE mg/dL 284*     Estimated Creatinine Clearance: 11.7 mL/min (by C-G formula based on Cr of 4.7).          Lab Results   Component Value Date    LACTATE 3.6 (C) 01/31/2018        IMAGES:       I reviewed the patient's results and images.     [facility] Problem List     * (Principal)Sepsis due to urinary tract infection    Atrial fibrillation    Altered mental status    Diabetes mellitus, type 2    Overview Signed 8/3/2016  2:13 PM by Ama Wolf     With foot ulcer             Chronic obstructive pulmonary disease        CAD (coronary artery disease)    Overview Signed 7/15/2016 10:27 AM by Yue Royal     History of  Coronary Artery Disease V12.59               Congestive heart failure        Hyperlipidemia        Hypothyroidism    Peripheral vascular disease    Obstructive sleep apnea syndrome        Plan/Recommendations       72-year-old female who presents with UTI and sepsis syndrome.  This is complicated by a rapid ventricular response in her apparently chronic atrial fibrillation, elevated troponin, acute kidney injury and hyperkalemia.  Will initially concentrate on aggressive fluid resuscitation and expect improvement in her laboratory studies with better renal perfusion.  If this does not occur will contact nephrology.  I will also have cardiology evaluate her tomorrow but suspect the elevated troponin is due to a type II non-STEMI due to physiologic stress.  Will initiate  empiric antibiotics.    1. ICU admission  2. Fluid resuscitation  3. Serial renal labs  4. Empiric broad-spectrum antibiotics  5. Serial lactic acid  6. Nephrology consultation if renal clearance does not improve with fluids  7. Cardiology to see in a.m. regarding atrial fibrillation and elevated troponin         I have seen and examined patient, performing a face-to-face diagnostic evaluation with plan of care reviewed and developed with APRN and nursing staff. I have addended and modified the above history of present illness, physical examination, and assessment and plan to reflect my findings and impressions.    Critical Care time spent in direct patient care: 40 minutes (excluding procedure time, if applicable) including high complexity decision making to assess, manipulate, and support vital organ system failure in this individual who has impairment of one or more vital organ systems such that there is a high probability of imminent or life threatening deterioration in the patient’s condition.      Brian Samson MD  Pulmonary and Critical Care Medicine         Electronically signed by Brian Samson MD at 1/31/2018  4:54 PM           Emergency Department Notes      Blayne Moe MD at 1/31/2018  1:25 PM      Procedure Orders:    1. Critical Care [[id number]] ordered by Blayne Moe MD at 01/31/18 1510                Subjective   HPI Comments: 72-year-old female with a history of MR presents for evaluation of altered mental status.  The patient is currently very limited historian and is speaking only and profanities.  According to EMS, the patient was noted to be altered compared to her baseline earlier this morning according to nursing home staff.  EMS was subsequently called and the patient was brought to the ED for further evaluation and treatment.  Glucose normal per EMS.    Patient is a 72 y.o. female presenting with altered mental status.   History provided by:  EMS  personnel  History limited by:  Mental status change  Altered Mental Status   Presenting symptoms: behavior changes, confusion and lethargy    Most recent episode:  Today  Duration:  1 day  Timing:  Constant  Progression:  Unchanged  Chronicity:  New  Context: nursing home resident    Associated symptoms: weakness        Review of Systems   Unable to perform ROS: Mental status change   Constitutional: Positive for fatigue.   Neurological: Positive for weakness.   Psychiatric/Behavioral: Positive for confusion.   All other systems reviewed and are negative.      Past Medical History:   Diagnosis Date   • Atrial fibrillation    • Chronic ulcer of right leg    • Cognitive communication deficit    • COPD (chronic obstructive pulmonary disease)    • Diabetes mellitus    • Difficulty in walking    • Encephalopathy    • Generalized muscle weakness    • Hematuria    • Hyperlipidemia    • Hypertension    • Hypothyroidism    • Urinary tract infection        Allergies   Allergen Reactions   • Codeine    • Tetracyclines & Related        Past Surgical History:   Procedure Laterality Date   • CATARACT EXTRACTION     • CHOLECYSTECTOMY     • FOOT SURGERY Right    • HERNIA REPAIR     • HYSTERECTOMY     • TOTAL KNEE ARTHROPLASTY Right        Family History   Problem Relation Age of Onset   • Stomach cancer Mother    • Alcohol abuse Other    • Cancer Other    • Diabetes Other    • Hypertension Other    • Other Other        Social History     Social History   • Marital status:      Spouse name: N/A   • Number of children: N/A   • Years of education: N/A     Social History Main Topics   • Smoking status: Former Smoker     Packs/day: 0.00     Years: 50.00     Types: Cigarettes   • Smokeless tobacco: Never Used   • Alcohol use No   • Drug use: No   • Sexual activity: Defer     Other Topics Concern   • Not on file     Social History Narrative         Objective   Physical Exam   Constitutional: She appears well-developed and  well-nourished. No distress.   Chronically ill-appearing, altered female   HENT:   Head: Normocephalic and atraumatic.   Eyes: EOM are normal. Pupils are equal, round, and reactive to light.   Cardiovascular: Normal heart sounds.  Exam reveals no gallop and no friction rub.    No murmur heard.  Irregularly irregular tachycardia   Pulmonary/Chest: Effort normal and breath sounds normal. No respiratory distress. She has no wheezes. She has no rales.   Abdominal: Soft. Bowel sounds are normal. She exhibits no distension and no mass. There is no tenderness. There is no rebound and no guarding.   Musculoskeletal: Normal range of motion. She exhibits no edema.   Neurological:   Altered LOC, moving all fours, neurovascularly intact distally in all fours   Skin: Skin is warm and dry. No rash noted. She is not diaphoretic. No erythema.   Psychiatric:   Unable to evaluate   Nursing note and vitals reviewed.      Critical Care  Performed by: LORA DE LA TORRE  Authorized by: LORA DE LA TORRE     Critical care provider statement:     Critical care time (minutes):  60    Critical care time was exclusive of:  Separately billable procedures and treating other patients    Critical care was necessary to treat or prevent imminent or life-threatening deterioration of the following conditions:  CNS failure or compromise and sepsis    Critical care was time spent personally by me on the following activities:  Development of treatment plan with patient or surrogate, evaluation of patient's response to treatment, examination of patient, obtaining history from patient or surrogate, ordering and performing treatments and interventions, ordering and review of laboratory studies, ordering and review of radiographic studies, pulse oximetry and re-evaluation of patient's condition              ED Course  ED Course   Comment By Time   72-year-old female with a history of MR presents from the nursing home for evaluation of altered mental  status.  The patient was last seen normal last night and was poorly altered compared to her baseline according to nursing home staff this morning.  EMS was called.  Glucose normal.  Patient markedly tachycardic and route and brought to the ED for further evaluation and treatment.  On arrival to the ED, patient with altered LOC.  Her initial EKG revealed a wide-complex irregularly irregular tachycardia with a heart rate of 184.  Normotensive initially.  Labs remarkable for white blood cell count of 17, UA suggestive of urinary tract infection, and a troponin of 4.  Rocephin given.  Blood and urine cultures pending. Blayne Moe MD 01/31 1441   Labs also remarkable for acute renal failure and hyperkalemia, explaining the patient's QRS widening.  Insulin, D50, calcium, and albuterol given for hyperkalemia.  IV fluids given gently (15 cc/kg) given pt's CXR findings, hx of CHF, and elevated BNP.  Patient's heart rate ranging from 130 to 155.  I withheld any rate control medicines at this time as I feel that the patient's tachycardia is compensatory for her sepsis and because giving her an AV gricel blocker or beta blocker given her wide complex tachycardia could result in catastrophic sequela.  Aspirin given for elevated troponin.  I discussed the patient's case with  and will admit for further evaluation and treatment.  The patient is hemodynamically stable at this time and her daughter is aware/agreeable with the plan. Blayne Moe MD 01/31 1664     Recent Results (from the past 24 hour(s))   Comprehensive Metabolic Panel    Collection Time: 01/31/18  1:37 PM   Result Value Ref Range    Glucose 284 (H) 70 - 100 mg/dL     (H) 9 - 23 mg/dL    Creatinine 4.70 (H) 0.60 - 1.30 mg/dL    Sodium 141 132 - 146 mmol/L    Potassium 6.6 (C) 3.5 - 5.5 mmol/L    Chloride 113 (H) 99 - 109 mmol/L    CO2 13.0 (L) 20.0 - 31.0 mmol/L    Calcium 9.7 8.7 - 10.4 mg/dL    Total Protein 7.0 5.7 - 8.2 g/dL     Albumin 3.70 3.20 - 4.80 g/dL    ALT (SGPT) 34 7 - 40 U/L    AST (SGOT) 40 (H) 0 - 33 U/L    Alkaline Phosphatase 284 (H) 25 - 100 U/L    Total Bilirubin 0.8 0.3 - 1.2 mg/dL    eGFR  African Amer 11 (L) >60 mL/min/1.73    Globulin 3.3 gm/dL    A/G Ratio 1.1 (L) 1.5 - 2.5 g/dL    BUN/Creatinine Ratio 32.3 (H) 7.0 - 25.0    Anion Gap 15.0 (H) 3.0 - 11.0 mmol/L   Magnesium    Collection Time: 01/31/18  1:37 PM   Result Value Ref Range    Magnesium 2.2 1.3 - 2.7 mg/dL   Light Blue Top    Collection Time: 01/31/18  1:37 PM   Result Value Ref Range    Extra Tube hold for add-on    Green Top (Gel)    Collection Time: 01/31/18  1:37 PM   Result Value Ref Range    Extra Tube Hold for add-ons.    Lavender Top    Collection Time: 01/31/18  1:37 PM   Result Value Ref Range    Extra Tube hold for add-on    Gold Top - SST    Collection Time: 01/31/18  1:37 PM   Result Value Ref Range    Extra Tube Hold for add-ons.    CBC Auto Differential    Collection Time: 01/31/18  1:37 PM   Result Value Ref Range    WBC 17.18 (H) 3.50 - 10.80 10*3/mm3    RBC 6.21 (H) 3.89 - 5.14 10*6/mm3    Hemoglobin 18.1 (H) 11.5 - 15.5 g/dL    Hematocrit 55.5 (H) 34.5 - 44.0 %    MCV 89.4 80.0 - 99.0 fL    MCH 29.1 27.0 - 31.0 pg    MCHC 32.6 32.0 - 36.0 g/dL    RDW 19.6 (H) 11.3 - 14.5 %    RDW-SD 63.3 (H) 37.0 - 54.0 fl    MPV 11.7 6.0 - 12.0 fL    Platelets 262 150 - 450 10*3/mm3    Neutrophil % 85.3 (H) 41.0 - 71.0 %    Lymphocyte % 7.9 (L) 24.0 - 44.0 %    Monocyte % 5.2 0.0 - 12.0 %    Eosinophil % 0.1 0.0 - 3.0 %    Basophil % 0.2 0.0 - 1.0 %    Immature Grans % 1.3 (H) 0.0 - 0.6 %    Neutrophils, Absolute 14.66 (H) 1.50 - 8.30 10*3/mm3    Lymphocytes, Absolute 1.36 0.60 - 4.80 10*3/mm3    Monocytes, Absolute 0.89 0.00 - 1.00 10*3/mm3    Eosinophils, Absolute 0.01 0.00 - 0.30 10*3/mm3    Basophils, Absolute 0.03 0.00 - 0.20 10*3/mm3    Immature Grans, Absolute 0.23 (H) 0.00 - 0.03 10*3/mm3   Lactic Acid, Plasma    Collection Time: 01/31/18  1:37 PM    Result Value Ref Range    Lactate 3.6 (C) 0.5 - 2.0 mmol/L   T4, Free    Collection Time: 01/31/18  1:37 PM   Result Value Ref Range    Free T4 1.53 0.89 - 1.76 ng/dL   TSH    Collection Time: 01/31/18  1:37 PM   Result Value Ref Range    TSH 2.380 0.350 - 5.350 mIU/mL   Acetaminophen Level    Collection Time: 01/31/18  1:37 PM   Result Value Ref Range    Acetaminophen <10.0 0.0 - 30.0 mcg/mL   Ethanol    Collection Time: 01/31/18  1:37 PM   Result Value Ref Range    Ethanol <10 0 - 10 mg/dL   Salicylate Level    Collection Time: 01/31/18  1:37 PM   Result Value Ref Range    Salicylate 2.6 0.0 - 29.0 mg/dL   Protime-INR    Collection Time: 01/31/18  1:37 PM   Result Value Ref Range    Protime 12.6 (H) 9.6 - 11.5 Seconds    INR 1.15    Scan Slide    Collection Time: 01/31/18  1:37 PM   Result Value Ref Range    RBC Morphology Normal Normal    WBC Morphology Normal Normal    Platelet Morphology Normal Normal   POC Troponin, Rapid    Collection Time: 01/31/18  1:45 PM   Result Value Ref Range    Troponin I 4.39 (C) 0.00 - 0.07 ng/mL   Urinalysis With / Culture If Indicated - Urine, Catheter    Collection Time: 01/31/18  1:46 PM   Result Value Ref Range    Color, UA Yellow Yellow, Straw    Appearance, UA Cloudy (A) Clear    pH, UA <=5.0 5.0 - 8.0    Specific Gravity, UA 1.025 1.005 - 1.030    Glucose, UA Negative Negative    Ketones, UA Negative Negative    Bilirubin, UA Negative Negative    Blood, UA Negative Negative    Protein, UA Negative Negative    Leuk Esterase, UA Moderate (2+) (A) Negative    Nitrite, UA Negative Negative    Urobilinogen, UA 0.2 E.U./dL 0.2 - 1.0 E.U./dL   Urine Drug Screen - Urine, Catheter    Collection Time: 01/31/18  1:46 PM   Result Value Ref Range    THC, Screen, Urine Negative Negative    Phencyclidine (PCP), Urine Negative Negative    Cocaine Screen, Urine Negative Negative    Methamphetamine, Urine Negative Negative    Opiate Screen Negative Negative    Amphetamine Screen, Urine  Negative Negative    Benzodiazepine Screen, Urine Negative Negative    Tricyclic Antidepressants Screen Negative Negative    Methadone Screen, Urine Negative Negative    Barbiturates Screen, Urine Negative Negative    Oxycodone Screen, Urine Negative Negative    Propoxyphene Screen Negative Negative    Buprenorphine, Screen, Urine Negative Negative   Urinalysis, Microscopic Only - Urine, Clean Catch    Collection Time: 01/31/18  1:46 PM   Result Value Ref Range    RBC, UA 0-2 None Seen, 0-2 /HPF    WBC, UA 21-30 (A) None Seen /HPF    Bacteria, UA Trace None Seen, Trace /HPF    Squamous Epithelial Cells, UA 3-6 (A) None Seen, 0-2 /HPF    Yeast, UA Moderate/2+ Budding Yeast None Seen /HPF    Hyaline Casts, UA 0-6 0 - 6 /LPF    Amorphous Crystals, UA Small/1+ None Seen /HPF    Methodology Automated Microscopy      Note: In addition to lab results from this visit, the labs listed above may include labs taken at another facility or during a different encounter within the last 24 hours. Please correlate lab times with ED admission and discharge times for further clarification of the services performed during this visit.    XR Chest 1 View   Preliminary Result   Mild pulmonary venous hypertension unchanged. No new chest   disease is identified.       D:  01/31/2018   E:  01/31/2018                  CT Head Without Contrast    (Results Pending)     Vitals:    01/31/18 1347 01/31/18 1430 01/31/18 1446 01/31/18 1500   BP:  100/69  (!) 113/101   Pulse:   (!) 142 (!) 148   Resp:       Temp: 97.5 °F (36.4 °C)      TempSrc: Rectal      SpO2:   95% 99%   Weight:       Height:         Medications   sodium chloride 0.9 % flush 10 mL (not administered)   sodium chloride 0.9 % bolus 500 mL (500 mL Intravenous New Bag 1/31/18 1450)   cefTRIAXone (ROCEPHIN) IVPB 1 g (1 g Intravenous New Bag 1/31/18 1451)   insulin regular (humuLIN R,novoLIN R) injection 10 Units (not administered)   dextrose (D50W) solution 50 mL (not administered)    calcium gluconate 1 g in sodium chloride 0.9 % 100 mL IVPB (not administered)   albuterol (PROVENTIL) nebulizer solution 0.083% 2.5 mg/3mL (not administered)   sodium chloride 0.9 % bolus 1,000 mL (not administered)     ECG/EMG Results (last 24 hours)     Procedure Component Value Units Date/Time    ECG 12 Lead [[id number]] Collected:  01/31/18 1320     Updated:  01/31/18 1323                    Recent Results (from the past 24 hour(s))   Comprehensive Metabolic Panel    Collection Time: 01/31/18  1:37 PM   Result Value Ref Range    Glucose 284 (H) 70 - 100 mg/dL     (H) 9 - 23 mg/dL    Creatinine 4.70 (H) 0.60 - 1.30 mg/dL    Sodium 141 132 - 146 mmol/L    Potassium 6.6 (C) 3.5 - 5.5 mmol/L    Chloride 113 (H) 99 - 109 mmol/L    CO2 13.0 (L) 20.0 - 31.0 mmol/L    Calcium 9.7 8.7 - 10.4 mg/dL    Total Protein 7.0 5.7 - 8.2 g/dL    Albumin 3.70 3.20 - 4.80 g/dL    ALT (SGPT) 34 7 - 40 U/L    AST (SGOT) 40 (H) 0 - 33 U/L    Alkaline Phosphatase 284 (H) 25 - 100 U/L    Total Bilirubin 0.8 0.3 - 1.2 mg/dL    eGFR  African Amer 11 (L) >60 mL/min/1.73    Globulin 3.3 gm/dL    A/G Ratio 1.1 (L) 1.5 - 2.5 g/dL    BUN/Creatinine Ratio 32.3 (H) 7.0 - 25.0    Anion Gap 15.0 (H) 3.0 - 11.0 mmol/L   Magnesium    Collection Time: 01/31/18  1:37 PM   Result Value Ref Range    Magnesium 2.2 1.3 - 2.7 mg/dL   Light Blue Top    Collection Time: 01/31/18  1:37 PM   Result Value Ref Range    Extra Tube hold for add-on    Green Top (Gel)    Collection Time: 01/31/18  1:37 PM   Result Value Ref Range    Extra Tube Hold for add-ons.    Lavender Top    Collection Time: 01/31/18  1:37 PM   Result Value Ref Range    Extra Tube hold for add-on    Gold Top - SST    Collection Time: 01/31/18  1:37 PM   Result Value Ref Range    Extra Tube Hold for add-ons.    CBC Auto Differential    Collection Time: 01/31/18  1:37 PM   Result Value Ref Range    WBC 17.18 (H) 3.50 - 10.80 10*3/mm3    RBC 6.21 (H) 3.89 - 5.14 10*6/mm3    Hemoglobin 18.1  (H) 11.5 - 15.5 g/dL    Hematocrit 55.5 (H) 34.5 - 44.0 %    MCV 89.4 80.0 - 99.0 fL    MCH 29.1 27.0 - 31.0 pg    MCHC 32.6 32.0 - 36.0 g/dL    RDW 19.6 (H) 11.3 - 14.5 %    RDW-SD 63.3 (H) 37.0 - 54.0 fl    MPV 11.7 6.0 - 12.0 fL    Platelets 262 150 - 450 10*3/mm3    Neutrophil % 85.3 (H) 41.0 - 71.0 %    Lymphocyte % 7.9 (L) 24.0 - 44.0 %    Monocyte % 5.2 0.0 - 12.0 %    Eosinophil % 0.1 0.0 - 3.0 %    Basophil % 0.2 0.0 - 1.0 %    Immature Grans % 1.3 (H) 0.0 - 0.6 %    Neutrophils, Absolute 14.66 (H) 1.50 - 8.30 10*3/mm3    Lymphocytes, Absolute 1.36 0.60 - 4.80 10*3/mm3    Monocytes, Absolute 0.89 0.00 - 1.00 10*3/mm3    Eosinophils, Absolute 0.01 0.00 - 0.30 10*3/mm3    Basophils, Absolute 0.03 0.00 - 0.20 10*3/mm3    Immature Grans, Absolute 0.23 (H) 0.00 - 0.03 10*3/mm3   Lactic Acid, Plasma    Collection Time: 01/31/18  1:37 PM   Result Value Ref Range    Lactate 3.6 (C) 0.5 - 2.0 mmol/L   T4, Free    Collection Time: 01/31/18  1:37 PM   Result Value Ref Range    Free T4 1.53 0.89 - 1.76 ng/dL   TSH    Collection Time: 01/31/18  1:37 PM   Result Value Ref Range    TSH 2.380 0.350 - 5.350 mIU/mL   Acetaminophen Level    Collection Time: 01/31/18  1:37 PM   Result Value Ref Range    Acetaminophen <10.0 0.0 - 30.0 mcg/mL   Ethanol    Collection Time: 01/31/18  1:37 PM   Result Value Ref Range    Ethanol <10 0 - 10 mg/dL   Salicylate Level    Collection Time: 01/31/18  1:37 PM   Result Value Ref Range    Salicylate 2.6 0.0 - 29.0 mg/dL   Protime-INR    Collection Time: 01/31/18  1:37 PM   Result Value Ref Range    Protime 12.6 (H) 9.6 - 11.5 Seconds    INR 1.15    Scan Slide    Collection Time: 01/31/18  1:37 PM   Result Value Ref Range    RBC Morphology Normal Normal    WBC Morphology Normal Normal    Platelet Morphology Normal Normal   BNP    Collection Time: 01/31/18  1:37 PM   Result Value Ref Range    .0 (H) 0.0 - 100.0 pg/mL   POC Troponin, Rapid    Collection Time: 01/31/18  1:45 PM   Result  Value Ref Range    Troponin I 4.39 (C) 0.00 - 0.07 ng/mL   Urinalysis With / Culture If Indicated - Urine, Catheter    Collection Time: 01/31/18  1:46 PM   Result Value Ref Range    Color, UA Yellow Yellow, Straw    Appearance, UA Cloudy (A) Clear    pH, UA <=5.0 5.0 - 8.0    Specific Gravity, UA 1.025 1.005 - 1.030    Glucose, UA Negative Negative    Ketones, UA Negative Negative    Bilirubin, UA Negative Negative    Blood, UA Negative Negative    Protein, UA Negative Negative    Leuk Esterase, UA Moderate (2+) (A) Negative    Nitrite, UA Negative Negative    Urobilinogen, UA 0.2 E.U./dL 0.2 - 1.0 E.U./dL   Urine Drug Screen - Urine, Catheter    Collection Time: 01/31/18  1:46 PM   Result Value Ref Range    THC, Screen, Urine Negative Negative    Phencyclidine (PCP), Urine Negative Negative    Cocaine Screen, Urine Negative Negative    Methamphetamine, Urine Negative Negative    Opiate Screen Negative Negative    Amphetamine Screen, Urine Negative Negative    Benzodiazepine Screen, Urine Negative Negative    Tricyclic Antidepressants Screen Negative Negative    Methadone Screen, Urine Negative Negative    Barbiturates Screen, Urine Negative Negative    Oxycodone Screen, Urine Negative Negative    Propoxyphene Screen Negative Negative    Buprenorphine, Screen, Urine Negative Negative   Urinalysis, Microscopic Only - Urine, Clean Catch    Collection Time: 01/31/18  1:46 PM   Result Value Ref Range    RBC, UA 0-2 None Seen, 0-2 /HPF    WBC, UA 21-30 (A) None Seen /HPF    Bacteria, UA Trace None Seen, Trace /HPF    Squamous Epithelial Cells, UA 3-6 (A) None Seen, 0-2 /HPF    Yeast, UA Moderate/2+ Budding Yeast None Seen /HPF    Hyaline Casts, UA 0-6 0 - 6 /LPF    Amorphous Crystals, UA Small/1+ None Seen /HPF    Methodology Automated Microscopy      Note: In addition to lab results from this visit, the labs listed above may include labs taken at another facility or during a different encounter within the last 24 hours.  Please correlate lab times with ED admission and discharge times for further clarification of the services performed during this visit.    XR Chest 1 View   Preliminary Result   Mild pulmonary venous hypertension unchanged. No new chest   disease is identified.       D:  01/31/2018   E:  01/31/2018                  CT Head Without Contrast    (Results Pending)     Vitals:    01/31/18 1446 01/31/18 1500 01/31/18 1515 01/31/18 1517   BP:  (!) 113/101     Pulse: (!) 142 (!) 148 (!) 140 (!) 144   Resp:   28    Temp:       TempSrc:       SpO2: 95% 99% 95% 96%   Weight:       Height:         Medications   sodium chloride 0.9 % flush 10 mL (not administered)   calcium gluconate 1 g in sodium chloride 0.9 % 100 mL IVPB (not administered)   sodium chloride 0.9 % bolus 1,000 mL (1,000 mL Intravenous New Bag 1/31/18 1533)   aspirin suppository 300 mg (not administered)   sodium chloride 0.9 % bolus 500 mL (500 mL Intravenous New Bag 1/31/18 1450)   cefTRIAXone (ROCEPHIN) IVPB 1 g (0 g Intravenous Stopped 1/31/18 1522)   insulin regular (humuLIN R,novoLIN R) injection 10 Units (10 Units Intravenous Given 1/31/18 1538)   dextrose (D50W) solution 50 mL (50 mL Intravenous Given 1/31/18 1539)   albuterol (PROVENTIL) nebulizer solution 0.083% 2.5 mg/3mL (10 mg Nebulization Given 1/31/18 1515)     ECG/EMG Results (last 24 hours)     Procedure Component Value Units Date/Time    ECG 12 Lead [[id number]] Collected:  01/31/18 1320     Updated:  01/31/18 1349            MDM  Number of Diagnoses or Management Options     Amount and/or Complexity of Data Reviewed  Decide to obtain previous medical records or to obtain history from someone other than the patient: yes    Critical Care  Total time providing critical care: 30-74 minutes      Final diagnoses:   Acute renal failure, unspecified acute renal failure type   Hyperkalemia   Sepsis, due to unspecified organism   Urinary tract infection with hematuria, site unspecified   Hyperglycemia  "  Leukocytosis, unspecified type   Altered mental status, unspecified altered mental status type   Atrial fibrillation with RVR       Documentation assistance provided by lam De Dios.  Information recorded by the scribe was done at my direction and has been verified and validated by me.     Brittney De Dios  01/31/18 1327       Brittney De Dios  01/31/18 1511       Blayne Moe MD  01/31/18 1540       Electronically signed by Blayne Moe MD at 1/31/2018  3:40 PM        Vital Signs (last 24 hours)       01/31 0700  -  02/01 0659 02/01 0700  -  02/01 0927   Most Recent    Temp (°F) 96.6 -  97.7       97.5 (36.4)    Heart Rate 108 -  (!)170      115     115    Resp 16 -  28      16     16    BP (!)81/56 -  136/89       98/61    SpO2 (%) (!)63 -  100      96     96          Hospital Medications (active)       Dose Frequency Start End    acetaminophen (TYLENOL) suppository 650 mg 650 mg Every 4 Hours PRN 1/31/2018     Sig - Route: Insert 1 suppository into the rectum Every 4 (Four) Hours As Needed for Fever (temperature greater than 101.5 F or headache). - Rectal    Linked Group 1:  \"Or\" Linked Group Details        acetaminophen (TYLENOL) tablet 650 mg 650 mg Every 4 Hours PRN 1/31/2018     Sig - Route: Take 2 tablets by mouth Every 4 (Four) Hours As Needed for Headache or Fever (fever greater than 101.5 F). - Oral    Linked Group 1:  \"Or\" Linked Group Details        albuterol (PROVENTIL) nebulizer solution 0.083% 2.5 mg/3mL 10 mg Once 1/31/2018 1/31/2018    Sig - Route: Take 10 mg by nebulization 1 (One) Time. - Nebulization    amiodarone (NEXTERONE) 360 mg/200 mL (1.8 mg/mL) infusion 1 mg/min Continuous 1/31/2018 2/1/2018    Sig - Route: Infuse 1 mg/min into a venous catheter Continuous. - Intravenous    Linked Group 2:  \"Followed by\" Linked Group Details        amiodarone (NEXTERONE) 360 mg/200 mL (1.8 mg/mL) infusion 0.5 mg/min Continuous 2/1/2018     Sig - Route: Infuse 0.5 mg/min into a " "venous catheter Continuous. - Intravenous    Linked Group 2:  \"Followed by\" Linked Group Details        aspirin chewable tablet 324 mg 324 mg Daily 2/1/2018     Sig - Route: Chew 4 tablets Daily. - Oral    aspirin suppository 300 mg 300 mg Once 1/31/2018 1/31/2018    Sig - Route: Insert 1 suppository into the rectum 1 (One) Time. - Rectal    atorvastatin (LIPITOR) tablet 10 mg 10 mg Nightly 2/1/2018     Sig - Route: Take 1 tablet by mouth Every Night. - Oral    calcium gluconate 1 g in sodium chloride 0.9 % 100 mL IVPB 1 g Once 1/31/2018 1/31/2018    Sig - Route: Infuse 1 g into a venous catheter 1 (One) Time. - Intravenous    cefTRIAXone (ROCEPHIN) IVPB 1 g 1 g Once 1/31/2018 1/31/2018    Sig - Route: Infuse 50 mL into a venous catheter 1 (One) Time. - Intravenous    cefTRIAXone (ROCEPHIN) IVPB 1 g 1 g Every 24 Hours 1/31/2018 2/7/2018    Sig - Route: Infuse 50 mL into a venous catheter Daily. - Intravenous    dextrose (D50W) solution 25 g 25 g Every 15 Minutes PRN 1/31/2018     Sig - Route: Infuse 50 mL into a venous catheter Every 15 (Fifteen) Minutes As Needed for Low Blood Sugar (Blood Sugar Less Than 70, Patient Has IV Access - Unresponsive, NPO or Unable To Safely Swallow). - Intravenous    dextrose (D50W) solution 50 mL 50 mL Once 1/31/2018 1/31/2018    Sig - Route: Infuse 50 mL into a venous catheter 1 (One) Time. - Intravenous    dextrose (GLUTOSE) oral gel 15 g 15 g Every 15 Minutes PRN 1/31/2018     Sig - Route: Take 15 g by mouth Every 15 (Fifteen) Minutes As Needed for Low Blood Sugar (Blood Sugar Less Than 70, Patient Alert, Is Not NPO & Can Safely Swallow). - Oral    digoxin (LANOXIN) injection 125 mcg 125 mcg Every 3 Hours 2/1/2018 2/1/2018    Sig - Route: Infuse 0.5 mL into a venous catheter Every 3 (Three) Hours. - Intravenous    diltiaZEM (CARDIZEM) tablet 60 mg 60 mg Every 6 Hours Scheduled 1/31/2018     Sig - Route: Take 1 tablet by mouth Every 6 (Six) Hours. - Oral    famotidine (PEPCID) " "injection 20 mg 20 mg Every 12 Hours Scheduled 1/31/2018     Sig - Route: Infuse 2 mL into a venous catheter Every 12 (Twelve) Hours. - Intravenous    heparin (porcine) 5000 UNIT/ML injection 5,000 Units 5,000 Units Every 8 Hours Scheduled 1/31/2018     Sig - Route: Inject 1 mL under the skin Every 8 (Eight) Hours. - Subcutaneous    insulin regular (HumuLIN R,NovoLIN R) 100 Units in sodium chloride 0.9 % 100 mL (1 Units/mL) infusion 0-50 Units/hr Titrated 2/1/2018     Sig - Route: Infuse 0-50 Units/hr into a venous catheter Dose Adjusted By Provider As Needed. - Intravenous    insulin regular (humuLIN R,novoLIN R) injection 10 Units 10 Units Once 1/31/2018 1/31/2018    Sig - Route: Infuse 10 Units into a venous catheter 1 (One) Time. - Intravenous    ipratropium-albuterol (DUO-NEB) nebulizer solution 3 mL 3 mL Every 6 Hours - RT 1/31/2018     Sig - Route: Take 3 mL by nebulization Every 6 (Six) Hours. - Nebulization    Magnesium Sulfate 2 gram infusion- Mg 1.6 - 1.9 mg/dL 2 g As Needed 2/1/2018     Sig - Route: Infuse 50 mL into a venous catheter As Needed (Mg 1.6 - 1.9 mg/dL). - Intravenous    Linked Group 3:  \"Or\" Linked Group Details        magnesium sulfate 3 gram infusion (1gm x 3) - Mg 1.1 - 1.5 mg/dL 1 g As Needed 2/1/2018     Sig - Route: Infuse 100 mL into a venous catheter As Needed (Mg 1.1 - 1.5 mg/dL). - Intravenous    Linked Group 3:  \"Or\" Linked Group Details        magnesium sulfate 4 gram infusion - Mg less than or equal to 1mg/dL 4 g As Needed 2/1/2018     Sig - Route: Infuse 100 mL into a venous catheter As Needed (Mg less than or equal to 1mg/dL). - Intravenous    Linked Group 3:  \"Or\" Linked Group Details        metoprolol tartrate (LOPRESSOR) injection 5 mg 5 mg Every 6 Hours Scheduled 1/31/2018     Sig - Route: Infuse 5 mL into a venous catheter Every 6 (Six) Hours. - Intravenous    phenylephrine (TRESA-SYNEPHRINE) 50 mg/250 mL (0.2 mg/mL) in 0.9% NS  infusion 0.5-3 mcg/kg/min × 90.7 kg Titrated " 1/31/2018     Sig - Route: Infuse 45..1 mcg/min into a venous catheter Dose Adjusted By Provider As Needed. - Intravenous    sodium bicarbonate 8.4 % 150 mEq in dextrose (D5W) 5 % 1,000 mL infusion (greater than 75 mEq) 75 mL/hr Continuous 1/31/2018     Sig - Route: Infuse 75 mL/hr into a venous catheter Continuous. - Intravenous    sodium bicarbonate injection 8.4% 50 mEq 50 mEq Once 1/31/2018 1/31/2018    Sig - Route: Infuse 50 mL into a venous catheter 1 (One) Time. - Intravenous    sodium chloride 0.9 % bolus 1,000 mL 1,000 mL Once 1/31/2018 1/31/2018    Sig - Route: Infuse 1,000 mL into a venous catheter 1 (One) Time. - Intravenous    sodium chloride 0.9 % bolus 1,000 mL 1,000 mL Once 1/31/2018 1/31/2018    Sig - Route: Infuse 1,000 mL into a venous catheter 1 (One) Time. - Intravenous    sodium chloride 0.9 % bolus 1,000 mL 1,000 mL Once 1/31/2018 1/31/2018    Sig - Route: Infuse 1,000 mL into a venous catheter 1 (One) Time. - Intravenous    sodium chloride 0.9 % bolus 500 mL 500 mL Once 1/31/2018 1/31/2018    Sig - Route: Infuse 500 mL into a venous catheter 1 (One) Time. - Intravenous    sodium chloride 0.9 % bolus 500 mL 500 mL Once 1/31/2018 1/31/2018    Sig - Route: Infuse 500 mL into a venous catheter 1 (One) Time. - Intravenous    sodium chloride 0.9 % flush 1-10 mL 1-10 mL As Needed 1/31/2018     Sig - Route: Infuse 1-10 mL into a venous catheter As Needed for Line Care. - Intravenous    sodium chloride 0.9 % flush 10 mL 10 mL As Needed 1/31/2018     Sig - Route: Infuse 10 mL into a venous catheter As Needed for Line Care. - Intravenous    Cosign for Ordering: Accepted by Blayen Moe MD on 1/31/2018  8:49 PM    sodium chloride 0.9 % infusion 25 mL/hr Continuous 1/31/2018     Sig - Route: Infuse 25 mL/hr into a venous catheter Continuous. - Intravenous    atorvastatin (LIPITOR) tablet 10 mg (Discontinued) 10 mg Nightly 1/31/2018 1/31/2018    Sig - Route: Take 1 tablet by mouth Every Night.  "- Oral    dextrose (D50W) solution 25-50 mL (Discontinued) 25-50 mL Every 30 Minutes PRN 1/31/2018 1/31/2018    Sig - Route: Infuse 25-50 mL into a venous catheter Every 30 (Thirty) Minutes As Needed for Low Blood Sugar (Blood Glucose <70 mg/dL; Per Insulin Infusion Protocol). - Intravenous    Reason for Discontinue: Formulary change    famotidine (PEPCID) tablet 20 mg (Discontinued) 20 mg Daily 2/1/2018 1/31/2018    Sig - Route: Take 1 tablet by mouth Daily. - Oral    insulin regular (HumuLIN R,NovoLIN R) 100 Units in sodium chloride 0.9 % 100 mL (1 Units/mL) infusion (Discontinued) 1-20 Units/hr Titrated 2/1/2018 1/31/2018    Sig - Route: Infuse 1-20 Units/hr into a venous catheter Dose Adjusted By Provider As Needed. - Intravenous    Reason for Discontinue: Formulary change    insulin regular (humuLIN R,novoLIN R) injection 0-14 Units (Discontinued) 0-14 Units Every 6 Hours Scheduled 1/31/2018 2/1/2018    Sig - Route: Inject 0-14 Units under the skin Every 6 (Six) Hours. - Subcutaneous    Reason for Discontinue: Alternate therapy    metoprolol tartrate (LOPRESSOR) injection 5 mg (Discontinued) 5 mg Every 6 Hours 2/1/2018 1/31/2018    Sig - Route: Infuse 5 mL into a venous catheter Every 6 (Six) Hours. - Intravenous    Reason for Discontinue: Reorder    metoprolol tartrate (LOPRESSOR) tablet 50 mg (Discontinued) 50 mg Every 12 Hours Scheduled 1/31/2018 1/31/2018    Sig - Route: Take 1 tablet by mouth Every 12 (Twelve) Hours. - Oral    ondansetron (ZOFRAN) injection 4 mg (Discontinued) 4 mg Every 6 Hours PRN 1/31/2018 1/31/2018    Sig - Route: Infuse 2 mL into a venous catheter Every 6 (Six) Hours As Needed for Nausea or Vomiting. - Intravenous    Reason for Discontinue: Discontinued by another clinician    Linked Group 4:  \"Or\" Linked Group Details        ondansetron (ZOFRAN) tablet 4 mg (Discontinued) 4 mg Every 6 Hours PRN 1/31/2018 1/31/2018    Sig - Route: Take 1 tablet by mouth Every 6 (Six) Hours As Needed " "for Nausea or Vomiting. - Oral    Reason for Discontinue: Discontinued by another clinician    Linked Group 4:  \"Or\" Linked Group Details        sodium bicarbonate 8.4 % 50 mEq in dextrose (D5W) 5 % 1,000 mL infusion (less than 75 mEq) (Discontinued) 50 mL/hr Continuous 1/31/2018 1/31/2018    Sig - Route: Infuse 50 mL/hr into a venous catheter Continuous. - Intravenous             Operative/Procedure Notes (all)      DANIEL Ludwig at 1/31/2018  7:26 PM  Version 1 of 1     Procedure Orders:    1. Insert Central Line At Bedside [[id number]] ordered by DANIEL Ludwig at 01/31/18 1926            Post-procedure Diagnoses:    1. Sepsis due to urinary tract infection [A41.9, N39.0]               Insert Central Line At Bedside  Date/Time: 1/31/2018 7:26 PM  Performed by: CECELIA JOHNSON  Authorized by: CECELIA JOHNSON   Consent: Verbal consent obtained. Written consent obtained.  Consent given by: power of   Patient states understanding of procedure being performed: Patient with chronic altered mental status. Discussed procedure and complications with the daughter and answered questions to the best of my ability.   Patient identity confirmed: arm band and provided demographic data  Time out: Immediately prior to procedure a \"time out\" was called to verify the correct patient, procedure, equipment, support staff and site/side marked as required.  Indications: vascular access  Anesthesia: local infiltration    Anesthesia:  Local Anesthetic: lidocaine 1% without epinephrine  Anesthetic total: 7 mL    Sedation:  Patient sedated: no  Preparation: skin prepped with 2% chlorhexidine  Skin prep agent dried: skin prep agent completely dried prior to procedure  Sterile barriers: all five maximum sterile barriers used - cap, mask, sterile gown, sterile gloves, and large sterile sheet  Hand hygiene: hand hygiene performed prior to central venous catheter insertion  Location details: left internal " jugular  Patient position: Trendelenburg  Catheter type: triple lumen  Catheter size: 7 Fr  Pre-procedure: landmarks identified  Ultrasound guidance: yes  Sterile ultrasound techniques: sterile gel and sterile probe covers were used  Number of attempts: 2 (Initial attempt with right IJ but unsuccesful secondary to scar tissue from previous central line.  Moved to left IJ with success. )  Successful placement: yes  Post-procedure: line sutured and dressing applied  Assessment: blood return through all ports,  placement verified by x-ray and no pneumothorax on x-ray  Patient tolerance: Patient tolerated the procedure well with no immediate complications  Comments: Dark red, non-pulsatile blood upon cannulation central line inserted into left IJ to 20cm. Dark red, non-pulsatile blood through each port. Line sutured in x3 and sterile dressing applied. CXR with no PTX. Patient tolerated procedure well.             DANIEL Ludwig, ACNP-BC  01/31/18 7:33 PM  Pulmonary & Critical Care           Electronically signed by DANIEL Ludwig at 1/31/2018  7:38 PM        Physician Progress Notes (all)     No notes of this type exist for this encounter.        Consult Notes (all)     No notes of this type exist for this encounter.

## 2018-02-01 NOTE — PLAN OF CARE
Problem: Patient Care Overview (Adult)  Goal: Plan of Care Review  Outcome: Ongoing (interventions implemented as appropriate)   02/01/18 3536   Coping/Psychosocial Response Interventions   Plan Of Care Reviewed With patient   Patient Care Overview   Progress improving   Outcome Evaluation   Outcome Summary/Follow up Plan bicarb drip continues. critical care insulin drip continues. diet advanced to regular. afib continues, rate better controlled by diltiazem rather than amiodarone. blood culture from anaerobic bottle came back gram+ cocci in pairs and clusters.        Problem: Renal Failure/Kidney Injury, Acute (Adult)  Goal: Signs and Symptoms of Listed Potential Problems Will be Absent or Manageable (Renal Failure/Kidney Injury, Acute)  Outcome: Ongoing (interventions implemented as appropriate)      Problem: Sepsis (Adult)  Goal: Signs and Symptoms of Listed Potential Problems Will be Absent or Manageable (Sepsis)  Outcome: Ongoing (interventions implemented as appropriate)      Problem: Skin Integrity Impairment, Risk/Actual (Adult)  Goal: Identify Related Risk Factors and Signs and Symptoms  Outcome: Ongoing (interventions implemented as appropriate)    Goal: Skin Integrity/Wound Healing  Outcome: Ongoing (interventions implemented as appropriate)      Problem: Fall Risk (Adult)  Goal: Identify Related Risk Factors and Signs and Symptoms  Outcome: Ongoing (interventions implemented as appropriate)    Goal: Absence of Falls  Outcome: Ongoing (interventions implemented as appropriate)
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
## 2018-02-01 NOTE — PLAN OF CARE
Problem: Patient Care Overview (Adult)  Goal: Plan of Care Review  Outcome: Ongoing (interventions implemented as appropriate)   01/31/18 2015   Coping/Psychosocial Response Interventions   Plan Of Care Reviewed With daughter   Patient Care Overview   Progress unable to show any progress toward functional goals   Outcome Evaluation   Outcome Summary/Follow up Plan received patient from ED. insulin, dextrose and 500 ns bolus given for treatment of potassium. CT head. pt in rapid atrial fibb with rate in 160's. 2 liters ns bolus given and then started amio gtt without bolus. echo done. unable to obtain venous lab draw..central line access placed in left IJ. 1 amp of bicarb given and bicarb gtt initated. daughter at bedside.      Goal: Adult Individualization and Mutuality  Outcome: Ongoing (interventions implemented as appropriate)      Problem: Renal Failure/Kidney Injury, Acute (Adult)  Goal: Signs and Symptoms of Listed Potential Problems Will be Absent or Manageable (Renal Failure/Kidney Injury, Acute)  Outcome: Ongoing (interventions implemented as appropriate)      Problem: Cardiac Output, Decreased (Adult)  Goal: Identify Related Risk Factors and Signs and Symptoms  Outcome: Ongoing (interventions implemented as appropriate)    Goal: Adequate Cardiac Output/Effective Tissue Perfusion  Outcome: Ongoing (interventions implemented as appropriate)      Problem: Sepsis (Adult)  Goal: Signs and Symptoms of Listed Potential Problems Will be Absent or Manageable (Sepsis)  Outcome: Ongoing (interventions implemented as appropriate)

## 2018-02-01 NOTE — PROGRESS NOTES
"Pharmacy Consult-Vancomycin Dosing  Leila Smith is a  72 y.o. female receiving vancomycin therapy.     Indication: Bacteremia   Consulting Provider: Intensivist   ID Consult: No    Goal Trough: 15-20 mcg/mL    Current Antimicrobial Therapy  Anti-Infectives       Ordered     Dose/Rate Route Frequency Start Stop      02/01/18 1413  vancomycin 1750 mg/500 mL 0.9% NS IVPB ([facility])     Ordering Provider:  Jasmina Maier, DO    20 mg/kg × 90.7 kg  over 120 Minutes Intravenous Once 02/01/18 1445      02/01/18 1413  piperacillin-tazobactam (ZOSYN) 4.5 g in iso-osmotic dextrose 100 mL IVPB (premix)     Ordering Provider:  Jasmina Maier, DO    4.5 g  200 mL/hr over 0.5 Hours Intravenous Once 02/01/18 1445      02/01/18 1401  Pharmacy to dose vancomycin     Ordering Provider:  Jasmina RACHEL Case, DO     Does not apply Continuous PRN 02/01/18 1400 02/15/18 1359    01/31/18 1418  cefTRIAXone (ROCEPHIN) IVPB 1 g     Ordering Provider:  Blayne Moe MD    1 g  100 mL/hr over 30 Minutes Intravenous Once 01/31/18 1430 01/31/18 1522            Allergies  Allergies as of 01/31/2018 - Rohan as Reviewed 01/31/2018   Allergen Reaction Noted   • Codeine  08/03/2016   • Tetracyclines & related  07/15/2016       Labs      Results from last 7 days     Lab Units 02/01/18  0344 01/31/18  2353 01/31/18  1939   BUN mg/dL 130* 120* 135*   CREATININE mg/dL 3.60* 3.60* 3.90*       Results from last 7 days     Lab Units 02/01/18  0344 01/31/18  1337   WBC 10*3/mm3 17.29* 17.18*     Evaluation of Dosing     Ht - 165.1 cm (65\")  Wt - 90.7 kg (200 lb)    Estimated Creatinine Clearance: 15.3 mL/min (by C-G formula based on Cr of 3.6).    Intake & Output (last 3 days)         01/29 0701 - 01/30 0700 01/30 0701 - 01/31 0700 01/31 0701 - 02/01 0700 02/01 0701 - 02/02 0700      I.V. (mL/kg)   1937.8 (21.4) 928.6 (10.2)    IV Piggyback   650 422.5    Total Intake(mL/kg)   2587.8 (28.5) 1351.1 (14.9)    Urine (mL/kg/hr)   605 540 (0.7)    Stool   0     " Total Output     605 540    Net     +1982.8 +811.1            Unmeasured Stool Occurrence   1 x             Microbiology and Radiology  Microbiology Results (last 10 days)       Procedure Component Value - Date/Time      Urine Culture - Urine, Urine, Clean Catch [[id number]]  (Normal) Collected:  01/31/18 1643    Lab Status:  Preliminary result Specimen:  Urine from Urine, Catheter Updated:  02/01/18 0817     Urine Culture No growth    Influenza A & B, RT PCR - Swab, Nasopharynx [[id number]]  (Normal) Collected:  01/31/18 1630    Lab Status:  Final result Specimen:  Swab from Nasopharynx Updated:  01/31/18 1748     Influenza A PCR Not Detected     Influenza B PCR Not Detected    Urine Culture - Urine, Urine, Clean Catch [[id number]]  (Abnormal) Collected:  01/31/18 1346    Lab Status:  Preliminary result Specimen:  Urine from Urine, Catheter Updated:  02/01/18 1102     Urine Culture --      40,000-50,000 CFU/mL Yeast isolated (A)    Blood Culture - Blood, [[id number]]  (Abnormal) Collected:  01/31/18 1337    Lab Status:  Preliminary result Specimen:  Blood from Arm, Right Updated:  02/01/18 1443     Blood Culture Abnormal Stain (A)     Gram Stain Result Anaerobic Bottle Gram positive cocci in pairs and clusters    Blood Culture - Blood, [[id number]]  (Normal) Collected:  01/31/18 1337    Lab Status:  Preliminary result Specimen:  Blood from Arm, Left Updated:  02/01/18 1431     Blood Culture No growth at 24 hours            Evaluation of Level    No prior vancomycin use at [facility]    Assessment/Plan:    1. Pharmacy to dose vancomycin for bacteremia. Blood culture growing GPC in pairs and clusters. Will order vancomycin 1750 mg (20 mg/kg) IV x 1. Pharmacy will initially dose vancomycin based on levels due to advanced age and poor renal function.  2. Random vancomycin level tomorrow afternoon @ 1300 to assess clearance.   3. Pharmacy will continue to monitor and adjust vancomycin dose as necessary based on renal function,  cultures, labs, and clinical status.     Thank you,  Ciera Silver, PharmD  Pharmacy Resident  2/1/2018  3:01 PM

## 2018-02-01 NOTE — PROCEDURES
"Insert Central Line At Bedside  Date/Time: 1/31/2018 7:26 PM  Performed by: CECELIA JOHNSON  Authorized by: CECELIA JOHNSON   Consent: Verbal consent obtained. Written consent obtained.  Consent given by: power of   Patient states understanding of procedure being performed: Patient with chronic altered mental status. Discussed procedure and complications with the daughter and answered questions to the best of my ability.   Patient identity confirmed: arm band and provided demographic data  Time out: Immediately prior to procedure a \"time out\" was called to verify the correct patient, procedure, equipment, support staff and site/side marked as required.  Indications: vascular access  Anesthesia: local infiltration    Anesthesia:  Local Anesthetic: lidocaine 1% without epinephrine  Anesthetic total: 7 mL    Sedation:  Patient sedated: no  Preparation: skin prepped with 2% chlorhexidine  Skin prep agent dried: skin prep agent completely dried prior to procedure  Sterile barriers: all five maximum sterile barriers used - cap, mask, sterile gown, sterile gloves, and large sterile sheet  Hand hygiene: hand hygiene performed prior to central venous catheter insertion  Location details: left internal jugular  Patient position: Trendelenburg  Catheter type: triple lumen  Catheter size: 7 Fr  Pre-procedure: landmarks identified  Ultrasound guidance: yes  Sterile ultrasound techniques: sterile gel and sterile probe covers were used  Number of attempts: 2 (Initial attempt with right IJ but unsuccesful secondary to scar tissue from previous central line.  Moved to left IJ with success. )  Successful placement: yes  Post-procedure: line sutured and dressing applied  Assessment: blood return through all ports,  placement verified by x-ray and no pneumothorax on x-ray  Patient tolerance: Patient tolerated the procedure well with no immediate complications  Comments: Dark red, non-pulsatile blood upon cannulation " central line inserted into left IJ to 20cm. Dark red, non-pulsatile blood through each port. Line sutured in x3 and sterile dressing applied. CXR with no PTX. Patient tolerated procedure well.             DANIEL Ludwig, ACNP-BC  01/31/18 7:33 PM  Pulmonary & Critical Care

## 2018-02-01 NOTE — PROGRESS NOTES
Adult Nutrition  Assessment/PES    Patient Name:  Leila Smith  YOB: 1945  MRN: [id number]  Admit Date:  1/31/2018    Assessment Date:  2/1/2018    Comments:  RD completed initial nutrition assessment r/t MST score. RD to continue to follow.             Reason for Assessment       02/01/18 1418    Reason for Assessment    Reason For Assessment/Visit multidisciplinary rounds;identified at risk by screening criteria    Identified At Risk By Screening Criteria MST SCORE 2+   Unsure    Time Spent (min) 30    Diagnosis Diagnosis    Cardiac CAD;HTN;Dyslipidemia    Endocrine DM Type 2, hyerglycemia    Gastrointestinal --   PMH:colectomy/colostomy    Infectious Disease Sepsis;UTI    Neurological AMS   PMH:mentally challenged @ baseline    Pulmonary/Critical Care --   PMH COPD, KEILY    Renal ANGELA;CKD    Skin Other (comment)   skin tear to coccyx   Principal Problem:    Sepsis due to urinary tract infection  Active Problems:    Diabetes mellitus, type 2    Hyperlipidemia    Hypothyroidism    Chronic obstructive pulmonary disease    CAD (coronary artery disease)    Peripheral vascular disease    Obstructive sleep apnea syndrome    Congestive heart failure    Atrial fibrillation with rapid ventricular response    Altered mental status    Acute renal failure    Hyperkalemia    Leukocytosis    Elevated troponin               Nutrition/Diet History       02/01/18 1425    Nutrition/Diet History    Factors Affecting Nutritional Intake Factors    Reported/Observed By RN;Other    Other Per MD pt with hx of refusing mediactions. RD spoke with employee at [facility] where pt had been residing P[facility] who stated pt had not been eating very well x 1-2weeks, he stated he did have access to weight history data. He stated pt had been receiving fortified shakes on all trays and would drink these, he includes that pt has no known dysphagia or food allergies from his knowledge. Per MDR-okay to advance diet.            "  Anthropometrics       02/01/18 1428    Anthropometrics (Special Considerations)    Height Used for Calculations 1.651 m (5' 5\")    Weight Used for Calculations 90.7 kg (200 lb)   Est 12/27 RD Calculated BMI (kg/m2) 33.28    Body Mass Index (BMI)    BMI Grade 30 - 34.9- obesity - grade I            Labs/Tests/Procedures/Meds       02/01/18 1429    Labs/Tests/Procedures/Meds    Labs/Tests Review Reviewed;BUN;Glucose;Creat;Hgb A1C    Medication Review Reviewed, pertinent   GTT:NaCl@25mL/hr, NaBibcarb, Insulin 2.4units/hr Other meds:ABX     Results from last 7 days  Lab Units 02/01/18  0344  01/31/18  1337   SODIUM mmol/L 142  < > 141   POTASSIUM mmol/L 4.3  < > 6.6*   CHLORIDE mmol/L 117*  < > 113*   CO2 mmol/L 17.0*  < > 13.0*   BUN mg/dL 130*  < > 152*   CREATININE mg/dL 3.60*  < > 4.70*   CALCIUM mg/dL 8.3*  < > 9.7   BILIRUBIN mg/dL  --   --  0.8   ALK PHOS U/L  --   --  284*   ALT (SGPT) U/L  --   --  34   AST (SGOT) U/L  --   --  40*   GLUCOSE mg/dL 237*  < > 284*   < > = values in this interval not displayed.       Results from last 7 days  Lab Units 02/01/18  1353 02/01/18  1241 02/01/18  1155 02/01/18  1058 02/01/18  0958 02/01/18  0850   GLUCOSE mg/dL 140* 127 161* 134* 120 125       Lab Results  Lab Value Date/Time   HGBA1C 10.20 (H) 01/31/2018 1939   HGBA1C 10.50 (H) 12/20/2017 0427           Intake & Output (last day)       01/31 0701 - 02/01 0700 02/01 0701 - 02/02 0700    I.V. (mL/kg) 1937.8 (21.4) 928.6 (10.2)    IV Piggyback 650 422.5    Total Intake(mL/kg) 2587.8 (28.5) 1351.1 (14.9)    Urine (mL/kg/hr) 605 540 (0.8)    Stool 0     Total Output 605 540    Net +1982.8 +811.1          Unmeasured Stool Occurrence 1 x                      Nutrition Prescription Ordered       02/01/18 1430    Nutrition Prescription PO    Current PO Diet NPO              Problem/Interventions:        Problem 1       02/01/18 1430    Nutrition Diagnoses Problem 1    Problem 1 Predicted Suboptimal Intake    Etiology " (related to) --   clinical status,, previous poor PO intake    Signs/Symptoms (evidenced by) Other (comment)   poor PO intake x 1-2weeks,                     Intervention Goal       02/01/18 1431    Intervention Goal    General Nutrition support treatment    PO Initiate feeding            Nutrition Intervention       02/01/18 1431    Nutrition Intervention    RD/Tech Action Follow Tx progress;Care plan reviewd;Recommend/ordered    Recommended/Ordered Diet;Supplement            Nutrition Prescription       02/01/18 1431    Nutrition Prescription PO    PO Prescription Begin/change supplement    Supplement Boost Glucose Control    Supplement Frequency 3 times a day    Common Modifiers Cardiac;Consistent Carbohydrate    New PO Prescription Ordered? Yes            Education/Evaluation       02/01/18 1431    Monitor/Evaluation    Monitor Per protocol;I&O;PO intake;Supplement intake;Pertinent labs        Electronically signed by:  Mayra Blake RDN, JENA  02/01/18 2:33 PM

## 2018-02-01 NOTE — H&P
Cardiology Consult/H&P     Leila Smith  1945  [phone]      02/01/18    DATE OF ADMISSION: 1/31/2018  [facility] 2A ICU    Ciaran Wakefield MD  [address] / [place] [id number]    Chief Complaint: Atrial fibrillation, elevated troponin    Patient Active Problem List   Diagnosis   • Diabetes mellitus, type 2   • Hypertension   • Hyperlipidemia   • Hypothyroidism   • Chronic obstructive pulmonary disease   • CAD (coronary artery disease)   • History of edema   • History of obesity   • Venous insufficiency   • Open wound of lower extremity   • Urinary tract infection   • T2DM (type 2 diabetes mellitus)   • Dyspnea on exertion   • Peripheral vascular disease   • Obstructive sleep apnea syndrome   • Ulcer of lower extremity   • Hypoxemia   • Hematuria   • Diabetic peripheral neuropathy   • Edema   • Chronic obstructive pulmonary disease with acute exacerbation   • Congestive heart failure   • Arthritis   • Neutrophilic leukocytosis   • Cystitis   • Atrial fibrillation with rapid ventricular response   • Altered mental status   • Sepsis due to urinary tract infection   • Acute renal failure   • Hyperkalemia   • Leukocytosis   • Elevated troponin         History of Present Illness:   Patient is a 72 year old AAF with a past medical history notable for COPD, CHF, atrial fibrillation, diabetes mellitus type II, and frequent UTIs who presented to [facility] ED from her rehab facility with worsening altered mental status.  At baseline, she is mentally challenged and is minimally verbal.  Upon arrival to the ED, she was noted to have a urinary tract infection with an elevated WBC of 17K, elevated potassium of 6.6, Cr 4.7,  and troponin of 4.39.  She was noted to be in atrial fibrillation with RVR with wide QRS in the 140's.  Repeat troponin 5.428.  Previous admission with similar presentation.  Upon admission, she was placed on amiodarone gtt and have attempted to rate  control with IV metoprolol and PO cardizem.  She remains tachycardic in the 120's-140's.  There is no family available at time of my exam and patient is not able to answer most questions appropriately.      Allergies   Allergen Reactions   • Codeine    • Tetracyclines & Related        Prior to Admission Medications     Prescriptions Last Dose Informant Patient Reported? Taking?    acetaminophen (TYLENOL) 500 MG tablet  Nursing Home Yes Yes    Take 1,000 mg by mouth Every 6 (Six) Hours As Needed for Mild Pain .    acidophilus (FLORANEX) tablet tablet  Nursing Home Yes Yes    Take 1 tablet by mouth 3 (Three) Times a Day.    ammonium lactate (AMLACTIN) 12 % cream  Nursing Home Yes Yes    Apply 1 application topically 2 (Two) Times a Day.    Artificial Tear Solution (TEARS NATURALE OP)  Nursing Home Yes Yes    Apply 1 application to eye 2 (Two) Times a Day.    aspirin 81 MG chewable tablet  Nursing Home Yes Yes    Chew 81 mg Daily.    atorvastatin (LIPITOR) 10 MG tablet  Nursing Home Yes Yes    Take 10 mg by mouth Every Night.    diltiaZEM (CARDIZEM) 60 MG tablet  Nursing Home No Yes    Take 1 tablet by mouth Every 6 (Six) Hours.    famotidine (PEPCID) 20 MG tablet  Nursing Home Yes Yes    Take 20 mg by mouth Daily.    haloperidol (HALDOL) 0.5 MG tablet  Nursing Home No Yes    Take 1 tablet by mouth Every 12 (Twelve) Hours.    Melatonin 3 MG tablet  Nursing Home Yes Yes    Take 3 mg by mouth Every Night.    metoprolol succinate XL (TOPROL-XL) 50 MG 24 hr tablet  Nursing Home No Yes    Take 3 tablets by mouth Daily.    mometasone-formoterol (DULERA) 100-5 MCG/ACT inhaler  Nursing Home Yes Yes    Inhale 2 puffs 2 (Two) Times a Day.    spironolactone (ALDACTONE) 50 MG tablet  Nursing Home No Yes    Take 1 tablet by mouth daily.    traMADol (ULTRAM) 50 MG tablet  Nursing Home No Yes    Take 1 tablet by mouth Every 8 (Eight) Hours As Needed for Moderate Pain .            Current Facility-Administered Medications:   •   acetaminophen (TYLENOL) tablet 650 mg, 650 mg, Oral, Q4H PRN **OR** acetaminophen (TYLENOL) suppository 650 mg, 650 mg, Rectal, Q4H PRN, DANIEL Ludwig  •  aspirin chewable tablet 324 mg, 324 mg, Oral, Daily, DANIEL Ludwig  •  atorvastatin (LIPITOR) tablet 10 mg, 10 mg, Oral, Nightly, DANIEL Mustafa  •  cefTRIAXone (ROCEPHIN) IVPB 1 g, 1 g, Intravenous, Q24H, DANIEL Ludwig, Last Rate: 100 mL/hr at 01/31/18 2239, 1 g at 01/31/18 2239  •  dextrose (D50W) solution 25 g, 25 g, Intravenous, Q15 Min PRN, DANIEL Mustafa  •  dextrose (GLUTOSE) oral gel 15 g, 15 g, Oral, Q15 Min PRN, DANIEL Mustafa  •  diltiaZEM (CARDIZEM) 125mg/125 mL infusion, 5-15 mg/hr, Intravenous, Titrated, Brandie Masters, APRN  •  diltiaZEM (CARDIZEM) tablet 60 mg, 60 mg, Oral, Q6H, DANIEL Ludwig, 60 mg at 02/01/18 1108  •  famotidine (PEPCID) injection 20 mg, 20 mg, Intravenous, Q12H, DANIEL Mustafa, 20 mg at 02/01/18 0900  •  heparin (porcine) 5000 UNIT/ML injection 5,000 Units, 5,000 Units, Subcutaneous, Q8H, DANIEL Ludwig, 5,000 Units at 02/01/18 0703  •  insulin regular (HumuLIN R,NovoLIN R) 100 Units in sodium chloride 0.9 % 100 mL (1 Units/mL) infusion, 0-50 Units/hr, Intravenous, Titrated, Gadiel Alvarado, [facility], Last Rate: 1.6 mL/hr at 02/01/18 1108, 1.6 Units/hr at 02/01/18 1108  •  ipratropium-albuterol (DUO-NEB) nebulizer solution 3 mL, 3 mL, Nebulization, Q6H - RT, Alexandra Galdamez, APRN, 3 mL at 02/01/18 0746  •  lactated ringers bolus 1,000 mL, 1,000 mL, Intravenous, Once, Jasmina VLaura Maier, DO, Last Rate: 1,000 mL/hr at 02/01/18 1056, 1,000 mL at 02/01/18 1056  •  magnesium sulfate 4 gram infusion - Mg less than or equal to 1mg/dL, 4 g, Intravenous, PRN **OR** magnesium sulfate 3 gram infusion (1gm x 3) - Mg 1.1 - 1.5 mg/dL, 1 g, Intravenous, PRN **OR** Magnesium Sulfate 2 gram infusion- Mg 1.6 - 1.9 mg/dL, 2 g, Intravenous, PRN, Frieda Alfredo,  DANIEL, Last Rate: 25 mL/hr at 02/01/18 0637, 2 g at 02/01/18 0637  •  metoprolol tartrate (LOPRESSOR) injection 5 mg, 5 mg, Intravenous, Q6H, DANIEL Mustafa, 5 mg at 02/01/18 1100  •  phenylephrine (TRESA-SYNEPHRINE) 50 mg/250 mL (0.2 mg/mL) in 0.9% NS  infusion, 0.5-3 mcg/kg/min, Intravenous, Titrated, DANIEL Mustafa  •  sodium bicarbonate 8.4 % 150 mEq in dextrose (D5W) 5 % 1,000 mL infusion (greater than 75 mEq), 75 mL/hr, Intravenous, Continuous, DANIEL Mustafa, Last Rate: 75 mL/hr at 02/01/18 1008, 75 mL/hr at 02/01/18 1008  •  sodium chloride 0.9 % flush 1-10 mL, 1-10 mL, Intravenous, PRN, DANIEL Ludwig  •  sodium chloride 0.9 % flush 10 mL, 10 mL, Intravenous, PRN, Blayne Moe MD  •  sodium chloride 0.9 % infusion, 25 mL/hr, Intravenous, Continuous, DANIEL Mustafa, Last Rate: 25 mL/hr at 02/01/18 0900, 25 mL/hr at 02/01/18 0900    Social History     Social History   • Marital status:      Spouse name: N/A   • Number of children: N/A   • Years of education: N/A     Social History Main Topics   • Smoking status: Former Smoker     Packs/day: 0.00     Years: 50.00     Types: Cigarettes   • Smokeless tobacco: Never Used   • Alcohol use No   • Drug use: No   • Sexual activity: Defer     Other Topics Concern   • None     Social History Narrative       Family History   Problem Relation Age of Onset   • Stomach cancer Mother    • Alcohol abuse Other    • Cancer Other    • Diabetes Other    • Hypertension Other    • Other Other        REVIEW OF SYSTEMS:   Denies pain, otherwise, unable to complete full review of systems due to patient's mental status.    Vitals:    02/01/18 0800 02/01/18 0900 02/01/18 1000 02/01/18 1100   BP: 90/52 (!) 83/71 (!) 86/76 95/83   BP Location: Left arm  Left arm    Patient Position: Lying  Lying    Pulse: (!) 131 117 (!) 129 106   Resp: 18  18    Temp: 97.5 °F (36.4 °C)      TempSrc: Core      SpO2: 99% 97% 98% 97%   Weight:       Height:              Vital Sign Min/Max for last 24 hours  Temp  Min: 96.6 °F (35.9 °C)  Max: 97.7 °F (36.5 °C)   BP  Min: 81/56  Max: 136/89   Pulse  Min: 106  Max: 170   Resp  Min: 16  Max: 28   SpO2  Min: 63 %  Max: 100 %   Flow (L/min)  Min: 2  Max: 6      Intake/Output Summary (Last 24 hours) at 02/01/18 1126  Last data filed at 02/01/18 1056   Gross per 24 hour   Intake           3516.5 ml   Output              925 ml   Net           2591.5 ml             Physical Exam:  GEN: Obese and poorly groomed, no acute distress  HEENT: Normocephalic, Atraumatic, PERRLA, moist mucous membranes  NECK: supple, NO JVD, no thyromegaly, no lymphadenopathy  CARD: Irregular rhythm and rate, S1S2, no murmur, gallop, rub  LUNGS: Clear to auscultataion, normal respiratory effort  ABDOMEN: Soft, nontender, normal bowel sounds  EXTREMITIES: Chronic venous stasis changes noted, 1+ bilat LE edema  SKIN: Warm, dry  NEURO: Confused at baseline  PSYCHIATRIC: Agitated      Data:     Results from last 7 days  Lab Units 02/01/18  0344 01/31/18  1337   WBC 10*3/mm3 17.29* 17.18*   HEMOGLOBIN g/dL 14.1 18.1*   HEMATOCRIT % 43.7 55.5*   PLATELETS 10*3/mm3 210 262       Results from last 7 days  Lab Units 02/01/18  0344 01/31/18  2353 01/31/18 1939   SODIUM mmol/L 142 141 142   POTASSIUM mmol/L 4.3 5.1 4.8   CHLORIDE mmol/L 117* 117* 117*   CO2 mmol/L 17.0* 12.0* 14.0*   BUN mg/dL 130* 120* 135*   CREATININE mg/dL 3.60* 3.60* 3.90*   GLUCOSE mg/dL 237* 327* 300*        Results from last 7 days  Lab Units 01/31/18 1939   HEMOGLOBIN A1C % 10.20*       Results from last 7 days  Lab Units 01/31/18  1939 01/31/18  1337   TSH mIU/mL 0.917 2.380   FREE T4 ng/dL  --  1.53           Results from last 7 days  Lab Units 01/31/18  1337   PROTIME Seconds 12.6*   INR  1.15       Results from last 7 days  Lab Units 01/31/18  1939   TROPONIN I ng/mL 5.428*               Intake/Output Summary (Last 24 hours) at 02/01/18 1126  Last data filed at 02/01/18 1056   Gross per  24 hour   Intake           3516.5 ml   Output              925 ml   Net           2591.5 ml       Chest X-Ray:  Imaging Results (last 24 hours)     Procedure Component Value Units Date/Time    XR Chest 1 View [[id number]] Collected:  01/31/18 1900     Updated:  01/31/18 1935    Narrative:       EXAM:    XR Chest, 1 View    CLINICAL HISTORY:    72 years old, female; Sepsis, unspecified organism; Elevated white blood cell   count, unspecified; Hyperkalemia; Unspecified atrial fibrillation; Acute kidney   failure, unspecified; Urinary tract infection, site not specified; Hematuria,   unspecified; Altered mental status, unspecified; Hyperglycemia, unspecified;   Device placement; Other: Central line placement    TECHNIQUE:    Frontal view of the chest.    COMPARISON:    CR - XR CHEST 1 VW 2018-01-31 13:45    FINDINGS:    Lungs:  Unremarkable.  No consolidation.    Pleural space:  Unremarkable.  No pneumothorax.    Heart:  The heart demonstrates diffuse enlargement.    Mediastinum:  Unremarkable.    Bones/joints:  Unremarkable.    Tubes, lines and devices:  A left internal jugular central venous catheter is   present, with its tip overlying the region of the superior vena cava.      Impression:         A left internal jugular central venous catheter is present, with its tip   overlying the region of the superior vena cava.    THIS DOCUMENT HAS BEEN ELECTRONICALLY SIGNED BY URSULA ARGUELLO MD    XR Chest 1 View [[id number]] Collected:  01/31/18 1447     Updated:  01/31/18 2150    Narrative:       EXAMINATION: XR CHEST 1 -01/31/2018:      INDICATION: Weak/Dizzy/AMS, triage protocol.      COMPARISON: 12/19/2017.     FINDINGS: The patient is again rotated to the left. The heart shadow  appears borderline enlarged. The vasculature appears upper limits of  normal. Mild chronic appearing interstitial lung changes and old  granulomatous calcifications are again noted and appear stable.           Impression:       Mild pulmonary  venous hypertension unchanged. No new chest  disease is identified.     D:  01/31/2018  E:  01/31/2018     This report was finalized on 1/31/2018 9:48 PM by DR. Brian Cosme MD.       CT Head Without Contrast [[id number]] Collected:  01/31/18 1648     Updated:  01/31/18 2223    Narrative:       EXAMINATION: CT HEAD WO CONTRAST- 01/31/2018     INDICATION: Decreased alertness; N17.9-Acute kidney failure,  unspecified; E87.5-Hyperkalemia; A41.9-Sepsis, unspecified organism;  N39.0-Urinary tract infection, site not specified; R31.9-Hematuria,  unspecified; R73.9-Hyperglycemia, unspecified; D72.829-Elevated white  blood cell count, unspecified; R41.82-Altered mental status,  unspecified; I48.91-Unspecified atrial fibrillation         TECHNIQUE: 5 mm unenhanced images through the brain     The radiation dose reduction device was turned on for each scan per the  ALARA (As Low as Reasonably Achievable) protocol.     COMPARISON: 12/19/2017     FINDINGS: Previous exam report from 12/19/2017 indicates no acute  findings. History today indicates decreased level of alertness, altered  mental status.     The calvarium appears intact. Included paranasal sinuses mastoid air  cells and middle ear spaces appear clear. Soft tissue window images show  advanced generalized cerebral atrophy and chronic appearing central  white matter changes stable in pattern from previous study. There is no  evidence of mass, mass effect, hemorrhage, edema, hydrocephalus, or  abnormal extra-axial collection.       Impression:       Stable head CT scan with chronic appearing age related  changes. No evidence of acute intracranial disease.        D:  01/31/2018  E:  01/31/2018     This report was finalized on 1/31/2018 10:21 PM by DR. Brian Cosme MD.             Telemetry: Atrial fibrillation, -170  EKG 1/31/18: Atrial fibrillation with RVR,     Assessment and Plan:   1. Atrial fibrillation with RVR:  - Recurrent atrial fibrillation with RVR in  setting of urosepsis  - Recommend discontinuing amiodarone gtt as atrial fibrillation likely chronic in nature, will focus on rate control at this point  - Will initiate cardizem gtt for improved rate control, titrate to HR <120, may need neosynephrine gtt for pressure support  - Continue metoprolol IVP PRN, no digoxin at this time given ANGELA    2. Elevated troponin:  - Elevated troponin likely in setting of atrial fibrillation with RVR and sepsis, no further ischemic workup needed at this time    3. UTI/Sepsis:  - Support per intensivists    No family available at time of exam to discuss goals of care and treatment options.      Scribed for Balwinder Marquez III, MD by Brandie Masters, APRN. 2/1/2018  11:26 AM     I,Balwinder Marquez M.D., personally performed the services described in this documentation as scribed by the above named individual in my presence, and it is both accurate and complete.  2/1/2018  11:29 AM

## 2018-02-02 NOTE — PROGRESS NOTES
INTENSIVIST   PROGRESS NOTE     Hospital:  LOS: 2 days     Ms. Leila Smith, 72 y.o. female is followed for a Chief Complaint of: Sepsis, ANGELA      Subjective   S   Ms. Smith is a 72 year old female who is mentally challenged at baseline and who [facility] for altered mental status. The patient's daughter states that she was called by the rehab when the patient was more lethargic than her baseline which is minimally verbal, and occasionally confused though the patient was able to live at home by herself until recently. Patient has a history of COPD, CHF, A fib, DM2, and frequent UTIs. Patient's daughter states that she has declined quickly since colectomy with colostomy placement at an outlying facility in April 2017.      VS on presentation to ED: HR 84 (now 140s), /69, RR 22, O2 98% on 4 L per NC, T 97.5. Patient received 500 ml NS bolus, Rocephin, albuterol neb, ASA, 10 units insulin, calcium gluconate, and D50,. Troponin 4.39 , , Cr 4.7, , K+ 6.6, lactic 3.6, WBC 17.18, UDS negative, PT/INR 12.6/1.15. Patient is arousable and will answer yes or no questions. Denies pain. Code status was discussed with the patient's daughter who states she doesn't know her mother's wishes and feels unable to make a decision at this time.       Interval History:  No events overnight. She is agitated and cursing this morning, which appears to be baseline.        The patient's relevant past medical, surgical and social history were reviewed and updated in Epic as appropriate.      ROS: Unable to obtain secondary to mental status.     Objective   O     Vitals:  Temp  Min: 97.5 °F (36.4 °C)  Max: 98.8 °F (37.1 °C)  BP  Min: 91/48  Max: 136/82  Pulse  Min: 51  Max: 110  Resp  Min: 16  Max: 20  SpO2  Min: 69 %  Max: 100 % No Data Recorded    Intake/Ouptut 24 hrs (7:00AM - 6:59 AM)  Intake & Output (last 3 days)       01/30 0701 - 01/31 0700 01/31 0701 - 02/01 0700 02/01 0701 - 02/02 0700  02/02 0701 - 02/03 0700    P.O.   550 350    I.V. (mL/kg)  1937.8 (21.4) 2651.5 (29.2) 424 (4.7)    IV Piggyback  650 541     Total Intake(mL/kg)  2587.8 (28.5) 3742.5 (41.3) 774 (8.5)    Urine (mL/kg/hr)  605 1440 (0.7) 220 (0.6)    Stool  0 300 (0.1)     Total Output   605 1740 220    Net   +1982.8 +2002.5 +554            Unmeasured Stool Occurrence  1 x            Medications (drips):    Pharmacy to dose vancomycin    phenylephrine    sodium bicarbonate drip (greater than 75 mEq/bag) Last Rate: 75 mL/hr (02/01/18 2235)   sodium chloride Last Rate: 25 mL/hr (02/01/18 0900)       Physical Examination  Telemetry:  Sinus Rhythm:  (UNDETERMINTED)  Atrial Rhythm: atrial fibrillation      Constitutional:  No acute distress. Cursing.    Cardiovascular: Normal rate, regular and rhythm. Normal heart sounds.  No murmurs, gallop or rub.   Respiratory: No respiratory distress. Normal respiratory effort.  Normal breath sounds  Clear to ascultation.    Abdominal:  Soft. No masses. Non-tender. No distension. No HSM.  Colostomy with stool output.    Extremities: No digital cyanosis. No clubbing.  No peripheral edema.  Scaly,dry skin on lower legs bilaterally.    Neurological:   Alert.  Does not answer orientation questions.              Results from last 7 days  Lab Units 02/02/18  0755 02/01/18  0344 01/31/18  1337   WBC 10*3/mm3 16.15* 17.29* 17.18*   HEMOGLOBIN g/dL 13.4 14.1 18.1*   MCV fL 87.9 88.6 89.4   PLATELETS 10*3/mm3 186 210 262       Results from last 7 days  Lab Units 02/02/18  0805 02/01/18  0344 01/31/18  2353  01/31/18  1337   SODIUM mmol/L 145 142 141  < > 141   POTASSIUM mmol/L 3.8 4.3 5.1  < > 6.6*   CO2 mmol/L 25.0 17.0* 12.0*  < > 13.0*   CREATININE mg/dL 2.60* 3.60* 3.60*  < > 4.70*   GLUCOSE mg/dL 179* 237* 327*  < > 284*   MAGNESIUM mg/dL 2.0 1.7  --   --  2.2   PHOSPHORUS mg/dL 2.9 4.3  --   --   --    < > = values in this interval not displayed.  Estimated Creatinine Clearance: 21.1 mL/min (by C-G  formula based on Cr of 2.6).    Results from last 7 days  Lab Units 01/31/18  1337   ALK PHOS U/L 284*   BILIRUBIN mg/dL 0.8   ALT (SGPT) U/L 34   AST (SGOT) U/L 40*         Results from last 7 days  Lab Units 02/01/18  0438 01/31/18  2201 01/31/18  1832   PH, ARTERIAL pH units 7.400 7.245* 7.198*   PCO2, ARTERIAL mm Hg 22.4* 22.0* 22.7*   PO2 ART mm Hg 93.0 148.0* 129.0*   FIO2 % 21 36 44       Images:  Renal US 02/01/2018 There are bilateral renal cortical cysts. Otherwise unremarkable.           Results: Reviewed.  I reviewed the patient's new laboratory and imaging results.  I independently reviewed the patient's new images.    Medications: Reviewed.    Assessment/Plan   A / P     Ms. Smith is a 72 year old female who is mentally challenged at baseline and who presents from [facility] via EMS for altered mental status. She was found to have new renal failure and is also in Afib with RVR. She appears to be dehydrated and may have an element of HHS in the setting of poorly controlled diabetes. Her renal function is slowly improving. Her mental status appears close to baseline. Her blood cultures were positive but have not speciated out yet. She remains on Vanc and Zosyn.     Nutrition:   Diet Regular; Consistent Carbohydrate, Cardiac  Advance Directives: Full Code    Hospital Problem List     * (Principal)Sepsis due to urinary tract infection    Diabetes mellitus, type 2    Overview Signed 8/3/2016  2:13 PM by Ama Wolf     With foot ulcer         Hyperlipidemia    Hypothyroidism    Chronic obstructive pulmonary disease    CAD (coronary artery disease)    Overview Signed 7/15/2016 10:27 AM by Yue Royal     History of  Coronary Artery Disease V12.59         Peripheral vascular disease    Obstructive sleep apnea syndrome    Congestive heart failure    Atrial fibrillation with rapid ventricular response    Altered mental status    Acute renal failure    Hyperkalemia    Leukocytosis    Elevated  troponin          Assessment / Plan:    1. Continue Vanc and Zosyn  2. Repeat blood cultures this AM  3. PO diltiazem. Wean off drip.   4. Transition insulin  5. Can likely stop Bicarb  6. Continue ICU care. Hopefully can transition to the floor soon.   7. AM labs    Plan of care and goals reviewed during interdisciplinary rounds.  I discussed the patient's findings and my recommendations with nursing staff    Time: was greater than 35 minutes.    Jasmina Maier, DO    Intensive Care Medicine and Pulmonary Medicine

## 2018-02-02 NOTE — PLAN OF CARE
Problem: Patient Care Overview (Adult)  Goal: Plan of Care Review  Outcome: Ongoing (interventions implemented as appropriate)   02/02/18 0540   Coping/Psychosocial Response Interventions   Plan Of Care Reviewed With patient   Patient Care Overview   Progress improving   Outcome Evaluation   Outcome Summary/Follow up Plan Bicarb, insulin, and cardizem drips continue. Afib continues with controlled rate. Pt rested well when left alone, but became easily agitated and tearful when stimulated.      Goal: Adult Individualization and Mutuality  Outcome: Ongoing (interventions implemented as appropriate)    Goal: Discharge Needs Assessment  Outcome: Ongoing (interventions implemented as appropriate)      Problem: Renal Failure/Kidney Injury, Acute (Adult)  Goal: Signs and Symptoms of Listed Potential Problems Will be Absent or Manageable (Renal Failure/Kidney Injury, Acute)  Outcome: Ongoing (interventions implemented as appropriate)      Problem: Cardiac Output, Decreased (Adult)  Goal: Identify Related Risk Factors and Signs and Symptoms  Outcome: Ongoing (interventions implemented as appropriate)    Goal: Adequate Cardiac Output/Effective Tissue Perfusion  Outcome: Ongoing (interventions implemented as appropriate)      Problem: Sepsis (Adult)  Goal: Signs and Symptoms of Listed Potential Problems Will be Absent or Manageable (Sepsis)  Outcome: Ongoing (interventions implemented as appropriate)      Problem: Skin Integrity Impairment, Risk/Actual (Adult)  Goal: Identify Related Risk Factors and Signs and Symptoms  Outcome: Ongoing (interventions implemented as appropriate)    Goal: Skin Integrity/Wound Healing  Outcome: Ongoing (interventions implemented as appropriate)      Problem: Fall Risk (Adult)  Goal: Identify Related Risk Factors and Signs and Symptoms  Outcome: Ongoing (interventions implemented as appropriate)    Goal: Absence of Falls  Outcome: Ongoing (interventions implemented as appropriate)

## 2018-02-02 NOTE — PROGRESS NOTES
"Pharmacy Consult-Vancomycin Dosing  Leila Smith is a  72 y.o. female receiving vancomycin therapy.     Indication: Bacteremia   Consulting Provider: Intensivist   ID Consult: No    Goal Trough: 15-20 mcg/mL    Current Antimicrobial Therapy  Anti-Infectives       Ordered     Dose/Rate Route Frequency Start Stop      02/01/18 1508  piperacillin-tazobactam (ZOSYN) 3.375 g in iso-osmotic dextrose 50 ml (premix)     Ordering Provider:  Ciera Silver RPH    3.375 g  over 4 Hours Intravenous Every 12 Hours 02/02/18 0500 02/09/18 0459    02/01/18 1504  vancomycin (dosing per levels)     Ordering Provider:  Ciera Silver RPH     Does not apply Daily 02/01/18 1545 02/08/18 0859    02/01/18 1413  vancomycin 1750 mg/500 mL 0.9% NS IVPB ([facility])     Ordering Provider:  Jasmina Maier DO    20 mg/kg × 90.7 kg  over 120 Minutes Intravenous Once 02/01/18 1445 02/01/18 1732    02/01/18 1413  piperacillin-tazobactam (ZOSYN) 4.5 g in iso-osmotic dextrose 100 mL IVPB (premix)     Ordering Provider:  Jasmina Maier DO    4.5 g  200 mL/hr over 0.5 Hours Intravenous Once 02/01/18 1445 02/01/18 1603    02/01/18 1401  Pharmacy to dose vancomycin     Ordering Provider:  Jasmina RACHEL Case DO     Does not apply Continuous PRN 02/01/18 1400 02/15/18 1359    01/31/18 1418  cefTRIAXone (ROCEPHIN) IVPB 1 g     Ordering Provider:  Blayne Meo MD    1 g  100 mL/hr over 30 Minutes Intravenous Once 01/31/18 1430 01/31/18 1522            Allergies  Allergies as of 01/31/2018 - Rohan as Reviewed 01/31/2018   Allergen Reaction Noted   • Codeine  08/03/2016   • Tetracyclines & related  07/15/2016       Labs    Results from last 7 days   Lab Units 02/02/18  0805 02/01/18  0344 01/31/18  2353   BUN mg/dL 94* 130* 120*   CREATININE mg/dL 2.60* 3.60* 3.60*     Results from last 7 days   Lab Units 02/02/18  0755 02/01/18  0344 01/31/18  1337   WBC 10*3/mm3 16.15* 17.29* 17.18*     Evaluation of Dosing     Ht - 165.1 cm (65\")  Wt - 90.7 kg (200 " lb)    Estimated Creatinine Clearance: 21.1 mL/min (by C-G formula based on Cr of 2.6).    Intake & Output (last 3 days)         01/30 0701 - 01/31 0700 01/31 0701 - 02/01 0700 02/01 0701 - 02/02 0700 02/02 0701 - 02/03 0700      P.O.   550 650    I.V. (mL/kg)  1937.8 (21.4) 2651.5 (29.2) 614 (6.8)    IV Piggyback  650 541     Total Intake(mL/kg)  2587.8 (28.5) 3742.5 (41.3) 1264 (13.9)    Urine (mL/kg/hr)  605 1440 (0.7) 320 (0.5)    Stool  0 300 (0.1)     Total Output   605 1740 320    Net   +1982.8 +2002.5 +944            Unmeasured Stool Occurrence  1 x              Microbiology and Radiology  Microbiology Results (last 10 days)       Procedure Component Value - Date/Time      Urine Culture - Urine, Urine, Clean Catch [[id number]]  (Abnormal) Collected:  01/31/18 1643    Lab Status:  Preliminary result Specimen:  Urine from Urine, Catheter Updated:  02/02/18 0814     Urine Culture --      >100,000 CFU/mL Yeast isolated (A)    Influenza A & B, RT PCR - Swab, Nasopharynx [[id number]]  (Normal) Collected:  01/31/18 1630    Lab Status:  Final result Specimen:  Swab from Nasopharynx Updated:  01/31/18 1748     Influenza A PCR Not Detected     Influenza B PCR Not Detected    Urine Culture - Urine, Urine, Clean Catch [[id number]]  (Abnormal) Collected:  01/31/18 1346    Lab Status:  Final result Specimen:  Urine from Urine, Catheter Updated:  02/02/18 1154     Urine Culture --      40,000-50,000 CFU/mL Candida albicans (A)    Blood Culture - Blood, [[id number]]  (Abnormal) Collected:  01/31/18 1337    Lab Status:  Preliminary result Specimen:  Blood from Arm, Right Updated:  02/01/18 2150     Blood Culture Abnormal Stain (A)     Gram Stain Result Anaerobic Bottle Gram positive cocci in pairs and clusters      Aerobic Bottle Gram positive cocci in clusters    Blood Culture - Blood, [[id number]]  (Abnormal) Collected:  01/31/18 1337    Lab Status:  Preliminary result Specimen:  Blood from Arm, Left Updated:  02/01/18 204      Blood Culture Abnormal Stain (A)     Gram Stain Result Anaerobic Bottle Gram positive cocci in clusters      Aerobic Bottle Gram positive cocci in clusters    Blood Culture ID, PCR - Blood, [[id number]]  (Normal) Collected:  01/31/18 1337    Lab Status:  Final result Specimen:  Blood from Arm, Right Updated:  02/01/18 1502     BCID, PCR No organism detected by BCID PCR.            Evaluation of Level    2/2 @ 1408 random vancomycin level = 27 mcg/mL (22.5 hr level, after 1 dose)    Assessment/Plan:    1. Pharmacy to dose vancomycin for bacteremia. Blood culture growing GPC in pairs and clusters. Patient received vancomycin 1750 mg (20 mg/kg) IV x 1 yesterday. Pharmacy is initially dosing vancomycin based on levels due to advanced age and poor renal function.  2. Random vancomycin level today is supratherapeutic at 27 mcg/mL (22.5 hour level). Will not order vancomycin dose today. Random vancomycin level tomorrow AM to assess clearance and further dosing.   3. Pharmacy will continue to monitor and adjust vancomycin dose as necessary based on renal function, cultures, labs, and clinical status.     Thank you,  Ciera Silver, PharmD  Pharmacy Resident  2/2/2018  2:34 PM

## 2018-02-02 NOTE — PROGRESS NOTES
"NAL Progress Note  Leila Smith  [id number]  1945    1/31/2018    Reason for visit:  ANGELA    ROS:    Pulm:  No SOA  CV:  No CP  She start to cry between conversation not sure how much she understands    I&O:    Intake/Output Summary (Last 24 hours) at 02/02/18 1255  Last data filed at 02/02/18 1200   Gross per 24 hour   Intake           3727.8 ml   Output             1740 ml   Net           1987.8 ml       PE:   Blood pressure 119/76, pulse 84, temperature 97.5 °F (36.4 °C), resp. rate 18, height 165.1 cm (65\"), weight 90.7 kg (200 lb), SpO2 93 %.    GENERAL: Awake and alert, in no acute distress.   HEENT: PER, No conjunctivitis  NECK: Supple, no JVD     HEART: RRR; No murmur, rubs, gallops.   LUNGS: Clear to auscultation bilaterally without wheezing, rales, rhonchi. Normal respiratory effort. Nonlabored. No dullness.  ABDOMEN: Soft, nontender, nondistended. Positive bowel sounds. No rebound or guarding. NO mass or HSM.  EXT:  No cyanosis, clubbing or edema. No cord.  : Genitalia generally unremarkable.  Without Bone catheter.  SKIN: Warm and dry without cutaneous eruptions on Inspection/palpation.    NEURO: Alert and oriented x 3, Motor 5/5 bilaterally    Medications:    Current Facility-Administered Medications:   •  acetaminophen (TYLENOL) tablet 650 mg, 650 mg, Oral, Q4H PRN **OR** acetaminophen (TYLENOL) suppository 650 mg, 650 mg, Rectal, Q4H PRN, DANIEL Ludwig  •  aspirin chewable tablet 324 mg, 324 mg, Oral, Daily, DANIEL Ludwig, 324 mg at 02/02/18 0830  •  atorvastatin (LIPITOR) tablet 10 mg, 10 mg, Oral, Nightly, DANIEL Mustafa, 10 mg at 02/01/18 2045  •  dextrose (D50W) solution 25 g, 25 g, Intravenous, Q15 Min PRN, Jasmina V. Case, DO  •  dextrose (GLUTOSE) oral gel 15 g, 15 g, Oral, Q15 Min PRN, Jasmina V. Case, DO  •  diltiaZEM (CARDIZEM) tablet 60 mg, 60 mg, Oral, Q6H, Alexandra Galdamez, APRN, 60 mg at 02/02/18 1147  •  famotidine (PEPCID) injection 20 mg, 20 mg, " Intravenous, Daily, Jasmina V. Case, DO, 20 mg at 02/02/18 0830  •  glucagon (human recombinant) (GLUCAGEN DIAGNOSTIC) injection 1 mg, 1 mg, Subcutaneous, PRN, Jasmina V. Case, DO  •  heparin (porcine) 5000 UNIT/ML injection 5,000 Units, 5,000 Units, Subcutaneous, Q8H, DANIEL Ludwig, 5,000 Units at 02/02/18 0533  •  insulin detemir (LEVEMIR) injection 10 Units, 10 Units, Subcutaneous, QAM, Jasmina V. Case, DO, 10 Units at 02/02/18 1150  •  insulin lispro (humaLOG) injection 0-7 Units, 0-7 Units, Subcutaneous, 4x Daily With Meals & Nightly, Jasmina V. Case, DO, 4 Units at 02/02/18 1147  •  insulin lispro (humaLOG) injection 3 Units, 3 Units, Subcutaneous, TID With Meals, Jasmina V. Case, DO, 3 Units at 02/02/18 1146  •  ipratropium-albuterol (DUO-NEB) nebulizer solution 3 mL, 3 mL, Nebulization, Q6H - RT, Alexandra Galdamez APRN, 3 mL at 02/02/18 0728  •  magnesium sulfate 4 gram infusion - Mg less than or equal to 1mg/dL, 4 g, Intravenous, PRN **OR** magnesium sulfate 3 gram infusion (1gm x 3) - Mg 1.1 - 1.5 mg/dL, 1 g, Intravenous, PRN **OR** Magnesium Sulfate 2 gram infusion- Mg 1.6 - 1.9 mg/dL, 2 g, Intravenous, PRN, DANIEL Mustafa, Last Rate: 25 mL/hr at 02/01/18 0637, 2 g at 02/01/18 0637  •  metoprolol tartrate (LOPRESSOR) injection 5 mg, 5 mg, Intravenous, Q6H, DANIEL Mustafa, 5 mg at 02/02/18 1146  •  Pharmacy to dose vancomycin, , Does not apply, Continuous PRN, Jasmina V. Case, DO  •  phenylephrine (TRESA-SYNEPHRINE) 50 mg/250 mL (0.2 mg/mL) in 0.9% NS  infusion, 0.5-3 mcg/kg/min, Intravenous, Titrated, DANIEL Desai  •  piperacillin-tazobactam (ZOSYN) 3.375 g in iso-osmotic dextrose 50 ml (premix), 3.375 g, Intravenous, Q12H, Ciera Silver, [facility], 3.375 g at 02/02/18 0408  •  sodium chloride 0.9 % flush 1-10 mL, 1-10 mL, Intravenous, PRN, DANIEL Ludwig  •  sodium chloride 0.9 % flush 10 mL, 10 mL, Intravenous, PRN, Blayne Moe MD  •  sodium chloride 0.9 %  infusion, 75 mL/hr, Intravenous, Continuous, Zane Muniz MD, Last Rate: 75 mL/hr at 02/02/18 1158, 75 mL/hr at 02/02/18 1158  •  vancomycin (dosing per levels), , Does not apply, Daily, Ciera Silver [facility], Stopped at 02/02/18 0843    Meds reviewed and doses adjusted for renal function    Labs:    Results from last 7 days  Lab Units 02/02/18  0755 02/01/18  0344 01/31/18  1337   WBC 10*3/mm3 16.15* 17.29* 17.18*   HEMOGLOBIN g/dL 13.4 14.1 18.1*   HEMATOCRIT % 41.4 43.7 55.5*   PLATELETS 10*3/mm3 186 210 262       Results from last 7 days  Lab Units 02/02/18  0805 02/01/18  0344 01/31/18  2353 01/31/18  1939   SODIUM mmol/L 145 142 141 142   POTASSIUM mmol/L 3.8 4.3 5.1 4.8   CHLORIDE mmol/L 115* 117* 117* 117*   CO2 mmol/L 25.0 17.0* 12.0* 14.0*   BUN mg/dL 94* 130* 120* 135*   CREATININE mg/dL 2.60* 3.60* 3.60* 3.90*   GLUCOSE mg/dL 179* 237* 327* 300*   CALCIUM mg/dL 7.5* 8.3* 8.1* 8.3*   PHOSPHORUS mg/dL 2.9 4.3  --   --        Results from last 7 days  Lab Units 01/31/18  1337   ALK PHOS U/L 284*   BILIRUBIN mg/dL 0.8   ALT (SGPT) U/L 34   AST (SGOT) U/L 40*     Invalid input(s): UCOL, UCLR, UPH, USG, UPRO, UBLD, UNITR, ELEU, URBC, UFC, UBACT    Estimated Creatinine Clearance: 21.1 mL/min (by C-G formula based on Cr of 2.6).    Radiology:  Imaging Results (last 72 hours)     Procedure Component Value Units Date/Time    XR Chest 1 View [[id number]] Collected:  01/31/18 1900     Updated:  01/31/18 1935    Narrative:       EXAM:    XR Chest, 1 View    CLINICAL HISTORY:    72 years old, female; Sepsis, unspecified organism; Elevated white blood cell   count, unspecified; Hyperkalemia; Unspecified atrial fibrillation; Acute kidney   failure, unspecified; Urinary tract infection, site not specified; Hematuria,   unspecified; Altered mental status, unspecified; Hyperglycemia, unspecified;   Device placement; Other: Central line placement    TECHNIQUE:    Frontal view of the chest.    COMPARISON:    CR - XR CHEST 1 VW  2018-01-31 13:45    FINDINGS:    Lungs:  Unremarkable.  No consolidation.    Pleural space:  Unremarkable.  No pneumothorax.    Heart:  The heart demonstrates diffuse enlargement.    Mediastinum:  Unremarkable.    Bones/joints:  Unremarkable.    Tubes, lines and devices:  A left internal jugular central venous catheter is   present, with its tip overlying the region of the superior vena cava.      Impression:         A left internal jugular central venous catheter is present, with its tip   overlying the region of the superior vena cava.    THIS DOCUMENT HAS BEEN ELECTRONICALLY SIGNED BY URSULA ARGUELLO MD    XR Chest 1 View [[id number]] Collected:  01/31/18 1447     Updated:  01/31/18 2150    Narrative:       EXAMINATION: XR CHEST 1 VW-01/31/2018:      INDICATION: Weak/Dizzy/AMS, triage protocol.      COMPARISON: 12/19/2017.     FINDINGS: The patient is again rotated to the left. The heart shadow  appears borderline enlarged. The vasculature appears upper limits of  normal. Mild chronic appearing interstitial lung changes and old  granulomatous calcifications are again noted and appear stable.           Impression:       Mild pulmonary venous hypertension unchanged. No new chest  disease is identified.     D:  01/31/2018  E:  01/31/2018     This report was finalized on 1/31/2018 9:48 PM by DR. Brian Cosme MD.       CT Head Without Contrast [[id number]] Collected:  01/31/18 1648     Updated:  01/31/18 2223    Narrative:       EXAMINATION: CT HEAD WO CONTRAST- 01/31/2018     INDICATION: Decreased alertness; N17.9-Acute kidney failure,  unspecified; E87.5-Hyperkalemia; A41.9-Sepsis, unspecified organism;  N39.0-Urinary tract infection, site not specified; R31.9-Hematuria,  unspecified; R73.9-Hyperglycemia, unspecified; D72.829-Elevated white  blood cell count, unspecified; R41.82-Altered mental status,  unspecified; I48.91-Unspecified atrial fibrillation         TECHNIQUE: 5 mm unenhanced images through the brain     The  radiation dose reduction device was turned on for each scan per the  ALARA (As Low as Reasonably Achievable) protocol.     COMPARISON: 12/19/2017     FINDINGS: Previous exam report from 12/19/2017 indicates no acute  findings. History today indicates decreased level of alertness, altered  mental status.     The calvarium appears intact. Included paranasal sinuses mastoid air  cells and middle ear spaces appear clear. Soft tissue window images show  advanced generalized cerebral atrophy and chronic appearing central  white matter changes stable in pattern from previous study. There is no  evidence of mass, mass effect, hemorrhage, edema, hydrocephalus, or  abnormal extra-axial collection.       Impression:       Stable head CT scan with chronic appearing age related  changes. No evidence of acute intracranial disease.        D:  01/31/2018  E:  01/31/2018     This report was finalized on 1/31/2018 10:21 PM by DR. Brian Cosme MD.       US Renal Limited [[id number]] Collected:  02/01/18 1601     Updated:  02/02/18 1236    Narrative:       EXAMINATION: US RENAL, LIMITED-02/01/2018:     INDICATION: ANGELA, renal insufficiency.     TECHNIQUE: Sonographic imaging was obtained of the kidneys in both the  sagittal and transverse planes. The examination is suboptimal due to  patient inability to hold respirations.     COMPARISON: NONE.     FINDINGS: The right kidney measures in length from pole to pole 13.5 cm.  There is no solid mass identified. There is a renal cortical cyst  identified measuring 3.2 cm. No hydronephrosis. No nephrolithiasis. No  solid mass.     The left kidney measures in length from pole to pole 11.4 cm. Several  cystic areas identified, the largest measuring 2.5 cm. There is no  hydronephrosis or nephrolithiasis. No solid mass. Imaging of the bladder  reveals a Bone catheter present.       Impression:       Bilateral renal cortical cysts otherwise, unremarkable  examination.     D:  02/01/2018  E:   02/01/2018            This report was finalized on 2/2/2018 12:33 PM by Dr. Stephanie Jarrett MD.             Renal Imaging:    reviewed    IMPRESSION:  Acute kidney injury-likely acute kidney injury due to volume depletion and ATN.  Creatinine is improving with fluid resuscitation  Chronic kidney disease-her baseline creatinine is about 0.8 just within the last 6-8 weeks.  She possibly has baseline diabetic nephropathy  Metabolic acidosis-improving with IV fluids  Hemoconcentration-hematocrit is quite high likely because awaiting depletion  Hypertension blood pressure is very low needing IV fluids     PLAN: Thank you for asking us to see Leila Smith, I recommend the following:   At this time continue IV fluids for the time being no need for renal replacement therapy.   Cr cl is improving         Zane Muniz MD  2/2/2018  12:55 PM

## 2018-02-02 NOTE — PROGRESS NOTES
Continued Stay Note  [facility]     Patient Name: Leila Smith  MRN: [id number]  Today's Date: 2/2/2018    Admit Date: 1/31/2018          Discharge Plan       02/02/18 0726    Case Management/Social Work Plan    Plan Return to [facility]    Additional Comments The plan is for patient to return to [facility] to complete her rehab.  Spoke with Fozia with [facility] and she states they will take patient back when she is ready for discharge.  CM will continue to follow.              Discharge Codes     None        Expected Discharge Date and Time     Expected Discharge Date Expected Discharge Time    Feb 6, 2018             Heather Sanchez RN

## 2018-02-02 NOTE — CONSULTS
Nutrition Services    Patient Name:  Leila Smith  YOB: 1945  MRN: [id number]  Admit Date:  1/31/2018    Comments: RD received MD consult r/t MST score >2. RD following.    Electronically signed by:  Mayra Blake RDN, JENA  02/02/18 7:45 AM

## 2018-02-02 NOTE — PLAN OF CARE
Problem: Patient Care Overview (Adult)  Goal: Plan of Care Review   02/02/18 0540 02/02/18 1600 02/02/18 1825   Coping/Psychosocial Response Interventions   Plan Of Care Reviewed With --  patient --    Patient Care Overview   Progress improving --  --    Outcome Evaluation   Outcome Summary/Follow up Plan --  --  bi carb and insulin d/c'd. Cardizem off. afib but rate controlled. She remains agitated with care, calm when left alone. poor appetite but drinking boost       Problem: Cardiac Output, Decreased (Adult)  Goal: Identify Related Risk Factors and Signs and Symptoms   02/02/18 0540   Cardiac Output, Decreased   Cardiac Output, Decreased: Related Risk Factors heart rhythm altered;heart rate altered   Signs and Symptoms (Cardiac Output Decreased) heart rate/rhythm alteration     Goal: Adequate Cardiac Output/Effective Tissue Perfusion   02/02/18 1825   Cardiac Output, Decreased (Adult)   Adequate Cardiac Output/Effective Tissue Perfusion making progress toward outcome       Problem: Sepsis (Adult)  Goal: Signs and Symptoms of Listed Potential Problems Will be Absent or Manageable (Sepsis)   02/02/18 0540   Sepsis   Problems Assessed (Sepsis) all   Problems Present (Sepsis) glycemic control, impaired;hypoperfusion/hemodynamic instability;situational response       Problem: Skin Integrity Impairment, Risk/Actual (Adult)  Goal: Identify Related Risk Factors and Signs and Symptoms   02/02/18 0540   Skin Integrity Impairment, Risk/Actual   Skin Integrity Impairment, Risk/Actual: Related Risk Factors cognitive impairment;edema;immobility;metabolic imbalance;tissue perfusion impaired;infection/disease process     Goal: Skin Integrity/Wound Healing   02/02/18 0540   Skin Integrity Impairment, Risk/Actual (Adult)   Skin Integrity/Wound Healing making progress toward outcome       Problem: Fall Risk (Adult)  Goal: Identify Related Risk Factors and Signs and Symptoms   02/02/18 0540   Fall Risk   Fall Risk: Related Risk  Factors confusion/agitation;age-related changes;gait/mobility problems;environment unfamiliar   Fall Risk: Signs and Symptoms presence of risk factors     Goal: Absence of Falls   02/02/18 6055   Fall Risk (Adult)   Absence of Falls making progress toward outcome

## 2018-02-02 NOTE — PROGRESS NOTES
"[facility] Progress Note     LOS: 2 days   Patient Care Team:  Ciaran Wakefield MD as PCP - General (Internal Medicine)  No Known Provider as PCP - Family Medicine  PCP:  Ciaran Wakefield MD    Chief Complaint:  Atrial fibrillation    SUBJECTIVE:   Clinically improved this morning.  Hemodynamically stable, rate controlled atrial fibrillation.      Review of Systems:   All systems have been reviewed and are negative with the exception of those mentioned above.      OBJECTIVE:    Vital Sign Min/Max for last 24 hours  Temp  Min: 97.5 °F (36.4 °C)  Max: 98.8 °F (37.1 °C)   BP  Min: 83/71  Max: 136/82   Pulse  Min: 51  Max: 129   Resp  Min: 16  Max: 20   SpO2  Min: 69 %  Max: 100 %   No Data Recorded   No Data Recorded     Flowsheet Rows         First Filed Value    Admission Height  165.1 cm (65\") Documented at 01/31/2018 1333    Admission Weight  90.7 kg (200 lb) Documented at 01/31/2018 1333          Telemetry: Rate controlled atrial fibrillation      Intake/Output Summary (Last 24 hours) at 02/02/18 0809  Last data filed at 02/02/18 0600   Gross per 24 hour   Intake           3497.7 ml   Output             1640 ml   Net           1857.7 ml     Intake & Output (last 3 days)       01/30 0701 - 01/31 0700 01/31 0701 - 02/01 0700 02/01 0701 - 02/02 0700 02/02 0701 - 02/03 0700    P.O.   550     I.V. (mL/kg)  1937.8 (21.4) 2651.5 (29.2)     IV Piggyback  650 541     Total Intake(mL/kg)  2587.8 (28.5) 3742.5 (41.3)     Urine (mL/kg/hr)  605 1440 (0.7)     Stool  0 300 (0.1)     Total Output   605 1740      Net   +1982.8 +2002.5              Unmeasured Stool Occurrence  1 x             Physical Exam:    General Appearance:    Awake    Neck:   Supple, symmetrical, trachea midline.   Lungs:     Clear to auscultation bilaterally, respirations unlabored   Chest Wall:    No tenderness or deformity    Heart:    Regular rate and rhythm, S1 and S2 normal, no murmur, rub   or gallop, normal " carotid impulse bilaterally without bruit.   Extremities:   Extremities normal, atraumatic, no cyanosis or edema   Pulses:   2+ and symmetric all extremities   Skin:   Skin color, texture, turgor normal, no rashes or lesions      LABS/DIAGNOSTIC DATA:    Results from last 7 days  Lab Units 02/01/18  0344 01/31/18  1337   WBC 10*3/mm3 17.29* 17.18*   HEMOGLOBIN g/dL 14.1 18.1*   HEMATOCRIT % 43.7 55.5*   PLATELETS 10*3/mm3 210 262     No results found for: TROPONINT    Results from last 7 days  Lab Units 01/31/18  1337   INR  1.15       Results from last 7 days  Lab Units 02/01/18  0344 01/31/18  2353 01/31/18  1939 01/31/18  1337   SODIUM mmol/L 142 141 142 141   POTASSIUM mmol/L 4.3 5.1 4.8 6.6*   CHLORIDE mmol/L 117* 117* 117* 113*   CO2 mmol/L 17.0* 12.0* 14.0* 13.0*   BUN mg/dL 130* 120* 135* 152*   CREATININE mg/dL 3.60* 3.60* 3.90* 4.70*   CALCIUM mg/dL 8.3* 8.1* 8.3* 9.7   BILIRUBIN mg/dL  --   --   --  0.8   ALK PHOS U/L  --   --   --  284*   ALT (SGPT) U/L  --   --   --  34   AST (SGOT) U/L  --   --   --  40*   GLUCOSE mg/dL 237* 327* 300* 284*       Results from last 7 days  Lab Units 01/31/18 1939   HEMOGLOBIN A1C % 10.20*           Results from last 7 days  Lab Units 01/31/18 1939 01/31/18  1337   TSH mIU/mL 0.917 2.380   FREE T4 ng/dL  --  1.53       Results from last 7 days  Lab Units 01/31/18  1337   BNP pg/mL 925.0*       Medication Review:     aspirin 324 mg Oral Daily   atorvastatin 10 mg Oral Nightly   diltiaZEM 60 mg Oral Q6H   famotidine 20 mg Intravenous Daily   heparin (porcine) 5,000 Units Subcutaneous Q8H   ipratropium-albuterol 3 mL Nebulization Q6H - RT   metoprolol tartrate 5 mg Intravenous Q6H   piperacillin-tazobactam 3.375 g Intravenous Q12H   vancomycin (dosing per levels)  Does not apply Daily        diltiaZEM 5-15 mg/hr Last Rate: 5 mg/hr (02/02/18 0527)   insulin regular infusion 1 unit/mL (CCU use) 0-50 Units/hr Last Rate: 4 Units/hr (02/02/18 9114)   Pharmacy to dose vancomycin      phenylephrine 0.5-3 mcg/kg/min    sodium bicarbonate drip (greater than 75 mEq/bag) 75 mL/hr Last Rate: 75 mL/hr (02/01/18 2235)   sodium chloride 25 mL/hr Last Rate: 25 mL/hr (02/01/18 0900)        Principal Problem:    Sepsis due to urinary tract infection  Active Problems:    Diabetes mellitus, type 2    Hyperlipidemia    Hypothyroidism    Chronic obstructive pulmonary disease    CAD (coronary artery disease)    Peripheral vascular disease    Obstructive sleep apnea syndrome    Congestive heart failure    Atrial fibrillation with rapid ventricular response    Altered mental status    Acute renal failure    Hyperkalemia    Leukocytosis    Elevated troponin      ASSESSMENT/PLAN:  Transition back to oral diltiazem and metoprolol once tolerating by mouth  Stable from a cardiac standpoint note sepsis resolving, we'll follow as needed over the weekend.          Balwinder Marquez III, MD   02/02/18  8:09 AM

## 2018-02-02 NOTE — PROGRESS NOTES
Multidisciplinary Rounds    Time: 20min  Patient Name: Leila Smith  Date of Encounter: 02/02/18 9:29 AM  MRN: [id number]  Admission date: 1/31/2018      Reason for visit: MDR. RD to continue to follow per protocol.     Additional information obtained during MDR: RN reports pt drank 100% of Boost Plus this morning but minimal food. Plans to transition insulin regimen today.     Current diet: Diet Regular; Consistent Carbohydrate, Cardiac    Active Supplement Orders      Dietary Nutrition Supplements Boost Glucose Control      DIET MESSAGE There was no Boost included on breakfast tray this morning. Please make sure Boost GC is on ALL TRAYS-thank you    Recommendations:  1. If pt continues to have minimal PO intake with exception to liquids it may be appropriate to adjust supplement to Boost Plus to provide increased kcals.     Intervention:  Follow treatment plan  Care plan reviewed    Follow up:   Per protocol      Mayra Blake RDN, LD  9:29 AM

## 2018-02-03 NOTE — PLAN OF CARE
Problem: Patient Care Overview (Adult)  Goal: Plan of Care Review  Outcome: Ongoing (interventions implemented as appropriate)   02/03/18 6159   Patient Care Overview   Progress improving   Outcome Evaluation   Outcome Summary/Follow up Plan Pt arrived to floor around 11:30am. Fairly pleasant, doesn't like to take oral medications.        Problem: Renal Failure/Kidney Injury, Acute (Adult)  Goal: Signs and Symptoms of Listed Potential Problems Will be Absent or Manageable (Renal Failure/Kidney Injury, Acute)  Outcome: Ongoing (interventions implemented as appropriate)

## 2018-02-03 NOTE — PROGRESS NOTES
INTENSIVIST   PROGRESS NOTE     Hospital:  LOS: 3 days     Ms. Leila Smith, 72 y.o. female is followed for a Chief Complaint of: Sepsis, ANGELA      Subjective   S   Ms. Smith is a 72 year old female who is mentally challenged at baseline and who presents from [facility] via EMS for altered mental status. The patient's daughter states that she was called by the rehab when the patient was more lethargic than her baseline which is minimally verbal, and occasionally confused though the patient was able to live at home by herself until recently. Patient has a history of COPD, CHF, A fib, DM2, and frequent UTIs. Patient's daughter states that she has declined quickly since colectomy with colostomy placement at an outlying facility in April 2017.     Interval History:  Appears comfortable.       The patient's relevant past medical, surgical and social history were reviewed and updated in Epic as appropriate.      ROS: Unable to obtain secondary to mental status.     Objective   O     Vitals:  Temp  Min: 97.3 °F (36.3 °C)  Max: 98.4 °F (36.9 °C)  BP  Min: 116/71  Max: 147/84  Pulse  Min: 72  Max: 105  Resp  Min: 16  Max: 22  SpO2  Min: 93 %  Max: 100 % No Data Recorded    Intake/Ouptut 24 hrs (7:00AM - 6:59 AM)  Intake & Output (last 3 days)       01/31 0701 - 02/01 0700 02/01 0701 - 02/02 0700 02/02 0701 - 02/03 0700 02/03 0701 - 02/04 0700    P.O.  550 1000     I.V. (mL/kg) 1937.8 (21.4) 2651.5 (29.2) 1552.2 (17.1)     IV Piggyback 650 541 61.6 50    Total Intake(mL/kg) 2587.8 (28.5) 3742.5 (41.3) 2613.8 (28.8) 50 (0.6)    Urine (mL/kg/hr) 605 1440 (0.7) 1230 (0.6) 120 (0.4)    Stool 0 300 (0.1) 225 (0.1) 100 (0.4)    Total Output 605 1740 1455 220    Net +1982.8 +2002.5 +1158.8 -170            Unmeasured Stool Occurrence 1 x             Medications (drips):    dextrose   Pharmacy to dose vancomycin   phenylephrine       Physical Examination  Telemetry:  Sinus Rhythm:  (UNDETERMINTED)  Atrial Rhythm: atrial  fibrillation      Constitutional:  No acute distress.    Cardiovascular: Normal rate, regular and rhythm. Normal heart sounds.  No murmurs, gallop or rub.   Respiratory: No respiratory distress. Normal respiratory effort.  Normal breath sounds  Clear to ascultation.    Abdominal:  Soft. No masses. Non-tender. No distension. No HSM.  Colostomy with stool output.    Extremities: No digital cyanosis. No clubbing.  No peripheral edema.  Scaly,dry skin on lower legs bilaterally.    Neurological:   Alert.  Does not answer orientation questions.              Results from last 7 days  Lab Units 02/03/18  0429 02/02/18  0755 02/01/18  0344   WBC 10*3/mm3 15.87* 16.15* 17.29*   HEMOGLOBIN g/dL 13.1 13.4 14.1   MCV fL 89.1 87.9 88.6   PLATELETS 10*3/mm3 201 186 210       Results from last 7 days  Lab Units 02/03/18  0429 02/02/18  0805 02/01/18  0344   SODIUM mmol/L 147* 145 142   POTASSIUM mmol/L 3.6 3.8 4.3   CO2 mmol/L 27.0 25.0 17.0*   CREATININE mg/dL 2.20* 2.60* 3.60*   GLUCOSE mg/dL 122* 179* 237*   MAGNESIUM mg/dL 1.8 2.0 1.7   PHOSPHORUS mg/dL 2.4 2.9 4.3     Estimated Creatinine Clearance: 25 mL/min (by C-G formula based on Cr of 2.2).    Results from last 7 days  Lab Units 01/31/18  1337   ALK PHOS U/L 284*   BILIRUBIN mg/dL 0.8   ALT (SGPT) U/L 34   AST (SGOT) U/L 40*         Results from last 7 days  Lab Units 02/01/18  0438 01/31/18  2201 01/31/18  1832   PH, ARTERIAL pH units 7.400 7.245* 7.198*   PCO2, ARTERIAL mm Hg 22.4* 22.0* 22.7*   PO2 ART mm Hg 93.0 148.0* 129.0*   FIO2 % 21 36 44       Images:  Renal US 02/01/2018 There are bilateral renal cortical cysts. Otherwise unremarkable.           Results: Reviewed.  I reviewed the patient's new laboratory and imaging results.  I independently reviewed the patient's new images.    Medications: Reviewed.    Assessment/Plan   A / P     Ms. Smith is a 72 year old female who is mentally challenged at baseline and who [facility] for  altered mental status. She was found to have new renal failure and is also in Afib with RVR. She appears to be dehydrated and may have an element of HHS in the setting of poorly controlled diabetes. Her renal function is slowly improving. Her mental status appears close to baseline. Her blood cultures were positive but have not speciated out yet. She remains on Vanc and Zosyn.     Nutrition:   Diet Regular; Consistent Carbohydrate, Cardiac  Advance Directives: Full Code    Hospital Problem List     * (Principal)Sepsis due to urinary tract infection    Diabetes mellitus, type 2    Overview Signed 8/3/2016  2:13 PM by Ama Wolf     With foot ulcer         Hyperlipidemia    Hypothyroidism    Chronic obstructive pulmonary disease    CAD (coronary artery disease)    Overview Signed 7/15/2016 10:27 AM by Yue Royal     History of  Coronary Artery Disease V12.59         Peripheral vascular disease    Obstructive sleep apnea syndrome    Congestive heart failure    Atrial fibrillation with rapid ventricular response    Altered mental status    Acute renal failure    Hyperkalemia    Leukocytosis    Elevated troponin          Assessment / Plan:    1. Continue Vanc and Zosyn  2. F/u blood cultures  3. PO diltiazem  4. Transitioned insulin  5.   D/w cardiology for mobile echogenicity on MV, ?endocarditis, getting YANNA Monday  6.    txf to tele  7.   D5W for hypernatremia    I discussed the patient's findings and my recommendations with nursing staff    Time: was greater than 35 minutes.    Yovanny Sherman MD  Pulmonology and Critical Care Medicine  02/03/18 10:00 AM  Electronically Signed

## 2018-02-03 NOTE — PROGRESS NOTES
"NAL Progress Note  Leila Smith  [id number]  1945    1/31/2018    Reason for visit:  ANGELA  Renal function improving.  ROS:    Denies any nausea vomiting, chest pain or shortness of breath  She start to cry between conversation not sure how much she understands    I&O:    Intake/Output Summary (Last 24 hours) at 02/03/18 0844  Last data filed at 02/03/18 0600   Gross per 24 hour   Intake           2021.6 ml   Output             1295 ml   Net            726.6 ml       PE:   Blood pressure 119/69, pulse 83, temperature 97.5 °F (36.4 °C), temperature source Core, resp. rate 18, height 165.1 cm (65\"), weight 90.7 kg (200 lb), SpO2 98 %.    GENERAL: Awake and alert, in no acute distress.   HEENT: PER, No conjunctivitis  NECK: Supple, no JVD     HEART: RRR; No murmur, rubs, gallops.   LUNGS: Clear auscultation normal effort.. Nonlabored.   ABDOMEN: Soft nontender positive bowel sounds.  EXT:  No cyanosis, clubbing or edema.   SKIN: Warm and dry , lower extremity skin changes are noted..    NEURO: Alert and oriented x 3, no focal deficit.  Medications:    Current Facility-Administered Medications:   •  acetaminophen (TYLENOL) tablet 650 mg, 650 mg, Oral, Q4H PRN **OR** acetaminophen (TYLENOL) suppository 650 mg, 650 mg, Rectal, Q4H PRN, DANIEL Ludwig  •  aspirin chewable tablet 324 mg, 324 mg, Oral, Daily, DANIEL Ludwig, 324 mg at 02/03/18 0821  •  atorvastatin (LIPITOR) tablet 10 mg, 10 mg, Oral, Nightly, Frieda Alfredo APRN, 10 mg at 02/02/18 2055  •  dextrose (D50W) solution 25 g, 25 g, Intravenous, Q15 Min PRN, Jasmina V. Case, DO  •  dextrose (GLUTOSE) oral gel 15 g, 15 g, Oral, Q15 Min PRN, Jasmina V. Case, DO  •  diltiaZEM (CARDIZEM) tablet 60 mg, 60 mg, Oral, Q6H, DANIEL Ludwig, 60 mg at 02/03/18 0603  •  famotidine (PEPCID) injection 20 mg, 20 mg, Intravenous, Daily, Jasmina V. Case, DO, 20 mg at 02/03/18 0821  •  glucagon (human recombinant) (GLUCAGEN DIAGNOSTIC) injection " 1 mg, 1 mg, Subcutaneous, PRN, Jasmina V. Case, DO  •  heparin (porcine) 5000 UNIT/ML injection 5,000 Units, 5,000 Units, Subcutaneous, Q8H, DANIEL Ludwig, 5,000 Units at 02/03/18 0603  •  insulin detemir (LEVEMIR) injection 10 Units, 10 Units, Subcutaneous, QAM, Jasmina V. Case, DO, 10 Units at 02/02/18 1150  •  insulin lispro (humaLOG) injection 0-7 Units, 0-7 Units, Subcutaneous, 4x Daily With Meals & Nightly, Jasmina V. Case, DO, 4 Units at 02/02/18 2055  •  insulin lispro (humaLOG) injection 3 Units, 3 Units, Subcutaneous, TID With Meals, Jasmina V. Case, DO, 3 Units at 02/03/18 0822  •  ipratropium-albuterol (DUO-NEB) nebulizer solution 3 mL, 3 mL, Nebulization, Q6H - RT, DANIEL Ludwig, 3 mL at 02/03/18 0655  •  magnesium sulfate 4 gram infusion - Mg less than or equal to 1mg/dL, 4 g, Intravenous, PRN **OR** magnesium sulfate 3 gram infusion (1gm x 3) - Mg 1.1 - 1.5 mg/dL, 1 g, Intravenous, PRN **OR** Magnesium Sulfate 2 gram infusion- Mg 1.6 - 1.9 mg/dL, 2 g, Intravenous, PRN, DANIEL Mustafa, Last Rate: 25 mL/hr at 02/03/18 0822, 2 g at 02/03/18 0822  •  metoprolol tartrate (LOPRESSOR) injection 5 mg, 5 mg, Intravenous, Q6H, DANIEL Mustafa, 5 mg at 02/03/18 0603  •  Pharmacy to dose vancomycin, , Does not apply, Continuous PRN, Jasmina V. Case, DO  •  phenylephrine (TRESA-SYNEPHRINE) 50 mg/250 mL (0.2 mg/mL) in 0.9% NS  infusion, 0.5-3 mcg/kg/min, Intravenous, Titrated, DANIEL Desai  •  piperacillin-tazobactam (ZOSYN) 3.375 g in iso-osmotic dextrose 50 ml (premix), 3.375 g, Intravenous, Q12H, Ciera Silver, formerly [facility], 3.375 g at 02/03/18 0417  •  sodium chloride 0.9 % flush 1-10 mL, 1-10 mL, Intravenous, PRN, Alexandra Galdamez, APRN  •  sodium chloride 0.9 % flush 10 mL, 10 mL, Intravenous, PRN, Blayne Moe MD  •  sodium chloride 0.9 % infusion, 75 mL/hr, Intravenous, Continuous, Zane Muniz MD, Last Rate: 75 mL/hr at 02/02/18 1158, 75 mL/hr at 02/02/18 1158  •   vancomycin (dosing per levels), , Does not apply, Daily, Ciera Silver [facility], Stopped at 02/02/18 0843    Meds reviewed and doses adjusted for renal function    Labs:    Results from last 7 days  Lab Units 02/03/18  0429 02/02/18  0755 02/01/18  0344   WBC 10*3/mm3 15.87* 16.15* 17.29*   HEMOGLOBIN g/dL 13.1 13.4 14.1   HEMATOCRIT % 41.6 41.4 43.7   PLATELETS 10*3/mm3 201 186 210       Results from last 7 days  Lab Units 02/03/18  0429 02/02/18  0805 02/01/18  0344 01/31/18  2353   SODIUM mmol/L 147* 145 142 141   POTASSIUM mmol/L 3.6 3.8 4.3 5.1   CHLORIDE mmol/L 113* 115* 117* 117*   CO2 mmol/L 27.0 25.0 17.0* 12.0*   BUN mg/dL 82* 94* 130* 120*   CREATININE mg/dL 2.20* 2.60* 3.60* 3.60*   GLUCOSE mg/dL 122* 179* 237* 327*   CALCIUM mg/dL 7.8* 7.5* 8.3* 8.1*   PHOSPHORUS mg/dL 2.4 2.9 4.3  --        Results from last 7 days  Lab Units 01/31/18  1337   ALK PHOS U/L 284*   BILIRUBIN mg/dL 0.8   ALT (SGPT) U/L 34   AST (SGOT) U/L 40*     Invalid input(s): UCOL, UCLR, UPH, USG, UPRO, UBLD, UNITR, ELEU, URBC, UFC, UBACT    Estimated Creatinine Clearance: 25 mL/min (by C-G formula based on Cr of 2.2).    Radiology:  Imaging Results (last 72 hours)     Procedure Component Value Units Date/Time    XR Chest 1 View [[id number]] Collected:  01/31/18 1900     Updated:  01/31/18 1935    Narrative:       EXAM:    XR Chest, 1 View    CLINICAL HISTORY:    72 years old, female; Sepsis, unspecified organism; Elevated white blood cell   count, unspecified; Hyperkalemia; Unspecified atrial fibrillation; Acute kidney   failure, unspecified; Urinary tract infection, site not specified; Hematuria,   unspecified; Altered mental status, unspecified; Hyperglycemia, unspecified;   Device placement; Other: Central line placement    TECHNIQUE:    Frontal view of the chest.    COMPARISON:    CR - XR CHEST 1 VW 2018-01-31 13:45    FINDINGS:    Lungs:  Unremarkable.  No consolidation.    Pleural space:  Unremarkable.  No pneumothorax.    Heart:  The  heart demonstrates diffuse enlargement.    Mediastinum:  Unremarkable.    Bones/joints:  Unremarkable.    Tubes, lines and devices:  A left internal jugular central venous catheter is   present, with its tip overlying the region of the superior vena cava.      Impression:         A left internal jugular central venous catheter is present, with its tip   overlying the region of the superior vena cava.    THIS DOCUMENT HAS BEEN ELECTRONICALLY SIGNED BY URSULA ARGUELLO MD    XR Chest 1 View [[id number]] Collected:  01/31/18 1447     Updated:  01/31/18 2150    Narrative:       EXAMINATION: XR CHEST 1 VW-01/31/2018:      INDICATION: Weak/Dizzy/AMS, triage protocol.      COMPARISON: 12/19/2017.     FINDINGS: The patient is again rotated to the left. The heart shadow  appears borderline enlarged. The vasculature appears upper limits of  normal. Mild chronic appearing interstitial lung changes and old  granulomatous calcifications are again noted and appear stable.           Impression:       Mild pulmonary venous hypertension unchanged. No new chest  disease is identified.     D:  01/31/2018  E:  01/31/2018     This report was finalized on 1/31/2018 9:48 PM by DR. Brian Cosme MD.       CT Head Without Contrast [[id number]] Collected:  01/31/18 1648     Updated:  01/31/18 2223    Narrative:       EXAMINATION: CT HEAD WO CONTRAST- 01/31/2018     INDICATION: Decreased alertness; N17.9-Acute kidney failure,  unspecified; E87.5-Hyperkalemia; A41.9-Sepsis, unspecified organism;  N39.0-Urinary tract infection, site not specified; R31.9-Hematuria,  unspecified; R73.9-Hyperglycemia, unspecified; D72.829-Elevated white  blood cell count, unspecified; R41.82-Altered mental status,  unspecified; I48.91-Unspecified atrial fibrillation         TECHNIQUE: 5 mm unenhanced images through the brain     The radiation dose reduction device was turned on for each scan per the  ALARA (As Low as Reasonably Achievable) protocol.     COMPARISON:  12/19/2017     FINDINGS: Previous exam report from 12/19/2017 indicates no acute  findings. History today indicates decreased level of alertness, altered  mental status.     The calvarium appears intact. Included paranasal sinuses mastoid air  cells and middle ear spaces appear clear. Soft tissue window images show  advanced generalized cerebral atrophy and chronic appearing central  white matter changes stable in pattern from previous study. There is no  evidence of mass, mass effect, hemorrhage, edema, hydrocephalus, or  abnormal extra-axial collection.       Impression:       Stable head CT scan with chronic appearing age related  changes. No evidence of acute intracranial disease.        D:  01/31/2018  E:  01/31/2018     This report was finalized on 1/31/2018 10:21 PM by DR. Brian Cosme MD.       US Renal Limited [[id number]] Collected:  02/01/18 1601     Updated:  02/02/18 1236    Narrative:       EXAMINATION: US RENAL, LIMITED-02/01/2018:     INDICATION: ANGELA, renal insufficiency.     TECHNIQUE: Sonographic imaging was obtained of the kidneys in both the  sagittal and transverse planes. The examination is suboptimal due to  patient inability to hold respirations.     COMPARISON: NONE.     FINDINGS: The right kidney measures in length from pole to pole 13.5 cm.  There is no solid mass identified. There is a renal cortical cyst  identified measuring 3.2 cm. No hydronephrosis. No nephrolithiasis. No  solid mass.     The left kidney measures in length from pole to pole 11.4 cm. Several  cystic areas identified, the largest measuring 2.5 cm. There is no  hydronephrosis or nephrolithiasis. No solid mass. Imaging of the bladder  reveals a Bone catheter present.       Impression:       Bilateral renal cortical cysts otherwise, unremarkable  examination.     D:  02/01/2018  E:  02/01/2018            This report was finalized on 2/2/2018 12:33 PM by Dr. Stephanie Jarrett MD.             Renal  Imaging:    reviewed    IMPRESSION:  Acute kidney injury-likely acute kidney injury due to volume depletion and ATN.  Creatinine is improving    Chronic kidney disease-her baseline creatinine is about 0.8 just within the last 6-8 weeks.  She possibly has baseline diabetic nephropathy  Metabolic Acidosis- improving   Hemoconcentration-hematocrit is quite high likely because awaiting depletion  Hypertension blood pressure is very low needing IV fluids     PLAN:   Hyper natremia noted at this time, case with the RN to increase the oral water intake.  We'll check the labs again in the morning.         Elliot Lo MD  2/3/2018  8:44 AM

## 2018-02-03 NOTE — PROGRESS NOTES
"Pharmacy Consult-Vancomycin Dosing  Leila Smith is a  72 y.o. female receiving vancomycin therapy.     Indication: Bacteremia   Consulting Provider: Intensivist   ID Consult: No    Goal Trough: 15-20 mcg/mL    Current Antimicrobial Therapy       Vancomycin dosing per level - day 3              - 2/1 Vancomycin 1750mg @1530       Zosyn 3.375gm q12h      Allergies  Allergies as of 01/31/2018 - Rohan as Reviewed 01/31/2018   Allergen Reaction Noted   • Codeine  08/03/2016   • Tetracyclines & related  07/15/2016     Labs      Results from last 7 days     Lab Units 02/03/18  0429 02/02/18  0755 02/01/18  0344   WBC 10*3/mm3 15.87* 16.15* 17.29*   HEMOGLOBIN g/dL 13.1 13.4 14.1   HEMATOCRIT % 41.6 41.4 43.7   PLATELETS 10*3/mm3 201 186 210      Results from last 7 days     Lab Units 02/03/18  0429 02/02/18  0805 02/01/18  0344   SODIUM mmol/L 147* 145 142   POTASSIUM mmol/L 3.6 3.8 4.3   CHLORIDE mmol/L 113* 115* 117*   CO2 mmol/L 27.0 25.0 17.0*   BUN mg/dL 82* 94* 130*   CREATININE mg/dL 2.20* 2.60* 3.60*   GLUCOSE mg/dL 122* 179* 237*   CALCIUM mg/dL 7.8* 7.5* 8.3*        Evaluation of Dosing     Ht - 165.1 cm (65\")  Wt - 90.7 kg (200 lb)    Estimated Creatinine Clearance: 25 mL/min (by C-G formula based on Cr of 2.2).    Intake & Output (last 3 days)         01/31 0701 - 02/01 0700 02/01 0701 - 02/02 0700 02/02 0701 - 02/03 0700 02/03 0701 - 02/04 0700      P.O.  550 1000     I.V. (mL/kg) 1937.8 (21.4) 2651.5 (29.2) 1552.2 (17.1)     IV Piggyback 650 541 61.6 150    Total Intake(mL/kg) 2587.8 (28.5) 3742.5 (41.3) 2613.8 (28.8) 150 (1.7)    Urine (mL/kg/hr) 605 1440 (0.7) 1230 (0.6) 120 (0.2)    Stool 0 300 (0.1) 225 (0.1) 100 (0.2)    Total Output 605 1740 1455 220    Net +1982.8 +2002.5 +1158.8 -70            Unmeasured Stool Occurrence 1 x               Microbiology and Radiology  Microbiology Results (last 10 days)       Procedure Component Value - Date/Time      Blood Culture - Blood, [[id number]]  (Normal) " Collected:  02/02/18 0805    Lab Status:  Preliminary result Specimen:  Blood from Blood, Central Line Updated:  02/03/18 1031     Blood Culture No growth at 24 hours    Urine Culture - Urine, Urine, Clean Catch [[id number]]  (Abnormal) Collected:  01/31/18 1643    Lab Status:  Final result Specimen:  Urine from Urine, Catheter Updated:  02/03/18 1239     Urine Culture --      >100,000 CFU/mL Candida parapsilosis (A)    Influenza A & B, RT PCR - Swab, Nasopharynx [[id number]]  (Normal) Collected:  01/31/18 1630    Lab Status:  Final result Specimen:  Swab from Nasopharynx Updated:  01/31/18 1748     Influenza A PCR Not Detected     Influenza B PCR Not Detected    Urine Culture - Urine, Urine, Clean Catch [[id number]]  (Abnormal) Collected:  01/31/18 1346    Lab Status:  Final result Specimen:  Urine from Urine, Catheter Updated:  02/02/18 1154     Urine Culture --      40,000-50,000 CFU/mL Candida albicans (A)    Blood Culture - Blood, [[id number]]  (Abnormal)  (Susceptibility) Collected:  01/31/18 1337    Lab Status:  Final result Specimen:  Blood from Arm, Right Updated:  02/03/18 1007     Blood Culture --      Staphylococcus epidermidis (A)      This isolate does not demonstrate inducible clindamycin resistance in vitro.          Isolated from Aerobic and Anaerobic Bottles     Gram Stain Result Anaerobic Bottle Gram positive cocci in pairs and clusters      Aerobic Bottle Gram positive cocci in clusters    Susceptibility        Staphylococcus epidermidis     DIMITRIS     Ciprofloxacin >2 ug/ml Resistant     Clindamycin <=0.5 ug/ml Susceptible     Daptomycin <=0.5 ug/ml Susceptible     Erythromycin >4 ug/ml Resistant     Gentamicin <=4 ug/ml Susceptible     Levofloxacin >4 ug/ml Resistant     Linezolid <=1 ug/ml Susceptible     Oxacillin >2 ug/ml Resistant     Penicillin G >8 ug/ml Resistant     Quinupristin + Dalfopristin <=0.5 ug/ml Susceptible     Rifampin <=1 ug/ml Susceptible     Tetracycline <=4 ug/ml Susceptible      Trimethoprim + Sulfamethoxazole <=0.5/9.5 ug/ml Susceptible     Vancomycin 1 ug/ml Susceptible                      Blood Culture - Blood, [[id number]]  (Abnormal) Collected:  01/31/18 1337    Lab Status:  Final result Specimen:  Blood from Arm, Left Updated:  02/03/18 1009     Blood Culture Abnormal Stain (A)      Staphylococcus, coagulase negative (A)     Isolated from Aerobic and Anaerobic Bottles     Gram Stain Result Anaerobic Bottle Gram positive cocci in clusters      Aerobic Bottle Gram positive cocci in clusters    Narrative:       Refer to culture [id number] for ID and Susceptibilty    Blood Culture ID, PCR - Blood, [[id number]]  (Normal) Collected:  01/31/18 1337    Lab Status:  Final result Specimen:  Blood from Arm, Right Updated:  02/01/18 1502     BCID, PCR No organism detected by BCID PCR.            Evaluation of Level    Lab Results   Component Value Date    VANCORANDOM 21.40 02/03/2018     Assessment/Plan:    Last dose 2/1 - Vancomycin 1750mg @1530  2/3 Vancomycin level - 21.4 mcg/ml  Vancomycin level drawn 35 hours following loading dose    Will obtain random vancomycin level 2/4 am to determine further dosing  Pharmacy will continue to monitor and adjust vancomycin dose as necessary based on renal function, cultures, labs, and clinical status.     Thank you,  Evert Cid RPH  2/3/2018  1:19 PM

## 2018-02-03 NOTE — PLAN OF CARE
Problem: Patient Care Overview (Adult)  Goal: Plan of Care Review  Outcome: Ongoing (interventions implemented as appropriate)   02/03/18 0641   Coping/Psychosocial Response Interventions   Plan Of Care Reviewed With patient   Patient Care Overview   Progress improving   Outcome Evaluation   Outcome Summary/Follow up Plan Afib but rate controlled, pt taking cardizem PO. She remains agitated with care, but is calm when left alone.      Goal: Adult Individualization and Mutuality  Outcome: Ongoing (interventions implemented as appropriate)    Goal: Discharge Needs Assessment  Outcome: Ongoing (interventions implemented as appropriate)      Problem: Renal Failure/Kidney Injury, Acute (Adult)  Goal: Signs and Symptoms of Listed Potential Problems Will be Absent or Manageable (Renal Failure/Kidney Injury, Acute)  Outcome: Ongoing (interventions implemented as appropriate)      Problem: Cardiac Output, Decreased (Adult)  Goal: Identify Related Risk Factors and Signs and Symptoms  Outcome: Ongoing (interventions implemented as appropriate)    Goal: Adequate Cardiac Output/Effective Tissue Perfusion  Outcome: Ongoing (interventions implemented as appropriate)      Problem: Sepsis (Adult)  Goal: Signs and Symptoms of Listed Potential Problems Will be Absent or Manageable (Sepsis)  Outcome: Ongoing (interventions implemented as appropriate)      Problem: Skin Integrity Impairment, Risk/Actual (Adult)  Goal: Identify Related Risk Factors and Signs and Symptoms  Outcome: Ongoing (interventions implemented as appropriate)    Goal: Skin Integrity/Wound Healing  Outcome: Ongoing (interventions implemented as appropriate)      Problem: Fall Risk (Adult)  Goal: Identify Related Risk Factors and Signs and Symptoms  Outcome: Ongoing (interventions implemented as appropriate)    Goal: Absence of Falls  Outcome: Ongoing (interventions implemented as appropriate)

## 2018-02-04 NOTE — PROGRESS NOTES
"NAL Progress Note  Leila Smith  [id number]  1945    1/31/2018    Reason for visit:  ANGELA  Renal function improving, sodium better  ROS:    Eyes any nausea, vomiting, chest pain, shortness of breath, dysuria, hematuria.       I&O:    Intake/Output Summary (Last 24 hours) at 02/04/18 1022  Last data filed at 02/04/18 0504   Gross per 24 hour   Intake             1287 ml   Output             1575 ml   Net             -288 ml       PE:   Blood pressure 115/95, pulse 99, temperature 98.2 °F (36.8 °C), temperature source Oral, resp. rate 16, height 165.1 cm (65\"), weight 82.3 kg (181 lb 8 oz), SpO2 96 %.    GENERAL: Following commands no acute distress.   HEENT: PER, no MI.  NECK: Supple, no JVD     HEART: RRR; No murmur, rubs, gallops.   LUNGS: Clear auscultation normal effort.. Nonlabored.   ABDOMEN: Soft nontender positive bowel sounds.  EXT:  No cyanosis, clubbing or edema.   SKIN: Warm and dry , lower extremity skin changes are noted..    NEURO: Alert and oriented x 3,        Medications:    Current Facility-Administered Medications:   •  acetaminophen (TYLENOL) tablet 650 mg, 650 mg, Oral, Q4H PRN **OR** acetaminophen (TYLENOL) suppository 650 mg, 650 mg, Rectal, Q4H PRN, Alexandra Galdamez, DANIEL  •  aspirin chewable tablet 324 mg, 324 mg, Oral, Daily, DANIEL Ludwig, 324 mg at 02/04/18 0919  •  atorvastatin (LIPITOR) tablet 10 mg, 10 mg, Oral, Nightly, Frieda Alfredo APRN, 10 mg at 02/02/18 2055  •  dextrose (D50W) solution 25 g, 25 g, Intravenous, Q15 Min PRN, Jasmina V. Case, DO  •  dextrose (GLUTOSE) oral gel 15 g, 15 g, Oral, Q15 Min PRN, Jasmina V. Case, DO  •  diltiaZEM (CARDIZEM) tablet 60 mg, 60 mg, Oral, Q6H, Alexandra Galdamez APRN, 60 mg at 02/04/18 0504  •  famotidine (PEPCID) injection 20 mg, 20 mg, Intravenous, Daily, Jasmina V. Case, DO, 20 mg at 02/04/18 0919  •  glucagon (human recombinant) (GLUCAGEN DIAGNOSTIC) injection 1 mg, 1 mg, Subcutaneous, PRN, Jasmina V. Case, DO  •  " heparin (porcine) 5000 UNIT/ML injection 5,000 Units, 5,000 Units, Subcutaneous, Q8H, Alexandra Galdamez APRN, 5,000 Units at 02/04/18 0503  •  insulin detemir (LEVEMIR) injection 10 Units, 10 Units, Subcutaneous, QAM, Jasmina V. Case, DO, 10 Units at 02/04/18 0916  •  insulin lispro (humaLOG) injection 0-7 Units, 0-7 Units, Subcutaneous, 4x Daily With Meals & Nightly, Jasmina V. Case, DO, 3 Units at 02/04/18 0920  •  insulin lispro (humaLOG) injection 3 Units, 3 Units, Subcutaneous, TID With Meals, Jasmina VLaura Case, DO, 3 Units at 02/04/18 0929  •  ipratropium-albuterol (DUO-NEB) nebulizer solution 3 mL, 3 mL, Nebulization, Q6H - RT, DANIEL Ludwig, 3 mL at 02/03/18 2009  •  magnesium sulfate 4 gram infusion - Mg less than or equal to 1mg/dL, 4 g, Intravenous, PRN **OR** magnesium sulfate 3 gram infusion (1gm x 3) - Mg 1.1 - 1.5 mg/dL, 1 g, Intravenous, PRN **OR** Magnesium Sulfate 2 gram infusion- Mg 1.6 - 1.9 mg/dL, 2 g, Intravenous, PRN, DANIEL Mustafa, Last Rate: 25 mL/hr at 02/03/18 0822, 2 g at 02/03/18 0822  •  metoprolol tartrate (LOPRESSOR) injection 5 mg, 5 mg, Intravenous, Q6H, DANIEL Mustafa, 5 mg at 02/04/18 0503  •  Pharmacy to dose vancomycin, , Does not apply, Continuous PRN, Jasmina VLaura Case, DO  •  piperacillin-tazobactam (ZOSYN) 3.375 g in iso-osmotic dextrose 50 ml (premix), 3.375 g, Intravenous, Q12H, Ciera Silver, [facility], 3.375 g at 02/04/18 0504  •  sodium chloride 0.9 % flush 1-10 mL, 1-10 mL, Intravenous, PRN, DANIEL Ludwig  •  sodium chloride 0.9 % flush 10 mL, 10 mL, Intravenous, PRN, Blayne Moe MD  •  vancomycin (dosing per levels), , Does not apply, Daily, Ciera Silver [facility], Stopped at 02/02/18 0843    Meds reviewed and doses adjusted for renal function    Labs:    Results from last 7 days  Lab Units 02/04/18  0453 02/03/18  0429 02/02/18  0755   WBC 10*3/mm3 13.67* 15.87* 16.15*   HEMOGLOBIN g/dL 12.4 13.1 13.4   HEMATOCRIT % 39.3 41.6 41.4    PLATELETS 10*3/mm3 192 201 186       Results from last 7 days  Lab Units 02/04/18  0452 02/03/18  0429 02/02/18  0805 02/01/18  0344   SODIUM mmol/L 140 147* 145 142   POTASSIUM mmol/L 3.5 3.6 3.8 4.3   CHLORIDE mmol/L 108 113* 115* 117*   CO2 mmol/L 28.0 27.0 25.0 17.0*   BUN mg/dL 66* 82* 94* 130*   CREATININE mg/dL 2.00* 2.20* 2.60* 3.60*   GLUCOSE mg/dL 210* 122* 179* 237*   CALCIUM mg/dL 7.7* 7.8* 7.5* 8.3*   PHOSPHORUS mg/dL 1.9* 2.4 2.9 4.3       Results from last 7 days  Lab Units 02/04/18  0452   ALK PHOS U/L 177*   BILIRUBIN mg/dL 0.4   ALT (SGPT) U/L 22   AST (SGOT) U/L 11     Invalid input(s): UCOL, UCLR, UPH, USG, UPRO, UBLD, UNITR, ELEU, URBC, UFC, UBACT    Estimated Creatinine Clearance: 26.1 mL/min (by C-G formula based on Cr of 2).    Radiology:  Imaging Results (last 72 hours)     Procedure Component Value Units Date/Time    XR Chest 1 View [[id number]] Collected:  01/31/18 1900     Updated:  01/31/18 1935    Narrative:       EXAM:    XR Chest, 1 View    CLINICAL HISTORY:    72 years old, female; Sepsis, unspecified organism; Elevated white blood cell   count, unspecified; Hyperkalemia; Unspecified atrial fibrillation; Acute kidney   failure, unspecified; Urinary tract infection, site not specified; Hematuria,   unspecified; Altered mental status, unspecified; Hyperglycemia, unspecified;   Device placement; Other: Central line placement    TECHNIQUE:    Frontal view of the chest.    COMPARISON:    CR - XR CHEST 1 VW 2018-01-31 13:45    FINDINGS:    Lungs:  Unremarkable.  No consolidation.    Pleural space:  Unremarkable.  No pneumothorax.    Heart:  The heart demonstrates diffuse enlargement.    Mediastinum:  Unremarkable.    Bones/joints:  Unremarkable.    Tubes, lines and devices:  A left internal jugular central venous catheter is   present, with its tip overlying the region of the superior vena cava.      Impression:         A left internal jugular central venous catheter is present, with its  tip   overlying the region of the superior vena cava.    THIS DOCUMENT HAS BEEN ELECTRONICALLY SIGNED BY URSULA ARGUELLO MD    XR Chest 1 View [[id number]] Collected:  01/31/18 1447     Updated:  01/31/18 2150    Narrative:       EXAMINATION: XR CHEST 1 VW-01/31/2018:      INDICATION: Weak/Dizzy/AMS, triage protocol.      COMPARISON: 12/19/2017.     FINDINGS: The patient is again rotated to the left. The heart shadow  appears borderline enlarged. The vasculature appears upper limits of  normal. Mild chronic appearing interstitial lung changes and old  granulomatous calcifications are again noted and appear stable.           Impression:       Mild pulmonary venous hypertension unchanged. No new chest  disease is identified.     D:  01/31/2018  E:  01/31/2018     This report was finalized on 1/31/2018 9:48 PM by DR. Brian Cosme MD.       CT Head Without Contrast [[id number]] Collected:  01/31/18 1648     Updated:  01/31/18 2223    Narrative:       EXAMINATION: CT HEAD WO CONTRAST- 01/31/2018     INDICATION: Decreased alertness; N17.9-Acute kidney failure,  unspecified; E87.5-Hyperkalemia; A41.9-Sepsis, unspecified organism;  N39.0-Urinary tract infection, site not specified; R31.9-Hematuria,  unspecified; R73.9-Hyperglycemia, unspecified; D72.829-Elevated white  blood cell count, unspecified; R41.82-Altered mental status,  unspecified; I48.91-Unspecified atrial fibrillation         TECHNIQUE: 5 mm unenhanced images through the brain     The radiation dose reduction device was turned on for each scan per the  ALARA (As Low as Reasonably Achievable) protocol.     COMPARISON: 12/19/2017     FINDINGS: Previous exam report from 12/19/2017 indicates no acute  findings. History today indicates decreased level of alertness, altered  mental status.     The calvarium appears intact. Included paranasal sinuses mastoid air  cells and middle ear spaces appear clear. Soft tissue window images show  advanced generalized cerebral atrophy  and chronic appearing central  white matter changes stable in pattern from previous study. There is no  evidence of mass, mass effect, hemorrhage, edema, hydrocephalus, or  abnormal extra-axial collection.       Impression:       Stable head CT scan with chronic appearing age related  changes. No evidence of acute intracranial disease.        D:  01/31/2018  E:  01/31/2018     This report was finalized on 1/31/2018 10:21 PM by DR. Brian Cosme MD.       US Renal Limited [[id number]] Collected:  02/01/18 1601     Updated:  02/02/18 1236    Narrative:       EXAMINATION: US RENAL, LIMITED-02/01/2018:     INDICATION: ANGELA, renal insufficiency.     TECHNIQUE: Sonographic imaging was obtained of the kidneys in both the  sagittal and transverse planes. The examination is suboptimal due to  patient inability to hold respirations.     COMPARISON: NONE.     FINDINGS: The right kidney measures in length from pole to pole 13.5 cm.  There is no solid mass identified. There is a renal cortical cyst  identified measuring 3.2 cm. No hydronephrosis. No nephrolithiasis. No  solid mass.     The left kidney measures in length from pole to pole 11.4 cm. Several  cystic areas identified, the largest measuring 2.5 cm. There is no  hydronephrosis or nephrolithiasis. No solid mass. Imaging of the bladder  reveals a Obne catheter present.       Impression:       Bilateral renal cortical cysts otherwise, unremarkable  examination.     D:  02/01/2018  E:  02/01/2018            This report was finalized on 2/2/2018 12:33 PM by Dr. Stephanie Jarrett MD.             Renal Imaging:    reviewed    IMPRESSION:  Acute kidney injury-likely acute kidney injury due to volume depletion and ATN.  Creatinine is improving    Chronic kidney disease-her baseline creatinine is about 0.8 just within the last 6-8 weeks.  She possibly has baseline diabetic nephropathy  Metabolic Acidosis- improving   Hemoconcentration-hematocrit is quite high likely because  awaiting depletion  Hypertension      PLAN:   Lower extremity edema is noted I will stop the IV fluids.  Hypernatremia resolved.  DC IV fluids.         Elliot Lo MD  2/4/2018  10:22 AM

## 2018-02-04 NOTE — PROGRESS NOTES
"Pharmacy Consult-Vancomycin Dosing  Leila Smith is a  72 y.o. female receiving vancomycin therapy.     Indication: Bacteremia   Consulting Provider: Intensivist   ID Consult: No    Goal Trough: 15-20 mcg/mL    Current Antimicrobial Therapy       Vancomycin dosing per level - day 3              - 2/1 Vancomycin 1750mg @1530       Zosyn 3.375gm q12h      Allergies  Allergies as of 01/31/2018 - Rohan as Reviewed 01/31/2018   Allergen Reaction Noted   • Codeine  08/03/2016   • Tetracyclines & related  07/15/2016     Labs    Results from last 7 days   Lab Units 02/04/18  0453 02/03/18  0429 02/02/18  0755   WBC 10*3/mm3 13.67* 15.87* 16.15*   HEMOGLOBIN g/dL 12.4 13.1 13.4   HEMATOCRIT % 39.3 41.6 41.4   PLATELETS 10*3/mm3 192 201 186    Results from last 7 days   Lab Units 02/04/18  0452 02/03/18  0429 02/02/18  0805   SODIUM mmol/L 140 147* 145   POTASSIUM mmol/L 3.5 3.6 3.8   CHLORIDE mmol/L 108 113* 115*   CO2 mmol/L 28.0 27.0 25.0   BUN mg/dL 66* 82* 94*   CREATININE mg/dL 2.00* 2.20* 2.60*   GLUCOSE mg/dL 210* 122* 179*   CALCIUM mg/dL 7.7* 7.8* 7.5*        Evaluation of Dosing     Ht - 165.1 cm (65\")  Wt - 82.3 kg (181 lb 8 oz)    Estimated Creatinine Clearance: 26.1 mL/min (by C-G formula based on Cr of 2).    Intake & Output (last 3 days)         02/01 0701 - 02/02 0700 02/02 0701 - 02/03 0700 02/03 0701 - 02/04 0700 02/04 0701 - 02/05 0700      P.O. 550 1000 237     I.V. (mL/kg) 2651.5 (29.2) 1552.2 (17.1) 1000 (12.1)     IV Piggyback 541 61.6 200     Total Intake(mL/kg) 3742.5 (41.3) 2613.8 (28.8) 1437 (17.5)     Urine (mL/kg/hr) 1440 (0.7) 1230 (0.6) 1320 (0.7)     Stool 300 (0.1) 225 (0.1) 475 (0.2)     Total Output 1740 1455 1795      Net +2002.5 +1158.8 -358                      Microbiology and Radiology  Microbiology Results (last 10 days)       Procedure Component Value - Date/Time      Blood Culture - Blood, [[id number]]  (Normal) Collected:  02/02/18 0805    Lab Status:  Preliminary result Specimen:  " Blood from Blood, Central Line Updated:  02/03/18 1031     Blood Culture No growth at 24 hours    Urine Culture - Urine, Urine, Clean Catch [[id number]]  (Abnormal) Collected:  01/31/18 1643    Lab Status:  Final result Specimen:  Urine from Urine, Catheter Updated:  02/03/18 1239     Urine Culture --      >100,000 CFU/mL Candida parapsilosis (A)    Influenza A & B, RT PCR - Swab, Nasopharynx [[id number]]  (Normal) Collected:  01/31/18 1630    Lab Status:  Final result Specimen:  Swab from Nasopharynx Updated:  01/31/18 1748     Influenza A PCR Not Detected     Influenza B PCR Not Detected    Urine Culture - Urine, Urine, Clean Catch [[id number]]  (Abnormal) Collected:  01/31/18 1346    Lab Status:  Final result Specimen:  Urine from Urine, Catheter Updated:  02/02/18 1154     Urine Culture --      40,000-50,000 CFU/mL Candida albicans (A)    Blood Culture - Blood, [[id number]]  (Abnormal)  (Susceptibility) Collected:  01/31/18 1337    Lab Status:  Final result Specimen:  Blood from Arm, Right Updated:  02/03/18 1007     Blood Culture --      Staphylococcus epidermidis (A)      This isolate does not demonstrate inducible clindamycin resistance in vitro.          Isolated from Aerobic and Anaerobic Bottles     Gram Stain Result Anaerobic Bottle Gram positive cocci in pairs and clusters      Aerobic Bottle Gram positive cocci in clusters    Susceptibility        Staphylococcus epidermidis     DIMITRIS     Ciprofloxacin >2 ug/ml Resistant     Clindamycin <=0.5 ug/ml Susceptible     Daptomycin <=0.5 ug/ml Susceptible     Erythromycin >4 ug/ml Resistant     Gentamicin <=4 ug/ml Susceptible     Levofloxacin >4 ug/ml Resistant     Linezolid <=1 ug/ml Susceptible     Oxacillin >2 ug/ml Resistant     Penicillin G >8 ug/ml Resistant     Quinupristin + Dalfopristin <=0.5 ug/ml Susceptible     Rifampin <=1 ug/ml Susceptible     Tetracycline <=4 ug/ml Susceptible     Trimethoprim + Sulfamethoxazole <=0.5/9.5 ug/ml Susceptible      Vancomycin 1 ug/ml Susceptible                      Blood Culture - Blood, [[id number]]  (Abnormal) Collected:  01/31/18 1337    Lab Status:  Final result Specimen:  Blood from Arm, Left Updated:  02/03/18 1009     Blood Culture Abnormal Stain (A)      Staphylococcus, coagulase negative (A)     Isolated from Aerobic and Anaerobic Bottles     Gram Stain Result Anaerobic Bottle Gram positive cocci in clusters      Aerobic Bottle Gram positive cocci in clusters    Narrative:       Refer to culture [id number] for ID and Susceptibilty    Blood Culture ID, PCR - Blood, [[id number]]  (Normal) Collected:  01/31/18 1337    Lab Status:  Final result Specimen:  Blood from Arm, Right Updated:  02/01/18 1502     BCID, PCR No organism detected by BCID PCR.            Evaluation of Level    Lab Results   Component Value Date    VANCORANDOM 13.30 02/04/2018     Assessment/Plan:    Last dose 2/1 - Vancomycin 1750mg @1530  2/4 Vancomycin level - 13.3 mcg/ml @0452  Vancomycin level drawn 53 hours following loading dose  SCr - 2.0    Will re-dose with Vancomycin 1250mg 2/4  Will obtain random vancomycin level 2/5 am to determine further dosing  Monitor s/sx nephrotoxicity (risk: ANGELA, advanced age, concurrent medications)  Pharmacy will continue to monitor and adjust vancomycin dose as necessary based on renal function, cultures, labs, and clinical status.     Thank you,  Evert Cid formerly [facility]  2/4/2018  8:16 AM

## 2018-02-04 NOTE — PLAN OF CARE
Problem: Patient Care Overview (Adult)  Goal: Plan of Care Review  Outcome: Ongoing (interventions implemented as appropriate)   02/03/18 2000 02/04/18 0432   Coping/Psychosocial Response Interventions   Plan Of Care Reviewed With patient --    Patient Care Overview   Progress --  progress toward functional goals as expected   Outcome Evaluation   Outcome Summary/Follow up Plan --  PT does not like taking oral medications, Gave with Ice cream.     Goal: Discharge Needs Assessment  Outcome: Ongoing (interventions implemented as appropriate)   01/31/18 1646 02/03/18 0641 02/04/18 0432   Discharge Needs Assessment   Concerns To Be Addressed --  --  denies needs/concerns at this time   Readmission Within The Last 30 Days no previous admission in last 30 days --  --    Equipment Needed After Discharge none --  --    Discharge Disposition --  still a patient --    Living Environment   Transportation Available ambulance --  --    Self-Care   Equipment Currently Used at Home walker, rolling;wheelchair;colostomy/ostomy supplies --  --        Problem: Renal Failure/Kidney Injury, Acute (Adult)  Goal: Signs and Symptoms of Listed Potential Problems Will be Absent or Manageable (Renal Failure/Kidney Injury, Acute)  Outcome: Ongoing (interventions implemented as appropriate)   02/03/18 0641   Renal Failure/Kidney Injury, Acute   Problems Assessed (Acute Renal Failure/Kidney Injury) all   Problems Present (Acute Renal Failure/Kidney Injury) electrolyte imbalance;fluid imbalance       Problem: Cardiac Output, Decreased (Adult)  Goal: Identify Related Risk Factors and Signs and Symptoms  Outcome: Ongoing (interventions implemented as appropriate)   02/03/18 0641   Cardiac Output, Decreased   Cardiac Output, Decreased: Related Risk Factors heart rhythm altered   Signs and Symptoms (Cardiac Output Decreased) heart rate/rhythm alteration     Goal: Adequate Cardiac Output/Effective Tissue Perfusion  Outcome: Ongoing (interventions  implemented as appropriate)   02/04/18 0432   Cardiac Output, Decreased (Adult)   Adequate Cardiac Output/Effective Tissue Perfusion making progress toward outcome       Problem: Sepsis (Adult)  Goal: Signs and Symptoms of Listed Potential Problems Will be Absent or Manageable (Sepsis)  Outcome: Ongoing (interventions implemented as appropriate)   02/03/18 0641   Sepsis   Problems Assessed (Sepsis) all   Problems Present (Sepsis) glycemic control, impaired;hypoperfusion/hemodynamic instability;situational response       Problem: Skin Integrity Impairment, Risk/Actual (Adult)  Goal: Identify Related Risk Factors and Signs and Symptoms  Outcome: Ongoing (interventions implemented as appropriate)   02/03/18 0641   Skin Integrity Impairment, Risk/Actual   Skin Integrity Impairment, Risk/Actual: Related Risk Factors cognitive impairment;edema;immobility;tissue perfusion impaired;infection/disease process     Goal: Skin Integrity/Wound Healing  Outcome: Ongoing (interventions implemented as appropriate)   02/04/18 0432   Skin Integrity Impairment, Risk/Actual (Adult)   Skin Integrity/Wound Healing making progress toward outcome       Problem: Fall Risk (Adult)  Goal: Identify Related Risk Factors and Signs and Symptoms  Outcome: Ongoing (interventions implemented as appropriate)   02/04/18 0432   Fall Risk   Fall Risk: Related Risk Factors confusion/agitation   Fall Risk: Signs and Symptoms presence of risk factors     Goal: Absence of Falls  Outcome: Ongoing (interventions implemented as appropriate)   02/04/18 0432   Fall Risk (Adult)   Absence of Falls making progress toward outcome

## 2018-02-04 NOTE — PROGRESS NOTES
=H  Hospital:  LOS: 4 days         Subjective   S   The patient is transferred from the intensive care unit where she was admitted on January 31, 2018  She is currently sitting up in bed.  She does not appear to be in any distress.  She denies any complaints.  No recurrent fever.  No nausea, vomiting, dumping, diarrhea or constipation           ROS: Unreliable due to patient's mental status    Objective   O     Vitals:  Temp  Min: 97.6 °F (36.4 °C)  Max: 98.6 °F (37 °C)  BP  Min: 115/95  Max: 128/65  Pulse  Min: 56  Max: 99  Resp  Min: 16  Max: 18  No Data Recorded No Data Recorded      Medications (drips):    Pharmacy to dose vancomycin       Physical Examination  Telemetry:  Sinus Rhythm:  (UNDETERMINTED)  Atrial Rhythm: atrial fibrillation      Constitutional:  No acute distress.    Cardiovascular: Normal rate, regular and rhythm. Normal heart sounds.  No murmurs, gallop or rub.   Respiratory: No respiratory distress. Normal respiratory effort.  Normal breath sounds  Clear to ascultation.    Abdominal:  Soft. No masses. Non-tender. No distension. No HSM.  Colostomy with stool output.    Extremities: No digital cyanosis. No clubbing.  No peripheral edema.  Scaly,dry skin on lower legs bilaterally.    Neurological:   Alert.  Does not answer orientation questions.              Images:  Renal US 02/01/2018 There are bilateral renal cortical cysts. Otherwise unremarkable.           Results: Reviewed.  I reviewed the patient's new laboratory and imaging results.  I independently reviewed the patient's new images.    1.  Arterial blood gas shows a pH of 7.198, PCO2 22.7, PO2 129  2.  Creatinine 2.0.  Elevated glucose at 210.  Baseline creatinine is 0.9.  Peak creatinine on admission was 4.7 .  Elevated lactic acid on admission at 3.6  3.  Albumin 2.6.  Low phosphorus of 1.9  4.  Elevated troponin 5.4.  Elevated BNP at 925  5.  TSH 0.917  6.  Elevated white blood cell count at admission at 17,000  7.  Urinalysis on  admission shows.  Large leukocyte esterase.  1+ bacteria.  6-12 white blood cells.  2+ budding yeast  8.  Negative influenza A and B PCR  9.  Gram-positive cocci in clusters from aerobic and anaerobic blood cultures    Medications: Reviewed.    Assessment/Plan   A / P     Ms. Smith is a 72 year old female who is mentally challenged at baseline and who Resides at [facility] with a past medical history of poorly controlled type 2 diabetes mellitus, hypertension, hyperlipidemia, hypothyroidism, coronary artery disease, chronic obstructive pulmonary disease, paroxysmal atrial fibrillation, obstructive sleep apnea, who was brought in by EMS for altered mental status.  She was afebrile but tachycardic and hypotensive.  She was found to be in atrial fibrillation with rapid ventricular response, hypotension, acute kidney injury, severe metabolic acidosis.  She had positive troponin.  Positive blood cultures for gram-positive cocci in clusters.     Nutrition:   Diet Regular; Consistent Carbohydrate, Cardiac  NPO Diet  Advance Directives: Full Code    Hospital Problem List     * (Principal)Sepsis due to urinary tract infection    Diabetes mellitus, type 2    Overview Signed 8/3/2016  2:13 PM by Ama Wolf     With foot ulcer         Hyperlipidemia    Hypothyroidism    Chronic obstructive pulmonary disease    CAD (coronary artery disease)    Overview Signed 7/15/2016 10:27 AM by Yue Royal     History of  Coronary Artery Disease V12.59         Peripheral vascular disease    Obstructive sleep apnea syndrome    Congestive heart failure    Atrial fibrillation with rapid ventricular response    Altered mental status    Acute renal failure    Hyperkalemia    Leukocytosis    Elevated troponin          1.  Atrial fibrillation with rapid ventricular response presenting with hypotension and acute kidney injury.  Evaluated by cardiology.  Currently on by mouth diltiazem    2.  Sepsis on admission of unclear source.  The patient  has Gram-positive cocci in clusters present and anaerobic and anaerobic blood cultures.  Unclear source.  Possibly urinary.  Possibly contamination.  Currently afebrile.  Presented with leukocytosis, tachycardia and hypotension.  Transthoracic echocardiogram shows possible mobile echogenicity near the posterior leaflet of the mitral valve concerning for possible endocarditis  PLAN  Repeat blood cultures in a.m.  Repeat CBC in a.m.  YANNA in am  ID consultation    3.  Acute kidney injury, AKIN stage III.  The patient had normal baseline creatinine of 0.8.  Presented with a creatinine of mid fours most likely due to ischemic ATN in the setting of hypotension, atrial fibrillation with rapid ventricular response and sepsis  Recovering    4.  Increased gap metabolic acidosis on admission due to acute kidney injury and lactic acidosis from hypotension, improving    5.  Hyperkalemia on admission due to acute kidney injury, improving    6.  Elevated troponin on admission thought to be type II non-ST elevation myocardial infarction from hypotension and atrial fibrillation with rapid ventricular response.  Further evaluation and management per cardiology    7.  Type 2 diabetes mellitus poorly controlled.  We will make appropriate adjustments today      Hayden Rivers MD  02/04/18 2:24 PM  Electronically Signed

## 2018-02-05 NOTE — PROGRESS NOTES
"NAL Progress Note  Leila Smith  [id number]  1945    1/31/2018    Reason for visit:  ANGELA    Not much talkative  ROS:    Eyes any nausea, vomiting, chest pain, shortness of breath, dysuria, hematuria.       I&O:    Intake/Output Summary (Last 24 hours) at 02/05/18 1112  Last data filed at 02/05/18 0518   Gross per 24 hour   Intake              310 ml   Output             1800 ml   Net            -1490 ml       PE:   Blood pressure 134/62, pulse 94, temperature 98.4 °F (36.9 °C), temperature source Oral, resp. rate 18, height 165.1 cm (65\"), weight 82 kg (180 lb 11.2 oz), SpO2 96 %.    GENERAL: Following commands no acute distress.   HEENT: PER, no MI.  NECK: Supple, no JVD     HEART: RRR; No murmur, rubs, gallops.   LUNGS: Clear auscultation normal effort.. Nonlabored.   ABDOMEN: Soft nontender positive bowel sounds.  EXT:  No cyanosis, clubbing or edema.   SKIN: Warm and dry , lower extremity skin changes are noted..    NEURO: Alert and oriented x 3,        Medications:    Current Facility-Administered Medications:   •  acetaminophen (TYLENOL) tablet 650 mg, 650 mg, Oral, Q4H PRN **OR** acetaminophen (TYLENOL) suppository 650 mg, 650 mg, Rectal, Q4H PRN, Alexandra Galdamez, DANIEL  •  aspirin chewable tablet 324 mg, 324 mg, Oral, Daily, DANIEL Ludwig, 324 mg at 02/04/18 0919  •  atorvastatin (LIPITOR) tablet 10 mg, 10 mg, Oral, Nightly, DANIEL Mustafa, 10 mg at 02/04/18 2103  •  dextrose (D50W) solution 25 g, 25 g, Intravenous, Q15 Min PRN, Jasmina V. Case, DO  •  dextrose (GLUTOSE) oral gel 15 g, 15 g, Oral, Q15 Min PRN, Jasmina V. Case, DO  •  diltiaZEM (CARDIZEM) tablet 60 mg, 60 mg, Oral, Q6H, DANIEL Ludwig, Stopped at 02/05/18 0515  •  famotidine (PEPCID) injection 20 mg, 20 mg, Intravenous, Daily, Jasmina V. Case, DO, 20 mg at 02/04/18 0919  •  fluconazole (DIFLUCAN) tablet 200 mg, 200 mg, Oral, Q24H, DANIEL Almanza  •  glucagon (human recombinant) (GLUCAGEN " DIAGNOSTIC) injection 1 mg, 1 mg, Subcutaneous, PRN, Jasmina V. Case, DO  •  heparin (porcine) 5000 UNIT/ML injection 5,000 Units, 5,000 Units, Subcutaneous, Q8H, Alexandra Galdamez APRN, 5,000 Units at 02/05/18 0518  •  insulin detemir (LEVEMIR) injection 15 Units, 15 Units, Subcutaneous, QAM, Hayden Rivers MD  •  insulin lispro (humaLOG) injection 0-7 Units, 0-7 Units, Subcutaneous, 4x Daily With Meals & Nightly, Jasmina KAUR. Case, DO, 4 Units at 02/04/18 2103  •  insulin lispro (humaLOG) injection 5 Units, 5 Units, Subcutaneous, TID With Meals, Hayden Rivers MD, 5 Units at 02/04/18 1739  •  ipratropium-albuterol (DUO-NEB) nebulizer solution 3 mL, 3 mL, Nebulization, Q6H PRN, Hayden Rivers MD  •  magnesium sulfate 4 gram infusion - Mg less than or equal to 1mg/dL, 4 g, Intravenous, PRN **OR** magnesium sulfate 3 gram infusion (1gm x 3) - Mg 1.1 - 1.5 mg/dL, 1 g, Intravenous, PRN **OR** Magnesium Sulfate 2 gram infusion- Mg 1.6 - 1.9 mg/dL, 2 g, Intravenous, PRN, Frieda Alfredo APRN, Last Rate: 25 mL/hr at 02/03/18 0822, 2 g at 02/03/18 0822  •  metoprolol tartrate (LOPRESSOR) tablet 50 mg, 50 mg, Oral, Q12H, Hayden Rivers MD  •  Pharmacy to dose vancomycin, , Does not apply, Continuous PRN, Jasmina V. Case, DO  •  sodium chloride 0.9 % flush 1-10 mL, 1-10 mL, Intravenous, PRN, Alexandra Galdamez APRN  •  sodium chloride 0.9 % flush 10 mL, 10 mL, Intravenous, PRN, Blayne Moe MD  •  vancomycin (dosing per levels), , Does not apply, Daily, Ciera Silver [facility], Stopped at 02/02/18 0843    Meds reviewed and doses adjusted for renal function    Labs:    Results from last 7 days  Lab Units 02/04/18  0453 02/03/18  0429 02/02/18  0755   WBC 10*3/mm3 13.67* 15.87* 16.15*   HEMOGLOBIN g/dL 12.4 13.1 13.4   HEMATOCRIT % 39.3 41.6 41.4   PLATELETS 10*3/mm3 192 201 186       Results from last 7 days  Lab Units 02/04/18  0452 02/03/18  0429 02/02/18  0805 02/01/18  0344   SODIUM mmol/L 140  147* 145 142   POTASSIUM mmol/L 3.5 3.6 3.8 4.3   CHLORIDE mmol/L 108 113* 115* 117*   CO2 mmol/L 28.0 27.0 25.0 17.0*   BUN mg/dL 66* 82* 94* 130*   CREATININE mg/dL 2.00* 2.20* 2.60* 3.60*   GLUCOSE mg/dL 210* 122* 179* 237*   CALCIUM mg/dL 7.7* 7.8* 7.5* 8.3*   PHOSPHORUS mg/dL 1.9* 2.4 2.9 4.3       Results from last 7 days  Lab Units 02/04/18  0452   ALK PHOS U/L 177*   BILIRUBIN mg/dL 0.4   ALT (SGPT) U/L 22   AST (SGOT) U/L 11     Invalid input(s): UCOL, UCLR, UPH, USG, UPRO, UBLD, UNITR, ELEU, URBC, UFC, UBACT    Estimated Creatinine Clearance: 26.1 mL/min (by C-G formula based on Cr of 2).    Radiology:  Imaging Results (last 72 hours)     Procedure Component Value Units Date/Time    XR Chest 1 View [[id number]] Collected:  01/31/18 1900     Updated:  01/31/18 1935    Narrative:       EXAM:    XR Chest, 1 View    CLINICAL HISTORY:    72 years old, female; Sepsis, unspecified organism; Elevated white blood cell   count, unspecified; Hyperkalemia; Unspecified atrial fibrillation; Acute kidney   failure, unspecified; Urinary tract infection, site not specified; Hematuria,   unspecified; Altered mental status, unspecified; Hyperglycemia, unspecified;   Device placement; Other: Central line placement    TECHNIQUE:    Frontal view of the chest.    COMPARISON:    CR - XR CHEST 1 VW 2018-01-31 13:45    FINDINGS:    Lungs:  Unremarkable.  No consolidation.    Pleural space:  Unremarkable.  No pneumothorax.    Heart:  The heart demonstrates diffuse enlargement.    Mediastinum:  Unremarkable.    Bones/joints:  Unremarkable.    Tubes, lines and devices:  A left internal jugular central venous catheter is   present, with its tip overlying the region of the superior vena cava.      Impression:         A left internal jugular central venous catheter is present, with its tip   overlying the region of the superior vena cava.    THIS DOCUMENT HAS BEEN ELECTRONICALLY SIGNED BY URSULA ARGUELLO MD    XR Chest 1 View [[id number]]  Collected:  01/31/18 1447     Updated:  01/31/18 2150    Narrative:       EXAMINATION: XR CHEST 1 VW-01/31/2018:      INDICATION: Weak/Dizzy/AMS, triage protocol.      COMPARISON: 12/19/2017.     FINDINGS: The patient is again rotated to the left. The heart shadow  appears borderline enlarged. The vasculature appears upper limits of  normal. Mild chronic appearing interstitial lung changes and old  granulomatous calcifications are again noted and appear stable.           Impression:       Mild pulmonary venous hypertension unchanged. No new chest  disease is identified.     D:  01/31/2018  E:  01/31/2018     This report was finalized on 1/31/2018 9:48 PM by DR. Brian Cosme MD.       CT Head Without Contrast [[id number]] Collected:  01/31/18 1648     Updated:  01/31/18 2223    Narrative:       EXAMINATION: CT HEAD WO CONTRAST- 01/31/2018     INDICATION: Decreased alertness; N17.9-Acute kidney failure,  unspecified; E87.5-Hyperkalemia; A41.9-Sepsis, unspecified organism;  N39.0-Urinary tract infection, site not specified; R31.9-Hematuria,  unspecified; R73.9-Hyperglycemia, unspecified; D72.829-Elevated white  blood cell count, unspecified; R41.82-Altered mental status,  unspecified; I48.91-Unspecified atrial fibrillation         TECHNIQUE: 5 mm unenhanced images through the brain     The radiation dose reduction device was turned on for each scan per the  ALARA (As Low as Reasonably Achievable) protocol.     COMPARISON: 12/19/2017     FINDINGS: Previous exam report from 12/19/2017 indicates no acute  findings. History today indicates decreased level of alertness, altered  mental status.     The calvarium appears intact. Included paranasal sinuses mastoid air  cells and middle ear spaces appear clear. Soft tissue window images show  advanced generalized cerebral atrophy and chronic appearing central  white matter changes stable in pattern from previous study. There is no  evidence of mass, mass effect, hemorrhage, edema,  hydrocephalus, or  abnormal extra-axial collection.       Impression:       Stable head CT scan with chronic appearing age related  changes. No evidence of acute intracranial disease.        D:  01/31/2018  E:  01/31/2018     This report was finalized on 1/31/2018 10:21 PM by DR. Brian Cosme MD.       US Renal Limited [[id number]] Collected:  02/01/18 1601     Updated:  02/02/18 1236    Narrative:       EXAMINATION: US RENAL, LIMITED-02/01/2018:     INDICATION: ANGELA, renal insufficiency.     TECHNIQUE: Sonographic imaging was obtained of the kidneys in both the  sagittal and transverse planes. The examination is suboptimal due to  patient inability to hold respirations.     COMPARISON: NONE.     FINDINGS: The right kidney measures in length from pole to pole 13.5 cm.  There is no solid mass identified. There is a renal cortical cyst  identified measuring 3.2 cm. No hydronephrosis. No nephrolithiasis. No  solid mass.     The left kidney measures in length from pole to pole 11.4 cm. Several  cystic areas identified, the largest measuring 2.5 cm. There is no  hydronephrosis or nephrolithiasis. No solid mass. Imaging of the bladder  reveals a Bone catheter present.       Impression:       Bilateral renal cortical cysts otherwise, unremarkable  examination.     D:  02/01/2018  E:  02/01/2018            This report was finalized on 2/2/2018 12:33 PM by Dr. Stephanie Jarrett MD.             Renal Imaging:    reviewed    IMPRESSION:  Acute kidney injury-likely acute kidney injury due to volume depletion and ATN.  Creatinine is improving    Chronic kidney disease-her baseline creatinine is about 0.8 just within the last 6-8 weeks.  She possibly has baseline diabetic nephropathy  Metabolic Acidosis- improving   Hemoconcentration-hematocrit is quite high likely because awaiting depletion  Hypertension      PLAN:   Continue to encourage po intake  Cr stable         Zane Muniz MD  2/5/2018  11:12 AM

## 2018-02-05 NOTE — PROGRESS NOTES
=H  HOSPITAL MEDICINE DAILY PROGRESS NOTE  Hospital:  LOS: 5 days         Subjective   S   The patient was transferred to the floor from the the intensive care unit on 2/4/2018  where she was admitted on January 31, 2018  She is currently sitting up in bed.  She does not appear to be in any distress.  She denies any complaints.  No recurrent fever.  No nausea, vomiting, dumping, diarrhea or constipation           ROS: Unreliable due to patient's mental status    Objective   O     Vitals:  Temp  Min: 97.8 °F (36.6 °C)  Max: 98.5 °F (36.9 °C)  BP  Min: 120/65  Max: 134/62  Pulse  Min: 80  Max: 94  Resp  Min: 16  Max: 18  No Data Recorded No Data Recorded      Medications (drips):    Pharmacy to dose vancomycin       Physical Examination  Telemetry:  Sinus Rhythm:  (UNDETERMINTED)  Atrial Rhythm: atrial fibrillation      Constitutional:  No acute distress. Pleasant   Cardiovascular: Irregular rate. Normal heart sounds.  No murmurs, gallop or rub.   Respiratory: No respiratory distress. Normal respiratory effort.  Normal breath sounds  Clear to ascultation.    Abdominal:  Soft. No masses. Non-tender. No distension. No HSM.  Colostomy with stool output.    Extremities: No digital cyanosis. No clubbing.  No peripheral edema.  Scaly,dry skin on lower legs bilaterally.    Neurological:   Alert.  Does not answer orientation questions.              Images:  Renal US 02/01/2018 There are bilateral renal cortical cysts. Otherwise unremarkable.           Results: Reviewed.  I reviewed the patient's new laboratory and imaging results.  I independently reviewed the patient's new images.    1.  Arterial blood gas shows a pH of 7.198, PCO2 22.7, PO2 129  2.  Creatinine 2.0.  Elevated glucose at 210.  Baseline creatinine is 0.9.  Peak creatinine on admission was 4.7 .  Elevated lactic acid on admission at 3.6  3.  Albumin 2.6.  Low phosphorus of 1.9  4.  Elevated troponin 5.4.  Elevated BNP at 925  5.  TSH 0.917  6.  Elevated  white blood cell count at admission at 17,000  7.  Urinalysis on admission shows.  Large leukocyte esterase.  1+ bacteria.  6-12 white blood cells.  2+ budding yeast  8.  Negative influenza A and B PCR  9.  Gram-positive cocci in clusters from aerobic and anaerobic blood cultures    Medications: Reviewed.    Assessment/Plan   A / P     Ms. Smith is a 72 year old female morbidly obese  female who is mentally challenged at baseline and who Resides at [facility] with a past medical history of poorly controlled type 2 diabetes mellitus, hypertension, hyperlipidemia, hypothyroidism, coronary artery disease, chronic obstructive pulmonary disease, paroxysmal atrial fibrillation, obstructive sleep apnea, who was brought in by EMS for altered mental status.  She was afebrile but tachycardic and hypotensive.  She was found to be in atrial fibrillation with rapid ventricular response, hypotension, acute kidney injury, severe metabolic acidosis.  She had positive troponin.  Positive blood cultures for gram-positive cocci in clusters.     Nutrition:   NPO Diet  Advance Directives: Full Code    Hospital Problem List     * (Principal)Sepsis due to urinary tract infection    Diabetes mellitus, type 2    Overview Signed 8/3/2016  2:13 PM by Ama Wolf     With foot ulcer         Hyperlipidemia    Hypothyroidism    Chronic obstructive pulmonary disease    CAD (coronary artery disease)    Overview Signed 7/15/2016 10:27 AM by Yue Royal     History of  Coronary Artery Disease V12.59         Peripheral vascular disease    Obstructive sleep apnea syndrome    Congestive heart failure    Atrial fibrillation with rapid ventricular response    Altered mental status    Acute renal failure    Hyperkalemia    Leukocytosis    Elevated troponin          1.  Atrial fibrillation with rapid ventricular response presenting with hypotension and acute kidney injury.  [id number] score of 4. We will start oral anticoagulation  for primary prevention of thromboembolic events after YANNA is done today  Rate control with diltiazem. Increase metoprolol tartrate to 50 mg bid      2.  Sepsis on admission of unclear source. The sepsis was severe due to lactic acidosis and ANGELA. Possible sources include mitral valve endocarditis or UTI.  Transthoracic echocardiogram shows possible mobile echogenicity near the posterior leaflet of the mitral valve concerning for possible endocarditis  PLAN  Repeat blood cultures today. YANNA today. Continue vancomycin and fluconazole      3.  Acute kidney injury, AKIN stage III.  The patient had normal baseline creatinine of 0.8.  Presented with a creatinine of mid fours most likely due to ischemic ATN in the setting of hypotension, atrial fibrillation with rapid ventricular response and sepsis  Recovering. Monitor renal function.     4.  Increased gap metabolic acidosis on admission due to acute kidney injury and lactic acidosis from hypotension, improving    5.  Hyperkalemia on admission due to acute kidney injury, improving    6.  Elevated troponin on admission thought to be type II non-ST elevation myocardial infarction from hypotension and atrial fibrillation with rapid ventricular response.  Further evaluation and management per cardiology    7.  Type 2 diabetes mellitus poorly controlled.  Now improved after adjustments made while inpatient. Start D5W while patient is NPO. Discontinue and resume diet post YANNA        Hayden Rivers MD  02/05/18 9:34 AM  Electronically Signed

## 2018-02-05 NOTE — PROGRESS NOTES
Adult Nutrition  Assessment/PES    Patient Name:  Leila Smith  YOB: 1945  MRN: [id number]  Admit Date:  1/31/2018    Assessment Date:  2/5/2018          Reason for Assessment       02/05/18 1815    Reason for Assessment    Reason For Assessment/Visit follow up protocol    Time Spent (min) 30        Patient Active Problem List   Diagnosis   • Diabetes mellitus, type 2   • Hypertension   • Hyperlipidemia   • Hypothyroidism   • Chronic obstructive pulmonary disease   • CAD (coronary artery disease)   • History of edema   • History of obesity   • Venous insufficiency   • Open wound of lower extremity   • Urinary tract infection   • T2DM (type 2 diabetes mellitus)   • Dyspnea on exertion   • Peripheral vascular disease   • Obstructive sleep apnea syndrome   • Ulcer of lower extremity   • Hypoxemia   • Hematuria   • Diabetic peripheral neuropathy   • Edema   • Chronic obstructive pulmonary disease with acute exacerbation   • Congestive heart failure   • Arthritis   • Neutrophilic leukocytosis   • Cystitis   • Atrial fibrillation with rapid ventricular response   • Altered mental status   • Sepsis due to urinary tract infection   • Acute renal failure   • Hyperkalemia   • Leukocytosis   • Elevated troponin               Labs/Tests/Procedures/Meds       02/05/18 1816    Labs/Tests/Procedures/Meds    Labs/Tests Review Reviewed;BUN;Creat;K+;Phos;Mg++            Results from last 7 days  Lab Units 02/04/18  0452   SODIUM mmol/L 140   POTASSIUM mmol/L 3.5   CHLORIDE mmol/L 108   CO2 mmol/L 28.0   BUN mg/dL 66*   CREATININE mg/dL 2.00*   CALCIUM mg/dL 7.7*   BILIRUBIN mg/dL 0.4   ALK PHOS U/L 177*   ALT (SGPT) U/L 22   AST (SGOT) U/L 11   GLUCOSE mg/dL 210*           Physical Findings       02/05/18 1816    Physical Findings/Assessment    Additional Documentation Physical Appearance (Group)   pt crying while eating her dinner, unclear why, observe pt eating dessert and  drinking boost supplement    per RN: pt  did not eat much lunch       family reports pt is very picky eater    Physical Appearance    Skin other (see comments)   upper + lower extremity edema, skin tear- coccyx              Nutrition Prescription Ordered       02/05/18 1818    Nutrition Prescription PO    Current PO Diet Regular    Supplement Boost Glucose Control    Supplement Frequency 3 times a day    Common Modifiers Cardiac;Consistent Carbohydrate            Evaluation of Received Nutrient/Fluid Intake       02/05/18 1818    PO Evaluation    Number of Meals 6    % PO Intake 46            Problem/Interventions:        Problem 1       02/05/18 1815    Nutrition Diagnoses Problem 1    Problem 1 Inadequate Intake/Infusion    Etiology (related to) --   per clinical status    Signs/Symptoms (evidenced by) PO Intake    Percent (%) intake recorded 46 %    Over number of meals 6                    Intervention Goal       02/05/18 1818    Intervention Goal    General Nutrition support treatment    PO Increase intake            Nutrition Intervention       02/05/18 1818    Nutrition Intervention    RD/Tech Action Interview for preference;Menu provided;Menu adjusted;Adjusted supplement              Education/Evaluation       02/05/18 1818    Monitor/Evaluation    Monitor Per protocol;PO intake;Supplement intake;Pertinent labs;Skin status;Symptoms        Electronically signed by:  Suzanne Pyle RD  02/05/18 6:19 PM

## 2018-02-05 NOTE — PLAN OF CARE
Problem: Skin Integrity Impairment, Risk/Actual (Adult)  Goal: Identify Related Risk Factors and Signs and Symptoms  Outcome: Ongoing (interventions implemented as appropriate)   02/05/18 0229   Skin Integrity Impairment, Risk/Actual   Skin Integrity Impairment, Risk/Actual: Related Risk Factors age extremes;cognitive impairment;mechanical factors;sensory impairment       Problem: Fall Risk (Adult)  Goal: Absence of Falls  Outcome: Ongoing (interventions implemented as appropriate)   02/05/18 0229   Fall Risk (Adult)   Absence of Falls making progress toward outcome

## 2018-02-05 NOTE — PROGRESS NOTES
"[facility] at [facility] Progress Note     LOS: 5 days   Patient Care Team:  Ciaran Wakefield MD as PCP - General (Internal Medicine)  No Known Provider as PCP - Family Medicine  PCP:  Ciaran Wakefield MD    Chief Complaint:  Atrial fibrillation    SUBJECTIVE:   Significant clinical improvement compared to admission.  Sepsis resolved.  Mentation back to baseline.  Telemetry reveals rate-controlled atrial fibrillation.  Transthoracic echo on admission with potential mitral valve associated vegetation and now with possible cultures for staph epidermidis.      Review of Systems:   All systems have been reviewed and are negative with the exception of those mentioned above.      OBJECTIVE:    Vital Sign Min/Max for last 24 hours  Temp  Min: 97.8 °F (36.6 °C)  Max: 98.5 °F (36.9 °C)   BP  Min: 120/65  Max: 145/104   Pulse  Min: 82  Max: 115   Resp  Min: 18  Max: 18   No Data Recorded   No Data Recorded   Weight  Min: 82 kg (180 lb 11.2 oz)  Max: 82 kg (180 lb 11.2 oz)     Flowsheet Rows         First Filed Value    Admission Height  165.1 cm (65\") Documented at 01/31/2018 1333    Admission Weight  90.7 kg (200 lb) Documented at 01/31/2018 1333          Telemetry: Rate controlled atrial fibrillation      Intake/Output Summary (Last 24 hours) at 02/05/18 1615  Last data filed at 02/05/18 1300   Gross per 24 hour   Intake              170 ml   Output             1800 ml   Net            -1630 ml     Intake & Output (last 3 days)       02/02 0701 - 02/03 0700 02/03 0701 - 02/04 0700 02/04 0701 - 02/05 0700 02/05 0701 - 02/06 0700    P.O. 1000 237 260 120    I.V. (mL/kg) 1552.2 (17.1) 1000 (12.1)      IV Piggyback 61.6 200 50     Total Intake(mL/kg) 2613.8 (28.8) 1437 (17.5) 310 (3.8) 120 (1.5)    Urine (mL/kg/hr) 1230 (0.6) 1320 (0.7) 1200 (0.6)     Stool 225 (0.1) 475 (0.2) 600 (0.3)     Total Output 1455 1795 1800      Net +1158.8 -358 -1490 +120                   Physical Exam:    General " Appearance:    Awake    Neck:   Supple, symmetrical, trachea midline.   Lungs:     Clear to auscultation bilaterally, respirations unlabored   Chest Wall:    No tenderness or deformity    Heart:    Regular rate and rhythm, S1 and S2 normal, no murmur, rub   or gallop, normal carotid impulse bilaterally without bruit.   Extremities:   Extremities normal, atraumatic, no cyanosis or edema   Pulses:   2+ and symmetric all extremities   Skin:   Skin color, texture, turgor normal, no rashes or lesions      LABS/DIAGNOSTIC DATA:    Results from last 7 days  Lab Units 02/04/18 0453 02/03/18 0429 02/02/18  0755   WBC 10*3/mm3 13.67* 15.87* 16.15*   HEMOGLOBIN g/dL 12.4 13.1 13.4   HEMATOCRIT % 39.3 41.6 41.4   PLATELETS 10*3/mm3 192 201 186     No results found for: TROPONINT    Results from last 7 days  Lab Units 01/31/18  1337   INR  1.15       Results from last 7 days  Lab Units 02/04/18 0452 02/03/18  0429 02/02/18  0805  01/31/18  1337   SODIUM mmol/L 140 147* 145  < > 141   POTASSIUM mmol/L 3.5 3.6 3.8  < > 6.6*   CHLORIDE mmol/L 108 113* 115*  < > 113*   CO2 mmol/L 28.0 27.0 25.0  < > 13.0*   BUN mg/dL 66* 82* 94*  < > 152*   CREATININE mg/dL 2.00* 2.20* 2.60*  < > 4.70*   CALCIUM mg/dL 7.7* 7.8* 7.5*  < > 9.7   BILIRUBIN mg/dL 0.4  --   --   --  0.8   ALK PHOS U/L 177*  --   --   --  284*   ALT (SGPT) U/L 22  --   --   --  34   AST (SGOT) U/L 11  --   --   --  40*   GLUCOSE mg/dL 210* 122* 179*  < > 284*   < > = values in this interval not displayed.    Results from last 7 days  Lab Units 01/31/18  1939   HEMOGLOBIN A1C % 10.20*           Results from last 7 days  Lab Units 01/31/18 1939 01/31/18  1337   TSH mIU/mL 0.917 2.380   FREE T4 ng/dL  --  1.53       Results from last 7 days  Lab Units 01/31/18  1337   BNP pg/mL 925.0*       Medication Review:     aspirin 324 mg Oral Daily   atorvastatin 10 mg Oral Nightly   diltiaZEM 60 mg Oral Q6H   famotidine 20 mg Intravenous Daily   fluconazole 200 mg Oral Q24H    heparin (porcine) 5,000 Units Subcutaneous Q8H   insulin detemir 15 Units Subcutaneous QAM   insulin lispro 0-7 Units Subcutaneous 4x Daily With Meals & Nightly   insulin lispro 5 Units Subcutaneous TID With Meals   metoprolol tartrate 50 mg Oral Q12H   vancomycin (dosing per levels)  Does not apply Daily        Pharmacy to dose vancomycin         Principal Problem:    Sepsis due to urinary tract infection  Active Problems:    Diabetes mellitus, type 2    Hyperlipidemia    Hypothyroidism    Chronic obstructive pulmonary disease    CAD (coronary artery disease)    Peripheral vascular disease    Obstructive sleep apnea syndrome    Congestive heart failure    Atrial fibrillation with rapid ventricular response    Altered mental status    Acute renal failure    Hyperkalemia    Leukocytosis    Elevated troponin      ASSESSMENT/PLAN:  Continue rate control with diltiazem, not a candidate for restoration of sinus rhythm  Not a good candidate for chronic anticoagulation  YANNA tomorrow to evaluate potential mitral valve vegetation in the setting of staph epidermidis bacteremia          Balwinder Marquez III, MD   02/05/18  4:15 PM

## 2018-02-05 NOTE — PROGRESS NOTES
Continued Stay Note   [facility]     Patient Name: Leila Smith  MRN: [id number]  Today's Date: 2/5/2018    Admit Date: 1/31/2018          Discharge Plan       02/05/18 1548    Case Management/Social Work Plan    Plan update    Patient/Family In Agreement With Plan other (see comments)    Additional Comments Called and left vm with Fozia to verify when pt is medically ready that she may return to [facility] for rehab. CM will cont to follow.               Discharge Codes     None        Expected Discharge Date and Time     Expected Discharge Date Expected Discharge Time    Feb 6, 2018             Marly Araya

## 2018-02-05 NOTE — CONSULTS
INFECTIOUS DISEASE CONSULT/INITIAL HOSPITAL VISIT    Leial Smith  1945  [id number]    Date of Consult: 2/5/2018    Admission Date: 1/31/2018      Requesting Provider: No ref. provider found  Evaluating Physician: Rohan Saunders MD    Reason for Consultation: Fungal UTI    History of present illness:    Patient is a 72 y.o. female who is seen today for evaluation of severe sepsis with blood cultures positive for staph epidermidis a urine culture positive for Candida.  She was brought from her nursing home for increasing lethargy.  She has a baseline of dementia/cognitive dysfunction but was noted to be substantially less responsive prior to admission.  Admitting labs revealed lactic acidosis, a leukocytosis of 17, 000, acute renal failure with Scr of 4.70, and 2/2 sets of positive blood cultures for Staph epidermidis, with repeat culture negative. She has had two positive urine cultures for yeast, initially Candida albicans and  most recently Candida parapsilosis. Vancomycin and Zosyn initiated and we were consulted for antibiotic management.  She is minimally conversant, and history obtained from the chart.  Her trans- thoracic echocardiogram revealed a possible mitral valve vegetation.  She is scheduled for a transesophageal echocardiogram this am.     Past Medical History:   Diagnosis Date   • Atrial fibrillation    • Chronic ulcer of right leg    • Cognitive communication deficit    • COPD (chronic obstructive pulmonary disease)    • Diabetes mellitus    • Difficulty in walking    • Encephalopathy    • Generalized muscle weakness    • Hematuria    • Hyperlipidemia    • Hypertension    • Hypothyroidism    • Urinary tract infection        Past Surgical History:   Procedure Laterality Date   • CATARACT EXTRACTION     • CHOLECYSTECTOMY     • FOOT SURGERY Right    • HERNIA REPAIR     • HYSTERECTOMY     • TOTAL KNEE ARTHROPLASTY Right        Family History   Problem Relation Age of Onset   • Stomach cancer  Mother    • Alcohol abuse Other    • Cancer Other    • Diabetes Other    • Hypertension Other    • Other Other        Social History     Social History   • Marital status:      Spouse name: N/A   • Number of children: N/A   • Years of education: N/A     Occupational History   • Not on file.     Social History Main Topics   • Smoking status: Former Smoker     Packs/day: 0.00     Years: 50.00     Types: Cigarettes   • Smokeless tobacco: Never Used   • Alcohol use No   • Drug use: No   • Sexual activity: Defer     Other Topics Concern   • Not on file     Social History Narrative       Allergies   Allergen Reactions   • Codeine    • Tetracyclines & Related          Medication:    Current Facility-Administered Medications:   •  acetaminophen (TYLENOL) tablet 650 mg, 650 mg, Oral, Q4H PRN **OR** acetaminophen (TYLENOL) suppository 650 mg, 650 mg, Rectal, Q4H PRN, DANIEL Ludwig  •  aspirin chewable tablet 324 mg, 324 mg, Oral, Daily, DANIEL Ludwig, 324 mg at 02/04/18 0919  •  atorvastatin (LIPITOR) tablet 10 mg, 10 mg, Oral, Nightly, Frieda Alfredo, APRN, 10 mg at 02/04/18 2103  •  dextrose (D50W) solution 25 g, 25 g, Intravenous, Q15 Min PRN, Jasmina V. Case, DO  •  dextrose (GLUTOSE) oral gel 15 g, 15 g, Oral, Q15 Min PRN, Jasmina V. Case, DO  •  diltiaZEM (CARDIZEM) tablet 60 mg, 60 mg, Oral, Q6H, DANIEL Ludwig, Stopped at 02/05/18 0515  •  famotidine (PEPCID) injection 20 mg, 20 mg, Intravenous, Daily, Jasmina V. Case, DO, 20 mg at 02/04/18 0919  •  fluconazole (DIFLUCAN) tablet 200 mg, 200 mg, Oral, Q24H, DANIEL Almanza  •  glucagon (human recombinant) (GLUCAGEN DIAGNOSTIC) injection 1 mg, 1 mg, Subcutaneous, PRN, Jasmina V. Case, DO  •  heparin (porcine) 5000 UNIT/ML injection 5,000 Units, 5,000 Units, Subcutaneous, Q8H, Alexandra Galdamez APRN, 5,000 Units at 02/05/18 0518  •  insulin detemir (LEVEMIR) injection 15 Units, 15 Units, Subcutaneous, Hayden FLYNN  MD Tita  •  insulin lispro (humaLOG) injection 0-7 Units, 0-7 Units, Subcutaneous, 4x Daily With Meals & Nightly, Jasmina Maier DO, 4 Units at 02/04/18 2103  •  insulin lispro (humaLOG) injection 5 Units, 5 Units, Subcutaneous, TID With Meals, Hayedn Rivers MD, 5 Units at 02/04/18 1739  •  ipratropium-albuterol (DUO-NEB) nebulizer solution 3 mL, 3 mL, Nebulization, Q6H PRN, Hayden Rivers MD  •  magnesium sulfate 4 gram infusion - Mg less than or equal to 1mg/dL, 4 g, Intravenous, PRN **OR** magnesium sulfate 3 gram infusion (1gm x 3) - Mg 1.1 - 1.5 mg/dL, 1 g, Intravenous, PRN **OR** Magnesium Sulfate 2 gram infusion- Mg 1.6 - 1.9 mg/dL, 2 g, Intravenous, PRN, DANIEL Mustafa, Last Rate: 25 mL/hr at 02/03/18 0822, 2 g at 02/03/18 0822  •  metoprolol tartrate (LOPRESSOR) tablet 25 mg, 25 mg, Oral, Q12H, Hayden Rivers MD, 25 mg at 02/04/18 2104  •  Pharmacy to dose vancomycin, , Does not apply, Continuous PRN, Jasmina Maier, DO  •  sodium chloride 0.9 % flush 1-10 mL, 1-10 mL, Intravenous, PRN, DANIEL Ludwig  •  sodium chloride 0.9 % flush 10 mL, 10 mL, Intravenous, PRN, Blayne Moe MD  •  vancomycin (dosing per levels), , Does not apply, Daily, Ciera Silver, [facility], Stopped at 02/02/18 0843    Antibiotics:  Anti-Infectives     Ordered     Dose/Rate Route Frequency Start Stop    02/05/18 0757  fluconazole (DIFLUCAN) tablet 200 mg     Ordering Provider:  DANIEL Almanza    200 mg Oral Every 24 Hours 02/05/18 0900 02/09/18 0859    02/04/18 0823  vancomycin 1250 mg/250 mL 0.9% NS IVPB ([facility])     Ordering Provider:  Evert Cid RPH    1,250 mg Intravenous Once 02/04/18 1000 02/04/18 1007    02/01/18 1504  vancomycin (dosing per levels)     Ordering Provider:  Ciera Silver RPH     Does not apply Daily 02/01/18 1545 02/08/18 0859    02/01/18 1413  vancomycin 1750 mg/500 mL 0.9% NS IVPB ([facility])     Ordering Provider:  Jasmina Maier, DO    20 mg/kg × 90.7 kg  over  120 Minutes Intravenous Once 18 1445 18 1732    18 1413  piperacillin-tazobactam (ZOSYN) 4.5 g in iso-osmotic dextrose 100 mL IVPB (premix)     Ordering Provider:  Jasmina RACHEL Darrion, DO    4.5 g  200 mL/hr over 0.5 Hours Intravenous Once 18 1445 18 1603    18 1401  Pharmacy to dose vancomycin     Ordering Provider:  Jasmina RACHEL Darrion, DO     Does not apply Continuous PRN 18 1400 02/15/18 1359    18 1418  cefTRIAXone (ROCEPHIN) IVPB 1 g     Ordering Provider:  Blayne Moe MD    1 g  100 mL/hr over 30 Minutes Intravenous Once 18 1430 18 1522            Review of Systems:  No reliable review of systems from patient       Physical Exam:   Vital Signs  Temp (24hrs), Av.1 °F (36.7 °C), Min:97.8 °F (36.6 °C), Max:98.5 °F (36.9 °C)    Temp  Min: 97.8 °F (36.6 °C)  Max: 98.5 °F (36.9 °C)  BP  Min: 120/65  Max: 134/62  Pulse  Min: 80  Max: 94  Resp  Min: 16  Max: 18  No Data Recorded    GENERAL: Awake and alert, in no acute distress.   HEENT: Normocephalic, atraumatic.  PERRL. EOMI. No conjunctival injection. No icterus. Oropharynx clear without evidence of thrush or exudate. No evidence of peridontal disease.    NECK: Supple without nuchal rigidity. No mass.  LYMPH: No cervical, axillary or inguinal lymphadenopathy.  HEART: ; No murmur, rubs, gallops. Irregular rate and rhythm  LUNGS: Clear to auscultation bilaterally without wheezing, rales, rhonchi. Normal respiratory effort. Nonlabored. No dullness.  ABDOMEN: Soft, nontender, nondistended. Positive bowel sounds. No rebound or guarding. NO mass or HSM.  EXT:  No cyanosis, clubbing or edema. No cord.  : Genitalia generally unremarkable.  +  Bone catheter.  MSK: FROM without joint effusions noted arms/legs.    SKIN: Warm and dry without cutaneous eruptions on Inspection/palpation.    NEURO: She is oriented ×0.  No focal deficits on motor/sensory exam at arms/legs.  Chest exam: She has no evidence of a  pacemaker in place        Laboratory Data      Results from last 7 days  Lab Units 02/04/18  0453 02/03/18  0429 02/02/18  0755   WBC 10*3/mm3 13.67* 15.87* 16.15*   HEMOGLOBIN g/dL 12.4 13.1 13.4   HEMATOCRIT % 39.3 41.6 41.4   PLATELETS 10*3/mm3 192 201 186       Results from last 7 days  Lab Units 02/04/18  0452   SODIUM mmol/L 140   POTASSIUM mmol/L 3.5   CHLORIDE mmol/L 108   CO2 mmol/L 28.0   BUN mg/dL 66*   CREATININE mg/dL 2.00*   GLUCOSE mg/dL 210*   CALCIUM mg/dL 7.7*       Results from last 7 days  Lab Units 02/04/18  0452   ALK PHOS U/L 177*   BILIRUBIN mg/dL 0.4   ALT (SGPT) U/L 22   AST (SGOT) U/L 11               Results from last 7 days  Lab Units 01/31/18  2353   LACTATE mmol/L 2.6*           Results from last 7 days  Lab Units 02/04/18  0452 02/03/18  0433 02/02/18  1408   VANCOMYCIN RM mcg/mL 13.30 21.40 27.00     Estimated Creatinine Clearance: 26.1 mL/min (by C-G formula based on Cr of 2).      Microbiology:  Blood Culture   Date Value Ref Range Status   02/02/2018 No growth at 2 days  Preliminary     BCID, PCR   Date Value Ref Range Status   01/31/2018 No organism detected by BCID PCR. No organism detected by BCID PCR. Final      1/31: BC (2/2)  Staph epidermidis            Urine Culture   Date Value Ref Range Status   01/31/2018 >100,000 CFU/mL Candida parapsilosis (A)  Final              Radiology:  Imaging Results (last 72 hours)     Procedure Component Value Units Date/Time    US Renal Limited [[id number]] Collected:  02/01/18 1601     Updated:  02/02/18 1236    Narrative:       EXAMINATION: US RENAL, LIMITED-02/01/2018:     INDICATION: ANGELA, renal insufficiency.     TECHNIQUE: Sonographic imaging was obtained of the kidneys in both the  sagittal and transverse planes. The examination is suboptimal due to  patient inability to hold respirations.     COMPARISON: NONE.     FINDINGS: The right kidney measures in length from pole to pole 13.5 cm.  There is no solid mass identified. There is a  renal cortical cyst  identified measuring 3.2 cm. No hydronephrosis. No nephrolithiasis. No  solid mass.     The left kidney measures in length from pole to pole 11.4 cm. Several  cystic areas identified, the largest measuring 2.5 cm. There is no  hydronephrosis or nephrolithiasis. No solid mass. Imaging of the bladder  reveals a Bone catheter present.       Impression:       Bilateral renal cortical cysts otherwise, unremarkable  examination.     D:  2018  E:  2018            This report was finalized on 2018 12:33 PM by Dr. Stephanie Jarrett MD.               Impression:   1. Severe Sepsis- with leukocytosis, lactic acidosis, acute renal failure,encephalopathy, and a potential source of infection.   2.  Staph epidermidis bacteremia-with possible mitral valve vegetation.  Staph epidermidis native valve endocarditis would be unusual but it is a described condition.  We will need to proceed with a transesophageal echocardiogram to evaluate this.  She does not appear to have a pacemaker or other prosthetic material that would potentially explain a staph epidermidis bacteremia.  We will plan to repeat blood cultures and leave her on intravenous vancomycin for the time being.  3.  Acute renal failure/ATN  4.  Leukocytosis/neutrophilia  5.  Staph epidermidis bacteremia, YANNA scheduled today  6.  Candida UTI  7.  Type 2 diabetes mellitus  8.  Dementia/cognitive dysfunction  9.  Metabolic encephalopathy    PLAN/RECOMMENDATIONS:   Thank you for asking us to see Leila Smith, I recommend the followin.  Repeat blood cultures  2.  Remove the Bone catheter soon  3.  Continue fluconazole  4.  Continue intravenous vancomycin  5.  Discontinue Zosyn  6.  Transesophageal echocardiogram    Dr. Saunders has obtained the history, performed the physical exam and formulated the above treatment plan.        Rohan Saunders MD  2018  9:24 AM

## 2018-02-06 NOTE — PROGRESS NOTES
[facility] Progress Note    Admission Date: 1/31/2018    eLila Smith  1945  [id number]    Date: 2/6/2018    Meds:    Anti-Infectives     Ordered     Dose/Rate Route Frequency Start Stop    02/06/18 1434  vancomycin (dosing per levels)     Ordering Provider:  Kanika Archer RPH     Does not apply Daily 02/07/18 0900 02/15/18 2359    02/05/18 0757  fluconazole (DIFLUCAN) tablet 200 mg     Ordering Provider:  DANIEL Almanza    200 mg Oral Every 24 Hours 02/05/18 0900 02/09/18 0859    02/04/18 0823  vancomycin 1250 mg/250 mL 0.9% NS IVPB ([facility])     Ordering Provider:  Evert Cid RPH    1,250 mg Intravenous Once 02/04/18 1000 02/04/18 1007    02/01/18 1413  vancomycin 1750 mg/500 mL 0.9% NS IVPB ([facility])     Ordering Provider:  Jasmina Maier DO    20 mg/kg × 90.7 kg  over 120 Minutes Intravenous Once 02/01/18 1445 02/01/18 1732    02/01/18 1413  piperacillin-tazobactam (ZOSYN) 4.5 g in iso-osmotic dextrose 100 mL IVPB (premix)     Ordering Provider:  Jasmina Maier DO    4.5 g  200 mL/hr over 0.5 Hours Intravenous Once 02/01/18 1445 02/01/18 1603    02/01/18 1401  Pharmacy to dose vancomycin     Ordering Provider:  Jasmina Maier DO     Does not apply Continuous PRN 02/01/18 1400 02/15/18 1359    01/31/18 1418  cefTRIAXone (ROCEPHIN) IVPB 1 g     Ordering Provider:  Blayne Moe MD    1 g  100 mL/hr over 30 Minutes Intravenous Once 01/31/18 1430 01/31/18 1522          CC:  UTI     SUBJECTIVE:  2/5/18:  Patient is a 72 y.o. female who is seen today for evaluation of severe sepsis with blood cultures positive for staph epidermidis a urine culture positive for Candida.  She was brought from her nursing home for increasing lethargy.  She has a baseline of dementia/cognitive dysfunction but was noted to be substantially less responsive prior to admission.  Admitting labs revealed lactic acidosis, a leukocytosis of 17, 000, acute renal failure with Scr of 4.70, and 2/2 sets of positive blood  cultures for Staph epidermidis, with repeat culture negative. She has had two positive urine cultures for yeast, initially Candida albicans and  most recently Candida parapsilosis. Vancomycin and Zosyn initiated and we were consulted for antibiotic management.  She is minimally conversant, and history obtained from the chart.  Her trans- thoracic echocardiogram revealed a possible mitral valve vegetation.  She is scheduled for a transesophageal echocardiogram this am.   18:  Daughter refusing YANNA.  The patient cannot provide a reliable review of systems due to her profound dementia.  The laboratory identified one of HER-2 sets of positive blood cultures as staph epidermidis but the second set was identified only as coagulase-negative staph and not speciate it yet.  She has remained afebrile.    ROS:  No f/c/s. No n/v/d. No rash. No new ADR to Abx.     PE:   Vital Signs  Temp (24hrs), Av.1 °F (36.2 °C), Min:97 °F (36.1 °C), Max:97.2 °F (36.2 °C)    Temp  Min: 97 °F (36.1 °C)  Max: 97.2 °F (36.2 °C)  BP  Min: 118/84  Max: 136/92  Pulse  Min: 78  Max: 92  Resp  Min: 18  Max: 20  SpO2  Min: 97 %  Max: 97 %    GENERAL: Awake and alert, in no acute distress.   HEENT:  No conjunctival injection. No icterus. Oropharynx clear without evidence of thrush or exudate.  NECK: Supple, no JVD     HEART: irreg rate rhythm; No murmur, rubs, gallops.   LUNGS: Clear to auscultation bilaterally without wheezing, rales, rhonchi. Normal respiratory effort. Nonlabored. No dullness.  ABDOMEN: Soft, nontender, nondistended. Positive bowel sounds. No rebound or guarding. NO mass or HSM.  EXT:  No cyanosis, clubbing or edema. No cord.     SKIN: Warm and dry without cutaneous eruptions on Inspection/palpation.    NEURO: She is alert but severely confused.  She is oriented ×1.    Laboratory Data      Results from last 7 days  Lab Units 18  0339 18  0453 18  0429   WBC 10*3/mm3 11.25* 13.67* 15.87*   HEMOGLOBIN g/dL 12.5  12.4 13.1   HEMATOCRIT % 40.6 39.3 41.6   PLATELETS 10*3/mm3 209 192 201       Results from last 7 days  Lab Units 02/06/18  0339   SODIUM mmol/L 139   POTASSIUM mmol/L 3.7   CHLORIDE mmol/L 107   CO2 mmol/L 26.0   BUN mg/dL 51*   CREATININE mg/dL 1.90*   GLUCOSE mg/dL 163*   CALCIUM mg/dL 8.2*       Results from last 7 days  Lab Units 02/04/18  0452   ALK PHOS U/L 177*   BILIRUBIN mg/dL 0.4   ALT (SGPT) U/L 22   AST (SGOT) U/L 11               Results from last 7 days  Lab Units 01/31/18  2353   LACTATE mmol/L 2.6*           Results from last 7 days  Lab Units 02/06/18  0339 02/04/18  0452 02/03/18  0433 02/02/18  1408   VANCOMYCIN RM mcg/mL 21.10 13.30 21.40 27.00     Estimated Creatinine Clearance: 27.5 mL/min (by C-G formula based on Cr of 1.9).    Microbiology:  Blood Culture   Date Value Ref Range Status   02/05/2018 No growth at less than 24 hours  Preliminary     BCID, PCR   Date Value Ref Range Status   01/31/2018 No organism detected by BCID PCR. No organism detected by BCID PCR. Final      2/2: BC no growth  2/6: BC pending            Urine Culture   Date Value Ref Range Status   01/31/2018 >100,000 CFU/mL Candida parapsilosis (A)  Final          Radiology:  Imaging Results (last 72 hours)     ** No results found for the last 72 hours. **          I personally read the radiographic studies     IMPRESSION:  1. Severe Sepsis- with leukocytosis, lactic acidosis, acute renal failure,encephalopathy, and a potential source of infection.   2.  Staph epidermidis bacteremia-with possible mitral valve vegetation.  Staph epidermidis native valve endocarditis would be unusual but it is a described condition.  I have asked the laboratory to speciate and obtain sensitivity data on the second positive blood culture.  If these are 2 different isolates, then we can consider them skin contaminants.  .3.  Acute renal failure/ATN  4.  Leukocytosis/neutrophilia  5.  Staph epidermidis bacteremia, YANNA scheduled today  6.   Candida UTI  7.  Type 2 diabetes mellitus  8.  Dementia/cognitive dysfunction  9.  Metabolic encephalopathy    RECOMMENDATIONS;  1.  Vancomycin  2.  Fluconazole  3.  Repeat blood cultures-pending  4.  I have asked the laboratory to identify the second set of blood cultures that is growing a coagulase-negative staph  5.  Transesophageal echocardiogram    Dr. Saunders has obtained the history, performed the physical exam and formulated the above treatment plan.     I discussed the rationale for transesophageal echocardiogram with her daughter last night in excruciating detail.  I discussed her clinical presentation and findings at length with her daughter.  She is having difficulty comprehending this complex situation despite the fact that I have tried to explain it in as simple terms as possible.  She has been refusing a transesophageal echocardiogram and still has not agreed to performance of the transesophageal echocardiogram.    I spent over 35 minutes on her care.  Over 50% of the time was spent in counseling and care coordination.    Rohan Saunders MD  2/6/2018  7:06 PM

## 2018-02-06 NOTE — PROGRESS NOTES
"[facility] Progress Note     LOS: 6 days   Patient Care Team:  Ciaran Wakefield MD as PCP - General (Internal Medicine)  No Known Provider as PCP - Family Medicine  PCP:  Ciaran Wakefield MD    Chief Complaint:  Atrial fibrillation    SUBJECTIVE:   Clinically stable.  Awake and pleasant this afternoon.  Telemetry reveals rate-controlled atrial fibrillation.  Family refusing more invasive cardiac evaluation to include transesophageal echocardiogram and have not been available today to discuss further plans for evaluating potential mitral valve vegetation.      Review of Systems:   All systems have been reviewed and are negative with the exception of those mentioned above.      OBJECTIVE:    Vital Sign Min/Max for last 24 hours  Temp  Min: 97 °F (36.1 °C)  Max: 98.2 °F (36.8 °C)   BP  Min: 118/84  Max: 142/74   Pulse  Min: 78  Max: 113   Resp  Min: 18  Max: 20   SpO2  Min: 97 %  Max: 97 %   No Data Recorded   Weight  Min: 82.1 kg (181 lb)  Max: 82.1 kg (181 lb)     Flowsheet Rows         First Filed Value    Admission Height  165.1 cm (65\") Documented at 01/31/2018 1333    Admission Weight  90.7 kg (200 lb) Documented at 01/31/2018 1333          Telemetry: Rate controlled atrial fibrillation      Intake/Output Summary (Last 24 hours) at 02/06/18 1356  Last data filed at 02/06/18 0200   Gross per 24 hour   Intake              750 ml   Output              900 ml   Net             -150 ml     Intake & Output (last 3 days)       02/03 0701 - 02/04 0700 02/04 0701 - 02/05 0700 02/05 0701 - 02/06 0700 02/06 0701 - 02/07 0700    P.O. 237 260 120     I.V. (mL/kg) 1000 (12.1)       IV Piggyback 200 50      Irrigation   750     Total Intake(mL/kg) 1437 (17.5) 310 (3.8) 870 (10.6)     Urine (mL/kg/hr) 1320 (0.7) 1200 (0.6) 400 (0.2)     Stool 475 (0.2) 600 (0.3) 500 (0.3)     Total Output 1795 1800 900      Net -358 -1490 -30                     Physical Exam:    General Appearance:    " Awake    Neck:   Supple, symmetrical, trachea midline.   Lungs:     Clear to auscultation bilaterally, respirations unlabored   Chest Wall:    No tenderness or deformity    Heart:    Regular rate and rhythm, S1 and S2 normal, no murmur, rub   or gallop, normal carotid impulse bilaterally without bruit.   Extremities:   Extremities normal, atraumatic, no cyanosis or edema   Pulses:   2+ and symmetric all extremities   Skin:   Skin color, texture, turgor normal, no rashes or lesions      LABS/DIAGNOSTIC DATA:    Results from last 7 days  Lab Units 02/06/18 0339 02/04/18 0453 02/03/18 0429   WBC 10*3/mm3 11.25* 13.67* 15.87*   HEMOGLOBIN g/dL 12.5 12.4 13.1   HEMATOCRIT % 40.6 39.3 41.6   PLATELETS 10*3/mm3 209 192 201     No results found for: TROPONINT    Results from last 7 days  Lab Units 01/31/18  1337   INR  1.15       Results from last 7 days  Lab Units 02/06/18 0339 02/04/18 0452 02/03/18 0429 01/31/18  1337   SODIUM mmol/L 139 140 147*  < > 141   POTASSIUM mmol/L 3.7 3.5 3.6  < > 6.6*   CHLORIDE mmol/L 107 108 113*  < > 113*   CO2 mmol/L 26.0 28.0 27.0  < > 13.0*   BUN mg/dL 51* 66* 82*  < > 152*   CREATININE mg/dL 1.90* 2.00* 2.20*  < > 4.70*   CALCIUM mg/dL 8.2* 7.7* 7.8*  < > 9.7   BILIRUBIN mg/dL  --  0.4  --   --  0.8   ALK PHOS U/L  --  177*  --   --  284*   ALT (SGPT) U/L  --  22  --   --  34   AST (SGOT) U/L  --  11  --   --  40*   GLUCOSE mg/dL 163* 210* 122*  < > 284*   < > = values in this interval not displayed.    Results from last 7 days  Lab Units 01/31/18 1939   HEMOGLOBIN A1C % 10.20*           Results from last 7 days  Lab Units 01/31/18 1939 01/31/18  1337   TSH mIU/mL 0.917 2.380   FREE T4 ng/dL  --  1.53       Results from last 7 days  Lab Units 01/31/18  1337   BNP pg/mL 925.0*       Medication Review:     aspirin 324 mg Oral Daily   atorvastatin 10 mg Oral Nightly   diltiaZEM 60 mg Oral Q6H   famotidine 20 mg Intravenous Daily   fluconazole 200 mg Oral Q24H   heparin (porcine)  5,000 Units Subcutaneous Q8H   insulin detemir 15 Units Subcutaneous QAM   insulin lispro 0-7 Units Subcutaneous 4x Daily With Meals & Nightly   insulin lispro 5 Units Subcutaneous TID With Meals   metoprolol tartrate 50 mg Oral Q12H   vancomycin (dosing per levels)  Does not apply Daily        Pharmacy to dose vancomycin         Principal Problem:    Sepsis due to urinary tract infection  Active Problems:    Diabetes mellitus, type 2    Hyperlipidemia    Hypothyroidism    Chronic obstructive pulmonary disease    CAD (coronary artery disease)    Peripheral vascular disease    Obstructive sleep apnea syndrome    Congestive heart failure    Atrial fibrillation with rapid ventricular response    Altered mental status    Acute renal failure    Hyperkalemia    Leukocytosis    Elevated troponin      ASSESSMENT/PLAN:  -Continue rate control with diltiazem and beta blockade, not a candidate for restoration of sinus rhythm  -Chadsvas equal to 5 - unclear risk for hemorrhagic complications in this chronically debilitated individual however trial of novel oral anticoagulant at discharge Reasonable, Eliquis 5 mg by mouth twice a day.  Would discontinue aspirin once anticoagulation is initiated.    -Patient family currently refusing transesophageal guided echocardiography.  Otherwise stable from a cardiac standpoint, will sign off now, please reconsult if family agrees to further cardiac imaging with YANNA.          Balwinder Marquez III, MD   02/06/18  1:56 PM

## 2018-02-06 NOTE — NURSING NOTE
MUKUND bed ordered form [facility] per RN request. Please contact [facility] nurse if needs arise. Thanks

## 2018-02-06 NOTE — PROGRESS NOTES
Pharmacy Consult-Vancomycin Dosing    Leila Smith is a  89 yo woman admitted 1/31/18 for severe sepsis and ANGELA.  Possible sources include mitral valve endocarditis or UTI.  Transthoracic echocardiogram shows possible mobile echogenicity near the posterior leaflet of the mitral valve concerning for possible endocarditis.  Dr. Rivers plans to repeat blood cultures and for patient to receive a YANNA today. However, the patient's family is refusing to do the YANNA.  Continue vancomycin for possible BE and fluconazole for yeast UTI.     Indication: possible BE  Consulting Provider: Dr. Sherman  ID Consult: Yes    Goal Trough: 15-20 mcg/ml    Current Antimicrobial Therapy  Anti-Infectives       Ordered     Dose/Rate Route Frequency Start Stop      02/05/18 0757  fluconazole (DIFLUCAN) tablet 200 mg     Ordering Provider:  DANIEL Almanza    200 mg Oral Every 24 Hours 02/05/18 0900 02/09/18 0859    02/04/18 0823  vancomycin 1250 mg/250 mL 0.9% NS IVPB ([facility])     Ordering Provider:  Evert Cid RPH    1,250 mg Intravenous Once 02/04/18 1000 02/04/18 1007    02/01/18 1504  vancomycin (dosing per levels)     Ordering Provider:  Ciera Silver RPH     Does not apply Daily 02/01/18 1545 02/08/18 0859    02/01/18 1413  vancomycin 1750 mg/500 mL 0.9% NS IVPB ([facility])     Ordering Provider:  Jasmina Maier DO    20 mg/kg × 90.7 kg  over 120 Minutes Intravenous Once 02/01/18 1445 02/01/18 1732    02/01/18 1413  piperacillin-tazobactam (ZOSYN) 4.5 g in iso-osmotic dextrose 100 mL IVPB (premix)     Ordering Provider:  Jasmina Maier DO    4.5 g  200 mL/hr over 0.5 Hours Intravenous Once 02/01/18 1445 02/01/18 1603    02/01/18 1401  Pharmacy to dose vancomycin     Ordering Provider:  Jasmina Maier DO     Does not apply Continuous PRN 02/01/18 1400 02/15/18 1359    01/31/18 1418  cefTRIAXone (ROCEPHIN) IVPB 1 g     Ordering Provider:  Blayne Moe MD    1 g  100 mL/hr over 30 Minutes Intravenous Once 01/31/18  "1430 01/31/18 1522        Labs      Results from last 7 days     Lab Units 02/06/18  0339 02/04/18  0452 02/03/18  0429   BUN mg/dL 51* 66* 82*   CREATININE mg/dL 1.90* 2.00* 2.20*     Results from last 7 days     Lab Units 02/06/18  0339 02/04/18  0453 02/03/18  0429   WBC 10*3/mm3 11.25* 13.67* 15.87*   Evaluation of Dosing     Last Dose: vancomycin 1250 mg IV on 2/4/18 at 1000    Ht - 165.1 cm (65\")  Wt - 82.1 kg (181 lb)    Estimated Creatinine Clearance: 27.5 mL/min (by C-G formula based on Cr of 1.9).    Microbiology and Radiology    Microbiology Results (last 10 days)       Procedure Component Value - Date/Time      Blood Culture - Blood, [[id number]]  (Normal) Collected:  02/05/18 1859    Lab Status:  Preliminary result Specimen:  Blood from Arm, Right Updated:  02/06/18 0716     Blood Culture No growth at less than 24 hours    Narrative:       Aerobic bottle only    Blood Culture - Blood, [[id number]]  (Normal) Collected:  02/02/18 0805    Lab Status:  Preliminary result Specimen:  Blood from Blood, Central Line Updated:  02/06/18 1031     Blood Culture No growth at 4 days    Urine Culture - Urine, Urine, Clean Catch [[id number]]  (Abnormal) Collected:  01/31/18 1643    Lab Status:  Final result Specimen:  Urine from Urine, Catheter Updated:  02/03/18 1239     Urine Culture --      >100,000 CFU/mL Candida parapsilosis (A)    Influenza A & B, RT PCR - Swab, Nasopharynx [[id number]]  (Normal) Collected:  01/31/18 1630    Lab Status:  Final result Specimen:  Swab from Nasopharynx Updated:  01/31/18 1748     Influenza A PCR Not Detected     Influenza B PCR Not Detected    Urine Culture - Urine, Urine, Clean Catch [[id number]]  (Abnormal) Collected:  01/31/18 1346    Lab Status:  Final result Specimen:  Urine from Urine, Catheter Updated:  02/02/18 1154     Urine Culture --      40,000-50,000 CFU/mL Candida albicans (A)    Blood Culture - Blood, [[id number]]  (Abnormal)  (Susceptibility) Collected:  01/31/18 1337    " Lab Status:  Final result Specimen:  Blood from Arm, Right Updated:  02/03/18 1007     Blood Culture --      Staphylococcus epidermidis (A)      This isolate does not demonstrate inducible clindamycin resistance in vitro.          Isolated from Aerobic and Anaerobic Bottles     Gram Stain Result Anaerobic Bottle Gram positive cocci in pairs and clusters      Aerobic Bottle Gram positive cocci in clusters    Susceptibility        Staphylococcus epidermidis     DIMITRIS     Ciprofloxacin >2 ug/ml Resistant     Clindamycin <=0.5 ug/ml Susceptible     Daptomycin <=0.5 ug/ml Susceptible     Erythromycin >4 ug/ml Resistant     Gentamicin <=4 ug/ml Susceptible     Levofloxacin >4 ug/ml Resistant     Linezolid <=1 ug/ml Susceptible     Oxacillin >2 ug/ml Resistant     Penicillin G >8 ug/ml Resistant     Quinupristin + Dalfopristin <=0.5 ug/ml Susceptible     Rifampin <=1 ug/ml Susceptible     Tetracycline <=4 ug/ml Susceptible     Trimethoprim + Sulfamethoxazole <=0.5/9.5 ug/ml Susceptible     Vancomycin 1 ug/ml Susceptible                      Blood Culture - Blood, [[id number]]  (Abnormal) Collected:  01/31/18 1337    Lab Status:  Final result Specimen:  Blood from Arm, Left Updated:  02/03/18 1009     Blood Culture Abnormal Stain (A)      Staphylococcus, coagulase negative (A)     Isolated from Aerobic and Anaerobic Bottles     Gram Stain Result Anaerobic Bottle Gram positive cocci in clusters      Aerobic Bottle Gram positive cocci in clusters    Narrative:       Refer to culture [id number] for ID and Susceptibilty    Blood Culture ID, PCR - Blood, [[id number]]  (Normal) Collected:  01/31/18 1337    Lab Status:  Final result Specimen:  Blood from Arm, Right Updated:  02/01/18 1502     BCID, PCR No organism detected by BCID PCR.          Evaluation of Level    Lab Results   Component Value Date    VANCORANDOM 21.10 02/06/2018     Assessment/Plan:     Vancomycin level drawn today indicates that no dose is needed since it is still  above the target range 42 hours after the loading dose.  Renal function is fairly stable over the last few days, so vancomycin clearance will likely be slow.  Will recheck level in the morning and re-evaluate need for re-dosing at that time.  Pharmacy Service will continue to follow the patient's clinical progress and monitor for evidence of vancomycin induced adverse effects.    Kanika Archer, VladD, BCPS

## 2018-02-06 NOTE — PROGRESS NOTES
=H  HOSPITAL MEDICINE DAILY PROGRESS NOTE  Hospital:  LOS: 6 days         Subjective   S   The patient was transferred to the floor from the the intensive care unit on 2/4/2018  where she was admitted on January 31, 2018  No major events overnight. No recurrent RVR episodes. She is currently sitting up in bed.  She does not appear to be in any distress.  She denies any complaints.  No recurrent fever.  No nausea, vomiting, diarrhea or constipation           ROS: Unreliable due to patient's mental status    Objective   O     Vitals:  Temp  Min: 97.2 °F (36.2 °C)  Max: 98.2 °F (36.8 °C)  BP  Min: 118/84  Max: 145/104  Pulse  Min: 80  Max: 115  Resp  Min: 18  Max: 20  SpO2  Min: 97 %  Max: 97 % No Data Recorded      Medications (drips):    Pharmacy to dose vancomycin       Physical Examination  Telemetry:  Sinus Rhythm:  (UNDETERMINTED)  Atrial Rhythm: atrial fibrillation      Constitutional:  No acute distress. Pleasant   Cardiovascular: Irregular rate. Normal heart sounds.  No murmurs, gallop or rub.   Respiratory: No respiratory distress. Normal respiratory effort.  Normal breath sounds  Clear to ascultation.    Abdominal:  Soft. No masses. Non-tender. No distension. No HSM.  Colostomy with stool output.    Extremities: No digital cyanosis. No clubbing.  No peripheral edema.  Scaly,dry skin on lower legs bilaterally.    Neurological:   Alert.  Does not answer orientation questions.              Images:  Renal US 02/01/2018 There are bilateral renal cortical cysts. Otherwise unremarkable.           Results: Reviewed.  I reviewed the patient's new laboratory and imaging results.  I independently reviewed the patient's new images.    1.  Arterial blood gas shows a pH of 7.198, PCO2 22.7, PO2 129  2.  Creatinine 2.0.  Elevated glucose at 210.  Baseline creatinine is 0.9.  Peak creatinine on admission was 4.7 .  Elevated lactic acid on admission at 3.6  3.  Albumin 2.6.  Low phosphorus of 1.9  4.  Elevated troponin  5.4.  Elevated BNP at 925  5.  TSH 0.917  6.  Elevated white blood cell count at admission at 17,000  7.  Urinalysis on admission shows.  Large leukocyte esterase.  1+ bacteria.  6-12 white blood cells.  2+ budding yeast  8.  Negative influenza A and B PCR  9.  Gram-positive cocci in clusters from aerobic and anaerobic blood cultures    Medications: Reviewed.    Assessment/Plan   A / P     Ms. Smith is a 72 year old female morbidly obese  female who is mentally challenged at baseline and who Resides at [facility] with a past medical history of poorly controlled type 2 diabetes mellitus, hypertension, hyperlipidemia, hypothyroidism, coronary artery disease, chronic obstructive pulmonary disease, paroxysmal atrial fibrillation, obstructive sleep apnea, who was brought in by EMS for altered mental status.  She was afebrile but tachycardic and hypotensive.  She was found to be in atrial fibrillation with rapid ventricular response, hypotension, acute kidney injury, severe metabolic acidosis.  She had positive troponin.  Positive blood cultures for gram-positive cocci in clusters.     Nutrition:   NPO Diet  Advance Directives: Full Code    Hospital Problem List     * (Principal)Sepsis due to urinary tract infection    Diabetes mellitus, type 2    Overview Signed 8/3/2016  2:13 PM by Ama Wolf     With foot ulcer         Hyperlipidemia    Hypothyroidism    Chronic obstructive pulmonary disease    CAD (coronary artery disease)    Overview Signed 7/15/2016 10:27 AM by Yue Royal     History of  Coronary Artery Disease V12.59         Peripheral vascular disease    Obstructive sleep apnea syndrome    Congestive heart failure    Atrial fibrillation with rapid ventricular response    Altered mental status    Acute renal failure    Hyperkalemia    Leukocytosis    Elevated troponin          1.  Atrial fibrillation with rapid ventricular response presenting with hypotension and acute kidney injury.   [id number] score of 4. Rate control with diltiazem. Increased. metoprolol tartrate to 50 mg bid. Start po anticoagulation post YANNA today      2.  Sepsis on admission of unclear source. The sepsis was severe due to lactic acidosis and ANGELA. Possible sources include mitral valve endocarditis or UTI.  Transthoracic echocardiogram shows possible mobile echogenicity near the posterior leaflet of the mitral valve concerning for possible endocarditis  PLAN  Repeat blood cultures done on 2/5/2018. YANNA today. Continue vancomycin and fluconazole      3.  Acute kidney injury, AKIN stage III.  The patient had normal baseline creatinine of 0.8.  Presented with a creatinine of mid fours most likely due to ischemic ATN in the setting of hypotension, atrial fibrillation with rapid ventricular response and sepsis  Recovering. Monitor renal function.     4.  Increased gap metabolic acidosis on admission due to acute kidney injury and lactic acidosis from hypotension, improving    5.  Hyperkalemia on admission due to acute kidney injury, improving    6.  Elevated troponin on admission thought to be type II non-ST elevation myocardial infarction from hypotension and atrial fibrillation with rapid ventricular response.  Further evaluation and management per cardiology    7.  Type 2 diabetes mellitus poorly controlled.  Now improved after adjustments made while inpatient.     8. COPD, stable without exacerbation        Hayden Rivers MD  02/06/18 7:51 AM  Electronically Signed

## 2018-02-06 NOTE — PLAN OF CARE
Problem: Patient Care Overview (Adult)  Goal: Plan of Care Review  Outcome: Ongoing (interventions implemented as appropriate)   02/06/18 0200   Coping/Psychosocial Response Interventions   Plan Of Care Reviewed With patient   Patient Care Overview   Progress progress towards functional goals is fair       Problem: Skin Integrity Impairment, Risk/Actual (Adult)  Goal: Identify Related Risk Factors and Signs and Symptoms   02/06/18 0200   Skin Integrity Impairment, Risk/Actual   Skin Integrity Impairment, Risk/Actual: Related Risk Factors age extremes;immobility;cognitive impairment;edema;moisture   Signs and Symptoms (Skin Integrity Impairment) plaques/scales       Problem: Fall Risk (Adult)  Goal: Identify Related Risk Factors and Signs and Symptoms  Outcome: Ongoing (interventions implemented as appropriate)   02/06/18 0200   Fall Risk   Fall Risk: Related Risk Factors age-related changes;confusion/agitation;gait/mobility problems   Fall Risk: Signs and Symptoms presence of risk factors

## 2018-02-06 NOTE — PROGRESS NOTES
"NAL Progress Note  Leila Smith  [id number]  1945    1/31/2018    Reason for visit:  ANGELA    Not much talkative  ROS:    Unable to get        I&O:    Intake/Output Summary (Last 24 hours) at 02/06/18 1107  Last data filed at 02/06/18 0200   Gross per 24 hour   Intake              870 ml   Output              900 ml   Net              -30 ml       PE:   Blood pressure 136/92, pulse 84, temperature 97 °F (36.1 °C), temperature source Temporal Artery , resp. rate 20, height 165.1 cm (65\"), weight 82.1 kg (181 lb), SpO2 97 %.    GENERAL: Following commands no acute distress.   HEENT: PER, no MI.  NECK: Supple, no JVD     HEART: RRR; No murmur, rubs, gallops.   LUNGS: Clear auscultation normal effort.. Nonlabored.   ABDOMEN: Soft nontender positive bowel sounds.  EXT:  No cyanosis, clubbing or edema.   SKIN: Warm and dry , lower extremity skin changes are noted..    NEURO: Alert and oriented x 3,        Medications:    Current Facility-Administered Medications:   •  acetaminophen (TYLENOL) tablet 650 mg, 650 mg, Oral, Q4H PRN **OR** acetaminophen (TYLENOL) suppository 650 mg, 650 mg, Rectal, Q4H PRN, DANIEL Ludwig  •  aspirin chewable tablet 324 mg, 324 mg, Oral, Daily, DANIEL Ludwig, 324 mg at 02/06/18 0859  •  atorvastatin (LIPITOR) tablet 10 mg, 10 mg, Oral, Nightly, DANIEL Mustafa, 10 mg at 02/05/18 2142  •  dextrose (D50W) solution 25 g, 25 g, Intravenous, Q15 Min PRN, Jasmina V. Case, DO  •  dextrose (GLUTOSE) oral gel 15 g, 15 g, Oral, Q15 Min PRN, Jasmina V. Case, DO  •  diltiaZEM (CARDIZEM) tablet 60 mg, 60 mg, Oral, Q6H, Alexandra Galdamez APRN, 60 mg at 02/06/18 0530  •  famotidine (PEPCID) injection 20 mg, 20 mg, Intravenous, Daily, Jasmina V. Case, DO, 20 mg at 02/06/18 0901  •  fluconazole (DIFLUCAN) tablet 200 mg, 200 mg, Oral, Q24H, DANIEL Almanza, 200 mg at 02/06/18 0900  •  glucagon (human recombinant) (GLUCAGEN DIAGNOSTIC) injection 1 mg, 1 mg, Subcutaneous, " PRN, Jasmina Maier, DO  •  heparin (porcine) 5000 UNIT/ML injection 5,000 Units, 5,000 Units, Subcutaneous, Q8H, Alexandra Galdamez APRN, 5,000 Units at 02/06/18 0531  •  insulin detemir (LEVEMIR) injection 15 Units, 15 Units, Subcutaneous, QAM, Hayden Rivers MD, 15 Units at 02/06/18 0926  •  insulin lispro (humaLOG) injection 0-7 Units, 0-7 Units, Subcutaneous, 4x Daily With Meals & Nightly, Jasmina Maier, DO, 4 Units at 02/06/18 0858  •  insulin lispro (humaLOG) injection 5 Units, 5 Units, Subcutaneous, TID With Meals, Hayden Rivers MD, 5 Units at 02/06/18 0857  •  ipratropium-albuterol (DUO-NEB) nebulizer solution 3 mL, 3 mL, Nebulization, Q6H PRN, Hayden Rivers MD  •  magnesium sulfate 4 gram infusion - Mg less than or equal to 1mg/dL, 4 g, Intravenous, PRN **OR** magnesium sulfate 3 gram infusion (1gm x 3) - Mg 1.1 - 1.5 mg/dL, 1 g, Intravenous, PRN **OR** Magnesium Sulfate 2 gram infusion- Mg 1.6 - 1.9 mg/dL, 2 g, Intravenous, PRN, Frieda Alfredo APRN, Last Rate: 25 mL/hr at 02/03/18 0822, 2 g at 02/03/18 0822  •  metoprolol tartrate (LOPRESSOR) tablet 50 mg, 50 mg, Oral, Q12H, Hayden Rivers MD, 50 mg at 02/06/18 0900  •  Pharmacy to dose vancomycin, , Does not apply, Continuous PRN, Jasmina Maier, DO  •  sodium chloride 0.9 % flush 1-10 mL, 1-10 mL, Intravenous, PRN, Alexandra Galdamez APRN  •  sodium chloride 0.9 % flush 10 mL, 10 mL, Intravenous, PRN, Blayne Moe MD  •  vancomycin (dosing per levels), , Does not apply, Daily, Ciera Silver RPH, Stopped at 02/02/18 0843    Meds reviewed and doses adjusted for renal function    Labs:    Results from last 7 days  Lab Units 02/06/18 0339 02/04/18 0453 02/03/18  0429   WBC 10*3/mm3 11.25* 13.67* 15.87*   HEMOGLOBIN g/dL 12.5 12.4 13.1   HEMATOCRIT % 40.6 39.3 41.6   PLATELETS 10*3/mm3 209 192 201       Results from last 7 days  Lab Units 02/06/18 0339 02/04/18 0452 02/03/18  0429 02/02/18  0805   SODIUM mmol/L 139  140 147* 145   POTASSIUM mmol/L 3.7 3.5 3.6 3.8   CHLORIDE mmol/L 107 108 113* 115*   CO2 mmol/L 26.0 28.0 27.0 25.0   BUN mg/dL 51* 66* 82* 94*   CREATININE mg/dL 1.90* 2.00* 2.20* 2.60*   GLUCOSE mg/dL 163* 210* 122* 179*   CALCIUM mg/dL 8.2* 7.7* 7.8* 7.5*   PHOSPHORUS mg/dL 1.6* 1.9* 2.4 2.9       Results from last 7 days  Lab Units 02/04/18  0452   ALK PHOS U/L 177*   BILIRUBIN mg/dL 0.4   ALT (SGPT) U/L 22   AST (SGOT) U/L 11     Invalid input(s): UCOL, UCLR, UPH, USG, UPRO, UBLD, UNITR, ELEU, URBC, UFC, UBACT    Estimated Creatinine Clearance: 27.5 mL/min (by C-G formula based on Cr of 1.9).    Radiology:  Imaging Results (last 72 hours)     Procedure Component Value Units Date/Time    XR Chest 1 View [[id number]] Collected:  01/31/18 1900     Updated:  01/31/18 1935    Narrative:       EXAM:    XR Chest, 1 View    CLINICAL HISTORY:    72 years old, female; Sepsis, unspecified organism; Elevated white blood cell   count, unspecified; Hyperkalemia; Unspecified atrial fibrillation; Acute kidney   failure, unspecified; Urinary tract infection, site not specified; Hematuria,   unspecified; Altered mental status, unspecified; Hyperglycemia, unspecified;   Device placement; Other: Central line placement    TECHNIQUE:    Frontal view of the chest.    COMPARISON:    CR - XR CHEST 1 VW 2018-01-31 13:45    FINDINGS:    Lungs:  Unremarkable.  No consolidation.    Pleural space:  Unremarkable.  No pneumothorax.    Heart:  The heart demonstrates diffuse enlargement.    Mediastinum:  Unremarkable.    Bones/joints:  Unremarkable.    Tubes, lines and devices:  A left internal jugular central venous catheter is   present, with its tip overlying the region of the superior vena cava.      Impression:         A left internal jugular central venous catheter is present, with its tip   overlying the region of the superior vena cava.    THIS DOCUMENT HAS BEEN ELECTRONICALLY SIGNED BY URSULA ARGUELLO MD    XR Chest 1 View [[id number]]  Collected:  01/31/18 1447     Updated:  01/31/18 2150    Narrative:       EXAMINATION: XR CHEST 1 VW-01/31/2018:      INDICATION: Weak/Dizzy/AMS, triage protocol.      COMPARISON: 12/19/2017.     FINDINGS: The patient is again rotated to the left. The heart shadow  appears borderline enlarged. The vasculature appears upper limits of  normal. Mild chronic appearing interstitial lung changes and old  granulomatous calcifications are again noted and appear stable.           Impression:       Mild pulmonary venous hypertension unchanged. No new chest  disease is identified.     D:  01/31/2018  E:  01/31/2018     This report was finalized on 1/31/2018 9:48 PM by DR. Brian Cosme MD.       CT Head Without Contrast [[id number]] Collected:  01/31/18 1648     Updated:  01/31/18 2223    Narrative:       EXAMINATION: CT HEAD WO CONTRAST- 01/31/2018     INDICATION: Decreased alertness; N17.9-Acute kidney failure,  unspecified; E87.5-Hyperkalemia; A41.9-Sepsis, unspecified organism;  N39.0-Urinary tract infection, site not specified; R31.9-Hematuria,  unspecified; R73.9-Hyperglycemia, unspecified; D72.829-Elevated white  blood cell count, unspecified; R41.82-Altered mental status,  unspecified; I48.91-Unspecified atrial fibrillation         TECHNIQUE: 5 mm unenhanced images through the brain     The radiation dose reduction device was turned on for each scan per the  ALARA (As Low as Reasonably Achievable) protocol.     COMPARISON: 12/19/2017     FINDINGS: Previous exam report from 12/19/2017 indicates no acute  findings. History today indicates decreased level of alertness, altered  mental status.     The calvarium appears intact. Included paranasal sinuses mastoid air  cells and middle ear spaces appear clear. Soft tissue window images show  advanced generalized cerebral atrophy and chronic appearing central  white matter changes stable in pattern from previous study. There is no  evidence of mass, mass effect, hemorrhage, edema,  hydrocephalus, or  abnormal extra-axial collection.       Impression:       Stable head CT scan with chronic appearing age related  changes. No evidence of acute intracranial disease.        D:  01/31/2018  E:  01/31/2018     This report was finalized on 1/31/2018 10:21 PM by DR. Brian Cosme MD.       US Renal Limited [[id number]] Collected:  02/01/18 1601     Updated:  02/02/18 1236    Narrative:       EXAMINATION: US RENAL, LIMITED-02/01/2018:     INDICATION: ANGELA, renal insufficiency.     TECHNIQUE: Sonographic imaging was obtained of the kidneys in both the  sagittal and transverse planes. The examination is suboptimal due to  patient inability to hold respirations.     COMPARISON: NONE.     FINDINGS: The right kidney measures in length from pole to pole 13.5 cm.  There is no solid mass identified. There is a renal cortical cyst  identified measuring 3.2 cm. No hydronephrosis. No nephrolithiasis. No  solid mass.     The left kidney measures in length from pole to pole 11.4 cm. Several  cystic areas identified, the largest measuring 2.5 cm. There is no  hydronephrosis or nephrolithiasis. No solid mass. Imaging of the bladder  reveals a Bone catheter present.       Impression:       Bilateral renal cortical cysts otherwise, unremarkable  examination.     D:  02/01/2018  E:  02/01/2018            This report was finalized on 2/2/2018 12:33 PM by Dr. Stephanie Jarrett MD.             Renal Imaging:    reviewed    IMPRESSION:  Acute kidney injury-likely acute kidney injury due to volume depletion and ATN.  Creatinine is improving    Chronic kidney disease-her baseline creatinine is about 0.8 just within the last 6-8 weeks.  She possibly has baseline diabetic nephropathy  Metabolic Acidosis- improving   Hemoconcentration-hematocrit is quite high likely because awaiting depletion  Hypertension      PLAN:   Continue to encourage po intake  May consider peg tube if she is unable to take po as she be at risk for volume  depletion  Cr stable- stuck though          Zane Muniz MD  2/6/2018  11:07 AM

## 2018-02-07 NOTE — PROGRESS NOTES
Continued Stay Note  [facility]     Patient Name: Leila Smith  MRN: [id number]  Today's Date: 2/7/2018    Admit Date: 1/31/2018          Discharge Plan       02/07/18 1602    Case Management/Social Work Plan    Plan discharge plan    Patient/Family In Agreement With Plan yes    Additional Comments Attempted to contact pt daughter, Olinda, arlene 2 in regards to discharge plans and unsuccessful. Spoke with Fozia at [facility] and pt has her bed on hold til 2/13. CM will follow up tomorrow.               Discharge Codes     None        Expected Discharge Date and Time     Expected Discharge Date Expected Discharge Time    Feb 7, 2018             Alisa Kelly RN

## 2018-02-07 NOTE — PROGRESS NOTES
=H  HOSPITAL MEDICINE DAILY PROGRESS NOTE  Hospital:  LOS: 7 days         Subjective   S   The patient was transferred to the floor from the the intensive care unit on 2/4/2018  where she was admitted on January 31, 2018.  The patient since then has been stable.  No major events overnight. No recurrent RVR episodes. She is currently sitting up in bed.  She does not appear to be in any distress.  She denies any complaints.  No recurrent fever.  No nausea, vomiting, diarrhea or constipation           ROS: Unreliable due to patient's mental status    Objective   O     Vitals:  Temp  Min: 97.7 °F (36.5 °C)  Max: 98.4 °F (36.9 °C)  BP  Min: 136/92  Max: 142/81  Pulse  Min: 40  Max: 95  Resp  Min: 18  Max: 20  SpO2  Min: 95 %  Max: 97 % No Data Recorded      Medications (drips):    Pharmacy to dose vancomycin       Physical Examination  Telemetry:  Sinus Rhythm:  (UNDETERMINTED)  Atrial Rhythm: atrial fibrillation      Constitutional:  No acute distress. Pleasant   Cardiovascular: Irregular rate. Normal heart sounds.  No murmurs, gallop or rub.   Respiratory: No respiratory distress. Normal respiratory effort.  Normal breath sounds  Clear to ascultation.    Abdominal:  Soft. No masses. Non-tender. No distension. No HSM.  Colostomy with stool output.    Extremities: No digital cyanosis. No clubbing.  No peripheral edema.  Scaly,dry skin on lower legs bilaterally.    Neurological:   Alert.  Does not answer orientation questions.              Images:  Renal US 02/01/2018 There are bilateral renal cortical cysts. Otherwise unremarkable.           Results: Reviewed.  I reviewed the patient's new laboratory and imaging results.  I independently reviewed the patient's new images.    1.  Arterial blood gas shows a pH of 7.198, PCO2 22.7, PO2 129  2.  Creatinine 2.0.  Elevated glucose at 210.  Baseline creatinine is 0.9.  Peak creatinine on admission was 4.7 .  Elevated lactic acid on admission at 3.6  3.  Albumin 2.6.  Low  phosphorus of 1.9  4.  Elevated troponin 5.4.  Elevated BNP at 925  5.  TSH 0.917  6.  Elevated white blood cell count at admission at 17,000  7.  Urinalysis on admission shows.  Large leukocyte esterase.  1+ bacteria.  6-12 white blood cells.  2+ budding yeast  8.  Negative influenza A and B PCR  9.  Gram-positive cocci in clusters from aerobic and anaerobic blood cultures    Medications: Reviewed.    Assessment/Plan   A / P     Ms. Smith is a 72 year old female morbidly obese  female who is mentally challenged at baseline and who Resides at [facility] with a past medical history of poorly controlled type 2 diabetes mellitus, hypertension, hyperlipidemia, hypothyroidism, coronary artery disease, chronic obstructive pulmonary disease, paroxysmal atrial fibrillation, obstructive sleep apnea, who was brought in by EMS for altered mental status.  She was afebrile but tachycardic and hypotensive.  She was found to be in atrial fibrillation with rapid ventricular response, hypotension, acute kidney injury, severe metabolic acidosis.  She had positive troponin.  Positive blood cultures for gram-positive cocci in clusters.     Nutrition:   Diet Regular; Consistent Carbohydrate  Advance Directives: Full Code    Hospital Problem List     * (Principal)Sepsis due to urinary tract infection    Diabetes mellitus, type 2    Overview Signed 8/3/2016  2:13 PM by Ama Wolf     With foot ulcer         Hyperlipidemia    Hypothyroidism    Chronic obstructive pulmonary disease    CAD (coronary artery disease)    Overview Signed 7/15/2016 10:27 AM by Yue Royal     History of  Coronary Artery Disease V12.59         Peripheral vascular disease    Obstructive sleep apnea syndrome    Congestive heart failure    Atrial fibrillation with rapid ventricular response    Altered mental status    Acute renal failure    Hyperkalemia    Leukocytosis    Elevated troponin          1.  Atrial fibrillation with rapid  ventricular response presenting with hypotension and acute kidney injury.  [id number] score of 4. Rate control with diltiazem. Increased. metoprolol tartrate to 50 mg bid. Start po anticoagulation       2.  Sepsis on admission of unclear source. The sepsis was severe due to lactic acidosis and ANGELA. Possible sources include mitral valve endocarditis or UTI.  Transthoracic echocardiogram shows possible mobile echogenicity near the posterior leaflet of the mitral valve concerning for possible endocarditis  PLAN  Repeat blood cultures done on 2/5/2018 no growth.  Family refused YANNA.  Currently on intravenous vancomycin.  Appreciate ID recommendations      3.  Acute kidney injury, AKIN stage III.  The patient had normal baseline creatinine of 0.8.  Presented with a creatinine of mid fours most likely due to ischemic ATN in the setting of hypotension, atrial fibrillation with rapid ventricular response and sepsis  Recovering. Monitor renal function.     4.  Increased gap metabolic acidosis on admission due to acute kidney injury and lactic acidosis from hypotension, improving    5.  Hyperkalemia on admission due to acute kidney injury, improving    6.  Elevated troponin on admission thought to be type II non-ST elevation myocardial infarction from hypotension and atrial fibrillation with rapid ventricular response.  Further evaluation and management per cardiology    7.  Type 2 diabetes mellitus poorly controlled.  Now improved after adjustments made while inpatient.  No changes today    8.   COPD, stable without exacerbation.  Encourage mobilization and incentive spirometry.        Hayden Rivers MD  02/07/18 12:33 PM  Electronically Signed

## 2018-02-07 NOTE — PROGRESS NOTES
[facility] Progress Note    Admission Date: 1/31/2018    Leila Smith  1945  [id number]    Date: 2/7/2018    Meds:    Anti-Infectives     Ordered     Dose/Rate Route Frequency Start Stop    02/06/18 1434  vancomycin (dosing per levels)     Ordering Provider:  Kanika Archer RPH     Does not apply Daily 02/07/18 0900 02/15/18 2359    02/05/18 0757  fluconazole (DIFLUCAN) tablet 200 mg     Rohan Saunders MD reviewed the order on 02/07/18 0658.   Ordering Provider:  Rohan Saunders MD    200 mg Oral Every 24 Hours 02/05/18 0900 02/11/18 0657    02/04/18 0823  vancomycin 1250 mg/250 mL 0.9% NS IVPB ([facility])     Ordering Provider:  Evert Cid RPH    1,250 mg Intravenous Once 02/04/18 1000 02/04/18 1007    02/01/18 1413  vancomycin 1750 mg/500 mL 0.9% NS IVPB ([facility])     Ordering Provider:  Jasmina Maier DO    20 mg/kg × 90.7 kg  over 120 Minutes Intravenous Once 02/01/18 1445 02/01/18 1732    02/01/18 1413  piperacillin-tazobactam (ZOSYN) 4.5 g in iso-osmotic dextrose 100 mL IVPB (premix)     Ordering Provider:  Jasmina Maier DO    4.5 g  200 mL/hr over 0.5 Hours Intravenous Once 02/01/18 1445 02/01/18 1603    02/01/18 1401  Pharmacy to dose vancomycin     Ordering Provider:  Jasmina Maier DO     Does not apply Continuous PRN 02/01/18 1400 02/15/18 1359    01/31/18 1418  cefTRIAXone (ROCEPHIN) IVPB 1 g     Ordering Provider:  Blayne Moe MD    1 g  100 mL/hr over 30 Minutes Intravenous Once 01/31/18 1430 01/31/18 1522          CC:  UTI     SUBJECTIVE:  2/5/18:  Patient is a 72 y.o. female who is seen today for evaluation of severe sepsis with blood cultures positive for staph epidermidis a urine culture positive for Candida.  She was brought from her nursing home for increasing lethargy.  She has a baseline of dementia/cognitive dysfunction but was noted to be substantially less responsive prior to admission.  Admitting labs revealed lactic acidosis, a leukocytosis of 17, 000, acute renal  failure with Scr of 4.70, and 2/2 sets of positive blood cultures for Staph epidermidis, with repeat culture negative. She has had two positive urine cultures for yeast, initially Candida albicans and  most recently Candida parapsilosis. Vancomycin and Zosyn initiated and we were consulted for antibiotic management.  She is minimally conversant, and history obtained from the chart.  Her trans- thoracic echocardiogram revealed a possible mitral valve vegetation.  She is scheduled for a transesophageal echocardiogram this am.   18:  Daughter refusing YANNA.  The patient cannot provide a reliable review of systems due to her profound dementia.  The laboratory identified one of HER-2 sets of positive blood cultures as staph epidermidis but the second set was identified only as coagulase-negative staph and not speciate it yet.  She has remained afebrile.  18: The nursing staff indicates that she developed bradycardia overnight and her Cardizem was held.  She is unable to provide any reliable ROS.  She has remained afebrile.  So far her family has not consented to a transesophageal echocardiogram.    ROS:  No f/c/s. No n/v/d. No rash. No new ADR to Abx.     PE:   Vital Signs  Temp (24hrs), Av.7 °F (36.5 °C), Min:97 °F (36.1 °C), Max:98.4 °F (36.9 °C)    Temp  Min: 97 °F (36.1 °C)  Max: 98.4 °F (36.9 °C)  BP  Min: 136/92  Max: 141/86  Pulse  Min: 40  Max: 92  Resp  Min: 18  Max: 20  SpO2  Min: 97 %  Max: 97 %    GENERAL: Awake and alert, in no acute distress.   HEENT:  No conjunctival injection. No icterus. Oropharynx clear without evidence of thrush or exudate.  NECK: Supple, no JVD     HEART: irreg rate rhythm; No murmur, rubs, gallops.   LUNGS: Clear to auscultation bilaterally without wheezing, rales, rhonchi. Normal respiratory effort. Nonlabored. No dullness.  ABDOMEN: Soft, nontender, nondistended. Positive bowel sounds. No rebound or guarding. NO mass or HSM.  EXT:  No cyanosis, clubbing or edema. No  cord.     SKIN: Warm and dry without cutaneous eruptions on Inspection/palpation.    NEURO: She is alert but severely confused.  She is oriented ×1.    Laboratory Data      Results from last 7 days  Lab Units 02/06/18  0339 02/04/18  0453 02/03/18  0429   WBC 10*3/mm3 11.25* 13.67* 15.87*   HEMOGLOBIN g/dL 12.5 12.4 13.1   HEMATOCRIT % 40.6 39.3 41.6   PLATELETS 10*3/mm3 209 192 201       Results from last 7 days  Lab Units 02/07/18  0330   SODIUM mmol/L 141   POTASSIUM mmol/L 3.6   CHLORIDE mmol/L 111*   CO2 mmol/L 23.0   BUN mg/dL 43*   CREATININE mg/dL 1.70*   GLUCOSE mg/dL 133*   CALCIUM mg/dL 8.4*       Results from last 7 days  Lab Units 02/04/18  0452   ALK PHOS U/L 177*   BILIRUBIN mg/dL 0.4   ALT (SGPT) U/L 22   AST (SGOT) U/L 11               Results from last 7 days  Lab Units 01/31/18  2353   LACTATE mmol/L 2.6*           Results from last 7 days  Lab Units 02/07/18  0330 02/06/18  0339 02/04/18  0452 02/03/18  0433 02/02/18  1408   VANCOMYCIN RM mcg/mL 14.50 21.10 13.30 21.40 27.00     Estimated Creatinine Clearance: 32.1 mL/min (by C-G formula based on Cr of 1.7).    Microbiology:  Blood Culture   Date Value Ref Range Status   02/05/2018 No growth at less than 24 hours  Preliminary     BCID, PCR   Date Value Ref Range Status   01/31/2018 No organism detected by BCID PCR. No organism detected by BCID PCR. Final      2/2: BC no growth  2/6: BC pending            Urine Culture   Date Value Ref Range Status   01/31/2018 >100,000 CFU/mL Candida parapsilosis (A)  Final          Radiology:  Imaging Results (last 72 hours)     ** No results found for the last 72 hours. **          I personally read the radiographic studies     IMPRESSION:  1. Severe Sepsis- improving  2.  Staph epidermidis bacteremia-with possible mitral valve vegetation.  Staph epidermidis native valve endocarditis would be unusual but it is a described condition.  I have asked the laboratory to speciate and obtain sensitivity data on the  second positive blood culture.  If these are 2 different isolates, then we can consider them skin contaminants.  .3.  Acute renal failure/ATN-improving  4.  Leukocytosis/neutrophilia-improving  5.  Staph epidermidis bacteremia, YANNA scheduled today  6.  Candida UTI  7.  Type 2 diabetes mellitus  8.  Dementia/cognitive dysfunction  9.  Metabolic encephalopathy    RECOMMENDATIONS;  1.  Continue Vancomycin  2.  Continue Fluconazole  3.  Repeat blood cultures-pending  4.  I have asked the laboratory to identify the second set of blood cultures that is growing a coagulase-negative staph  5.  Transesophageal echocardiogram         Rufus Saunders MD  2/7/2018  7:56 AM

## 2018-02-07 NOTE — PLAN OF CARE
Problem: Patient Care Overview (Adult)  Goal: Plan of Care Review  Outcome: Ongoing (interventions implemented as appropriate)   02/07/18 0127   Coping/Psychosocial Response Interventions   Plan Of Care Reviewed With patient   Patient Care Overview   Progress progress toward functional goals as expected       Problem: Renal Failure/Kidney Injury, Acute (Adult)  Intervention: Prevent/Manage Infection   02/07/18 0127   Safety Interventions   Infection Prevention barrier precautions utilized   Pain/Comfort/Sleep Interventions   Sleep/Rest Enhancement awakenings minimized;consistent schedule promoted;family presence promoted;regular sleep/rest pattern promoted;relaxation techniques promoted   Hygiene Care Assistance   Oral Care oral care provided         Problem: Sepsis (Adult)  Goal: Signs and Symptoms of Listed Potential Problems Will be Absent or Manageable (Sepsis)  Outcome: Ongoing (interventions implemented as appropriate)   02/07/18 0127   Sepsis   Problems Assessed (Sepsis) all   Problems Present (Sepsis) glycemic control, impaired;situational response       Problem: Fall Risk (Adult)  Goal: Identify Related Risk Factors and Signs and Symptoms  Outcome: Ongoing (interventions implemented as appropriate)   02/07/18 0127   Fall Risk   Fall Risk: Related Risk Factors age-related changes;confusion/agitation;fatigue/slow reaction   Fall Risk: Signs and Symptoms presence of risk factors     Goal: Absence of Falls  Outcome: Ongoing (interventions implemented as appropriate)   02/07/18 0127   Fall Risk (Adult)   Absence of Falls making progress toward outcome

## 2018-02-07 NOTE — PLAN OF CARE
Problem: Renal Failure/Kidney Injury, Acute (Adult)  Goal: Signs and Symptoms of Listed Potential Problems Will be Absent or Manageable (Renal Failure/Kidney Injury, Acute)  Outcome: Ongoing (interventions implemented as appropriate)

## 2018-02-07 NOTE — PLAN OF CARE
Problem: Cardiac Output, Decreased (Adult)  Goal: Identify Related Risk Factors and Signs and Symptoms  Outcome: Ongoing (interventions implemented as appropriate)    Goal: Adequate Cardiac Output/Effective Tissue Perfusion  Outcome: Ongoing (interventions implemented as appropriate)

## 2018-02-07 NOTE — PLAN OF CARE
Problem: Patient Care Overview (Adult)  Goal: Plan of Care Review  Outcome: Ongoing (interventions implemented as appropriate)   02/07/18 1644   Coping/Psychosocial Response Interventions   Plan Of Care Reviewed With patient   Patient Care Overview   Progress progress toward functional goals as expected   Outcome Evaluation   Outcome Summary/Follow up Plan VSS, getting IV antibiotics, still with orosco, colostomy, good output, has eaten more today.        Problem: Cardiac Output, Decreased (Adult)  Goal: Adequate Cardiac Output/Effective Tissue Perfusion  Outcome: Ongoing (interventions implemented as appropriate)   02/07/18 1644   Cardiac Output, Decreased (Adult)   Adequate Cardiac Output/Effective Tissue Perfusion making progress toward outcome       Problem: Skin Integrity Impairment, Risk/Actual (Adult)  Goal: Skin Integrity/Wound Healing  Outcome: Ongoing (interventions implemented as appropriate)   02/07/18 1644   Skin Integrity Impairment, Risk/Actual (Adult)   Skin Integrity/Wound Healing making progress toward outcome       Problem: Fall Risk (Adult)  Goal: Absence of Falls  Outcome: Ongoing (interventions implemented as appropriate)   02/07/18 1644   Fall Risk (Adult)   Absence of Falls making progress toward outcome

## 2018-02-07 NOTE — PROGRESS NOTES
Continued Stay Note  [facility]     Patient Name: Leila Smith  MRN: [id number]  Today's Date: 2/7/2018    Admit Date: 1/31/2018          Discharge Plan     Consent obtained for the participation in the [facility] Transitions Program. Rose Boyd RN                Discharge Codes     None        Expected Discharge Date and Time     Expected Discharge Date Expected Discharge Time    Feb 7, 2018             Rose Boyd RN

## 2018-02-07 NOTE — PROGRESS NOTES
Pharmacy Consult-Vancomycin Dosing    Leila Smith is a  89 yo woman admitted 1/31/18 for severe sepsis and ANGELA.  Possible sources include mitral valve endocarditis or UTI.  Transthoracic echocardiogram shows possible mobile echogenicity near the posterior leaflet of the mitral valve concerning for possible endocarditis. Continue vancomycin for possible BE and fluconazole for yeast UTI.     Indication: possible BE  Consulting Provider: Dr. Sherman  ID Consult: Yes    Goal Trough: 15-20 mcg/ml    Current Antimicrobial Therapy  Anti-Infectives       Ordered     Dose/Rate Route Frequency Start Stop      02/06/18 1434  vancomycin (dosing per levels)     Ordering Provider:  Kanika Archer RP     Does not apply Daily 02/07/18 0900 02/15/18 2359    02/05/18 0757  fluconazole (DIFLUCAN) tablet 200 mg     Rohan Saunders MD reviewed the order on 02/07/18 0658.   Ordering Provider:  Rohan Saunders MD    200 mg Oral Every 24 Hours 02/05/18 0900 02/11/18 0657    02/04/18 0823  vancomycin 1250 mg/250 mL 0.9% NS IVPB ([facility])     Ordering Provider:  Evert Cid RPH    1,250 mg Intravenous Once 02/04/18 1000 02/04/18 1007    02/01/18 1413  vancomycin 1750 mg/500 mL 0.9% NS IVPB ([facility])     Ordering Provider:  Jasmina Maier DO    20 mg/kg × 90.7 kg  over 120 Minutes Intravenous Once 02/01/18 1445 02/01/18 1732    02/01/18 1413  piperacillin-tazobactam (ZOSYN) 4.5 g in iso-osmotic dextrose 100 mL IVPB (premix)     Ordering Provider:  Jasmina Maier DO    4.5 g  200 mL/hr over 0.5 Hours Intravenous Once 02/01/18 1445 02/01/18 1603    02/01/18 1401  Pharmacy to dose vancomycin     Ordering Provider:  Jasmina Maier DO     Does not apply Continuous PRN 02/01/18 1400 02/15/18 1359    01/31/18 1418  cefTRIAXone (ROCEPHIN) IVPB 1 g     Ordering Provider:  Blayne Moe MD    1 g  100 mL/hr over 30 Minutes Intravenous Once 01/31/18 1430 01/31/18 1522        Labs    Results from last 7 days   Lab Units  "02/07/18  0330 02/06/18  0339 02/04/18  0452   BUN mg/dL 43* 51* 66*   CREATININE mg/dL 1.70* 1.90* 2.00*     Results from last 7 days     Lab Units 02/06/18  0339 02/04/18  0453 02/03/18  0429   WBC 10*3/mm3 11.25* 13.67* 15.87*   Evaluation of Dosing     Last Dose: vancomycin 1250 mg IV on 2/4/18 at 1000    Ht - 165.1 cm (65\")  Wt - 82.1 kg (181 lb)    Estimated Creatinine Clearance: 32.1 mL/min (by C-G formula based on Cr of 1.7).    Microbiology and Radiology    Microbiology Results (last 10 days)       Procedure Component Value - Date/Time      Blood Culture - Blood, [[id number]]  (Normal) Collected:  02/06/18 0341    Lab Status:  Preliminary result Specimen:  Blood from Hand, Right Updated:  02/07/18 0446     Blood Culture No growth at 24 hours    Blood Culture - Blood, [[id number]]  (Normal) Collected:  02/05/18 1859    Lab Status:  Preliminary result Specimen:  Blood from Arm, Right Updated:  02/06/18 1916     Blood Culture No growth at 24 hours    Narrative:       Aerobic bottle only    Blood Culture - Blood, [[id number]]  (Normal) Collected:  02/02/18 0805    Lab Status:  Preliminary result Specimen:  Blood from Blood, Central Line Updated:  02/06/18 1031     Blood Culture No growth at 4 days    Urine Culture - Urine, Urine, Clean Catch [[id number]]  (Abnormal) Collected:  01/31/18 1643    Lab Status:  Final result Specimen:  Urine from Urine, Catheter Updated:  02/03/18 1239     Urine Culture --      >100,000 CFU/mL Candida parapsilosis (A)    Influenza A & B, RT PCR - Swab, Nasopharynx [[id number]]  (Normal) Collected:  01/31/18 1630    Lab Status:  Final result Specimen:  Swab from Nasopharynx Updated:  01/31/18 1748     Influenza A PCR Not Detected     Influenza B PCR Not Detected    Urine Culture - Urine, Urine, Clean Catch [[id number]]  (Abnormal) Collected:  01/31/18 1346    Lab Status:  Final result Specimen:  Urine from Urine, Catheter Updated:  02/02/18 1154     Urine Culture --      40,000-50,000 " CFU/mL Candida albicans (A)    Blood Culture - Blood, [[id number]]  (Abnormal)  (Susceptibility) Collected:  01/31/18 1337    Lab Status:  Final result Specimen:  Blood from Arm, Right Updated:  02/03/18 1007     Blood Culture --      Staphylococcus epidermidis (A)      This isolate does not demonstrate inducible clindamycin resistance in vitro.          Isolated from Aerobic and Anaerobic Bottles     Gram Stain Result Anaerobic Bottle Gram positive cocci in pairs and clusters      Aerobic Bottle Gram positive cocci in clusters    Susceptibility        Staphylococcus epidermidis     DIMITRIS     Ciprofloxacin >2 ug/ml Resistant     Clindamycin <=0.5 ug/ml Susceptible     Daptomycin <=0.5 ug/ml Susceptible     Erythromycin >4 ug/ml Resistant     Gentamicin <=4 ug/ml Susceptible     Levofloxacin >4 ug/ml Resistant     Linezolid <=1 ug/ml Susceptible     Oxacillin >2 ug/ml Resistant     Penicillin G >8 ug/ml Resistant     Quinupristin + Dalfopristin <=0.5 ug/ml Susceptible     Rifampin <=1 ug/ml Susceptible     Tetracycline <=4 ug/ml Susceptible     Trimethoprim + Sulfamethoxazole <=0.5/9.5 ug/ml Susceptible     Vancomycin 1 ug/ml Susceptible                      Blood Culture - Blood, [[id number]]  (Abnormal) Collected:  01/31/18 1337    Lab Status:  Preliminary result Specimen:  Blood from Arm, Left Updated:  02/07/18 0750     Blood Culture --      Staphylococcus, coagulase negative (A)     Isolated from Aerobic and Anaerobic Bottles     Gram Stain Result Anaerobic Bottle Gram positive cocci in clusters      Aerobic Bottle Gram positive cocci in clusters    Blood Culture ID, PCR - Blood, [[id number]]  (Normal) Collected:  01/31/18 1337    Lab Status:  Final result Specimen:  Blood from Arm, Right Updated:  02/01/18 1502     BCID, PCR No organism detected by BCID PCR.          Evaluation of Level    Lab Results   Component Value Date    VANCORANDOM 14.50 02/07/2018     Assessment/Plan:   1. Start patient on vancomycin 1 gram  iv every 48  hours. Draw vancomycin trough on 2/9 with am labs.  2. Monitor s/s of toxicity.  3. Monitor cultures and progress.  Balwinder Mccall [facility]  2/7/2018  8:46 AM

## 2018-02-07 NOTE — PROGRESS NOTES
Continued Stay Note  [facility]     Patient Name: Leila Smith  MRN: [id number]  Today's Date: 2/7/2018    Admit Date: 1/31/2018          Discharge Plan       02/07/18 160    Case Management/Social Work Plan    Plan discharge plan    Patient/Family In Agreement With Plan yes    Additional Comments Spoke with pt daughterBria on cell and plan is for pt to return to [facility] when medically ready for discharge. CM will cont to follow              Discharge Codes     None        Expected Discharge Date and Time     Expected Discharge Date Expected Discharge Time    Feb 7, 2018             Alisa Kelly RN

## 2018-02-07 NOTE — PROGRESS NOTES
"NAL Progress Note  Leila Smith  [id number]  1945    1/31/2018    Reason for visit:  ANGELA    She is more awake today a bit more responsive  Trying to eat food spilled on tray and has not eaten much  ROS:    Unable to get        I&O:    Intake/Output Summary (Last 24 hours) at 02/07/18 0944  Last data filed at 02/07/18 0600   Gross per 24 hour   Intake              240 ml   Output              875 ml   Net             -635 ml       PE:   Blood pressure 141/86, pulse 90, temperature 97.7 °F (36.5 °C), temperature source Temporal Artery , resp. rate 20, height 165.1 cm (65\"), weight 89.6 kg (197 lb 9.6 oz), SpO2 97 %.    GENERAL: Following commands no acute distress.   HEENT: PER, no MI.  NECK: Supple, no JVD     HEART: RRR; No murmur, rubs, gallops.   LUNGS: Clear auscultation normal effort.. Nonlabored.   ABDOMEN: Soft nontender positive bowel sounds.  EXT:  No cyanosis, clubbing or edema.   SKIN: Warm and dry , lower extremity skin changes are noted..    NEURO: Alert and oriented x 3,        Medications:    Current Facility-Administered Medications:   •  [START ON 2/9/2018] !Vancomycin trough on 2/9 with am labs. Please hold scheduled dose due at 0900 until pharmacy has reviewed level., , Does not apply, Once, Balwinder Mccall, [facility]  •  acetaminophen (TYLENOL) tablet 650 mg, 650 mg, Oral, Q4H PRN **OR** acetaminophen (TYLENOL) suppository 650 mg, 650 mg, Rectal, Q4H PRN, DANIEL Ludwig  •  aspirin chewable tablet 324 mg, 324 mg, Oral, Daily, DANIEL Ludwig, 324 mg at 02/07/18 0802  •  atorvastatin (LIPITOR) tablet 10 mg, 10 mg, Oral, Nightly, DANIEL Mustafa, 10 mg at 02/06/18 2122  •  dextrose (D50W) solution 25 g, 25 g, Intravenous, Q15 Min PRN, Jasmina V. Case, DO  •  dextrose (GLUTOSE) oral gel 15 g, 15 g, Oral, Q15 Min PRN, Jasmina V. Case, DO  •  diltiaZEM (CARDIZEM) tablet 60 mg, 60 mg, Oral, Q6H, Alix Robertson MD, Stopped at 02/06/18 3177  •  famotidine (PEPCID) tablet 20 mg, " 20 mg, Oral, Daily, Kanika Archer, [facility], 20 mg at 02/07/18 0802  •  fluconazole (DIFLUCAN) tablet 200 mg, 200 mg, Oral, Q24H, Rohan Saunders MD, 200 mg at 02/07/18 0802  •  glucagon (human recombinant) (GLUCAGEN DIAGNOSTIC) injection 1 mg, 1 mg, Subcutaneous, PRN, Jasmina V. Case, DO  •  heparin (porcine) 5000 UNIT/ML injection 5,000 Units, 5,000 Units, Subcutaneous, Q8H, Alexandra Galdamez APRN, 5,000 Units at 02/07/18 0556  •  insulin detemir (LEVEMIR) injection 15 Units, 15 Units, Subcutaneous, QAM, Hayden Rviers MD, 15 Units at 02/07/18 0801  •  insulin lispro (humaLOG) injection 0-7 Units, 0-7 Units, Subcutaneous, 4x Daily With Meals & Nightly, Jasmina V. Case, DO, Stopped at 02/06/18 2026  •  insulin lispro (humaLOG) injection 5 Units, 5 Units, Subcutaneous, TID With Meals, Hayden Rivers MD, 5 Units at 02/07/18 0802  •  ipratropium-albuterol (DUO-NEB) nebulizer solution 3 mL, 3 mL, Nebulization, Q6H PRN, Hayden Rivers MD  •  magnesium sulfate 4 gram infusion - Mg less than or equal to 1mg/dL, 4 g, Intravenous, PRN **OR** magnesium sulfate 3 gram infusion (1gm x 3) - Mg 1.1 - 1.5 mg/dL, 1 g, Intravenous, PRN **OR** Magnesium Sulfate 2 gram infusion- Mg 1.6 - 1.9 mg/dL, 2 g, Intravenous, PRN, Frieda Alfredo, APRN, Last Rate: 25 mL/hr at 02/03/18 0822, 2 g at 02/03/18 0822  •  metoprolol tartrate (LOPRESSOR) tablet 50 mg, 50 mg, Oral, Q12H, Alix Robertson MD, 50 mg at 02/07/18 0802  •  Pharmacy to dose vancomycin, , Does not apply, Continuous PRN, Jasmina V. Case, DO  •  sodium chloride 0.9 % flush 1-10 mL, 1-10 mL, Intravenous, PRN, DANIEL Ludwig  •  sodium chloride 0.9 % flush 10 mL, 10 mL, Intravenous, PRN, Blayne Moe MD  •  vancomycin (VANCOCIN) in iso-osmotic dextrose IVPB 1 g (premix) 200 mL, 1,000 mg, Intravenous, Q48H, Balwinder Mccall [facility]    Meds reviewed and doses adjusted for renal function    Labs:    Results from last 7 days  Lab Units 02/06/18  3262  02/04/18  0453 02/03/18  0429   WBC 10*3/mm3 11.25* 13.67* 15.87*   HEMOGLOBIN g/dL 12.5 12.4 13.1   HEMATOCRIT % 40.6 39.3 41.6   PLATELETS 10*3/mm3 209 192 201       Results from last 7 days  Lab Units 02/07/18  0330 02/06/18  0339 02/04/18  0452 02/03/18  0429 02/02/18  0805   SODIUM mmol/L 141 139 140 147* 145   POTASSIUM mmol/L 3.6 3.7 3.5 3.6 3.8   CHLORIDE mmol/L 111* 107 108 113* 115*   CO2 mmol/L 23.0 26.0 28.0 27.0 25.0   BUN mg/dL 43* 51* 66* 82* 94*   CREATININE mg/dL 1.70* 1.90* 2.00* 2.20* 2.60*   GLUCOSE mg/dL 133* 163* 210* 122* 179*   CALCIUM mg/dL 8.4* 8.2* 7.7* 7.8* 7.5*   PHOSPHORUS mg/dL  --  1.6* 1.9* 2.4 2.9       Results from last 7 days  Lab Units 02/04/18  0452   ALK PHOS U/L 177*   BILIRUBIN mg/dL 0.4   ALT (SGPT) U/L 22   AST (SGOT) U/L 11     Invalid input(s): UCOL, UCLR, UPH, USG, UPRO, UBLD, UNITR, ELEU, URBC, UFC, UBACT    Estimated Creatinine Clearance: 32.1 mL/min (by C-G formula based on Cr of 1.7).    Radiology:  Imaging Results (last 72 hours)     Procedure Component Value Units Date/Time    XR Chest 1 View [[id number]] Collected:  01/31/18 1900     Updated:  01/31/18 1935    Narrative:       EXAM:    XR Chest, 1 View    CLINICAL HISTORY:    72 years old, female; Sepsis, unspecified organism; Elevated white blood cell   count, unspecified; Hyperkalemia; Unspecified atrial fibrillation; Acute kidney   failure, unspecified; Urinary tract infection, site not specified; Hematuria,   unspecified; Altered mental status, unspecified; Hyperglycemia, unspecified;   Device placement; Other: Central line placement    TECHNIQUE:    Frontal view of the chest.    COMPARISON:    CR - XR CHEST 1 VW 2018-01-31 13:45    FINDINGS:    Lungs:  Unremarkable.  No consolidation.    Pleural space:  Unremarkable.  No pneumothorax.    Heart:  The heart demonstrates diffuse enlargement.    Mediastinum:  Unremarkable.    Bones/joints:  Unremarkable.    Tubes, lines and devices:  A left internal jugular  central venous catheter is   present, with its tip overlying the region of the superior vena cava.      Impression:         A left internal jugular central venous catheter is present, with its tip   overlying the region of the superior vena cava.    THIS DOCUMENT HAS BEEN ELECTRONICALLY SIGNED BY URSULA ARGUELLO MD    XR Chest 1 View [[id number]] Collected:  01/31/18 1447     Updated:  01/31/18 2150    Narrative:       EXAMINATION: XR CHEST 1 VW-01/31/2018:      INDICATION: Weak/Dizzy/AMS, triage protocol.      COMPARISON: 12/19/2017.     FINDINGS: The patient is again rotated to the left. The heart shadow  appears borderline enlarged. The vasculature appears upper limits of  normal. Mild chronic appearing interstitial lung changes and old  granulomatous calcifications are again noted and appear stable.           Impression:       Mild pulmonary venous hypertension unchanged. No new chest  disease is identified.     D:  01/31/2018  E:  01/31/2018     This report was finalized on 1/31/2018 9:48 PM by DR. Brian Cosme MD.       CT Head Without Contrast [[id number]] Collected:  01/31/18 1648     Updated:  01/31/18 2223    Narrative:       EXAMINATION: CT HEAD WO CONTRAST- 01/31/2018     INDICATION: Decreased alertness; N17.9-Acute kidney failure,  unspecified; E87.5-Hyperkalemia; A41.9-Sepsis, unspecified organism;  N39.0-Urinary tract infection, site not specified; R31.9-Hematuria,  unspecified; R73.9-Hyperglycemia, unspecified; D72.829-Elevated white  blood cell count, unspecified; R41.82-Altered mental status,  unspecified; I48.91-Unspecified atrial fibrillation         TECHNIQUE: 5 mm unenhanced images through the brain     The radiation dose reduction device was turned on for each scan per the  ALARA (As Low as Reasonably Achievable) protocol.     COMPARISON: 12/19/2017     FINDINGS: Previous exam report from 12/19/2017 indicates no acute  findings. History today indicates decreased level of alertness, altered  mental  status.     The calvarium appears intact. Included paranasal sinuses mastoid air  cells and middle ear spaces appear clear. Soft tissue window images show  advanced generalized cerebral atrophy and chronic appearing central  white matter changes stable in pattern from previous study. There is no  evidence of mass, mass effect, hemorrhage, edema, hydrocephalus, or  abnormal extra-axial collection.       Impression:       Stable head CT scan with chronic appearing age related  changes. No evidence of acute intracranial disease.        D:  01/31/2018  E:  01/31/2018     This report was finalized on 1/31/2018 10:21 PM by DR. Brian Cosme MD.       US Renal Limited [[id number]] Collected:  02/01/18 1601     Updated:  02/02/18 1236    Narrative:       EXAMINATION: US RENAL, LIMITED-02/01/2018:     INDICATION: ANGELA, renal insufficiency.     TECHNIQUE: Sonographic imaging was obtained of the kidneys in both the  sagittal and transverse planes. The examination is suboptimal due to  patient inability to hold respirations.     COMPARISON: NONE.     FINDINGS: The right kidney measures in length from pole to pole 13.5 cm.  There is no solid mass identified. There is a renal cortical cyst  identified measuring 3.2 cm. No hydronephrosis. No nephrolithiasis. No  solid mass.     The left kidney measures in length from pole to pole 11.4 cm. Several  cystic areas identified, the largest measuring 2.5 cm. There is no  hydronephrosis or nephrolithiasis. No solid mass. Imaging of the bladder  reveals a Bone catheter present.       Impression:       Bilateral renal cortical cysts otherwise, unremarkable  examination.     D:  02/01/2018  E:  02/01/2018            This report was finalized on 2/2/2018 12:33 PM by Dr. Stephanie Jarrett MD.             Renal Imaging:    reviewed    IMPRESSION:  Acute kidney injury-likely acute kidney injury due to volume depletion and ATN.  Creatinine is improving    Chronic kidney disease-her baseline  creatinine is about 0.8 just within the last 6-8 weeks.  She possibly has baseline diabetic nephropathy  Metabolic Acidosis- improving   Hemoconcentration-hematocrit is quite high likely because awaiting depletion  Hypertension      PLAN:   Continue to encourage po intake- may need help   May consider peg tube if she is unable to take po as she be at risk for volume depletion  Cr stable- improving slowly         Zane Muniz MD  2/7/2018  9:44 AM

## 2018-02-08 NOTE — PLAN OF CARE
Problem: Cardiac Output, Decreased (Adult)  Goal: Adequate Cardiac Output/Effective Tissue Perfusion  Outcome: Ongoing (interventions implemented as appropriate)      Problem: Fall Risk (Adult)  Goal: Absence of Falls  Outcome: Ongoing (interventions implemented as appropriate)

## 2018-02-08 NOTE — PROGRESS NOTES
[facility] Medicine Services  PROGRESS NOTE    Patient Name: Leila Smith  : 1945  MRN: [id number]    Date of Admission: 2018  Length of Stay: 8  Primary Care Physician: Ciaran Wakefield MD    Subjective   Subjective     CC: F/U sepsis    HPI:  Refusing medications and physical examination today.  She does not verbalize any complaints.    Review of Systems  Unable to obtain due to mental status    Objective   Objective     Vital Signs:   Temp:  [98.2 °F (36.8 °C)-98.6 °F (37 °C)] 98.6 °F (37 °C)  Heart Rate:  [60-91] 70  Resp:  [18-19] 18  BP: (136-157)/(73-97) 136/94        Physical Exam:  Constitutional: No acute distress, awake, alert, laying in bed  HENT: NCAT, mucous membranes moist  Respiratory: Respiratory effort normal   Cardiovascular: RRR on telemetry  Gastrointestinal: Soft, nontender, nondistended  Musculoskeletal: No bilateral ankle edema  Psychiatric: Uncooperative  Neurologic: Cranial Nerves grossly intact to confrontation  Skin: No rashes    Results Reviewed:  I have personally reviewed current lab, radiology, and data and agree.      Results from last 7 days  Lab Units 18  03318  042   WBC 10*3/mm3 11.25* 13.67* 15.87*   HEMOGLOBIN g/dL 12.5 12.4 13.1   HEMATOCRIT % 40.6 39.3 41.6   PLATELETS 10*3/mm3 209 192 201       Results from last 7 days  Lab Units 18  04218  03318  03318  045   SODIUM mmol/L 139 141 139 140   POTASSIUM mmol/L 3.6 3.6 3.7 3.5   CHLORIDE mmol/L 108 111* 107 108   CO2 mmol/L 23.0 23.0 26.0 28.0   BUN mg/dL 34* 43* 51* 66*   CREATININE mg/dL 1.40* 1.70* 1.90* 2.00*   GLUCOSE mg/dL 93 133* 163* 210*   CALCIUM mg/dL 8.1* 8.4* 8.2* 7.7*   ALT (SGPT) U/L  --   --   --  22   AST (SGOT) U/L  --   --   --  11     Estimated Creatinine Clearance: 37.2 mL/min (by C-G formula based on Cr of 1.4).  No results found for: BNP  No results found for: PHART    Microbiology Results Abnormal      Procedure Component Value - Date/Time    Blood Culture - Blood, [[id number]]  (Abnormal)  (Susceptibility) Collected:  01/31/18 1335    Lab Status:  Edited Result - FINAL Specimen:  Blood from Arm, Left Updated:  02/08/18 0918     Blood Culture --      Staphylococcus epidermidis (A)      This isolate does not demonstrate inducible clindamycin resistance in vitro.          Isolated from Aerobic and Anaerobic Bottles     Gram Stain Result Anaerobic Bottle Gram positive cocci in clusters      Aerobic Bottle Gram positive cocci in clusters    Susceptibility      Staphylococcus epidermidis     DIMITRIS     Ciprofloxacin >2 ug/ml Resistant     Clindamycin <=0.5 ug/ml Susceptible     Daptomycin <=0.5 ug/ml Susceptible     Erythromycin >4 ug/ml Resistant     Gentamicin <=4 ug/ml Susceptible     Levofloxacin >4 ug/ml Resistant     Linezolid <=1 ug/ml Susceptible     Oxacillin >2 ug/ml Resistant     Penicillin G >8 ug/ml Resistant     Quinupristin + Dalfopristin <=0.5 ug/ml Susceptible     Rifampin <=1 ug/ml Susceptible     Tetracycline <=4 ug/ml Susceptible     Trimethoprim + Sulfamethoxazole <=0.5/9.5 ug/ml Susceptible     Vancomycin 2 ug/ml Susceptible                    Blood Culture - Blood, [[id number]]  (Abnormal)  (Susceptibility) Collected:  01/31/18 1323    Lab Status:  Edited Result - FINAL Specimen:  Blood from Arm, Right Updated:  02/08/18 0830     Blood Culture --      Staphylococcus epidermidis (A)      This isolate does not demonstrate inducible clindamycin resistance in vitro.          Isolated from Aerobic and Anaerobic Bottles     Gram Stain Result Anaerobic Bottle Gram positive cocci in pairs and clusters      Aerobic Bottle Gram positive cocci in clusters    Susceptibility      Staphylococcus epidermidis     DIMITRIS     Ciprofloxacin >2 ug/ml Resistant     Clindamycin <=0.5 ug/ml Susceptible     Daptomycin <=0.5 ug/ml Susceptible     Erythromycin >4 ug/ml Resistant     Gentamicin <=4 ug/ml Susceptible      Levofloxacin >4 ug/ml Resistant     Linezolid <=1 ug/ml Susceptible     Oxacillin >2 ug/ml Resistant     Penicillin G >8 ug/ml Resistant     Quinupristin + Dalfopristin <=0.5 ug/ml Susceptible     Rifampin <=1 ug/ml Susceptible     Tetracycline <=4 ug/ml Susceptible     Trimethoprim + Sulfamethoxazole <=0.5/9.5 ug/ml Susceptible     Vancomycin 1 ug/ml Susceptible                    Blood Culture ID, PCR - Blood, [[id number]]  (Normal) Collected:  01/31/18 1323    Lab Status:  Edited Result - FINAL Specimen:  Blood from Arm, Right Updated:  02/08/18 0830     BCID, PCR No organism detected by BCID PCR.    Blood Culture - Blood, [[id number]]  (Normal) Collected:  02/06/18 0341    Lab Status:  Preliminary result Specimen:  Blood from Hand, Right Updated:  02/08/18 0446     Blood Culture No growth at 2 days    Blood Culture - Blood, [[id number]]  (Normal) Collected:  02/05/18 1859    Lab Status:  Preliminary result Specimen:  Blood from Arm, Right Updated:  02/07/18 1916     Blood Culture No growth at 2 days    Narrative:       Aerobic bottle only    Blood Culture - Blood, [[id number]]  (Normal) Collected:  02/02/18 0805    Lab Status:  Final result Specimen:  Blood from Blood, Central Line Updated:  02/07/18 1031     Blood Culture No growth at 5 days    Urine Culture - Urine, Urine, Clean Catch [[id number]]  (Abnormal) Collected:  01/31/18 1643    Lab Status:  Final result Specimen:  Urine from Urine, Catheter Updated:  02/03/18 1239     Urine Culture --      >100,000 CFU/mL Candida parapsilosis (A)    Urine Culture - Urine, Urine, Clean Catch [[id number]]  (Abnormal) Collected:  01/31/18 1346    Lab Status:  Final result Specimen:  Urine from Urine, Catheter Updated:  02/02/18 1154     Urine Culture --      40,000-50,000 CFU/mL Candida albicans (A)    Influenza A & B, RT PCR - Swab, Nasopharynx [[id number]]  (Normal) Collected:  01/31/18 1630    Lab Status:  Final result Specimen:  Swab from Nasopharynx Updated:  01/31/18  1748     Influenza A PCR Not Detected     Influenza B PCR Not Detected          Imaging Results (last 24 hours)     ** No results found for the last 24 hours. **        Results for orders placed during the hospital encounter of 01/31/18   Adult Transthoracic Echo Complete W/ Cont if Necessary Per Protocol    Narrative · The left ventricular cavity is small.  · Left ventricular wall thickness is consistent with concentric   hypertrophy.  · Right ventricular cavity is mild-to-moderately dilated.  · Left atrial cavity size is dilated.  · Moderate tricuspid valve regurgitation is present.  · Moderate aortic valve regurgitation is present.  · Mild pulmonic valve regurgitation is present.  · There is mild calcification of the aortic valve mainly affecting the non   coronary cusp(s).  · Left ventricular systolic function is normal. Estimated EF = 55%.  · There is a small mobile echogenic density near the annulus of the   posterior leaflet of the mitral valve  · Saline contrast study is negative for PFO  · The patient's rhythm is tachycardic throughout and on the transmitral   inflow pattern there's an BALDO waves to suggest this may be atrial flutter          I have reviewed the medications.    Assessment/Plan   Assessment / Plan     Hospital Problem List     * (Principal)Sepsis due to urinary tract infection    Diabetes mellitus, type 2    Overview Signed 8/3/2016  2:13 PM by Ama Wolf     With foot ulcer         Hyperlipidemia    Hypothyroidism    Chronic obstructive pulmonary disease    CAD (coronary artery disease)    Overview Signed 7/15/2016 10:27 AM by Yue Royal     History of  Coronary Artery Disease V12.59         Peripheral vascular disease    Obstructive sleep apnea syndrome    Congestive heart failure    Atrial fibrillation with rapid ventricular response    Altered mental status    Acute renal failure    Hyperkalemia    Leukocytosis    Elevated troponin             Brief Hospital Course to date:  Leila  HOUSTON Smith is a 72 y.o. female who is mentally challenged at baseline and who Resides at [facility] with a past medical history of poorly controlled type 2 diabetes mellitus, hypertension, hyperlipidemia, hypothyroidism, coronary artery disease, chronic obstructive pulmonary disease, paroxysmal atrial fibrillation, obstructive sleep apnea, who was brought in by EMS for altered mental status.  She was afebrile but tachycardic and hypotensive.  She was found to be in atrial fibrillation with rapid ventricular response, hypotension, acute kidney injury, severe metabolic acidosis.  Initial concern was for UTI.  Urine culture grew candida species.  Blood cultures on admission returned positive with staph epidermidis from both sets which were drawn at separate times and locations.  Echocardiogram concerning for mitral valve vegetation.  Family refusing YANNA due to need for sedation.     Assessment & Plan:    Sepsis on admission-possibly UTI vs. Bacterial endocarditis  -Sepsis now resolved  -ID following; discussed with Dr. Saunders today  -Continue vancomycin and fluconazole  -Inflammatory markers pending today  -Cardiology to compare TTE this admission to last TTE to evaluate mitral valve since family is refusing YANNA  -Further antibiotic plan pending    ANGELA  -Improving  -Continue to encourage PO intake    A-fib with RVR  -[id number] = 4  -Continue diltiazem and metoprolol  -Started on Eliquis    DM2  -Improved control  -Continue basal/bolus insulin    DVT Prophylaxis:  Eliquis    CODE STATUS: Full Code    Disposition: I expect the patient to be discharged back to [facility] in the next few days.    Lenora Mcgowan MD  02/08/18  1:50 PM

## 2018-02-08 NOTE — PLAN OF CARE
Problem: Cardiac Output, Decreased (Adult)  Goal: Adequate Cardiac Output/Effective Tissue Perfusion  Outcome: Ongoing (interventions implemented as appropriate)

## 2018-02-08 NOTE — PROGRESS NOTES
Continued Stay Note  [facility]     Patient Name: Leila Smith  MRN: [id number]  Today's Date: 2/8/2018    Admit Date: 1/31/2018          Discharge Plan       02/08/18 1232    Case Management/Social Work Plan    Plan ambulance    Patient/Family In Agreement With Plan yes    Additional Comments Per provider( Dr Mcgowan), pt possibly medically ready for discharge Friday. Ambulance arranged with [facility] for transportation back to [facility], Friday, 2/9 at 4 pm. PCS form on chartlet. CM will cont to follow              Discharge Codes     None        Expected Discharge Date and Time     Expected Discharge Date Expected Discharge Time    Feb 9, 2018             Alisa Kelly RN

## 2018-02-08 NOTE — PROGRESS NOTES
[facility] Progress Note    Admission Date: 1/31/2018    Leila Smith  1945  [id number]    Date: 2/8/2018    Meds:    Anti-Infectives     Ordered     Dose/Rate Route Frequency Start Stop    02/07/18 0848  vancomycin (VANCOCIN) in iso-osmotic dextrose IVPB 1 g (premix) 200 mL     Ordering Provider:  Balwinder Mccall, RPH    1,000 mg  over 60 Minutes Intravenous Every 48 Hours Scheduled 02/07/18 1000 02/15/18 0859    02/05/18 0757  fluconazole (DIFLUCAN) tablet 200 mg     Rohan Saunders MD reviewed the order on 02/07/18 0658.   Ordering Provider:  Rohan Saunders MD    200 mg Oral Every 24 Hours 02/05/18 0900 02/11/18 0657    02/04/18 0823  vancomycin 1250 mg/250 mL 0.9% NS IVPB ([facility])     Ordering Provider:  Evert Cid RPH    1,250 mg Intravenous Once 02/04/18 1000 02/04/18 1007    02/01/18 1413  vancomycin 1750 mg/500 mL 0.9% NS IVPB ([facility])     Ordering Provider:  Jasmina Maier DO    20 mg/kg × 90.7 kg  over 120 Minutes Intravenous Once 02/01/18 1445 02/01/18 1732    02/01/18 1413  piperacillin-tazobactam (ZOSYN) 4.5 g in iso-osmotic dextrose 100 mL IVPB (premix)     Ordering Provider:  Jasmina Maier DO    4.5 g  200 mL/hr over 0.5 Hours Intravenous Once 02/01/18 1445 02/01/18 1603    02/01/18 1401  Pharmacy to dose vancomycin     Ordering Provider:  Jasmina Maier DO     Does not apply Continuous PRN 02/01/18 1400 02/15/18 1359    01/31/18 1418  cefTRIAXone (ROCEPHIN) IVPB 1 g     Ordering Provider:  Blayne Moe MD    1 g  100 mL/hr over 30 Minutes Intravenous Once 01/31/18 1430 01/31/18 1522          CC:  UTI     SUBJECTIVE:  2/5/18:  Patient is a 72 y.o. female who is seen today for evaluation of severe sepsis with blood cultures positive for staph epidermidis a urine culture positive for Candida.  She was brought from her nursing home for increasing lethargy.  She has a baseline of dementia/cognitive dysfunction but was noted to be substantially less responsive prior to admission.   "Admitting labs revealed lactic acidosis, a leukocytosis of 17, 000, acute renal failure with Scr of 4.70, and 2/2 sets of positive blood cultures for Staph epidermidis, with repeat culture negative. She has had two positive urine cultures for yeast, initially Candida albicans and  most recently Candida parapsilosis. Vancomycin and Zosyn initiated and we were consulted for antibiotic management.  She is minimally conversant, and history obtained from the chart.  Her trans- thoracic echocardiogram revealed a possible mitral valve vegetation.  She is scheduled for a transesophageal echocardiogram this am.   18:  Daughter refusing YANNA.  The patient cannot provide a reliable review of systems due to her profound dementia.  The laboratory identified one of HER-2 sets of positive blood cultures as staph epidermidis but the second set was identified only as coagulase-negative staph and not speciate it yet.  She has remained afebrile.  18: The nursing staff indicates that she developed bradycardia overnight and her Cardizem was held.  She is unable to provide any reliable ROS.  She has remained afebrile.  So far her family has not consented to a transesophageal echocardiogram.  18:  She is conversant this am.  \"ready to go home\".  Remains afebrile.  Daughter still refusing YANNA.    ROS:  No f/c/s. No n/v/d. No rash. No new ADR to Abx.     PE:   Vital Signs  Temp (24hrs), Av.4 °F (36.9 °C), Min:98.2 °F (36.8 °C), Max:98.6 °F (37 °C)    Temp  Min: 98.2 °F (36.8 °C)  Max: 98.6 °F (37 °C)  BP  Min: 136/94  Max: 157/97  Pulse  Min: 60  Max: 95  Resp  Min: 18  Max: 19  SpO2  Min: 95 %  Max: 95 %    GENERAL: Awake and alert, in no acute distress.   HEENT:  No conjunctival injection. No icterus. Oropharynx clear without evidence of thrush or exudate.  NECK: Supple, no JVD     HEART: 1-2/6 systolic murmur  LUNGS: Clear to auscultation bilaterally without wheezing, rales, rhonchi. Normal respiratory effort. Nonlabored. " No dullness.  ABDOMEN: Soft, nontender, nondistended. Positive bowel sounds. No rebound or guarding. NO mass or HSM.  EXT:  No cyanosis, clubbing or edema. No cord.   Left IJ site ok   SKIN: Warm and dry without cutaneous eruptions on Inspection/palpation.    NEURO: She is alert but severely confused.      Laboratory Data      Results from last 7 days  Lab Units 02/06/18  0339 02/04/18  0453 02/03/18  0429   WBC 10*3/mm3 11.25* 13.67* 15.87*   HEMOGLOBIN g/dL 12.5 12.4 13.1   HEMATOCRIT % 40.6 39.3 41.6   PLATELETS 10*3/mm3 209 192 201       Results from last 7 days  Lab Units 02/08/18  0421   SODIUM mmol/L 139   POTASSIUM mmol/L 3.6   CHLORIDE mmol/L 108   CO2 mmol/L 23.0   BUN mg/dL 34*   CREATININE mg/dL 1.40*   GLUCOSE mg/dL 93   CALCIUM mg/dL 8.1*       Results from last 7 days  Lab Units 02/04/18  0452   ALK PHOS U/L 177*   BILIRUBIN mg/dL 0.4   ALT (SGPT) U/L 22   AST (SGOT) U/L 11                       Results from last 7 days  Lab Units 02/07/18  0330 02/06/18  0339 02/04/18  0452 02/03/18  0433 02/02/18  1408   VANCOMYCIN RM mcg/mL 14.50 21.10 13.30 21.40 27.00     Estimated Creatinine Clearance: 37.2 mL/min (by C-G formula based on Cr of 1.4).    Microbiology:  Blood Culture   Date Value Ref Range Status   02/05/2018 No growth at less than 24 hours  Preliminary     BCID, PCR   Date Value Ref Range Status   01/31/2018 No organism detected by BCID PCR. No organism detected by BCID PCR. Final      2/2: BC no growth  2/5:  BC no growth   2/6: BC pending       1/31:  BC  1/31  (2/2)  Staph epidermidis,  sens of 2nd to be posted      Urine Culture   Date Value Ref Range Status   01/31/2018 >100,000 CFU/mL Candida parapsilosis (A)  Final          Radiology:  Imaging Results (last 72 hours)     ** No results found for the last 72 hours. **              IMPRESSION:  1.  Staph epidermidis bacteremia-both of the positive blood cultures have been identified as staph epidermidis with the same antibiotic gram.  This  raises the issue of possible endocarditis since she has a mitral valve lesion.  I did have Dr. Pérez review her transthoracic echocardiograms from 12/22 and 1/31.  She has a small mitral valve lesion which appears to be slightly larger on the 1/31 study.  Follow-up blood cultures have been negative.  Her 2 sets of positive  blood cultures were drawn at separate times.  Her ESR is elevated at over 70.  Family has declined transesophageal echocardiogram.  At this point, think that it would be prudent to complete treatment for endocarditis.  I'm reluctant to leave her on vancomycin since her renal function is changing and the vancomycin will be difficult to dose with potential for significant side effects.  I will switch her to intravenous daptomycin.  I will obtain a baseline CPK and we will need to hold atorvastatin while she is on daptomycin due to the risk of myositis.  Her renal function has improved and I think that we could place a PICC line rather than switching her deep line to a tunnel catheter, particularly since her family wishes to avoid sedation.  2.  Severe sepsis-improved  3.  Acute renal failure/ATN-improved  4.  Mitral valve lesion-as above  5.  Leukocytosis/neutrophilia-improved  6.  Severe dementia  7.  Candida UTI  8.  Type 2 diabetes mellitus    RECOMMENDATIONS;  1.  Discontinue Vancomycin  2.  Continue Fluconazole 1-2 more days  3.  PICC line placement  4.  Remove the left IJ central line after the PICC line is placed  5.  Daptomycin 500 mg IV daily for 5 weeks, to complete at least 6 weeks of intravenous antibiotic therapy       Dr. Saunders has obtained the history, performed the physical exam and formulated the above treatment plan.     I discussed her very complex situation with Dr. Camacho both this morning and again this evening.    I spent over 45 minutes on her care today.  Over 50% of this time was spent in conference and care coordination.    Orders for her subacute nursing  facility:  1.  Fax this  page to [phone]  2.  Appointment to see me next Thursday 2/15 at 1500-this appointment has been made  3.  Daptomycin 500 mg IV daily at her subacute nursing facility with an anticipated duration of 5 weeks  4.  PICC line dressing change weekly with a Tegaderm CHG gel dressing or Biopatch  5.  CBC, CMP, ESR, CRP, CPK every Monday-faxed to [phone]    Rufus Saunders MD  2/8/2018  9:14 AM

## 2018-02-08 NOTE — PROGRESS NOTES
Adult Nutrition  Assessment/PES    Patient Name:  Leila Smith  YOB: 1945  MRN: [id number]  Admit Date:  1/31/2018    Assessment Date:  2/8/2018          Reason for Assessment       02/08/18 1413    Reason for Assessment    Reason For Assessment/Visit follow up protocol    Time Spent (min) 20    Diagnosis Diagnosis   Per notes this admission               Nutrition/Diet History       02/08/18 1413    Nutrition/Diet History    Reported/Observed By Patient;RN    Other Pt reported appetite to be fine, shook head no when asked if drinking supplements. Per RN pt has been refusing to eat today, will try to get her to sip on boost today.               Labs/Tests/Procedures/Meds       02/08/18 1417    Labs/Tests/Procedures/Meds    Labs/Tests Review Reviewed                Nutrition Prescription Ordered       02/08/18 1417    Nutrition Prescription PO    Current PO Diet Regular    Supplement Boost Glucose Control    Supplement Frequency 3 times a day    Common Modifiers Cardiac;Consistent Carbohydrate            Evaluation of Received Nutrient/Fluid Intake       02/08/18 1418    PO Evaluation    Number of Meals 4    % PO Intake 29            Problem/Interventions:        Problem 1       02/08/18 1418    Nutrition Diagnoses Problem 1    Problem 1 Inadequate Intake/Infusion    Etiology (related to) Medical Diagnosis   clinical condition     Signs/Symptoms (evidenced by) PO Intake    Percent (%) intake recorded 29 %    Over number of meals 4                    Intervention Goal       02/08/18 1418    Intervention Goal    General Nutrition support treatment    PO Increase intake            Nutrition Intervention       02/08/18 1419    Nutrition Intervention    RD/Tech Action Advise alternate selection;Advise available snack;Encourage intake;Follow Tx progress;Care plan reviewd   Ecouraged intake of nutritional supplements to RN and pt              Education/Evaluation       02/08/18 1419    Monitor/Evaluation     Monitor Per protocol;PO intake;Supplement intake        Electronically signed by:  Stephany Reed  02/08/18 2:19 PM

## 2018-02-08 NOTE — PROGRESS NOTES
"NAL Progress Note  Leila Smith  [id number]  1945    1/31/2018    Reason for visit:  ANGELA    She is more awake today a bit more responsive    Breakfast sitting on table un touched  ROS:    Unable to get        I&O:    Intake/Output Summary (Last 24 hours) at 02/08/18 0928  Last data filed at 02/07/18 2349   Gross per 24 hour   Intake              560 ml   Output             1075 ml   Net             -515 ml       PE:   Blood pressure 136/94, pulse 70, temperature 98.6 °F (37 °C), temperature source Axillary, resp. rate 18, height 165.1 cm (65\"), weight 81.4 kg (179 lb 8 oz), SpO2 95 %.    GENERAL: Following commands no acute distress.   HEENT: PER, no MI.  NECK: Supple, no JVD     HEART: RRR; No murmur, rubs, gallops.   LUNGS: Clear auscultation normal effort.. Nonlabored.   ABDOMEN: Soft nontender positive bowel sounds.  EXT:  No cyanosis, clubbing or edema.   SKIN: Warm and dry , lower extremity skin changes are noted..    NEURO: Alert and oriented x 3,        Medications:    Current Facility-Administered Medications:   •  [START ON 2/9/2018] !Vancomycin trough on 2/9 with am labs. Please hold scheduled dose due at 0900 until pharmacy has reviewed level., , Does not apply, Once, Balwinder Mccall, [facility]  •  acetaminophen (TYLENOL) tablet 650 mg, 650 mg, Oral, Q4H PRN **OR** acetaminophen (TYLENOL) suppository 650 mg, 650 mg, Rectal, Q4H PRN, DANIEL Ludwig  •  apixaban (ELIQUIS) tablet 5 mg, 5 mg, Oral, Q12H, Hayden Rivers MD, 5 mg at 02/07/18 2109  •  atorvastatin (LIPITOR) tablet 10 mg, 10 mg, Oral, Nightly, DANIEL Mustafa, 10 mg at 02/07/18 2109  •  dextrose (D50W) solution 25 g, 25 g, Intravenous, Q15 Min PRN, Jasmina V. Case, DO  •  dextrose (GLUTOSE) oral gel 15 g, 15 g, Oral, Q15 Min PRN, Jasmina V. Case, DO  •  diltiaZEM (CARDIZEM) tablet 60 mg, 60 mg, Oral, Q6H, Alix Robertson MD, 60 mg at 02/08/18 0534  •  famotidine (PEPCID) tablet 20 mg, 20 mg, Oral, Daily, Kanika Archer, " RPH, 20 mg at 02/07/18 0802  •  fluconazole (DIFLUCAN) tablet 200 mg, 200 mg, Oral, Q24H, Rohan Saunders MD, 200 mg at 02/07/18 0802  •  glucagon (human recombinant) (GLUCAGEN DIAGNOSTIC) injection 1 mg, 1 mg, Subcutaneous, PRN, Jasmina V. Case, DO  •  insulin detemir (LEVEMIR) injection 15 Units, 15 Units, Subcutaneous, QAM, Hayden Rivers MD, 15 Units at 02/08/18 0841  •  insulin lispro (humaLOG) injection 0-7 Units, 0-7 Units, Subcutaneous, 4x Daily With Meals & Nightly, Jasmina V. Case, DO, 2 Units at 02/07/18 1709  •  insulin lispro (humaLOG) injection 5 Units, 5 Units, Subcutaneous, TID With Meals, Hayden Rivers MD, 5 Units at 02/08/18 0841  •  ipratropium-albuterol (DUO-NEB) nebulizer solution 3 mL, 3 mL, Nebulization, Q6H PRN, Hayden Rivers MD  •  magnesium sulfate 4 gram infusion - Mg less than or equal to 1mg/dL, 4 g, Intravenous, PRN **OR** magnesium sulfate 3 gram infusion (1gm x 3) - Mg 1.1 - 1.5 mg/dL, 1 g, Intravenous, PRN **OR** Magnesium Sulfate 2 gram infusion- Mg 1.6 - 1.9 mg/dL, 2 g, Intravenous, PRN, DANIEL Mustafa, Last Rate: 25 mL/hr at 02/03/18 0822, 2 g at 02/03/18 0822  •  metoprolol tartrate (LOPRESSOR) tablet 50 mg, 50 mg, Oral, Q12H, Alix Robertson MD, 50 mg at 02/07/18 2109  •  Pharmacy to dose vancomycin, , Does not apply, Continuous PRN, Jasmina V. Case, DO  •  sodium chloride 0.9 % flush 1-10 mL, 1-10 mL, Intravenous, PRN, Alexandra Galdamez APRN  •  sodium chloride 0.9 % flush 10 mL, 10 mL, Intravenous, PRN, Blayne Moe MD  •  vancomycin (VANCOCIN) in iso-osmotic dextrose IVPB 1 g (premix) 200 mL, 1,000 mg, Intravenous, Q48H, Balwinder Mccall, [facility], 1,000 mg at 02/07/18 1042    Meds reviewed and doses adjusted for renal function    Labs:    Results from last 7 days  Lab Units 02/06/18  0339 02/04/18  0453 02/03/18  0429   WBC 10*3/mm3 11.25* 13.67* 15.87*   HEMOGLOBIN g/dL 12.5 12.4 13.1   HEMATOCRIT % 40.6 39.3 41.6   PLATELETS 10*3/mm3 209 192  201       Results from last 7 days  Lab Units 02/08/18  0421 02/07/18  0330 02/06/18  0339 02/04/18  0452 02/03/18  0429 02/02/18  0805   SODIUM mmol/L 139 141 139 140 147* 145   POTASSIUM mmol/L 3.6 3.6 3.7 3.5 3.6 3.8   CHLORIDE mmol/L 108 111* 107 108 113* 115*   CO2 mmol/L 23.0 23.0 26.0 28.0 27.0 25.0   BUN mg/dL 34* 43* 51* 66* 82* 94*   CREATININE mg/dL 1.40* 1.70* 1.90* 2.00* 2.20* 2.60*   GLUCOSE mg/dL 93 133* 163* 210* 122* 179*   CALCIUM mg/dL 8.1* 8.4* 8.2* 7.7* 7.8* 7.5*   PHOSPHORUS mg/dL  --   --  1.6* 1.9* 2.4 2.9       Results from last 7 days  Lab Units 02/04/18  0452   ALK PHOS U/L 177*   BILIRUBIN mg/dL 0.4   ALT (SGPT) U/L 22   AST (SGOT) U/L 11     Invalid input(s): UCOL, UCLR, UPH, USG, UPRO, UBLD, UNITR, ELEU, URBC, UFC, UBACT    Estimated Creatinine Clearance: 37.2 mL/min (by C-G formula based on Cr of 1.4).    Radiology:  Imaging Results (last 72 hours)     Procedure Component Value Units Date/Time    XR Chest 1 View [[id number]] Collected:  01/31/18 1900     Updated:  01/31/18 1935    Narrative:       EXAM:    XR Chest, 1 View    CLINICAL HISTORY:    72 years old, female; Sepsis, unspecified organism; Elevated white blood cell   count, unspecified; Hyperkalemia; Unspecified atrial fibrillation; Acute kidney   failure, unspecified; Urinary tract infection, site not specified; Hematuria,   unspecified; Altered mental status, unspecified; Hyperglycemia, unspecified;   Device placement; Other: Central line placement    TECHNIQUE:    Frontal view of the chest.    COMPARISON:    CR - XR CHEST 1 VW 2018-01-31 13:45    FINDINGS:    Lungs:  Unremarkable.  No consolidation.    Pleural space:  Unremarkable.  No pneumothorax.    Heart:  The heart demonstrates diffuse enlargement.    Mediastinum:  Unremarkable.    Bones/joints:  Unremarkable.    Tubes, lines and devices:  A left internal jugular central venous catheter is   present, with its tip overlying the region of the superior vena cava.       Impression:         A left internal jugular central venous catheter is present, with its tip   overlying the region of the superior vena cava.    THIS DOCUMENT HAS BEEN ELECTRONICALLY SIGNED BY URSULA ARGUELLO MD    XR Chest 1 View [[id number]] Collected:  01/31/18 1447     Updated:  01/31/18 2150    Narrative:       EXAMINATION: XR CHEST 1 VW-01/31/2018:      INDICATION: Weak/Dizzy/AMS, triage protocol.      COMPARISON: 12/19/2017.     FINDINGS: The patient is again rotated to the left. The heart shadow  appears borderline enlarged. The vasculature appears upper limits of  normal. Mild chronic appearing interstitial lung changes and old  granulomatous calcifications are again noted and appear stable.           Impression:       Mild pulmonary venous hypertension unchanged. No new chest  disease is identified.     D:  01/31/2018  E:  01/31/2018     This report was finalized on 1/31/2018 9:48 PM by DR. Brian Cosme MD.       CT Head Without Contrast [[id number]] Collected:  01/31/18 1648     Updated:  01/31/18 2223    Narrative:       EXAMINATION: CT HEAD WO CONTRAST- 01/31/2018     INDICATION: Decreased alertness; N17.9-Acute kidney failure,  unspecified; E87.5-Hyperkalemia; A41.9-Sepsis, unspecified organism;  N39.0-Urinary tract infection, site not specified; R31.9-Hematuria,  unspecified; R73.9-Hyperglycemia, unspecified; D72.829-Elevated white  blood cell count, unspecified; R41.82-Altered mental status,  unspecified; I48.91-Unspecified atrial fibrillation         TECHNIQUE: 5 mm unenhanced images through the brain     The radiation dose reduction device was turned on for each scan per the  ALARA (As Low as Reasonably Achievable) protocol.     COMPARISON: 12/19/2017     FINDINGS: Previous exam report from 12/19/2017 indicates no acute  findings. History today indicates decreased level of alertness, altered  mental status.     The calvarium appears intact. Included paranasal sinuses mastoid air  cells and middle ear  spaces appear clear. Soft tissue window images show  advanced generalized cerebral atrophy and chronic appearing central  white matter changes stable in pattern from previous study. There is no  evidence of mass, mass effect, hemorrhage, edema, hydrocephalus, or  abnormal extra-axial collection.       Impression:       Stable head CT scan with chronic appearing age related  changes. No evidence of acute intracranial disease.        D:  01/31/2018  E:  01/31/2018     This report was finalized on 1/31/2018 10:21 PM by DR. Brian Cosme MD.       US Renal Limited [[id number]] Collected:  02/01/18 1601     Updated:  02/02/18 1236    Narrative:       EXAMINATION: US RENAL, LIMITED-02/01/2018:     INDICATION: ANGELA, renal insufficiency.     TECHNIQUE: Sonographic imaging was obtained of the kidneys in both the  sagittal and transverse planes. The examination is suboptimal due to  patient inability to hold respirations.     COMPARISON: NONE.     FINDINGS: The right kidney measures in length from pole to pole 13.5 cm.  There is no solid mass identified. There is a renal cortical cyst  identified measuring 3.2 cm. No hydronephrosis. No nephrolithiasis. No  solid mass.     The left kidney measures in length from pole to pole 11.4 cm. Several  cystic areas identified, the largest measuring 2.5 cm. There is no  hydronephrosis or nephrolithiasis. No solid mass. Imaging of the bladder  reveals a Bone catheter present.       Impression:       Bilateral renal cortical cysts otherwise, unremarkable  examination.     D:  02/01/2018  E:  02/01/2018            This report was finalized on 2/2/2018 12:33 PM by Dr. Stephanie Jarrett MD.             Renal Imaging:    reviewed    IMPRESSION:  Acute kidney injury-likely acute kidney injury due to volume depletion and ATN.  Creatinine is improving    Chronic kidney disease-her baseline creatinine is about 0.8 just within the last 6-8 weeks.  She possibly has baseline diabetic  nephropathy  Metabolic Acidosis- improving   Hemoconcentration-hematocrit is quite high likely because awaiting depletion  Hypertension      PLAN:   Continue to encourage po intake- may need help   She needs help to feed her-   Cr stable- improving slowly  Will follow as out patient         Zane Muniz MD  2/8/2018  9:28 AM

## 2018-02-09 NOTE — NURSING NOTE
Saw picc consult.  Spoke with RN.  Patient's daughter is refusing for patient to have PICC placed at this time.

## 2018-02-09 NOTE — PROGRESS NOTES
Continued Stay Note  [facility]     Patient Name: Leila Smith  MRN: [id number]  Today's Date: 2/9/2018    Admit Date: 1/31/2018          Discharge Plan       02/09/18 1623    Case Management/Social Work Plan    Plan [facility] consult    Additional Comments Orders from Dr Jackson faxed to [facility] office per instruction. Per Dr Mcgowan, pt not medically ready for discharge today and ambulance cancelled. CM will follow up on Monday.              Discharge Codes     None        Expected Discharge Date and Time     Expected Discharge Date Expected Discharge Time    Feb 9, 2018             Alisa Kelly RN

## 2018-02-09 NOTE — PROGRESS NOTES
"NAL Progress Note  Leila Smith  [id number]  1945    1/31/2018    Reason for visit:  ANGELA    She is more awake today a bit more responsive      ROS:    Unable to get        I&O:    Intake/Output Summary (Last 24 hours) at 02/09/18 1104  Last data filed at 02/09/18 0900   Gross per 24 hour   Intake              840 ml   Output             1575 ml   Net             -735 ml       PE:   Blood pressure 147/81, pulse 76, temperature 97.8 °F (36.6 °C), temperature source Axillary, resp. rate 18, height 165.1 cm (65\"), weight 82.2 kg (181 lb 3.2 oz), SpO2 96 %.    GENERAL: Following commands no acute distress.   HEENT: PER, no MI.  NECK: Supple, no JVD     HEART: RRR; No murmur, rubs, gallops.   LUNGS: Clear auscultation normal effort.. Nonlabored.   ABDOMEN: Soft nontender positive bowel sounds.  EXT:  No cyanosis, clubbing or edema.   SKIN: Warm and dry , lower extremity skin changes are noted..    NEURO: Alert and oriented x 3,        Medications:    Current Facility-Administered Medications:   •  acetaminophen (TYLENOL) tablet 650 mg, 650 mg, Oral, Q4H PRN **OR** acetaminophen (TYLENOL) suppository 650 mg, 650 mg, Rectal, Q4H PRN, Alexandra Galdamez, DANIEL  •  apixaban (ELIQUIS) tablet 5 mg, 5 mg, Oral, Q12H, Hayden Rivers MD, 5 mg at 02/09/18 1004  •  atorvastatin (LIPITOR) tablet 10 mg, 10 mg, Oral, Nightly, Frieda Alfredo, DANIEL, 10 mg at 02/08/18 2050  •  DAPTOmycin (CUBICIN) 500 mg in sterile water (preservative free) 10 mL IV syringe, 6 mg/kg, Intravenous, Q24H, Rohan Saunders MD  •  dextrose (D50W) solution 25 g, 25 g, Intravenous, Q15 Min PRN, Jasmina V. Case, DO  •  dextrose (GLUTOSE) oral gel 15 g, 15 g, Oral, Q15 Min PRN, Jasmina V. Case, DO  •  diltiaZEM CD (CARDIZEM CD) 24 hr capsule 240 mg, 240 mg, Oral, Q24H, Lenora Mcgowan MD  •  famotidine (PEPCID) tablet 20 mg, 20 mg, Oral, Daily, Kanika Archer formerly [facility], 20 mg at 02/09/18 1003  •  fluconazole (DIFLUCAN) tablet 200 mg, 200 mg, Oral, Q24H, " Rohan Saunders MD, 200 mg at 02/09/18 1003  •  glucagon (human recombinant) (GLUCAGEN DIAGNOSTIC) injection 1 mg, 1 mg, Subcutaneous, PRN, Jasmina Maier DO  •  [START ON 2/10/2018] insulin detemir (LEVEMIR) injection 13 Units, 13 Units, Subcutaneous, QAM, Lenora Mcgowan MD  •  insulin lispro (humaLOG) injection 0-7 Units, 0-7 Units, Subcutaneous, 4x Daily With Meals & Nightly, Jasmina Maier DO, 2 Units at 02/08/18 2050  •  insulin lispro (humaLOG) injection 3 Units, 3 Units, Subcutaneous, TID With Meals, Lenora Mcgowan MD  •  ipratropium-albuterol (DUO-NEB) nebulizer solution 3 mL, 3 mL, Nebulization, Q6H PRN, Hayden Rivers MD  •  magnesium sulfate 4 gram infusion - Mg less than or equal to 1mg/dL, 4 g, Intravenous, PRN **OR** magnesium sulfate 3 gram infusion (1gm x 3) - Mg 1.1 - 1.5 mg/dL, 1 g, Intravenous, PRN **OR** Magnesium Sulfate 2 gram infusion- Mg 1.6 - 1.9 mg/dL, 2 g, Intravenous, PRN, DANIEL Mustafa, Last Rate: 25 mL/hr at 02/03/18 0822, 2 g at 02/03/18 0822  •  metoprolol tartrate (LOPRESSOR) tablet 50 mg, 50 mg, Oral, Q12H, Alix Robertson MD, 50 mg at 02/09/18 1003  •  sodium chloride 0.9 % flush 1-10 mL, 1-10 mL, Intravenous, PRN, Alexandra Galdamez APRN  •  sodium chloride 0.9 % flush 10 mL, 10 mL, Intravenous, PRN, Blayne Moe MD    Meds reviewed and doses adjusted for renal function    Labs:    Results from last 7 days  Lab Units 02/06/18  0339 02/04/18  0453 02/03/18  0429   WBC 10*3/mm3 11.25* 13.67* 15.87*   HEMOGLOBIN g/dL 12.5 12.4 13.1   HEMATOCRIT % 40.6 39.3 41.6   PLATELETS 10*3/mm3 209 192 201       Results from last 7 days  Lab Units 02/09/18  0401 02/08/18  0421 02/07/18  0330 02/06/18  0339 02/04/18  0452 02/03/18  0429   SODIUM mmol/L 140 139 141 139 140 147*   POTASSIUM mmol/L 3.7 3.6 3.6 3.7 3.5 3.6   CHLORIDE mmol/L 108 108 111* 107 108 113*   CO2 mmol/L 24.0 23.0 23.0 26.0 28.0 27.0   BUN mg/dL 31* 34* 43* 51* 66* 82*   CREATININE mg/dL 1.40* 1.40*  1.70* 1.90* 2.00* 2.20*   GLUCOSE mg/dL 99 93 133* 163* 210* 122*   CALCIUM mg/dL 8.0* 8.1* 8.4* 8.2* 7.7* 7.8*   PHOSPHORUS mg/dL  --   --   --  1.6* 1.9* 2.4       Results from last 7 days  Lab Units 02/04/18  0452   ALK PHOS U/L 177*   BILIRUBIN mg/dL 0.4   ALT (SGPT) U/L 22   AST (SGOT) U/L 11     Invalid input(s): UCOL, UCLR, UPH, USG, UPRO, UBLD, UNITR, ELEU, URBC, UFC, UBACT    Estimated Creatinine Clearance: 37.3 mL/min (by C-G formula based on Cr of 1.4).    Radiology:  Imaging Results (last 72 hours)     Procedure Component Value Units Date/Time    XR Chest 1 View [[id number]] Collected:  01/31/18 1900     Updated:  01/31/18 1935    Narrative:       EXAM:    XR Chest, 1 View    CLINICAL HISTORY:    72 years old, female; Sepsis, unspecified organism; Elevated white blood cell   count, unspecified; Hyperkalemia; Unspecified atrial fibrillation; Acute kidney   failure, unspecified; Urinary tract infection, site not specified; Hematuria,   unspecified; Altered mental status, unspecified; Hyperglycemia, unspecified;   Device placement; Other: Central line placement    TECHNIQUE:    Frontal view of the chest.    COMPARISON:    CR - XR CHEST 1  2018-01-31 13:45    FINDINGS:    Lungs:  Unremarkable.  No consolidation.    Pleural space:  Unremarkable.  No pneumothorax.    Heart:  The heart demonstrates diffuse enlargement.    Mediastinum:  Unremarkable.    Bones/joints:  Unremarkable.    Tubes, lines and devices:  A left internal jugular central venous catheter is   present, with its tip overlying the region of the superior vena cava.      Impression:         A left internal jugular central venous catheter is present, with its tip   overlying the region of the superior vena cava.    THIS DOCUMENT HAS BEEN ELECTRONICALLY SIGNED BY URSULA ARGUELLO MD    XR Chest 1 View [[id number]] Collected:  01/31/18 1447     Updated:  01/31/18 2150    Narrative:       EXAMINATION: XR CHEST 1 -01/31/2018:      INDICATION:  Weak/Dizzy/AMS, triage protocol.      COMPARISON: 12/19/2017.     FINDINGS: The patient is again rotated to the left. The heart shadow  appears borderline enlarged. The vasculature appears upper limits of  normal. Mild chronic appearing interstitial lung changes and old  granulomatous calcifications are again noted and appear stable.           Impression:       Mild pulmonary venous hypertension unchanged. No new chest  disease is identified.     D:  01/31/2018  E:  01/31/2018     This report was finalized on 1/31/2018 9:48 PM by DR. Brian Cosme MD.       CT Head Without Contrast [[id number]] Collected:  01/31/18 1648     Updated:  01/31/18 2223    Narrative:       EXAMINATION: CT HEAD WO CONTRAST- 01/31/2018     INDICATION: Decreased alertness; N17.9-Acute kidney failure,  unspecified; E87.5-Hyperkalemia; A41.9-Sepsis, unspecified organism;  N39.0-Urinary tract infection, site not specified; R31.9-Hematuria,  unspecified; R73.9-Hyperglycemia, unspecified; D72.829-Elevated white  blood cell count, unspecified; R41.82-Altered mental status,  unspecified; I48.91-Unspecified atrial fibrillation         TECHNIQUE: 5 mm unenhanced images through the brain     The radiation dose reduction device was turned on for each scan per the  ALARA (As Low as Reasonably Achievable) protocol.     COMPARISON: 12/19/2017     FINDINGS: Previous exam report from 12/19/2017 indicates no acute  findings. History today indicates decreased level of alertness, altered  mental status.     The calvarium appears intact. Included paranasal sinuses mastoid air  cells and middle ear spaces appear clear. Soft tissue window images show  advanced generalized cerebral atrophy and chronic appearing central  white matter changes stable in pattern from previous study. There is no  evidence of mass, mass effect, hemorrhage, edema, hydrocephalus, or  abnormal extra-axial collection.       Impression:       Stable head CT scan with chronic appearing age  related  changes. No evidence of acute intracranial disease.        D:  01/31/2018  E:  01/31/2018     This report was finalized on 1/31/2018 10:21 PM by DR. Brian Cosme MD.       US Renal Limited [[id number]] Collected:  02/01/18 1601     Updated:  02/02/18 1236    Narrative:       EXAMINATION: US RENAL, LIMITED-02/01/2018:     INDICATION: ANGELA, renal insufficiency.     TECHNIQUE: Sonographic imaging was obtained of the kidneys in both the  sagittal and transverse planes. The examination is suboptimal due to  patient inability to hold respirations.     COMPARISON: NONE.     FINDINGS: The right kidney measures in length from pole to pole 13.5 cm.  There is no solid mass identified. There is a renal cortical cyst  identified measuring 3.2 cm. No hydronephrosis. No nephrolithiasis. No  solid mass.     The left kidney measures in length from pole to pole 11.4 cm. Several  cystic areas identified, the largest measuring 2.5 cm. There is no  hydronephrosis or nephrolithiasis. No solid mass. Imaging of the bladder  reveals a Bone catheter present.       Impression:       Bilateral renal cortical cysts otherwise, unremarkable  examination.     D:  02/01/2018  E:  02/01/2018            This report was finalized on 2/2/2018 12:33 PM by Dr. Stephanie Jarrett MD.             Renal Imaging:    reviewed    IMPRESSION:  Acute kidney injury-likely acute kidney injury due to volume depletion and ATN.  Creatinine is improving    Chronic kidney disease-her baseline creatinine is about 0.8 just within the last 6-8 weeks.  She possibly has baseline diabetic nephropathy  Metabolic Acidosis- improving   Hemoconcentration-hematocrit is quite high likely because awaiting depletion  Hypertension      PLAN:   Continue to encourage po intake- may need help   Need to make sure she is drinking atleast 0100-0191 cc fluids  She needs help to feed her-   Cr stable- improving slowly  Will follow as out patient         Zane Muniz  MD  2/9/2018  11:04 AM

## 2018-02-09 NOTE — DISCHARGE PLACEMENT REQUEST
Leila Smith (72 y.o. Female)   To [facility]  From [facility] at [facility]() [phone]        [address]  [address] [facility] [address]  Phone:  [phone]  Fax:          Patient:     Leila Smith MRN:  [id number]   [address] [facility] [address] :  1945  SSN:    Phone: [phone] Sex:  F      INSURANCE PAYOR PLAN GROUP # SUBSCRIBER ID   Primary: [employer] MEDICARE REPLACEMENT [id number]   Admitting Diagnosis: Acute renal failure, unspecified acute renal failure type [N17.9]  Order Date:  2018               Inpatient Consult to Case Management        (Order ID: [id number])     Diagnosis:         Priority:  Routine Expected Date:   Expiration Date:        Interval:   Count:    Comments: 1.  Fax this  page to [phone]  2.  Appointment to see me next Thursday 2/15 at 1500-this appointment has been made  3.  Daptomycin 500 mg IV daily at her subacute nursing facility with an anticipated duration of 5 weeks  4.  PICC line dressing change weekly with a Tegaderm CHG gel dressing or Biopatch  5.  CBC, CMP, ESR, CRP, CPK every Monday-faxed to [phone]  Reason for Consult? Orders for her subacute nursing facility     Specimen Type:   Specimen Source:   Specimen Taken Date:   Specimen Taken Time:                         Authorizing Provider:Rohan Saunders MD  Authorizing Provider's NPI: [id number]  Order Entered By: Rohan Saunders MD 2018  6:05 PM     Electronically signed by: Rohan Saunders MD 2018  6:05 PM             Date of Birth Social Security Number Address Home Phone MRN    1945  [facility] [id number] [phone] [id number]    Hindu Marital Status          Unknown        Admission Date Admission Type Admitting Provider Attending Provider Department, Room/Bed    18 Emergency Lenora Mcgowan MD Brown, Hannah, MD [address], S583/1    Discharge  "Date Discharge Disposition Discharge Destination                      Attending Provider: Lenora Mcgowan MD     Allergies:  Codeine, Tetracyclines & Related    Isolation:  None   Infection:  None   Code Status:  FULL    Ht:  165.1 cm (65\")   Wt:  82.2 kg (181 lb 3.2 oz)    Admission Cmt:  None   Principal Problem:  Sepsis due to urinary tract infection [A41.9,N39.0]                 Active Insurance as of 1/31/2018     Primary Coverage     Payor Plan Insurance Group Employer/Plan Group    ANTHEM MEDICARE REPLACEMENT ANTHEM MEDICARE ADVANTAGE [id number]     Payor Plan Address Payor Plan Phone Number Effective From Effective To    [address] [phone] 1/1/2017     [place] [address]       Subscriber Name Subscriber Birth Date Member ID       USHA SMITH 1945 [id number]                 Emergency Contacts      (Rel.) Home Phone Work Phone Mobile Phone    Danilo Hope (Daughter) [phone] -- --    Kleber Johnson (Son) [phone] -- --               History & Physical      Brian Samson MD at 1/31/2018  3:48 PM            Chief Complaint     Sepsis due to urinary tract infection    History of Present Illness     Ms. Smith is a 72 year old female who is mentally challenged at baseline and who presents from [facility] via EMS for altered mental status. The patient's daughter states that she was called by the rehab when the patient was more lethargic than her baseline which is minimally verbal, and occasionally confused though the patient was able to live at home by herself until recently. Patient has a history of COPD, CHF, A fib, DM2, and frequent UTIs. Patient's daughter states that she has declined quickly since colectomy with colostomy placement at an outlying facility in April 2017.     VS on presentation to ED: HR 84 (now 140s), /69, RR 22, O2 98% on 4 L per NC, T 97.5. Patient received 500 ml NS bolus, Rocephin, albuterol neb, ASA, 10 units insulin, calcium " gluconate, and D50,. Troponin 4.39 , , Cr 4.7, , K+ 6.6, lactic 3.6, WBC 17.18, UDS negative, PT/INR 12.6/1.15. Patient is arousable and will answer yes or no questions. Denies pain. Code status was discussed with the patient's daughter who states she doesn't know her mother's wishes and feels unable to make a decision at this time.     Problem List, Surgical History, Family, Social History, and ROS     Patient Active Problem List   Diagnosis   • Diabetes mellitus, type 2   • Hypertension   • Hyperlipidemia   • Hypothyroidism   • Chronic obstructive pulmonary disease   • CAD (coronary artery disease)   • History of edema   • History of obesity   • Venous insufficiency   • Open wound of lower extremity   • Urinary tract infection   • T2DM (type 2 diabetes mellitus)   • Dyspnea on exertion   • Peripheral vascular disease   • Obstructive sleep apnea syndrome   • Ulcer of lower extremity   • Hypoxemia   • Hematuria   • Diabetic peripheral neuropathy   • Edema   • Chronic obstructive pulmonary disease with acute exacerbation   • Congestive heart failure   • Arthritis   • Neutrophilic leukocytosis   • Cystitis   • Atrial fibrillation with rapid ventricular response   • Altered mental status   • Sepsis due to urinary tract infection   • Acute renal failure   • Hyperkalemia   • Leukocytosis   • Elevated troponin     Past Surgical History:   Procedure Laterality Date   • CATARACT EXTRACTION     • CHOLECYSTECTOMY     • FOOT SURGERY Right    • HERNIA REPAIR     • HYSTERECTOMY     • TOTAL KNEE ARTHROPLASTY Right        Allergies   Allergen Reactions   • Codeine    • Tetracyclines & Related      No current facility-administered medications on file prior to encounter.      Current Outpatient Prescriptions on File Prior to Encounter   Medication Sig   • acetaminophen (TYLENOL) 500 MG tablet Take 1,000 mg by mouth Every 6 (Six) Hours As Needed for Mild Pain .   • atorvastatin (LIPITOR) 10 MG tablet Take 10 mg by mouth  "Every Night.   • diltiaZEM (CARDIZEM) 60 MG tablet Take 1 tablet by mouth Every 6 (Six) Hours.   • famotidine (PEPCID) 20 MG tablet Take 20 mg by mouth Daily.   • haloperidol (HALDOL) 0.5 MG tablet Take 1 tablet by mouth Every 12 (Twelve) Hours.   • Melatonin 3 MG tablet Take 3 mg by mouth Every Night.   • metoprolol succinate XL (TOPROL-XL) 50 MG 24 hr tablet Take 3 tablets by mouth Daily.   • mometasone-formoterol (DULERA) 100-5 MCG/ACT inhaler Inhale 2 puffs 2 (Two) Times a Day.   • spironolactone (ALDACTONE) 50 MG tablet Take 1 tablet by mouth daily.   • traMADol (ULTRAM) 50 MG tablet Take 1 tablet by mouth Every 8 (Eight) Hours As Needed for Moderate Pain .   • [DISCONTINUED] docusate sodium (COLACE) 250 MG capsule Take 250 mg by mouth Daily.   • [DISCONTINUED] aspirin (ASPIRIN LOW DOSE) 81 MG tablet Take  by mouth daily.   • [DISCONTINUED] BD INSULIN SYRINGE ULTRAFINE 31G X 15/64\" 0.5 ML misc USE AS DIRECTED.   • [DISCONTINUED] budesonide-formoterol (SYMBICORT) 160-4.5 MCG/ACT inhaler Symbicort 160-4.5 MCG/ACT Inhalation Aerosol; Patient Sig: Symbicort 160-4.5 MCG/ACT Inhalation Aerosol INHALE 2 PUFFS TWICE A DAY. RINSE MOUTH AFTER USE; 3; 3; 24-Apr-2015; Active   • [DISCONTINUED] glucose blood test strip 3 (three) times a day.   • [DISCONTINUED] glucose blood test strip Use as instructed   • [DISCONTINUED] insulin detemir (LEVEMIR) 100 UNIT/ML injection Inject 10 Units under the skin Every Night.   • [DISCONTINUED] insulin detemir (LEVEMIR) 100 UNIT/ML injection Inject 10 Units under the skin Every Morning.   • [DISCONTINUED] insulin lispro (humaLOG) 100 UNIT/ML injection Inject 5 Units under the skin 3 (Three) Times a Day With Meals.   • [DISCONTINUED] insulin lispro (humaLOG) 100 UNIT/ML injection Inject 0-7 Units under the skin 4 (Four) Times a Day With Meals & at Bedtime.   • [DISCONTINUED] Insulin Pen Needle (EASY TOUCH PEN NEEDLES) 31G X 8 MM misc    • [DISCONTINUED] Insulin Syringe-Needle U-100 (B-D " "INS SYR ULTRAFINE 1CC/31G) 31G X 5/16\" 1 ML misc 80 Units Daily.   • [DISCONTINUED] Lactobacillus (FLORANEX PO) Take 1 tablet by mouth 3 (Three) Times a Day As Needed.   • [DISCONTINUED] silver sulfadiazine (SILVADENE, SSD) 1 % cream Apply  topically 2 (two) times a day.     MEDICATION LIST AND ALLERGIES REVIEWED.    Family History   Problem Relation Age of Onset   • Stomach cancer Mother    • Alcohol abuse Other    • Cancer Other    • Diabetes Other    • Hypertension Other    • Other Other      Social History   Substance Use Topics   • Smoking status: Former Smoker     Packs/day: 0.00     Years: 50.00     Types: Cigarettes   • Smokeless tobacco: Never Used   • Alcohol use No     Social History     Social History Narrative     FAMILY AND SOCIAL HISTORY REVIEWED.    Review of Systems   Unable to perform ROS: Mental status change     ALL OTHER SYSTEMS REVIEWED AND ARE NEGATIVE.    Physical Exam and Clinical Information   BP (!) 113/101  Pulse (!) 144  Temp 97.5 °F (36.4 °C) (Rectal)   Resp 28  Ht 165.1 cm (65\")  Wt 90.7 kg (200 lb)  SpO2 96%  BMI 33.28 kg/m2  Physical Exam  Objective:  General Appearance:  Ill-appearing and uncomfortable.    Vital signs: (most recent): Blood pressure (!) 113/101, pulse (!) 144, temperature 97.5 °F (36.4 °C), temperature source Rectal, resp. rate 28, height 165.1 cm (65\"), weight 90.7 kg (200 lb), SpO2 96 %.    HEENT: Normal HEENT exam.    Heart: Irregular rhythm.    Chest: Symmetric chest wall expansion.   Extremities: There is dependent edema.  (Chronic venous stasis changes)  Neurological: (Confused, drowsy, and agitated at times).                Results from last 7 days  Lab Units 01/31/18  1337   WBC 10*3/mm3 17.18*   HEMOGLOBIN g/dL 18.1*   PLATELETS 10*3/mm3 262       Results from last 7 days  Lab Units 01/31/18  1337   SODIUM mmol/L 141   POTASSIUM mmol/L 6.6*   CO2 mmol/L 13.0*   BUN mg/dL 152*   CREATININE mg/dL 4.70*   MAGNESIUM mg/dL 2.2   GLUCOSE mg/dL 284* "     Estimated Creatinine Clearance: 11.7 mL/min (by C-G formula based on Cr of 4.7).          Lab Results   Component Value Date    LACTATE 3.6 (C) 01/31/2018        IMAGES:       I reviewed the patient's results and images.     [facility] Problem List     * (Principal)Sepsis due to urinary tract infection    Atrial fibrillation    Altered mental status    Diabetes mellitus, type 2    Overview Signed 8/3/2016  2:13 PM by Ama Wolf     With foot ulcer             Chronic obstructive pulmonary disease        CAD (coronary artery disease)    Overview Signed 7/15/2016 10:27 AM by Yue Royal     History of  Coronary Artery Disease V12.59               Congestive heart failure        Hyperlipidemia        Hypothyroidism    Peripheral vascular disease    Obstructive sleep apnea syndrome        Plan/Recommendations       72-year-old female who presents with UTI and sepsis syndrome.  This is complicated by a rapid ventricular response in her apparently chronic atrial fibrillation, elevated troponin, acute kidney injury and hyperkalemia.  Will initially concentrate on aggressive fluid resuscitation and expect improvement in her laboratory studies with better renal perfusion.  If this does not occur will contact nephrology.  I will also have cardiology evaluate her tomorrow but suspect the elevated troponin is due to a type II non-STEMI due to physiologic stress.  Will initiate empiric antibiotics.    1. ICU admission  2. Fluid resuscitation  3. Serial renal labs  4. Empiric broad-spectrum antibiotics  5. Serial lactic acid  6. Nephrology consultation if renal clearance does not improve with fluids  7. Cardiology to see in a.m. regarding atrial fibrillation and elevated troponin         I have seen and examined patient, performing a face-to-face diagnostic evaluation with plan of care reviewed and developed with APRN and nursing staff. I have addended and modified the above history of present illness,  physical examination, and assessment and plan to reflect my findings and impressions.    Critical Care time spent in direct patient care: 40 minutes (excluding procedure time, if applicable) including high complexity decision making to assess, manipulate, and support vital organ system failure in this individual who has impairment of one or more vital organ systems such that there is a high probability of imminent or life threatening deterioration in the patient’s condition.      Brian Samson MD  Pulmonary and Critical Care Medicine         Electronically signed by Brian Samson MD at 1/31/2018  4:54 PM      Balwnider Marquez III, MD at 2/1/2018 10:44 AM          Cardiology Consult/H&P     Leila CONRAD Smith  1945  [phone]      02/01/18    DATE OF ADMISSION: 1/31/2018  [address] ICU    Ciaran Wakefield MD  [address]  / [place] [address]    Chief Complaint: Atrial fibrillation, elevated troponin    Patient Active Problem List   Diagnosis   • Diabetes mellitus, type 2   • Hypertension   • Hyperlipidemia   • Hypothyroidism   • Chronic obstructive pulmonary disease   • CAD (coronary artery disease)   • History of edema   • History of obesity   • Venous insufficiency   • Open wound of lower extremity   • Urinary tract infection   • T2DM (type 2 diabetes mellitus)   • Dyspnea on exertion   • Peripheral vascular disease   • Obstructive sleep apnea syndrome   • Ulcer of lower extremity   • Hypoxemia   • Hematuria   • Diabetic peripheral neuropathy   • Edema   • Chronic obstructive pulmonary disease with acute exacerbation   • Congestive heart failure   • Arthritis   • Neutrophilic leukocytosis   • Cystitis   • Atrial fibrillation with rapid ventricular response   • Altered mental status   • Sepsis due to urinary tract infection   • Acute renal failure   • Hyperkalemia   • Leukocytosis   • Elevated troponin         History of Present Illness:   Patient is a 72 year old AAF  with a past medical history notable for COPD, CHF, atrial fibrillation, diabetes mellitus type II, and frequent UTIs who presented to [facility] ED from her rehab facility with worsening altered mental status.  At baseline, she is mentally challenged and is minimally verbal.  Upon arrival to the ED, she was noted to have a urinary tract infection with an elevated WBC of 17K, elevated potassium of 6.6, Cr 4.7,  and troponin of 4.39.  She was noted to be in atrial fibrillation with RVR with wide QRS in the 140's.  Repeat troponin 5.428.  Previous admission with similar presentation.  Upon admission, she was placed on amiodarone gtt and have attempted to rate control with IV metoprolol and PO cardizem.  She remains tachycardic in the 120's-140's.  There is no family available at time of my exam and patient is not able to answer most questions appropriately.      Allergies   Allergen Reactions   • Codeine    • Tetracyclines & Related        Prior to Admission Medications     Prescriptions Last Dose Informant Patient Reported? Taking?    acetaminophen (TYLENOL) 500 MG tablet  Nursing Home Yes Yes    Take 1,000 mg by mouth Every 6 (Six) Hours As Needed for Mild Pain .    acidophilus (FLORANEX) tablet tablet  Nursing Home Yes Yes    Take 1 tablet by mouth 3 (Three) Times a Day.    ammonium lactate (AMLACTIN) 12 % cream  Nursing Home Yes Yes    Apply 1 application topically 2 (Two) Times a Day.    Artificial Tear Solution (TEARS NATURALE OP)  Nursing Home Yes Yes    Apply 1 application to eye 2 (Two) Times a Day.    aspirin 81 MG chewable tablet  Nursing Home Yes Yes    Chew 81 mg Daily.    atorvastatin (LIPITOR) 10 MG tablet  Nursing Home Yes Yes    Take 10 mg by mouth Every Night.    diltiaZEM (CARDIZEM) 60 MG tablet  Nursing Home No Yes    Take 1 tablet by mouth Every 6 (Six) Hours.    famotidine (PEPCID) 20 MG tablet  Nursing Home Yes Yes    Take 20 mg by mouth Daily.    haloperidol (HALDOL) 0.5 MG  tablet  Nursing Home No Yes    Take 1 tablet by mouth Every 12 (Twelve) Hours.    Melatonin 3 MG tablet  Nursing Home Yes Yes    Take 3 mg by mouth Every Night.    metoprolol succinate XL (TOPROL-XL) 50 MG 24 hr tablet  Nursing Home No Yes    Take 3 tablets by mouth Daily.    mometasone-formoterol (DULERA) 100-5 MCG/ACT inhaler  Nursing Home Yes Yes    Inhale 2 puffs 2 (Two) Times a Day.    spironolactone (ALDACTONE) 50 MG tablet  Nursing Home No Yes    Take 1 tablet by mouth daily.    traMADol (ULTRAM) 50 MG tablet  Nursing Home No Yes    Take 1 tablet by mouth Every 8 (Eight) Hours As Needed for Moderate Pain .            Current Facility-Administered Medications:   •  acetaminophen (TYLENOL) tablet 650 mg, 650 mg, Oral, Q4H PRN **OR** acetaminophen (TYLENOL) suppository 650 mg, 650 mg, Rectal, Q4H PRN, DANIEL Ludwig  •  aspirin chewable tablet 324 mg, 324 mg, Oral, Daily, DANIEL Ludwig  •  atorvastatin (LIPITOR) tablet 10 mg, 10 mg, Oral, Nightly, DANIEL Mustafa  •  cefTRIAXone (ROCEPHIN) IVPB 1 g, 1 g, Intravenous, Q24H, DANIEL Ludwig, Last Rate: 100 mL/hr at 01/31/18 2239, 1 g at 01/31/18 2239  •  dextrose (D50W) solution 25 g, 25 g, Intravenous, Q15 Min PRN, DANIEL Mustafa  •  dextrose (GLUTOSE) oral gel 15 g, 15 g, Oral, Q15 Min PRN, DANIEL Mustafa  •  diltiaZEM (CARDIZEM) 125mg/125 mL infusion, 5-15 mg/hr, Intravenous, Titrated, Brandie Masters, DANIEL  •  diltiaZEM (CARDIZEM) tablet 60 mg, 60 mg, Oral, Q6H, DANIEL Ludwig, 60 mg at 02/01/18 1108  •  famotidine (PEPCID) injection 20 mg, 20 mg, Intravenous, Q12H, DANIEL Mustafa, 20 mg at 02/01/18 0900  •  heparin (porcine) 5000 UNIT/ML injection 5,000 Units, 5,000 Units, Subcutaneous, Q8H, DANIEL Ludwig, 5,000 Units at 02/01/18 0703  •  insulin regular (HumuLIN R,NovoLIN R) 100 Units in sodium chloride 0.9 % 100 mL (1 Units/mL) infusion, 0-50 Units/hr, Intravenous, Titrated,  Gadiel Alvarado, [facility], Last Rate: 1.6 mL/hr at 02/01/18 1108, 1.6 Units/hr at 02/01/18 1108  •  ipratropium-albuterol (DUO-NEB) nebulizer solution 3 mL, 3 mL, Nebulization, Q6H - RT, DANIEL Ludwig, 3 mL at 02/01/18 0746  •  lactated ringers bolus 1,000 mL, 1,000 mL, Intravenous, Once, Jasmina V. Darrion, DO, Last Rate: 1,000 mL/hr at 02/01/18 1056, 1,000 mL at 02/01/18 1056  •  magnesium sulfate 4 gram infusion - Mg less than or equal to 1mg/dL, 4 g, Intravenous, PRN **OR** magnesium sulfate 3 gram infusion (1gm x 3) - Mg 1.1 - 1.5 mg/dL, 1 g, Intravenous, PRN **OR** Magnesium Sulfate 2 gram infusion- Mg 1.6 - 1.9 mg/dL, 2 g, Intravenous, PRN, DANIEL Mustafa, Last Rate: 25 mL/hr at 02/01/18 0637, 2 g at 02/01/18 0637  •  metoprolol tartrate (LOPRESSOR) injection 5 mg, 5 mg, Intravenous, Q6H, DANIEL Mustafa, 5 mg at 02/01/18 1100  •  phenylephrine (TRESA-SYNEPHRINE) 50 mg/250 mL (0.2 mg/mL) in 0.9% NS  infusion, 0.5-3 mcg/kg/min, Intravenous, Titrated, DANIEL Mustafa  •  sodium bicarbonate 8.4 % 150 mEq in dextrose (D5W) 5 % 1,000 mL infusion (greater than 75 mEq), 75 mL/hr, Intravenous, Continuous, DANIEL Mustafa, Last Rate: 75 mL/hr at 02/01/18 1008, 75 mL/hr at 02/01/18 1008  •  sodium chloride 0.9 % flush 1-10 mL, 1-10 mL, Intravenous, PRN, DANIEL Ludwig  •  sodium chloride 0.9 % flush 10 mL, 10 mL, Intravenous, PRN, Blayne Moe MD  •  sodium chloride 0.9 % infusion, 25 mL/hr, Intravenous, Continuous, DANIEL Mustafa, Last Rate: 25 mL/hr at 02/01/18 0900, 25 mL/hr at 02/01/18 0900    Social History     Social History   • Marital status:      Spouse name: N/A   • Number of children: N/A   • Years of education: N/A     Social History Main Topics   • Smoking status: Former Smoker     Packs/day: 0.00     Years: 50.00     Types: Cigarettes   • Smokeless tobacco: Never Used   • Alcohol use No   • Drug use: No   • Sexual activity: Defer     Other Topics  Concern   • None     Social History Narrative       Family History   Problem Relation Age of Onset   • Stomach cancer Mother    • Alcohol abuse Other    • Cancer Other    • Diabetes Other    • Hypertension Other    • Other Other        REVIEW OF SYSTEMS:   Denies pain, otherwise, unable to complete full review of systems due to patient's mental status.    Vitals:    02/01/18 0800 02/01/18 0900 02/01/18 1000 02/01/18 1100   BP: 90/52 (!) 83/71 (!) 86/76 95/83   BP Location: Left arm  Left arm    Patient Position: Lying  Lying    Pulse: (!) 131 117 (!) 129 106   Resp: 18  18    Temp: 97.5 °F (36.4 °C)      TempSrc: Core      SpO2: 99% 97% 98% 97%   Weight:       Height:             Vital Sign Min/Max for last 24 hours  Temp  Min: 96.6 °F (35.9 °C)  Max: 97.7 °F (36.5 °C)   BP  Min: 81/56  Max: 136/89   Pulse  Min: 106  Max: 170   Resp  Min: 16  Max: 28   SpO2  Min: 63 %  Max: 100 %   Flow (L/min)  Min: 2  Max: 6      Intake/Output Summary (Last 24 hours) at 02/01/18 1126  Last data filed at 02/01/18 1056   Gross per 24 hour   Intake           3516.5 ml   Output              925 ml   Net           2591.5 ml             Physical Exam:  GEN: Obese and poorly groomed, no acute distress  HEENT: Normocephalic, Atraumatic, PERRLA, moist mucous membranes  NECK: supple, NO JVD, no thyromegaly, no lymphadenopathy  CARD: Irregular rhythm and rate, S1S2, no murmur, gallop, rub  LUNGS: Clear to auscultataion, normal respiratory effort  ABDOMEN: Soft, nontender, normal bowel sounds  EXTREMITIES: Chronic venous stasis changes noted, 1+ bilat LE edema  SKIN: Warm, dry  NEURO: Confused at baseline  PSYCHIATRIC: Agitated      Data:     Results from last 7 days  Lab Units 02/01/18  0344 01/31/18  1337   WBC 10*3/mm3 17.29* 17.18*   HEMOGLOBIN g/dL 14.1 18.1*   HEMATOCRIT % 43.7 55.5*   PLATELETS 10*3/mm3 210 262       Results from last 7 days  Lab Units 02/01/18  0344 01/31/18  2353 01/31/18  1939   SODIUM mmol/L 142 141 142   POTASSIUM  mmol/L 4.3 5.1 4.8   CHLORIDE mmol/L 117* 117* 117*   CO2 mmol/L 17.0* 12.0* 14.0*   BUN mg/dL 130* 120* 135*   CREATININE mg/dL 3.60* 3.60* 3.90*   GLUCOSE mg/dL 237* 327* 300*        Results from last 7 days  Lab Units 01/31/18 1939   HEMOGLOBIN A1C % 10.20*       Results from last 7 days  Lab Units 01/31/18 1939 01/31/18  1337   TSH mIU/mL 0.917 2.380   FREE T4 ng/dL  --  1.53           Results from last 7 days  Lab Units 01/31/18  1337   PROTIME Seconds 12.6*   INR  1.15       Results from last 7 days  Lab Units 01/31/18 1939   TROPONIN I ng/mL 5.428*               Intake/Output Summary (Last 24 hours) at 02/01/18 1126  Last data filed at 02/01/18 1056   Gross per 24 hour   Intake           3516.5 ml   Output              925 ml   Net           2591.5 ml       Chest X-Ray:  Imaging Results (last 24 hours)     Procedure Component Value Units Date/Time    XR Chest 1 View [[id number]] Collected:  01/31/18 1900     Updated:  01/31/18 1935    Narrative:       EXAM:    XR Chest, 1 View    CLINICAL HISTORY:    72 years old, female; Sepsis, unspecified organism; Elevated white blood cell   count, unspecified; Hyperkalemia; Unspecified atrial fibrillation; Acute kidney   failure, unspecified; Urinary tract infection, site not specified; Hematuria,   unspecified; Altered mental status, unspecified; Hyperglycemia, unspecified;   Device placement; Other: Central line placement    TECHNIQUE:    Frontal view of the chest.    COMPARISON:    CR - XR CHEST 1 VW 2018-01-31 13:45    FINDINGS:    Lungs:  Unremarkable.  No consolidation.    Pleural space:  Unremarkable.  No pneumothorax.    Heart:  The heart demonstrates diffuse enlargement.    Mediastinum:  Unremarkable.    Bones/joints:  Unremarkable.    Tubes, lines and devices:  A left internal jugular central venous catheter is   present, with its tip overlying the region of the superior vena cava.      Impression:         A left internal jugular central venous catheter is  present, with its tip   overlying the region of the superior vena cava.    THIS DOCUMENT HAS BEEN ELECTRONICALLY SIGNED BY URSULA ARGUELLO MD    XR Chest 1 View [[id number]] Collected:  01/31/18 1447     Updated:  01/31/18 2150    Narrative:       EXAMINATION: XR CHEST 1 VW-01/31/2018:      INDICATION: Weak/Dizzy/AMS, triage protocol.      COMPARISON: 12/19/2017.     FINDINGS: The patient is again rotated to the left. The heart shadow  appears borderline enlarged. The vasculature appears upper limits of  normal. Mild chronic appearing interstitial lung changes and old  granulomatous calcifications are again noted and appear stable.           Impression:       Mild pulmonary venous hypertension unchanged. No new chest  disease is identified.     D:  01/31/2018  E:  01/31/2018     This report was finalized on 1/31/2018 9:48 PM by DR. Brian Cosme MD.       CT Head Without Contrast [[id number]] Collected:  01/31/18 1648     Updated:  01/31/18 2223    Narrative:       EXAMINATION: CT HEAD WO CONTRAST- 01/31/2018     INDICATION: Decreased alertness; N17.9-Acute kidney failure,  unspecified; E87.5-Hyperkalemia; A41.9-Sepsis, unspecified organism;  N39.0-Urinary tract infection, site not specified; R31.9-Hematuria,  unspecified; R73.9-Hyperglycemia, unspecified; D72.829-Elevated white  blood cell count, unspecified; R41.82-Altered mental status,  unspecified; I48.91-Unspecified atrial fibrillation         TECHNIQUE: 5 mm unenhanced images through the brain     The radiation dose reduction device was turned on for each scan per the  ALARA (As Low as Reasonably Achievable) protocol.     COMPARISON: 12/19/2017     FINDINGS: Previous exam report from 12/19/2017 indicates no acute  findings. History today indicates decreased level of alertness, altered  mental status.     The calvarium appears intact. Included paranasal sinuses mastoid air  cells and middle ear spaces appear clear. Soft tissue window images show  advanced  generalized cerebral atrophy and chronic appearing central  white matter changes stable in pattern from previous study. There is no  evidence of mass, mass effect, hemorrhage, edema, hydrocephalus, or  abnormal extra-axial collection.       Impression:       Stable head CT scan with chronic appearing age related  changes. No evidence of acute intracranial disease.        D:  01/31/2018  E:  01/31/2018     This report was finalized on 1/31/2018 10:21 PM by DR. Brian Cosme MD.             Telemetry: Atrial fibrillation, -170  EKG 1/31/18: Atrial fibrillation with RVR,     Assessment and Plan:   1. Atrial fibrillation with RVR:  - Recurrent atrial fibrillation with RVR in setting of urosepsis  - Recommend discontinuing amiodarone gtt as atrial fibrillation likely chronic in nature, will focus on rate control at this point  - Will initiate cardizem gtt for improved rate control, titrate to HR <120, may need neosynephrine gtt for pressure support  - Continue metoprolol IVP PRN, no digoxin at this time given ANGELA    2. Elevated troponin:  - Elevated troponin likely in setting of atrial fibrillation with RVR and sepsis, no further ischemic workup needed at this time    3. UTI/Sepsis:  - Support per intensivists    No family available at time of exam to discuss goals of care and treatment options.      Scribed for Balwinder Marquez III, MD by Brandie Masters, APRN. 2/1/2018  11:26 AM     I,Balwinder Marquez M.D., personally performed the services described in this documentation as scribed by the above named individual in my presence, and it is both accurate and complete.  2/1/2018  11:29 AM                 Electronically signed by Balwinder Marquez III, MD at 2/1/2018 11:29 AM

## 2018-02-09 NOTE — PLAN OF CARE
Problem: Patient Care Overview (Adult)  Goal: Plan of Care Review  Outcome: Ongoing (interventions implemented as appropriate)   02/08/18 0339 02/09/18 0000 02/09/18 0437   Coping/Psychosocial Response Interventions   Plan Of Care Reviewed With --  patient --    Patient Care Overview   Progress progress toward functional goals as expected --  --    Outcome Evaluation   Outcome Summary/Follow up Plan --  --  PT turned every 2 hours. Boots placed on PT to keep heels off bed, PT tolerating well. Giving Pt her medications crushed with Ice cream so she will take them.      Goal: Discharge Needs Assessment  Outcome: Ongoing (interventions implemented as appropriate)   01/31/18 1646 02/03/18 0641 02/04/18 0432   Discharge Needs Assessment   Concerns To Be Addressed --  --  denies needs/concerns at this time   Readmission Within The Last 30 Days no previous admission in last 30 days --  --    Discharge Disposition --  still a patient --    Living Environment   Transportation Available ambulance --  --    Self-Care   Equipment Currently Used at Home walker, rolling;wheelchair;colostomy/ostomy supplies --  --        Problem: Renal Failure/Kidney Injury, Acute (Adult)  Goal: Signs and Symptoms of Listed Potential Problems Will be Absent or Manageable (Renal Failure/Kidney Injury, Acute)  Outcome: Ongoing (interventions implemented as appropriate)   02/09/18 0437   Renal Failure/Kidney Injury, Acute   Problems Assessed (Acute Renal Failure/Kidney Injury) all   Problems Present (Acute Renal Failure/Kidney Injury) electrolyte imbalance;fluid imbalance;infection       Problem: Cardiac Output, Decreased (Adult)  Goal: Identify Related Risk Factors and Signs and Symptoms  Outcome: Ongoing (interventions implemented as appropriate)   02/03/18 0641 02/09/18 0437   Cardiac Output, Decreased   Cardiac Output, Decreased: Related Risk Factors --  heart rhythm altered   Signs and Symptoms (Cardiac Output Decreased) heart rate/rhythm  alteration --      Goal: Adequate Cardiac Output/Effective Tissue Perfusion  Outcome: Ongoing (interventions implemented as appropriate)   02/09/18 0437   Cardiac Output, Decreased (Adult)   Adequate Cardiac Output/Effective Tissue Perfusion making progress toward outcome       Problem: Sepsis (Adult)  Goal: Signs and Symptoms of Listed Potential Problems Will be Absent or Manageable (Sepsis)  Outcome: Ongoing (interventions implemented as appropriate)   02/09/18 0437   Sepsis   Problems Assessed (Sepsis) all   Problems Present (Sepsis) glycemic control, impaired       Problem: Skin Integrity Impairment, Risk/Actual (Adult)  Goal: Identify Related Risk Factors and Signs and Symptoms  Outcome: Ongoing (interventions implemented as appropriate)   02/09/18 0437   Skin Integrity Impairment, Risk/Actual   Skin Integrity Impairment, Risk/Actual: Related Risk Factors age extremes;cognitive impairment;fluid/nutrition status;immobility;infection/disease process;edema;moisture;tissue perfusion impaired   Signs and Symptoms (Skin Integrity Impairment) plaques/scales     Goal: Skin Integrity/Wound Healing  Outcome: Ongoing (interventions implemented as appropriate)   02/09/18 0437   Skin Integrity Impairment, Risk/Actual (Adult)   Skin Integrity/Wound Healing making progress toward outcome       Problem: Fall Risk (Adult)  Goal: Identify Related Risk Factors and Signs and Symptoms  Outcome: Ongoing (interventions implemented as appropriate)   02/09/18 0437   Fall Risk   Fall Risk: Related Risk Factors age-related changes;confusion/agitation;fatigue/slow reaction;gait/mobility problems;environment unfamiliar   Fall Risk: Signs and Symptoms presence of risk factors     Goal: Absence of Falls  Outcome: Ongoing (interventions implemented as appropriate)   02/09/18 0437   Fall Risk (Adult)   Absence of Falls making progress toward outcome

## 2018-02-09 NOTE — PROGRESS NOTES
[facility] Progress Note    Admission Date: 2018    Leila Smith  1945  [id number]    Date: 2018    Meds:    Anti-Infectives     Ordered     Dose/Rate Route Frequency Start Stop    18 1757  DAPTOmycin (CUBICIN) 500 mg in sterile water (preservative free) 10 mL IV syringe     Ordering Provider:  Rohan Saunders MD    6 mg/kg × 81.4 kg  over 2 Minutes Intravenous Every 24 Hours 18 0900 18 0859    18 0757  fluconazole (DIFLUCAN) tablet 200 mg     Rohan Saunders MD let the order  on 18 0658.   Ordering Provider:  Rohan Saunders MD    200 mg Oral Every 24 Hours 18 0900 18 0657    18 0823  vancomycin 1250 mg/250 mL 0.9% NS IVPB ([facility])     Ordering Provider:  Evert Cid [facility]    1,250 mg Intravenous Once 18 1000 18 1007    18 1413  vancomycin 1750 mg/500 mL 0.9% NS IVPB ([facility])     Ordering Provider:  Jasmina Maier, DO    20 mg/kg × 90.7 kg  over 120 Minutes Intravenous Once 18 1445 18 1732    18 1413  piperacillin-tazobactam (ZOSYN) 4.5 g in iso-osmotic dextrose 100 mL IVPB (premix)     Ordering Provider:  Jasmina RACHEL Case, DO    4.5 g  200 mL/hr over 0.5 Hours Intravenous Once 18 1445 18 1603    18 1418  cefTRIAXone (ROCEPHIN) IVPB 1 g     Ordering Provider:  Blayne Moe MD    1 g  100 mL/hr over 30 Minutes Intravenous Once 18 1430 18 1522          CC:  UTI     SUBJECTIVE:  18:  Patient is a 72 y.o. female who is seen today for evaluation of severe sepsis with blood cultures positive for staph epidermidis a urine culture positive for Candida.  She was brought from her nursing home for increasing lethargy.  She has a baseline of dementia/cognitive dysfunction but was noted to be substantially less responsive prior to admission.  Admitting labs revealed lactic acidosis, a leukocytosis of 17, 000, acute renal failure with Scr of 4.70, and 2/2 sets of positive blood cultures  "for Staph epidermidis, with repeat culture negative. She has had two positive urine cultures for yeast, initially Candida albicans and  most recently Candida parapsilosis. Vancomycin and Zosyn initiated and we were consulted for antibiotic management.  She is minimally conversant, and history obtained from the chart.  Her trans- thoracic echocardiogram revealed a possible mitral valve vegetation.  She is scheduled for a transesophageal echocardiogram this am.   18:  Daughter refusing YANNA.  The patient cannot provide a reliable review of systems due to her profound dementia.  The laboratory identified one of HER-2 sets of positive blood cultures as staph epidermidis but the second set was identified only as coagulase-negative staph and not speciate it yet.  She has remained afebrile.  18: The nursing staff indicates that she developed bradycardia overnight and her Cardizem was held.  She is unable to provide any reliable ROS.  She has remained afebrile.  So far her family has not consented to a transesophageal echocardiogram.  18:  She is conversant this am.  \"ready to go home\".  Remains afebrile.  Daughter still refusing YANNA.  18: Daughter is refusing PICC line and Daptomycin.  She is waiting for family meeting to decide about treatment.     ROS:  No f/c/s. No n/v/d. No rash. No new ADR to Abx.     PE:   Vital Signs  Temp (24hrs), Av °F (36.7 °C), Min:97.8 °F (36.6 °C), Max:98.1 °F (36.7 °C)    Temp  Min: 97.8 °F (36.6 °C)  Max: 98.1 °F (36.7 °C)  BP  Min: 136/69  Max: 147/81  Pulse  Min: 73  Max: 99  Resp  Min: 18  Max: 18  SpO2  Min: 96 %  Max: 96 %    GENERAL: Awake and alert, in no acute distress.   HEENT:  No conjunctival injection. No icterus. Oropharynx clear without evidence of thrush or exudate.  NECK: Supple, no JVD     HEART: 1-2/6 systolic murmur  LUNGS: Clear to auscultation bilaterally without wheezing, rales, rhonchi. Normal respiratory effort. Nonlabored. No dullness.  ABDOMEN: " Soft, nontender, nondistended. Positive bowel sounds. No rebound or guarding. NO mass or HSM.  EXT:  No cyanosis, clubbing or edema. No cord.   Left IJ site ok   SKIN: Warm and dry without cutaneous eruptions on Inspection/palpation.    NEURO: She is alert but severely confused.      Laboratory Data      Results from last 7 days  Lab Units 02/06/18  0339 02/04/18  0453 02/03/18  0429   WBC 10*3/mm3 11.25* 13.67* 15.87*   HEMOGLOBIN g/dL 12.5 12.4 13.1   HEMATOCRIT % 40.6 39.3 41.6   PLATELETS 10*3/mm3 209 192 201       Results from last 7 days  Lab Units 02/09/18  0401   SODIUM mmol/L 140   POTASSIUM mmol/L 3.7   CHLORIDE mmol/L 108   CO2 mmol/L 24.0   BUN mg/dL 31*   CREATININE mg/dL 1.40*   GLUCOSE mg/dL 99   CALCIUM mg/dL 8.0*       Results from last 7 days  Lab Units 02/04/18  0452   ALK PHOS U/L 177*   BILIRUBIN mg/dL 0.4   ALT (SGPT) U/L 22   AST (SGOT) U/L 11       Results from last 7 days  Lab Units 02/08/18  0947   SED RATE mm/hr 74*       Results from last 7 days  Lab Units 02/08/18  0947   CRP mg/dL 2.20*           Results from last 7 days  Lab Units 02/09/18  0401   CK TOTAL U/L 34       Results from last 7 days  Lab Units 02/07/18  0330 02/06/18  0339 02/04/18  0452 02/03/18  0433 02/02/18  1408   VANCOMYCIN RM mcg/mL 14.50 21.10 13.30 21.40 27.00     Estimated Creatinine Clearance: 37.3 mL/min (by C-G formula based on Cr of 1.4).    Microbiology:  Blood Culture   Date Value Ref Range Status   02/05/2018 No growth at less than 24 hours  Preliminary     BCID, PCR   Date Value Ref Range Status   01/31/2018 No organism detected by BCID PCR. No organism detected by BCID PCR. Final      2/2: BC no growth  2/5:  BC no growth   2/6: BC pending       1/31:  BC  1/31  (2/2)  Staph epidermidis,      Urine Culture   Date Value Ref Range Status   01/31/2018 >100,000 CFU/mL Candida parapsilosis (A)  Final          Radiology:  Imaging Results (last 72 hours)     ** No results found for the last 72 hours. **               IMPRESSION:  1.  Staph epidermidis bacteremia-both of the positive blood cultures have been identified as staph epidermidis with the same antibiotic gram.  This raises the issue of possible endocarditis since she has a mitral valve lesion.  I did have Dr. Pérez review her transthoracic echocardiograms from 12/22 and 1/31.  She has a small mitral valve lesion which appears to be slightly larger on the 1/31 study.  Follow-up blood cultures have been negative.  Her 2 sets of positive  blood cultures were drawn at separate times.  Her ESR is elevated at over 70.  Family has declined transesophageal echocardiogram.  At this point, think that it would be prudent to complete treatment for endocarditis.  I'm reluctant to leave her on vancomycin since her renal function is changing and the vancomycin will be difficult to dose with potential for significant side effects.  I will switch her to intravenous daptomycin.  I will obtain a baseline CPK and we will need to hold atorvastatin while she is on daptomycin due to the risk of myositis.  Her renal function has improved and I think that we could place a PICC line rather than switching her deep line to a tunnel catheter, particularly since her family wishes to avoid sedation.  2.  Severe sepsis-improved  3.  Acute renal failure/ATN-improved  4.  Mitral valve lesion-as above  5.  Leukocytosis/neutrophilia-improved  6.  Severe dementia  7.  Candida UTI  8.  Type 2 diabetes mellitus    RECOMMENDATIONS;  1.  Discontinue Vancomycin  2.  Continue Fluconazole 1-2 more days  3.  PICC line placement- daughter refusing   4.  Remove the left IJ central line after the PICC line is placed  5.  Daptomycin 500 mg IV daily for 5 weeks, to complete at least 6 weeks of intravenous antibiotic therapy      Family meeting this afternoon-       Orders for her subacute nursing facility:  1.  Fax this  page to [phone]  2.  Appointment to see me next Thursday 2/15 at 1500-this appointment  has been made  3.  Daptomycin 500 mg IV daily at her subacute nursing facility with an anticipated duration of 5 weeks  4.  PICC line dressing change weekly with a Tegaderm CHG gel dressing or Biopatch  5.  CBC, CMP, ESR, CRP, CPK every Monday-faxed to [phone]    Doug Padgett, DANIEL  2/9/2018  10:11 AM

## 2018-02-09 NOTE — NURSING NOTE
Called daughter to get consent for PICC placement and she said no to the antibiotics and to PICC at this time. MD notified

## 2018-02-10 NOTE — PROGRESS NOTES
[facility]  PROGRESS NOTE    Patient Name: Leila Smith  : 1945  MRN: [id number]    Date of Admission: 2018  Length of Stay: 9  Primary Care Physician: Ciaran Wakefield MD    Subjective   Subjective     CC: F/U sepsis    HPI:  Feeling better today.  In much better spirits.    Review of Systems  Gen- No fevers, chills  CV- No chest pain, palpitations  Resp- No cough, dyspnea  GI- No N/V/D, abd pain    Unable to obtain due to mental status    Objective   Objective     Vital Signs:   Temp:  [97.8 °F (36.6 °C)-98.1 °F (36.7 °C)] 97.8 °F (36.6 °C)  Heart Rate:  [73-99] 74  Resp:  [18] 18  BP: (136-147)/(68-99) 145/99        Physical Exam:  Constitutional: No acute distress, awake, alert, sitting up in bed  HENT: NCAT, mucous membranes moist  Respiratory: Respiratory effort normal, lungs CTAB  Cardiovascular: Irregularly irregular, no murmurs  Gastrointestinal: Soft, nontender, nondistended, green stool in ostomy bag, normal bowel sounds  Musculoskeletal: No bilateral ankle edema  Psychiatric: Pleasant and cooperative  Neurologic: Cranial Nerves grossly intact to confrontation  Skin: No rashes    Results Reviewed:  I have personally reviewed current lab, radiology, and data and agree.      Results from last 7 days  Lab Units 18  03318  0453 18  0429   WBC 10*3/mm3 11.25* 13.67* 15.87*   HEMOGLOBIN g/dL 12.5 12.4 13.1   HEMATOCRIT % 40.6 39.3 41.6   PLATELETS 10*3/mm3 209 192 201       Results from last 7 days  Lab Units 18  0401 18  0421 18  0330  18  0452   SODIUM mmol/L 140 139 141  < > 140   POTASSIUM mmol/L 3.7 3.6 3.6  < > 3.5   CHLORIDE mmol/L 108 108 111*  < > 108   CO2 mmol/L 24.0 23.0 23.0  < > 28.0   BUN mg/dL 31* 34* 43*  < > 66*   CREATININE mg/dL 1.40* 1.40* 1.70*  < > 2.00*   GLUCOSE mg/dL 99 93 133*  < > 210*   CALCIUM mg/dL 8.0* 8.1* 8.4*  < > 7.7*   ALT (SGPT) U/L  --   --   --   --  22   AST (SGOT)  U/L  --   --   --   --  11   < > = values in this interval not displayed.  Estimated Creatinine Clearance: 37.3 mL/min (by C-G formula based on Cr of 1.4).  No results found for: BNP  No results found for: PHART    Microbiology Results Abnormal     Procedure Component Value - Date/Time    Blood Culture - Blood, [[id number]]  (Normal) Collected:  02/05/18 1859    Lab Status:  Preliminary result Specimen:  Blood from Arm, Right Updated:  02/09/18 1916     Blood Culture No growth at 4 days    Narrative:       Aerobic bottle only    Blood Culture - Blood, [[id number]]  (Normal) Collected:  02/06/18 0341    Lab Status:  Preliminary result Specimen:  Blood from Hand, Right Updated:  02/09/18 0446     Blood Culture No growth at 3 days    Blood Culture - Blood, [[id number]]  (Abnormal)  (Susceptibility) Collected:  01/31/18 1335    Lab Status:  Edited Result - FINAL Specimen:  Blood from Arm, Left Updated:  02/08/18 0918     Blood Culture --      Staphylococcus epidermidis (A)      This isolate does not demonstrate inducible clindamycin resistance in vitro.          Isolated from Aerobic and Anaerobic Bottles     Gram Stain Result Anaerobic Bottle Gram positive cocci in clusters      Aerobic Bottle Gram positive cocci in clusters    Susceptibility      Staphylococcus epidermidis     DIMITRIS     Ciprofloxacin >2 ug/ml Resistant     Clindamycin <=0.5 ug/ml Susceptible     Daptomycin <=0.5 ug/ml Susceptible     Erythromycin >4 ug/ml Resistant     Gentamicin <=4 ug/ml Susceptible     Levofloxacin >4 ug/ml Resistant     Linezolid <=1 ug/ml Susceptible     Oxacillin >2 ug/ml Resistant     Penicillin G >8 ug/ml Resistant     Quinupristin + Dalfopristin <=0.5 ug/ml Susceptible     Rifampin <=1 ug/ml Susceptible     Tetracycline <=4 ug/ml Susceptible     Trimethoprim + Sulfamethoxazole <=0.5/9.5 ug/ml Susceptible     Vancomycin 2 ug/ml Susceptible                    Blood Culture - Blood, [[id number]]  (Abnormal)  (Susceptibility)  Collected:  01/31/18 1323    Lab Status:  Edited Result - FINAL Specimen:  Blood from Arm, Right Updated:  02/08/18 0830     Blood Culture --      Staphylococcus epidermidis (A)      This isolate does not demonstrate inducible clindamycin resistance in vitro.          Isolated from Aerobic and Anaerobic Bottles     Gram Stain Result Anaerobic Bottle Gram positive cocci in pairs and clusters      Aerobic Bottle Gram positive cocci in clusters    Susceptibility      Staphylococcus epidermidis     DIMITRIS     Ciprofloxacin >2 ug/ml Resistant     Clindamycin <=0.5 ug/ml Susceptible     Daptomycin <=0.5 ug/ml Susceptible     Erythromycin >4 ug/ml Resistant     Gentamicin <=4 ug/ml Susceptible     Levofloxacin >4 ug/ml Resistant     Linezolid <=1 ug/ml Susceptible     Oxacillin >2 ug/ml Resistant     Penicillin G >8 ug/ml Resistant     Quinupristin + Dalfopristin <=0.5 ug/ml Susceptible     Rifampin <=1 ug/ml Susceptible     Tetracycline <=4 ug/ml Susceptible     Trimethoprim + Sulfamethoxazole <=0.5/9.5 ug/ml Susceptible     Vancomycin 1 ug/ml Susceptible                    Blood Culture ID, PCR - Blood, [[id number]]  (Normal) Collected:  01/31/18 1323    Lab Status:  Edited Result - FINAL Specimen:  Blood from Arm, Right Updated:  02/08/18 0830     BCID, PCR No organism detected by BCID PCR.    Blood Culture - Blood, [[id number]]  (Normal) Collected:  02/02/18 0805    Lab Status:  Final result Specimen:  Blood from Blood, Central Line Updated:  02/07/18 1031     Blood Culture No growth at 5 days    Urine Culture - Urine, Urine, Clean Catch [[id number]]  (Abnormal) Collected:  01/31/18 1643    Lab Status:  Final result Specimen:  Urine from Urine, Catheter Updated:  02/03/18 1239     Urine Culture --      >100,000 CFU/mL Candida parapsilosis (A)    Urine Culture - Urine, Urine, Clean Catch [[id number]]  (Abnormal) Collected:  01/31/18 1346    Lab Status:  Final result Specimen:  Urine from Urine, Catheter Updated:  02/02/18 1154      Urine Culture --      40,000-50,000 CFU/mL Candida albicans (A)    Influenza A & B, RT PCR - Swab, Nasopharynx [[id number]]  (Normal) Collected:  01/31/18 1630    Lab Status:  Final result Specimen:  Swab from Nasopharynx Updated:  01/31/18 2413     Influenza A PCR Not Detected     Influenza B PCR Not Detected          Imaging Results (last 24 hours)     ** No results found for the last 24 hours. **        Results for orders placed during the hospital encounter of 01/31/18   Adult Transthoracic Echo Complete W/ Cont if Necessary Per Protocol    Narrative · The left ventricular cavity is small.  · Left ventricular wall thickness is consistent with concentric   hypertrophy.  · Right ventricular cavity is mild-to-moderately dilated.  · Left atrial cavity size is dilated.  · Moderate tricuspid valve regurgitation is present.  · Moderate aortic valve regurgitation is present.  · Mild pulmonic valve regurgitation is present.  · There is mild calcification of the aortic valve mainly affecting the non   coronary cusp(s).  · Left ventricular systolic function is normal. Estimated EF = 55%.  · There is a small mobile echogenic density near the annulus of the   posterior leaflet of the mitral valve  · Saline contrast study is negative for PFO  · The patient's rhythm is tachycardic throughout and on the transmitral   inflow pattern there's an BALDO waves to suggest this may be atrial flutter          I have reviewed the medications.    Assessment/Plan   Assessment / Plan     Hospital Problem List     * (Principal)Sepsis due to urinary tract infection    Diabetes mellitus, type 2    Overview Signed 8/3/2016  2:13 PM by Ama Wolf     With foot ulcer         Hyperlipidemia    Hypothyroidism    Chronic obstructive pulmonary disease    CAD (coronary artery disease)    Overview Signed 7/15/2016 10:27 AM by Yue Royal     History of  Coronary Artery Disease V12.59         Peripheral vascular disease    Obstructive sleep apnea  syndrome    Congestive heart failure    Atrial fibrillation with rapid ventricular response    Altered mental status    Acute renal failure    Hyperkalemia    Leukocytosis    Elevated troponin             Brief Hospital Course to date:  Leila Smith is a 72 y.o. female who is mentally challenged at baseline and who Resides at [facility] with a past medical history of poorly controlled type 2 diabetes mellitus, hypertension, hyperlipidemia, hypothyroidism, coronary artery disease, chronic obstructive pulmonary disease, paroxysmal atrial fibrillation, obstructive sleep apnea, who was brought in by EMS for altered mental status.  She was afebrile but tachycardic and hypotensive.  She was found to be in atrial fibrillation with rapid ventricular response, hypotension, acute kidney injury, severe metabolic acidosis.  Initial concern was for UTI.  Urine culture grew candida species.  Blood cultures on admission returned positive with staph epidermidis from both sets which were drawn at separate times and locations.  Echocardiogram concerning for mitral valve vegetation.  Family refusing YANNA due to need for sedation.     Assessment & Plan:    Sepsis on admission-possibly due to S. Epi bacterial endocarditis  -Sepsis now resolved.  Unlikely due to UTI since she only grew a few CFU of candida species on culture.  More likely due to bacterial endocarditis given multiple positive blood cultures, abnormality on mitral valve, and elevated inflammatory markers  -ID following  -Completing course of fluconazole for candiduria  -Changed antibiotics to daptomycin as this would be more manageable long term  -Long discussion with daughter and patient's brother today.  They would now like to pursue YANNA on Monday prior to PICC placement to be extra sure of the need for long term antibiotics.    ANGELA  -Improving  -Continue to encourage PO intake    A-fib with RVR  -[id number] = 4  -Continue diltiazem and metoprolol  -Started on  Eliquis    DM2  -Improved control  -Continue basal/bolus insulin-decreased slightly today    DVT Prophylaxis:  Eliquis    CODE STATUS: Full Code    Disposition: I expect the patient to be discharged back to [facility] early next week-as soon as Tuesday.    Lenora Mcgowan MD  02/09/18  8:00 PM    More than 50% of time spent counseling on current illness and plan of care. Case discussed with: Patient, her daughter, her brother, her son-in-law, nursing staff  Total time spent face to face with the patient was 18 minutes.  Total time of the encounter was 36 minutes.

## 2018-02-10 NOTE — PLAN OF CARE
Problem: Patient Care Overview (Adult)  Goal: Plan of Care Review  Outcome: Ongoing (interventions implemented as appropriate)   02/08/18 0339 02/09/18 0437 02/09/18 1200   Coping/Psychosocial Response Interventions   Plan Of Care Reviewed With --  --  patient   Patient Care Overview   Progress progress toward functional goals as expected --  --    Outcome Evaluation   Outcome Summary/Follow up Plan --  PT turned every 2 hours. Boots placed on PT to keep heels off bed, PT tolerating well. Giving Pt her medications crushed with Ice cream so she will take them.  --      Goal: Adult Individualization and Mutuality  Outcome: Ongoing (interventions implemented as appropriate)    Goal: Discharge Needs Assessment  Outcome: Ongoing (interventions implemented as appropriate)   01/31/18 1646 02/03/18 0641 02/04/18 0432   Discharge Needs Assessment   Concerns To Be Addressed --  --  denies needs/concerns at this time   Readmission Within The Last 30 Days no previous admission in last 30 days --  --    Equipment Needed After Discharge none --  --    Discharge Disposition --  still a patient --    Living Environment   Transportation Available ambulance --  --    Self-Care   Equipment Currently Used at Home walker, rolling;wheelchair;colostomy/ostomy supplies --  --        Problem: Renal Failure/Kidney Injury, Acute (Adult)  Goal: Signs and Symptoms of Listed Potential Problems Will be Absent or Manageable (Renal Failure/Kidney Injury, Acute)  Outcome: Ongoing (interventions implemented as appropriate)   02/09/18 0437   Renal Failure/Kidney Injury, Acute   Problems Assessed (Acute Renal Failure/Kidney Injury) all   Problems Present (Acute Renal Failure/Kidney Injury) electrolyte imbalance;fluid imbalance;infection

## 2018-02-10 NOTE — PROGRESS NOTES
"   LOS: 10 days    Patient Care Team:  Ciaran Wakefield MD as PCP - General (Internal Medicine)  No Known Provider as PCP - Family Medicine    Reason For Visit:  F/U ANGELA.  Subjective           Review of Systems:    Pulm: No soa   CV:  No CP      Objective       apixaban 5 mg Oral Q12H   atorvastatin 10 mg Oral Nightly   DAPTOmycin 6 mg/kg Intravenous Q24H   diltiaZEM  mg Oral Q24H   famotidine 20 mg Oral Daily   fluconazole 200 mg Oral Q24H   insulin detemir 13 Units Subcutaneous QAM   insulin lispro 0-7 Units Subcutaneous 4x Daily With Meals & Nightly   insulin lispro 3 Units Subcutaneous TID With Meals   metoprolol tartrate 50 mg Oral Q12H            Vital Signs:  Blood pressure 145/80, pulse 99, temperature 98.3 °F (36.8 °C), temperature source Oral, resp. rate 16, height 165.1 cm (65\"), weight 74.6 kg (164 lb 6.4 oz), SpO2 96 %.    Flowsheet Rows         First Filed Value    Admission Height  165.1 cm (65\") Documented at 01/31/2018 1333    Admission Weight  90.7 kg (200 lb) Documented at 01/31/2018 1333          02/09 0701 - 02/10 0700  In: 599 [P.O.:599]  Out: 880 [Urine:425]    Physical Exam:    General Appearance: NAD, alert and cooperative, Ox3  Eyes: PER, conjunctivae and sclerae normal, no icterus  Lungs: FEW CRACKLES  Heart/CV: regular rhythm & normal rate, no murmur, no gallop, no rub and no edema  Abdomen: not distended, soft, non-tender, no masses,  bowel sounds present  Skin: No rash, Warm and dry    Radiology:            Labs:    Results from last 7 days  Lab Units 02/06/18  0339 02/04/18  0453   WBC 10*3/mm3 11.25* 13.67*   HEMOGLOBIN g/dL 12.5 12.4   HEMATOCRIT % 40.6 39.3   PLATELETS 10*3/mm3 209 192       Results from last 7 days  Lab Units 02/09/18  0401 02/08/18  0421 02/07/18  0330 02/06/18  0339 02/04/18  0452   SODIUM mmol/L 140 139 141 139 140   POTASSIUM mmol/L 3.7 3.6 3.6 3.7 3.5   CHLORIDE mmol/L 108 108 111* 107 108   CO2 mmol/L 24.0 23.0 23.0 26.0 28.0   BUN mg/dL 31* 34* 43* " "51* 66*   CREATININE mg/dL 1.40* 1.40* 1.70* 1.90* 2.00*   CALCIUM mg/dL 8.0* 8.1* 8.4* 8.2* 7.7*   PHOSPHORUS mg/dL  --   --   --  1.6* 1.9*   MAGNESIUM mg/dL  --   --   --   --  2.0   ALBUMIN g/dL  --   --   --  2.80* 2.60*       Results from last 7 days  Lab Units 02/09/18  0401   GLUCOSE mg/dL 99         Results from last 7 days  Lab Units 02/04/18  0452   ALK PHOS U/L 177*   BILIRUBIN mg/dL 0.4   ALT (SGPT) U/L 22   AST (SGOT) U/L 11                 Estimated Creatinine Clearance: 35.6 mL/min (by C-G formula based on Cr of 1.4).      Assessment     Principal Problem:    Sepsis due to urinary tract infection  Active Problems:    Diabetes mellitus, type 2    Hyperlipidemia    Hypothyroidism    Chronic obstructive pulmonary disease    CAD (coronary artery disease)    Peripheral vascular disease    Obstructive sleep apnea syndrome    Congestive heart failure    Atrial fibrillation with rapid ventricular response    Altered mental status    Acute renal failure    Hyperkalemia    Leukocytosis    Elevated troponin            Impression: ANGELA IMPROVED GFR BUT \" STUCK \". DM.            Recommendations: LAB AM. CHECK P/C RATIO.      Juan Ramon Chan MD  02/10/18  11:20 AM        "

## 2018-02-10 NOTE — PROGRESS NOTES
[facility] Medicine Services  PROGRESS NOTE    Patient Name: Leila Smith  : 1945  MRN: [id number]    Date of Admission: 2018  Length of Stay: 10  Primary Care Physician: Ciaran Wakefield MD    Subjective   Subjective     CC: F/U sepsis    HPI:  No complaints today.  She reports she is eating well.    Review of Systems  Gen- No fevers, chills  CV- No chest pain, palpitations  Resp- No cough, dyspnea  GI- No N/V/D, abd pain    Objective   Objective     Vital Signs:   Temp:  [97.5 °F (36.4 °C)-98.3 °F (36.8 °C)] 98.3 °F (36.8 °C)  Heart Rate:  [84-99] 99  Resp:  [14-16] 16  BP: (137-145)/(73-80) 145/80        Physical Exam:  Constitutional: No acute distress, awake, alert, laying in bed  HENT: NCAT, mucous membranes moist  Respiratory: Respiratory effort normal, lungs CTAB  Cardiovascular: Irregularly irregular, no murmurs  Gastrointestinal: Soft, nontender, nondistended, normal bowel sounds  Musculoskeletal: No bilateral ankle edema  Psychiatric: Pleasant and cooperative  Neurologic: Cranial Nerves grossly intact to confrontation  Skin: No rashes    Results Reviewed:  I have personally reviewed current lab, radiology, and data and agree.      Results from last 7 days  Lab Units 18  0339 18  0453   WBC 10*3/mm3 11.25* 13.67*   HEMOGLOBIN g/dL 12.5 12.4   HEMATOCRIT % 40.6 39.3   PLATELETS 10*3/mm3 209 192       Results from last 7 days  Lab Units 18  0401 18  0421 18  0330  18  0452   SODIUM mmol/L 140 139 141  < > 140   POTASSIUM mmol/L 3.7 3.6 3.6  < > 3.5   CHLORIDE mmol/L 108 108 111*  < > 108   CO2 mmol/L 24.0 23.0 23.0  < > 28.0   BUN mg/dL 31* 34* 43*  < > 66*   CREATININE mg/dL 1.40* 1.40* 1.70*  < > 2.00*   GLUCOSE mg/dL 99 93 133*  < > 210*   CALCIUM mg/dL 8.0* 8.1* 8.4*  < > 7.7*   ALT (SGPT) U/L  --   --   --   --  22   AST (SGOT) U/L  --   --   --   --  11   < > = values in this interval not displayed.  Estimated Creatinine  Clearance: 35.6 mL/min (by C-G formula based on Cr of 1.4).  No results found for: BNP  No results found for: PHART    Microbiology Results Abnormal     Procedure Component Value - Date/Time    Blood Culture - Blood, [[id number]]  (Normal) Collected:  02/06/18 0341    Lab Status:  Preliminary result Specimen:  Blood from Hand, Right Updated:  02/10/18 0446     Blood Culture No growth at 4 days    Blood Culture - Blood, [[id number]]  (Normal) Collected:  02/05/18 1859    Lab Status:  Preliminary result Specimen:  Blood from Arm, Right Updated:  02/09/18 1916     Blood Culture No growth at 4 days    Narrative:       Aerobic bottle only    Blood Culture - Blood, [[id number]]  (Abnormal)  (Susceptibility) Collected:  01/31/18 1335    Lab Status:  Edited Result - FINAL Specimen:  Blood from Arm, Left Updated:  02/08/18 0918     Blood Culture --      Staphylococcus epidermidis (A)      This isolate does not demonstrate inducible clindamycin resistance in vitro.          Isolated from Aerobic and Anaerobic Bottles     Gram Stain Result Anaerobic Bottle Gram positive cocci in clusters      Aerobic Bottle Gram positive cocci in clusters    Susceptibility      Staphylococcus epidermidis     DIMITRIS     Ciprofloxacin >2 ug/ml Resistant     Clindamycin <=0.5 ug/ml Susceptible     Daptomycin <=0.5 ug/ml Susceptible     Erythromycin >4 ug/ml Resistant     Gentamicin <=4 ug/ml Susceptible     Levofloxacin >4 ug/ml Resistant     Linezolid <=1 ug/ml Susceptible     Oxacillin >2 ug/ml Resistant     Penicillin G >8 ug/ml Resistant     Quinupristin + Dalfopristin <=0.5 ug/ml Susceptible     Rifampin <=1 ug/ml Susceptible     Tetracycline <=4 ug/ml Susceptible     Trimethoprim + Sulfamethoxazole <=0.5/9.5 ug/ml Susceptible     Vancomycin 2 ug/ml Susceptible                    Blood Culture - Blood, [[id number]]  (Abnormal)  (Susceptibility) Collected:  01/31/18 1323    Lab Status:  Edited Result - FINAL Specimen:  Blood from Arm, Right Updated:   02/08/18 0830     Blood Culture --      Staphylococcus epidermidis (A)      This isolate does not demonstrate inducible clindamycin resistance in vitro.          Isolated from Aerobic and Anaerobic Bottles     Gram Stain Result Anaerobic Bottle Gram positive cocci in pairs and clusters      Aerobic Bottle Gram positive cocci in clusters    Susceptibility      Staphylococcus epidermidis     DIMITRIS     Ciprofloxacin >2 ug/ml Resistant     Clindamycin <=0.5 ug/ml Susceptible     Daptomycin <=0.5 ug/ml Susceptible     Erythromycin >4 ug/ml Resistant     Gentamicin <=4 ug/ml Susceptible     Levofloxacin >4 ug/ml Resistant     Linezolid <=1 ug/ml Susceptible     Oxacillin >2 ug/ml Resistant     Penicillin G >8 ug/ml Resistant     Quinupristin + Dalfopristin <=0.5 ug/ml Susceptible     Rifampin <=1 ug/ml Susceptible     Tetracycline <=4 ug/ml Susceptible     Trimethoprim + Sulfamethoxazole <=0.5/9.5 ug/ml Susceptible     Vancomycin 1 ug/ml Susceptible                    Blood Culture ID, PCR - Blood, [[id number]]  (Normal) Collected:  01/31/18 1323    Lab Status:  Edited Result - FINAL Specimen:  Blood from Arm, Right Updated:  02/08/18 0830     BCID, PCR No organism detected by BCID PCR.    Blood Culture - Blood, [[id number]]  (Normal) Collected:  02/02/18 0805    Lab Status:  Final result Specimen:  Blood from Blood, Central Line Updated:  02/07/18 1031     Blood Culture No growth at 5 days    Urine Culture - Urine, Urine, Clean Catch [[id number]]  (Abnormal) Collected:  01/31/18 1643    Lab Status:  Final result Specimen:  Urine from Urine, Catheter Updated:  02/03/18 1239     Urine Culture --      >100,000 CFU/mL Candida parapsilosis (A)    Urine Culture - Urine, Urine, Clean Catch [[id number]]  (Abnormal) Collected:  01/31/18 1346    Lab Status:  Final result Specimen:  Urine from Urine, Catheter Updated:  02/02/18 1154     Urine Culture --      40,000-50,000 CFU/mL Candida albicans (A)    Influenza A & B, RT PCR - Swab,  Nasopharynx [[id number]]  (Normal) Collected:  01/31/18 1630    Lab Status:  Final result Specimen:  Swab from Nasopharynx Updated:  01/31/18 8409     Influenza A PCR Not Detected     Influenza B PCR Not Detected          Imaging Results (last 24 hours)     ** No results found for the last 24 hours. **        Results for orders placed during the hospital encounter of 01/31/18   Adult Transthoracic Echo Complete W/ Cont if Necessary Per Protocol    Narrative · The left ventricular cavity is small.  · Left ventricular wall thickness is consistent with concentric   hypertrophy.  · Right ventricular cavity is mild-to-moderately dilated.  · Left atrial cavity size is dilated.  · Moderate tricuspid valve regurgitation is present.  · Moderate aortic valve regurgitation is present.  · Mild pulmonic valve regurgitation is present.  · There is mild calcification of the aortic valve mainly affecting the non   coronary cusp(s).  · Left ventricular systolic function is normal. Estimated EF = 55%.  · There is a small mobile echogenic density near the annulus of the   posterior leaflet of the mitral valve  · Saline contrast study is negative for PFO  · The patient's rhythm is tachycardic throughout and on the transmitral   inflow pattern there's an BALDO waves to suggest this may be atrial flutter          I have reviewed the medications.    Assessment/Plan   Assessment / Plan     Hospital Problem List     * (Principal)Sepsis due to urinary tract infection    Diabetes mellitus, type 2    Overview Signed 8/3/2016  2:13 PM by Ama Wolf     With foot ulcer         Hyperlipidemia    Hypothyroidism    Chronic obstructive pulmonary disease    CAD (coronary artery disease)    Overview Signed 7/15/2016 10:27 AM by Yue Royal     History of  Coronary Artery Disease V12.59         Peripheral vascular disease    Obstructive sleep apnea syndrome    Congestive heart failure    Atrial fibrillation with rapid ventricular response    Altered  mental status    Acute renal failure    Hyperkalemia    Leukocytosis    Elevated troponin             Brief Hospital Course to date:  Leila Smith is a 72 y.o. female who is mentally challenged at baseline and who Resides at [facility] with a past medical history of poorly controlled type 2 diabetes mellitus, hypertension, hyperlipidemia, hypothyroidism, coronary artery disease, chronic obstructive pulmonary disease, paroxysmal atrial fibrillation, obstructive sleep apnea, who was brought in by EMS for altered mental status.  She was afebrile but tachycardic and hypotensive.  She was found to be in atrial fibrillation with rapid ventricular response, hypotension, acute kidney injury, severe metabolic acidosis.  Initial concern was for UTI.  Urine culture grew candida species.  Blood cultures on admission returned positive with staph epidermidis from both sets which were drawn at separate times and locations.  Echocardiogram concerning for mitral valve vegetation.  Family refusing YANNA initially due to need for sedation, but now wants to pursue before committing to long term antibiotics.      Assessment & Plan:    Sepsis on admission-possibly due to S. Epi bacterial endocarditis  -Sepsis now resolved.  Unlikely due to UTI since she only grew a few CFU of candida species on culture.  More likely due to bacterial endocarditis given multiple positive blood cultures, abnormality on mitral valve, and elevated inflammatory markers  -ID following  -Completing course of fluconazole for candiduria  -Changed antibiotics to daptomycin as this would be more manageable long term  -Family would now like to pursue YANNA on Monday prior to PICC placement to be extra sure of the need for long term antibiotics.    ANGELA  -Improved/stable  -Continue to encourage PO intake    A-fib with RVR  -[id number] = 4  -Continue diltiazem and metoprolol  -Started on Eliquis    DM2  -Improved control  -Continue basal/bolus insulin    DVT Prophylaxis:   Eliquis    CODE STATUS: Full Code    Disposition: I expect the patient to be discharged back to [facility] early next week-as soon as Tuesday.    Lenora Mcgowan MD  02/10/18  12:51 PM

## 2018-02-11 NOTE — PLAN OF CARE
Problem: Patient Care Overview (Adult)  Goal: Plan of Care Review  Outcome: Ongoing (interventions implemented as appropriate)    Goal: Adult Individualization and Mutuality  Outcome: Ongoing (interventions implemented as appropriate)    Goal: Discharge Needs Assessment  Outcome: Ongoing (interventions implemented as appropriate)      Problem: Renal Failure/Kidney Injury, Acute (Adult)  Goal: Signs and Symptoms of Listed Potential Problems Will be Absent or Manageable (Renal Failure/Kidney Injury, Acute)  Outcome: Ongoing (interventions implemented as appropriate)      Problem: Cardiac Output, Decreased (Adult)  Goal: Identify Related Risk Factors and Signs and Symptoms  Outcome: Ongoing (interventions implemented as appropriate)    Goal: Adequate Cardiac Output/Effective Tissue Perfusion  Outcome: Ongoing (interventions implemented as appropriate)      Problem: Sepsis (Adult)  Goal: Signs and Symptoms of Listed Potential Problems Will be Absent or Manageable (Sepsis)  Outcome: Ongoing (interventions implemented as appropriate)      Problem: Skin Integrity Impairment, Risk/Actual (Adult)  Goal: Identify Related Risk Factors and Signs and Symptoms  Outcome: Ongoing (interventions implemented as appropriate)    Goal: Skin Integrity/Wound Healing  Outcome: Ongoing (interventions implemented as appropriate)      Problem: Fall Risk (Adult)  Goal: Identify Related Risk Factors and Signs and Symptoms  Outcome: Ongoing (interventions implemented as appropriate)    Goal: Absence of Falls  Outcome: Ongoing (interventions implemented as appropriate)

## 2018-02-11 NOTE — CONSULTS
Family would like to pursue YANNA and decide need for longterm antibiotics before proceeding with PICC  Please reconsult if PICC is aggreed to and consent signed by family.  THanks  Dez Rock RN [facility]

## 2018-02-11 NOTE — PROGRESS NOTES
"   LOS: 11 days    Patient Care Team:  Ciaran Wakefield MD as PCP - General (Internal Medicine)  No Known Provider as PCP - Family Medicine    Reason For Visit:  F/U ANGELA  Subjective           Review of Systems:    Pulm: No soa   CV:  No CP      Objective       apixaban 5 mg Oral Q12H   atorvastatin 10 mg Oral Nightly   DAPTOmycin 6 mg/kg Intravenous Q24H   diltiaZEM  mg Oral Q24H   famotidine 20 mg Oral Daily   insulin detemir 13 Units Subcutaneous QAM   insulin lispro 0-7 Units Subcutaneous 4x Daily With Meals & Nightly   insulin lispro 3 Units Subcutaneous TID With Meals   metoprolol tartrate 50 mg Oral Q12H            Vital Signs:  Blood pressure 134/81, pulse 96, temperature 98.7 °F (37.1 °C), temperature source Axillary, resp. rate 18, height 165.1 cm (65\"), weight 74.9 kg (165 lb 3.2 oz), SpO2 96 %.    Flowsheet Rows         First Filed Value    Admission Height  165.1 cm (65\") Documented at 01/31/2018 1333    Admission Weight  90.7 kg (200 lb) Documented at 01/31/2018 1333          02/10 0701 - 02/11 0700  In: 0   Out: 1400 [Urine:600]    Physical Exam:    General Appearance: NAD, alert and cooperative, Ox3  Eyes: PER, conjunctivae and sclerae normal, no icterus  Lungs: respirations regular and unlabored, no crepitus, clear to auscultation  Heart/CV: regular rhythm & normal rate, no murmur, no gallop, no rub and TR DEPENDENT edema  Abdomen: not distended, soft, non-tender, no masses,  bowel sounds present  Skin: No rash, Warm and dry    Radiology:            Labs: [facility] AND P/C RATIO PENDING.    Results from last 7 days  Lab Units 02/06/18  0339   WBC 10*3/mm3 11.25*   HEMOGLOBIN g/dL 12.5   HEMATOCRIT % 40.6   PLATELETS 10*3/mm3 209       Results from last 7 days  Lab Units 02/09/18  0401 02/08/18  0421 02/07/18  0330 02/06/18  0339   SODIUM mmol/L 140 139 141 139   POTASSIUM mmol/L 3.7 3.6 3.6 3.7   CHLORIDE mmol/L 108 108 111* 107   CO2 mmol/L 24.0 23.0 23.0 26.0   BUN mg/dL 31* 34* 43* 51* "   CREATININE mg/dL 1.40* 1.40* 1.70* 1.90*   CALCIUM mg/dL 8.0* 8.1* 8.4* 8.2*   PHOSPHORUS mg/dL  --   --   --  1.6*   ALBUMIN g/dL  --   --   --  2.80*       Results from last 7 days  Lab Units 02/09/18  0401   GLUCOSE mg/dL 99                       Estimated Creatinine Clearance: 35.7 mL/min (by C-G formula based on Cr of 1.4).      Assessment     Principal Problem:    Sepsis due to urinary tract infection  Active Problems:    Diabetes mellitus, type 2    Hyperlipidemia    Hypothyroidism    Chronic obstructive pulmonary disease    CAD (coronary artery disease)    Peripheral vascular disease    Obstructive sleep apnea syndrome    Congestive heart failure    Atrial fibrillation with rapid ventricular response    Altered mental status    Acute renal failure    Hyperkalemia    Leukocytosis    Elevated troponin            Impression: ANGELA. AWAIT LAB AND P/C RATIO.            Recommendations: LAB AM.      Juan Ramon Chan MD  02/11/18  10:00 AM

## 2018-02-11 NOTE — PROGRESS NOTES
[facility]  PROGRESS NOTE    Patient Name: Leila Smith  : 1945  MRN: [id number]    Date of Admission: 2018  Length of Stay: 11  Primary Care Physician: Ciaran Wakefield MD    Subjective   Subjective     CC: F/U sepsis    HPI:  No complaints today.  She reports she is eating well.    Review of Systems  Gen- No fevers, chills  CV- No chest pain, palpitations  Resp- No cough, dyspnea  GI- No N/V/D, abd pain    Objective   Objective     Vital Signs:   Temp:  [98.2 °F (36.8 °C)-98.7 °F (37.1 °C)] 98.7 °F (37.1 °C)  Heart Rate:  [83-96] 96  Resp:  [18] 18  BP: (124-134)/(81-88) 134/81        Physical Exam:  Constitutional: No acute distress, awake, alert, laying in bed  HENT: NCAT, mucous membranes moist  Respiratory: Respiratory effort normal, lungs CTAB  Cardiovascular: Irregularly irregular, no murmurs  Gastrointestinal: Soft, nontender, nondistended, normal bowel sounds  Musculoskeletal: No bilateral ankle edema  Psychiatric: Pleasant and cooperative  Neurologic: Cranial Nerves grossly intact to confrontation  Skin: No rashes    Results Reviewed:  I have personally reviewed current lab, radiology, and data and agree.      Results from last 7 days  Lab Units 18  0339   WBC 10*3/mm3 11.25*   HEMOGLOBIN g/dL 12.5   HEMATOCRIT % 40.6   PLATELETS 10*3/mm3 209       Results from last 7 days  Lab Units 18  0401 18  0421 18  0330   SODIUM mmol/L 140 139 141   POTASSIUM mmol/L 3.7 3.6 3.6   CHLORIDE mmol/L 108 108 111*   CO2 mmol/L 24.0 23.0 23.0   BUN mg/dL 31* 34* 43*   CREATININE mg/dL 1.40* 1.40* 1.70*   GLUCOSE mg/dL 99 93 133*   CALCIUM mg/dL 8.0* 8.1* 8.4*     Estimated Creatinine Clearance: 35.7 mL/min (by C-G formula based on Cr of 1.4).  No results found for: BNP  No results found for: PHART    Microbiology Results Abnormal     Procedure Component Value - Date/Time    Blood Culture - Blood, [[id number]]  (Normal) Collected:  18  0341    Lab Status:  Final result Specimen:  Blood from Hand, Right Updated:  02/11/18 0446     Blood Culture No growth at 5 days    Blood Culture - Blood, [[id number]]  (Normal) Collected:  02/05/18 1859    Lab Status:  Final result Specimen:  Blood from Arm, Right Updated:  02/10/18 1916     Blood Culture No growth at 5 days    Narrative:       Aerobic bottle only    Blood Culture - Blood, [[id number]]  (Abnormal)  (Susceptibility) Collected:  01/31/18 1335    Lab Status:  Edited Result - FINAL Specimen:  Blood from Arm, Left Updated:  02/08/18 0918     Blood Culture --      Staphylococcus epidermidis (A)      This isolate does not demonstrate inducible clindamycin resistance in vitro.          Isolated from Aerobic and Anaerobic Bottles     Gram Stain Result Anaerobic Bottle Gram positive cocci in clusters      Aerobic Bottle Gram positive cocci in clusters    Susceptibility      Staphylococcus epidermidis     DIMITRIS     Ciprofloxacin >2 ug/ml Resistant     Clindamycin <=0.5 ug/ml Susceptible     Daptomycin <=0.5 ug/ml Susceptible     Erythromycin >4 ug/ml Resistant     Gentamicin <=4 ug/ml Susceptible     Levofloxacin >4 ug/ml Resistant     Linezolid <=1 ug/ml Susceptible     Oxacillin >2 ug/ml Resistant     Penicillin G >8 ug/ml Resistant     Quinupristin + Dalfopristin <=0.5 ug/ml Susceptible     Rifampin <=1 ug/ml Susceptible     Tetracycline <=4 ug/ml Susceptible     Trimethoprim + Sulfamethoxazole <=0.5/9.5 ug/ml Susceptible     Vancomycin 2 ug/ml Susceptible                    Blood Culture - Blood, [[id number]]  (Abnormal)  (Susceptibility) Collected:  01/31/18 1323    Lab Status:  Edited Result - FINAL Specimen:  Blood from Arm, Right Updated:  02/08/18 0830     Blood Culture --      Staphylococcus epidermidis (A)      This isolate does not demonstrate inducible clindamycin resistance in vitro.          Isolated from Aerobic and Anaerobic Bottles     Gram Stain Result Anaerobic Bottle Gram positive cocci in  pairs and clusters      Aerobic Bottle Gram positive cocci in clusters    Susceptibility      Staphylococcus epidermidis     DIMITRIS     Ciprofloxacin >2 ug/ml Resistant     Clindamycin <=0.5 ug/ml Susceptible     Daptomycin <=0.5 ug/ml Susceptible     Erythromycin >4 ug/ml Resistant     Gentamicin <=4 ug/ml Susceptible     Levofloxacin >4 ug/ml Resistant     Linezolid <=1 ug/ml Susceptible     Oxacillin >2 ug/ml Resistant     Penicillin G >8 ug/ml Resistant     Quinupristin + Dalfopristin <=0.5 ug/ml Susceptible     Rifampin <=1 ug/ml Susceptible     Tetracycline <=4 ug/ml Susceptible     Trimethoprim + Sulfamethoxazole <=0.5/9.5 ug/ml Susceptible     Vancomycin 1 ug/ml Susceptible                    Blood Culture ID, PCR - Blood, [[id number]]  (Normal) Collected:  01/31/18 1323    Lab Status:  Edited Result - FINAL Specimen:  Blood from Arm, Right Updated:  02/08/18 0830     BCID, PCR No organism detected by BCID PCR.    Blood Culture - Blood, [[id number]]  (Normal) Collected:  02/02/18 0805    Lab Status:  Final result Specimen:  Blood from Blood, Central Line Updated:  02/07/18 1031     Blood Culture No growth at 5 days    Urine Culture - Urine, Urine, Clean Catch [[id number]]  (Abnormal) Collected:  01/31/18 1643    Lab Status:  Final result Specimen:  Urine from Urine, Catheter Updated:  02/03/18 1239     Urine Culture --      >100,000 CFU/mL Candida parapsilosis (A)    Urine Culture - Urine, Urine, Clean Catch [[id number]]  (Abnormal) Collected:  01/31/18 1346    Lab Status:  Final result Specimen:  Urine from Urine, Catheter Updated:  02/02/18 1154     Urine Culture --      40,000-50,000 CFU/mL Candida albicans (A)    Influenza A & B, RT PCR - Swab, Nasopharynx [[id number]]  (Normal) Collected:  01/31/18 1630    Lab Status:  Final result Specimen:  Swab from Nasopharynx Updated:  01/31/18 1748     Influenza A PCR Not Detected     Influenza B PCR Not Detected          Imaging Results (last 24 hours)     ** No results  found for the last 24 hours. **        Results for orders placed during the hospital encounter of 01/31/18   Adult Transthoracic Echo Complete W/ Cont if Necessary Per Protocol    Narrative · The left ventricular cavity is small.  · Left ventricular wall thickness is consistent with concentric   hypertrophy.  · Right ventricular cavity is mild-to-moderately dilated.  · Left atrial cavity size is dilated.  · Moderate tricuspid valve regurgitation is present.  · Moderate aortic valve regurgitation is present.  · Mild pulmonic valve regurgitation is present.  · There is mild calcification of the aortic valve mainly affecting the non   coronary cusp(s).  · Left ventricular systolic function is normal. Estimated EF = 55%.  · There is a small mobile echogenic density near the annulus of the   posterior leaflet of the mitral valve  · Saline contrast study is negative for PFO  · The patient's rhythm is tachycardic throughout and on the transmitral   inflow pattern there's an BALDO waves to suggest this may be atrial flutter          I have reviewed the medications.    Assessment/Plan   Assessment / Plan     Hospital Problem List     * (Principal)Sepsis due to urinary tract infection    Diabetes mellitus, type 2    Overview Signed 8/3/2016  2:13 PM by Ama Wolf     With foot ulcer         Hyperlipidemia    Hypothyroidism    Chronic obstructive pulmonary disease    CAD (coronary artery disease)    Overview Signed 7/15/2016 10:27 AM by Yue Royal     History of  Coronary Artery Disease V12.59         Peripheral vascular disease    Obstructive sleep apnea syndrome    Congestive heart failure    Atrial fibrillation with rapid ventricular response    Altered mental status    Acute renal failure    Hyperkalemia    Leukocytosis    Elevated troponin             Brief Hospital Course to date:  Leila Smith is a 72 y.o. female who is mentally challenged at baseline and who Resides at [facility] with a past medical history of  poorly controlled type 2 diabetes mellitus, hypertension, hyperlipidemia, hypothyroidism, coronary artery disease, chronic obstructive pulmonary disease, paroxysmal atrial fibrillation, obstructive sleep apnea, who was brought in by EMS for altered mental status.  She was afebrile but tachycardic and hypotensive.  She was found to be in atrial fibrillation with rapid ventricular response, hypotension, acute kidney injury, severe metabolic acidosis.  Initial concern was for UTI.  Urine culture grew candida species.  Blood cultures on admission returned positive with staph epidermidis from both sets which were drawn at separate times and locations.  Echocardiogram concerning for mitral valve vegetation.  Family refusing YANNA initially due to need for sedation, but now wants to pursue before committing to long term antibiotics.      Assessment & Plan:    Sepsis on admission-possibly due to S. Epi bacterial endocarditis  -Sepsis now resolved.  Unlikely due to UTI since she only grew a few CFU of candida species on culture.  More likely due to bacterial endocarditis given multiple positive blood cultures, abnormality on mitral valve, and elevated inflammatory markers  -ID following  -Completed course of fluconazole for candiduria  -Changed antibiotics to daptomycin as this would be more manageable long term (tentatively 5 weeks daily infusion)  -Family would now like to pursue YANNA on Monday prior to PICC placement to be extra sure of the need for long term antibiotics.    ANGELA  -Improved/stable  -Continue to encourage PO intake  --Renal following, am bmp    A-fib with RVR  -[id number] = 4  -Continue diltiazem and metoprolol  -Started on Eliquis    DM2  -Improved control  -Continue basal/bolus insulin    DVT Prophylaxis:  Eliquis    CODE STATUS: Full Code    Disposition: I expect the patient to be discharged back to [facility] early next week-as soon as Tuesday.    Yumiko Seymour, APRN  02/11/18  1:14 PM

## 2018-02-11 NOTE — PLAN OF CARE
Problem: Patient Care Overview (Adult)  Goal: Plan of Care Review  Outcome: Ongoing (interventions implemented as appropriate)   02/11/18 1631   Coping/Psychosocial Response Interventions   Plan Of Care Reviewed With patient   Patient Care Overview   Progress improving   Outcome Evaluation   Outcome Summary/Follow up Plan VSS, pt has eaten better and been more cooperative today, TLDL and IV antibiotics, pt scheduled for YANNA tomorrow, possible PICC placement.        Problem: Cardiac Output, Decreased (Adult)  Goal: Adequate Cardiac Output/Effective Tissue Perfusion  Outcome: Ongoing (interventions implemented as appropriate)   02/11/18 1631   Cardiac Output, Decreased (Adult)   Adequate Cardiac Output/Effective Tissue Perfusion making progress toward outcome       Problem: Sepsis (Adult)  Goal: Signs and Symptoms of Listed Potential Problems Will be Absent or Manageable (Sepsis)  Outcome: Ongoing (interventions implemented as appropriate)   02/11/18 1631   Sepsis   Problems Assessed (Sepsis) all   Problems Present (Sepsis) none       Problem: Skin Integrity Impairment, Risk/Actual (Adult)  Goal: Skin Integrity/Wound Healing  Outcome: Ongoing (interventions implemented as appropriate)   02/11/18 1631   Skin Integrity Impairment, Risk/Actual (Adult)   Skin Integrity/Wound Healing making progress toward outcome       Problem: Fall Risk (Adult)  Goal: Absence of Falls  Outcome: Ongoing (interventions implemented as appropriate)   02/11/18 1631   Fall Risk (Adult)   Absence of Falls making progress toward outcome       Problem: Pressure Ulcer Risk (Ralph Scale) (Adult,Obstetrics,Pediatric)  Goal: Skin Integrity  Outcome: Ongoing (interventions implemented as appropriate)   02/11/18 1631   Pressure Ulcer Risk (Ralph Scale) (Adult,Obstetrics,Pediatric)   Skin Integrity making progress toward outcome

## 2018-02-12 NOTE — PROGRESS NOTES
[facility] Progress Note    Admission Date: 2018    Leila Smith  1945  [id number]    Date: 2018    Meds:    Anti-Infectives     Ordered     Dose/Rate Route Frequency Start Stop    18 1757  DAPTOmycin (CUBICIN) 500 mg in sterile water (preservative free) 10 mL IV syringe     Ordering Provider:  Rohan Saunders MD    6 mg/kg × 81.4 kg  over 2 Minutes Intravenous Every 24 Hours 18 0900 18 0859    18 0757  fluconazole (DIFLUCAN) tablet 200 mg     Rohan Saunders MD let the order  on 18 0658.   Ordering Provider:  Rohan Saunders MD    200 mg Oral Every 24 Hours 18 0900 18 0657    18 0823  vancomycin 1250 mg/250 mL 0.9% NS IVPB ([facility])     Ordering Provider:  Evert Cid [facility]    1,250 mg Intravenous Once 18 1000 18 1007    18 1413  vancomycin 1750 mg/500 mL 0.9% NS IVPB ([facility])     Ordering Provider:  Jasmina Maier, DO    20 mg/kg × 90.7 kg  over 120 Minutes Intravenous Once 18 1445 18 1732    18 1413  piperacillin-tazobactam (ZOSYN) 4.5 g in iso-osmotic dextrose 100 mL IVPB (premix)     Ordering Provider:  Jasmina RACHEL Case, DO    4.5 g  200 mL/hr over 0.5 Hours Intravenous Once 18 1445 18 1603    18 1418  cefTRIAXone (ROCEPHIN) IVPB 1 g     Ordering Provider:  Blayne Moe MD    1 g  100 mL/hr over 30 Minutes Intravenous Once 18 1430 18 1522          CC:  UTI     SUBJECTIVE:  18:  Patient is a 72 y.o. female who is seen today for evaluation of severe sepsis with blood cultures positive for staph epidermidis a urine culture positive for Candida.  She was brought from her nursing home for increasing lethargy.  She has a baseline of dementia/cognitive dysfunction but was noted to be substantially less responsive prior to admission.  Admitting labs revealed lactic acidosis, a leukocytosis of 17, 000, acute renal failure with Scr of 4.70, and 2/2 sets of positive blood  "cultures for Staph epidermidis, with repeat culture negative. She has had two positive urine cultures for yeast, initially Candida albicans and  most recently Candida parapsilosis. Vancomycin and Zosyn initiated and we were consulted for antibiotic management.  She is minimally conversant, and history obtained from the chart.  Her trans- thoracic echocardiogram revealed a possible mitral valve vegetation.  She is scheduled for a transesophageal echocardiogram this am.   18:  Daughter refusing YANNA.  The patient cannot provide a reliable review of systems due to her profound dementia.  The laboratory identified one of HER-2 sets of positive blood cultures as staph epidermidis but the second set was identified only as coagulase-negative staph and not speciate it yet.  She has remained afebrile.  18: The nursing staff indicates that she developed bradycardia overnight and her Cardizem was held.  She is unable to provide any reliable ROS.  She has remained afebrile.  So far her family has not consented to a transesophageal echocardiogram.  18:  She is conversant this am.  \"ready to go home\".  Remains afebrile.  Daughter still refusing YANNA.  18: Daughter is refusing PICC line and Daptomycin.  She is waiting for family meeting to decide about treatment.   18:  YANNA is now scheduled for today.  She cannot provide any reliable ROS due to her severe dementia.  She has remained afebrile.    ROS:  No f/c/s. No n/v/d. No rash. No new ADR to Abx.     PE:   Vital Signs  Temp (24hrs), Av.9 °F (37.2 °C), Min:98.9 °F (37.2 °C), Max:98.9 °F (37.2 °C)    Temp  Min: 98.9 °F (37.2 °C)  Max: 98.9 °F (37.2 °C)  BP  Min: 146/90  Max: 148/85  Pulse  Min: 67  Max: 84  Resp  Min: 18  Max: 18  SpO2  Min: 96 %  Max: 96 %    GENERAL: Awake and alert, in no acute distress.   HEENT:  No conjunctival injection. No icterus. Oropharynx clear without evidence of thrush or exudate.  NECK: Supple, no JVD     HEART: 1- systolic " murmur  LUNGS: Clear to auscultation bilaterally without wheezing, rales, rhonchi. Normal respiratory effort. Nonlabored. No dullness.  ABDOMEN: Soft, nontender, nondistended. Positive bowel sounds. No rebound or guarding. NO mass or HSM.  EXT:  No cyanosis, clubbing or edema. No cord.   Left IJ site ok   SKIN: Warm and dry without cutaneous eruptions on Inspection/palpation.    NEURO: She is alert but severely confused.      Laboratory Data      Results from last 7 days  Lab Units 02/06/18  0339   WBC 10*3/mm3 11.25*   HEMOGLOBIN g/dL 12.5   HEMATOCRIT % 40.6   PLATELETS 10*3/mm3 209       Results from last 7 days  Lab Units 02/12/18  0421   SODIUM mmol/L 140   POTASSIUM mmol/L 3.7   CHLORIDE mmol/L 111*   CO2 mmol/L 19.0*   BUN mg/dL 26*   CREATININE mg/dL 1.30   GLUCOSE mg/dL 84   CALCIUM mg/dL 9.3           Results from last 7 days  Lab Units 02/08/18  0947   SED RATE mm/hr 74*       Results from last 7 days  Lab Units 02/08/18  0947   CRP mg/dL 2.20*           Results from last 7 days  Lab Units 02/09/18  0401   CK TOTAL U/L 34       Results from last 7 days  Lab Units 02/07/18  0330 02/06/18  0339   VANCOMYCIN RM mcg/mL 14.50 21.10     Estimated Creatinine Clearance: 38.6 mL/min (by C-G formula based on Cr of 1.3).    Microbiology:  Blood Culture   Date Value Ref Range Status   02/05/2018 No growth at less than 24 hours  Preliminary     BCID, PCR   Date Value Ref Range Status   01/31/2018 No organism detected by BCID PCR. No organism detected by BCID PCR. Final      2/2: BC no growth  2/5:  BC no growth   2/6: BC pending       1/31:  BC  1/31  (2/2)  Staph epidermidis,      Urine Culture   Date Value Ref Range Status   01/31/2018 >100,000 CFU/mL Candida parapsilosis (A)  Final          Radiology:  Imaging Results (last 72 hours)     ** No results found for the last 72 hours. **              IMPRESSION:  1.  Staph epidermidis bacteremia-both of the positive blood cultures have been identified as staph  epidermidis with the same antibiotic gram.  This raises the issue of possible endocarditis since she has a mitral valve lesion.  I did have Dr. Pérez review her transthoracic echocardiograms from 12/22 and 1/31.  She has a small mitral valve lesion which appears to be slightly larger on the 1/31 study.  Follow-up blood cultures have been negative.  Her 2 sets of positive  blood cultures were drawn at separate times.  Her ESR is elevated at over 70.  Family has declined transesophageal echocardiogram.  At this point, think that it would be prudent to complete treatment for endocarditis.  I'm reluctant to leave her on vancomycin since her renal function is changing and the vancomycin will be difficult to dose with potential for significant side effects.  I will switch her to intravenous daptomycin.  I will obtain a baseline CPK and we will need to hold atorvastatin while she is on daptomycin due to the risk of myositis.  Her renal function has improved and I think that we could place a PICC line rather than switching her deep line to a tunnel catheter, particularly since her family wishes to avoid sedation.  Her family has apparently agreed to a YANNA.  2.  Severe sepsis-improved  3.  Acute renal failure/ATN-improved  4.  Mitral valve lesion-as above  5.  Leukocytosis/neutrophilia-improved  6.  Severe dementia  7.  Candida UTI  8.  Type 2 diabetes mellitus    RECOMMENDATIONS;  1.  PICC line placement after YANNA   2.  Remove the left IJ central line after the PICC line is placed  3.  Daptomycin 500 mg IV daily for 5 weeks, to complete at least 6 weeks of intravenous antibiotic therapy  4.  YANNA today  5.  Possible discharge back to SNF Tuesday           Orders for her subacute nursing facility:  1.  Fax this  page to [phone]  2.  Appointment to see me next Thursday 2/15 at 1500-this appointment has been made  3.  Daptomycin 500 mg IV daily at her subacute nursing facility with an anticipated duration of 5 weeks  4.  PICC  line dressing change weekly with a Tegaderm CHG gel dressing or Biopatch  5.  CBC, CMP, ESR, CRP, CPK every Monday-faxed to [phone]    Rufus Saunders MD  2/12/2018  8:35 AM

## 2018-02-12 NOTE — PROGRESS NOTES
"   LOS: 12 days    Patient Care Team:  Ciaran Wakefield MD as PCP - General (Internal Medicine)  No Known Provider as PCP - Family Medicine    Reason For Visit:    Camilo/sepsis    Subjective       Says she feels ok    Review of Systems:    Pulm: No soa   CV:  No CP      Objective       apixaban 5 mg Oral Q12H   atorvastatin 10 mg Oral Nightly   DAPTOmycin 6 mg/kg Intravenous Q24H   diltiaZEM  mg Oral Q24H   famotidine 20 mg Oral Daily   insulin detemir 13 Units Subcutaneous QAM   insulin lispro 0-7 Units Subcutaneous 4x Daily With Meals & Nightly   insulin lispro 3 Units Subcutaneous TID With Meals   metoprolol tartrate 50 mg Oral Q12H            Vital Signs:  Blood pressure 142/90, pulse 82, temperature 96.5 °F (35.8 °C), temperature source Axillary, resp. rate 18, height 165.1 cm (65\"), weight 75.4 kg (166 lb 3.2 oz), SpO2 96 %.    Flowsheet Rows         First Filed Value    Admission Height  165.1 cm (65\") Documented at 01/31/2018 1333    Admission Weight  90.7 kg (200 lb) Documented at 01/31/2018 1333          02/11 0701 - 02/12 0700  In: 586 [P.O.:586]  Out: 2425 [Urine:650]    Physical Exam:    General Appearance: NAD, alert and cooperative, Ox3  Eyes: PER, conjunctivae and sclerae normal, no icterus  Lungs: respirations regular and unlabored, no crepitus, clear to auscultation  Heart/CV: regular rhythm & normal rate, no murmur, no gallop, no rub and no edema  Abdomen: not distended, soft, non-tender, no masses,  bowel sounds present  Skin: No rash, Warm and dry    Radiology:      Renal Imaging:        Labs:    Results from last 7 days  Lab Units 02/06/18  0339   WBC 10*3/mm3 11.25*   HEMOGLOBIN g/dL 12.5   HEMATOCRIT % 40.6   PLATELETS 10*3/mm3 209       Results from last 7 days  Lab Units 02/12/18  0421 02/09/18  0401 02/08/18  0421 02/07/18  0330 02/06/18  0339   SODIUM mmol/L 140 140 139 141 139   POTASSIUM mmol/L 3.7 3.7 3.6 3.6 3.7   CHLORIDE mmol/L 111* 108 108 111* 107   CO2 mmol/L 19.0* 24.0 " 23.0 23.0 26.0   BUN mg/dL 26* 31* 34* 43* 51*   CREATININE mg/dL 1.30 1.40* 1.40* 1.70* 1.90*   CALCIUM mg/dL 9.3 8.0* 8.1* 8.4* 8.2*   PHOSPHORUS mg/dL 2.6  --   --   --  1.6*   ALBUMIN g/dL 3.00*  --   --   --  2.80*       Results from last 7 days  Lab Units 02/12/18  0421   GLUCOSE mg/dL 84                       Estimated Creatinine Clearance: 38.6 mL/min (by C-G formula based on Cr of 1.3).      Assessment     Principal Problem:    Sepsis due to urinary tract infection  Active Problems:    Diabetes mellitus, type 2    Hyperlipidemia    Hypothyroidism    Chronic obstructive pulmonary disease    CAD (coronary artery disease)    Peripheral vascular disease    Obstructive sleep apnea syndrome    Congestive heart failure    Atrial fibrillation with rapid ventricular response    Altered mental status    Acute renal failure    Hyperkalemia    Leukocytosis    Elevated troponin            Impression:     chung resolving        Recommendations:     D/c anytime ok with renal, picc line ok with renal will make f/u appt in 6 weeks, dr altman will keep a close eye on renal function on atbs and we can see her sooner if needed renal sign off    Aj Oseguera MD  02/12/18  12:45 PM

## 2018-02-12 NOTE — PLAN OF CARE
Problem: Patient Care Overview (Adult)  Goal: Plan of Care Review  Outcome: Ongoing (interventions implemented as appropriate)   02/12/18 0251   Patient Care Overview   Progress progress toward functional goals as expected       Problem: Renal Failure/Kidney Injury, Acute (Adult)  Goal: Signs and Symptoms of Listed Potential Problems Will be Absent or Manageable (Renal Failure/Kidney Injury, Acute)  Outcome: Ongoing (interventions implemented as appropriate)   02/12/18 0251   Renal Failure/Kidney Injury, Acute   Problems Assessed (Acute Renal Failure/Kidney Injury) hypertension;infection;situational response   Problems Present (Acute Renal Failure/Kidney Injury) hypertension;situational response;infection       Problem: Cardiac Output, Decreased (Adult)  Goal: Identify Related Risk Factors and Signs and Symptoms  Outcome: Ongoing (interventions implemented as appropriate)   02/12/18 0251   Cardiac Output, Decreased   Cardiac Output, Decreased: Related Risk Factors heart rhythm altered   Signs and Symptoms (Cardiac Output Decreased) heart rate/rhythm alteration     Goal: Adequate Cardiac Output/Effective Tissue Perfusion  Outcome: Ongoing (interventions implemented as appropriate)   02/12/18 0251   Cardiac Output, Decreased (Adult)   Adequate Cardiac Output/Effective Tissue Perfusion making progress toward outcome       Problem: Sepsis (Adult)  Goal: Signs and Symptoms of Listed Potential Problems Will be Absent or Manageable (Sepsis)  Outcome: Ongoing (interventions implemented as appropriate)   02/12/18 0251   Sepsis   Problems Assessed (Sepsis) infection progression   Problems Present (Sepsis) situational response       Problem: Skin Integrity Impairment, Risk/Actual (Adult)  Goal: Identify Related Risk Factors and Signs and Symptoms  Outcome: Ongoing (interventions implemented as appropriate)   02/12/18 0251   Skin Integrity Impairment, Risk/Actual   Skin Integrity Impairment, Risk/Actual: Related Risk Factors  cognitive impairment;immobility;infection/disease process     Goal: Skin Integrity/Wound Healing  Outcome: Ongoing (interventions implemented as appropriate)   02/12/18 0251   Skin Integrity Impairment, Risk/Actual (Adult)   Skin Integrity/Wound Healing making progress toward outcome       Problem: Fall Risk (Adult)  Goal: Identify Related Risk Factors and Signs and Symptoms  Outcome: Ongoing (interventions implemented as appropriate)   02/12/18 0251   Fall Risk   Fall Risk: Related Risk Factors age-related changes;bladder function altered;confusion/agitation;gait/mobility problems;history of falls   Fall Risk: Signs and Symptoms presence of risk factors     Goal: Absence of Falls  Outcome: Ongoing (interventions implemented as appropriate)   02/12/18 0251   Fall Risk (Adult)   Absence of Falls making progress toward outcome       Problem: Pressure Ulcer Risk (Ralph Scale) (Adult,Obstetrics,Pediatric)  Goal: Identify Related Risk Factors and Signs and Symptoms  Outcome: Ongoing (interventions implemented as appropriate)   02/12/18 0251   Pressure Ulcer Risk (Ralph Scale)   Related Risk Factors (Pressure Ulcer Risk (Ralph Scale)) cognitive impairment;hospitalization prolonged;infection;mental impairment;mobility impaired     Goal: Skin Integrity  Outcome: Ongoing (interventions implemented as appropriate)   02/12/18 0251   Pressure Ulcer Risk (Ralph Scale) (Adult,Obstetrics,Pediatric)   Skin Integrity making progress toward outcome

## 2018-02-12 NOTE — PROGRESS NOTES
Adult Nutrition  Assessment/PES    Patient Name:  Leila Smith  YOB: 1945  MRN: [id number]  Admit Date:  1/31/2018    Assessment Date:  2/12/2018    Comments:  Pt intake 46%/6 meals, drinking some supplements. Will continue to follow.           Reason for Assessment       02/12/18 1336    Reason for Assessment    Reason For Assessment/Visit follow up protocol    Time Spent (min) 20    Diagnosis --   Per notes this admission.              Nutrition/Diet History       02/12/18 1336    Nutrition/Diet History    Reported/Observed By Patient;RD/Tech    Other Pt nodded yes when asked about supplements.  RD observed recorded meals, it seems as though pt will eat 1 good meal/day and the other 2 meals are minimal. YANNA today.            Anthropometrics       02/12/18 1338    Anthropometrics    RD Documented Current Weight  75.4 kg (166 lb 3 oz)   Bed weight taken 2/12            Labs/Tests/Procedures/Meds       02/12/18 1339    Labs/Tests/Procedures/Meds    Labs/Tests Review Reviewed    Medication Review Reviewed, pertinent;Antibiotic;Insulin                Nutrition Prescription Ordered       02/12/18 1340    Nutrition Prescription PO    Current PO Diet Regular    Supplement Boost Glucose Control    Supplement Frequency 3 times a day    Common Modifiers Consistent Carbohydrate            Evaluation of Received Nutrient/Fluid Intake       02/12/18 1341    PO Evaluation    Number of Meals 6    % PO Intake 46            Problem/Interventions:        Problem 1       02/12/18 1341    Nutrition Diagnoses Problem 1    Problem 1 Inadequate Intake/Infusion    Inadequate Intake Type Oral    Etiology (related to) Other (comment)   clinical condition    Signs/Symptoms (evidenced by) PO Intake    Percent (%) intake recorded 46 %    Over number of meals 6                    Intervention Goal       02/12/18 1342    Intervention Goal    General Nutrition support treatment    PO Increase intake;Continue positive trend             Nutrition Intervention       02/12/18 1342    Nutrition Intervention    RD/Tech Action Follow Tx progress;Care plan reviewd              Education/Evaluation       02/12/18 1342    Monitor/Evaluation    Monitor Per protocol;PO intake;Supplement intake;Pertinent labs        Electronically signed by:  Lynsey Small  02/12/18 1:42 PM

## 2018-02-12 NOTE — PROGRESS NOTES
[facility] Medicine Services  PROGRESS NOTE    Patient Name: Leila Smith  : 1945  MRN: [id number]    Date of Admission: 2018  Length of Stay: 12  Primary Care Physician: Ciaran Wakefield MD    Subjective   Subjective     CC: F/U sepsis    HPI:  Sleeping but arouses. Not as talkative today. No overnight events per RN    Review of Systems  Gen- No fevers, chills  CV- No chest pain, palpitations  Resp- No cough, dyspnea  GI- No N/V/D, abd pain    Objective   Objective     Vital Signs:   Temp:  [96.5 °F (35.8 °C)-98.9 °F (37.2 °C)] 96.5 °F (35.8 °C)  Heart Rate:  [] 82  Resp:  [18] 18  BP: (142-148)/(85-90) 142/90        Physical Exam:  Constitutional: No acute distress, awake, alert, laying in bed  HENT: NCAT, mucous membranes moist  Respiratory: Respiratory effort normal, lungs CTAB  Cardiovascular: Irregularly irregular, no murmurs  Gastrointestinal: Soft, nontender, nondistended, normal bowel sounds  Musculoskeletal: No bilateral ankle edema  Psychiatric: Pleasant and cooperative  Neurologic: Cranial Nerves grossly intact to confrontation  Skin: No rashes    Results Reviewed:  I have personally reviewed current lab, radiology, and data and agree.      Results from last 7 days  Lab Units 18  0339   WBC 10*3/mm3 11.25*   HEMOGLOBIN g/dL 12.5   HEMATOCRIT % 40.6   PLATELETS 10*3/mm3 209       Results from last 7 days  Lab Units 18  0421 18  0401 18  0421   SODIUM mmol/L 140 140 139   POTASSIUM mmol/L 3.7 3.7 3.6   CHLORIDE mmol/L 111* 108 108   CO2 mmol/L 19.0* 24.0 23.0   BUN mg/dL 26* 31* 34*   CREATININE mg/dL 1.30 1.40* 1.40*   GLUCOSE mg/dL 84 99 93   CALCIUM mg/dL 9.3 8.0* 8.1*     Estimated Creatinine Clearance: 38.6 mL/min (by C-G formula based on Cr of 1.3).  No results found for: BNP  No results found for: PHART    Microbiology Results Abnormal     Procedure Component Value - Date/Time    Blood Culture - Blood, [[id number]]  (Normal)  Collected:  02/06/18 0341    Lab Status:  Final result Specimen:  Blood from Hand, Right Updated:  02/11/18 0446     Blood Culture No growth at 5 days    Blood Culture - Blood, [[id number]]  (Normal) Collected:  02/05/18 1859    Lab Status:  Final result Specimen:  Blood from Arm, Right Updated:  02/10/18 1916     Blood Culture No growth at 5 days    Narrative:       Aerobic bottle only    Blood Culture - Blood, [[id number]]  (Abnormal)  (Susceptibility) Collected:  01/31/18 1335    Lab Status:  Edited Result - FINAL Specimen:  Blood from Arm, Left Updated:  02/08/18 0918     Blood Culture --      Staphylococcus epidermidis (A)      This isolate does not demonstrate inducible clindamycin resistance in vitro.          Isolated from Aerobic and Anaerobic Bottles     Gram Stain Result Anaerobic Bottle Gram positive cocci in clusters      Aerobic Bottle Gram positive cocci in clusters    Susceptibility      Staphylococcus epidermidis     DIMITRIS     Ciprofloxacin >2 ug/ml Resistant     Clindamycin <=0.5 ug/ml Susceptible     Daptomycin <=0.5 ug/ml Susceptible     Erythromycin >4 ug/ml Resistant     Gentamicin <=4 ug/ml Susceptible     Levofloxacin >4 ug/ml Resistant     Linezolid <=1 ug/ml Susceptible     Oxacillin >2 ug/ml Resistant     Penicillin G >8 ug/ml Resistant     Quinupristin + Dalfopristin <=0.5 ug/ml Susceptible     Rifampin <=1 ug/ml Susceptible     Tetracycline <=4 ug/ml Susceptible     Trimethoprim + Sulfamethoxazole <=0.5/9.5 ug/ml Susceptible     Vancomycin 2 ug/ml Susceptible                    Blood Culture - Blood, [[id number]]  (Abnormal)  (Susceptibility) Collected:  01/31/18 1323    Lab Status:  Edited Result - FINAL Specimen:  Blood from Arm, Right Updated:  02/08/18 0830     Blood Culture --      Staphylococcus epidermidis (A)      This isolate does not demonstrate inducible clindamycin resistance in vitro.          Isolated from Aerobic and Anaerobic Bottles     Gram Stain Result Anaerobic Bottle Gram  positive cocci in pairs and clusters      Aerobic Bottle Gram positive cocci in clusters    Susceptibility      Staphylococcus epidermidis     DIMITRIS     Ciprofloxacin >2 ug/ml Resistant     Clindamycin <=0.5 ug/ml Susceptible     Daptomycin <=0.5 ug/ml Susceptible     Erythromycin >4 ug/ml Resistant     Gentamicin <=4 ug/ml Susceptible     Levofloxacin >4 ug/ml Resistant     Linezolid <=1 ug/ml Susceptible     Oxacillin >2 ug/ml Resistant     Penicillin G >8 ug/ml Resistant     Quinupristin + Dalfopristin <=0.5 ug/ml Susceptible     Rifampin <=1 ug/ml Susceptible     Tetracycline <=4 ug/ml Susceptible     Trimethoprim + Sulfamethoxazole <=0.5/9.5 ug/ml Susceptible     Vancomycin 1 ug/ml Susceptible                    Blood Culture ID, PCR - Blood, [[id number]]  (Normal) Collected:  01/31/18 1323    Lab Status:  Edited Result - FINAL Specimen:  Blood from Arm, Right Updated:  02/08/18 0830     BCID, PCR No organism detected by BCID PCR.    Blood Culture - Blood, [[id number]]  (Normal) Collected:  02/02/18 0805    Lab Status:  Final result Specimen:  Blood from Blood, Central Line Updated:  02/07/18 1031     Blood Culture No growth at 5 days    Urine Culture - Urine, Urine, Clean Catch [[id number]]  (Abnormal) Collected:  01/31/18 1643    Lab Status:  Final result Specimen:  Urine from Urine, Catheter Updated:  02/03/18 1239     Urine Culture --      >100,000 CFU/mL Candida parapsilosis (A)    Urine Culture - Urine, Urine, Clean Catch [[id number]]  (Abnormal) Collected:  01/31/18 1346    Lab Status:  Final result Specimen:  Urine from Urine, Catheter Updated:  02/02/18 1154     Urine Culture --      40,000-50,000 CFU/mL Candida albicans (A)    Influenza A & B, RT PCR - Swab, Nasopharynx [[id number]]  (Normal) Collected:  01/31/18 1630    Lab Status:  Final result Specimen:  Swab from Nasopharynx Updated:  01/31/18 1748     Influenza A PCR Not Detected     Influenza B PCR Not Detected          Imaging Results (last 24 hours)      ** No results found for the last 24 hours. **        Results for orders placed during the hospital encounter of 01/31/18   Adult Transthoracic Echo Complete W/ Cont if Necessary Per Protocol    Narrative · The left ventricular cavity is small.  · Left ventricular wall thickness is consistent with concentric   hypertrophy.  · Right ventricular cavity is mild-to-moderately dilated.  · Left atrial cavity size is dilated.  · Moderate tricuspid valve regurgitation is present.  · Moderate aortic valve regurgitation is present.  · Mild pulmonic valve regurgitation is present.  · There is mild calcification of the aortic valve mainly affecting the non   coronary cusp(s).  · Left ventricular systolic function is normal. Estimated EF = 55%.  · There is a small mobile echogenic density near the annulus of the   posterior leaflet of the mitral valve  · Saline contrast study is negative for PFO  · The patient's rhythm is tachycardic throughout and on the transmitral   inflow pattern there's an BALDO waves to suggest this may be atrial flutter          I have reviewed the medications.    Assessment/Plan   Assessment / Plan     Hospital Problem List     * (Principal)Sepsis due to urinary tract infection    Diabetes mellitus, type 2    Overview Signed 8/3/2016  2:13 PM by Ama Wolf     With foot ulcer         Hyperlipidemia    Hypothyroidism    Chronic obstructive pulmonary disease    CAD (coronary artery disease)    Overview Signed 7/15/2016 10:27 AM by Yue Royal     History of  Coronary Artery Disease V12.59         Peripheral vascular disease    Obstructive sleep apnea syndrome    Congestive heart failure    Atrial fibrillation with rapid ventricular response    Altered mental status    Acute renal failure    Hyperkalemia    Leukocytosis    Elevated troponin             Brief Hospital Course to date:  Leila Smith is a 72 y.o. female who is mentally challenged at baseline and who Res[facility] at [facility] with a past  medical history of poorly controlled type 2 diabetes mellitus, hypertension, hyperlipidemia, hypothyroidism, coronary artery disease, chronic obstructive pulmonary disease, paroxysmal atrial fibrillation, obstructive sleep apnea, who was brought in by EMS for altered mental status.  She was afebrile but tachycardic and hypotensive.  She was found to be in atrial fibrillation with rapid ventricular response, hypotension, acute kidney injury, severe metabolic acidosis.  Initial concern was for UTI.  Urine culture grew candida species.  Blood cultures on admission returned positive with staph epidermidis from both sets which were drawn at separate times and locations.  Echocardiogram concerning for mitral valve vegetation.  Family refusing YANNA initially due to need for sedation, but now wants to pursue before committing to long term antibiotics.      Assessment & Plan:    Sepsis on admission-possibly due to S. Epi bacterial endocarditis  -Sepsis now resolved.  Unlikely due to UTI since she only grew a few CFU of candida species on culture.  More likely due to bacterial endocarditis given multiple positive blood cultures, abnormality on mitral valve, and elevated inflammatory markers  -ID following, recommend: PICC placement after YANNA. Daptomycin 500mg IV daily x 5 weeks. Follow up 2/15 at 1500, Dr. Saunders  -Completed course of fluconazole for candiduria  -Family would like YANNA prior to proceeding with long term IV antibiotics. YANNA pending.    ANGELA  -resolving  -Continue to encourage PO intake  --Renal following    A-fib with RVR  -[id number] = 4  -Continue diltiazem and metoprolol  -Started on Eliquis    DM2  -Improved control  -Continue basal/bolus insulin    Addendum 1705: Patient not receiving YANNA today 2/2 to staffing issues. Patient placed back on diet and will be NPO after MN for planned YANNA tomorrow    DVT Prophylaxis:  Eliquis    CODE STATUS: Full Code    Disposition: I expect the patient to be discharged  back to [facility] as soon as Tomorrow, 2/13  Yumiko Seymour, APRN  02/12/18  10:43 AM

## 2018-02-12 NOTE — PLAN OF CARE
Problem: Pressure Ulcer Risk (Ralph Scale) (Adult,Obstetrics,Pediatric)  Goal: Identify Related Risk Factors and Signs and Symptoms  Outcome: Ongoing (interventions implemented as appropriate)    Goal: Skin Integrity  Outcome: Ongoing (interventions implemented as appropriate)

## 2018-02-12 NOTE — PROGRESS NOTES
Continued Stay Note  [facility]     Patient Name: Leila Smith  MRN: [id number]  Today's Date: 2/12/2018    Admit Date: 1/31/2018          Discharge Plan       02/12/18 1460    Case Management/Social Work Plan    Plan discharge plan    Patient/Family In Agreement With Plan yes    Additional Comments Per provider note, pt not medically ready for discharge today. Pt is scheduled for YANNA [place]. Spoke with Fozia at [facility] and she is aware pt having a YANNA tomorrow, Tues. Pt has bed hold days throught 2/13. [facility]               Discharge Codes     None        Expected Discharge Date and Time     Expected Discharge Date Expected Discharge Time    Feb 13, 2018             Alisa Kelly RN

## 2018-02-13 NOTE — PROGRESS NOTES
[facility] Progress Note    Admission Date: 2018    Leila Smith  1945  [id number]    Date: 2018    Meds:    Anti-Infectives     Ordered     Dose/Rate Route Frequency Start Stop    18 1757  DAPTOmycin (CUBICIN) 500 mg in sterile water (preservative free) 10 mL IV syringe     Ordering Provider:  Rohan Saunders MD    6 mg/kg × 81.4 kg  over 2 Minutes Intravenous Every 24 Hours 18 0900 18 0859    18 0757  fluconazole (DIFLUCAN) tablet 200 mg     Rohan Saunders MD let the order  on 18 0658.   Ordering Provider:  Rohan Saunders MD    200 mg Oral Every 24 Hours 18 0900 18 0657    18 0823  vancomycin 1250 mg/250 mL 0.9% NS IVPB ([facility])     Ordering Provider:  Evert Cid [facility]    1,250 mg Intravenous Once 18 1000 18 1007    18 1413  vancomycin 1750 mg/500 mL 0.9% NS IVPB ([facility])     Ordering Provider:  Jasmina Maier, DO    20 mg/kg × 90.7 kg  over 120 Minutes Intravenous Once 18 1445 18 1732    18 1413  piperacillin-tazobactam (ZOSYN) 4.5 g in iso-osmotic dextrose 100 mL IVPB (premix)     Ordering Provider:  Jasmina RACHEL Case, DO    4.5 g  200 mL/hr over 0.5 Hours Intravenous Once 18 1445 18 1603    18 1418  cefTRIAXone (ROCEPHIN) IVPB 1 g     Ordering Provider:  Blayne Moe MD    1 g  100 mL/hr over 30 Minutes Intravenous Once 18 1430 18 1522          CC:  UTI     SUBJECTIVE:  18:  Patient is a 72 y.o. female who is seen today for evaluation of severe sepsis with blood cultures positive for staph epidermidis a urine culture positive for Candida.  She was brought from her nursing home for increasing lethargy.  She has a baseline of dementia/cognitive dysfunction but was noted to be substantially less responsive prior to admission.  Admitting labs revealed lactic acidosis, a leukocytosis of 17, 000, acute renal failure with Scr of 4.70, and 2/2 sets of positive blood  "cultures for Staph epidermidis, with repeat culture negative. She has had two positive urine cultures for yeast, initially Candida albicans and  most recently Candida parapsilosis. Vancomycin and Zosyn initiated and we were consulted for antibiotic management.  She is minimally conversant, and history obtained from the chart.  Her trans- thoracic echocardiogram revealed a possible mitral valve vegetation.  She is scheduled for a transesophageal echocardiogram this am.   18:  Daughter refusing YANNA.  The patient cannot provide a reliable review of systems due to her profound dementia.  The laboratory identified one of HER-2 sets of positive blood cultures as staph epidermidis but the second set was identified only as coagulase-negative staph and not speciate it yet.  She has remained afebrile.  18: The nursing staff indicates that she developed bradycardia overnight and her Cardizem was held.  She is unable to provide any reliable ROS.  She has remained afebrile.  So far her family has not consented to a transesophageal echocardiogram.  18:  She is conversant this am.  \"ready to go home\".  Remains afebrile.  Daughter still refusing YANNA.  18: Daughter is refusing PICC line and Daptomycin.  She is waiting for family meeting to decide about treatment.   18:  YANNA is now scheduled for today.  She cannot provide any reliable ROS due to her severe dementia.  She has remained afebrile.  18:  YANNA rescheduled for today, with possible transfer to SNF after. She awakens easily.  Remains afebrile    ROS:  No f/c/s. No n/v/d. No rash. No new ADR to Abx.     PE:   Vital Signs  Temp (24hrs), Av.3 °F (36.8 °C), Min:97.9 °F (36.6 °C), Max:98.6 °F (37 °C)    Temp  Min: 97.9 °F (36.6 °C)  Max: 98.6 °F (37 °C)  BP  Min: 131/72  Max: 148/83  Pulse  Min: 82  Max: 112  Resp  Min: 17  Max: 20  SpO2  Min: 92 %  Max: 92 %    GENERAL:drowsy and alert, in no acute distress.   HEENT:  No conjunctival injection. No " icterus. Oropharynx clear without evidence of thrush or exudate.  NECK: Supple, no JVD     HEART: 1-2/6 systolic murmur  LUNGS: Clear to auscultation bilaterally without wheezing, rales, rhonchi. Normal respiratory effort. Nonlabored. No dullness.  ABDOMEN: Soft, nontender, nondistended. Positive bowel sounds. No rebound or guarding. NO mass or HSM.  EXT:  No cyanosis, clubbing or edema. No cord.   Left IJ site ok   SKIN: Warm and dry without cutaneous eruptions on Inspection/palpation.    NEURO: drowsy  Laboratory Data          Results from last 7 days  Lab Units 02/12/18  0421   SODIUM mmol/L 140   POTASSIUM mmol/L 3.7   CHLORIDE mmol/L 111*   CO2 mmol/L 19.0*   BUN mg/dL 26*   CREATININE mg/dL 1.30   GLUCOSE mg/dL 84   CALCIUM mg/dL 9.3           Results from last 7 days  Lab Units 02/08/18  0947   SED RATE mm/hr 74*       Results from last 7 days  Lab Units 02/08/18  0947   CRP mg/dL 2.20*           Results from last 7 days  Lab Units 02/09/18  0401   CK TOTAL U/L 34       Results from last 7 days  Lab Units 02/07/18  0330   VANCOMYCIN RM mcg/mL 14.50     Estimated Creatinine Clearance: 38.2 mL/min (by C-G formula based on Cr of 1.3).    Microbiology:  Blood Culture   Date Value Ref Range Status   02/05/2018 No growth at less than 24 hours  Preliminary     BCID, PCR   Date Value Ref Range Status   01/31/2018 No organism detected by BCID PCR. No organism detected by BCID PCR. Final      2/2: BC no growth  2/5:  BC no growth   2/6: BC pending       1/31:  BC  1/31  (2/2)  Staph epidermidis,      Urine Culture   Date Value Ref Range Status   01/31/2018 >100,000 CFU/mL Candida parapsilosis (A)  Final          Radiology:  Imaging Results (last 72 hours)     ** No results found for the last 72 hours. **        Her YANNA revealed no evidence of valvular vegetation.      IMPRESSION:  1.  Staph epidermidis bacteremia-since her YANNA reveals no evidence of valvular vegetation/endocarditis I will plan to discontinue her  daptomycin therapy.  2.  Severe sepsis-improved  3.  Acute renal failure/ATN-improved  4.  Mitral valve lesion-as above  5.  Leukocytosis/neutrophilia-improved  6.  Severe dementia  7.  Candida UTI  8.  Type 2 diabetes mellitus      I discussed her complicated situation with Dr. Flores again today.  We can discontinue her antibiotic therapy and discharge her today off of antibiotic therapy.    RECOMMENDATIONS:  1.  Discontinue daptomycin  2.  Discharge today  3.  Remove the central line  4.  Discharge off of antibiotic therapy    Dr. Saunders has obtained the history, performed the physical exam and formulated the above treatment plan.     UM/SELWYN: I discussed her disposition with case management today.  I have asked that case management discontinue her orders for intravenous antibiotic therapy at the subacute nursing facility.  I also discussed her disposition with the nursing staff.    I spent over 35 minutes on her complex situation today.  Over 50% of the time was spent in conference and care coordination.      Rufus Saunders MD  2/13/2018  9:15 AM

## 2018-02-13 NOTE — PROGRESS NOTES
Continued Stay Note  [facility]     Patient Name: Leila Smith  MRN: [id number]  Today's Date: 2/13/2018    Admit Date: 1/31/2018          Discharge Plan       02/13/18 1234    Case Management/Social Work Plan    Plan discharge plan    Patient/Family In Agreement With Plan yes    Additional Comments  C              Discharge Codes     None        Expected Discharge Date and Time     Expected Discharge Date Expected Discharge Time    Feb 14, 2018             Alisa Kelly RN

## 2018-02-13 NOTE — PLAN OF CARE
Problem: Patient Care Overview (Adult)  Goal: Plan of Care Review  Outcome: Ongoing (interventions implemented as appropriate)   02/13/18 0250   Patient Care Overview   Progress progress toward functional goals as expected   Outcome Evaluation   Outcome Summary/Follow up Plan Pt having YANNA today with possible PICC placement and possible d/c       Problem: Renal Failure/Kidney Injury, Acute (Adult)  Goal: Signs and Symptoms of Listed Potential Problems Will be Absent or Manageable (Renal Failure/Kidney Injury, Acute)  Outcome: Ongoing (interventions implemented as appropriate)   02/13/18 0250   Renal Failure/Kidney Injury, Acute   Problems Assessed (Acute Renal Failure/Kidney Injury) hypertension;infection   Problems Present (Acute Renal Failure/Kidney Injury) hypertension;infection       Problem: Cardiac Output, Decreased (Adult)  Goal: Identify Related Risk Factors and Signs and Symptoms  Outcome: Ongoing (interventions implemented as appropriate)   02/13/18 0250   Cardiac Output, Decreased   Cardiac Output, Decreased: Related Risk Factors disease process;heart rhythm altered   Signs and Symptoms (Cardiac Output Decreased) fatigue;heart rate/rhythm alteration;weakness/activity intolerance     Goal: Adequate Cardiac Output/Effective Tissue Perfusion  Outcome: Ongoing (interventions implemented as appropriate)   02/13/18 0250   Cardiac Output, Decreased (Adult)   Adequate Cardiac Output/Effective Tissue Perfusion making progress toward outcome       Problem: Sepsis (Adult)  Goal: Signs and Symptoms of Listed Potential Problems Will be Absent or Manageable (Sepsis)  Outcome: Ongoing (interventions implemented as appropriate)   02/13/18 0250   Sepsis   Problems Assessed (Sepsis) glycemic control impaired;infection progression   Problems Present (Sepsis) glycemic control, impaired;progression of infection       Problem: Skin Integrity Impairment, Risk/Actual (Adult)  Goal: Identify Related Risk Factors and Signs and  Symptoms  Outcome: Ongoing (interventions implemented as appropriate)   02/13/18 0250   Skin Integrity Impairment, Risk/Actual   Skin Integrity Impairment, Risk/Actual: Related Risk Factors age extremes;cognitive impairment;immobility;infection/disease process;sensory impairment     Goal: Skin Integrity/Wound Healing  Outcome: Ongoing (interventions implemented as appropriate)   02/13/18 0250   Skin Integrity Impairment, Risk/Actual (Adult)   Skin Integrity/Wound Healing making progress toward outcome       Problem: Fall Risk (Adult)  Goal: Identify Related Risk Factors and Signs and Symptoms  Outcome: Ongoing (interventions implemented as appropriate)   02/13/18 0250   Fall Risk   Fall Risk: Related Risk Factors age-related changes;confusion/agitation;gait/mobility problems;history of falls     Goal: Absence of Falls  Outcome: Ongoing (interventions implemented as appropriate)   02/13/18 0250   Fall Risk (Adult)   Absence of Falls making progress toward outcome       Problem: Pressure Ulcer Risk (Ralph Scale) (Adult,Obstetrics,Pediatric)  Goal: Identify Related Risk Factors and Signs and Symptoms  Outcome: Ongoing (interventions implemented as appropriate)   02/13/18 0250   Pressure Ulcer Risk (Ralph Scale)   Related Risk Factors (Pressure Ulcer Risk (Ralph Scale)) cognitive impairment;hospitalization prolonged;infection;mental impairment;mobility impaired     Goal: Skin Integrity  Outcome: Ongoing (interventions implemented as appropriate)   02/13/18 0250   Pressure Ulcer Risk (Ralph Scale) (Adult,Obstetrics,Pediatric)   Skin Integrity making progress toward outcome

## 2018-02-13 NOTE — DISCHARGE PLACEMENT REQUEST
"Leila Smith (72 y.o. Female)     To [facility]  From [facility] at [facility]() [phone]    Date of Birth Social Security Number Address Home Phone MRN    1945  [address] [place] [address] [phone] [id number]    Judaism Marital Status          Unknown        Admission Date Admission Type Admitting Provider Attending Provider Department, Room/Bed    18 Emergency Rod Flores MD West, Christopher R, MD [facility] 5H, S583/1    Discharge Date Discharge Disposition Discharge Destination         Skilled Nursing Facility (DC - External)             Attending Provider: Rod Flores MD     Allergies:  Codeine, Tetracyclines & Related    Isolation:  None   Infection:  None   Code Status:  FULL    Ht:  165.1 cm (65\")   Wt:  74.1 kg (163 lb 5 oz)    Admission Cmt:  None   Principal Problem:  Sepsis due to urinary tract infection [A41.9,N39.0]                 Active Insurance as of 2018     Primary Coverage     Payor Plan Insurance Group Employer/Plan Group    ANTHEM MEDICARE REPLACEMENT ANTHEM MEDICARE ADVANTAGE [id number]     Payor Plan Address Payor Plan Phone Number Effective From Effective To    [address] [phone] 2017     [place] [address]       Subscriber Name Subscriber Birth Date Member ID       LEILA SMITH 1945 [id number]                 Emergency Contacts      (Rel.) Home Phone Work Phone Mobile Phone    Danilo Hope (Daughter) [phone] -- --    JohnsonKleber (Son) [phone] -- --               Discharge Summary      DANIEL Calle at 2018 12:47 PM              [facility] Medicine Services  DISCHARGE SUMMARY    Patient Name: Leila Smith  : 1945  MRN: [id number]    Date of Admission: 2018  Date of Discharge:  18  Primary Care Physician: Ciaran Wakefield MD    Consults     Date and Time Order Name Status Description    2018 1444 Inpatient " Consult to Infectious Diseases Completed     2/3/2018 1004 Inpatient Consult to Cardiology      2/1/2018 0706 Inpatient Consult to Nephrology Completed     1/31/2018 1654 Inpatient Consult to Cardiology          Hospital Course   Presenting Problem:   Acute renal failure, unspecified acute renal failure type [N17.9]    Active Hospital Problems (** Indicates Principal Problem)    Diagnosis Date Noted   • **Sepsis due to urinary tract infection [A41.9, N39.0] 01/31/2018   • Acute renal failure [N17.9] 01/31/2018   • Hyperkalemia [E87.5] 01/31/2018   • Leukocytosis [D72.829] 01/31/2018   • Elevated troponin [R74.8] 01/31/2018   • Altered mental status [R41.82] 12/20/2017   • Atrial fibrillation with rapid ventricular response [I48.91] 12/20/2017   • Congestive heart failure [I50.9] 07/20/2016   • Obstructive sleep apnea syndrome [G47.33] 07/20/2016   • Peripheral vascular disease [I73.9] 07/20/2016   • CAD (coronary artery disease) [I25.10] 07/15/2016   • Chronic obstructive pulmonary disease [J44.9] 07/15/2016   • Diabetes mellitus, type 2 [E11.9] 07/15/2016   • Hyperlipidemia [E78.5] 07/15/2016   • Hypothyroidism [E03.9] 07/15/2016      Resolved Hospital Problems    Diagnosis Date Noted Date Resolved   No resolved problems to display.      Hospital Course:  Leila Smith is a 72 y.o. female with PMH significant for COPD, CHF, A. fib, DM2, frequent UTIs who is mentally challenged at baseline who presented on 1/31/18 from [facility] rehabilitation via EMS for altered mental status.  The patient's daughter was called by the rehabilitation telling her that the patient was more lethargic than her baseline.  Patient did live at home by herself up until recently when she was put into [facility].  Patient had colostomy placed April 2017 and according to the daughter states that the patient has gone downhill quickly since then.  Upon evaluation in the ED, patient was septic, so patient was admitted to the ICU/pulmonary  intensive care team.    Pulmonary/intensivist followed patient throughout her stay.  Cardiology was consulted due to patient's elevated troponins.  Dr. Marquez.  The patient was in stable condition and wanted her back on her oral medications.  Cardiology followed the patient throughout her stay. It was determined that she needed an echocardiogram to rule out valvular vegetation.  YANNA was done on 2/13/18 and no vegetation was found on the valves.  Infectious disease followed the patient throughout her stay and started her on IV antibiotics, but those were stopped when her YANNA results were negative.  Nephrology followed the patient for her elevated Cr.  Nephrology followed the patient for an elevated Cr.   Patients Cr has normalized.  The patient was transferred to the hospital medicine service on 2/5/18 where she has continued to progress. Patient needs to follow up with [facility] 6 weeks after discharge.  Patient is medically ready for discharge back to [facility] via ambulance.  Day of Discharge   HPI:   Patient alert and lying in bed but confused.  No adverse events overnight.  No family in the room.    Review of Systems  Unable to assess due to patient's confusion  Otherwise ROS is negative except as mentioned in the HPI.    Vital Signs:   Temp:  [97.9 °F (36.6 °C)-98.6 °F (37 °C)] 98.6 °F (37 °C)  Heart Rate:  [] 85  Resp:  [16-20] 18  BP: (107-148)/() 123/91   Physical Exam:  General: Alert, well-developed well-nourished female in no acute distress    Head: Normocephalic atraumatic    Eyes: PERRLA, EOMI, nonicteric, conjunctiva normal    ENT: Pink, moist mucous membranes    Neck: Supple, nontender, trachea midline without lymphadenopathy, JVD, nuchal rigidity.      Cardiovascular: IRR IRR  no M/R/G  palpable DP pulses bilaterally    Respiratory: Nonlabored, symmetrical chest expansion, clear to auscultation bilaterally    Abdomen: Obese, soft, nontender, nondistended,  positive bowel sounds in all 4  quadrants     Extremities: FROM in upper and lower extremities bilaterally. negative for edema.  Feet in waffle boots bilaterally    Skin: Pink/warm/dry.  No rash or lesions noted    Neuro: Alert, speech is clear, follows no commands, otherwise, unable to assess due to patient's confusion    Psych: Pleasant, but follows no commands  Pertinent  and/or Most Recent Results     Results from last 7 days  Lab Units 02/12/18  0421 02/09/18  0401 02/08/18  0421 02/07/18  0330   SODIUM mmol/L 140 140 139 141   POTASSIUM mmol/L 3.7 3.7 3.6 3.6   CHLORIDE mmol/L 111* 108 108 111*   CO2 mmol/L 19.0* 24.0 23.0 23.0   BUN mg/dL 26* 31* 34* 43*   CREATININE mg/dL 1.30 1.40* 1.40* 1.70*   GLUCOSE mg/dL 84 99 93 133*   CALCIUM mg/dL 9.3 8.0* 8.1* 8.4*           Invalid input(s): PROT, LABALBU        Invalid input(s): TG, LDLCALC, LDLREALC      Brief Urine Lab Results  (Last result in the past 365 days)      Color   Clarity   Blood   Leuk Est   Nitrite   Protein   CREAT   Urine HCG        02/11/18 1213             83.5           Microbiology Results Abnormal     Procedure Component Value - Date/Time    Blood Culture - Blood, [[id number]]  (Normal) Collected:  02/06/18 0341    Lab Status:  Final result Specimen:  Blood from Hand, Right Updated:  02/11/18 0446     Blood Culture No growth at 5 days    Blood Culture - Blood, [[id number]]  (Normal) Collected:  02/05/18 1859    Lab Status:  Final result Specimen:  Blood from Arm, Right Updated:  02/10/18 1916     Blood Culture No growth at 5 days    Narrative:       Aerobic bottle only    Blood Culture - Blood, [[id number]]  (Abnormal)  (Susceptibility) Collected:  01/31/18 1335    Lab Status:  Edited Result - FINAL Specimen:  Blood from Arm, Left Updated:  02/08/18 0918     Blood Culture --      Staphylococcus epidermidis (A)      This isolate does not demonstrate inducible clindamycin resistance in vitro.          Isolated from Aerobic and Anaerobic Bottles     Gram Stain Result Anaerobic  Bottle Gram positive cocci in clusters      Aerobic Bottle Gram positive cocci in clusters    Susceptibility      Staphylococcus epidermidis     DIMITRIS     Ciprofloxacin >2 ug/ml Resistant     Clindamycin <=0.5 ug/ml Susceptible     Daptomycin <=0.5 ug/ml Susceptible     Erythromycin >4 ug/ml Resistant     Gentamicin <=4 ug/ml Susceptible     Levofloxacin >4 ug/ml Resistant     Linezolid <=1 ug/ml Susceptible     Oxacillin >2 ug/ml Resistant     Penicillin G >8 ug/ml Resistant     Quinupristin + Dalfopristin <=0.5 ug/ml Susceptible     Rifampin <=1 ug/ml Susceptible     Tetracycline <=4 ug/ml Susceptible     Trimethoprim + Sulfamethoxazole <=0.5/9.5 ug/ml Susceptible     Vancomycin 2 ug/ml Susceptible                    Blood Culture - Blood, [[id number]]  (Abnormal)  (Susceptibility) Collected:  01/31/18 1323    Lab Status:  Edited Result - FINAL Specimen:  Blood from Arm, Right Updated:  02/08/18 0830     Blood Culture --      Staphylococcus epidermidis (A)      This isolate does not demonstrate inducible clindamycin resistance in vitro.          Isolated from Aerobic and Anaerobic Bottles     Gram Stain Result Anaerobic Bottle Gram positive cocci in pairs and clusters      Aerobic Bottle Gram positive cocci in clusters    Susceptibility      Staphylococcus epidermidis     DIMITRIS     Ciprofloxacin >2 ug/ml Resistant     Clindamycin <=0.5 ug/ml Susceptible     Daptomycin <=0.5 ug/ml Susceptible     Erythromycin >4 ug/ml Resistant     Gentamicin <=4 ug/ml Susceptible     Levofloxacin >4 ug/ml Resistant     Linezolid <=1 ug/ml Susceptible     Oxacillin >2 ug/ml Resistant     Penicillin G >8 ug/ml Resistant     Quinupristin + Dalfopristin <=0.5 ug/ml Susceptible     Rifampin <=1 ug/ml Susceptible     Tetracycline <=4 ug/ml Susceptible     Trimethoprim + Sulfamethoxazole <=0.5/9.5 ug/ml Susceptible     Vancomycin 1 ug/ml Susceptible                    Blood Culture ID, PCR - Blood, [[id number]]  (Normal) Collected:  01/31/18  1323    Lab Status:  Edited Result - FINAL Specimen:  Blood from Arm, Right Updated:  02/08/18 0830     BCID, PCR No organism detected by BCID PCR.    Blood Culture - Blood, [[id number]]  (Normal) Collected:  02/02/18 0805    Lab Status:  Final result Specimen:  Blood from Blood, Central Line Updated:  02/07/18 1031     Blood Culture No growth at 5 days    Urine Culture - Urine, Urine, Clean Catch [[id number]]  (Abnormal) Collected:  01/31/18 1643    Lab Status:  Final result Specimen:  Urine from Urine, Catheter Updated:  02/03/18 1239     Urine Culture --      >100,000 CFU/mL Candida parapsilosis (A)    Urine Culture - Urine, Urine, Clean Catch [[id number]]  (Abnormal) Collected:  01/31/18 1346    Lab Status:  Final result Specimen:  Urine from Urine, Catheter Updated:  02/02/18 1154     Urine Culture --      40,000-50,000 CFU/mL Candida albicans (A)    Influenza A & B, RT PCR - Swab, Nasopharynx [[id number]]  (Normal) Collected:  01/31/18 1630    Lab Status:  Final result Specimen:  Swab from Nasopharynx Updated:  01/31/18 1748     Influenza A PCR Not Detected     Influenza B PCR Not Detected        Imaging Results (all)     Procedure Component Value Units Date/Time    XR Chest 1 View [[id number]] Collected:  01/31/18 1900     Updated:  01/31/18 1935    Narrative:       EXAM:    XR Chest, 1 View    CLINICAL HISTORY:    72 years old, female; Sepsis, unspecified organism; Elevated white blood cell   count, unspecified; Hyperkalemia; Unspecified atrial fibrillation; Acute kidney   failure, unspecified; Urinary tract infection, site not specified; Hematuria,   unspecified; Altered mental status, unspecified; Hyperglycemia, unspecified;   Device placement; Other: Central line placement    TECHNIQUE:    Frontal view of the chest.    COMPARISON:    CR - XR CHEST 1 VW 2018-01-31 13:45    FINDINGS:    Lungs:  Unremarkable.  No consolidation.    Pleural space:  Unremarkable.  No pneumothorax.    Heart:  The heart demonstrates  diffuse enlargement.    Mediastinum:  Unremarkable.    Bones/joints:  Unremarkable.    Tubes, lines and devices:  A left internal jugular central venous catheter is   present, with its tip overlying the region of the superior vena cava.      Impression:         A left internal jugular central venous catheter is present, with its tip   overlying the region of the superior vena cava.    THIS DOCUMENT HAS BEEN ELECTRONICALLY SIGNED BY URSULA ARGUELLO MD    XR Chest 1 View [[id number]] Collected:  01/31/18 1447     Updated:  01/31/18 2150    Narrative:       EXAMINATION: XR CHEST 1 VW-01/31/2018:      INDICATION: Weak/Dizzy/AMS, triage protocol.      COMPARISON: 12/19/2017.     FINDINGS: The patient is again rotated to the left. The heart shadow  appears borderline enlarged. The vasculature appears upper limits of  normal. Mild chronic appearing interstitial lung changes and old  granulomatous calcifications are again noted and appear stable.           Impression:       Mild pulmonary venous hypertension unchanged. No new chest  disease is identified.     D:  01/31/2018  E:  01/31/2018     This report was finalized on 1/31/2018 9:48 PM by DR. Brian Cosme MD.       CT Head Without Contrast [[id number]] Collected:  01/31/18 1648     Updated:  01/31/18 2223    Narrative:       EXAMINATION: CT HEAD WO CONTRAST- 01/31/2018     INDICATION: Decreased alertness; N17.9-Acute kidney failure,  unspecified; E87.5-Hyperkalemia; A41.9-Sepsis, unspecified organism;  N39.0-Urinary tract infection, site not specified; R31.9-Hematuria,  unspecified; R73.9-Hyperglycemia, unspecified; D72.829-Elevated white  blood cell count, unspecified; R41.82-Altered mental status,  unspecified; I48.91-Unspecified atrial fibrillation         TECHNIQUE: 5 mm unenhanced images through the brain     The radiation dose reduction device was turned on for each scan per the  ALARA (As Low as Reasonably Achievable) protocol.     COMPARISON: 12/19/2017     FINDINGS:  Previous exam report from 12/19/2017 indicates no acute  findings. History today indicates decreased level of alertness, altered  mental status.     The calvarium appears intact. Included paranasal sinuses mastoid air  cells and middle ear spaces appear clear. Soft tissue window images show  advanced generalized cerebral atrophy and chronic appearing central  white matter changes stable in pattern from previous study. There is no  evidence of mass, mass effect, hemorrhage, edema, hydrocephalus, or  abnormal extra-axial collection.       Impression:       Stable head CT scan with chronic appearing age related  changes. No evidence of acute intracranial disease.        D:  01/31/2018  E:  01/31/2018     This report was finalized on 1/31/2018 10:21 PM by DR. Brian Cosme MD.       US Renal Limited [[id number]] Collected:  02/01/18 1601     Updated:  02/02/18 1236    Narrative:       EXAMINATION: US RENAL, LIMITED-02/01/2018:     INDICATION: ANGELA, renal insufficiency.     TECHNIQUE: Sonographic imaging was obtained of the kidneys in both the  sagittal and transverse planes. The examination is suboptimal due to  patient inability to hold respirations.     COMPARISON: NONE.     FINDINGS: The right kidney measures in length from pole to pole 13.5 cm.  There is no solid mass identified. There is a renal cortical cyst  identified measuring 3.2 cm. No hydronephrosis. No nephrolithiasis. No  solid mass.     The left kidney measures in length from pole to pole 11.4 cm. Several  cystic areas identified, the largest measuring 2.5 cm. There is no  hydronephrosis or nephrolithiasis. No solid mass. Imaging of the bladder  reveals a Bone catheter present.       Impression:       Bilateral renal cortical cysts otherwise, unremarkable  examination.     D:  02/01/2018  E:  02/01/2018            This report was finalized on 2/2/2018 12:33 PM by Dr. Stephanie Jarrett MD.           Results for orders placed during the hospital encounter of  01/31/18   Adult Transesophageal Echo (YANNA) W/ Cont if Necessary Per Protocol    Narrative · Left ventricular systolic function is normal.  · Left ventricular wall thickness is consistent with moderate concentric   hypertrophy.  · Left atrial cavity size is dilated.  · Cardiac valves are morphologically within normal limits for patient's   age.          Discharge Details      Leila Smith   Home Medication Instructions ZANDER:[id number]    Printed on:02/13/18 9708   Medication Information                      acetaminophen (TYLENOL) 500 MG tablet  Take 1,000 mg by mouth Every 6 (Six) Hours As Needed for Mild Pain .             apixaban (ELIQUIS) 5 MG tablet tablet  Take 1 tablet by mouth Every 12 (Twelve) Hours.             Artificial Tear Solution (TEARS NATURALE OP)  Apply 1 application to eye 2 (Two) Times a Day.             aspirin 81 MG chewable tablet  Chew 81 mg Daily.             atorvastatin (LIPITOR) 10 MG tablet  Take 10 mg by mouth Every Night.             diltiaZEM CD (CARDIZEM CD) 240 MG 24 hr capsule  Take 1 capsule by mouth Daily.             famotidine (PEPCID) 20 MG tablet  Take 20 mg by mouth Daily.             haloperidol (HALDOL) 0.5 MG tablet  Take 1 tablet by mouth Every 12 (Twelve) Hours.             insulin detemir (LEVEMIR) 100 UNIT/ML injection  Inject 13 Units under the skin Every Morning.             insulin lispro (humaLOG) 100 UNIT/ML injection  Inject 0-7 Units under the skin 4 (Four) Times a Day With Meals & at Bedtime.             insulin lispro (humaLOG) 100 UNIT/ML injection  Inject 3 Units under the skin 3 (Three) Times a Day With Meals.             Melatonin 3 MG tablet  Take 3 mg by mouth Every Night.             metoprolol succinate XL (TOPROL-XL) 50 MG 24 hr tablet  Take 3 tablets by mouth Daily.             mometasone-formoterol (DULERA) 100-5 MCG/ACT inhaler  Inhale 2 puffs 2 (Two) Times a Day.               Discharge Disposition:  Skilled Nursing Facility (DC -  External)    Discharge Diet:  Diet Instructions     Diet: Regular, Consistent Carbohydrate, Cardiac; Thin       Discharge Diet:   Regular  Consistent Carbohydrate  Cardiac      Fluid Consistency:  Thin               Discharge Activity:   Activity Instructions     Activity as Tolerated                   No future appointments.    Additional Instructions for the Follow-ups that You Need to Schedule     Discharge Follow-up with PCP    As directed    Follow Up Details:  Follow-up with facility provider in one to 2 days                            Time Spent on Discharge:  60 minutes    Electronically signed by DANIEL Calle, 02/13/18, 12:47 PM.         Electronically signed by DANIEL Calle at 2/13/2018  1:20 PM

## 2018-02-13 NOTE — PROGRESS NOTES
Continued Stay Note  [facility]     Patient Name: Leila Smith  MRN: [id number]  Today's Date: 2/13/2018    Admit Date: 1/31/2018          Discharge Plan       02/13/18 1243    Case Management/Social Work Plan    Plan discharge plan    Patient/Family In Agreement With Plan yes    Additional Comments CM spoke with Dr Jackson on phone and per Dr Jackson, pt will not need PICC line, IV Dapto, or follow up appt with [facility]. Per Yaneth SANCHEZ, pt medically ready for discharge today and can return to [facility]. Ambulance arranged with [facility] for transportation from [facility] back to [facility] at 4 pm today. Spoke with Fozia from [facility], and they can accept pt today. CM will fax discharge summary to [facility]( [phone]) when available. Nurse can call report to [facility] at  [phone] and send a copy of discharge summary along with any scripts with pt.   Spoke with pt daughterBria, on cell([phone]). and she is agreeable to discharge plan.       02/13/18 1234    Case Management/Social Work Plan    Plan discharge plan    Patient/Family In Agreement With Plan yes    Additional Comments Spoke with Dr Jackson and pt will not be needing, PICC line, IV Dapto, or follow up with Dr Jackson. Spoke with Yaneth SANCHEZ and pt medically ready for discharge today. Ambulance arranged with [facility] for 4pm today to transport pt from [facility] to [facility]. Spoke with Fozia from [facility] and they can accept pt back today and aware ambulance arranged for transportation today, Tues, at 4pm.  CM will fax discharge summary to [phone] when available and nurse can call report to [facility] at [phone] and send a copy of discharge summary along any scripts. PCS for [facility] transportation placed on pt chartlet. Attempted to reach pt daughterBria, on work and cell phone and unsuccessf              Discharge Codes     None        Expected Discharge Date and Time     Expected Discharge Date Expected  Discharge Time    Feb 13, 2018             Alisa Kelly RN

## 2018-02-13 NOTE — PROGRESS NOTES
Discharge Planning Assessment  [facility]     Patient Name: Leila Smith  MRN: [id number]  Today's Date: 2/13/2018    Admit Date: 1/31/2018          Discharge Needs Assessment     None            Discharge Plan       02/13/18 1723    Case Management/Social Work Plan    Plan d/c plan    Additional Comments Spoke with Fozia with [facility] this afternoon re: a different matter. She asked  to re-fax d/c summary to her at [phone]. DONE        Discharge Placement     No information found        Expected Discharge Date and Time     Expected Discharge Date Expected Discharge Time    Feb 13, 2018               Demographic Summary     None            Functional Status     None            Psychosocial     None            Abuse/Neglect     None            Legal     None            Substance Abuse     None            Patient Forms     None          Callie Atkins

## 2018-02-13 NOTE — DISCHARGE SUMMARY
[facility] Medicine Services  DISCHARGE SUMMARY    Patient Name: Leila Smith  : 1945  MRN: [id number]    Date of Admission: 2018  Date of Discharge:  18  Primary Care Physician: Ciaran Wakefield MD    Consults     Date and Time Order Name Status Description    2018 1444 Inpatient Consult to Infectious Diseases Completed     2/3/2018 1004 Inpatient Consult to Cardiology      2018 0706 Inpatient Consult to Nephrology Completed     2018 1654 Inpatient Consult to Cardiology          Hospital Course   Presenting Problem:   Acute renal failure, unspecified acute renal failure type [N17.9]    Active Hospital Problems (** Indicates Principal Problem)    Diagnosis Date Noted   • **Sepsis due to urinary tract infection [A41.9, N39.0] 2018   • Acute renal failure [N17.9] 2018   • Hyperkalemia [E87.5] 2018   • Leukocytosis [D72.829] 2018   • Elevated troponin [R74.8] 2018   • Altered mental status [R41.82] 2017   • Atrial fibrillation with rapid ventricular response [I48.91] 2017   • Congestive heart failure [I50.9] 2016   • Obstructive sleep apnea syndrome [G47.33] 2016   • Peripheral vascular disease [I73.9] 2016   • CAD (coronary artery disease) [I25.10] 07/15/2016   • Chronic obstructive pulmonary disease [J44.9] 07/15/2016   • Diabetes mellitus, type 2 [E11.9] 07/15/2016   • Hyperlipidemia [E78.5] 07/15/2016   • Hypothyroidism [E03.9] 07/15/2016      Resolved Hospital Problems    Diagnosis Date Noted Date Resolved   No resolved problems to display.      Hospital Course:  Leila Smith is a 72 y.o. female with PMH significant for COPD, CHF, A. fib, DM2, frequent UTIs who is mentally challenged at baseline who presented on 18 from [facility] via EMS for altered mental status.  The patient's daughter was called by the rehabilitation telling her that the patient was more lethargic than  her baseline.  Patient did live at home by herself up until recently when she was put into [facility].  Patient had colostomy placed April 2017 and according to the daughter states that the patient has gone downhill quickly since then.  Upon evaluation in the ED, patient was septic, so patient was admitted to the ICU/pulmonary intensive care team.    Pulmonary/intensivist followed patient throughout her stay.  Cardiology was consulted due to patient's elevated troponins.  Dr. Marquez.  The patient was in stable condition and wanted her back on her oral medications.  Cardiology followed the patient throughout her stay. It was determined that she needed an echocardiogram to rule out valvular vegetation.  YANNA was done on 2/13/18 and no vegetation was found on the valves.  Infectious disease followed the patient throughout her stay and started her on IV antibiotics, but those were stopped when her YANNA results were negative.  Nephrology followed the patient for her elevated Cr.  Nephrology followed the patient for an elevated Cr.   Patients Cr has normalized.  The patient was transferred to the hospital medicine service on 2/5/18 where she has continued to progress. Patient needs to follow up with [facility] 6 weeks after discharge.  Patient is medically ready for discharge back to [facility] via ambulance.  Day of Discharge   HPI:   Patient alert and lying in bed but confused.  No adverse events overnight.  No family in the room.    Review of Systems  Unable to assess due to patient's confusion  Otherwise ROS is negative except as mentioned in the HPI.    Vital Signs:   Temp:  [97.9 °F (36.6 °C)-98.6 °F (37 °C)] 98.6 °F (37 °C)  Heart Rate:  [] 85  Resp:  [16-20] 18  BP: (107-148)/() 123/91   Physical Exam:  General: Alert, well-developed well-nourished female in no acute distress    Head: Normocephalic atraumatic    Eyes: PERRLA, EOMI, nonicteric, conjunctiva normal    ENT: Pink, moist mucous membranes    Neck:  Supple, nontender, trachea midline without lymphadenopathy, JVD, nuchal rigidity.      Cardiovascular: IRR IRR  no M/R/G  palpable DP pulses bilaterally    Respiratory: Nonlabored, symmetrical chest expansion, clear to auscultation bilaterally    Abdomen: Obese, soft, nontender, nondistended,  positive bowel sounds in all 4 quadrants     Extremities: FROM in upper and lower extremities bilaterally. negative for edema.  Feet in waffle boots bilaterally    Skin: Pink/warm/dry.  No rash or lesions noted    Neuro: Alert, speech is clear, follows no commands, otherwise, unable to assess due to patient's confusion    Psych: Pleasant, but follows no commands  Pertinent  and/or Most Recent Results     Results from last 7 days  Lab Units 02/12/18  0421 02/09/18  0401 02/08/18  0421 02/07/18  0330   SODIUM mmol/L 140 140 139 141   POTASSIUM mmol/L 3.7 3.7 3.6 3.6   CHLORIDE mmol/L 111* 108 108 111*   CO2 mmol/L 19.0* 24.0 23.0 23.0   BUN mg/dL 26* 31* 34* 43*   CREATININE mg/dL 1.30 1.40* 1.40* 1.70*   GLUCOSE mg/dL 84 99 93 133*   CALCIUM mg/dL 9.3 8.0* 8.1* 8.4*           Invalid input(s): PROT, LABALBU        Invalid input(s): TG, LDLCALC, LDLREALC      Brief Urine Lab Results  (Last result in the past 365 days)      Color   Clarity   Blood   Leuk Est   Nitrite   Protein   CREAT   Urine HCG        02/11/18 1213             83.5           Microbiology Results Abnormal     Procedure Component Value - Date/Time    Blood Culture - Blood, [[id number]]  (Normal) Collected:  02/06/18 0341    Lab Status:  Final result Specimen:  Blood from Hand, Right Updated:  02/11/18 0446     Blood Culture No growth at 5 days    Blood Culture - Blood, [[id number]]  (Normal) Collected:  02/05/18 1859    Lab Status:  Final result Specimen:  Blood from Arm, Right Updated:  02/10/18 1916     Blood Culture No growth at 5 days    Narrative:       Aerobic bottle only    Blood Culture - Blood, [[id number]]  (Abnormal)  (Susceptibility) Collected:   01/31/18 1335    Lab Status:  Edited Result - FINAL Specimen:  Blood from Arm, Left Updated:  02/08/18 0918     Blood Culture --      Staphylococcus epidermidis (A)      This isolate does not demonstrate inducible clindamycin resistance in vitro.          Isolated from Aerobic and Anaerobic Bottles     Gram Stain Result Anaerobic Bottle Gram positive cocci in clusters      Aerobic Bottle Gram positive cocci in clusters    Susceptibility      Staphylococcus epidermidis     DIMITRIS     Ciprofloxacin >2 ug/ml Resistant     Clindamycin <=0.5 ug/ml Susceptible     Daptomycin <=0.5 ug/ml Susceptible     Erythromycin >4 ug/ml Resistant     Gentamicin <=4 ug/ml Susceptible     Levofloxacin >4 ug/ml Resistant     Linezolid <=1 ug/ml Susceptible     Oxacillin >2 ug/ml Resistant     Penicillin G >8 ug/ml Resistant     Quinupristin + Dalfopristin <=0.5 ug/ml Susceptible     Rifampin <=1 ug/ml Susceptible     Tetracycline <=4 ug/ml Susceptible     Trimethoprim + Sulfamethoxazole <=0.5/9.5 ug/ml Susceptible     Vancomycin 2 ug/ml Susceptible                    Blood Culture - Blood, [[id number]]  (Abnormal)  (Susceptibility) Collected:  01/31/18 1323    Lab Status:  Edited Result - FINAL Specimen:  Blood from Arm, Right Updated:  02/08/18 0830     Blood Culture --      Staphylococcus epidermidis (A)      This isolate does not demonstrate inducible clindamycin resistance in vitro.          Isolated from Aerobic and Anaerobic Bottles     Gram Stain Result Anaerobic Bottle Gram positive cocci in pairs and clusters      Aerobic Bottle Gram positive cocci in clusters    Susceptibility      Staphylococcus epidermidis     DIMITRIS     Ciprofloxacin >2 ug/ml Resistant     Clindamycin <=0.5 ug/ml Susceptible     Daptomycin <=0.5 ug/ml Susceptible     Erythromycin >4 ug/ml Resistant     Gentamicin <=4 ug/ml Susceptible     Levofloxacin >4 ug/ml Resistant     Linezolid <=1 ug/ml Susceptible     Oxacillin >2 ug/ml Resistant     Penicillin G >8 ug/ml  Resistant     Quinupristin + Dalfopristin <=0.5 ug/ml Susceptible     Rifampin <=1 ug/ml Susceptible     Tetracycline <=4 ug/ml Susceptible     Trimethoprim + Sulfamethoxazole <=0.5/9.5 ug/ml Susceptible     Vancomycin 1 ug/ml Susceptible                    Blood Culture ID, PCR - Blood, [[id number]]  (Normal) Collected:  01/31/18 1323    Lab Status:  Edited Result - FINAL Specimen:  Blood from Arm, Right Updated:  02/08/18 0830     BCID, PCR No organism detected by BCID PCR.    Blood Culture - Blood, [[id number]]  (Normal) Collected:  02/02/18 0805    Lab Status:  Final result Specimen:  Blood from Blood, Central Line Updated:  02/07/18 1031     Blood Culture No growth at 5 days    Urine Culture - Urine, Urine, Clean Catch [[id number]]  (Abnormal) Collected:  01/31/18 1643    Lab Status:  Final result Specimen:  Urine from Urine, Catheter Updated:  02/03/18 1239     Urine Culture --      >100,000 CFU/mL Candida parapsilosis (A)    Urine Culture - Urine, Urine, Clean Catch [[id number]]  (Abnormal) Collected:  01/31/18 1346    Lab Status:  Final result Specimen:  Urine from Urine, Catheter Updated:  02/02/18 1154     Urine Culture --      40,000-50,000 CFU/mL Candida albicans (A)    Influenza A & B, RT PCR - Swab, Nasopharynx [[id number]]  (Normal) Collected:  01/31/18 1630    Lab Status:  Final result Specimen:  Swab from Nasopharynx Updated:  01/31/18 1748     Influenza A PCR Not Detected     Influenza B PCR Not Detected        Imaging Results (all)     Procedure Component Value Units Date/Time    XR Chest 1 View [[id number]] Collected:  01/31/18 1900     Updated:  01/31/18 1935    Narrative:       EXAM:    XR Chest, 1 View    CLINICAL HISTORY:    72 years old, female; Sepsis, unspecified organism; Elevated white blood cell   count, unspecified; Hyperkalemia; Unspecified atrial fibrillation; Acute kidney   failure, unspecified; Urinary tract infection, site not specified; Hematuria,   unspecified; Altered mental status,  unspecified; Hyperglycemia, unspecified;   Device placement; Other: Central line placement    TECHNIQUE:    Frontal view of the chest.    COMPARISON:    CR - XR CHEST 1 VW 2018-01-31 13:45    FINDINGS:    Lungs:  Unremarkable.  No consolidation.    Pleural space:  Unremarkable.  No pneumothorax.    Heart:  The heart demonstrates diffuse enlargement.    Mediastinum:  Unremarkable.    Bones/joints:  Unremarkable.    Tubes, lines and devices:  A left internal jugular central venous catheter is   present, with its tip overlying the region of the superior vena cava.      Impression:         A left internal jugular central venous catheter is present, with its tip   overlying the region of the superior vena cava.    THIS DOCUMENT HAS BEEN ELECTRONICALLY SIGNED BY URSULA ARGUELLO MD    XR Chest 1 View [[id number]] Collected:  01/31/18 1447     Updated:  01/31/18 2150    Narrative:       EXAMINATION: XR CHEST 1 VW-01/31/2018:      INDICATION: Weak/Dizzy/AMS, triage protocol.      COMPARISON: 12/19/2017.     FINDINGS: The patient is again rotated to the left. The heart shadow  appears borderline enlarged. The vasculature appears upper limits of  normal. Mild chronic appearing interstitial lung changes and old  granulomatous calcifications are again noted and appear stable.           Impression:       Mild pulmonary venous hypertension unchanged. No new chest  disease is identified.     D:  01/31/2018  E:  01/31/2018     This report was finalized on 1/31/2018 9:48 PM by DR. Brian Cosme MD.       CT Head Without Contrast [[id number]] Collected:  01/31/18 1648     Updated:  01/31/18 2223    Narrative:       EXAMINATION: CT HEAD WO CONTRAST- 01/31/2018     INDICATION: Decreased alertness; N17.9-Acute kidney failure,  unspecified; E87.5-Hyperkalemia; A41.9-Sepsis, unspecified organism;  N39.0-Urinary tract infection, site not specified; R31.9-Hematuria,  unspecified; R73.9-Hyperglycemia, unspecified; D72.829-Elevated white  blood cell  count, unspecified; R41.82-Altered mental status,  unspecified; I48.91-Unspecified atrial fibrillation         TECHNIQUE: 5 mm unenhanced images through the brain     The radiation dose reduction device was turned on for each scan per the  ALARA (As Low as Reasonably Achievable) protocol.     COMPARISON: 12/19/2017     FINDINGS: Previous exam report from 12/19/2017 indicates no acute  findings. History today indicates decreased level of alertness, altered  mental status.     The calvarium appears intact. Included paranasal sinuses mastoid air  cells and middle ear spaces appear clear. Soft tissue window images show  advanced generalized cerebral atrophy and chronic appearing central  white matter changes stable in pattern from previous study. There is no  evidence of mass, mass effect, hemorrhage, edema, hydrocephalus, or  abnormal extra-axial collection.       Impression:       Stable head CT scan with chronic appearing age related  changes. No evidence of acute intracranial disease.        D:  01/31/2018  E:  01/31/2018     This report was finalized on 1/31/2018 10:21 PM by DR. Brian Cosme MD.       US Renal Limited [[id number]] Collected:  02/01/18 1601     Updated:  02/02/18 1236    Narrative:       EXAMINATION: US RENAL, LIMITED-02/01/2018:     INDICATION: ANGELA, renal insufficiency.     TECHNIQUE: Sonographic imaging was obtained of the kidneys in both the  sagittal and transverse planes. The examination is suboptimal due to  patient inability to hold respirations.     COMPARISON: NONE.     FINDINGS: The right kidney measures in length from pole to pole 13.5 cm.  There is no solid mass identified. There is a renal cortical cyst  identified measuring 3.2 cm. No hydronephrosis. No nephrolithiasis. No  solid mass.     The left kidney measures in length from pole to pole 11.4 cm. Several  cystic areas identified, the largest measuring 2.5 cm. There is no  hydronephrosis or nephrolithiasis. No solid mass. Imaging of  the bladder  reveals a Bone catheter present.       Impression:       Bilateral renal cortical cysts otherwise, unremarkable  examination.     D:  02/01/2018  E:  02/01/2018            This report was finalized on 2/2/2018 12:33 PM by Dr. Stephanie Jarrett MD.           Results for orders placed during the hospital encounter of 01/31/18   Adult Transesophageal Echo (YANNA) W/ Cont if Necessary Per Protocol    Narrative · Left ventricular systolic function is normal.  · Left ventricular wall thickness is consistent with moderate concentric   hypertrophy.  · Left atrial cavity size is dilated.  · Cardiac valves are morphologically within normal limits for patient's   age.          Discharge Details      Leila Smith   Home Medication Instructions ZANDER:[id number]    Printed on:02/13/18 1248   Medication Information                      acetaminophen (TYLENOL) 500 MG tablet  Take 1,000 mg by mouth Every 6 (Six) Hours As Needed for Mild Pain .             apixaban (ELIQUIS) 5 MG tablet tablet  Take 1 tablet by mouth Every 12 (Twelve) Hours.             Artificial Tear Solution (TEARS NATURALE OP)  Apply 1 application to eye 2 (Two) Times a Day.             aspirin 81 MG chewable tablet  Chew 81 mg Daily.             atorvastatin (LIPITOR) 10 MG tablet  Take 10 mg by mouth Every Night.             diltiaZEM CD (CARDIZEM CD) 240 MG 24 hr capsule  Take 1 capsule by mouth Daily.             famotidine (PEPCID) 20 MG tablet  Take 20 mg by mouth Daily.             haloperidol (HALDOL) 0.5 MG tablet  Take 1 tablet by mouth Every 12 (Twelve) Hours.             insulin detemir (LEVEMIR) 100 UNIT/ML injection  Inject 13 Units under the skin Every Morning.             insulin lispro (humaLOG) 100 UNIT/ML injection  Inject 0-7 Units under the skin 4 (Four) Times a Day With Meals & at Bedtime.             insulin lispro (humaLOG) 100 UNIT/ML injection  Inject 3 Units under the skin 3 (Three) Times a Day With Meals.              Melatonin 3 MG tablet  Take 3 mg by mouth Every Night.             metoprolol succinate XL (TOPROL-XL) 50 MG 24 hr tablet  Take 3 tablets by mouth Daily.             mometasone-formoterol (DULERA) 100-5 MCG/ACT inhaler  Inhale 2 puffs 2 (Two) Times a Day.               Discharge Disposition:  Skilled Nursing Facility (DC - External)    Discharge Diet:  Diet Instructions     Diet: Regular, Consistent Carbohydrate, Cardiac; Thin       Discharge Diet:   Regular  Consistent Carbohydrate  Cardiac      Fluid Consistency:  Thin               Discharge Activity:   Activity Instructions     Activity as Tolerated                   No future appointments.    Additional Instructions for the Follow-ups that You Need to Schedule     Discharge Follow-up with PCP    As directed    Follow Up Details:  Follow-up with facility provider in one to 2 days                            Time Spent on Discharge:  60 minutes    Electronically signed by DANIEL Calle, 02/13/18, 12:47 PM.    Attending Skinny VENTURA supervised care of the patient on day of discharge with direct care provided by the advanced care provider (APC).    Electronically signed by Rod Flores MD, 02/13/18, 5:45 PM.

## 2018-02-13 NOTE — DISCHARGE PLACEMENT REQUEST
"Leila Smith (72 y.o. Female)     Date of Birth Social Security Number Address Home Phone MRN    1945  [address] [id number] [phone] [id number]    Sikhism Marital Status          Unknown        Admission Date Admission Type Admitting Provider Attending Provider Department, Room/Bed    18 Emergency Rod Flores MD  [facility] 5H, S583/1    Discharge Date Discharge Disposition Discharge Destination        2018 Skilled Nursing Facility (DC - External)             Attending Provider: (none)    Allergies:  Codeine, Tetracyclines & Related    Isolation:  None   Infection:  None   Code Status:  FULL    Ht:  165.1 cm (65\")   Wt:  74.1 kg (163 lb 5 oz)    Admission Cmt:  None   Principal Problem:  Sepsis due to urinary tract infection [A41.9,N39.0]                 Active Insurance as of 2018     Primary Coverage     Payor Plan Insurance Group Employer/Plan Group    ANTHEM MEDICARE REPLACEMENT ANTH MEDICARE ADVANTAGE [id number]     Payor Plan Address Payor Plan Phone Number Effective From Effective To    [address] [phone] 2017     [place] [address]       Subscriber Name Subscriber Birth Date Member ID       LEILA SMITH 1945 [id number]                 Emergency Contacts      (Rel.) Home Phone Work Phone Mobile Phone    Danilo Hope (Daughter) [phone] -- --    Kleber Johnson (Son) [phone] -- --               Discharge Summary      DANIEL Calle at 2018 12:47 PM              [facility] Medicine Services  DISCHARGE SUMMARY    Patient Name: Leila Smith  : 1945  MRN: [id number]    Date of Admission: 2018  Date of Discharge:  18  Primary Care Physician: Ciaran Wakefield MD    Consults     Date and Time Order Name Status Description    2018 1444 Inpatient Consult to Infectious Diseases Completed     2/3/2018 1004 Inpatient Consult to " Cardiology      2/1/2018 0706 Inpatient Consult to Nephrology Completed     1/31/2018 1654 Inpatient Consult to Cardiology          Hospital Course   Presenting Problem:   Acute renal failure, unspecified acute renal failure type [N17.9]    Active Hospital Problems (** Indicates Principal Problem)    Diagnosis Date Noted   • **Sepsis due to urinary tract infection [A41.9, N39.0] 01/31/2018   • Acute renal failure [N17.9] 01/31/2018   • Hyperkalemia [E87.5] 01/31/2018   • Leukocytosis [D72.829] 01/31/2018   • Elevated troponin [R74.8] 01/31/2018   • Altered mental status [R41.82] 12/20/2017   • Atrial fibrillation with rapid ventricular response [I48.91] 12/20/2017   • Congestive heart failure [I50.9] 07/20/2016   • Obstructive sleep apnea syndrome [G47.33] 07/20/2016   • Peripheral vascular disease [I73.9] 07/20/2016   • CAD (coronary artery disease) [I25.10] 07/15/2016   • Chronic obstructive pulmonary disease [J44.9] 07/15/2016   • Diabetes mellitus, type 2 [E11.9] 07/15/2016   • Hyperlipidemia [E78.5] 07/15/2016   • Hypothyroidism [E03.9] 07/15/2016      Resolved Hospital Problems    Diagnosis Date Noted Date Resolved   No resolved problems to display.      Hospital Course:  Leila Smith is a 72 y.o. female with PMH significant for COPD, CHF, A. fib, DM2, frequent UTIs who is mentally challenged at baseline who presented on 1/31/18 from [facility] via EMS for altered mental status.  The patient's daughter was called by the rehabilitation telling her that the patient was more lethargic than her baseline.  Patient did live at home by herself up until recently when she was put into [facility].  Patient had colostomy placed April 2017 and according to the daughter states that the patient has gone downhill quickly since then.  Upon evaluation in the ED, patient was septic, so patient was admitted to the ICU/pulmonary intensive care team.    Pulmonary/intensivist followed patient throughout her  stay.  Cardiology was consulted due to patient's elevated troponins.  Dr. Marquez.  The patient was in stable condition and wanted her back on her oral medications.  Cardiology followed the patient throughout her stay. It was determined that she needed an echocardiogram to rule out valvular vegetation.  YANNA was done on 2/13/18 and no vegetation was found on the valves.  Infectious disease followed the patient throughout her stay and started her on IV antibiotics, but those were stopped when her YANNA results were negative.  Nephrology followed the patient for her elevated Cr.  Nephrology followed the patient for an elevated Cr.   Patients Cr has normalized.  The patient was transferred to the hospital medicine service on 2/5/18 where she has continued to progress. Patient needs to follow up with [facility] 6 weeks after discharge.  Patient is medically ready for discharge back to [facility] via ambulance.  Day of Discharge   HPI:   Patient alert and lying in bed but confused.  No adverse events overnight.  No family in the room.    Review of Systems  Unable to assess due to patient's confusion  Otherwise ROS is negative except as mentioned in the HPI.    Vital Signs:   Temp:  [97.9 °F (36.6 °C)-98.6 °F (37 °C)] 98.6 °F (37 °C)  Heart Rate:  [] 85  Resp:  [16-20] 18  BP: (107-148)/() 123/91   Physical Exam:  General: Alert, well-developed well-nourished female in no acute distress    Head: Normocephalic atraumatic    Eyes: PERRLA, EOMI, nonicteric, conjunctiva normal    ENT: Pink, moist mucous membranes    Neck: Supple, nontender, trachea midline without lymphadenopathy, JVD, nuchal rigidity.      Cardiovascular: IRR IRR  no M/R/G  palpable DP pulses bilaterally    Respiratory: Nonlabored, symmetrical chest expansion, clear to auscultation bilaterally    Abdomen: Obese, soft, nontender, nondistended,  positive bowel sounds in all 4 quadrants     Extremities: FROM in upper and lower extremities bilaterally.  negative for edema.  Feet in waffle boots bilaterally    Skin: Pink/warm/dry.  No rash or lesions noted    Neuro: Alert, speech is clear, follows no commands, otherwise, unable to assess due to patient's confusion    Psych: Pleasant, but follows no commands  Pertinent  and/or Most Recent Results     Results from last 7 days  Lab Units 02/12/18  0421 02/09/18  0401 02/08/18  0421 02/07/18  0330   SODIUM mmol/L 140 140 139 141   POTASSIUM mmol/L 3.7 3.7 3.6 3.6   CHLORIDE mmol/L 111* 108 108 111*   CO2 mmol/L 19.0* 24.0 23.0 23.0   BUN mg/dL 26* 31* 34* 43*   CREATININE mg/dL 1.30 1.40* 1.40* 1.70*   GLUCOSE mg/dL 84 99 93 133*   CALCIUM mg/dL 9.3 8.0* 8.1* 8.4*           Invalid input(s): PROT, LABALBU        Invalid input(s): TG, LDLCALC, LDLREALC      Brief Urine Lab Results  (Last result in the past 365 days)      Color   Clarity   Blood   Leuk Est   Nitrite   Protein   CREAT   Urine HCG        02/11/18 1213             83.5           Microbiology Results Abnormal     Procedure Component Value - Date/Time    Blood Culture - Blood, [[id number]]  (Normal) Collected:  02/06/18 0341    Lab Status:  Final result Specimen:  Blood from Hand, Right Updated:  02/11/18 0446     Blood Culture No growth at 5 days    Blood Culture - Blood, [[id number]]  (Normal) Collected:  02/05/18 1859    Lab Status:  Final result Specimen:  Blood from Arm, Right Updated:  02/10/18 1916     Blood Culture No growth at 5 days    Narrative:       Aerobic bottle only    Blood Culture - Blood, [[id number]]  (Abnormal)  (Susceptibility) Collected:  01/31/18 1335    Lab Status:  Edited Result - FINAL Specimen:  Blood from Arm, Left Updated:  02/08/18 0918     Blood Culture --      Staphylococcus epidermidis (A)      This isolate does not demonstrate inducible clindamycin resistance in vitro.          Isolated from Aerobic and Anaerobic Bottles     Gram Stain Result Anaerobic Bottle Gram positive cocci in clusters      Aerobic Bottle Gram positive  cocci in clusters    Susceptibility      Staphylococcus epidermidis     DIMITRIS     Ciprofloxacin >2 ug/ml Resistant     Clindamycin <=0.5 ug/ml Susceptible     Daptomycin <=0.5 ug/ml Susceptible     Erythromycin >4 ug/ml Resistant     Gentamicin <=4 ug/ml Susceptible     Levofloxacin >4 ug/ml Resistant     Linezolid <=1 ug/ml Susceptible     Oxacillin >2 ug/ml Resistant     Penicillin G >8 ug/ml Resistant     Quinupristin + Dalfopristin <=0.5 ug/ml Susceptible     Rifampin <=1 ug/ml Susceptible     Tetracycline <=4 ug/ml Susceptible     Trimethoprim + Sulfamethoxazole <=0.5/9.5 ug/ml Susceptible     Vancomycin 2 ug/ml Susceptible                    Blood Culture - Blood, [[id number]]  (Abnormal)  (Susceptibility) Collected:  01/31/18 1323    Lab Status:  Edited Result - FINAL Specimen:  Blood from Arm, Right Updated:  02/08/18 0830     Blood Culture --      Staphylococcus epidermidis (A)      This isolate does not demonstrate inducible clindamycin resistance in vitro.          Isolated from Aerobic and Anaerobic Bottles     Gram Stain Result Anaerobic Bottle Gram positive cocci in pairs and clusters      Aerobic Bottle Gram positive cocci in clusters    Susceptibility      Staphylococcus epidermidis     DIMITRIS     Ciprofloxacin >2 ug/ml Resistant     Clindamycin <=0.5 ug/ml Susceptible     Daptomycin <=0.5 ug/ml Susceptible     Erythromycin >4 ug/ml Resistant     Gentamicin <=4 ug/ml Susceptible     Levofloxacin >4 ug/ml Resistant     Linezolid <=1 ug/ml Susceptible     Oxacillin >2 ug/ml Resistant     Penicillin G >8 ug/ml Resistant     Quinupristin + Dalfopristin <=0.5 ug/ml Susceptible     Rifampin <=1 ug/ml Susceptible     Tetracycline <=4 ug/ml Susceptible     Trimethoprim + Sulfamethoxazole <=0.5/9.5 ug/ml Susceptible     Vancomycin 1 ug/ml Susceptible                    Blood Culture ID, PCR - Blood, [[id number]]  (Normal) Collected:  01/31/18 1323    Lab Status:  Edited Result - FINAL Specimen:  Blood from Arm,  Right Updated:  02/08/18 0830     BCID, PCR No organism detected by BCID PCR.    Blood Culture - Blood, [[id number]]  (Normal) Collected:  02/02/18 0805    Lab Status:  Final result Specimen:  Blood from Blood, Central Line Updated:  02/07/18 1031     Blood Culture No growth at 5 days    Urine Culture - Urine, Urine, Clean Catch [[id number]]  (Abnormal) Collected:  01/31/18 1643    Lab Status:  Final result Specimen:  Urine from Urine, Catheter Updated:  02/03/18 1239     Urine Culture --      >100,000 CFU/mL Candida parapsilosis (A)    Urine Culture - Urine, Urine, Clean Catch [[id number]]  (Abnormal) Collected:  01/31/18 1346    Lab Status:  Final result Specimen:  Urine from Urine, Catheter Updated:  02/02/18 1154     Urine Culture --      40,000-50,000 CFU/mL Candida albicans (A)    Influenza A & B, RT PCR - Swab, Nasopharynx [[id number]]  (Normal) Collected:  01/31/18 1630    Lab Status:  Final result Specimen:  Swab from Nasopharynx Updated:  01/31/18 1748     Influenza A PCR Not Detected     Influenza B PCR Not Detected        Imaging Results (all)     Procedure Component Value Units Date/Time    XR Chest 1 View [[id number]] Collected:  01/31/18 1900     Updated:  01/31/18 1935    Narrative:       EXAM:    XR Chest, 1 View    CLINICAL HISTORY:    72 years old, female; Sepsis, unspecified organism; Elevated white blood cell   count, unspecified; Hyperkalemia; Unspecified atrial fibrillation; Acute kidney   failure, unspecified; Urinary tract infection, site not specified; Hematuria,   unspecified; Altered mental status, unspecified; Hyperglycemia, unspecified;   Device placement; Other: Central line placement    TECHNIQUE:    Frontal view of the chest.    COMPARISON:    CR - XR CHEST 1 VW 2018-01-31 13:45    FINDINGS:    Lungs:  Unremarkable.  No consolidation.    Pleural space:  Unremarkable.  No pneumothorax.    Heart:  The heart demonstrates diffuse enlargement.    Mediastinum:  Unremarkable.    Bones/joints:   Unremarkable.    Tubes, lines and devices:  A left internal jugular central venous catheter is   present, with its tip overlying the region of the superior vena cava.      Impression:         A left internal jugular central venous catheter is present, with its tip   overlying the region of the superior vena cava.    THIS DOCUMENT HAS BEEN ELECTRONICALLY SIGNED BY URSULA ARGUELLO MD    XR Chest 1 View [[id number]] Collected:  01/31/18 1447     Updated:  01/31/18 2150    Narrative:       EXAMINATION: XR CHEST 1 VW-01/31/2018:      INDICATION: Weak/Dizzy/AMS, triage protocol.      COMPARISON: 12/19/2017.     FINDINGS: The patient is again rotated to the left. The heart shadow  appears borderline enlarged. The vasculature appears upper limits of  normal. Mild chronic appearing interstitial lung changes and old  granulomatous calcifications are again noted and appear stable.           Impression:       Mild pulmonary venous hypertension unchanged. No new chest  disease is identified.     D:  01/31/2018  E:  01/31/2018     This report was finalized on 1/31/2018 9:48 PM by DR. Brian Cosme MD.       CT Head Without Contrast [[id number]] Collected:  01/31/18 1648     Updated:  01/31/18 2223    Narrative:       EXAMINATION: CT HEAD WO CONTRAST- 01/31/2018     INDICATION: Decreased alertness; N17.9-Acute kidney failure,  unspecified; E87.5-Hyperkalemia; A41.9-Sepsis, unspecified organism;  N39.0-Urinary tract infection, site not specified; R31.9-Hematuria,  unspecified; R73.9-Hyperglycemia, unspecified; D72.829-Elevated white  blood cell count, unspecified; R41.82-Altered mental status,  unspecified; I48.91-Unspecified atrial fibrillation         TECHNIQUE: 5 mm unenhanced images through the brain     The radiation dose reduction device was turned on for each scan per the  ALARA (As Low as Reasonably Achievable) protocol.     COMPARISON: 12/19/2017     FINDINGS: Previous exam report from 12/19/2017 indicates no acute  findings.  History today indicates decreased level of alertness, altered  mental status.     The calvarium appears intact. Included paranasal sinuses mastoid air  cells and middle ear spaces appear clear. Soft tissue window images show  advanced generalized cerebral atrophy and chronic appearing central  white matter changes stable in pattern from previous study. There is no  evidence of mass, mass effect, hemorrhage, edema, hydrocephalus, or  abnormal extra-axial collection.       Impression:       Stable head CT scan with chronic appearing age related  changes. No evidence of acute intracranial disease.        D:  01/31/2018  E:  01/31/2018     This report was finalized on 1/31/2018 10:21 PM by DR. Brian Cosme MD.       US Renal Limited [[id number]] Collected:  02/01/18 1601     Updated:  02/02/18 1236    Narrative:       EXAMINATION: US RENAL, LIMITED-02/01/2018:     INDICATION: ANGELA, renal insufficiency.     TECHNIQUE: Sonographic imaging was obtained of the kidneys in both the  sagittal and transverse planes. The examination is suboptimal due to  patient inability to hold respirations.     COMPARISON: NONE.     FINDINGS: The right kidney measures in length from pole to pole 13.5 cm.  There is no solid mass identified. There is a renal cortical cyst  identified measuring 3.2 cm. No hydronephrosis. No nephrolithiasis. No  solid mass.     The left kidney measures in length from pole to pole 11.4 cm. Several  cystic areas identified, the largest measuring 2.5 cm. There is no  hydronephrosis or nephrolithiasis. No solid mass. Imaging of the bladder  reveals a Bone catheter present.       Impression:       Bilateral renal cortical cysts otherwise, unremarkable  examination.     D:  02/01/2018  E:  02/01/2018            This report was finalized on 2/2/2018 12:33 PM by Dr. Stephanie Jarrett MD.           Results for orders placed during the hospital encounter of 01/31/18   Adult Transesophageal Echo (YANNA) W/ Cont if Necessary  Per Protocol    Narrative · Left ventricular systolic function is normal.  · Left ventricular wall thickness is consistent with moderate concentric   hypertrophy.  · Left atrial cavity size is dilated.  · Cardiac valves are morphologically within normal limits for patient's   age.          Discharge Details      Leila Smith   Home Medication Instructions ZANDER:[id number]    Printed on:02/13/18 8311   Medication Information                      acetaminophen (TYLENOL) 500 MG tablet  Take 1,000 mg by mouth Every 6 (Six) Hours As Needed for Mild Pain .             apixaban (ELIQUIS) 5 MG tablet tablet  Take 1 tablet by mouth Every 12 (Twelve) Hours.             Artificial Tear Solution (TEARS NATURALE OP)  Apply 1 application to eye 2 (Two) Times a Day.             aspirin 81 MG chewable tablet  Chew 81 mg Daily.             atorvastatin (LIPITOR) 10 MG tablet  Take 10 mg by mouth Every Night.             diltiaZEM CD (CARDIZEM CD) 240 MG 24 hr capsule  Take 1 capsule by mouth Daily.             famotidine (PEPCID) 20 MG tablet  Take 20 mg by mouth Daily.             haloperidol (HALDOL) 0.5 MG tablet  Take 1 tablet by mouth Every 12 (Twelve) Hours.             insulin detemir (LEVEMIR) 100 UNIT/ML injection  Inject 13 Units under the skin Every Morning.             insulin lispro (humaLOG) 100 UNIT/ML injection  Inject 0-7 Units under the skin 4 (Four) Times a Day With Meals & at Bedtime.             insulin lispro (humaLOG) 100 UNIT/ML injection  Inject 3 Units under the skin 3 (Three) Times a Day With Meals.             Melatonin 3 MG tablet  Take 3 mg by mouth Every Night.             metoprolol succinate XL (TOPROL-XL) 50 MG 24 hr tablet  Take 3 tablets by mouth Daily.             mometasone-formoterol (DULERA) 100-5 MCG/ACT inhaler  Inhale 2 puffs 2 (Two) Times a Day.               Discharge Disposition:  Skilled Nursing Facility (DC - External)    Discharge Diet:  Diet Instructions     Diet: Regular,  Consistent Carbohydrate, Cardiac; Thin       Discharge Diet:   Regular  Consistent Carbohydrate  Cardiac      Fluid Consistency:  Thin               Discharge Activity:   Activity Instructions     Activity as Tolerated                   No future appointments.    Additional Instructions for the Follow-ups that You Need to Schedule     Discharge Follow-up with PCP    As directed    Follow Up Details:  Follow-up with facility provider in one to 2 days                            Time Spent on Discharge:  60 minutes    Electronically signed by DANIEL Calle, 02/13/18, 12:47 PM.         Electronically signed by DANIEL Calle at 2/13/2018  1:20 PM

## 2018-02-13 NOTE — PROCEDURES
Preliminary transesophageal echocardiogram report:  No echocardiographic findings concerning for infective endocarditis

## 2018-07-02 PROBLEM — N18.30 ACUTE RENAL FAILURE SUPERIMPOSED ON STAGE 3 CHRONIC KIDNEY DISEASE (HCC): Status: ACTIVE | Noted: 2018-01-01

## 2018-07-02 PROBLEM — R40.0 SOMNOLENCE: Status: ACTIVE | Noted: 2018-01-01

## 2018-07-03 NOTE — H&P
Chief complaint:  Back pain    Subjective     Patient is a 72 y.o. female who is a resident of [facility].  She was brought to the emergency department this evening for back pain, nausea/vomiting, and altered mental status.    She has a history of COPD, CHF, atrial fibrillation, type 2 diabetes mellitus, and a history of frequent UTIs.  She has a history of a colostomy in April 2017.  She was admitted here in January for acute renal failure and a urinary tract infection with sepsis.  She responded IV fluids and her creatinine returned to her normal baseline which is around 1.5.  She had an echocardiogram to rule out a valvular vegetations and this was negative.    Her daughter accompanies her tonight.  She tells me that she has had complaints of back pain for several weeks.  It sounds as if they did an ultrasound at the nursing facility which they said showed kidney stones.  She was supposed to see a urologist but they did not take her insurance so she has not seen one as of yet.    Her normal activity in the nursing home is basically bed to chair.  She gets her bed into a wheelchair and back.  She does eat adequately though not optimally.  She is normally oriented but her daughter says that her behavior has changed ever since her colostomy in 2017.  She normally wears diapers for urinary incontinence.    She was found to have an elevated creatinine of 3.2 in the emergency department.  She also potassium of 6.7.  Urinalysis is pending.      History  Past Medical History:   Diagnosis Date   • Atrial fibrillation (CMS/HCC)    • Chronic ulcer of right leg (CMS/HCC)    • Cognitive communication deficit    • COPD (chronic obstructive pulmonary disease) (CMS/HCC)    • Diabetes mellitus (CMS/HCC)    • Difficulty in walking    • Encephalopathy    • Generalized muscle weakness    • Hematuria    • Hyperlipidemia    • Hypertension    • Hypothyroidism    • Urinary tract infection      Past Surgical  History:   Procedure Laterality Date   • CATARACT EXTRACTION     • CHOLECYSTECTOMY     • COLOSTOMY     • FOOT SURGERY Right    • HERNIA REPAIR     • HYSTERECTOMY     • TOTAL KNEE ARTHROPLASTY Right      Family History   Problem Relation Age of Onset   • Stomach cancer Mother    • Alcohol abuse Other    • Cancer Other    • Diabetes Other    • Hypertension Other    • Other Other      Social History   Substance Use Topics   • Smoking status: Former Smoker     Packs/day: 0.00     Years: 50.00     Types: Cigarettes   • Smokeless tobacco: Never Used   • Alcohol use No     Prescriptions Prior to Admission   Medication Sig Dispense Refill Last Dose   • acetaminophen (TYLENOL) 500 MG tablet Take 1,000 mg by mouth Every 6 (Six) Hours As Needed for Mild Pain .   7/2/2018 at Unknown time   • ammonium lactate (LAC-HYDRIN) 12 % lotion Apply 1 application topically 2 (Two) Times a Day As Needed for Dry Skin.   7/2/2018 at Unknown time   • apixaban (ELIQUIS) 5 MG tablet tablet Take 1 tablet by mouth Every 12 (Twelve) Hours. 60 tablet  7/2/2018 at Unknown time   • Artificial Tear Solution (TEARS NATURALE OP) Apply 1 application to eye 2 (Two) Times a Day.   7/2/2018 at Unknown time   • aspirin 81 MG chewable tablet Chew 81 mg Daily.   7/2/2018 at Unknown time   • atorvastatin (LIPITOR) 10 MG tablet Take 10 mg by mouth Every Night.   7/1/2018 at Unknown time   • bacitracin-polymyxin b ointment ophthalmic ointment Apply 1 application topically As Needed (Apply to LEFT wrist after cleansing with NS).   7/2/2018 at Unknown time   • Benzalkonium Chloride 0.1 % liquid Apply 1 application topically As Needed (After incontinent episode).   7/2/2018 at Unknown time   • diltiaZEM CD (CARDIZEM CD) 240 MG 24 hr capsule Take 1 capsule by mouth Daily.   7/2/2018 at Unknown time   • dimethicone 1 % cream Apply 1 application topically As Needed (apply to buttocks).      • Dimethicone 1.5 % cream Apply 1 application topically As Needed (apply to  buttock as needed).      • escitalopram (LEXAPRO) 10 MG tablet Take 10 mg by mouth Daily.   7/2/2018 at Unknown time   • famotidine (PEPCID) 20 MG tablet Take 20 mg by mouth Daily.   7/2/2018 at Unknown time   • haloperidol (HALDOL) 1 MG tablet Take 1 mg by mouth 2 (Two) Times a Day.   7/2/2018 at Unknown time   • insulin lispro (humaLOG) 100 UNIT/ML injection Inject 0-10 Units under the skin 4 (Four) Times a Day Before Meals & at Bedtime. Sliding Scale:  150-199 = 2 units, 200-249 = 4 units, 250-299 = 6 units, 300-349 = 8 units, 350-399 = 10 units, >400 = call MD   7/2/2018 at 1100   • levoFLOXacin (LEVAQUIN) 500 MG tablet Take 500 mg by mouth Daily. (6/26/18 - 7/5/18)   7/2/2018 at Unknown time   • magnesium hydroxide (MILK OF MAGNESIA) 400 MG/5ML suspension Take 30 mL by mouth Daily As Needed for Constipation.      • Melatonin 3 MG tablet Take 3 mg by mouth Every Night.   7/1/2018 at Unknown time   • Menthol-Zinc Oxide (REMEDY CALAZIME EX) Apply 1 application topically As Needed (apply to right buttock small pink area).   7/2/2018 at Unknown time   • metoprolol succinate XL (TOPROL-XL) 50 MG 24 hr tablet Take 3 tablets by mouth Daily.   7/2/2018 at Unknown time   • mometasone-formoterol (DULERA) 100-5 MCG/ACT inhaler Inhale 2 puffs 2 (Two) Times a Day.   7/2/2018 at Unknown time   • ondansetron (ZOFRAN) 4 MG tablet Take 4 mg by mouth Every 8 (Eight) Hours As Needed for Nausea or Vomiting.        Allergies:  Codeine and Tetracyclines & related    Review of Systems   Review of systems could not be obtained due to   patient confusion.      Objective     Vital Signs  Temp:  [97.5 °F (36.4 °C)-97.7 °F (36.5 °C)] 97.7 °F (36.5 °C)  Heart Rate:  [123-159] 138  Resp:  [18-22] 18  BP: ()/() 148/106    Physical Exam:    Objective:  General Appearance:  Uncomfortable and ill-appearing.    Vital signs: (most recent): Blood pressure (!) 148/106, pulse (!) 138, temperature 97.7 °F (36.5 °C), temperature source  "Axillary, resp. rate 18, height 180.3 cm (71\"), weight 80.7 kg (178 lb), SpO2 93 %.    HEENT: Normal HEENT exam.  (Nasal cannula O2)    Lungs:  Normal effort and normal respiratory rate.  Breath sounds clear to auscultation.  She is not in respiratory distress.  No rales, wheezes or rhonchi.    Heart: Tachycardia.  Irregular rhythm.  S1 normal and S2 normal.  No murmur, gallop or friction rub.   Chest: Symmetric chest wall expansion.   Abdomen: Abdomen is soft and non-distended.  (Colostomy in place with pink stoma).  Bowel sounds are normal.   There is no abdominal tenderness.   There is no mass. There is no splenomegaly. There is no hepatomegaly.   Extremities: There is no deformity or dependent edema.    Neurological: Patient is alert.  (Confused).    Pupils:  Pupils are equal, round, and reactive to light.    Skin:  Warm and dry.              Results Review:    I reviewed the patient's new clinical results.  I reviewed the patient's other test results and agree with the interpretation    Assessment/Plan     Assessment:    Hospital Problem List     * (Principal)Sepsis due to urinary tract infection (CMS/HCC)    Atrial fibrillation with rapid ventricular response (CMS/HCC)    Altered mental status    Acute renal failure (CMS/HCC)    Hyperkalemia    Diabetes mellitus, type 2 (CMS/HCC)    Overview Signed 8/3/2016  2:13 PM by Ama Wolf     With foot ulcer         Hypertension    Hyperlipidemia    Hypothyroidism    Obstructive sleep apnea syndrome    Diabetic peripheral neuropathy (CMS/HCC)    Somnolence          72-year-old female presents with back pain, nausea/vomiting, volume depletion, and acute on chronic renal insufficiency.  She probably has a urinary tract infection though urinalysis is pending.  These have been recurrent in the past.  She was also recently told that she had kidney stones based upon what I think was a renal ultrasound done at her nursing home.  She has chronic medical problems as noted " above.    Plan:    1. ICU admission  2. Broad-spectrum antibiotics  3. Urine and blood cultures  4. Monitor atrial fibrillation rate.  Brevibloc for now.  Normally on oral Cardizem  5. Continue anticoagulation  6. Volume resuscitation  7. Monitor renal function  8. CT of the abdomen to investigate the possibility of kidney stones and/or abscess  9. Urology consultation if necessary  10. Discussed plans with daughter in detail    I discussed the patients findings and my recommendations with patient, family and nursing staff.     Critical Care time spent in direct patient care: 50 minutes (excluding procedure time, if applicable) including high complexity decision making to assess, manipulate, and support vital organ system failure in this individual who has impairment of one or more vital organ systems such that there is a high probability of imminent or life threatening deterioration in the patient’s condition.    Brian Samson MD  Pulmonary and Critical Care Medicine  07/02/18  9:39 PM

## 2018-07-03 NOTE — PROGRESS NOTES
INTENSIVIST   PROGRESS NOTE     Hospital:  LOS: 1 day     Ms. Leila Smith, 72 y.o. female is followed for a Chief Complaint of: Sepsis, Afib w/ RVR      Subjective   S   Ms. Smith is a 71yo mentally challenged female who resides at [facility] who presented with back pain, nausea, vomiting and altered mental status. She was found to have a urinary tract infection and was in Afib with RVR. She was admitted to the ICU and started on Zosyn. She had a very similar admission in late January for essentially the same thing.     Interval History:  No events overnight. Her heart rate is better controlled this morning. She is intermittently refusing to take medications.        The patient's relevant past medical, surgical and social history were reviewed and updated in Epic as appropriate.      ROS:   Constitutional: Negative for fever.   Respiratory: Negative for dyspnea.   Cardiovascular: Negative for chest pain.   Gastrointestinal: Negative for  nausea, vomiting and diarrhea.     Objective   O     Vitals:  Temp  Min: 97.5 °F (36.4 °C)  Max: 98.2 °F (36.8 °C)  BP  Min: 71/54  Max: 150/86  Pulse  Min: 81  Max: 159  Resp  Min: 14  Max: 22  SpO2  Min: 78 %  Max: 100 % Flow (L/min)  Min: 1  Max: 3    Intake/Ouptut 24 hrs (7:00AM - 6:59 AM)  Intake & Output (last 3 days)       06/30 0701 - 07/01 0700 07/01 0701 - 07/02 0700 07/02 0701 - 07/03 0700 07/03 0701 - 07/04 0700    P.O.    120    I.V. (mL/kg)   780.9 (9.7) 879 (10.9)    IV Piggyback   1550 50    Total Intake(mL/kg)   2330.9 (28.9) 1049 (13)    Urine (mL/kg/hr)   75     Stool   150     Total Output     225      Net     +2105.9 +1049            Unmeasured Urine Occurrence    2 x          Medications (drips):    esmolol Last Rate: 50 mcg/kg/min (07/02/18 1854)   lactated ringers Last Rate: 100 mL/hr (07/03/18 1159)         Physical Examination  Telemetry:  Atrial Fibrillation.    Constitutional:  No acute distress.   Cardiovascular: Tachycardic. Irregular rhythm.  Normal heart sounds.  No murmurs, gallop or rub.   Respiratory: No respiratory distress. Normal respiratory effort.  Normal breath sounds  Upper airway rhonchi.    Abdominal:  Soft. No masses. Non-tender. No distension. No HSM.   Extremities: No digital cyanosis. No clubbing.  No peripheral edema.   Neurological:   Alert and interactive.                 Results from last 7 days  Lab Units 07/03/18  0539 07/02/18  1615   WBC 10*3/mm3 12.26* 10.76   HEMOGLOBIN g/dL 13.0 14.5   MCV fL 88.4 88.9   PLATELETS 10*3/mm3 271 314       Results from last 7 days  Lab Units 07/03/18  0539 07/03/18  0052 07/02/18  2100 07/02/18  1615   SODIUM mmol/L 143  --   --  142   POTASSIUM mmol/L 4.8  4.8 5.2 5.4 6.7*   CO2 mmol/L 18.0*  --   --  18.0*   CREATININE mg/dL 2.69*  --   --  3.21*   GLUCOSE mg/dL 214*  --   --  381*   MAGNESIUM mg/dL 2.2  --   --   --    PHOSPHORUS mg/dL 4.9  --   --   --      Estimated Creatinine Clearance: 23.4 mL/min (A) (by C-G formula based on SCr of 2.69 mg/dL (H)).    Results from last 7 days  Lab Units 07/03/18  0539 07/02/18  1615   ALK PHOS U/L 163* 204*   BILIRUBIN mg/dL 0.7 0.8   ALT (SGPT) U/L 38 38   AST (SGOT) U/L 27 43*             Images:  Imaging Results (last 24 hours)     Procedure Component Value Units Date/Time    CT Abdomen Pelvis Stone Protocol [[id number]] Collected:  07/02/18 2157     Updated:  07/03/18 0101    Narrative:       EXAM:    CT Abdomen and Pelvis Without Intravenous Contrast    EXAM DATE/TIME:    7/2/2018 9:57 PM    CLINICAL HISTORY:    72 years old, female; Generalized abd pain, nausea, vomiting.    TECHNIQUE:    Axial computed tomography images of the abdomen and pelvis without   intravenous contrast.  All CT scans at this facility use at least one of these   dose optimization techniques: automated exposure control; mA and/or kV   adjustment per patient size (includes targeted exams where dose is matched to   clinical indication); or iterative reconstruction.    Coronal  reformatted images were created and reviewed.    COMPARISON:    CT ABDOMEN PELVIS WO CONTRAST 2017-10-27 11:24    FINDINGS:    Prior near-total colectomy with a right periumbilical ileostomy.  There is no   bowel inflammation, obstruction, free intraperitoneal air, or ascites.  Stable   mild presacral stranding.      Stable cardiomegaly.  Patchy bibasilar atelectasis.  Left basilar bronchial   wall thickening.        Trace pericholecystic free fluid without biliary dilatation, gallbladder   dilatation, or visualized gallstone.      Right adrenal mass has slightly increased in size since the prior exam,   currently measuring up to 3.7 cm where it previously measured up to 3.2 cm.        Bilateral nonobstructing intrarenal calculi, measuring up to 1 cm on the   left.  Bilateral simple renal cysts, measuring up to 2.9 cm on the left.  No   ureteral or bladder calculus.  No hydronephrosis.      Pancreatic atrophy.  The unenhanced liver, spleen, and urinary bladder are   normal.  Prior hysterectomy.      Diffuse atherosclerosis without abdominal aortic aneurysm or retroperitoneal   hemorrhage.  Chronic degenerative changes throughout the spine and at both hips.      Impression:         Trace pericholecystic fluid without layering dilatation or stone.  If there   is right upper quadrant pain, ultrasound recommended.      Right adrenal mass has slightly increased in size since the prior exam.  This   can be followed up with non-emergent washout CT or MRI.      Left basilar bronchitis.      Bilateral nephrolithiasis without urinary obstruction.      THIS DOCUMENT HAS BEEN ELECTRONICALLY SIGNED BY ERENDIRA KATE MD          Results: Reviewed.  I reviewed the patient's new laboratory and imaging results.  I independently reviewed the patient's new images.    Medications: Reviewed.    Assessment/Plan   A / P     Ms. Smith is a 71yo F who presented with Afib with RVR and a urinary tract infection. Cultures are pending.      Nutrition:   Diet Clear Liquid  Advance Directives:   Code Status and Medical Interventions:   Ordered at: 07/02/18 8366     Code Status:    CPR     Medical Interventions (Level of Support Prior to Arrest):    Full       Hospital Problem List     * (Principal)Sepsis due to urinary tract infection (CMS/HCC)    Diabetes mellitus, type 2 (CMS/HCC)    Overview Deleted 7/2/2018  9:44 PM by Brian Samson MD            Hypertension    Hyperlipidemia    Hypothyroidism    Obstructive sleep apnea syndrome    Diabetic peripheral neuropathy (CMS/[facility])    Atrial fibrillation with rapid ventricular response (CMS/[facility])    Altered mental status    Acute renal failure superimposed on stage 3 chronic kidney disease (CMS/[facility])    Hyperkalemia    Somnolence          Assessment / Plan:    1. Continue Zosyn. Follow up cultures.   2. Restart diltiazem PO.   3. Change NS to LR at 100/hr  4. Check Renal ultrasound.   5. Urology to see  6. Speech evaluation for possible aspiration.   7. Continue to monitor in the ICU  8. AM labs    Plan of care and goals reviewed during interdisciplinary rounds.  I discussed the patient's findings and my recommendations with patient, family and nursing staff    Time: was greater than 35 minutes.      Jasmian Maier, DO    Intensive Care Medicine and Pulmonary Medicine

## 2018-07-03 NOTE — NURSING NOTE
Pt  Remains stable throughout shift. Increased alertness and communication after 1200. SLP assessment today. Diet increased to pureed diet. SLP to re-evaluate 7-4-18. Renal US done. Patient able to take all oral meds.     Oneil Alarcon RN

## 2018-07-03 NOTE — PAYOR COMM NOTE
"Leila Smith (72 y.o. Female)     Date of Birth Social Security Number Address Home Phone MRN    1945  [id number] [id number] [phone] [id number]    [employer] Marital Status          Unknown        Admission Date Admission Type Admitting Provider Attending Provider Department, Room/Bed    7/2/18 Emergency Brian Samson MD Tzouanakis, Alexander E, MD [facility] 2B ICU, N237/1    Discharge Date Discharge Disposition Discharge Destination                       Attending Provider:  Brian Samson MD    Allergies:  Codeine, Tetracyclines & Related    Isolation:  None   Infection:  None   Code Status:  CPR    Ht:  180.3 cm (71\")   Wt:  80.7 kg (178 lb)    Admission Cmt:  None   Principal Problem:  Sepsis due to urinary tract infection (CMS/[facility]) [A41.9,N39.0]                 Active Insurance as of 7/2/2018     Primary Coverage     Payor Plan Insurance Group Employer/Plan Group    [employer]HEM MEDICARE REPLACEMENT [employer] MEDICARE ADVANTAGE [id number]     Payor Plan Address Payor Plan Phone Number Effective From Effective To    [address] [phone] 1/1/2017     [facility] [address]       Subscriber Name Subscriber Birth Date Member ID       LEILA SMITH 1945 [id number]                 Emergency Contacts      (Rel.) Home Phone Work Phone Mobile Phone    Danilo Hope (Daughter) [phone] -- --    Kleber Johnson (Son) [phone] -- --            Insurance Information                [employer]HE[employer] MEDICARE REPLACEMENT/[employer] MEDICARE ADVANTAGE Phone: [phone]    Subscriber: Leila Smith Subscriber#: [id number]    Group#: [id number] Precert#:              History & Physical      Brian Samson MD at 7/2/2018  9:29 PM                Chief complaint:  Back pain    Subjective     Patient is a 72 y.o. female who is a resident of [facility].  She was brought to the emergency department this evening for back " pain, nausea/vomiting, and altered mental status.    She has a history of COPD, CHF, atrial fibrillation, type 2 diabetes mellitus, and a history of frequent UTIs.  She has a history of a colostomy in April 2017.  She was admitted here in January for acute renal failure and a urinary tract infection with sepsis.  She responded IV fluids and her creatinine returned to her normal baseline which is around 1.5.  She had an echocardiogram to rule out a valvular vegetations and this was negative.    Her daughter accompanies her tonight.  She tells me that she has had complaints of back pain for several weeks.  It sounds as if they did an ultrasound at the nursing facility which they said showed kidney stones.  She was supposed to see a urologist but they did not take her insurance so she has not seen one as of yet.    Her normal activity in the nursing home is basically bed to chair.  She gets her bed into a wheelchair and back.  She does eat adequately though not optimally.  She is normally oriented but her daughter says that her behavior has changed ever since her colostomy in 2017.  She normally wears diapers for urinary incontinence.    She was found to have an elevated creatinine of 3.2 in the emergency department.  She also potassium of 6.7.  Urinalysis is pending.      History  Past Medical History:   Diagnosis Date   • Atrial fibrillation (CMS/HCC)    • Chronic ulcer of right leg (CMS/HCC)    • Cognitive communication deficit    • COPD (chronic obstructive pulmonary disease) (CMS/HCC)    • Diabetes mellitus (CMS/HCC)    • Difficulty in walking    • Encephalopathy    • Generalized muscle weakness    • Hematuria    • Hyperlipidemia    • Hypertension    • Hypothyroidism    • Urinary tract infection      Past Surgical History:   Procedure Laterality Date   • CATARACT EXTRACTION     • CHOLECYSTECTOMY     • COLOSTOMY     • FOOT SURGERY Right    • HERNIA REPAIR     • HYSTERECTOMY     • TOTAL KNEE ARTHROPLASTY Right       Family History   Problem Relation Age of Onset   • Stomach cancer Mother    • Alcohol abuse Other    • Cancer Other    • Diabetes Other    • Hypertension Other    • Other Other      Social History   Substance Use Topics   • Smoking status: Former Smoker     Packs/day: 0.00     Years: 50.00     Types: Cigarettes   • Smokeless tobacco: Never Used   • Alcohol use No     Prescriptions Prior to Admission   Medication Sig Dispense Refill Last Dose   • acetaminophen (TYLENOL) 500 MG tablet Take 1,000 mg by mouth Every 6 (Six) Hours As Needed for Mild Pain .   7/2/2018 at Unknown time   • ammonium lactate (LAC-HYDRIN) 12 % lotion Apply 1 application topically 2 (Two) Times a Day As Needed for Dry Skin.   7/2/2018 at Unknown time   • apixaban (ELIQUIS) 5 MG tablet tablet Take 1 tablet by mouth Every 12 (Twelve) Hours. 60 tablet  7/2/2018 at Unknown time   • Artificial Tear Solution (TEARS NATURALE OP) Apply 1 application to eye 2 (Two) Times a Day.   7/2/2018 at Unknown time   • aspirin 81 MG chewable tablet Chew 81 mg Daily.   7/2/2018 at Unknown time   • atorvastatin (LIPITOR) 10 MG tablet Take 10 mg by mouth Every Night.   7/1/2018 at Unknown time   • bacitracin-polymyxin b ointment ophthalmic ointment Apply 1 application topically As Needed (Apply to LEFT wrist after cleansing with NS).   7/2/2018 at Unknown time   • Benzalkonium Chloride 0.1 % liquid Apply 1 application topically As Needed (After incontinent episode).   7/2/2018 at Unknown time   • diltiaZEM CD (CARDIZEM CD) 240 MG 24 hr capsule Take 1 capsule by mouth Daily.   7/2/2018 at Unknown time   • dimethicone 1 % cream Apply 1 application topically As Needed (apply to buttocks).      • Dimethicone 1.5 % cream Apply 1 application topically As Needed (apply to buttock as needed).      • escitalopram (LEXAPRO) 10 MG tablet Take 10 mg by mouth Daily.   7/2/2018 at Unknown time   • famotidine (PEPCID) 20 MG tablet Take 20 mg by mouth Daily.   7/2/2018 at Unknown  "time   • haloperidol (HALDOL) 1 MG tablet Take 1 mg by mouth 2 (Two) Times a Day.   7/2/2018 at Unknown time   • insulin lispro (humaLOG) 100 UNIT/ML injection Inject 0-10 Units under the skin 4 (Four) Times a Day Before Meals & at Bedtime. Sliding Scale:  150-199 = 2 units, 200-249 = 4 units, 250-299 = 6 units, 300-349 = 8 units, 350-399 = 10 units, >400 = call MD   7/2/2018 at 1100   • levoFLOXacin (LEVAQUIN) 500 MG tablet Take 500 mg by mouth Daily. (6/26/18 - 7/5/18)   7/2/2018 at Unknown time   • magnesium hydroxide (MILK OF MAGNESIA) 400 MG/5ML suspension Take 30 mL by mouth Daily As Needed for Constipation.      • Melatonin 3 MG tablet Take 3 mg by mouth Every Night.   7/1/2018 at Unknown time   • Menthol-Zinc Oxide (REMEDY CALAZIME EX) Apply 1 application topically As Needed (apply to right buttock small pink area).   7/2/2018 at Unknown time   • metoprolol succinate XL (TOPROL-XL) 50 MG 24 hr tablet Take 3 tablets by mouth Daily.   7/2/2018 at Unknown time   • mometasone-formoterol (DULERA) 100-5 MCG/ACT inhaler Inhale 2 puffs 2 (Two) Times a Day.   7/2/2018 at Unknown time   • ondansetron (ZOFRAN) 4 MG tablet Take 4 mg by mouth Every 8 (Eight) Hours As Needed for Nausea or Vomiting.        Allergies:  Codeine and Tetracyclines & related    Review of Systems   Review of systems could not be obtained due to   patient confusion.      Objective     Vital Signs  Temp:  [97.5 °F (36.4 °C)-97.7 °F (36.5 °C)] 97.7 °F (36.5 °C)  Heart Rate:  [123-159] 138  Resp:  [18-22] 18  BP: ()/() 148/106    Physical Exam:    Objective:  General Appearance:  Uncomfortable and ill-appearing.    Vital signs: (most recent): Blood pressure (!) 148/106, pulse (!) 138, temperature 97.7 °F (36.5 °C), temperature source Axillary, resp. rate 18, height 180.3 cm (71\"), weight 80.7 kg (178 lb), SpO2 93 %.    HEENT: Normal HEENT exam.  (Nasal cannula O2)    Lungs:  Normal effort and normal respiratory rate.  Breath sounds clear " to auscultation.  She is not in respiratory distress.  No rales, wheezes or rhonchi.    Heart: Tachycardia.  Irregular rhythm.  S1 normal and S2 normal.  No murmur, gallop or friction rub.   Chest: Symmetric chest wall expansion.   Abdomen: Abdomen is soft and non-distended.  (Colostomy in place with pink stoma).  Bowel sounds are normal.   There is no abdominal tenderness.   There is no mass. There is no splenomegaly. There is no hepatomegaly.   Extremities: There is no deformity or dependent edema.    Neurological: Patient is alert.  (Confused).    Pupils:  Pupils are equal, round, and reactive to light.    Skin:  Warm and dry.              Results Review:    I reviewed the patient's new clinical results.  I reviewed the patient's other test results and agree with the interpretation    Assessment/Plan     Assessment:    Hospital Problem List     * (Principal)Sepsis due to urinary tract infection (CMS/HCC)    Atrial fibrillation with rapid ventricular response (CMS/HCC)    Altered mental status    Acute renal failure (CMS/HCC)    Hyperkalemia    Diabetes mellitus, type 2 (CMS/HCC)    Overview Signed 8/3/2016  2:13 PM by Ama Wolf     With foot ulcer         Hypertension    Hyperlipidemia    Hypothyroidism    Obstructive sleep apnea syndrome    Diabetic peripheral neuropathy (CMS/HCC)    Somnolence          72-year-old female presents with back pain, nausea/vomiting, volume depletion, and acute on chronic renal insufficiency.  She probably has a urinary tract infection though urinalysis is pending.  These have been recurrent in the past.  She was also recently told that she had kidney stones based upon what I think was a renal ultrasound done at her nursing home.  She has chronic medical problems as noted above.    Plan:    1. ICU admission  2. Broad-spectrum antibiotics  3. Urine and blood cultures  4. Monitor atrial fibrillation rate.  Brevibloc for now.  Normally on oral Cardizem  5. Continue  anticoagulation  6. Volume resuscitation  7. Monitor renal function  8. CT of the abdomen to investigate the possibility of kidney stones and/or abscess  9. Urology consultation if necessary  10. Discussed plans with daughter in detail    I discussed the patients findings and my recommendations with patient, family and nursing staff.     Critical Care time spent in direct patient care: 50 minutes (excluding procedure time, if applicable) including high complexity decision making to assess, manipulate, and support vital organ system failure in this individual who has impairment of one or more vital organ systems such that there is a high probability of imminent or life threatening deterioration in the patient’s condition.    Brian Samson MD  Pulmonary and Critical Care Medicine  07/02/18  9:39 PM            Electronically signed by Brian Samson MD at 7/2/2018  9:58 PM             ICU Vital Signs     Row Name 07/02/18 2015 07/02/18 2013 07/02/18 2012 07/02/18 1930 07/02/18 1924       Vitals    Temp 97.7 °F (36.5 °C)  --  --  --  --    Temp src Axillary  --  --  --  --    Pulse (!)  138  -- (!)  125 (!)  123 (!)  130    Heart Rate Source Monitor  --  --  --  --    BP (!)  148/106 98/64  -- 94/77  --    Noninvasive MAP (mmHg) 69  --  --  --  --    BP Location Right arm  --  --  --  --    BP Method Automatic  --  --  --  --    Patient Position Lying  --  --  --  --       Oxygen Therapy    SpO2 93 %  -- 96 % 95 % 95 %    Pulse Oximetry Type Continuous  --  --  --  --    Device (Oxygen Therapy) room air  --  --  --  --    Row Name 07/02/18 1900 07/02/18 1830 07/02/18 1800 07/02/18 1739 07/02/18 1730       Vitals    Pulse (!)  131 (!)  159 (!)  133 (!)  143 (!)  145    Resp  -- 18 -- 18 22    /94 (!)  127/101 129/92  -- 117/79    Noninvasive MAP (mmHg)  -- 110 112  -- 89       Oxygen Therapy    SpO2 100 % 99 % 95 % 98 % 92 %    Device (Oxygen Therapy)  --  --  -- room air  --    Row Name  "07/02/18 1630 07/02/18 1557                Height and Weight    Height  -- 180.3 cm (71\")       Height Method  -- Stated       Weight  -- 80.7 kg (178 lb)       Weight Method  -- Stated       Ideal Body Weight (IBW) (kg)  -- 71.01       BSA (Calculated - sq m)  -- 2.01 sq meters       BMI (Calculated)  -- 24.8       Weight in (lb) to have BMI = 25  -- 178.9          Vitals    Temp  -- 97.5 °F (36.4 °C)       Temp src  -- Oral       Pulse (!)  142 (!)  142       Heart Rate Source  -- Monitor       Resp  -- 22       Resp Rate Source  -- Visual       /88 109/93       BP Method  -- Automatic       Patient Position  -- Lying          Oxygen Therapy    SpO2 98 % 98 %       Pulse Oximetry Type  -- Continuous       Device (Oxygen Therapy)  -- room air           Hospital Medications (all)       Dose Frequency Start End    albuterol (PROVENTIL) nebulizer solution 0.083% 2.5 mg/3mL 2.5 mg Once 7/2/2018 7/2/2018    Sig - Route: Take 2.5 mg by nebulization 1 (One) Time. - Nebulization    apixaban (ELIQUIS) tablet 5 mg 5 mg Every 12 Hours Scheduled 7/2/2018     Sig - Route: Take 1 tablet by mouth Every 12 (Twelve) Hours. - Oral    aspirin chewable tablet 81 mg 81 mg Daily 7/3/2018     Sig - Route: Chew 1 tablet Daily. - Oral    atorvastatin (LIPITOR) tablet 10 mg 10 mg Nightly 7/2/2018     Sig - Route: Take 1 tablet by mouth Every Night. - Oral    budesonide-formoterol (SYMBICORT) 160-4.5 MCG/ACT inhaler 2 puff 2 puff 2 Times Daily - RT 7/2/2018     Sig - Route: Inhale 2 puffs 2 (Two) Times a Day. - Inhalation    calcium gluconate 1 g in sodium chloride 0.9 % 100 mL  Once 7/2/2018 7/2/2018    Sig - Route: Infuse  into a venous catheter 1 (One) Time. - Intravenous    dextrose (D50W) solution 25 g 25 g Every 15 Minutes PRN 7/2/2018     Sig - Route: Infuse 50 mL into a venous catheter Every 15 (Fifteen) Minutes As Needed for Low Blood Sugar (Blood Sugar Less Than 70). - Intravenous    dextrose (GLUTOSE) oral gel 15 g 15 g " Every 15 Minutes PRN 7/2/2018     Sig - Route: Take 15 g by mouth Every 15 (Fifteen) Minutes As Needed for Low Blood Sugar (Blood sugar less than 70). - Oral    esmolol (BREVIBLOC) 2500 mg/250 mL (10 mg/mL) infusion  mcg/kg/min × 80.7 kg Titrated 7/2/2018     Sig - Route: Infuse 4,035-24,210 mcg/min into a venous catheter Dose Adjusted By Provider As Needed. - Intravenous    famotidine (PEPCID) tablet 20 mg 20 mg Daily 7/3/2018     Sig - Route: Take 1 tablet by mouth Daily. - Oral    glucagon (human recombinant) (GLUCAGEN DIAGNOSTIC) injection 1 mg 1 mg As Needed 7/2/2018     Sig - Route: Inject 1 mg under the skin As Needed (Blood Glucose Less Than 70). - Subcutaneous    haloperidol (HALDOL) tablet 1 mg 1 mg 2 Times Daily 7/2/2018     Sig - Route: Take 1 tablet by mouth 2 (Two) Times a Day. - Oral    insulin lispro (humaLOG) injection 0-7 Units 0-7 Units 4 Times Daily With Meals & Nightly 7/2/2018     Sig - Route: Inject 0-7 Units under the skin 4 (Four) Times a Day With Meals & at Bedtime. - Subcutaneous    ipratropium-albuterol (DUO-NEB) nebulizer solution 3 mL 3 mL Every 6 Hours PRN 7/2/2018     Sig - Route: Take 3 mL by nebulization Every 6 (Six) Hours As Needed for Wheezing or Shortness of Air. - Nebulization    ondansetron (ZOFRAN) injection 4 mg 4 mg Every 6 Hours PRN 7/2/2018     Sig - Route: Infuse 2 mL into a venous catheter Every 6 (Six) Hours As Needed for Nausea or Vomiting. - Intravenous    piperacillin-tazobactam (ZOSYN) 3.375 g in iso-osmotic dextrose 50 ml (premix) 3.375 g Every 8 Hours 7/3/2018 7/10/2018    Sig - Route: Infuse 50 mL into a venous catheter Every 8 (Eight) Hours. - Intravenous    piperacillin-tazobactam (ZOSYN) 4.5 g in iso-osmotic dextrose 100 mL IVPB (premix) 4.5 g Once 7/2/2018 7/2/2018    Sig - Route: Infuse 100 mL into a venous catheter 1 (One) Time. - Intravenous    sodium chloride 0.9 % bolus 2,421 mL 30 mL/kg × 80.7 kg Once 7/2/2018 7/2/2018    Sig - Route: Infuse  2,421 mL into a venous catheter 1 (One) Time. - Intravenous    sodium chloride 0.9 % flush 1-10 mL 1-10 mL As Needed 7/2/2018     Sig - Route: Infuse 1-10 mL into a venous catheter As Needed for Line Care. - Intravenous    sodium chloride 0.9 % flush 10 mL 10 mL As Needed 7/2/2018     Sig - Route: Infuse 10 mL into a venous catheter As Needed for Line Care. - Intravenous    Cosign for Ordering: Accepted by Tato Peraza MD on 7/2/2018  9:22 PM    sodium chloride 0.9 % infusion 150 mL/hr Continuous 7/2/2018     Sig - Route: Infuse 150 mL/hr into a venous catheter Continuous. - Intravenous    diltiaZEM (CARDIZEM) 125mg/125 mL infusion (Discontinued) 5-15 mg/hr Titrated 7/2/2018 7/2/2018    Sig - Route: Infuse 5-15 mg/hr into a venous catheter Dose Adjusted By Provider As Needed. - Intravenous    piperacillin-tazobactam (ZOSYN) 3.375 g in iso-osmotic dextrose 50 ml (premix) (Discontinued) 3.375 g Once 7/2/2018 7/2/2018    Sig - Route: Infuse 50 mL into a venous catheter 1 (One) Time. - Intravenous    Reason for Discontinue: Duplicate order          Orders (last 24 hrs)     Start     Ordered    07/03/18 0900  aspirin chewable tablet 81 mg  Daily      07/02/18 2154 07/03/18 0900  famotidine (PEPCID) tablet 20 mg  Daily      07/02/18 2154 07/03/18 0600  CBC & Differential  Morning Draw      07/02/18 2152 07/03/18 0600  Comprehensive Metabolic Panel  Morning Draw      07/02/18 2152 07/03/18 0600  Magnesium  Morning Draw      07/02/18 2152 07/03/18 0600  Phosphorus  Morning Draw      07/02/18 2152 07/03/18 0600  TSH  Morning Draw      07/02/18 2152 07/03/18 0600  Hemoglobin A1c  Morning Draw      07/02/18 2152 07/03/18 0200  piperacillin-tazobactam (ZOSYN) 3.375 g in iso-osmotic dextrose 50 ml (premix)  Every 8 Hours      07/02/18 2152 07/02/18 2300  insulin lispro (humaLOG) injection 0-7 Units  4 Times Daily With Meals & Nightly      07/02/18 2152 07/02/18 2300  apixaban (ELIQUIS)  tablet 5 mg  Every 12 Hours Scheduled      07/02/18 2154 07/02/18 2300  atorvastatin (LIPITOR) tablet 10 mg  Nightly      07/02/18 2154 07/02/18 2300  haloperidol (HALDOL) tablet 1 mg  2 Times Daily      07/02/18 2154 07/02/18 2300  budesonide-formoterol (SYMBICORT) 160-4.5 MCG/ACT inhaler 2 puff  2 Times Daily - RT      07/02/18 2154 07/02/18 2230  sodium chloride 0.9 % infusion  Continuous      07/02/18 2152 07/02/18 2230  piperacillin-tazobactam (ZOSYN) 3.375 g in iso-osmotic dextrose 50 ml (premix)  Once,   Status:  Discontinued      07/02/18 2152 07/02/18 2200  Vital Signs Every Hour and Per Hospital Policy Based on Patient Condition  Every Hour      07/02/18 2152 07/02/18 2200  Intake and Output  Every Hour      07/02/18 2152 07/02/18 2200  POC Glucose 4x Daily AC & at Bedtime  4 Times Daily Before Meals & at Bedtime      07/02/18 2152 07/02/18 2157  CT Abdomen Pelvis Stone Protocol  1 Time Imaging      07/02/18 2156 07/02/18 2156  Potassium  Every 4 Hours      07/02/18 2155 07/02/18 2153  Daily Weights  Daily      07/02/18 2152 07/02/18 2152  ipratropium-albuterol (DUO-NEB) nebulizer solution 3 mL  Every 6 Hours PRN      07/02/18 2152 07/02/18 2152  ondansetron (ZOFRAN) injection 4 mg  Every 6 Hours PRN      07/02/18 2152 07/02/18 2150  Critical Care  Once     Comments:  This order was created via procedure documentation    07/02/18 2149 07/02/18 2149  Oxygen Therapy-  Continuous      07/02/18 2152 07/02/18 2149  Diet Clear Liquid  Diet Effective Now      07/02/18 2152 07/02/18 2148  Cardiac Monitoring  Continuous      07/02/18 2152 07/02/18 2148  Continuous Pulse Oximetry  Continuous      07/02/18 2152 07/02/18 2148  Strict Bed Rest  Until Discontinued      07/02/18 2152 07/02/18 2148  Use Mobility Guidelines for Advancement of Activity  Continuous      07/02/18 2152 07/02/18 2148  Do NOT Hold Basal Insulin When Patient is NPO, Hold Bolus Dose if  NPO  Continuous      07/02/18 2152 07/02/18 2148  Follow [facility] Hypoglycemia Protocol For Blood Glucose Less Than 70 mg/dL  Until Discontinued      07/02/18 2152 07/02/18 2148  Hypoglycemia Treatment - Alert Patient That is Not NPO and Can Safely Swallow  Until Discontinued     Comments:  Administer 4 oz Fruit Juice OR 4 oz Regular Soda OR 8 oz Milk OR 15-30 grams (1 tube) of Glucose Gel.  Recheck Blood Glucose 15 Minutes After Ingestion, Repeat Treatment & Continue to Recheck Blood Sugar Every 15 Minutes Until Blood Glucose is 70 mg/dL or Higher.  Once Blood Glucose is 70 mg/dL or Higher and if It Will Be more than 60 Minutes Until the Next Meal, Provide Appropriate Snack (Including Carbohydrate Food) Based on Meal Plan Order. Give Meal Tray As Soon As Possible.    07/02/18 2152 07/02/18 2148  Hypoglycemia Treatment - Patient Has IV Access - Unresponsive, NPO or Unable To Safely Swallow  Until Discontinued     Comments:  Administer 25g (50ml) D50W IV Push.  Recheck Blood Glucose 15 Minutes After Administration, if Blood Glucose Remains Less Than 70 mg/dl, Repeat Treatment   Recheck Blood Glucose 15 Minutes After 2nd Administration, if Blood Glucose Remains Less Than 70 mg/dL After 2nd Dose of D50W, Contact Provider for Further Treatment Orders & Consider Adding IVF With D5W for Maintenance    07/02/18 2152 07/02/18 2148  Hypoglycemia Treatment - Patient Without IV Access - Unresponsive, NPO or Unable To Safely Swallow  Until Discontinued     Comments:  Administer 1mg Glucagon SQ & Establish IV Access.  Turn Patient on Side - Nausea / Vomiting May Occur.  Recheck Blood Glucose 15 Minutes After Administration.  If Blood Glucose Remains Less Than 70, Administer 25g D50W IV Push (50ml).  Recheck Blood Glucose 15 Minutes After Administration of D50W, if Blood Glucose Remains Less Than 70 mg/dl, Contact Provider for Further Treatment Orders & Consider Adding IVF With D5 for Maintenance    07/02/18 2152     07/02/18 2148  Notify Provider of event. Communicate needs and document in EMR.  Once      07/02/18 2152 07/02/18 2148  Document event and patients response to treatment in EMR, any medications given to be documented on MAR.  Once      07/02/18 2152 07/02/18 2148  Height & Weight  Once      07/02/18 2152 07/02/18 2148  Insert Peripheral IV  Once      07/02/18 2152 07/02/18 2148  Saline Lock & Maintain IV Access  Continuous      07/02/18 2152 07/02/18 2148  Code Status and Medical Interventions:  Continuous      07/02/18 2152 07/02/18 2148  VTE Risk Assessment - Moderate Risk  Once      07/02/18 2152 07/02/18 2148  Pharmacologic VTE Prophylaxis Not Indicated: Currently Anticoagulated  Once      07/02/18 2152 07/02/18 2148  Place Sequential Compression Device  Once      07/02/18 2152 07/02/18 2148  Maintain Sequential Compression Device  Continuous      07/02/18 2152 07/02/18 2147  dextrose (GLUTOSE) oral gel 15 g  Every 15 Minutes PRN      07/02/18 2152 07/02/18 2147  dextrose (D50W) solution 25 g  Every 15 Minutes PRN      07/02/18 2152 07/02/18 2147  glucagon (human recombinant) (GLUCAGEN DIAGNOSTIC) injection 1 mg  As Needed      07/02/18 2152 07/02/18 2147  sodium chloride 0.9 % flush 1-10 mL  As Needed      07/02/18 2152 07/02/18 2130  Inpatient Admission  Once      07/02/18 2129 07/02/18 2002  Lactic Acid, Reflex  STAT      07/02/18 2001 07/02/18 1923  ICU / CCU Bed Request  Once      07/02/18 1922 07/02/18 1921  piperacillin-tazobactam (ZOSYN) 4.5 g in iso-osmotic dextrose 100 mL IVPB (premix)  Once      07/02/18 1919 07/02/18 1919  Urinalysis With Culture If Indicated - Urine, Catheter  Once      07/02/18 1919 07/02/18 1803  esmolol (BREVIBLOC) 2500 mg/250 mL (10 mg/mL) infusion  Titrated      07/02/18 1801    07/02/18 1753  diltiaZEM (CARDIZEM) 125mg/125 mL infusion  Titrated,   Status:  Discontinued      07/02/18 1751    07/02/18 1710  calcium  gluconate 1 g in sodium chloride 0.9 % 100 mL  Once      07/02/18 1708    07/02/18 1710  albuterol (PROVENTIL) nebulizer solution 0.083% 2.5 mg/3mL  Once      07/02/18 1708    07/02/18 1657  sodium chloride 0.9 % bolus 2,421 mL  Once      07/02/18 1655    07/02/18 1656  Lactic Acid, Reflex Timer (This will reflex a repeat order 3-3:15 hours after ordered.)  Once      07/02/18 1655    07/02/18 1656  Blood Culture - Blood,  Once      07/02/18 1655    07/02/18 1656  Blood Culture - Blood,  Once      07/02/18 1655    07/02/18 1629  Comprehensive Metabolic Panel  Once      07/02/18 1551    07/02/18 1629  Lipase  Once      07/02/18 1551    07/02/18 1608  Lactic Acid, Plasma  STAT      07/02/18 1607    07/02/18 1608  Urinalysis With Microscopic If Indicated (No Culture) - Urine, Catheter  Once      07/02/18 1607    07/02/18 1552  NPO Diet  Diet Effective Now,   Status:  Canceled      07/02/18 1551    07/02/18 1552  Undress and Gown  Once      07/02/18 1551    07/02/18 1552  Insert peripheral IV  Once      07/02/18 1551    07/02/18 1552  Cameron Draw  Once      07/02/18 1551    07/02/18 1552  CBC & Differential  Once      07/02/18 1551    07/02/18 1552  Urinalysis With Microscopic If Indicated (No Culture) - Urine, Clean Catch  Once,   Status:  Canceled      07/02/18 1551    07/02/18 1552  Light Blue Top  PROCEDURE ONCE      07/02/18 1551    07/02/18 1552  Green Top (Gel)  PROCEDURE ONCE      07/02/18 1551    07/02/18 1552  Lavender Top  PROCEDURE ONCE      07/02/18 1551    07/02/18 1552  Gold Top - SST  PROCEDURE ONCE      07/02/18 1551    07/02/18 1552  Green Top (No Gel)  PROCEDURE ONCE,   Status:  Canceled      07/02/18 1551    07/02/18 1552  CBC Auto Differential  PROCEDURE ONCE      07/02/18 1551    07/02/18 1551  sodium chloride 0.9 % flush 10 mL  As Needed      07/02/18 1551    06/26/18 0000  levoFLOXacin (LEVAQUIN) 500 MG tablet  Daily      07/02/18 1909    --  escitalopram (LEXAPRO) 10 MG tablet  Daily       07/02/18 1909    --  magnesium hydroxide (MILK OF MAGNESIA) 400 MG/5ML suspension  Daily PRN      07/02/18 1909    --  ondansetron (ZOFRAN) 4 MG tablet  Every 8 Hours PRN      07/02/18 1909    --  haloperidol (HALDOL) 1 MG tablet  2 Times Daily      07/02/18 2033    --  insulin lispro (humaLOG) 100 UNIT/ML injection  4 Times Daily Before Meals & Nightly      07/02/18 2033    --  ammonium lactate (LAC-HYDRIN) 12 % lotion  2 Times Daily PRN      07/02/18 2033    --  bacitracin-polymyxin b ointment ophthalmic ointment  As Needed      07/02/18 2033    --  Benzalkonium Chloride 0.1 % liquid  As Needed      07/02/18 2033    --  Menthol-Zinc Oxide (REMEDY CALAZIME EX)  As Needed      07/02/18 2033    --  dimethicone 1 % cream  As Needed      07/02/18 2033    --  Dimethicone 1.5 % cream  As Needed      07/02/18 2033

## 2018-07-03 NOTE — PLAN OF CARE
Problem: Patient Care Overview  Goal: Plan of Care Review  Outcome: Ongoing (interventions implemented as appropriate)   07/03/18 4017   Coping/Psychosocial   Plan of Care Reviewed With patient;other (see comments)  (Oneil RN)   Plan of Care Review   Progress no change   OTHER   Outcome Summary WOC nurse consulted to assess skin buttocks. Pt has area of healed PI with tunneling right lower gluteus and area of healed PI or shearing right upper gluteus; cleaned with blue skin wipes; Z-guard applied; covered with foam sacral dressing. Left great toe with DTPI (POA); cleaned with N/S, Betadine applied; LITA. Left lower leg with 95% healed venous ulcer; covered with foam dressing. Plan to apply Betadine to this ulcer. Pt has established colostomy; [place] #[id number] piece appliance intact with no leakage. Pt on Isolibrium bed. See LDA's and orders. Pt not educational candidate today. WOC nurse will f/u. Please contact WOC nurse as needed for concerns.

## 2018-07-03 NOTE — PLAN OF CARE
Problem: Fall Risk (Adult)  Goal: Identify Related Risk Factors and Signs and Symptoms  Outcome: Ongoing (interventions implemented as appropriate)   07/03/18 0529   Fall Risk (Adult)   Related Risk Factors (Fall Risk) age-related changes;environment unfamiliar;gait/mobility problems;confusion/agitation   Signs and Symptoms (Fall Risk) presence of risk factors     Goal: Absence of Fall  Outcome: Ongoing (interventions implemented as appropriate)   07/03/18 0529   Fall Risk (Adult)   Absence of Fall making progress toward outcome       Problem: Skin Injury Risk (Adult)  Goal: Identify Related Risk Factors and Signs and Symptoms  Outcome: Ongoing (interventions implemented as appropriate)   07/03/18 0529   Skin Injury Risk (Adult)   Related Risk Factors (Skin Injury Risk) advanced age;cognitive impairment;critical care admission;mobility impaired     Goal: Skin Health and Integrity  Outcome: Ongoing (interventions implemented as appropriate)   07/03/18 0529   Skin Injury Risk (Adult)   Skin Health and Integrity making progress toward outcome       Problem: Renal Failure/Kidney Injury, Acute (Adult)  Goal: Signs and Symptoms of Listed Potential Problems Will be Absent, Minimized or Managed (Renal Failure/Kidney Injury, Acute)  Outcome: Ongoing (interventions implemented as appropriate)   07/03/18 0529   Goal/Outcome Evaluation   Problems Assessed (Acute Renal Failure/Kidney Injury) all   Problems Present (ARF/Kidney Injury) none       Problem: Arrhythmia/Dysrhythmia (Symptomatic) (Adult)  Goal: Signs and Symptoms of Listed Potential Problems Will be Absent, Minimized or Managed (Arrhythmia/Dysrhythmia)  Outcome: Ongoing (interventions implemented as appropriate)   07/03/18 0529   Goal/Outcome Evaluation   Problems Assessed (Arrhythmia/Dysrhythmia) all   Problems Present (Dysrhythmia) hypoxia/hypoxemia

## 2018-07-03 NOTE — PLAN OF CARE
Problem: Patient Care Overview  Goal: Plan of Care Review  Outcome: Ongoing (interventions implemented as appropriate)   07/03/18 1415   Coping/Psychosocial   Plan of Care Reviewed With patient   Plan of Care Review   Progress no change   SLP evaluation completed. Will address suspected pharyngeal dysphagia w/ BS re-eval tomorrow for upgrade vs. Need for instrumental eval. Pt coughing w/ thins via straw only. Impulsive w/ self-feeding and unable to independently take small sips. Pt on clear liquid restriction so unable to assess solids. Burping noted t/o eval. RECS: thin liquids, small single sips, no straws, meds whole one at a time in thins or nectar-thick liqs as tolerated, general aspiration and reflux precautions, Oral care BID and PRN, supervision w/ PO. Please see note for further details and recommendations.

## 2018-07-03 NOTE — PROGRESS NOTES
Clinical Nutrition Note      Patient Name: Leila Smith  MRN: [id number]  Admission date: 7/2/2018      Reason for Assessment:  Multidisciplinary Rounds    Additional information obtained during MDR:  RN reports pt adm w/ sepsis but came to the  unit  w/ a fib and RVR ; pt's  daughter reports pt to have  confusion since colon surgery in 2017 as well as  behavior changes.     Current diet: Diet Clear Liquid    Pertinent medical data reviewed    Intervention:  Plan of care and goals reviewed    Monitor:  RD to follow per protocol      Carline Gordon MS,RD,LD  07/03/18 12:21 PM  Time: 20min

## 2018-07-03 NOTE — PLAN OF CARE
Problem: Fall Risk (Adult)  Intervention: Monitor/Assist with Self Care   18 1800   Activity   Activity Assistance Provided assistance, 2 people     Intervention: Reduce Risk/Promote Restraint Free Environment   18 1800   Safety Management   Environmental Safety Modification assistive device/personal items within reach;clutter free environment maintained;lighting adjusted;room organization consistent   Safety Promotion/Fall Prevention safety round/check completed   Prevent Savage Drop/Fall   Safety/Security Measures bed alarm set     Intervention: Review Medications/Identify Contributors to Fall Risk   18 1800   Safety Management   Medication Review/Management medications reviewed       Goal: Identify Related Risk Factors and Signs and Symptoms  Outcome: Ongoing (interventions implemented as appropriate)    Goal: Absence of Fall  Outcome: Ongoing (interventions implemented as appropriate)   18 0529   Fall Risk (Adult)   Absence of Fall making progress toward outcome       Problem: Skin Injury Risk (Adult)  Intervention: Promote/Optimize Nutrition   18 0800   Nutrition Interventions   Oral Nutrition Promotion social interaction promoted     Intervention: Prevent/Manage Excess Moisture   18 1400 18 1800   Hygiene Care   Perineal Care perineum cleansed;absorbent pad changed --    Bathing/Skin Care incontinence care;bath, partial --    Skin Interventions   Skin Protection --  adhesive use limited;incontinence pads utilized;tubing/devices free from skin contact;protective footwear used     Intervention: Maintain Head of Bed Elevation Less Than 30 Degrees as Tolerated   18 1800   Positioning   Head of Bed (HOB) HOB at 30-45 degrees     Intervention: Prevent/Minimize Shear/Friction Injuries   18 1800   Skin Interventions   Pressure Reduction Devices specialty bed utilized;heel offloading device utilized;positioning supports utilized   Positioning   Positioning/Transfer  Devices pillows;in use     Intervention: Prevent or Minimize Pressure   07/03/18 1800   Skin Interventions   Pressure Reduction Techniques frequent weight shift encouraged;heels elevated off bed;pressure points protected;weight shift assistance provided   Positioning   Body Position turned;neutral body alignment;neutral head position       Goal: Identify Related Risk Factors and Signs and Symptoms  Outcome: Ongoing (interventions implemented as appropriate)   07/03/18 0529   Skin Injury Risk (Adult)   Related Risk Factors (Skin Injury Risk) advanced age;cognitive impairment;critical care admission;mobility impaired     Goal: Skin Health and Integrity  Outcome: Ongoing (interventions implemented as appropriate)   07/03/18 0529   Skin Injury Risk (Adult)   Skin Health and Integrity making progress toward outcome       Problem: Renal Failure/Kidney Injury, Acute (Adult)  Intervention: Prevent/Manage Infection   07/03/18 1600 07/03/18 1800   Safety Management   Infection Prevention --  rest/sleep promoted;personal protective equipment utilized   Hygiene Care   Oral Care patient refused intervention --      Intervention: Monitor/Manage Fluid, Acid Base Balance   07/03/18 0800 07/03/18 1800   Nutrition Interventions   Fluid/Electrolyte Management fluids provided --    Positioning   Body Position --  turned;neutral body alignment;neutral head position   Safety Management   Medication Review/Management --  medications reviewed     Intervention: Evaluate/Maintain Nutrition Support   07/03/18 1800   Coping/Psychosocial Interventions   Environmental Support calm environment promoted;rest periods encouraged     Intervention: Promote Energy Conservation   07/03/18 1800   Pain/Comfort/Sleep Interventions   Sleep/Rest Enhancement regular sleep/rest pattern promoted     Intervention: Provide Oxygenation/Ventilation/Perfusion Support   07/03/18 1800   Activity   Activity Management bedrest   Positioning   Head of Bed (HOB) HOB at  30-45 degrees         Problem: Arrhythmia/Dysrhythmia (Symptomatic) (Adult)  Intervention: Prevent/Manage Thrombi/Emboli   07/03/18 1000   Interventions   VTE Prevention/Management bilateral;sequential compression devices on      07/03/18 1000   Interventions   VTE Prevention/Management bilateral;sequential compression devices on     Intervention: Promote Hemodynamic Stability   07/03/18 0800 07/03/18 1800   Nutrition Interventions   Fluid/Electrolyte Management fluids provided --    Cognitive Interventions   Sensory Stimulation Regulation music/television provided for relaxation;care clustered --    Positioning   Head of Bed (HOB) --  HOB at 30-45 degrees       Goal: Signs and Symptoms of Listed Potential Problems Will be Absent, Minimized or Managed (Arrhythmia/Dysrhythmia)  Outcome: Ongoing (interventions implemented as appropriate)   07/03/18 0529   Goal/Outcome Evaluation   Problems Assessed (Arrhythmia/Dysrhythmia) all   Problems Present (Dysrhythmia) hypoxia/hypoxemia       Problem: Patient Care Overview  Goal: Plan of Care Review   07/03/18 0915 07/03/18 1507 07/03/18 1800   Coping/Psychosocial   Plan of Care Reviewed With --  --  patient   Plan of Care Review   Progress --  no change --    OTHER   Outcome Summary WOC nurse consulted to assess skin buttocks. Pt has area of healed PI with tunneling right lower gluteus and area of healed PI or shearing right upper gluteus; cleaned with blue skin wipes; Z-guard applied; covered with foam sacral dressing. Left great toe with DTPI (POA); cleaned with N/S, Betadine applied; LITA. Left lower leg with 95% healed venous ulcer; covered with foam dressing. Plan to apply Betadine to this ulcer. Pt has established colostomy; [place] #[id number] piece appliance intact with no leakage. Pt on Isolibrium bed. See LDA's and orders. WOC nurse will f/u. Please contact WOC nurse as needed for concerns.  --  --      Goal: Individualization and Mutuality  Outcome: Ongoing  (interventions implemented as appropriate)    Goal: Discharge Needs Assessment   07/03/18 1115   Discharge Needs Assessment   Readmission Within the Last 30 Days no previous admission in last 30 days   Concerns to be Addressed no discharge needs identified   Equipment Needed After Discharge none   Discharge Facility/Level of Care Needs nursing facility, intermediate   Disability   Equipment Currently Used at Home walker, rolling;wheelchair;colostomy/ostomy supplies     Goal: Interprofessional Rounds/Family Conf  Outcome: Ongoing (interventions implemented as appropriate)   07/03/18 1820   Interdisciplinary Rounds/Family Conf   Participants ;nursing;physical therapy;respiratory therapy

## 2018-07-03 NOTE — PROGRESS NOTES
Discharge Planning Assessment  [facility]     Patient Name: Leila Smith  MRN: [id number]  Today's Date: 7/3/2018    Admit Date: 7/2/2018          Discharge Needs Assessment     Row Name 07/03/18 1115       Living Environment    Lives With facility resident   pt is resident ([facility]) longterm    Name(s) of Who Lives With Patient residential facility    Current Living Arrangements residential facility    Primary Care Provided by other (see comments)    Provides Primary Care For no one    Quality of Family Relationships helpful;involved;supportive       Discharge Needs Assessment    Readmission Within the Last 30 Days no previous admission in last 30 days    Concerns to be Addressed no discharge needs identified    Equipment Currently Used at Home walker, rolling;wheelchair;colostomy/ostomy supplies    Equipment Needed After Discharge none    Discharge Facility/Level of Care Needs nursing facility, intermediate            Discharge Plan     Row Name 07/03/18 1120       Plan    Plan Return to [facility]    Patient/Family in Agreement with Plan yes    Plan Comments  Information is from chart. Pt is confused, family is not at bedside. Pt is a resident at [facility], I spoke with Frantz at [facility] she states pt is on 1 day [facility] bed Hold she is going to speak with the business office and let me know if they are going to hold the bed.  I spoke with Danilo  daughter,she is going to check other facilities, I gave her a list for [facility].    Final Discharge Disposition Code 04 - intermediate care facility        Destination     No service coordination in this encounter.      Durable Medical Equipment     No service coordination in this encounter.      Dialysis/Infusion     No service coordination in this encounter.      Home Medical Care     No service coordination in this encounter.      Social Care     No service coordination in this encounter.                Demographic  Summary     Row Name 07/03/18 1105       General Information    Admission Type inpatient    Arrived From emergency department    Referral Source admission list    Reason for Consult discharge planning    Preferred Language English       Contact Information    Permission Granted to Share Info With             Functional Status     Row Name 07/03/18 1109       Functional Status    Usual Activity Tolerance fair    Functional Status Comments resident at [facility] she gets up to chair.       Functional Status, IADL    Medications completely dependent    Meal Preparation completely dependent    Housekeeping completely dependent    Laundry completely dependent    Shopping completely dependent    IADL Comments Pt is resident at [facility].            Psychosocial    No documentation.           Abuse/Neglect    No documentation.           Legal    No documentation.           Substance Abuse    No documentation.           Patient Forms    No documentation.         Lizbeth Broussard, RN

## 2018-07-03 NOTE — PAYOR COMM NOTE
"Leila Smith (72 y.o. Female)     Date of Birth Social Security Number Address Home Phone MRN    1945  [id number] [id number] [phone] [id number]    Christianity Marital Status          Unknown        Admission Date Admission Type Admitting Provider Attending Provider Department, Room/Bed    7/2/18 Emergency Brian Samson MD Tzouanakis, Alexander E, MD [facility] 2B ICU, N237/1    Discharge Date Discharge Disposition Discharge Destination                       Attending Provider:  Brian Samson MD    Allergies:  Codeine, Tetracyclines & Related    Isolation:  None   Infection:  None   Code Status:  CPR    Ht:  180.3 cm (71\")   Wt:  80.7 kg (178 lb)    Admission Cmt:  None   Principal Problem:  Sepsis due to urinary tract infection (CMS/[facility]) [A41.9,N39.0]                 Active Insurance as of 7/2/2018     Primary Coverage     Payor Plan Insurance Group Employer/Plan Group    ANTH MEDICARE REPLACEMENT ANTH MEDICARE ADVANTAGE [id number]     Payor Plan Address Payor Plan Phone Number Effective From Effective To    [address] [phone] 1/1/2017     [facility] [id number]       Subscriber Name Subscriber Birth Date Member ID       LEILA SMITH 1945 [id number]                 Emergency Contacts      (Rel.) Home Phone Work Phone Mobile Phone    Danilo Hope (Daughter) [phone] -- --    Kleber Johnson (Son) [phone] -- --               History & Physical      Brian Samson MD at 7/2/2018  9:29 PM                Chief complaint:  Back pain    Subjective     Patient is a 72 y.o. female who is a resident of [facility].  She was brought to the emergency department this evening for back pain, nausea/vomiting, and altered mental status.    She has a history of COPD, CHF, atrial fibrillation, type 2 diabetes mellitus, and a history of frequent UTIs.  She has a history of a colostomy in April " 2017.  She was admitted here in January for acute renal failure and a urinary tract infection with sepsis.  She responded IV fluids and her creatinine returned to her normal baseline which is around 1.5.  She had an echocardiogram to rule out a valvular vegetations and this was negative.    Her daughter accompanies her tonight.  She tells me that she has had complaints of back pain for several weeks.  It sounds as if they did an ultrasound at the nursing facility which they said showed kidney stones.  She was supposed to see a urologist but they did not take her insurance so she has not seen one as of yet.    Her normal activity in the nursing home is basically bed to chair.  She gets her bed into a wheelchair and back.  She does eat adequately though not optimally.  She is normally oriented but her daughter says that her behavior has changed ever since her colostomy in 2017.  She normally wears diapers for urinary incontinence.    She was found to have an elevated creatinine of 3.2 in the emergency department.  She also potassium of 6.7.  Urinalysis is pending.      History  Past Medical History:   Diagnosis Date   • Atrial fibrillation (CMS/HCC)    • Chronic ulcer of right leg (CMS/HCC)    • Cognitive communication deficit    • COPD (chronic obstructive pulmonary disease) (CMS/HCC)    • Diabetes mellitus (CMS/HCC)    • Difficulty in walking    • Encephalopathy    • Generalized muscle weakness    • Hematuria    • Hyperlipidemia    • Hypertension    • Hypothyroidism    • Urinary tract infection      Past Surgical History:   Procedure Laterality Date   • CATARACT EXTRACTION     • CHOLECYSTECTOMY     • COLOSTOMY     • FOOT SURGERY Right    • HERNIA REPAIR     • HYSTERECTOMY     • TOTAL KNEE ARTHROPLASTY Right      Family History   Problem Relation Age of Onset   • Stomach cancer Mother    • Alcohol abuse Other    • Cancer Other    • Diabetes Other    • Hypertension Other    • Other Other      Social History    Substance Use Topics   • Smoking status: Former Smoker     Packs/day: 0.00     Years: 50.00     Types: Cigarettes   • Smokeless tobacco: Never Used   • Alcohol use No     Prescriptions Prior to Admission   Medication Sig Dispense Refill Last Dose   • acetaminophen (TYLENOL) 500 MG tablet Take 1,000 mg by mouth Every 6 (Six) Hours As Needed for Mild Pain .   7/2/2018 at Unknown time   • ammonium lactate (LAC-HYDRIN) 12 % lotion Apply 1 application topically 2 (Two) Times a Day As Needed for Dry Skin.   7/2/2018 at Unknown time   • apixaban (ELIQUIS) 5 MG tablet tablet Take 1 tablet by mouth Every 12 (Twelve) Hours. 60 tablet  7/2/2018 at Unknown time   • Artificial Tear Solution (TEARS NATURALE OP) Apply 1 application to eye 2 (Two) Times a Day.   7/2/2018 at Unknown time   • aspirin 81 MG chewable tablet Chew 81 mg Daily.   7/2/2018 at Unknown time   • atorvastatin (LIPITOR) 10 MG tablet Take 10 mg by mouth Every Night.   7/1/2018 at Unknown time   • bacitracin-polymyxin b ointment ophthalmic ointment Apply 1 application topically As Needed (Apply to LEFT wrist after cleansing with NS).   7/2/2018 at Unknown time   • Benzalkonium Chloride 0.1 % liquid Apply 1 application topically As Needed (After incontinent episode).   7/2/2018 at Unknown time   • diltiaZEM CD (CARDIZEM CD) 240 MG 24 hr capsule Take 1 capsule by mouth Daily.   7/2/2018 at Unknown time   • dimethicone 1 % cream Apply 1 application topically As Needed (apply to buttocks).      • Dimethicone 1.5 % cream Apply 1 application topically As Needed (apply to buttock as needed).      • escitalopram (LEXAPRO) 10 MG tablet Take 10 mg by mouth Daily.   7/2/2018 at Unknown time   • famotidine (PEPCID) 20 MG tablet Take 20 mg by mouth Daily.   7/2/2018 at Unknown time   • haloperidol (HALDOL) 1 MG tablet Take 1 mg by mouth 2 (Two) Times a Day.   7/2/2018 at Unknown time   • insulin lispro (humaLOG) 100 UNIT/ML injection Inject 0-10 Units under the skin 4  "(Four) Times a Day Before Meals & at Bedtime. Sliding Scale:  150-199 = 2 units, 200-249 = 4 units, 250-299 = 6 units, 300-349 = 8 units, 350-399 = 10 units, >400 = call MD   7/2/2018 at 1100   • levoFLOXacin (LEVAQUIN) 500 MG tablet Take 500 mg by mouth Daily. (6/26/18 - 7/5/18)   7/2/2018 at Unknown time   • magnesium hydroxide (MILK OF MAGNESIA) 400 MG/5ML suspension Take 30 mL by mouth Daily As Needed for Constipation.      • Melatonin 3 MG tablet Take 3 mg by mouth Every Night.   7/1/2018 at Unknown time   • Menthol-Zinc Oxide (REMEDY CALAZIME EX) Apply 1 application topically As Needed (apply to right buttock small pink area).   7/2/2018 at Unknown time   • metoprolol succinate XL (TOPROL-XL) 50 MG 24 hr tablet Take 3 tablets by mouth Daily.   7/2/2018 at Unknown time   • mometasone-formoterol (DULERA) 100-5 MCG/ACT inhaler Inhale 2 puffs 2 (Two) Times a Day.   7/2/2018 at Unknown time   • ondansetron (ZOFRAN) 4 MG tablet Take 4 mg by mouth Every 8 (Eight) Hours As Needed for Nausea or Vomiting.        Allergies:  Codeine and Tetracyclines & related    Review of Systems   Review of systems could not be obtained due to   patient confusion.      Objective     Vital Signs  Temp:  [97.5 °F (36.4 °C)-97.7 °F (36.5 °C)] 97.7 °F (36.5 °C)  Heart Rate:  [123-159] 138  Resp:  [18-22] 18  BP: ()/() 148/106    Physical Exam:    Objective:  General Appearance:  Uncomfortable and ill-appearing.    Vital signs: (most recent): Blood pressure (!) 148/106, pulse (!) 138, temperature 97.7 °F (36.5 °C), temperature source Axillary, resp. rate 18, height 180.3 cm (71\"), weight 80.7 kg (178 lb), SpO2 93 %.    HEENT: Normal HEENT exam.  (Nasal cannula O2)    Lungs:  Normal effort and normal respiratory rate.  Breath sounds clear to auscultation.  She is not in respiratory distress.  No rales, wheezes or rhonchi.    Heart: Tachycardia.  Irregular rhythm.  S1 normal and S2 normal.  No murmur, gallop or friction rub. "   Chest: Symmetric chest wall expansion.   Abdomen: Abdomen is soft and non-distended.  (Colostomy in place with pink stoma).  Bowel sounds are normal.   There is no abdominal tenderness.   There is no mass. There is no splenomegaly. There is no hepatomegaly.   Extremities: There is no deformity or dependent edema.    Neurological: Patient is alert.  (Confused).    Pupils:  Pupils are equal, round, and reactive to light.    Skin:  Warm and dry.              Results Review:    I reviewed the patient's new clinical results.  I reviewed the patient's other test results and agree with the interpretation    Assessment/Plan     Assessment:    Hospital Problem List     * (Principal)Sepsis due to urinary tract infection (CMS/HCC)    Atrial fibrillation with rapid ventricular response (CMS/HCC)    Altered mental status    Acute renal failure (CMS/HCC)    Hyperkalemia    Diabetes mellitus, type 2 (CMS/HCC)    Overview Signed 8/3/2016  2:13 PM by Ama Wolf     With foot ulcer         Hypertension    Hyperlipidemia    Hypothyroidism    Obstructive sleep apnea syndrome    Diabetic peripheral neuropathy (CMS/HCC)    Somnolence          72-year-old female presents with back pain, nausea/vomiting, volume depletion, and acute on chronic renal insufficiency.  She probably has a urinary tract infection though urinalysis is pending.  These have been recurrent in the past.  She was also recently told that she had kidney stones based upon what I think was a renal ultrasound done at her nursing home.  She has chronic medical problems as noted above.    Plan:    1. ICU admission  2. Broad-spectrum antibiotics  3. Urine and blood cultures  4. Monitor atrial fibrillation rate.  Brevibloc for now.  Normally on oral Cardizem  5. Continue anticoagulation  6. Volume resuscitation  7. Monitor renal function  8. CT of the abdomen to investigate the possibility of kidney stones and/or abscess  9. Urology consultation if  necessary  10. Discussed plans with daughter in detail    I discussed the patients findings and my recommendations with patient, family and nursing staff.     Critical Care time spent in direct patient care: 50 minutes (excluding procedure time, if applicable) including high complexity decision making to assess, manipulate, and support vital organ system failure in this individual who has impairment of one or more vital organ systems such that there is a high probability of imminent or life threatening deterioration in the patient’s condition.    Brian Samson MD  Pulmonary and Critical Care Medicine  07/02/18  9:39 PM            Electronically signed by Brian Samson MD at 7/2/2018  9:58 PM             ICU Vital Signs     Row Name 07/03/18 0800 07/03/18 0730 07/03/18 0700 07/03/18 0630 07/03/18 0600       Vitals    Temp 97.5 °F (36.4 °C) 97.5 °F (36.4 °C)  --  --  --    Temp src Axillary Axillary  --  --  --    Pulse (!)  128 108 113 104 106    Heart Rate Source Monitor Monitor Monitor  --  --    Resp 18 18 18  --  --    Resp Rate Source Visual Visual Visual  --  --    /90 114/94 (!)  126/103 130/100 (!)  116/101    Noninvasive MAP (mmHg) 98 103 113 114 106    BP Location Right arm Right arm Right arm  --  --    BP Method Automatic Automatic Automatic  --  --    Patient Position Lying Lying Lying  --  --       Oxygen Therapy    SpO2 91 % 97 % 97 % 98 % 98 %    Pulse Oximetry Type Continuous Continuous Continuous  --  --    Device (Oxygen Therapy) nasal cannula nasal cannula nasal cannula  -- nasal cannula    Flow (L/min) 2 2 2  -- 2    Row Name 07/03/18 0545 07/03/18 0530 07/03/18 0515 07/03/18 0500 07/03/18 0445       Vitals    Pulse 116 101 96 96 92    /91 132/86 130/100 128/94 122/98    Noninvasive MAP (mmHg) 99 101 113 115 103       Oxygen Therapy    SpO2 97 % 100 % 100 % 100 % 100 %    Row Name 07/03/18 0430 07/03/18 0415 07/03/18 0400 07/03/18 0345 07/03/18 0330       Vitals     Temp  --  -- 97.5 °F (36.4 °C)  --  --    Temp src  --  -- Axillary  --  --    Pulse 107 104 105 101 81    Heart Rate Source  --  -- Monitor  --  --    Resp  --  -- 16  --  --    Resp Rate Source  --  -- Visual  --  --    /87 129/99 135/90 (!)  135/103 134/89    Noninvasive MAP (mmHg) 100 113 111 116 117    BP Location  --  -- Right arm  --  --    BP Method  --  -- Automatic  --  --    Patient Position  --  -- Lying  --  --       Oxygen Therapy    SpO2 100 % 100 % 99 % 100 % 100 %    Pulse Oximetry Type  --  -- Continuous  --  --    Device (Oxygen Therapy)  --  -- nasal cannula  --  --    Flow (L/min)  --  -- 2  --  --    Row Name 07/03/18 0315 07/03/18 0300 07/03/18 0245 07/03/18 0230 07/03/18 0215       Vitals    Pulse 100 99 93 103 98    BP (!)  134/108 132/86 124/96 125/79 (!)  128/109    Noninvasive MAP (mmHg) 118 94 108 112 121       Oxygen Therapy    SpO2 98 % 100 % 100 % 97 % 99 %    Row Name 07/03/18 0200 07/03/18 0145 07/03/18 0130 07/03/18 0115 07/03/18 0100       Vitals    Pulse 104 102 108 118 108    BP (!)  132/109 (!)  124/104 120/89 137/88 109/80    Noninvasive MAP (mmHg) 114 108 101 104 93       Oxygen Therapy    SpO2 96 % 100 % 96 % 100 % (!)  78 %    Device (Oxygen Therapy) nasal cannula  --  --  --  --    Flow (L/min) 2  --  --  --  --    Row Name 07/03/18 0045 07/03/18 0030 07/03/18 0015 07/03/18 0000 07/02/18 2330       Vitals    Temp  --  --  -- 97.8 °F (36.6 °C)  --    Temp src  --  --  -- Axillary  --    Pulse 93 110 (!)  123 102 107    Heart Rate Source  --  --  -- Monitor  --    Resp  --  --  -- 18  --    Resp Rate Source  --  --  -- Visual  --    BP (!)  122/105 (!)  134/105 127/98 (!)  118/101 94/82    Noninvasive MAP (mmHg) 111 118 115 109 89    BP Location  --  --  -- Right arm  --    BP Method  --  --  -- Automatic  --    Patient Position  --  --  -- Lying  --       Oxygen Therapy    SpO2 100 % 99 % 99 % 100 % 98 %    Pulse Oximetry Type  --  --  -- Continuous  --    Device  (Oxygen Therapy)  --  --  -- nasal cannula  --    Flow (L/min)  --  --  -- 3  --    Row Name 07/02/18 2315 07/02/18 2300 07/02/18 2245 07/02/18 2230 07/02/18 2215       Vitals    Pulse 116 117 (!)  126 (!)  140 (!)  124    BP (!)  103/38 150/86  -- 115/94 (!)  71/54    Noninvasive MAP (mmHg) 57 95  -- 97 63       Oxygen Therapy    SpO2 96 % (!)  81 % 91 % 98 % 96 %    Row Name 07/02/18 2200 07/02/18 2145 07/02/18 2130 07/02/18 2115 07/02/18 2100       Vitals    Temp  --  --  -- 97.7 °F (36.5 °C)  --    Temp src  --  --  -- Axillary  --    Pulse (!)  133 (!)  133 (!)  133 (!)  131  --    Heart Rate Source  --  --  -- Monitor  --    Resp  --  --  -- 18  --    Resp Rate Source  --  --  -- Visual  --    BP (!)  73/60 101/89 101/61 101/82  --    Noninvasive MAP (mmHg) 67 95 85 89  --       Oxygen Therapy    SpO2 (!)  83 % 92 % 95 % (!)  84 %  --    Device (Oxygen Therapy) nasal cannula  --  --  -- nasal cannula    Flow (L/min) 3  --  --  -- 3    Row Name 07/02/18 2015 07/02/18 2013 07/02/18 2012 07/02/18 1930 07/02/18 1924       Vitals    Temp 97.7 °F (36.5 °C)  --  --  --  --    Temp src Axillary  --  --  --  --    Pulse (!)  138  -- (!)  125 (!)  123 (!)  130    Heart Rate Source Monitor  --  --  --  --    BP (!)  148/106 98/64  -- 94/77  --    Noninvasive MAP (mmHg) 69  --  --  --  --    BP Location Right arm  --  --  --  --    BP Method Automatic  --  --  --  --    Patient Position Lying  --  --  --  --       Oxygen Therapy    SpO2 93 %  -- 96 % 95 % 95 %    Pulse Oximetry Type Continuous  --  --  --  --    Device (Oxygen Therapy) room air  --  --  --  --    Row Name 07/02/18 1900 07/02/18 1830 07/02/18 1800 07/02/18 1739 07/02/18 1730       Vitals    Pulse (!)  131 (!)  159 (!)  133 (!)  143 (!)  145    Resp  -- 18 -- 18 22    /94 (!)  127/101 129/92  -- 117/79    Noninvasive MAP (mmHg)  -- 110 112  -- 89       Oxygen Therapy    SpO2 100 % 99 % 95 % 98 % 92 %    Device (Oxygen Therapy)  --  --  -- room air   "--    Row Name 07/02/18 1630 07/02/18 1557                Height and Weight    Height  -- 180.3 cm (71\")       Height Method  -- Stated       Weight  -- 80.7 kg (178 lb)       Weight Method  -- Stated       Ideal Body Weight (IBW) (kg)  -- 71.01       BSA (Calculated - sq m)  -- 2.01 sq meters       BMI (Calculated)  -- 24.8       Weight in (lb) to have BMI = 25  -- 178.9          Vitals    Temp  -- 97.5 °F (36.4 °C)       Temp src  -- Oral       Pulse (!)  142 (!)  142       Heart Rate Source  -- Monitor       Resp  -- 22       Resp Rate Source  -- Visual       /88 109/93       BP Method  -- Automatic       Patient Position  -- Lying          Oxygen Therapy    SpO2 98 % 98 %       Pulse Oximetry Type  -- Continuous       Device (Oxygen Therapy)  -- room air           Hospital Medications (all)       Dose Frequency Start End    albuterol (PROVENTIL) nebulizer solution 0.083% 2.5 mg/3mL 2.5 mg Once 7/2/2018 7/2/2018    Sig - Route: Take 2.5 mg by nebulization 1 (One) Time. - Nebulization    apixaban (ELIQUIS) tablet 5 mg 5 mg Every 12 Hours Scheduled 7/2/2018     Sig - Route: Take 1 tablet by mouth Every 12 (Twelve) Hours. - Oral    aspirin chewable tablet 81 mg 81 mg Daily 7/3/2018     Sig - Route: Chew 1 tablet Daily. - Oral    atorvastatin (LIPITOR) tablet 10 mg 10 mg Nightly 7/2/2018     Sig - Route: Take 1 tablet by mouth Every Night. - Oral    calcium gluconate 1 g in sodium chloride 0.9 % 100 mL  Once 7/2/2018 7/2/2018    Sig - Route: Infuse  into a venous catheter 1 (One) Time. - Intravenous    dextrose (D50W) solution 25 g 25 g Every 15 Minutes PRN 7/2/2018     Sig - Route: Infuse 50 mL into a venous catheter Every 15 (Fifteen) Minutes As Needed for Low Blood Sugar (Blood Sugar Less Than 70). - Intravenous    dextrose (GLUTOSE) oral gel 15 g 15 g Every 15 Minutes PRN 7/2/2018     Sig - Route: Take 15 g by mouth Every 15 (Fifteen) Minutes As Needed for Low Blood Sugar (Blood sugar less than 70). - Oral "    diltiaZEM (CARDIZEM) tablet 60 mg 60 mg Every 6 Hours Scheduled 7/3/2018     Sig - Route: Take 1 tablet by mouth Every 6 (Six) Hours. - Oral    esmolol (BREVIBLOC) 2500 mg/250 mL (10 mg/mL) infusion  mcg/kg/min × 80.7 kg Titrated 7/2/2018     Sig - Route: Infuse 4,035-24,210 mcg/min into a venous catheter Dose Adjusted By Provider As Needed. - Intravenous    famotidine (PEPCID) injection 20 mg 20 mg Every 12 Hours Scheduled 7/3/2018     Sig - Route: Infuse 2 mL into a venous catheter Every 12 (Twelve) Hours. - Intravenous    glucagon (human recombinant) (GLUCAGEN DIAGNOSTIC) injection 1 mg 1 mg As Needed 7/2/2018     Sig - Route: Inject 1 mg under the skin As Needed (Blood Glucose Less Than 70). - Subcutaneous    haloperidol lactate (HALDOL) injection 1 mg 1 mg 2 Times Daily 7/3/2018     Sig - Route: Infuse 0.2 mL into a venous catheter 2 (Two) Times a Day. - Intravenous    insulin lispro (humaLOG) injection 0-7 Units 0-7 Units 4 Times Daily With Meals & Nightly 7/2/2018     Sig - Route: Inject 0-7 Units under the skin 4 (Four) Times a Day With Meals & at Bedtime. - Subcutaneous    ipratropium-albuterol (DUO-NEB) nebulizer solution 3 mL 3 mL Every 6 Hours PRN 7/2/2018     Sig - Route: Take 3 mL by nebulization Every 6 (Six) Hours As Needed for Wheezing or Shortness of Air. - Nebulization    lactated ringers infusion 100 mL/hr Continuous 7/3/2018     Sig - Route: Infuse 100 mL/hr into a venous catheter Continuous. - Intravenous    ondansetron (ZOFRAN) injection 4 mg 4 mg Every 6 Hours PRN 7/2/2018     Sig - Route: Infuse 2 mL into a venous catheter Every 6 (Six) Hours As Needed for Nausea or Vomiting. - Intravenous    piperacillin-tazobactam (ZOSYN) 3.375 g in iso-osmotic dextrose 50 ml (premix) 3.375 g Every 8 Hours 7/3/2018 7/10/2018    Sig - Route: Infuse 50 mL into a venous catheter Every 8 (Eight) Hours. - Intravenous    piperacillin-tazobactam (ZOSYN) 4.5 g in iso-osmotic dextrose 100 mL IVPB (premix)  4.5 g Once 7/2/2018 7/2/2018    Sig - Route: Infuse 100 mL into a venous catheter 1 (One) Time. - Intravenous    sodium chloride 0.9 % bolus 2,421 mL 30 mL/kg × 80.7 kg Once 7/2/2018 7/2/2018    Sig - Route: Infuse 2,421 mL into a venous catheter 1 (One) Time. - Intravenous    sodium chloride 0.9 % flush 1-10 mL 1-10 mL As Needed 7/2/2018     Sig - Route: Infuse 1-10 mL into a venous catheter As Needed for Line Care. - Intravenous    sodium chloride 0.9 % flush 10 mL 10 mL As Needed 7/2/2018     Sig - Route: Infuse 10 mL into a venous catheter As Needed for Line Care. - Intravenous    Cosign for Ordering: Accepted by Tato Peraza MD on 7/2/2018  9:22 PM    budesonide-formoterol (SYMBICORT) 160-4.5 MCG/ACT inhaler 2 puff (Discontinued) 2 puff 2 Times Daily - RT 7/2/2018 7/3/2018    Sig - Route: Inhale 2 puffs 2 (Two) Times a Day. - Inhalation    diltiaZEM (CARDIZEM) 125mg/125 mL infusion (Discontinued) 5-15 mg/hr Titrated 7/2/2018 7/2/2018    Sig - Route: Infuse 5-15 mg/hr into a venous catheter Dose Adjusted By Provider As Needed. - Intravenous    famotidine (PEPCID) tablet 20 mg (Discontinued) 20 mg Daily 7/3/2018 7/3/2018    Sig - Route: Take 1 tablet by mouth Daily. - Oral    haloperidol (HALDOL) tablet 1 mg (Discontinued) 1 mg 2 Times Daily 7/2/2018 7/3/2018    Sig - Route: Take 1 tablet by mouth 2 (Two) Times a Day. - Oral    piperacillin-tazobactam (ZOSYN) 3.375 g in iso-osmotic dextrose 50 ml (premix) (Discontinued) 3.375 g Once 7/2/2018 7/2/2018    Sig - Route: Infuse 50 mL into a venous catheter 1 (One) Time. - Intravenous    Reason for Discontinue: Duplicate order    sodium chloride 0.9 % infusion (Discontinued) 150 mL/hr Continuous 7/2/2018 7/3/2018    Sig - Route: Infuse 150 mL/hr into a venous catheter Continuous. - Intravenous            Lab Results (last 24 hours)     Procedure Component Value Units Date/Time    TSH [[id number]]  (Normal) Collected:  07/03/18 0539    Specimen:  Blood Updated:   07/03/18 0854     TSH 1.275 mIU/mL     Blood Culture - Blood, [[id number]]  (Normal) Collected:  07/02/18 1940    Specimen:  Blood from Arm, Right Updated:  07/03/18 0830     Blood Culture No growth at less than 24 hours    CBC & Differential [[id number]] Collected:  07/03/18 0539    Specimen:  Blood Updated:  07/03/18 0738    Narrative:       The following orders were created for panel order CBC & Differential.  Procedure                               Abnormality         Status                     ---------                               -----------         ------                     Manual Differential[[id number]]          Abnormal            Final result               CBC Auto Differential[[id number]]        Abnormal            Final result                 Please view results for these tests on the individual orders.    CBC Auto Differential [[id number]]  (Abnormal) Collected:  07/03/18 0539    Specimen:  Blood Updated:  07/03/18 0738     WBC 12.26 (H) 10*3/mm3      RBC 4.84 10*6/mm3      Hemoglobin 13.0 g/dL      Hematocrit 42.8 %      MCV 88.4 fL      MCH 26.9 (L) pg      MCHC 30.4 (L) g/dL      RDW 17.8 (H) %      RDW-SD 56.7 (H) fl      MPV 10.4 fL      Platelets 271 10*3/mm3     Manual Differential [[id number]]  (Abnormal) Collected:  07/03/18 0539    Specimen:  Blood Updated:  07/03/18 0738     Neutrophil % 84.0 (H) %      Lymphocyte % 12.0 (L) %      Monocyte % 4.0 %      Eosinophil % 0.0 %      Basophil % 0.0 %      Neutrophils Absolute 10.30 (H) 10*3/mm3      Lymphocytes Absolute 1.47 10*3/mm3      Monocytes Absolute 0.49 10*3/mm3      Eosinophils Absolute 0.00 (L) 10*3/mm3      Basophils Absolute 0.00 10*3/mm3      RBC Morphology Normal     WBC Morphology Normal     Platelet Morphology Normal    Narrative:       OCCASIONAL LARGE PLATELET NOTED ON SLIDE REVIEW.    POC Glucose Once [[id number]]  (Abnormal) Collected:  07/03/18 0734    Specimen:  Blood Updated:  07/03/18 0738     Glucose 178 (H) mg/dL      Narrative:       Meter: [id number] : [id number] Aj Sullivan    Potassium [[id number]]  (Normal) Collected:  07/03/18 0539    Specimen:  Blood Updated:  07/03/18 0706     Potassium 4.8 mmol/L     Comprehensive Metabolic Panel [[id number]]  (Abnormal) Collected:  07/03/18 0539    Specimen:  Blood Updated:  07/03/18 0706     Glucose 214 (H) mg/dL      BUN 76 (H) mg/dL      Creatinine 2.69 (H) mg/dL      Sodium 143 mmol/L      Potassium 4.8 mmol/L      Chloride 114 (H) mmol/L      CO2 18.0 (L) mmol/L      Calcium 8.3 (L) mg/dL      Total Protein 6.1 g/dL      Albumin 3.04 (L) g/dL      ALT (SGPT) 38 U/L      AST (SGOT) 27 U/L      Alkaline Phosphatase 163 (H) U/L      Total Bilirubin 0.7 mg/dL      eGFR  African Amer 21 (L) mL/min/1.73      Globulin 3.1 gm/dL      A/G Ratio 1.0 (L) g/dL      BUN/Creatinine Ratio 28.3 (H)     Anion Gap 11.0 mmol/L     Narrative:       National Kid[employer] Guidelines    Stage     Description        GFR  1         Normal or High     90+  2         Mild decrease      60-89  3         Moderate decrease  30-59  4         Severe decrease    15-29  5         Kidney failure     <15    Magnesium [[id number]]  (Normal) Collected:  07/03/18 0539    Specimen:  Blood Updated:  07/03/18 0706     Magnesium 2.2 mg/dL     Phosphorus [[id number]]  (Normal) Collected:  07/03/18 0539    Specimen:  Blood Updated:  07/03/18 0706     Phosphorus 4.9 mg/dL     Hemoglobin A1c [[id number]]  (Abnormal) Collected:  07/03/18 0539    Specimen:  Blood Updated:  07/03/18 0701     Hemoglobin A1C 9.30 (H) %     Narrative:       The American Diabetes Association recommends maintenance of Hemoglobin A1C at 7.0% or lower. Goals for Hemoglobin A1C reduction may need to be modified if hypoglycemia is a problem.    LSAC Slide Creation [[id number]] Collected:  07/03/18 0539    Specimen:  Blood Updated:  07/03/18 0700    Blood Culture - Blood, [[id number]]  (Normal) Collected:  07/02/18 1610    Specimen:  Blood from Arm,  Right Updated:  07/03/18 0615     Blood Culture No growth at less than 24 hours    Urinalysis With Microscopic If Indicated (No Culture) - Urine, Catheter [[id number]]  (Abnormal) Collected:  07/03/18 0200    Specimen:  Urine from Urine, Catheter Updated:  07/03/18 0208     Color, UA [place] (A)     Appearance, UA Turbid (A)     pH, UA <=5.0     Specific Gravity, UA 1.022     Glucose, UA Negative     Ketones, UA Negative     Bilirubin, UA Negative     Blood, UA Large (3+) (A)     Protein,  mg/dL (2+) (A)     Leuk Esterase, UA Moderate (2+) (A)     Nitrite, UA Negative     Urobilinogen, UA 0.2 E.U./dL    Urinalysis With Culture If Indicated - Urine, Catheter [[id number]]  (Abnormal) Collected:  07/03/18 0200    Specimen:  Urine from Urine, Catheter Updated:  07/03/18 0208     Color, UA [place] (A)     Appearance, UA Turbid (A)     pH, UA <=5.0     Specific Gravity, UA 1.022     Glucose, UA Negative     Ketones, UA Negative     Bilirubin, UA Negative     Blood, UA Large (3+) (A)     Protein,  mg/dL (2+) (A)     Leuk Esterase, UA Moderate (2+) (A)     Nitrite, UA Negative     Urobilinogen, UA 0.2 E.U./dL    Urinalysis, Microscopic Only - Urine, Clean Catch [[id number]]  (Abnormal) Collected:  07/03/18 0200    Specimen:  Urine from Urine, Catheter Updated:  07/03/18 0208     RBC, UA Too Numerous to Count (A) /HPF      WBC, UA 6-12 (A) /HPF      Bacteria, UA None Seen /HPF      Squamous Epithelial Cells, UA 13-20 (A) /HPF      Hyaline Casts, UA 0-6 /LPF      Methodology Automated Microscopy    Urine Culture - Urine, [[id number]] Collected:  07/03/18 0200    Specimen:  Urine from Urine, Catheter Updated:  07/03/18 0208    Potassium [[id number]]  (Normal) Collected:  07/03/18 0052    Specimen:  Blood Updated:  07/03/18 0153     Potassium 5.2 mmol/L     Potassium [[id number]]  (Normal) Collected:  07/02/18 2100    Specimen:  Blood Updated:  07/02/18 2325     Potassium 5.4 mmol/L     POC Glucose Once [[id number]]   (Abnormal) Collected:  07/02/18 2229    Specimen:  Blood Updated:  07/02/18 2231     Glucose 287 (H) mg/dL     Narrative:       Meter: [id number] : [id number] Elvin Machuca    Lactic Acid, Reflex [[id number]]  (Abnormal) Collected:  07/02/18 2132    Specimen:  Blood Updated:  07/02/18 2154     Lactate 3.8 (C) mmol/L      Comment: Falsely depressed results may occur on samples drawn from patients receiving N-Acetylcysteine (NAC) or Metamizole.       Lactic Acid, Reflex Timer (This will reflex a repeat order 3-3:15 hours after ordered.) [[id number]] Collected:  07/02/18 1615    Specimen:  Blood Updated:  07/02/18 2001     Extra Tube Hold for add-ons.     Comment: Auto resulted.       [place] Draw [[id number]] Collected:  07/02/18 1615    Specimen:  Blood Updated:  07/02/18 1730    Narrative:       The following orders were created for panel order [place] Draw.  Procedure                               Abnormality         Status                     ---------                               -----------         ------                     Light Blue Top[[id number]]                                   Final result               Green Top (Gel)[[id number]]                                  Final result               Lavender Top[[id number]]                                     Final result               Gold Top - SST[[id number]]                                   Final result               Green Top (No Gel)[[id number]]                                                            Please view results for these tests on the individual orders.    Green Top (Gel) [[id number]] Collected:  07/02/18 1615    Specimen:  Blood Updated:  07/02/18 1730     Extra Tube Hold for add-ons.     Comment: Auto resulted.       Light Blue Top [[id number]] Collected:  07/02/18 1615    Specimen:  Blood Updated:  07/02/18 1730     Extra Tube hold for add-on     Comment: Auto resulted       Lavender Top [[id number]] Collected:  07/02/18 1615    Specimen:  Blood  Updated:  07/02/18 1730     Extra Tube hold for add-on     Comment: Auto resulted       Gold Top - SST [[id number]] Collected:  07/02/18 1615    Specimen:  Blood Updated:  07/02/18 1730     Extra Tube Hold for add-ons.     Comment: Auto resulted.       Comprehensive Metabolic Panel [[id number]]  (Abnormal) Collected:  07/02/18 1615    Specimen:  Blood Updated:  07/02/18 1701     Glucose 381 (H) mg/dL      BUN 86 (H) mg/dL      Creatinine 3.21 (H) mg/dL      Sodium 142 mmol/L      Potassium 6.7 (C) mmol/L      Chloride 108 mmol/L      CO2 18.0 (L) mmol/L      Calcium 8.9 mg/dL      Total Protein 7.0 g/dL      Albumin 3.52 g/dL      ALT (SGPT) 38 U/L      AST (SGOT) 43 (H) U/L      Alkaline Phosphatase 204 (H) U/L      Total Bilirubin 0.8 mg/dL      eGFR  African Amer 17 (L) mL/min/1.73      Globulin 3.5 gm/dL      A/G Ratio 1.0 (L) g/dL      BUN/Creatinine Ratio 26.8 (H)     Anion Gap 16.0 (H) mmol/L     Narrative:       National Kidney Foundation Guidelines    Stage     Description        GFR  1         Normal or High     90+  2         Mild decrease      60-89  3         Moderate decrease  30-59  4         Severe decrease    15-29  5         Kidney failure     <15    Lipase [[id number]]  (Normal) Collected:  07/02/18 1615    Specimen:  Blood Updated:  07/02/18 1700     Lipase 44 U/L     Lactic Acid, Plasma [[id number]]  (Abnormal) Collected:  07/02/18 1615    Specimen:  Blood Updated:  07/02/18 1655     Lactate 4.6 (C) mmol/L      Comment: Falsely depressed results may occur on samples drawn from patients receiving N-Acetylcysteine (NAC) or Metamizole.       CBC & Differential [[id number]] Collected:  07/02/18 1615    Specimen:  Blood Updated:  07/02/18 1625    Narrative:       The following orders were created for panel order CBC & Differential.  Procedure                               Abnormality         Status                     ---------                               -----------         ------                      CBC Auto Differential[[id number]]        Abnormal            Final result                 Please view results for these tests on the individual orders.    CBC Auto Differential [[id number]]  (Abnormal) Collected:  07/02/18 1615    Specimen:  Blood Updated:  07/02/18 1625     WBC 10.76 10*3/mm3      RBC 5.32 (H) 10*6/mm3      Hemoglobin 14.5 g/dL      Hematocrit 47.3 (H) %      MCV 88.9 fL      MCH 27.3 pg      MCHC 30.7 (L) g/dL      RDW 17.5 (H) %      RDW-SD 56.5 (H) fl      MPV 10.2 fL      Platelets 314 10*3/mm3      Neutrophil % 83.6 (H) %      Lymphocyte % 11.3 (L) %      Monocyte % 4.3 %      Eosinophil % 0.0 %      Basophil % 0.1 %      Immature Grans % 0.7 (H) %      Neutrophils, Absolute 9.00 (H) 10*3/mm3      Lymphocytes, Absolute 1.22 10*3/mm3      Monocytes, Absolute 0.46 10*3/mm3      Eosinophils, Absolute 0.00 10*3/mm3      Basophils, Absolute 0.01 10*3/mm3      Immature Grans, Absolute 0.07 (H) 10*3/mm3         Imaging Results (last 24 hours)     Procedure Component Value Units Date/Time    CT Abdomen Pelvis Stone Protocol [[id number]] Collected:  07/02/18 2157     Updated:  07/03/18 0101    Narrative:       EXAM:    CT Abdomen and Pelvis Without Intravenous Contrast    EXAM DATE/TIME:    7/2/2018 9:57 PM    CLINICAL HISTORY:    72 years old, female; Generalized abd pain, nausea, vomiting.    TECHNIQUE:    Axial computed tomography images of the abdomen and pelvis without   intravenous contrast.  All CT scans at this facility use at least one of these   dose optimization techniques: automated exposure control; mA and/or kV   adjustment per patient size (includes targeted exams where dose is matched to   clinical indication); or iterative reconstruction.    Coronal reformatted images were created and reviewed.    COMPARISON:    CT ABDOMEN PELVIS WO CONTRAST 2017-10-27 11:24    FINDINGS:    Prior near-total colectomy with a right periumbilical ileostomy.  There is no   bowel inflammation, obstruction,  free intraperitoneal air, or ascites.  Stable   mild presacral stranding.      Stable cardiomegaly.  Patchy bibasilar atelectasis.  Left basilar bronchial   wall thickening.        Trace pericholecystic free fluid without biliary dilatation, gallbladder   dilatation, or visualized gallstone.      Right adrenal mass has slightly increased in size since the prior exam,   currently measuring up to 3.7 cm where it previously measured up to 3.2 cm.        Bilateral nonobstructing intrarenal calculi, measuring up to 1 cm on the   left.  Bilateral simple renal cysts, measuring up to 2.9 cm on the left.  No   ureteral or bladder calculus.  No hydronephrosis.      Pancreatic atrophy.  The unenhanced liver, spleen, and urinary bladder are   normal.  Prior hysterectomy.      Diffuse atherosclerosis without abdominal aortic aneurysm or retroperitoneal   hemorrhage.  Chronic degenerative changes throughout the spine and at both hips.      Impression:         Trace pericholecystic fluid without layering dilatation or stone.  If there   is right upper quadrant pain, ultrasound recommended.      Right adrenal mass has slightly increased in size since the prior exam.  This   can be followed up with non-emergent washout CT or MRI.      Left basilar bronchitis.      Bilateral nephrolithiasis without urinary obstruction.      THIS DOCUMENT HAS BEEN ELECTRONICALLY SIGNED BY ERENDIRA KATE MD

## 2018-07-03 NOTE — THERAPY EVALUATION
Acute Care - Speech Language Pathology   Swallow Initial Evaluation [facility]   Clinical Swallow Evaluation     Patient Name: Leila Smith  : 1945  MRN: [id number]  Today's Date: 7/3/2018               Admit Date: 2018    Visit Dx:     ICD-10-CM ICD-9-CM   1. Somnolence R40.0 780.09   2. Atrial fibrillation with rapid ventricular response (CMS/[facility]) I48.91 427.31   3. History of hypertension Z86.79 V12.59   4. Type 2 diabetes mellitus with hyperglycemia, unspecified long term insulin use status (CMS/[facility]) E11.65 250.00   5. History of recurrent UTI (urinary tract infection) Z87.440 V13.02   6. Dysphagia, unspecified type R13.10 787.20     Patient Active Problem List   Diagnosis   • Diabetes mellitus, type 2 (CMS/[facility])   • Hypertension   • Hyperlipidemia   • Hypothyroidism   • Chronic obstructive pulmonary disease (CMS/[facility])   • CAD (coronary artery disease)   • History of edema   • History of obesity   • Venous insufficiency   • Open wound of lower extremity   • Urinary tract infection   • T2DM (type 2 diabetes mellitus) (CMS/[facility])   • Dyspnea on exertion   • Peripheral vascular disease (CMS/[facility])   • Obstructive sleep apnea syndrome   • Ulcer of lower extremity (CMS/[facility])   • Hypoxemia   • Hematuria   • Diabetic peripheral neuropathy (CMS/[facility])   • Edema   • Chronic obstructive pulmonary disease with acute exacerbation (CMS/[facility])   • Congestive heart failure (CMS/[facility])   • Arthritis   • Neutrophilic leukocytosis   • Cystitis   • Atrial fibrillation with rapid ventricular response (CMS/[facility])   • Altered mental status   • Sepsis due to urinary tract infection (CMS/[facility])   • Acute renal failure superimposed on stage 3 chronic kidney disease (CMS/[facility])   • Hyperkalemia   • Leukocytosis   • Elevated troponin   • Somnolence     Past Medical History:   Diagnosis Date   • Atrial fibrillation (CMS/[facility])    • Chronic ulcer of right leg (CMS/[facility])    • Cognitive communication deficit    • COPD (chronic obstructive pulmonary  disease) (CMS/HCC)    • Diabetes mellitus (CMS/[facility])    • Difficulty in walking    • Encephalopathy    • Generalized muscle weakness    • Hematuria    • Hyperlipidemia    • Hypertension    • Hypothyroidism    • Urinary tract infection      Past Surgical History:   Procedure Laterality Date   • CATARACT EXTRACTION     • CHOLECYSTECTOMY     • COLOSTOMY     • FOOT SURGERY Right    • HERNIA REPAIR     • HYSTERECTOMY     • TOTAL KNEE ARTHROPLASTY Right           SWALLOW EVALUATION (last 72 hours)      SLP Adult Swallow Evaluation     Row Name 07/03/18 1415       Rehab Evaluation    Document Type evaluation  -RD    Subjective Information no complaints  -RD    Patient Observations alert;cooperative;agree to therapy  -RD    Patient/Family Observations Pt upright in bed  -RD    Patient Effort adequate  -RD       General Information    Patient Profile Reviewed yes  -RD    Pertinent History Of Current Problem Adm w/ sepsis, somnolence, AMS, afib, AMS, CKD 3, incr'd sodium, COPD, CHD. RN reports pt coughing w/ liquids  -RD    Current Method of Nutrition clear liquids  -RD    Precautions/Limitations, Vision WFL;for purposes of eval  -RD    Precautions/Limitations, Hearing WFL;for purposes of eval  -RD    Prior Level of Function-Communication other (see comments)   unknown  -RD    Prior Level of Function-Swallowing other (see comments)   unknown  -RD    Plans/Goals Discussed with patient;agreed upon  -RD    Barriers to Rehab cognitive status  -RD    Patient's Goals for Discharge patient did not state  -RD       Pain Assessment    Additional Documentation Pain Scale: FACES Pre/Post-Treatment (Group)  -RD       Pain Scale: FACES Pre/Post-Treatment    Pain: FACES Scale, Pretreatment 0-->no hurt  -RD    Pain: FACES Scale, Post-Treatment 0-->no hurt  -RD       Oral Musculature and Cranial Nerve Assessment    Oral Motor General Assessment generalized oral motor weakness  -RD       General Eating/Swallowing Observations    Respiratory  Support Currently in Use nasal cannula  -RD    Eating/Swallowing Skills fed by SLP;unsafe self-feeding skills observed;rate is too fast;unaware of safety concerns  -RD    Positioning During Eating upright 90 degree;upright in bed  -RD    Utensils Used spoon;cup;straw  -RD    Consistencies Trialed thin liquids;nectar/syrup-thick liquids;pudding thick  -RD       Clinical Swallow Eval    Oral Prep Phase --   DNT solid  -RD    Oral Transit impaired  -RD    Oral Residue WFL  -RD    Pharyngeal Phase suspected pharyngeal impairment  -RD    Esophageal Phase suspected esophageal impairment  -RD    Clinical Swallow Evaluation Summary BS eval complete. Pt confused. Trials of thins, nectar, and pureed (clear liqs) given. No s/s w/ Nectar or pureed. Pt very impulsive and unable to control rate w/ straw. Overt s/s of aspiration c/b coughing w/ thins via straw. No s/s of aspiration w/ small single cup sips of thin. Unable to assess solids 2' current clear restriction. Pt noted burping w/all consistencies t/o eval. Suspect pharyngeal and esophageal dysphagia. RECS: continue clear thin liquid diet per MD discretion, no straws, small single sips, general aspiration and reflux precautions, Oral care BID and PRN, F/u for re-eval for diet uprade vs. instrumental eval.   -RD       Oral Transit Concerns    Oral Transit Concerns increased oral transit time  -RD    Increased Oral Transit Time all consistencies  -RD       Pharyngeal Phase Concerns    Pharyngeal Phase Concerns cough;wet vocal quality  -RD    Wet Vocal Quality thin  -RD    Cough thin  -RD       Esophageal Phase Concerns    Esophageal Phase Concerns belching  -RD    Belching thin;nectar;pudding  -RD       Clinical Impression    SLP Swallowing Diagnosis suspected pharyngeal dysfunction;esophageal dysfunction  -RD    Functional Impact risk of aspiration/pneumonia  -RD    Rehab Potential/Prognosis, Swallowing good, to achieve stated therapy goals  -RD    Criteria for Skilled  Therapeutic Interventions Met demonstrates skilled criteria  -RD       Recommendations    Therapy Frequency (Swallow) evaluation only;PRN  -RD    Predicted Duration Therapy Intervention (Days) until discharge  -RD    SLP Diet Recommendation thin liquids;clear liquid diet   no straws  -RD    Recommended Diagnostics reassess via clinical swallow evaluation  -RD    Recommended Precautions and Strategies upright posture during/after eating;small bites of food and sips of liquid;no straw;other (see comments)   supervision w/ PO  -RD    SLP Rec. for Method of Medication Administration meds whole;with thick liquids;with pudding or applesauce;as tolerated  -RD    Monitor for Signs of Aspiration yes;notify SLP if any concerns;cough;gurgly voice;throat clearing;upper respiratory;pneumonia;right lower lobe infiltrates  -RD    Anticipated Dischage Disposition unknown;anticipate therapy at next level of care  -RD      User Key  (r) = Recorded By, (t) = Taken By, (c) = Cosigned By    Initials Name Effective Dates    RD Laura Warren, MS CCC-SLP 04/03/18 -         EDUCATION  The patient has been educated in the following areas:   Dysphagia (Swallowing Impairment) Oral Care/Hydration Modified Diet Instruction.    SLP Recommendation and Plan  SLP Swallowing Diagnosis: suspected pharyngeal dysfunction, esophageal dysfunction  SLP Diet Recommendation: thin liquids, clear liquid diet (no straws)  Recommended Precautions and Strategies: upright posture during/after eating, small bites of food and sips of liquid, no straw, other (see comments) (supervision w/ PO)     Monitor for Signs of Aspiration: yes, notify SLP if any concerns, cough, gurgly voice, throat clearing, upper respiratory, pneumonia, right lower lobe infiltrates  Recommended Diagnostics: reassess via clinical swallow evaluation  Criteria for Skilled Therapeutic Interventions Met: demonstrates skilled criteria  Anticipated Dischage Disposition: unknown, anticipate  therapy at next level of care  Rehab Potential/Prognosis, Swallowing: good, to achieve stated therapy goals  Therapy Frequency (Swallow): evaluation only, PRN  Predicted Duration Therapy Intervention (Days): until discharge       Plan of Care Reviewed With: patient  Plan of Care Review  Plan of Care Reviewed With: patient  Progress: no change             Time Calculation:         Time Calculation- SLP     Row Name 07/03/18 1515             Time Calculation- SLP    SLP Start Time 1415  -RD      SLP Received On 07/03/18  -RD        User Key  (r) = Recorded By, (t) = Taken By, (c) = Cosigned By    Initials Name Provider Type    RD Laura Warren MS CCC-SLP Speech and Language Pathologist          Therapy Charges for Today     Code Description Service Date Service Provider Modifiers Qty    [id number] HC ST EVAL ORAL PHARYNG SWALLOW 4 7/3/2018 Laura Warren MS CCC-SLP GN 1               Laura Warren MS CCC-MATT  7/3/2018

## 2018-07-04 NOTE — CONSULTS
Urology Consult    Referring Provider: Chapin  Reason for Consultation: kidney stones    Patient Care Team:  Ciaran Wakefield MD as PCP - General (Internal Medicine)  No Known Provider as PCP - Family Medicine    Chief complaint back pain    Subjective .     History of present illness:  Patient is a problems with recurrent urinary tract infections.  Patient is brought in now for suspected pyelonephritis.  CT scan shows bilateral renal calculi.  No stones were seen in the ureter signofhydronephrosis.Urineculturesarepending    Review of Systems  Pertinent items are noted in HPI    History  Past Medical History:   Diagnosis Date   • Atrial fibrillation (CMS/HCC)    • Chronic ulcer of right leg (CMS/HCC)    • Cognitive communication deficit    • COPD (chronic obstructive pulmonary disease) (CMS/HCC)    • Diabetes mellitus (CMS/HCC)    • Difficulty in walking    • Encephalopathy    • Generalized muscle weakness    • Hematuria    • Hyperlipidemia    • Hypertension    • Hypothyroidism    • Urinary tract infection        Objective     Vital Signs   Temp:  [97.5 °F (36.4 °C)-98.3 °F (36.8 °C)] 97.6 °F (36.4 °C)  Heart Rate:  [] 86  Resp:  [14-18] 16  BP: ()/() 145/88    Physical Exam:     General Appearance:    Alert, cooperative, in no acute distress   Head:    Normocephalic, without obvious abnormality, atraumatic   Eyes:            Lids and lashes normal, conjunctivae and sclerae normal, no   icterus, no pallor, corneas clear, PERRLA   Ears:    Ears appear intact with no abnormalities noted   Throat:   No oral lesions, no thrush, oral mucosa moist   Neck:   No adenopathy, supple, trachea midline, no thyromegaly, no     carotid bruit, no JVD   Back:     No kyphosis present, no scoliosis present, no skin lesions,       erythema or scars, no tenderness to percussion or                   palpation,   range of motion normal   Lungs:     Clear to auscultation,respirations regular, even and                    unlabored    Heart:    Regular rhythm and normal rate, normal S1 and S2, no            murmur, no gallop, no rub, no click   Breast Exam:    Deferred   Abdomen:     Normal bowel sounds, no masses, no organomegaly, soft        non-tender, non-distended, no guarding, no rebound                 tenderness   Genitalia:    Deferred   Extremities:   Moves all extremities well, no edema, no cyanosis, no              redness   Pulses:   Pulses palpable and equal bilaterally   Skin:   No bleeding, bruising or rash   Lymph nodes:   No palpable adenopathy   Neurologic:   Cranial nerves 2 - 12 grossly intact, sensation intact, DTR        present and equal bilaterally       Results Review:   I reviewed the patient's new clinical results.    Lab Results (last 72 hours)     Procedure Component Value Units Date/Time    POC Glucose Once [[id number]]  (Abnormal) Collected:  07/03/18 2045    Specimen:  Blood Updated:  07/03/18 2047     Glucose 159 (H) mg/dL     Narrative:       Meter: [id number] : [id number] Jerad Quinones    Blood Culture - Blood, [[id number]]  (Normal) Collected:  07/02/18 1940    Specimen:  Blood from Arm, Right Updated:  07/03/18 2030     Blood Culture No growth at 24 hours    Blood Culture - Blood, [[id number]]  (Normal) Collected:  07/02/18 1610    Specimen:  Blood from Arm, Right Updated:  07/03/18 1815     Blood Culture No growth at 24 hours    POC Glucose Once [[id number]]  (Abnormal) Collected:  07/03/18 1620    Specimen:  Blood Updated:  07/03/18 1630     Glucose 138 (H) mg/dL     Narrative:       Meter: [id number] : [id number] Aj Sullivan    POC Glucose Once [[id number]]  (Abnormal) Collected:  07/03/18 1217    Specimen:  Blood Updated:  07/03/18 1218     Glucose 152 (H) mg/dL     Narrative:       Meter: [id number] : [id number] Aj Sullivan    TSH [[id number]]  (Normal) Collected:  07/03/18 0539    Specimen:  Blood Updated:  07/03/18 0854     TSH 1.275 mIU/mL     CBC & Differential [[id number]]  Collected:  07/03/18 0539    Specimen:  Blood Updated:  07/03/18 0738    Narrative:       The following orders were created for panel order CBC & Differential.  Procedure                               Abnormality         Status                     ---------                               -----------         ------                     Manual Differential[[id number]]          Abnormal            Final result               CBC Auto Differential[[id number]]        Abnormal            Final result                 Please view results for these tests on the individual orders.    CBC Auto Differential [[id number]]  (Abnormal) Collected:  07/03/18 0539    Specimen:  Blood Updated:  07/03/18 0738     WBC 12.26 (H) 10*3/mm3      RBC 4.84 10*6/mm3      Hemoglobin 13.0 g/dL      Hematocrit 42.8 %      MCV 88.4 fL      MCH 26.9 (L) pg      MCHC 30.4 (L) g/dL      RDW 17.8 (H) %      RDW-SD 56.7 (H) fl      MPV 10.4 fL      Platelets 271 10*3/mm3     Manual Differential [[id number]]  (Abnormal) Collected:  07/03/18 0539    Specimen:  Blood Updated:  07/03/18 0738     Neutrophil % 84.0 (H) %      Lymphocyte % 12.0 (L) %      Monocyte % 4.0 %      Eosinophil % 0.0 %      Basophil % 0.0 %      Neutrophils Absolute 10.30 (H) 10*3/mm3      Lymphocytes Absolute 1.47 10*3/mm3      Monocytes Absolute 0.49 10*3/mm3      Eosinophils Absolute 0.00 (L) 10*3/mm3      Basophils Absolute 0.00 10*3/mm3      RBC Morphology Normal     WBC Morphology Normal     Platelet Morphology Normal    Narrative:       OCCASIONAL LARGE PLATELET NOTED ON SLIDE REVIEW.    POC Glucose Once [[id number]]  (Abnormal) Collected:  07/03/18 0734    Specimen:  Blood Updated:  07/03/18 0738     Glucose 178 (H) mg/dL     Narrative:       Meter: [id number] : [id number] Aj Sullivan    Potassium [[id number]]  (Normal) Collected:  07/03/18 0539    Specimen:  Blood Updated:  07/03/18 0706     Potassium 4.8 mmol/L     Comprehensive Metabolic Panel [[id number]]  (Abnormal)  Collected:  07/03/18 0539    Specimen:  Blood Updated:  07/03/18 0706     Glucose 214 (H) mg/dL      BUN 76 (H) mg/dL      Creatinine 2.69 (H) mg/dL      Sodium 143 mmol/L      Potassium 4.8 mmol/L      Chloride 114 (H) mmol/L      CO2 18.0 (L) mmol/L      Calcium 8.3 (L) mg/dL      Total Protein 6.1 g/dL      Albumin 3.04 (L) g/dL      ALT (SGPT) 38 U/L      AST (SGOT) 27 U/L      Alkaline Phosphatase 163 (H) U/L      Total Bilirubin 0.7 mg/dL      eGFR  African Amer 21 (L) mL/min/1.73      Globulin 3.1 gm/dL      A/G Ratio 1.0 (L) g/dL      BUN/Creatinine Ratio 28.3 (H)     Anion Gap 11.0 mmol/L     Narrative:       National Kidney Foundation Guidelines    Stage     Description        GFR  1         Normal or High     90+  2         Mild decrease      60-89  3         Moderate decrease  30-59  4         Severe decrease    15-29  5         Kidney failure     <15    Magnesium [[id number]]  (Normal) Collected:  07/03/18 0539    Specimen:  Blood Updated:  07/03/18 0706     Magnesium 2.2 mg/dL     Phosphorus [[id number]]  (Normal) Collected:  07/03/18 0539    Specimen:  Blood Updated:  07/03/18 0706     Phosphorus 4.9 mg/dL     Hemoglobin A1c [[id number]]  (Abnormal) Collected:  07/03/18 0539    Specimen:  Blood Updated:  07/03/18 0701     Hemoglobin A1C 9.30 (H) %     Narrative:       The American Diabetes Association recommends maintenance of Hemoglobin A1C at 7.0% or lower. Goals for Hemoglobin A1C reduction may need to be modified if hypoglycemia is a problem.    LSAC Slide Creation [[id number]] Collected:  07/03/18 0539    Specimen:  Blood Updated:  07/03/18 0700    Urinalysis With Microscopic If Indicated (No Culture) - Urine, Catheter [[id number]]  (Abnormal) Collected:  07/03/18 0200    Specimen:  Urine from Urine, Catheter Updated:  07/03/18 0208     Color, UA [place] (A)     Appearance, UA Turbid (A)     pH, UA <=5.0     Specific Gravity, UA 1.022     Glucose, UA Negative     Ketones, UA Negative     Bilirubin, UA  Negative     Blood, UA Large (3+) (A)     Protein,  mg/dL (2+) (A)     Leuk Esterase, UA Moderate (2+) (A)     Nitrite, UA Negative     Urobilinogen, UA 0.2 E.U./dL    Urinalysis With Culture If Indicated - Urine, Catheter [[id number]]  (Abnormal) Collected:  07/03/18 0200    Specimen:  Urine from Urine, Catheter Updated:  07/03/18 0208     Color, UA [place] (A)     Appearance, UA Turbid (A)     pH, UA <=5.0     Specific Gravity, UA 1.022     Glucose, UA Negative     Ketones, UA Negative     Bilirubin, UA Negative     Blood, UA Large (3+) (A)     Protein,  mg/dL (2+) (A)     Leuk Esterase, UA Moderate (2+) (A)     Nitrite, UA Negative     Urobilinogen, UA 0.2 E.U./dL    Urinalysis, Microscopic Only - Urine, Clean Catch [[id number]]  (Abnormal) Collected:  07/03/18 0200    Specimen:  Urine from Urine, Catheter Updated:  07/03/18 0208     RBC, UA Too Numerous to Count (A) /HPF      WBC, UA 6-12 (A) /HPF      Bacteria, UA None Seen /HPF      Squamous Epithelial Cells, UA 13-20 (A) /HPF      Hyaline Casts, UA 0-6 /LPF      Methodology Automated Microscopy    Urine Culture - Urine, [[id number]] Collected:  07/03/18 0200    Specimen:  Urine from Urine, Catheter Updated:  07/03/18 0208    Potassium [[id number]]  (Normal) Collected:  07/03/18 0052    Specimen:  Blood Updated:  07/03/18 0153     Potassium 5.2 mmol/L     Potassium [[id number]]  (Normal) Collected:  07/02/18 2100    Specimen:  Blood Updated:  07/02/18 2325     Potassium 5.4 mmol/L     POC Glucose Once [[id number]]  (Abnormal) Collected:  07/02/18 2229    Specimen:  Blood Updated:  07/02/18 2231     Glucose 287 (H) mg/dL     Narrative:       Meter: [id number] : [id number] Elvin Machuca    Lactic Acid, Reflex [[id number]]  (Abnormal) Collected:  07/02/18 2132    Specimen:  Blood Updated:  07/02/18 2154     Lactate 3.8 (C) mmol/L      Comment: Falsely depressed results may occur on samples drawn from patients receiving N-Acetylcysteine (NAC) or  Metamizole.       Lactic Acid, Reflex Timer (This will reflex a repeat order 3-3:15 hours after ordered.) [[id number]] Collected:  07/02/18 1615    Specimen:  Blood Updated:  07/02/18 2001     Extra Tube Hold for add-ons.     Comment: Auto resulted.       [place] Draw [[id number]] Collected:  07/02/18 1615    Specimen:  Blood Updated:  07/02/18 1730    Narrative:       The following orders were created for panel order [place] Draw.  Procedure                               Abnormality         Status                     ---------                               -----------         ------                     Light Blue Top[[id number]]                                   Final result               Green Top (Gel)[[id number]]                                  Final result               Lavender Top[[id number]]                                     Final result               Gold Top - SST[[id number]]                                   Final result               Green Top (No Gel)[[id number]]                                                            Please view results for these tests on the individual orders.    Green Top (Gel) [[id number]] Collected:  07/02/18 1615    Specimen:  Blood Updated:  07/02/18 1730     Extra Tube Hold for add-ons.     Comment: Auto resulted.       Light Blue Top [[id number]] Collected:  07/02/18 1615    Specimen:  Blood Updated:  07/02/18 1730     Extra Tube hold for add-on     Comment: Auto resulted       Lavender Top [[id number]] Collected:  07/02/18 1615    Specimen:  Blood Updated:  07/02/18 1730     Extra Tube hold for add-on     Comment: Auto resulted       Gold Top - SST [[id number]] Collected:  07/02/18 1615    Specimen:  Blood Updated:  07/02/18 1730     Extra Tube Hold for add-ons.     Comment: Auto resulted.       Comprehensive Metabolic Panel [[id number]]  (Abnormal) Collected:  07/02/18 1615    Specimen:  Blood Updated:  07/02/18 1701     Glucose 381 (H) mg/dL      BUN 86 (H) mg/dL      Creatinine  3.21 (H) mg/dL      Sodium 142 mmol/L      Potassium 6.7 (C) mmol/L      Chloride 108 mmol/L      CO2 18.0 (L) mmol/L      Calcium 8.9 mg/dL      Total Protein 7.0 g/dL      Albumin 3.52 g/dL      ALT (SGPT) 38 U/L      AST (SGOT) 43 (H) U/L      Alkaline Phosphatase 204 (H) U/L      Total Bilirubin 0.8 mg/dL      eGFR  African Amer 17 (L) mL/min/1.73      Globulin 3.5 gm/dL      A/G Ratio 1.0 (L) g/dL      BUN/Creatinine Ratio 26.8 (H)     Anion Gap 16.0 (H) mmol/L     Narrative:       National Kidney Foundation Guidelines    Stage     Description        GFR  1         Normal or High     90+  2         Mild decrease      60-89  3         Moderate decrease  30-59  4         Severe decrease    15-29  5         Kidney failure     <15    Lipase [[id number]]  (Normal) Collected:  07/02/18 1615    Specimen:  Blood Updated:  07/02/18 1700     Lipase 44 U/L     Lactic Acid, Plasma [[id number]]  (Abnormal) Collected:  07/02/18 1615    Specimen:  Blood Updated:  07/02/18 1655     Lactate 4.6 (C) mmol/L      Comment: Falsely depressed results may occur on samples drawn from patients receiving N-Acetylcysteine (NAC) or Metamizole.       CBC & Differential [[id number]] Collected:  07/02/18 1615    Specimen:  Blood Updated:  07/02/18 1625    Narrative:       The following orders were created for panel order CBC & Differential.  Procedure                               Abnormality         Status                     ---------                               -----------         ------                     CBC Auto Differential[[id number]]        Abnormal            Final result                 Please view results for these tests on the individual orders.    CBC Auto Differential [[id number]]  (Abnormal) Collected:  07/02/18 1615    Specimen:  Blood Updated:  07/02/18 1625     WBC 10.76 10*3/mm3      RBC 5.32 (H) 10*6/mm3      Hemoglobin 14.5 g/dL      Hematocrit 47.3 (H) %      MCV 88.9 fL      MCH 27.3 pg      MCHC 30.7 (L) g/dL      RDW  17.5 (H) %      RDW-SD 56.5 (H) fl      MPV 10.2 fL      Platelets 314 10*3/mm3      Neutrophil % 83.6 (H) %      Lymphocyte % 11.3 (L) %      Monocyte % 4.3 %      Eosinophil % 0.0 %      Basophil % 0.1 %      Immature Grans % 0.7 (H) %      Neutrophils, Absolute 9.00 (H) 10*3/mm3      Lymphocytes, Absolute 1.22 10*3/mm3      Monocytes, Absolute 0.46 10*3/mm3      Eosinophils, Absolute 0.00 10*3/mm3      Basophils, Absolute 0.01 10*3/mm3      Immature Grans, Absolute 0.07 (H) 10*3/mm3         Imaging Results (last 24 hours)     Procedure Component Value Units Date/Time    US Renal Bilateral [[id number]] Updated:  07/03/18 1614    CT Abdomen Pelvis Stone Protocol [[id number]] Collected:  07/02/18 2157     Updated:  07/03/18 0101    Narrative:       EXAM:    CT Abdomen and Pelvis Without Intravenous Contrast    EXAM DATE/TIME:    7/2/2018 9:57 PM    CLINICAL HISTORY:    72 years old, female; Generalized abd pain, nausea, vomiting.    TECHNIQUE:    Axial computed tomography images of the abdomen and pelvis without   intravenous contrast.  All CT scans at this facility use at least one of these   dose optimization techniques: automated exposure control; mA and/or kV   adjustment per patient size (includes targeted exams where dose is matched to   clinical indication); or iterative reconstruction.    Coronal reformatted images were created and reviewed.    COMPARISON:    CT ABDOMEN PELVIS WO CONTRAST 2017-10-27 11:24    FINDINGS:    Prior near-total colectomy with a right periumbilical ileostomy.  There is no   bowel inflammation, obstruction, free intraperitoneal air, or ascites.  Stable   mild presacral stranding.      Stable cardiomegaly.  Patchy bibasilar atelectasis.  Left basilar bronchial   wall thickening.        Trace pericholecystic free fluid without biliary dilatation, gallbladder   dilatation, or visualized gallstone.      Right adrenal mass has slightly increased in size since the prior exam,   currently  measuring up to 3.7 cm where it previously measured up to 3.2 cm.        Bilateral nonobstructing intrarenal calculi, measuring up to 1 cm on the   left.  Bilateral simple renal cysts, measuring up to 2.9 cm on the left.  No   ureteral or bladder calculus.  No hydronephrosis.      Pancreatic atrophy.  The unenhanced liver, spleen, and urinary bladder are   normal.  Prior hysterectomy.      Diffuse atherosclerosis without abdominal aortic aneurysm or retroperitoneal   hemorrhage.  Chronic degenerative changes throughout the spine and at both hips.      Impression:         Trace pericholecystic fluid without layering dilatation or stone.  If there   is right upper quadrant pain, ultrasound recommended.      Right adrenal mass has slightly increased in size since the prior exam.  This   can be followed up with non-emergent washout CT or MRI.      Left basilar bronchitis.      Bilateral nephrolithiasis without urinary obstruction.      THIS DOCUMENT HAS BEEN ELECTRONICALLY SIGNED BY ERENDIRA KATE MD          Medications:  Current Facility-Administered Medications   Medication Dose Route Frequency Provider Last Rate Last Dose   • apixaban (ELIQUIS) tablet 5 mg  5 mg Oral Q12H Brian Samson MD   5 mg at 07/03/18 2018   • aspirin chewable tablet 81 mg  81 mg Oral Daily Brian Samson MD   81 mg at 07/03/18 0850   • atorvastatin (LIPITOR) tablet 10 mg  10 mg Oral Nightly Brian Samson MD   10 mg at 07/03/18 2018   • dextrose (D50W) solution 25 g  25 g Intravenous Q15 Min PRN Brian Samson MD       • dextrose (GLUTOSE) oral gel 15 g  15 g Oral Q15 Min PRN Brian Samson MD       • diltiaZEM (CARDIZEM) tablet 60 mg  60 mg Oral Q6H Jasmina V. Case, DO   60 mg at 07/03/18 1801   • esmolol (BREVIBLOC) 2500 mg/250 mL (10 mg/mL) infusion   mcg/kg/min Intravenous Titrated Tato Peraza MD 24.2 mL/hr at 07/02/18 1854 50 mcg/kg/min at 07/02/18 1854   • famotidine (PEPCID)  injection 20 mg  20 mg Intravenous Q12H Julien Carcamo, APRN   20 mg at 07/03/18 2017   • glucagon (human recombinant) (GLUCAGEN DIAGNOSTIC) injection 1 mg  1 mg Subcutaneous PRN Brian Samson MD       • haloperidol lactate (HALDOL) injection 1 mg  1 mg Intravenous BID Julien Carcamo, APRN   1 mg at 07/03/18 2017   • insulin lispro (humaLOG) injection 0-7 Units  0-7 Units Subcutaneous 4x Daily With Meals & Nightly Brian Samson MD   2 Units at 07/03/18 2059   • ipratropium-albuterol (DUO-NEB) nebulizer solution 3 mL  3 mL Nebulization Q6H PRN Brian Samson MD       • lactated ringers infusion  100 mL/hr Intravenous Continuous Jasmina V. Case,  mL/hr at 07/03/18 1159 100 mL/hr at 07/03/18 1159   • ondansetron (ZOFRAN) injection 4 mg  4 mg Intravenous Q6H PRN Brian Samson MD       • piperacillin-tazobactam (ZOSYN) 3.375 g in iso-osmotic dextrose 50 ml (premix)  3.375 g Intravenous Q8H Brian Samson MD   3.375 g at 07/03/18 1801   • povidone-iodine (BETADINE) external solution   Topical Daily Julien Carcamo, APRN   1 application at 07/03/18 1152   • sodium chloride 0.9 % flush 1-10 mL  1-10 mL Intravenous PRN Brian Samson MD       • sodium chloride 0.9 % flush 10 mL  10 mL Intravenous PRN Tato Peraza MD           Assessment/Plan     Principal Problem:    Sepsis due to urinary tract infection (CMS/HCC)  Active Problems:    Diabetes mellitus, type 2 (CMS/HCC)    Hypertension    Hyperlipidemia    Hypothyroidism    Obstructive sleep apnea syndrome    Diabetic peripheral neuropathy (CMS/HCC)    Atrial fibrillation with rapid ventricular response (CMS/HCC)    Altered mental status    Acute renal failure superimposed on stage 3 chronic kidney disease (CMS/HCC)    Hyperkalemia    Somnolence      Impression: Urinary tract infection with bilateral renal calculi    Plan: Continue antibiotics.  We will treat the stones at a later date    I  discussed the patients findings and my recommendations with patient    Vern Fonseca MD  07/03/18  9:16 PM

## 2018-07-04 NOTE — PROGRESS NOTES
INTENSIVIST   PROGRESS NOTE     Hospital:  LOS: 2 days     Ms. Leila Smith, 72 y.o. female is followed for a Chief Complaint of: Sepsis, Afib w/ RVR      Subjective   S   Ms. Smith is a 73yo mentally challenged female who resides at [facility] who presented with back pain, nausea, vomiting and altered mental status. She was found to have a urinary tract infection and was in Afib with RVR. She was admitted to the ICU and started on Zosyn. She had a very similar admission in late January for essentially the same thing.     Interval History:  No events overnight. Mental status is improved.        The patient's relevant past medical, surgical and social history were reviewed and updated in Epic as appropriate.      ROS:   Constitutional: Negative for fever.   Respiratory: Negative for dyspnea.   Cardiovascular: Negative for chest pain.   Gastrointestinal: Negative for  nausea, vomiting and diarrhea.     Objective   O     Vitals:  Temp  Min: 97.4 °F (36.3 °C)  Max: 98.3 °F (36.8 °C)  BP  Min: 115/78  Max: 153/113  Pulse  Min: 72  Max: 142  Resp  Min: 14  Max: 20  SpO2  Min: 91 %  Max: 100 % No Data Recorded    Intake/Ouptut 24 hrs (7:00AM - 6:59 AM)  Intake & Output (last 3 days)       07/01 0701 - 07/02 0700 07/02 0701 - 07/03 0700 07/03 0701 - 07/04 0700 07/04 0701 - 07/05 0700    P.O.   435     I.V. (mL/kg)  780.9 (9.7) 2765 (34.3)     IV Piggyback  1550 100     Total Intake(mL/kg)  2330.9 (28.9) 3300 (40.9)     Urine (mL/kg/hr)  75 0 (0)     Stool  150 500 (0.3)     Total Output   225 500      Net   +2105.9 +2800              Unmeasured Urine Occurrence   7 x           Medications (drips):    lactated ringers Last Rate: 100 mL/hr (07/03/18 2313)         Physical Examination  Telemetry:  Atrial Fibrillation.    Constitutional:  No acute distress.   Cardiovascular: Tachycardic. Irregular rhythm. Normal heart sounds.  No murmurs, gallop or rub.   Respiratory: No respiratory distress. Normal respiratory  effort.  Normal breath sounds  Upper airway rhonchi.    Abdominal:  Soft. No masses. Non-tender. No distension. No HSM.   Extremities: No digital cyanosis. No clubbing.  No peripheral edema.   Neurological:   Alert and interactive.                 Results from last 7 days  Lab Units 07/04/18  0517 07/03/18  0539 07/02/18  1615   WBC 10*3/mm3 10.48 12.26* 10.76   HEMOGLOBIN g/dL 13.3 13.0 14.5   MCV fL 88.1 88.4 88.9   PLATELETS 10*3/mm3 249 271 314       Results from last 7 days  Lab Units 07/04/18  0517 07/03/18  0539 07/03/18  0052  07/02/18  1615   SODIUM mmol/L 144 143  --   --  142   POTASSIUM mmol/L 4.0 4.8  4.8 5.2  < > 6.7*   CO2 mmol/L 20.0 18.0*  --   --  18.0*   CREATININE mg/dL 2.07* 2.69*  --   --  3.21*   GLUCOSE mg/dL 114* 214*  --   --  381*   MAGNESIUM mg/dL 1.9 2.2  --   --   --    PHOSPHORUS mg/dL 3.4 4.9  --   --   --    < > = values in this interval not displayed.  Estimated Creatinine Clearance: 30.4 mL/min (A) (by C-G formula based on SCr of 2.07 mg/dL (H)).    Results from last 7 days  Lab Units 07/03/18  0539 07/02/18  1615   ALK PHOS U/L 163* 204*   BILIRUBIN mg/dL 0.7 0.8   ALT (SGPT) U/L 38 38   AST (SGOT) U/L 27 43*             Images:  Imaging Results (last 24 hours)     Procedure Component Value Units Date/Time    US Renal Bilateral [[id number]] Updated:  07/03/18 1614          Results: Reviewed.  I reviewed the patient's new laboratory and imaging results.  I independently reviewed the patient's new images.    Medications: Reviewed.    Assessment/Plan   A / P     Ms. Smith is a 73yo F who presented with Afib with RVR and a urinary tract infection. Cultures are pending. She has clinically improved. Renal function continues to improve. Urology is following for kidney stones.     Nutrition:   Diet Pureed; Consistent Carbohydrate  Advance Directives:   Code Status and Medical Interventions:   Ordered at: 07/02/18 6402     Code Status:    CPR     Medical Interventions (Level of Support  Prior to Arrest):    Full       Hospital Problem List     * (Principal)Sepsis due to urinary tract infection (CMS/HCC)    Diabetes mellitus, type 2 (CMS/[facility])    Overview Deleted 7/2/2018  9:44 PM by Brian Samson MD            Hypertension    Hyperlipidemia    Hypothyroidism    Obstructive sleep apnea syndrome    Diabetic peripheral neuropathy (CMS/HCC)    Atrial fibrillation with rapid ventricular response (CMS/HCC)    Altered mental status    Acute renal failure superimposed on stage 3 chronic kidney disease (CMS/[facility])    Hyperkalemia    Somnolence          Assessment / Plan:    1. Continue Zosyn. Follow up cultures.   2. Continue LR at 100/hr  3. Continue PO medications. Change Haldol back to PO.   4. Continue to monitor renal function.   5. AM labs  6. Okay to transfer to telemetry when a bed is available.     Plan of care and goals reviewed during interdisciplinary rounds.  I discussed the patient's findings and my recommendations with patient and nursing staff    Time: was greater than 35 minutes.      Jasmina Maier, DO    Intensive Care Medicine and Pulmonary Medicine

## 2018-07-04 NOTE — THERAPY RE-EVALUATION
Acute Care - Speech Language Pathology   Swallow Re-Evaluation [facility]   Clinical Swallow Evaluation     Patient Name: Leila Smith  : 1945  MRN: [id number]  Today's Date: 2018               Admit Date: 2018    Visit Dx:     ICD-10-CM ICD-9-CM   1. Somnolence R40.0 780.09   2. Atrial fibrillation with rapid ventricular response (CMS/[facility]) I48.91 427.31   3. History of hypertension Z86.79 V12.59   4. Type 2 diabetes mellitus with hyperglycemia, unspecified long term insulin use status (CMS/[facility]) E11.65 250.00   5. History of recurrent UTI (urinary tract infection) Z87.440 V13.02   6. Dysphagia, unspecified type R13.10 787.20     Patient Active Problem List   Diagnosis   • Diabetes mellitus, type 2 (CMS/[facility])   • Hypertension   • Hyperlipidemia   • Hypothyroidism   • Chronic obstructive pulmonary disease (CMS/[facility])   • CAD (coronary artery disease)   • History of edema   • History of obesity   • Venous insufficiency   • Open wound of lower extremity   • Urinary tract infection   • T2DM (type 2 diabetes mellitus) (CMS/[facility])   • Dyspnea on exertion   • Peripheral vascular disease (CMS/[facility])   • Obstructive sleep apnea syndrome   • Ulcer of lower extremity (CMS/[facility])   • Hypoxemia   • Hematuria   • Diabetic peripheral neuropathy (CMS/[facility])   • Edema   • Chronic obstructive pulmonary disease with acute exacerbation (CMS/[facility])   • Congestive heart failure (CMS/[facility])   • Arthritis   • Neutrophilic leukocytosis   • Cystitis   • Atrial fibrillation with rapid ventricular response (CMS/[facility])   • Altered mental status   • Sepsis due to urinary tract infection (CMS/[facility])   • Acute renal failure superimposed on stage 3 chronic kidney disease (CMS/[facility])   • Hyperkalemia   • Leukocytosis   • Elevated troponin   • Somnolence     Past Medical History:   Diagnosis Date   • Atrial fibrillation (CMS/[facility])    • Chronic ulcer of right leg (CMS/[facility])    • Cognitive communication deficit    • COPD (chronic obstructive pulmonary  disease) (CMS/HCC)    • Diabetes mellitus (CMS/[facility])    • Difficulty in walking    • Encephalopathy    • Generalized muscle weakness    • Hematuria    • Hyperlipidemia    • Hypertension    • Hypothyroidism    • Urinary tract infection      Past Surgical History:   Procedure Laterality Date   • CATARACT EXTRACTION     • CHOLECYSTECTOMY     • COLOSTOMY     • FOOT SURGERY Right    • HERNIA REPAIR     • HYSTERECTOMY     • TOTAL KNEE ARTHROPLASTY Right           SWALLOW EVALUATION (last 72 hours)      SLP Adult Swallow Evaluation     Row Name 07/04/18 1030 07/03/18 1415                Rehab Evaluation    Document Type re-evaluation  - evaluation  -RD       Subjective Information no complaints  - no complaints  -RD       Patient Observations alert;cooperative  -SM alert;cooperative;agree to therapy  -RD       Patient/Family Observations  -- Pt upright in bed  -RD       Patient Effort  -- adequate  -RD          General Information    Patient Profile Reviewed  -- yes  -RD       Pertinent History Of Current Problem  -- Adm w/ sepsis, somnolence, AMS, afib, AMS, CKD 3, incr'd sodium, COPD, CHD. RN reports pt coughing w/ liquids  -RD       Current Method of Nutrition pureed;thin liquids  - clear liquids  -RD       Precautions/Limitations, Vision  -- WFL;for purposes of eval  -RD       Precautions/Limitations, Hearing  -- WFL;for purposes of eval  -RD       Prior Level of Function-Communication  -- other (see comments)   unknown  -RD       Prior Level of Function-Swallowing  -- other (see comments)   unknown  -RD       Plans/Goals Discussed with patient  - patient;agreed upon  -       Barriers to Rehab  -- cognitive status  -RD       Patient's Goals for Discharge patient did not state  - patient did not state  -          Pain Assessment    Additional Documentation  -- Pain Scale: FACES Pre/Post-Treatment (Group)  -RD          Pain Scale: FACES Pre/Post-Treatment    Pain: FACES Scale, Pretreatment 0-->no hurt  -  0-->no hurt  -RD       Pain: FACES Scale, Post-Treatment 0-->no hurt  -SM 0-->no hurt  -RD          Oral Musculature and Cranial Nerve Assessment    Oral Motor General Assessment  -- generalized oral motor weakness  -RD          General Eating/Swallowing Observations    Respiratory Support Currently in Use  -- nasal cannula  -RD       Eating/Swallowing Skills  -- fed by SLP;unsafe self-feeding skills observed;rate is too fast;unaware of safety concerns  -RD       Positioning During Eating  -- upright 90 degree;upright in bed  -RD       Utensils Used  -- spoon;cup;straw  -RD       Consistencies Trialed  -- thin liquids;nectar/syrup-thick liquids;pudding thick  -RD          Clinical Swallow Eval    Oral Prep Phase  -- --   DNT solid  -RD       Oral Transit  -- impaired  -RD       Oral Residue  -- WFL  -RD       Pharyngeal Phase no overt signs/symptoms of pharyngeal impairment  -SM suspected pharyngeal impairment  -RD       Esophageal Phase  -- suspected esophageal impairment  -RD       Clinical Swallow Evaluation Summary Back to baseline function per RN. Less impulsive today c/t yesterday's report. RN reports no issues once implemented controlled sips and cues from nursing as needed. Showing no s/s aspiration with SLP trials, even via straw. Lungs clear per RN. No obvious concern for chronic phayrngeal dysphagia, though some risk for aspiration 2' cognitive impairment, if general precautions not utilized (which nursing doing well utilizing).   -SM BS eval complete. Pt confused. Trials of thins, nectar, and pureed (clear liqs) given. No s/s w/ Nectar or pureed. Pt very impulsive and unable to control rate w/ straw. Overt s/s of aspiration c/b coughing w/ thins via straw. No s/s of aspiration w/ small single cup sips of thin. Unable to assess solids 2' current clear restriction. Pt noted burping w/all consistencies t/o eval. Suspect pharyngeal and esophageal dysphagia. RECS: continue clear thin liquid diet per MD  discretion, no straws, small single sips, general aspiration and reflux precautions, Oral care BID and PRN, F/u for re-eval for diet uprade vs. instrumental eval.   -RD          Oral Transit Concerns    Oral Transit Concerns  -- increased oral transit time  -RD       Increased Oral Transit Time  -- all consistencies  -RD          Pharyngeal Phase Concerns    Pharyngeal Phase Concerns  -- cough;wet vocal quality  -RD       Wet Vocal Quality  -- thin  -RD       Cough  -- thin  -RD          Esophageal Phase Concerns    Esophageal Phase Concerns  -- belching  -RD       Belching  -- thin;nectar;pudding  -RD          Clinical Impression    SLP Swallowing Diagnosis  -- suspected pharyngeal dysfunction;esophageal dysfunction  -RD       Functional Impact  -- risk of aspiration/pneumonia  -RD       Rehab Potential/Prognosis, Swallowing  -- good, to achieve stated therapy goals  -RD       Criteria for Skilled Therapeutic Interventions Met  -- demonstrates skilled criteria  -RD          Recommendations    Therapy Frequency (Swallow)  -- evaluation only;PRN  -RD       Predicted Duration Therapy Intervention (Days)  -- until discharge  -RD       SLP Diet Recommendation puree;thin liquids  -SM thin liquids;clear liquid diet   no straws  -RD       Recommended Diagnostics  -- reassess via clinical swallow evaluation  -RD       Recommended Precautions and Strategies upright posture during/after eating;small bites of food and sips of liquid;other (see comments)   ok to resume straws as tolerated. Remove if/when impulsive  -SM upright posture during/after eating;small bites of food and sips of liquid;no straw;other (see comments)   supervision w/ PO  -RD       SLP Rec. for Method of Medication Administration meds whole;with pudding or applesauce  -SM meds whole;with thick liquids;with pudding or applesauce;as tolerated  -RD       Monitor for Signs of Aspiration  -- yes;notify SLP if any concerns;cough;gurgly voice;throat clearing;upper  respiratory;pneumonia;right lower lobe infiltrates  -RD       Anticipated Dischage Disposition  -- unknown;anticipate therapy at next level of care  -RD         User Key  (r) = Recorded By, (t) = Taken By, (c) = Cosigned By    Initials Name Effective Dates    MANUEL Angela MS CCC-SLP 06/22/15 -     RD Laura Warren MS CCC-SLP 04/03/18 -         EDUCATION  The patient has been educated in the following areas:   Dysphagia (Swallowing Impairment) Modified Diet Instruction.    SLP Recommendation and Plan     SLP Diet Recommendation: puree, thin liquids  Recommended Precautions and Strategies: upright posture during/after eating, small bites of food and sips of liquid, other (see comments) (ok to resume straws as tolerated. Remove if/when impulsive)                               Plan of Care Reviewed With: patient  Plan of Care Review  Plan of Care Reviewed With: patient             Time Calculation:         Time Calculation- SLP     Row Name 07/04/18 1105             Time Calculation- SLP    SLP Start Time 1030  -      SLP Received On 07/04/18  -        User Key  (r) = Recorded By, (t) = Taken By, (c) = Cosigned By    Initials Name Provider Type     Tianna Angela MS CCC-SLP Speech and Language Pathologist          Therapy Charges for Today     Code Description Service Date Service Provider Modifiers Qty    [id number] HC ST EVAL ORAL PHARYNG SWALLOW 3 7/4/2018 Tianna Angela MS CCC-SLP GN 1               Tianna Angela MS CCC-SLP  7/4/2018

## 2018-07-04 NOTE — PLAN OF CARE
Problem: Fall Risk (Adult)  Goal: Identify Related Risk Factors and Signs and Symptoms  Outcome: Ongoing (interventions implemented as appropriate)    Goal: Absence of Fall  Outcome: Ongoing (interventions implemented as appropriate)      Problem: Skin Injury Risk (Adult)  Goal: Identify Related Risk Factors and Signs and Symptoms  Outcome: Ongoing (interventions implemented as appropriate)    Goal: Skin Health and Integrity  Outcome: Ongoing (interventions implemented as appropriate)      Problem: Renal Failure/Kidney Injury, Acute (Adult)  Goal: Signs and Symptoms of Listed Potential Problems Will be Absent, Minimized or Managed (Renal Failure/Kidney Injury, Acute)  Outcome: Ongoing (interventions implemented as appropriate)      Problem: Arrhythmia/Dysrhythmia (Symptomatic) (Adult)  Goal: Signs and Symptoms of Listed Potential Problems Will be Absent, Minimized or Managed (Arrhythmia/Dysrhythmia)  Outcome: Ongoing (interventions implemented as appropriate)      Problem: Patient Care Overview  Goal: Plan of Care Review  Outcome: Ongoing (interventions implemented as appropriate)    Goal: Discharge Needs Assessment  Outcome: Ongoing (interventions implemented as appropriate)    Goal: Interprofessional Rounds/Family Conf  Outcome: Ongoing (interventions implemented as appropriate)  Pt oriented to person only. Selectively follows commands. Resistive to care at times.  Often screams when touched, shouts profanities. VSS. Afib-rate controlled. 3 doses PO cardizem received thus far. Rate dropped into 50's a few times, unsustained   07/04/18 0339   Interdisciplinary Rounds/Family Conf   Participants speech language pathology;wound care specialist;physician  (urology)   . Incontinent of urine every few hours. CT abd/pelvis showed bilateral renal stones-non-obstructing. Awaiting renal u/s results.

## 2018-07-04 NOTE — PLAN OF CARE
Problem: Patient Care Overview  Goal: Plan of Care Review  Outcome: Ongoing (interventions implemented as appropriate)   07/04/18 1103   Coping/Psychosocial   Plan of Care Reviewed With patient   SLP re-evaluation completed. Dysphagia Re-Eval: Doing much better today with nursing use of aspiration precautions given pt often impulsive with intake. Showing no s/s aspiration, even when trialed via straw. Recommend: Continue puree diet and thin liquids - supervision to ensure small bites and sips, remove straws if any moments of impulsivity. All dysphagia needs being well managed by nursing. No further SLP needs at this time. Will sign-off. Please see note for further details and recommendations.

## 2018-07-04 NOTE — PROGRESS NOTES
Adult Nutrition  Assessment/PES    Patient Name:  Leila Smith  YOB: 1945  MRN: [id number]  Admit Date:  7/2/2018    Assessment Date:  7/4/2018    Comments:  Pt w/ good acceptance of pureed diet; intake trending upward; Boost Glucose Control supplement provided twice daily d/t wounds/pressure injury.          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 colectomy w/ ileostomy 2017      Identified At Risk by Screening Criteria large or nonhealing wound, burn or pressure injury             Nutrition/Diet History     Row Name 07/04/18 1152          Nutrition/Diet History    Factors Affecting Nutritional Intake impaired cognitive status/motor control   RN reports pt ate well at breakfast 75-80%; tolerates pureed consistency; her daughter reports pt will spit out any food w/ texture greater than pureed               Labs/Tests/Procedures/Meds     Row Name 07/04/18 1154          Labs/Procedures/Meds    Lab Results Reviewed reviewed, pertinent        Diagnostic Tests/Procedures    Diagnostic Test/Procedure Reviewed reviewed, pertinent        Medications    Pertinent Medications Reviewed reviewed, pertinent             Physical Findings     Row Name 07/04/18 1155          Physical Findings    Skin pressure injury;non-healing wound(s)   LLE w/ g scabs per RN and toe, coccyx wounds w/ tunnelling, but healed/healing               Nutrition Prescription Ordered     Row Name 07/04/18 1156          Nutrition Prescription PO    Current PO Diet Pureed     Common Modifiers Consistent Carbohydrate             Evaluation  of Received Nutrient/Fluid Intake     Row Name 07/04/18 1156          PO Evaluation    Number of Days PO Intake Evaluated 2 days     Number of Meals 3     % PO Intake 25; but trending positive             Problem/Interventions:        Problem 1     Row Name 07/04/18 1158          Nutrition Diagnoses Problem 1    Problem 1 Increased Nutrient Needs     Macronutrient Kcal;Protein     Micronutrient Vitamin;Mineral     Etiology (related to) Medical Diagnosis     Skin Non healing wound;Pressure injury     Signs/Symptoms (evidenced by) Other (comment)   per RN/WOCN documentation                     Intervention Goal     Row Name 07/04/18 1202          Intervention Goal    General Meet nutritional needs for age/condition     PO Continue positive trend;Modify texture/consistency;Increase intake             Nutrition Intervention     Row Name 07/04/18 1202          Nutrition Intervention    RD/Tech Action Interview for preference;Recommend/ordered;Follow Tx progress;Care plan reviewd   Pt unable to provide preferences; family not available, discussed w/ RN advised supplemnts may be beneficial     Recommended/Ordered Supplement             Nutrition Prescription     Row Name 07/04/18 1204          Nutrition Prescription PO    PO Prescription Begin/change supplement     Supplement Boost Glucose Control     Supplement Frequency 2 times a day     New PO Prescription Ordered? Yes             Education/Evaluation     Row Name 07/04/18 1206          Monitor/Evaluation    Monitor Per protocol;PO intake;Supplement intake;Skin status         Electronically signed by:  Carline Gordon, MS,RD,LD  07/04/18 12:07 PM

## 2018-07-05 NOTE — THERAPY EVALUATION
Acute Care - Occupational Therapy Initial Evaluation  [facility]     Patient Name: Leila Smith  : 1945  MRN: [id number]  Today's Date: 2018  Onset of Illness/Injury or Date of Surgery: 18  Date of Referral to OT: 18  Referring Physician: MD Devan    Admit Date: 2018       ICD-10-CM ICD-9-CM   1. Somnolence R40.0 780.09   2. Atrial fibrillation with rapid ventricular response (CMS/[facility]) I48.91 427.31   3. History of hypertension Z86.79 V12.59   4. Type 2 diabetes mellitus with hyperglycemia, unspecified long term insulin use status (CMS/[facility]) E11.65 250.00   5. History of recurrent UTI (urinary tract infection) Z87.440 V13.02   6. Dysphagia, unspecified type R13.10 787.20   7. Impaired mobility and ADLs Z74.09 799.89     Patient Active Problem List   Diagnosis   • Diabetes mellitus, type 2 (CMS/[facility])   • Hypertension   • Hyperlipidemia   • Hypothyroidism   • Chronic obstructive pulmonary disease (CMS/[facility])   • CAD (coronary artery disease)   • History of edema   • History of obesity   • Venous insufficiency   • Open wound of lower extremity   • Urinary tract infection   • T2DM (type 2 diabetes mellitus) (CMS/[facility])   • Dyspnea on exertion   • Peripheral vascular disease (CMS/[facility])   • Obstructive sleep apnea syndrome   • Ulcer of lower extremity (CMS/[facility])   • Hypoxemia   • Hematuria   • Diabetic peripheral neuropathy (CMS/[facility])   • Edema   • Chronic obstructive pulmonary disease with acute exacerbation (CMS/[facility])   • Congestive heart failure (CMS/[facility])   • Arthritis   • Neutrophilic leukocytosis   • Cystitis   • Atrial fibrillation with rapid ventricular response (CMS/[facility])   • Altered mental status   • Sepsis due to urinary tract infection (CMS/[facility])   • Acute renal failure superimposed on stage 3 chronic kidney disease (CMS/[facility])   • Hyperkalemia   • Leukocytosis   • Elevated troponin   • Somnolence     Past Medical History:   Diagnosis Date   • Atrial fibrillation (CMS/[facility])    • Chronic ulcer  of right leg (CMS/HCC)    • Cognitive communication deficit    • COPD (chronic obstructive pulmonary disease) (CMS/HCC)    • Diabetes mellitus (CMS/HCC)    • Difficulty in walking    • Encephalopathy    • Generalized muscle weakness    • Hematuria    • Hyperlipidemia    • Hypertension    • Hypothyroidism    • Urinary tract infection      Past Surgical History:   Procedure Laterality Date   • CATARACT EXTRACTION     • CHOLECYSTECTOMY     • COLOSTOMY     • FOOT SURGERY Right    • HERNIA REPAIR     • HYSTERECTOMY     • TOTAL KNEE ARTHROPLASTY Right           OT ASSESSMENT FLOWSHEET (last 72 hours)      Occupational Therapy Evaluation     Row Name 07/05/18 1507                   OT Evaluation Time/Intention    Subjective Information complains of;fatigue  -CL        Document Type evaluation  -CL        Patient Effort poor  -CL        Comment Pt easily overwhelmed/agitated.   -CL           General Information    Patient Profile Reviewed? yes  -CL        Onset of Illness/Injury or Date of Surgery 07/05/18  -CL        Referring Physician MD Devan  -CL        Prior Level of Function --   Pt poor historian, no family present  -CL        Equipment Currently Used at Home walker, rolling;wheelchair   Per CM, Pt poor historian, no family present  -CL        Pertinent History of Current Functional Problem Pt presented to the ED 2/2 to back pain, N/V, and AMS. Pt found to be in A-fib w/ RVR and to have a UTI. CT (+) B renal calculi. PMH: colostomy  -CL        Existing Precautions/Restrictions fall;other (see comments)   dementia  -CL        Limitations/Impairments safety/cognitive  -CL        Risks Reviewed patient:;LOB;nausea/vomiting;increased discomfort;dizziness;lines disloged  -CL        Benefits Reviewed patient:;improve function;increase independence;increase strength;decrease pain;increase balance;increase knowledge  -CL        Barriers to Rehab medically complex;previous functional deficit;cognitive status  -CL            Relationship/Environment    Lives With facility resident  -CL           Resource/Environmental Concerns    Current Living Arrangements residential facility;extended care facility   LTC  -CL           Cognitive Assessment/Intervention- PT/OT    Affect/Mental Status (Cognitive) confused;agitated  -CL        Behavioral Issues (Cognitive) overwhelmed easily;inappropriate language;verbal outbursts;uncooperative  -CL        Orientation Status (Cognition) oriented to;person;disoriented to;place;situation;time  -CL        Follows Commands (Cognition) follows one step commands;0-24% accuracy;verbal cues/prompting required;repetition of directions required  -CL        Cognitive Function (Cognitive) safety deficit;attention deficit  -CL        Attention Deficit (Cognitive) severe deficit  -CL        Safety Deficit (Cognitive) severe deficit;awareness of need for assistance;safety precautions awareness;insight into deficits/self awareness  -CL        Personal Safety Interventions fall prevention program maintained;nonskid shoes/slippers when out of bed;supervised activity  -CL           Bed Mobility Assessment/Treatment    Bed Mobility Assessment/Treatment rolling left;rolling right  -CL        Rolling Left [place] (Bed Mobility) maximum assist (25% patient effort);2 person assist;verbal cues  -CL        Rolling Right [place] (Bed Mobility) maximum assist (25% patient effort);2 person assist;verbal cues  -CL        Assistive Device (Bed Mobility) bed rails;draw sheet  -CL        Comment (Bed Mobility) Upon turning pt became very agitated and combative, maximum encouragement to participate.   -CL           Transfer Assessment/Treatment    Comment (Transfers) Deferred.   -CL           ADL Assessment/Intervention    BADL Assessment/Intervention feeding;grooming;toileting  -CL           Grooming Assessment/Training    [place] Level (Grooming) wash face, hands;moderate assist (50% patient effort)  -CL         Grooming Position sitting up in bed  -CL           Self-Feeding Assessment/Training    [place] Level (Feeding) maximum assist (25% patient effort);liquids to mouth  -CL        Assistive Devices (Feeding) adapted cup  -CL        Self-Feeding Assess/Train, Spillage Amount minimal  -CL           Toileting Assessment/Training    [place] Level (Toileting) adjust/manage clothing;change pad/brief;perform perineal hygiene;dependent (less than 25% patient effort);two person assist required;verbal cues  -CL        Toileting Position supine  -CL           General ROM    GENERAL ROM COMMENTS Unable to formally assess d/t cognitive status.   -CL           General Assessment (Manual Muscle Testing)    Comment, General Manual Muscle Testing (MMT) Assessment Unable to formally assess d/t cognitive status.   -CL           Sensory Assessment/Intervention    Sensory General Assessment --   Unable to formally assess d/t cognitive status.   -CL           Positioning and Restraints    Pre-Treatment Position in bed  -CL        Post Treatment Position bed  -CL        In Bed notified nsg;fowlers;call light within reach;encouraged to call for assist;with nsg;exit alarm on;with other staff  -CL           Pain Scale: FACES Pre/Post-Treatment    Pain: FACES Scale, Pretreatment 0-->no hurt  -CL        Pain: FACES Scale, Post-Treatment 0-->no hurt  -CL           Wound 07/02/18 2100 Bilateral posterior coccyx pressure injury    Wound - Properties Group Date first assessed: 07/02/18  -JG Time first assessed: 2100  -JG Present On Admission : yes  -JG Side: Bilateral  -JG Orientation: posterior  -JG Location: coccyx  -JG Type: pressure injury  -JG Additional Comments: healing/scarred PI or shearing  -CP       Wound 07/03/18 0915 Left distal toe    Wound - Properties Group Date first assessed: 07/03/18  -CP Time first assessed: 0915  -CP Present On Admission : yes;picture taken  -CP Side: Left  -CP Orientation: distal  -CP Location: toe  -CP  Stage, Pressure Injury: deep tissue injury  -CP       Wound 07/03/18 0915 Left lower leg ulceration, venous    Wound - Properties Group Date first assessed: 07/03/18  -CP Time first assessed: 0915  -CP Present On Admission : yes;picture taken  -CP Side: Left  -CP Orientation: lower  -CP Location: leg  -CP Type: ulceration, venous  -CP Additional Comments: healing  -CP       Clinical Impression (OT)    Date of Referral to OT 07/05/18  -CL        OT Diagnosis Decreased independence in ADLs.   -CL        Patient/Family Goals Statement (OT Eval) Pt unable to state.   -CL        Criteria for Skilled Therapeutic Interventions Met (OT Eval) yes;treatment indicated  -CL        Rehab Potential (OT Eval) fair, will monitor progress closely  -CL        Therapy Frequency (OT Eval) 3 times/wk   MWF  -CL        Anticipated Equipment Needs at Discharge (OT) --   TBA further  -CL        Anticipated Discharge Disposition (OT) skilled nursing facility  -CL           Vital Signs    Pre Systolic BP Rehab --   VSS, RN cleared for tx.   -CL           OT Goals    Transfer Goal Selection (OT) transfer, OT goal 1  -CL        Grooming Goal Selection (OT) grooming, OT goal 1  -CL        Self-Feeding Goal Selection (OT) self feeding, OT goal 1  -CL        Balance Goal Selection (OT) balance, OT goal 1  -CL        Additional Documentation Self-Feeding Goal Selection (OT) (Row);Grooming Goal Selection (OT) (Row);Balance Goal Selection (OT) (Row)  -CL           Transfer Goal 1 (OT)    Activity/Assistive Device (Transfer Goal 1, OT) sit-to-stand/stand-to-sit;toilet  -CL        [place] Level/Cues Needed (Transfer Goal 1, OT) maximum assist (25-49% patient effort);verbal cues required  -CL        Time Frame (Transfer Goal 1, OT) by discharge;long term goal (LTG)  -CL        Progress/Outcome (Transfer Goal 1, OT) goal ongoing  -CL           Grooming Goal 1 (OT)    Activity/Device (Grooming Goal 1, OT) wash face, hands   sitting EOB  -CL         [place] (Grooming Goal 1, OT) minimum assist (75% or more patient effort);verbal cues required  -CL        Time Frame (Grooming Goal 1, OT) by discharge;long term goal (LTG)  -CL        Progress/Outcome (Grooming Goal 1, OT) goal ongoing  -CL           Self-Feeding Goal 1 (OT)    Activity/Assistive Device (Self-Feeding Goal 1, OT) liquids to mouth;scoop food and bring to mouth  -CL        [place] Level/Cues Needed (Self-Feeding Goal 1, OT) minimum assist (75% or more patient effort);verbal cues required  -CL        Time Frame (Self-Feeding Goal 1, OT) by discharge;long term goal (LTG)  -CL        Progress/Outcomes (Self-Feeding Goal 1, OT) goal ongoing  -CL           Balance Goal 1 (OT)    Activity/Assistive Device (Balance Goal 1, OT) sitting, static  -CL        [place] Level/Cues Needed (Balance Goal 1, OT) minimum assist (75% or more patient effort);verbal cues required  -CL        Time Frame (Balance Goal 1, OT) by discharge;long term goal (LTG)  -CL        Progress/Outcomes (Balance Goal 1, OT) goal ongoing  -CL           OT Cognitive Goals    Attention Goal Selection (OT) attention, OT goal 1  -CL          User Key  (r) = Recorded By, (t) = Taken By, (c) = Cosigned By    Initials Name Effective Dates    CP Angélica Gavin, APRN 06/08/18 -     CL Kelsea Riggs, OT 04/03/18 -     RAUDEL Krause RN 08/08/16 -            Occupational Therapy Education     Title: PT OT SLP Therapies (Active)     Topic: Occupational Therapy (Active)     Point: ADL training (Active)     Description: Instruct learner(s) on proper safety adaptation and remediation techniques during self care or transfers.   Instruct in proper use of assistive devices.   Learning Progress Summary     Learner Status Readiness Method Response Comment Documented by    Patient Active Acceptance E NR Pt educated on role of OT, AE for self-feeding, positioning, and benefits of therapy. CL 07/05/18 5257          Point: Home exercise  program (Active)     Description: Instruct learner(s) on appropriate technique for monitoring, assisting and/or progressing therapeutic exercises/activities.   Learning Progress Summary     Learner Status Readiness Method Response Comment Documented by    Patient Active Acceptance E NR Pt educated on role of OT, AE for self-feeding, positioning, and benefits of therapy.  07/05/18 1555          Point: Precautions (Active)     Description: Instruct learner(s) on prescribed precautions during self-care and functional transfers.   Learning Progress Summary     Learner Status Readiness Method Response Comment Documented by    Patient Active Acceptance E NR Pt educated on role of OT, AE for self-feeding, positioning, and benefits of therapy. CL 07/05/18 1555                      User Key     Initials Effective Dates Name Provider Type Discipline     04/03/18 -  Kelsea Riggs, OT Occupational Therapist OT                  OT Recommendation and Plan  Outcome Summary/Treatment Plan (OT)  Anticipated Equipment Needs at Discharge (OT):  (TBA further)  Anticipated Discharge Disposition (OT): skilled nursing facility  Therapy Frequency (OT Eval): 3 times/wk (MWF)  Plan of Care Review  Plan of Care Reviewed With: patient  Plan of Care Reviewed With: patient  Outcome Summary: OT completed a brief chart review relating to presenting physical/cognitive deficits. Pt sessoin limited d/t significant confusion and pt requiring MAXIMUM encouragement to participate. Recommend cont skilled IPOT intervention 3x/wk. Recommend pt DC to SNF.           Outcome Measures     Row Name 07/05/18 3414             How much help from another is currently needed...    Putting on and taking off regular lower body clothing? 1  -CL      Bathing (including washing, rinsing, and drying) 1  -CL      Toileting (which includes using toilet bed pan or urinal) 1  -CL      Putting on and taking off regular upper body clothing 2  -CL      Taking care of personal  grooming (such as brushing teeth) 2  -CL      Eating meals 2  -CL      Score 9  -CL         Functional Assessment    Outcome Measure Options AM-PAC 6 Clicks Daily Activity (OT)  -CL        User Key  (r) = Recorded By, (t) = Taken By, (c) = Cosigned By    Initials Name Provider Type    CL Kelsea Riggs OT Occupational Therapist          Time Calculation:   OT Start Time: 1440  Therapy Suggested Charges     Code   Minutes Charges    None           Therapy Charges for Today     Code Description Service Date Service Provider Modifiers Qty    [id number]  OT EVAL LOW COMPLEXITY 4 7/5/2018 Kelsea Riggs OT GO 1    [id number]  OT THER SUPP EA 15 MIN 7/5/2018 Kelsea Riggs OT GO 1               Kelsea Riggs OT  7/5/2018

## 2018-07-05 NOTE — PLAN OF CARE
Problem: Patient Care Overview  Goal: Plan of Care Review  Outcome: Ongoing (interventions implemented as appropriate)   07/05/18 4075   Coping/Psychosocial   Plan of Care Reviewed With patient   OTHER   Outcome Summary OT completed a brief chart review relating to presenting physical/cognitive deficits. Pt sessoin limited d/t significant confusion and pt requiring MAXIMUM encouragement to participate. Recommend cont skilled IPOT intervention 3x/wk. Recommend pt DC to SNF.

## 2018-07-05 NOTE — PROGRESS NOTES
Continued Stay Note  [facility]     Patient Name: Leila Smith  MRN: [id number]  Today's Date: 7/5/2018    Admit Date: 7/2/2018          Discharge Plan     Row Name 07/05/18 8213       Plan    Plan Rehab    Patient/Family in Agreement with Plan yes    Plan Comments Spoke to pt and she is pleasantly confused, states it's ok for CM to call her daughter for plan. Per Fozia at [facility], pt is at their facility for skilled rehab and does not have a bed hold. They do not have any beds available today and she is checking for bed availability in the next few days. PT/OT ordered. Pt's daughter states she is reviewing list for other rehab options and get back to CM. Will cont to follow.               Discharge Codes    No documentation.       Expected Discharge Date and Time     Expected Discharge Date Expected Discharge Time    Jul 9, 2018             Marly Araya

## 2018-07-05 NOTE — PLAN OF CARE
Problem: Patient Care Overview  Goal: Plan of Care Review  Outcome: Ongoing (interventions implemented as appropriate)   07/05/18 8281   Coping/Psychosocial   Plan of Care Reviewed With patient   Plan of Care Review   Progress no change   OTHER   Outcome Summary VSS, RA. pt afib on monitor. HR 90s -105s. Pt very confused, but pleasant, begininning of shift pt was a little agitated. Pt has colostomy bag in RLQ. Had to crush pt meds d/t pt spitting them back out. no other complaints. will continue to monitor.

## 2018-07-05 NOTE — PROGRESS NOTES
Continued Stay Note  [facility]     Patient Name: Leila Smith  MRN: [id number]  Today's Date: 7/5/2018    Admit Date: 7/2/2018    Consent obtained for the participation in the [facility] Transitions Program. Rose Boyd RN        Discharge Plan     Row Name 07/05/18 1220       Plan    Plan Rehab    Patient/Family in Agreement with Plan yes    Plan Comments Spoke to pt and she is pleasantly confused, states it's ok for CM to call her daughter for plan. Per Fozia at [facility], pt is at their facility for skilled rehab and does not have a bed hold. They do not have any beds available today and she is checking for bed availability in the next few days. PT/OT ordered. Pt's daughter states she is reviewing list for other rehab options and get back to CM. Will cont to follow.               Discharge Codes    No documentation.       Expected Discharge Date and Time     Expected Discharge Date Expected Discharge Time    Jul 9, 2018             Rose Boyd RN

## 2018-07-06 NOTE — PROGRESS NOTES
Continued Stay Note  [facility]     Patient Name: Leila Smith  MRN: [id number]  Today's Date: 7/6/2018    Admit Date: 7/2/2018          Discharge Plan     Row Name 07/06/18 5199       Plan    Plan Rehab    Patient/Family in Agreement with Plan yes    Plan Comments Spoke to daughter and she is ok with referrals to [facility] facilities, [facility]. Called referrral to Brandie at [facility] with referrals to [facility] and [facility]. They cannot accept to [facility] but will look at referral to [facility]. Efaxed referral to [facility] and [facility]. CM will f/u on Monday on referrals.               Discharge Codes    No documentation.       Expected Discharge Date and Time     Expected Discharge Date Expected Discharge Time    Jul 9, 2018             Marly Araya

## 2018-07-06 NOTE — THERAPY EVALUATION
Acute Care - Physical Therapy Initial Evaluation  [facility]     Patient Name: Leila Smith  : 1945  MRN: [id number]  Today's Date: 2018   Onset of Illness/Injury or Date of Surgery: 18  Date of Referral to PT: 18  Referring Physician: MD Devan      Admit Date: 2018    Visit Dx:     ICD-10-CM ICD-9-CM   1. Somnolence R40.0 780.09   2. Atrial fibrillation with rapid ventricular response (CMS/[facility]) I48.91 427.31   3. History of hypertension Z86.79 V12.59   4. Type 2 diabetes mellitus with hyperglycemia, unspecified long term insulin use status (CMS/[facility]) E11.65 250.00   5. History of recurrent UTI (urinary tract infection) Z87.440 V13.02   6. Dysphagia, unspecified type R13.10 787.20   7. Impaired mobility and ADLs Z74.09 799.89   8. Impaired functional mobility, balance, gait, and endurance Z74.09 V49.89     Patient Active Problem List   Diagnosis   • Diabetes mellitus, type 2 (CMS/[facility])   • Hypertension   • Hyperlipidemia   • Hypothyroidism   • Chronic obstructive pulmonary disease (CMS/[facility])   • CAD (coronary artery disease)   • History of edema   • History of obesity   • Venous insufficiency   • Open wound of lower extremity   • Urinary tract infection   • T2DM (type 2 diabetes mellitus) (CMS/[facility])   • Dyspnea on exertion   • Peripheral vascular disease (CMS/[facility])   • Obstructive sleep apnea syndrome   • Ulcer of lower extremity (CMS/[facility])   • Hypoxemia   • Hematuria   • Diabetic peripheral neuropathy (CMS/[facility])   • Edema   • Chronic obstructive pulmonary disease with acute exacerbation (CMS/[facility])   • Congestive heart failure (CMS/[facility])   • Arthritis   • Neutrophilic leukocytosis   • Cystitis   • Atrial fibrillation with rapid ventricular response (CMS/[facility])   • Altered mental status   • Sepsis due to urinary tract infection (CMS/[facility])   • Acute renal failure superimposed on stage 3 chronic kidney disease (CMS/[facility])   • Hyperkalemia   • Leukocytosis   • Elevated troponin   • Somnolence      Past Medical History:   Diagnosis Date   • Atrial fibrillation (CMS/HCC)    • Chronic ulcer of right leg (CMS/HCC)    • Cognitive communication deficit    • COPD (chronic obstructive pulmonary disease) (CMS/HCC)    • Diabetes mellitus (CMS/HCC)    • Difficulty in walking    • Encephalopathy    • Generalized muscle weakness    • Hematuria    • Hyperlipidemia    • Hypertension    • Hypothyroidism    • Urinary tract infection      Past Surgical History:   Procedure Laterality Date   • CATARACT EXTRACTION     • CHOLECYSTECTOMY     • COLOSTOMY     • FOOT SURGERY Right    • HERNIA REPAIR     • HYSTERECTOMY     • TOTAL KNEE ARTHROPLASTY Right         PT ASSESSMENT (last 12 hours)      Physical Therapy Evaluation     Row Name 07/06/18 1426          PT Evaluation Time/Intention    Subjective Information no complaints  -LS     Document Type evaluation  -LS     Patient Effort adequate  -LS     Row Name 07/06/18 1426          General Information    Patient Profile Reviewed? yes  -LS     Onset of Illness/Injury or Date of Surgery 07/05/18  -LS     Referring Physician MD Devan  -LS     Patient Observations alert;cooperative;agree to therapy  -LS     Prior Level of Function --   TBA further; no family present to clarify  -LS     Equipment Currently Used at Home walker, rolling;wheelchair  -LS     Pertinent History of Current Functional Problem To ED from [facility] with back pain, N/V, AMS. Found to have afib with RVR, UTI, and bilat renal calculi. PMH significant for dementia.   -LS     Existing Precautions/Restrictions fall;other (see comments)   dementia  -LS     Limitations/Impairments safety/cognitive  -LS     Risks Reviewed patient:;LOB;dizziness;increased discomfort;change in vital signs  -LS     Benefits Reviewed patient:;improve function;increase independence;increase strength;increase knowledge  -LS     Barriers to Rehab medically complex  -LS     Row Name 07/06/18 1426          Relationship/Environment     Lives With facility resident  -     Row Name 07/06/18 1426          Resource/Environmental Concerns    Current Living Arrangements residential facility   [facility] (per CM note)  -     Row Name 07/06/18 1426          Cognitive Assessment/Intervention- PT/OT    Affect/Mental Status (Cognitive) confused;agitated  -     Orientation Status (Cognition) oriented to;person  -LS     Follows Commands (Cognition) follows one step commands;0-24% accuracy;increased processing time needed;initiation impaired;repetition of directions required;verbal cues/prompting required  -     Cognitive Function (Cognitive) safety deficit  -LS     Attention Deficit (Cognitive) severe deficit  -     Safety Deficit (Cognitive) insight into deficits/self awareness;safety precautions awareness  -     Personal Safety Interventions fall prevention program maintained;gait belt;nonskid shoes/slippers when out of bed  -     Row Name 07/06/18 1426          Safety Issues, Functional Mobility    Impairments Affecting Function (Mobility) balance;coordination;endurance/activity tolerance;cognition;strength  -     Row Name 07/06/18 1426          Bed Mobility Assessment/Treatment    Bed Mobility Assessment/Treatment supine-sit;sit-supine  -LS     Rolling Left [place] (Bed Mobility) maximum assist (25% patient effort);verbal cues  -LS     Rolling Right [place] (Bed Mobility) maximum assist (25% patient effort);verbal cues  -LS     Assistive Device (Bed Mobility) bed rails;head of bed elevated;draw sheet  -LS     Comment (Bed Mobility) Increased agitation with supine to sit; constant cues to remain calm. Improved participation with remainder of session.   -     Row Name 07/06/18 1426          Transfer Assessment/Treatment    Transfer Assessment/Treatment stand-sit transfer;sit-stand transfer  -LS     Comment (Transfers) Attempted STS x2 from EOB; unable to clear hips. PT/tech on either side of pt  -LS     Sit-Stand  [place] (Transfers) dependent (less than 25% patient effort);1 person to manage equipment;verbal cues  -     Stand-Sit [place] (Transfers) dependent (less than 25% patient effort);2 person assist;verbal cues  -[facility] Name 07/06/18 1426          Gait/Stairs Assessment/Training    [place] Level (Gait) unable to assess  -[facility] Name 07/06/18 1426          General ROM    GENERAL ROM COMMENTS BLEs grossly WFL for AAROM  -[facility] Name 07/06/18 1426          MMT (Manual Muscle Testing)    Additional Documentation General Assessment (Manual Muscle Testing) (Group)  -[facility] Name 07/06/18 1426          General Assessment (Manual Muscle Testing)    General Manual Muscle Testing (MMT) Assessment lower extremity strength deficits identified  -[facility] Name 07/06/18 1426          Lower Extremity (Manual Muscle Testing)    Comment, MMT: Lower Extremity BLE grossly 3-/5 as demonstrated functionally. Pt unable to follow commands for formal MMT  -[facility] Name 07/06/18 1426          Motor Assessment/Intervention    Additional Documentation Balance (Group);Therapeutic Exercise (Group)  -[facility] Name 07/06/18 1500 07/06/18 1426       Therapeutic Exercise    Therapeutic Exercise supine, lower extremities  -  --    Additional Documentation  -- Therapeutic Exercise (Row)  -    79810 - PT Therapeutic Exercise Minutes  -- 3  -[facility] Name 07/06/18 1500          Lower Extremity Supine Therapeutic Exercise    Performed, Supine Lower Extremity (Therapeutic Exercise) hip flexion/extension;knee flexion/extension;ankle dorsiflexion/plantarflexion  -     Exercise Type, Supine Lower Extremity (Therapeutic Exercise) AAROM (active assistive range of motion)  -     Sets/Reps Detail, Supine Lower Extremity (Therapeutic Exercise) 1/10  -     Row Name 07/06/18 1426          Balance    Balance static sitting balance;static standing balance  -[facility] Name 07/06/18 1426          Static Sitting Balance     Level of [place] (Unsupported Sitting, Static Balance) minimal assist, 75% patient effort   progressed to CGA  -LS     Sitting Position (Unsupported Sitting, Static Balance) sitting on edge of bed  -     Time Able to Maintain Position (Unsupported Sitting, Static Balance) more than 5 minutes  -     Row Name 07/06/18 1426          Sensory Assessment/Intervention    Sensory General Assessment --   TBA further  -     Row Name 07/06/18 1426          Pain Assessment    Additional Documentation Pain Scale: FACES Pre/Post-Treatment (Group)  -     Row Name 07/06/18 1426          Pain Scale: FACES Pre/Post-Treatment    Pain: FACES Scale, Pretreatment 0-->no hurt  -LS     Pain: FACES Scale, Post-Treatment 0-->no hurt  -LS     Row Name             Wound 07/02/18 2100 Bilateral posterior coccyx pressure injury    Wound - Properties Group Date first assessed: 07/02/18  -JG Time first assessed: 2100  -JG Present On Admission : yes  -JG Side: Bilateral  -JG Orientation: posterior  -JG Location: coccyx  -JG Type: pressure injury  -JG Additional Comments: healing/scarred PI or shearing  -CP    Row Name             Wound 07/03/18 0915 Left distal toe    Wound - Properties Group Date first assessed: 07/03/18  -CP Time first assessed: 0915  -CP Present On Admission : yes;picture taken  -CP Side: Left  -CP Orientation: distal  -CP Location: toe  -CP Stage, Pressure Injury: deep tissue injury  -CP    Row Name             Wound 07/03/18 0915 Left lower leg ulceration, venous    Wound - Properties Group Date first assessed: 07/03/18  -CP Time first assessed: 0915  -CP Present On Admission : yes;picture taken  -CP Side: Left  -CP Orientation: lower  -CP Location: leg  -CP Type: ulceration, venous  -CP Additional Comments: healing  -CP    Row Name 07/06/18 1426          Plan of Care Review    Plan of Care Reviewed With patient  -LS     Row Name 07/06/18 1426          Physical Therapy Clinical Impression    Date of Referral to PT  07/05/18  -LS     PT Diagnosis (PT Clinical Impression) impaired functional mobility, balance, gait  -LS     Patient/Family Goals Statement (PT Clinical Impression) return to PLOF  -LS     Criteria for Skilled Interventions Met (PT Clinical Impression) yes;treatment indicated  -LS     Rehab Potential (PT Clinical Summary) fair, will monitor progress closely  -LS     Care Plan Review (PT) evaluation/treatment results reviewed  -     Row Name 07/06/18 1426          Vital Signs    Pre Systolic BP Rehab --   RN gave consent for therapy; RA  -     Row Name 07/06/18 1426          Physical Therapy Goals    Bed Mobility Goal Selection (PT) bed mobility, PT goal 1  -LS     Transfer Goal Selection (PT) transfer, PT goal 1  -LS     Balance Goal Selection (PT) balance, PT goal 1  -     Additional Documentation Balance Goal Selection (PT) (Row)  -     Row Name 07/06/18 1426          Bed Mobility Goal 1 (PT)    Activity/Assistive Device (Bed Mobility Goal 1, PT) sit to supine/supine to sit  -LS     [place] Level/Cues Needed (Bed Mobility Goal 1, PT) minimum assist (75% or more patient effort)  -LS     Time Frame (Bed Mobility Goal 1, PT) 2 weeks  -LS     Progress/Outcomes (Bed Mobility Goal 1, PT) goal ongoing  -     Row Name 07/06/18 1426          Transfer Goal 1 (PT)    Activity/Assistive Device (Transfer Goal 1, PT) sit-to-stand/stand-to-sit  -LS     [place] Level/Cues Needed (Transfer Goal 1, PT) maximum assist (25-49% patient effort)  -LS     Time Frame (Transfer Goal 1, PT) 2 weeks  -LS     Progress/Outcome (Transfer Goal 1, PT) goal ongoing  -     Row Name 07/06/18 1426          Balance Goal 1 (PT)    Activity/Assistive Device (Balance Goal 1, PT) sitting, static  -LS     [place] Level/Cues Needed (Balance Goal 1, PT) independent  -LS     Progress/Outcomes (Balance Goal 1, PT) goal ongoing  -     Row Name 07/06/18 1426          Positioning and Restraints    Pre-Treatment Position in bed  -LS      Post Treatment Position bed  -LS     In Bed notified nsg;fowlers;call light within reach;encouraged to call for assist;exit alarm on;with nsg;heels elevated;waffle boots/both  -LS       User Key  (r) = Recorded By, (t) = Taken By, (c) = Cosigned By    Initials Name Provider Type    CP Angélica Gavin, APRN Nurse Practitioner    JUSTYN Kessler, PT Physical Therapist    RAUDEL Krause RN Registered Nurse          Physical Therapy Education     Title: PT OT SLP Therapies (Active)     Topic: Physical Therapy (Active)     Point: Mobility training (Active)    Learning Progress Summary     Learner Status Readiness Method Response Comment Documented by    Patient Active Acceptance E,D NR   07/06/18 1511          Point: Body mechanics (Active)    Learning Progress Summary     Learner Status Readiness Method Response Comment Documented by    Patient Active Acceptance E,D NR  LS 07/06/18 1511          Point: Precautions (Active)    Learning Progress Summary     Learner Status Readiness Method Response Comment Documented by    Patient Active Acceptance E,D NR  LS 07/06/18 1511                      User Key     Initials Effective Dates Name Provider Type Discipline     06/19/15 -  Senait Kessler, PT Physical Therapist PT                PT Recommendation and Plan  Anticipated Discharge Disposition (PT): skilled nursing facility  Planned Therapy Interventions (PT Eval): balance training, bed mobility training, home exercise program, patient/family education, strengthening, transfer training  Therapy Frequency (PT Clinical Impression): 3 times/wk  Outcome Summary/Treatment Plan (PT)  Anticipated Discharge Disposition (PT): skilled nursing facility  Plan of Care Reviewed With: patient  Outcome Summary: PT evaluation completed. Pt demonstrates generalized weakness and decreased indep/ safety re: functional mobility, warranting further skilled PT services to promote PLOF. Limited today by confusion and slight  agitation; improved cooperation at end of session. Responds well to calm, short commands. Recommend SNF at d/c.           Outcome Measures     Row Name 07/06/18 1426 07/05/18 1440          How much help from another person do you currently need...    Turning from your back to your side while in flat bed without using bedrails? 2  -LS  --     Moving from lying on back to sitting on the side of a flat bed without bedrails? 2  -LS  --     Moving to and from a bed to a chair (including a wheelchair)? 1  -LS  --     Standing up from a chair using your arms (e.g., wheelchair, bedside chair)? 1  -LS  --     Climbing 3-5 steps with a railing? 1  -LS  --     To walk in hospital room? 1  -LS  --     AM-PAC 6 Clicks Score 8  -LS  --        How much help from another is currently needed...    Putting on and taking off regular lower body clothing?  -- 1  -CL     Bathing (including washing, rinsing, and drying)  -- 1  -CL     Toileting (which includes using toilet bed pan or urinal)  -- 1  -CL     Putting on and taking off regular upper body clothing  -- 2  -CL     Taking care of personal grooming (such as brushing teeth)  -- 2  -CL     Eating meals  -- 2  -CL     Score  -- 9  -CL        Functional Assessment    Outcome Measure Options AM-PAC 6 Clicks Basic Mobility (PT)  -LS AM-PAC 6 Clicks Daily Activity (OT)  -CL       User Key  (r) = Recorded By, (t) = Taken By, (c) = Cosigned By    Initials Name Provider Type    LS Senait Kessler, PT Physical Therapist    CL Kelsea Riggs, OT Occupational Therapist           Time Calculation:         PT Charges     Row Name 07/06/18 1426             Time Calculation    Start Time 1426  -      PT Received On 07/06/18  -      PT Goal Re-Cert Due Date 07/16/18  -         Time Calculation- PT    Total Timed Code Minutes- PT 3 minute(s)  -         Timed Charges    36204 - PT Therapeutic Exercise Minutes 3  -LS        User Key  (r) = Recorded By, (t) = Taken By, (c) = Cosigned By    Initials  Name Provider Type     Senait Kessler, PT Physical Therapist        Therapy Suggested Charges     Code   Minutes Charges    79679 (CPT®) Hc Pt Neuromusc Re Education Ea 15 Min      04753 (CPT®) Hc Pt Ther Proc Ea 15 Min 3     46784 (CPT®) Hc Gait Training Ea 15 Min      16042 (CPT®) Hc Pt Therapeutic Act Ea 15 Min      14566 (CPT®) Hc Pt Manual Therapy Ea 15 Min      05216 (CPT®) Hc Pt Iontophoresis Ea 15 Min      79293 (CPT®) Hc Pt Elec Stim Ea-Per 15 Min      12968 (CPT®) Hc Pt Ultrasound Ea 15 Min      96118 (CPT®) Hc Pt Self Care/Mgmt/Train Ea 15 Min      Total  3         Therapy Charges for Today     Code Description Service Date Service Provider Modifiers Qty    06239992661 HC PT EVAL MOD COMPLEXITY 4 7/6/2018 Senait Kessler, PT GP 1    24082704249 HC PT THER SUPP EA 15 MIN 7/6/2018 Senait Kessler, PT GP 2          PT G-Codes  Outcome Measure Options: AM-PAC 6 Clicks Basic Mobility (PT)      Senait Kessler, PT  7/6/2018

## 2018-07-06 NOTE — PROGRESS NOTES
[facility] Medicine Services  PROGRESS NOTE    Patient Name: Leila Smith  : 1945  MRN: [id number]    Date of Admission: 2018  Length of Stay: 3  Primary Care Physician: Ciaran Wakefield MD    Subjective   Subjective     CC:  F/u UTI, sepsis    HPI:  Assuming care from ICU.  No family present.  Patient confused, unable/unwilling to provide much feedback.  RN reports ate a good lunch, so resistance to some things.    Review of Systems   Unable to perform ROS: Mental status change       Objective   Objective     Vital Signs:   Temp:  [96.9 °F (36.1 °C)-98.4 °F (36.9 °C)] 98.4 °F (36.9 °C)  Heart Rate:  [] 91  Resp:  [16-20] 20  BP: (122-170)/(80-98) 160/84        Physical Exam:  Constitutional: No acute distress, awake, alert, chronically ill appearing  HENT: NCAT, mucous membranes moist  Respiratory: Clear to auscultation bilaterally, respiratory effort normal   Cardiovascular: RRR, no murmurs, rubs, or gallops,  Gastrointestinal: Positive bowel sounds, soft, nontender, nondistended  Musculoskeletal: No bilateral ankle edema  Psychiatric: flat affect, cooperative  Neurologic: Oriented x 1, will follow commands, no obvious deficits  Skin: some chronic scaling to LEs      Results Reviewed:  I have personally reviewed current lab, radiology, and data and agree.      Results from last 7 days  Lab Units 18  0518  0539 18  1615   WBC 10*3/mm3 10.48 12.26* 10.76   HEMOGLOBIN g/dL 13.3 13.0 14.5   HEMATOCRIT % 42.9 42.8 47.3*   PLATELETS 10*3/mm3 249 271 314       Results from last 7 days  Lab Units 18  0517 18  0539 18  0052  18  1615   SODIUM mmol/L 144 143  --   --  142   POTASSIUM mmol/L 4.0 4.8  4.8 5.2  < > 6.7*   CHLORIDE mmol/L 105 114*  --   --  108   CO2 mmol/L 20.0 18.0*  --   --  18.0*   BUN mg/dL 63* 76*  --   --  86*   CREATININE mg/dL 2.07* 2.69*  --   --  3.21*   GLUCOSE mg/dL 114* 214*  --   --  381*   CALCIUM mg/dL  7.8* 8.3*  --   --  8.9   ALT (SGPT) U/L  --  38  --   --  38   AST (SGOT) U/L  --  27  --   --  43*   < > = values in this interval not displayed.  Estimated Creatinine Clearance: 30.5 mL/min (A) (by C-G formula based on SCr of 2.07 mg/dL (H)).  No results found for: BNP    Microbiology Results Abnormal     Procedure Component Value - Date/Time    Blood Culture - Blood, [[id number]]  (Normal) Collected:  07/02/18 1940    Lab Status:  Preliminary result Specimen:  Blood from Arm, Right Updated:  07/05/18 2030     Blood Culture No growth at 3 days    Blood Culture - Blood, [[id number]]  (Normal) Collected:  07/02/18 1610    Lab Status:  Preliminary result Specimen:  Blood from Arm, Right Updated:  07/05/18 1815     Blood Culture No growth at 3 days    Urine Culture - Urine, [[id number]]  (Abnormal) Collected:  07/03/18 0200    Lab Status:  Preliminary result Specimen:  Urine from Urine, Catheter Updated:  07/05/18 1150     Urine Culture <10,000 CFU/mL Yeast isolated (A)    Narrative:       Identification to follow.          Imaging Results (last 24 hours)     Procedure Component Value Units Date/Time    US Renal Bilateral [[id number]] Collected:  07/04/18 1302     Updated:  07/05/18 0907    Narrative:       EXAMINATION: US RENAL BILATERAL - 7/3/2018     INDICATION:  R40.0-Somnolence; I48.91-Unspecified atrial fibrillation;  Z86.79-Personal history of other diseases of the circulatory system;  E11.65-Type 2 diabetes mellitus with hyperglycemia; Z87.440-Personal  history of urinary (tract) infections; R13.10-Dysphagia, unspecified.  Renal stones, follow-up.          TECHNIQUE: Sonographic imaging is obtained of the kidneys in both the  sagittal and transverse planes.     COMPARISON: None.     FINDINGS: Right kidney measures in length from pole to pole,   12.7 cm. There is an echogenic focus with shadowing suggesting a  nonobstructing stone measuring approximately 9 mm. There is a small cyst  identified as well along the  inferior aspect measuring 3 cm. No definite  solid mass identified. No hydronephrosis.     The left kidney measures in length from pole to pole, 12.0 cm. There are  several areas of increased echogenicity and shadowing suggesting  nonobstructing stones the largest measuring 1.7 cm. There are several  cysts as well seen within the left kidney the largest measuring  approximately 2.5 cm. There is no solid mass identified. Imaging of the  bladder reveals no gross abnormality.       Impression:       Bilateral renal cortical cysts as well as bilateral  nonobstructing renal stones. No evidence of obstruction. No solid mass.     DICTATED:   7/3/2018  EDITED/ls :   7/4/2018      This report was finalized on 7/5/2018 9:05 AM by Dr. Stephanie Jarrett MD.           Results for orders placed during the hospital encounter of 01/31/18   Adult Transesophageal Echo ([facility]NA) W/ Cont if Necessary Per Protocol    Narrative · Left ventricular systolic function is normal.  · Left ventricular wall thickness is consistent with moderate concentric   hypertrophy.  · Left atrial cavity size is dilated.  · Cardiac valves are morphologically within normal limits for patient's   age.          I have reviewed the medications.    Assessment/Plan   Assessment / Plan     Hospital Problem List     * (Principal)Sepsis due to urinary tract infection (CMS/HCC)    Diabetes mellitus, type 2 (CMS/HCC)    Overview Deleted 7/2/2018  9:44 PM by Brian Samson MD            Hypertension    Hyperlipidemia    Hypothyroidism    Obstructive sleep apnea syndrome    Diabetic peripheral neuropathy (CMS/HCC)    Atrial fibrillation with rapid ventricular response (CMS/HCC)    Altered mental status    Acute renal failure superimposed on stage 3 chronic kidney disease (CMS/HCC)    Hyperkalemia    Somnolence             Brief Hospital Course to date:  Leila Smith is a 72 y.o. female with dementia, DM2, HTN, presented to ED with hypotension, sepsis, ARF, and  UTI.  Improved with IVF.      Assessment & Plan:  - urine culture with yeast only.  Continue empiric abx for now.   - ARF improving, check AM  - d/c IVF and see how she maintains on her own.  - hyperkalemia resolved  - adjust basal/bolus insulin  - mental status likely at baseline  - resume home meds  - reviewed CM note, no bed hold.  Working on bed, will discuss tomorrow.        DVT Prophylaxis:  eliquis    CODE STATUS:   Code Status and Medical Interventions:   Ordered at: 07/02/18 2152     Code Status:    CPR     Medical Interventions (Level of Support Prior to Arrest):    Full       Disposition: I expect the patient to be discharged to SNF 1-2 days.    Electronically signed by Oneil Hartman MD, 07/05/18, 9:30 PM.

## 2018-07-06 NOTE — PLAN OF CARE
Problem: Patient Care Overview  Goal: Plan of Care Review  Outcome: Ongoing (interventions implemented as appropriate)   07/06/18 1905   Coping/Psychosocial   Plan of Care Reviewed With patient   OTHER   Outcome Summary PT evaluation completed. Pt demonstrates generalized weakness and decreased indep/ safety re: functional mobility, warranting further skilled PT services to promote PLOF. Limited today by confusion and slight agitation; improved cooperation at end of session. Responds well to calm, short commands. Recommend SNF at d/c.

## 2018-07-06 NOTE — PROGRESS NOTES
"                  Clinical Nutrition     Nutrition Assessment  Reason for Visit:   Follow-up protocol      Patient Name: Leila Smith  YOB: 1945  MRN: [id number]  Date of Encounter: 07/06/18 1:26 PM  Admission date: 7/2/2018      Nutrition Assessment   Assessment       Hospital Problem List  Principal Problem:    Sepsis due to urinary tract infection (CMS/HCC)  Active Problems:    Diabetes mellitus, type 2 (CMS/HCC)    Hypertension    Hyperlipidemia    Hypothyroidism    Obstructive sleep apnea syndrome    Diabetic peripheral neuropathy (CMS/HCC)    Atrial fibrillation with rapid ventricular response (CMS/HCC)    Altered mental status    Acute renal failure superimposed on stage 3 chronic kidney disease (CMS/HCC)    Hyperkalemia    Somnolence      PMH: She  has a past medical history of Atrial fibrillation (CMS/HCC); Chronic ulcer of right leg (CMS/HCC); Cognitive communication deficit; COPD (chronic obstructive pulmonary disease) (CMS/HCC); Diabetes mellitus (CMS/HCC); Difficulty in walking; Encephalopathy; Generalized muscle weakness; Hematuria; Hyperlipidemia; Hypertension; Hypothyroidism; and Urinary tract infection.   PSxH: She  has a past surgical history that includes Hernia repair; Total knee arthroplasty (Right); Hysterectomy; Foot surgery (Right); Cataract extraction; Cholecystectomy; and Colostomy.         Reported/Observed/Food/Nutrition Related History:     Pt sleeping, no family at bedside, observed lunch tray to be untouched during time of visit. Spoke with RN on phone, reported first day with pt, unsure how she has been doing with nutritional supplements, reported she had a few bites of breakfast this am however did not seem to like some of the food spit out some of the sausage, reported pt did consume 100% of a chocolate ice cream cup with her medicines this am.       Anthropometrics     Height: 180.3 cm (71\")  Last filed wt: Weight: 78.8 kg (173 lb 11.6 oz) (07/06/18 0600)  Weight " Method: Bed scale    BMI: BMI (Calculated): 24.8  Normal: 18.5-24.9kg/m2    Ideal Body Weight (IBW) (kg): 71.01  ]    Labs reviewed       Results from last 7 days  Lab Units 07/04/18  0517 07/03/18  0539 07/03/18  0052  07/02/18  1615   GLUCOSE mg/dL 114* 214*  --   --  381*   BUN mg/dL 63* 76*  --   --  86*   CREATININE mg/dL 2.07* 2.69*  --   --  3.21*   SODIUM mmol/L 144 143  --   --  142   CHLORIDE mmol/L 105 114*  --   --  108   POTASSIUM mmol/L 4.0 4.8  4.8 5.2  < > 6.7*   PHOSPHORUS mg/dL 3.4 4.9  --   --   --    MAGNESIUM mg/dL 1.9 2.2  --   --   --    ALT (SGPT) U/L  --  38  --   --  38   < > = values in this interval not displayed.    Results from last 7 days  Lab Units 07/03/18  0539 07/02/18  1615   ALBUMIN g/dL 3.04* 3.52           Results from last 7 days  Lab Units 07/06/18  1104 07/06/18  0705 07/05/18  1952 07/05/18  1656 07/05/18  1156 07/05/18  0810   GLUCOSE mg/dL 218* 111 210* 234* 213* 168*       Lab Results  Lab Value Date/Time   HGBA1C 9.30 (H) 07/03/2018 0539   HGBA1C 10.20 (H) 01/31/2018 1939       Intake/Ouptut 24 hrs (7:00AM - 6:59 AM)     Intake & Output (last day)       07/05 0701 - 07/06 0700 07/06 0701 - 07/07 0700    P.O. 440     I.V. (mL/kg) 1365 (17.3)     IV Piggyback 50     Total Intake(mL/kg) 1855 (23.5)     Urine (mL/kg/hr) 400 (0.2) 450 (0.9)    Stool 100 (0.1)     Total Output 500 450    Net +1355 -450          Unmeasured Urine Occurrence 5 x           Current Nutrition Prescription     PO: Diet Pureed; Consistent Carbohydrate    Active Supplement Orders      Dietary Nutrition Supplements Boost Glucose Control- vanilla BID    Intake:    Per charting pt consuming 63% of 6 meals (7/4/18-7/6/18)       Nutrition Diagnosis       Problem Inadequate oral intake   Etiology Clinical condition    Signs/Symptoms PO intake 63% of 6 meals       Nutrition Intervention   1.  Follow treatment progress, Care plan reviewed, Adjusted supplement, Encourage intake  Encouraged intake of  nutritional supplements to RN on phone  Will change supplement to TID, add chocolate flavor preference-per RN report    Goal:   General: Nutrition support treatment  PO: Increase intake  Additional goals:      Monitoring/Evaluation:   Per protocol, PO intake, Supplement intake      Will Continue to follow per protocol      Stephany SOUZA Waits  Time Spent: 20 minutes

## 2018-07-06 NOTE — PLAN OF CARE
Problem: Fall Risk (Adult)  Goal: Identify Related Risk Factors and Signs and Symptoms  Outcome: Outcome(s) achieved Date Met: 07/06/18    Goal: Absence of Fall  Outcome: Ongoing (interventions implemented as appropriate)      Problem: Skin Injury Risk (Adult)  Goal: Identify Related Risk Factors and Signs and Symptoms  Outcome: Outcome(s) achieved Date Met: 07/06/18    Goal: Skin Health and Integrity  Outcome: Ongoing (interventions implemented as appropriate)      Problem: Renal Failure/Kidney Injury, Acute (Adult)  Goal: Signs and Symptoms of Listed Potential Problems Will be Absent, Minimized or Managed (Renal Failure/Kidney Injury, Acute)  Outcome: Ongoing (interventions implemented as appropriate)      Problem: Arrhythmia/Dysrhythmia (Symptomatic) (Adult)  Goal: Signs and Symptoms of Listed Potential Problems Will be Absent, Minimized or Managed (Arrhythmia/Dysrhythmia)  Outcome: Ongoing (interventions implemented as appropriate)      Problem: Patient Care Overview  Goal: Plan of Care Review  Outcome: Ongoing (interventions implemented as appropriate)   07/06/18 0351   Coping/Psychosocial   Plan of Care Reviewed With patient   Plan of Care Review   Progress no change   OTHER   Outcome Summary VSS, A-Fib with PVC's on monitor, on room air. Remains confused, but is cooperative this shift. Pills were taken well when crushed and offered in ice cream. Will continue to monitor.        Problem: Infection, Risk/Actual (Adult)  Goal: Identify Related Risk Factors and Signs and Symptoms  Outcome: Outcome(s) achieved Date Met: 07/06/18    Goal: Infection Prevention/Resolution  Outcome: Ongoing (interventions implemented as appropriate)      Problem: Sepsis/Septic Shock (Adult)  Goal: Signs and Symptoms of Listed Potential Problems Will be Absent, Minimized or Managed (Sepsis/Septic Shock)  Outcome: Ongoing (interventions implemented as appropriate)

## 2018-07-06 NOTE — PROGRESS NOTES
Continued Stay Note  [facility]     Patient Name: Leila Smith  MRN: [id number]  Today's Date: 7/6/2018    Admit Date: 7/2/2018          Discharge Plan     Row Name 07/06/18 1114       Plan    Plan Rehab    Patient/Family in Agreement with Plan yes    Plan Comments Spoke to [facility] and [facility] or [facility] do not have beds available at this time. Pt is confused. Called daughter, Danilo, and she will call CM back once she gets home and views list with more referrals. CM will cont to follow.     Final Discharge Disposition Code 03 - skilled nursing facility (SNF)              Discharge Codes    No documentation.       Expected Discharge Date and Time     Expected Discharge Date Expected Discharge Time    Jul 9, 2018             Marly rAaya

## 2018-07-06 NOTE — PROGRESS NOTES
[facility]  PROGRESS NOTE    Patient Name: Leila Smith  : 1945  MRN: [id number]    Date of Admission: 2018  Length of Stay: 4  Primary Care Physician: Ciaran Wakefield MD    Subjective   Subjective     CC:  F/u UTI, sepsis    HPI:  No visitors present.  She is unable to provide any history.  RN report she is drinking some boost, but otherwise not much PO.    Review of Systems   Unable to perform ROS: Mental status change       Objective   Objective     Vital Signs:   Temp:  [97.8 °F (36.6 °C)-98.4 °F (36.9 °C)] 97.8 °F (36.6 °C)  Heart Rate:  [] 105  Resp:  [16-24] 18  BP: (140-160)/(84-98) 156/94        Physical Exam:  Constitutional: No acute distress, awake, alert, chronically ill appearing  HENT: NCAT, mucous membranes moist  Respiratory: Clear to auscultation bilaterally, respiratory effort normal   Cardiovascular: RRR (rate in 90s), no murmurs, rubs, or gallops,  Gastrointestinal: Positive bowel sounds, soft, nontender, nondistended  Musculoskeletal: No bilateral ankle edema  Psychiatric: flat affect, cooperative  Neurologic: Oriented x 1, will follow commands, no obvious deficits  Skin: some chronic scaling to LEs  Exam unchanged from previous      Results Reviewed:  I have personally reviewed current lab, radiology, and data and agree.      Results from last 7 days  Lab Units 18  0518  0539 18  1615   WBC 10*3/mm3 10.48 12.26* 10.76   HEMOGLOBIN g/dL 13.3 13.0 14.5   HEMATOCRIT % 42.9 42.8 47.3*   PLATELETS 10*3/mm3 249 271 314       Results from last 7 days  Lab Units 18  0517 18  0539 182  18  1615   SODIUM mmol/L 144 143  --   --  142   POTASSIUM mmol/L 4.0 4.8  4.8 5.2  < > 6.7*   CHLORIDE mmol/L 105 114*  --   --  108   CO2 mmol/L 20.0 18.0*  --   --  18.0*   BUN mg/dL 63* 76*  --   --  86*   CREATININE mg/dL 2.07* 2.69*  --   --  3.21*   GLUCOSE mg/dL 114* 214*  --   --  381*   CALCIUM  mg/dL 7.8* 8.3*  --   --  8.9   ALT (SGPT) U/L  --  38  --   --  38   AST (SGOT) U/L  --  27  --   --  43*   < > = values in this interval not displayed.  Estimated Creatinine Clearance: 29.7 mL/min (A) (by C-G formula based on SCr of 2.07 mg/dL (H)).  No results found for: BNP    Microbiology Results Abnormal     Procedure Component Value - Date/Time    Urine Culture - Urine, [[id number]]  (Abnormal) Collected:  07/03/18 0200    Lab Status:  Final result Specimen:  Urine from Urine, Catheter Updated:  07/06/18 1206     Urine Culture <10,000 CFU/mL Candida albicans (A)    Blood Culture - Blood, [[id number]]  (Normal) Collected:  07/02/18 1940    Lab Status:  Preliminary result Specimen:  Blood from Arm, Right Updated:  07/05/18 2030     Blood Culture No growth at 3 days    Blood Culture - Blood, [[id number]]  (Normal) Collected:  07/02/18 1610    Lab Status:  Preliminary result Specimen:  Blood from Arm, Right Updated:  07/05/18 1815     Blood Culture No growth at 3 days          Imaging Results (last 24 hours)     ** No results found for the last 24 hours. **        Results for orders placed during the hospital encounter of 01/31/18   Adult Transesophageal Echo (YANNA) W/ Cont if Necessary Per Protocol    Narrative · Left ventricular systolic function is normal.  · Left ventricular wall thickness is consistent with moderate concentric   hypertrophy.  · Left atrial cavity size is dilated.  · Cardiac valves are morphologically within normal limits for patient's   age.          I have reviewed the medications.    Assessment/Plan   Assessment / Plan     Hospital Problem List     * (Principal)Sepsis due to urinary tract infection (CMS/HCC)    Diabetes mellitus, type 2 (CMS/HCC)    Overview Deleted 7/2/2018  9:44 PM by Brian Samson MD            Hypertension    Hyperlipidemia    Hypothyroidism    Obstructive sleep apnea syndrome    Diabetic peripheral neuropathy (CMS/HCC)    Atrial fibrillation with rapid  ventricular response (CMS/HCC)    Altered mental status    Acute renal failure superimposed on stage 3 chronic kidney disease (CMS/HCC)    Hyperkalemia    Somnolence             Brief Hospital Course to date:  Leila Smith is a 72 y.o. female with dementia, DM2, HTN, presented to ED with hypotension, sepsis, ARF, and UTI.  Improved with IVF.      Assessment & Plan:  - urine culture with yeast only.  Continue empiric abx for now.  Probably stop tomorrow.  - ARF improving, labs ordered for AM  - d/c IVF and see how she maintains on her own.  Is drinking some boost.  - hyperkalemia resolved  - continue current basal/bolus insulin, acceptable control  - mental status likely at baseline  -change CD diltizem to IR due to patient chewing up pills.  - reviewed CM note, no bed hold.  New referrals sent for placement.  Will be here through the weekend.      DVT Prophylaxis:  eliquis    CODE STATUS:   Code Status and Medical Interventions:   Ordered at: 07/02/18 2152     Code Status:    CPR     Medical Interventions (Level of Support Prior to Arrest):    Full       Disposition: I expect the patient to be discharged to SNF next week.    Electronically signed by Oneil Hartman MD, 07/06/18, 3:20 PM.

## 2018-07-06 NOTE — PLAN OF CARE
Problem: Patient Care Overview  Goal: Plan of Care Review  Outcome: Ongoing (interventions implemented as appropriate)   07/06/18 3858   Coping/Psychosocial   Plan of Care Reviewed With patient   Plan of Care Review   Progress no change   OTHER   Outcome Summary VSS. afib on the monitor -120's at times. pt continues to be confused. Boost with meals. Awaiting placement. no other complains.

## 2018-07-06 NOTE — DISCHARGE PLACEMENT REQUEST
"Leila Smith (72 y.o. Female)     Date of Birth Social Security Number Address Home Phone MRN    1945  [id number] [id number] [phone] [id number]    Yazidi Marital Status          Unknown        Admission Date Admission Type Admitting Provider Attending Provider Department, Room/Bed    7/2/18 Emergency Oneil Hartman MD Dossett, Lee M, MD [facility] 6A, N609/1    Discharge Date Discharge Disposition Discharge Destination                       Attending Provider:  Oneil Hartman MD    Allergies:  Codeine, Tetracyclines & Related    Isolation:  None   Infection:  None   Code Status:  CPR    Ht:  180.3 cm (71\")   Wt:  78.8 kg (173 lb 11.6 oz)    Admission Cmt:  None   Principal Problem:  Sepsis due to urinary tract infection (CMS/HCC) [A41.9,N39.0]                 Active Insurance as of 7/2/2018     Primary Coverage     Payor Plan Insurance Group Employer/Plan Group    ANTH MEDICARE REPLACEMENT ANTH MEDICARE ADVANTAGE [id number]     Payor Plan Address Payor Plan Phone Number Effective From Effective To    [address] [phone] 1/1/2017     [facility] [id number]       Subscriber Name Subscriber Birth Date Member ID       LEILA SMITH 1945 [id number]                 Emergency Contacts      (Rel.) Home Phone Work Phone Mobile Phone    Danilo Hope (Daughter) [phone] -- --    Kleber Johnson (Son) [phone] -- --               History & Physical      Brian Samson MD at 7/2/2018  9:29 PM                Chief complaint:  Back pain    Subjective     Patient is a 72 y.o. female who is a resident of [facility].  She was brought to the emergency department this evening for back pain, nausea/vomiting, and altered mental status.    She has a history of COPD, CHF, atrial fibrillation, type 2 diabetes mellitus, and a history of frequent UTIs.  She has a history of a colostomy in April 2017.  She was admitted " here in January for acute renal failure and a urinary tract infection with sepsis.  She responded IV fluids and her creatinine returned to her normal baseline which is around 1.5.  She had an echocardiogram to rule out a valvular vegetations and this was negative.    Her daughter accompanies her tonight.  She tells me that she has had complaints of back pain for several weeks.  It sounds as if they did an ultrasound at the nursing facility which they said showed kidney stones.  She was supposed to see a urologist but they did not take her insurance so she has not seen one as of yet.    Her normal activity in the nursing home is basically bed to chair.  She gets her bed into a wheelchair and back.  She does eat adequately though not optimally.  She is normally oriented but her daughter says that her behavior has changed ever since her colostomy in 2017.  She normally wears diapers for urinary incontinence.    She was found to have an elevated creatinine of 3.2 in the emergency department.  She also potassium of 6.7.  Urinalysis is pending.      History  Past Medical History:   Diagnosis Date   • Atrial fibrillation (CMS/HCC)    • Chronic ulcer of right leg (CMS/HCC)    • Cognitive communication deficit    • COPD (chronic obstructive pulmonary disease) (CMS/HCC)    • Diabetes mellitus (CMS/HCC)    • Difficulty in walking    • Encephalopathy    • Generalized muscle weakness    • Hematuria    • Hyperlipidemia    • Hypertension    • Hypothyroidism    • Urinary tract infection      Past Surgical History:   Procedure Laterality Date   • CATARACT EXTRACTION     • CHOLECYSTECTOMY     • COLOSTOMY     • FOOT SURGERY Right    • HERNIA REPAIR     • HYSTERECTOMY     • TOTAL KNEE ARTHROPLASTY Right      Family History   Problem Relation Age of Onset   • Stomach cancer Mother    • Alcohol abuse Other    • Cancer Other    • Diabetes Other    • Hypertension Other    • Other Other      Social History   Substance Use Topics   • Smoking  status: Former Smoker     Packs/day: 0.00     Years: 50.00     Types: Cigarettes   • Smokeless tobacco: Never Used   • Alcohol use No     Prescriptions Prior to Admission   Medication Sig Dispense Refill Last Dose   • acetaminophen (TYLENOL) 500 MG tablet Take 1,000 mg by mouth Every 6 (Six) Hours As Needed for Mild Pain .   7/2/2018 at Unknown time   • ammonium lactate (LAC-HYDRIN) 12 % lotion Apply 1 application topically 2 (Two) Times a Day As Needed for Dry Skin.   7/2/2018 at Unknown time   • apixaban (ELIQUIS) 5 MG tablet tablet Take 1 tablet by mouth Every 12 (Twelve) Hours. 60 tablet  7/2/2018 at Unknown time   • Artificial Tear Solution (TEARS NATURALE OP) Apply 1 application to eye 2 (Two) Times a Day.   7/2/2018 at Unknown time   • aspirin 81 MG chewable tablet Chew 81 mg Daily.   7/2/2018 at Unknown time   • atorvastatin (LIPITOR) 10 MG tablet Take 10 mg by mouth Every Night.   7/1/2018 at Unknown time   • bacitracin-polymyxin b ointment ophthalmic ointment Apply 1 application topically As Needed (Apply to LEFT wrist after cleansing with NS).   7/2/2018 at Unknown time   • Benzalkonium Chloride 0.1 % liquid Apply 1 application topically As Needed (After incontinent episode).   7/2/2018 at Unknown time   • diltiaZEM CD (CARDIZEM CD) 240 MG 24 hr capsule Take 1 capsule by mouth Daily.   7/2/2018 at Unknown time   • dimethicone 1 % cream Apply 1 application topically As Needed (apply to buttocks).      • Dimethicone 1.5 % cream Apply 1 application topically As Needed (apply to buttock as needed).      • escitalopram (LEXAPRO) 10 MG tablet Take 10 mg by mouth Daily.   7/2/2018 at Unknown time   • famotidine (PEPCID) 20 MG tablet Take 20 mg by mouth Daily.   7/2/2018 at Unknown time   • haloperidol (HALDOL) 1 MG tablet Take 1 mg by mouth 2 (Two) Times a Day.   7/2/2018 at Unknown time   • insulin lispro (humaLOG) 100 UNIT/ML injection Inject 0-10 Units under the skin 4 (Four) Times a Day Before Meals & at  "Bedtime. Sliding Scale:  150-199 = 2 units, 200-249 = 4 units, 250-299 = 6 units, 300-349 = 8 units, 350-399 = 10 units, >400 = call MD   7/2/2018 at 1100   • levoFLOXacin (LEVAQUIN) 500 MG tablet Take 500 mg by mouth Daily. (6/26/18 - 7/5/18)   7/2/2018 at Unknown time   • magnesium hydroxide (MILK OF MAGNESIA) 400 MG/5ML suspension Take 30 mL by mouth Daily As Needed for Constipation.      • Melatonin 3 MG tablet Take 3 mg by mouth Every Night.   7/1/2018 at Unknown time   • Menthol-Zinc Oxide (REMEDY CALAZIME EX) Apply 1 application topically As Needed (apply to right buttock small pink area).   7/2/2018 at Unknown time   • metoprolol succinate XL (TOPROL-XL) 50 MG 24 hr tablet Take 3 tablets by mouth Daily.   7/2/2018 at Unknown time   • mometasone-formoterol (DULERA) 100-5 MCG/ACT inhaler Inhale 2 puffs 2 (Two) Times a Day.   7/2/2018 at Unknown time   • ondansetron (ZOFRAN) 4 MG tablet Take 4 mg by mouth Every 8 (Eight) Hours As Needed for Nausea or Vomiting.        Allergies:  Codeine and Tetracyclines & related    Review of Systems   Review of systems could not be obtained due to   patient confusion.      Objective     Vital Signs  Temp:  [97.5 °F (36.4 °C)-97.7 °F (36.5 °C)] 97.7 °F (36.5 °C)  Heart Rate:  [123-159] 138  Resp:  [18-22] 18  BP: ()/() 148/106    Physical Exam:    Objective:  General Appearance:  Uncomfortable and ill-appearing.    Vital signs: (most recent): Blood pressure (!) 148/106, pulse (!) 138, temperature 97.7 °F (36.5 °C), temperature source Axillary, resp. rate 18, height 180.3 cm (71\"), weight 80.7 kg (178 lb), SpO2 93 %.    HEENT: Normal HEENT exam.  (Nasal cannula O2)    Lungs:  Normal effort and normal respiratory rate.  Breath sounds clear to auscultation.  She is not in respiratory distress.  No rales, wheezes or rhonchi.    Heart: Tachycardia.  Irregular rhythm.  S1 normal and S2 normal.  No murmur, gallop or friction rub.   Chest: Symmetric chest wall expansion. "   Abdomen: Abdomen is soft and non-distended.  (Colostomy in place with pink stoma).  Bowel sounds are normal.   There is no abdominal tenderness.   There is no mass. There is no splenomegaly. There is no hepatomegaly.   Extremities: There is no deformity or dependent edema.    Neurological: Patient is alert.  (Confused).    Pupils:  Pupils are equal, round, and reactive to light.    Skin:  Warm and dry.              Results Review:    I reviewed the patient's new clinical results.  I reviewed the patient's other test results and agree with the interpretation    Assessment/Plan     Assessment:    Hospital Problem List     * (Principal)Sepsis due to urinary tract infection (CMS/HCC)    Atrial fibrillation with rapid ventricular response (CMS/HCC)    Altered mental status    Acute renal failure (CMS/HCC)    Hyperkalemia    Diabetes mellitus, type 2 (CMS/HCC)    Overview Signed 8/3/2016  2:13 PM by Ama Wolf     With foot ulcer         Hypertension    Hyperlipidemia    Hypothyroidism    Obstructive sleep apnea syndrome    Diabetic peripheral neuropathy (CMS/HCC)    Somnolence          72-year-old female presents with back pain, nausea/vomiting, volume depletion, and acute on chronic renal insufficiency.  She probably has a urinary tract infection though urinalysis is pending.  These have been recurrent in the past.  She was also recently told that she had kidney stones based upon what I think was a renal ultrasound done at her nursing home.  She has chronic medical problems as noted above.    Plan:    1. ICU admission  2. Broad-spectrum antibiotics  3. Urine and blood cultures  4. Monitor atrial fibrillation rate.  Brevibloc for now.  Normally on oral Cardizem  5. Continue anticoagulation  6. Volume resuscitation  7. Monitor renal function  8. CT of the abdomen to investigate the possibility of kidney stones and/or abscess  9. Urology consultation if necessary  10. Discussed plans with daughter in detail    I  discussed the patients findings and my recommendations with patient, family and nursing staff.     Critical Care time spent in direct patient care: 50 minutes (excluding procedure time, if applicable) including high complexity decision making to assess, manipulate, and support vital organ system failure in this individual who has impairment of one or more vital organ systems such that there is a high probability of imminent or life threatening deterioration in the patient’s condition.    Brian Samson MD  Pulmonary and Critical Care Medicine  07/02/18  9:39 PM            Electronically signed by Brian Samson MD at 7/2/2018  9:58 PM       Hospital Medications (active)       Dose Frequency Start End    apixaban (ELIQUIS) tablet 5 mg 5 mg Every 12 Hours Scheduled 7/2/2018     Sig - Route: Take 1 tablet by mouth Every 12 (Twelve) Hours. - Oral    aspirin chewable tablet 81 mg 81 mg Daily 7/3/2018     Sig - Route: Chew 1 tablet Daily. - Oral    atorvastatin (LIPITOR) tablet 10 mg 10 mg Nightly 7/2/2018     Sig - Route: Take 1 tablet by mouth Every Night. - Oral    dextrose (D50W) solution 25 g 25 g Every 15 Minutes PRN 7/2/2018     Sig - Route: Infuse 50 mL into a venous catheter Every 15 (Fifteen) Minutes As Needed for Low Blood Sugar (Blood Sugar Less Than 70). - Intravenous    dextrose (GLUTOSE) oral gel 15 g 15 g Every 15 Minutes PRN 7/2/2018     Sig - Route: Take 15 g by mouth Every 15 (Fifteen) Minutes As Needed for Low Blood Sugar (Blood sugar less than 70). - Oral    diltiaZEM (CARDIZEM) tablet 60 mg 60 mg Every 6 Hours Scheduled 7/6/2018     Sig - Route: Take 1 tablet by mouth Every 6 (Six) Hours. - Oral    famotidine (PEPCID) tablet 20 mg 20 mg 2 Times Daily 7/5/2018     Sig - Route: Take 1 tablet by mouth 2 (Two) Times a Day. - Oral    glucagon (human recombinant) (GLUCAGEN DIAGNOSTIC) injection 1 mg 1 mg As Needed 7/2/2018     Sig - Route: Inject 1 mg under the skin As Needed (Blood  Glucose Less Than 70). - Subcutaneous    haloperidol (HALDOL) tablet 1 mg 1 mg Every 12 Hours Scheduled 7/4/2018     Sig - Route: Take 1 tablet by mouth Every 12 (Twelve) Hours. - Oral    insulin lispro (humaLOG) injection 0-7 Units 0-7 Units 4 Times Daily With Meals & Nightly 7/2/2018     Sig - Route: Inject 0-7 Units under the skin 4 (Four) Times a Day With Meals & at Bedtime. - Subcutaneous    ipratropium-albuterol (DUO-NEB) nebulizer solution 3 mL 3 mL Every 6 Hours PRN 7/2/2018     Sig - Route: Take 3 mL by nebulization Every 6 (Six) Hours As Needed for Wheezing or Shortness of Air. - Nebulization    ondansetron (ZOFRAN) injection 4 mg 4 mg Every 6 Hours PRN 7/2/2018     Sig - Route: Infuse 2 mL into a venous catheter Every 6 (Six) Hours As Needed for Nausea or Vomiting. - Intravenous    piperacillin-tazobactam (ZOSYN) 3.375 g in iso-osmotic dextrose 50 ml (premix) 3.375 g Every 8 Hours 7/3/2018 7/10/2018    Sig - Route: Infuse 50 mL into a venous catheter Every 8 (Eight) Hours. - Intravenous    povidone-iodine (BETADINE) external solution  Daily 7/3/2018     Sig - Route: Apply  topically Daily. - Topical    Cosign for Ordering: Accepted by DANIEL Summers on 7/3/2018 11:26 AM    sodium chloride 0.9 % flush 1-10 mL 1-10 mL As Needed 7/2/2018     Sig - Route: Infuse 1-10 mL into a venous catheter As Needed for Line Care. - Intravenous    sodium chloride 0.9 % flush 10 mL 10 mL As Needed 7/2/2018     Sig - Route: Infuse 10 mL into a venous catheter As Needed for Line Care. - Intravenous    Cosign for Ordering: Accepted by Tato Peraza MD on 7/2/2018  9:22 PM    diltiaZEM CD (CARDIZEM CD) 24 hr capsule 240 mg (Discontinued) 240 mg Every 24 Hours Scheduled 7/5/2018 7/6/2018    Sig - Route: Take 1 capsule by mouth Daily. - Oral             Physician Progress Notes (most recent note)      Oneil Hartman MD at 7/6/2018  3:20 PM              [facility]  Services  PROGRESS NOTE    Patient Name: Leila Smith  : 1945  MRN: [id number]    Date of Admission: 2018  Length of Stay: 4  Primary Care Physician: Ciaran Wakefield MD    Subjective   Subjective     CC:  F/u UTI, sepsis    HPI:  No visitors present.  She is unable to provide any history.  RN report she is drinking some boost, but otherwise not much PO.    Review of Systems   Unable to perform ROS: Mental status change       Objective   Objective     Vital Signs:   Temp:  [97.8 °F (36.6 °C)-98.4 °F (36.9 °C)] 97.8 °F (36.6 °C)  Heart Rate:  [] 105  Resp:  [16-24] 18  BP: (140-160)/(84-98) 156/94        Physical Exam:  Constitutional: No acute distress, awake, alert, chronically ill appearing  HENT: NCAT, mucous membranes moist  Respiratory: Clear to auscultation bilaterally, respiratory effort normal   Cardiovascular: RRR (rate in 90s), no murmurs, rubs, or gallops,  Gastrointestinal: Positive bowel sounds, soft, nontender, nondistended  Musculoskeletal: No bilateral ankle edema  Psychiatric: flat affect, cooperative  Neurologic: Oriented x 1, will follow commands, no obvious deficits  Skin: some chronic scaling to LEs  Exam unchanged from previous      Results Reviewed:  I have personally reviewed current lab, radiology, and data and agree.      Results from last 7 days  Lab Units 18  0518  0539 18  1615   WBC 10*3/mm3 10.48 12.26* 10.76   HEMOGLOBIN g/dL 13.3 13.0 14.5   HEMATOCRIT % 42.9 42.8 47.3*   PLATELETS 10*3/mm3 249 271 314       Results from last 7 days  Lab Units 18  0517 18  0539 18  0052  18  1615   SODIUM mmol/L 144 143  --   --  142   POTASSIUM mmol/L 4.0 4.8  4.8 5.2  < > 6.7*   CHLORIDE mmol/L 105 114*  --   --  108   CO2 mmol/L 20.0 18.0*  --   --  18.0*   BUN mg/dL 63* 76*  --   --  86*   CREATININE mg/dL 2.07* 2.69*  --   --  3.21*   GLUCOSE mg/dL 114* 214*  --   --  381*   CALCIUM mg/dL 7.8* 8.3*  --   --  8.9   ALT (SGPT) U/L   --  38  --   --  38   AST (SGOT) U/L  --  27  --   --  43*   < > = values in this interval not displayed.  Estimated Creatinine Clearance: 29.7 mL/min (A) (by C-G formula based on SCr of 2.07 mg/dL (H)).  No results found for: BNP    Microbiology Results Abnormal     Procedure Component Value - Date/Time    Urine Culture - Urine, [[id number]]  (Abnormal) Collected:  07/03/18 0200    Lab Status:  Final result Specimen:  Urine from Urine, Catheter Updated:  07/06/18 1206     Urine Culture <10,000 CFU/mL Candida albicans (A)    Blood Culture - Blood, [[id number]]  (Normal) Collected:  07/02/18 1940    Lab Status:  Preliminary result Specimen:  Blood from Arm, Right Updated:  07/05/18 2030     Blood Culture No growth at 3 days    Blood Culture - Blood, [[id number]]  (Normal) Collected:  07/02/18 1610    Lab Status:  Preliminary result Specimen:  Blood from Arm, Right Updated:  07/05/18 1815     Blood Culture No growth at 3 days          Imaging Results (last 24 hours)     ** No results found for the last 24 hours. **        Results for orders placed during the hospital encounter of 01/31/18   Adult Transesophageal Echo (YANNA) W/ Cont if Necessary Per Protocol    Narrative · Left ventricular systolic function is normal.  · Left ventricular wall thickness is consistent with moderate concentric   hypertrophy.  · Left atrial cavity size is dilated.  · Cardiac valves are morphologically within normal limits for patient's   age.          I have reviewed the medications.    Assessment/Plan   Assessment / Plan     Hospital Problem List     * (Principal)Sepsis due to urinary tract infection (CMS/HCC)    Diabetes mellitus, type 2 (CMS/HCC)    Overview Deleted 7/2/2018  9:44 PM by Brain Samson MD            Hypertension    Hyperlipidemia    Hypothyroidism    Obstructive sleep apnea syndrome    Diabetic peripheral neuropathy (CMS/HCC)    Atrial fibrillation with rapid ventricular response (CMS/HCC)    Altered mental status     Acute renal failure superimposed on stage 3 chronic kidney disease (CMS/HCC)    Hyperkalemia    Somnolence             Brief Hospital Course to date:  Leila Smith is a 72 y.o. female with dementia, DM2, HTN, presented to ED with hypotension, sepsis, ARF, and UTI.  Improved with IVF.      Assessment & Plan:  - urine culture with yeast only.  Continue empiric abx for now.  Probably stop tomorrow.  - ARF improving, labs ordered for AM  - d/c IVF and see how she maintains on her own.  Is drinking some boost.  - hyperkalemia resolved  - continue current basal/bolus insulin, acceptable control  - mental status likely at baseline  -change CD diltizem to IR due to patient chewing up pills.  - reviewed CM note, no bed hold.  New referrals sent for placement.  Will be here through the weekend.      DVT Prophylaxis:  eliquis    CODE STATUS:   Code Status and Medical Interventions:   Ordered at: 07/02/18 2152     Code Status:    CPR     Medical Interventions (Level of Support Prior to Arrest):    Full       Disposition: I expect the patient to be discharged to SNF next week.    Electronically signed by Oneil Hartman MD, 07/06/18, 3:20 PM.        Electronically signed by Oneil Hartman MD at 7/6/2018  3:24 PM          Consult Notes (most recent note)      Vern Fonseca MD at 7/3/2018  9:16 PM          Urology Consult    Referring Provider: Chapin  Reason for Consultation: kidney stones    Patient Care Team:  Ciaran Wakefield MD as PCP - General (Internal Medicine)  No Known Provider as PCP - Family Medicine    Chief complaint back pain    Subjective .     History of present illness:  Patient is a problems with recurrent urinary tract infections.  Patient is brought in now for suspected pyelonephritis.  CT scan shows bilateral renal calculi.  No stones were seen in the ureter signofhydronephrosis.Urineculturesarepending    Review of Systems  Pertinent items are noted in HPI    History  Past Medical History:    Diagnosis Date   • Atrial fibrillation (CMS/HCC)    • Chronic ulcer of right leg (CMS/HCC)    • Cognitive communication deficit    • COPD (chronic obstructive pulmonary disease) (CMS/HCC)    • Diabetes mellitus (CMS/HCC)    • Difficulty in walking    • Encephalopathy    • Generalized muscle weakness    • Hematuria    • Hyperlipidemia    • Hypertension    • Hypothyroidism    • Urinary tract infection        Objective     Vital Signs   Temp:  [97.5 °F (36.4 °C)-98.3 °F (36.8 °C)] 97.6 °F (36.4 °C)  Heart Rate:  [] 86  Resp:  [14-18] 16  BP: ()/() 145/88    Physical Exam:     General Appearance:    Alert, cooperative, in no acute distress   Head:    Normocephalic, without obvious abnormality, atraumatic   Eyes:            Lids and lashes normal, conjunctivae and sclerae normal, no   icterus, no pallor, corneas clear, PERRLA   Ears:    Ears appear intact with no abnormalities noted   Throat:   No oral lesions, no thrush, oral mucosa moist   Neck:   No adenopathy, supple, trachea midline, no thyromegaly, no     carotid bruit, no JVD   Back:     No kyphosis present, no scoliosis present, no skin lesions,       erythema or scars, no tenderness to percussion or                   palpation,   range of motion normal   Lungs:     Clear to auscultation,respirations regular, even and                   unlabored    Heart:    Regular rhythm and normal rate, normal S1 and S2, no            murmur, no gallop, no rub, no click   Breast Exam:    Deferred   Abdomen:     Normal bowel sounds, no masses, no organomegaly, soft        non-tender, non-distended, no guarding, no rebound                 tenderness   Genitalia:    Deferred   Extremities:   Moves all extremities well, no edema, no cyanosis, no              redness   Pulses:   Pulses palpable and equal bilaterally   Skin:   No bleeding, bruising or rash   Lymph nodes:   No palpable adenopathy   Neurologic:   Cranial nerves 2 - 12 grossly intact, sensation  intact, DTR        present and equal bilaterally       Results Review:   I reviewed the patient's new clinical results.    Lab Results (last 72 hours)     Procedure Component Value Units Date/Time    POC Glucose Once [[id number]]  (Abnormal) Collected:  07/03/18 2045    Specimen:  Blood Updated:  07/03/18 2047     Glucose 159 (H) mg/dL     Narrative:       Meter: [id number] : [id number] Jerad Quinones    Blood Culture - Blood, [[id number]]  (Normal) Collected:  07/02/18 1940    Specimen:  Blood from Arm, Right Updated:  07/03/18 2030     Blood Culture No growth at 24 hours    Blood Culture - Blood, [[id number]]  (Normal) Collected:  07/02/18 1610    Specimen:  Blood from Arm, Right Updated:  07/03/18 1815     Blood Culture No growth at 24 hours    POC Glucose Once [[id number]]  (Abnormal) Collected:  07/03/18 1620    Specimen:  Blood Updated:  07/03/18 1630     Glucose 138 (H) mg/dL     Narrative:       Meter: [id number] : [id number] Aj Sullivan    POC Glucose Once [[id number]]  (Abnormal) Collected:  07/03/18 1217    Specimen:  Blood Updated:  07/03/18 1218     Glucose 152 (H) mg/dL     Narrative:       Meter: [id number] : [id number] Aj Sullivan    TSH [[id number]]  (Normal) Collected:  07/03/18 0539    Specimen:  Blood Updated:  07/03/18 0854     TSH 1.275 mIU/mL     CBC & Differential [[id number]] Collected:  07/03/18 0539    Specimen:  Blood Updated:  07/03/18 0738    Narrative:       The following orders were created for panel order CBC & Differential.  Procedure                               Abnormality         Status                     ---------                               -----------         ------                     Manual Differential[[id number]]          Abnormal            Final result               CBC Auto Differential[[id number]]        Abnormal            Final result                 Please view results for these tests on the individual orders.    CBC Auto Differential [[id number]]  (Abnormal)  Collected:  07/03/18 0539    Specimen:  Blood Updated:  07/03/18 0738     WBC 12.26 (H) 10*3/mm3      RBC 4.84 10*6/mm3      Hemoglobin 13.0 g/dL      Hematocrit 42.8 %      MCV 88.4 fL      MCH 26.9 (L) pg      MCHC 30.4 (L) g/dL      RDW 17.8 (H) %      RDW-SD 56.7 (H) fl      MPV 10.4 fL      Platelets 271 10*3/mm3     Manual Differential [[id number]]  (Abnormal) Collected:  07/03/18 0539    Specimen:  Blood Updated:  07/03/18 0738     Neutrophil % 84.0 (H) %      Lymphocyte % 12.0 (L) %      Monocyte % 4.0 %      Eosinophil % 0.0 %      Basophil % 0.0 %      Neutrophils Absolute 10.30 (H) 10*3/mm3      Lymphocytes Absolute 1.47 10*3/mm3      Monocytes Absolute 0.49 10*3/mm3      Eosinophils Absolute 0.00 (L) 10*3/mm3      Basophils Absolute 0.00 10*3/mm3      RBC Morphology Normal     WBC Morphology Normal     Platelet Morphology Normal    Narrative:       OCCASIONAL LARGE PLATELET NOTED ON SLIDE REVIEW.    POC Glucose Once [[id number]]  (Abnormal) Collected:  07/03/18 0734    Specimen:  Blood Updated:  07/03/18 0738     Glucose 178 (H) mg/dL     Narrative:       Meter: [id number] : [id number] Aj Nate    Potassium [[id number]]  (Normal) Collected:  07/03/18 0539    Specimen:  Blood Updated:  07/03/18 0706     Potassium 4.8 mmol/L     Comprehensive Metabolic Panel [[id number]]  (Abnormal) Collected:  07/03/18 0539    Specimen:  Blood Updated:  07/03/18 0706     Glucose 214 (H) mg/dL      BUN 76 (H) mg/dL      Creatinine 2.69 (H) mg/dL      Sodium 143 mmol/L      Potassium 4.8 mmol/L      Chloride 114 (H) mmol/L      CO2 18.0 (L) mmol/L      Calcium 8.3 (L) mg/dL      Total Protein 6.1 g/dL      Albumin 3.04 (L) g/dL      ALT (SGPT) 38 U/L      AST (SGOT) 27 U/L      Alkaline Phosphatase 163 (H) U/L      Total Bilirubin 0.7 mg/dL      eGFR  African Amer 21 (L) mL/min/1.73      Globulin 3.1 gm/dL      A/G Ratio 1.0 (L) g/dL      BUN/Creatinine Ratio 28.3 (H)     Anion Gap 11.0 mmol/L     Narrative:        National Kidney Foundation Guidelines    Stage     Description        GFR  1         Normal or High     90+  2         Mild decrease      60-89  3         Moderate decrease  30-59  4         Severe decrease    15-29  5         Kidney failure     <15    Magnesium [[id number]]  (Normal) Collected:  07/03/18 0539    Specimen:  Blood Updated:  07/03/18 0706     Magnesium 2.2 mg/dL     Phosphorus [[id number]]  (Normal) Collected:  07/03/18 0539    Specimen:  Blood Updated:  07/03/18 0706     Phosphorus 4.9 mg/dL     Hemoglobin A1c [[id number]]  (Abnormal) Collected:  07/03/18 0539    Specimen:  Blood Updated:  07/03/18 0701     Hemoglobin A1C 9.30 (H) %     Narrative:       The American Diabetes Association recommends maintenance of Hemoglobin A1C at 7.0% or lower. Goals for Hemoglobin A1C reduction may need to be modified if hypoglycemia is a problem.    LSAC Slide Creation [[id number]] Collected:  07/03/18 0539    Specimen:  Blood Updated:  07/03/18 0700    Urinalysis With Microscopic If Indicated (No Culture) - Urine, Catheter [[id number]]  (Abnormal) Collected:  07/03/18 0200    Specimen:  Urine from Urine, Catheter Updated:  07/03/18 0208     Color, UA Hammond (A)     Appearance, UA Turbid (A)     pH, UA <=5.0     Specific Gravity, UA 1.022     Glucose, UA Negative     Ketones, UA Negative     Bilirubin, UA Negative     Blood, UA Large (3+) (A)     Protein,  mg/dL (2+) (A)     Leuk Esterase, UA Moderate (2+) (A)     Nitrite, UA Negative     Urobilinogen, UA 0.2 E.U./dL    Urinalysis With Culture If Indicated - Urine, Catheter [[id number]]  (Abnormal) Collected:  07/03/18 0200    Specimen:  Urine from Urine, Catheter Updated:  07/03/18 0208     Color, UA Hammond (A)     Appearance, UA Turbid (A)     pH, UA <=5.0     Specific Gravity, UA 1.022     Glucose, UA Negative     Ketones, UA Negative     Bilirubin, UA Negative     Blood, UA Large (3+) (A)     Protein,  mg/dL (2+) (A)     Leuk Esterase, UA Moderate (2+)  (A)     Nitrite, UA Negative     Urobilinogen, UA 0.2 E.U./dL    Urinalysis, Microscopic Only - Urine, Clean Catch [[id number]]  (Abnormal) Collected:  07/03/18 0200    Specimen:  Urine from Urine, Catheter Updated:  07/03/18 0208     RBC, UA Too Numerous to Count (A) /HPF      WBC, UA 6-12 (A) /HPF      Bacteria, UA None Seen /HPF      Squamous Epithelial Cells, UA 13-20 (A) /HPF      Hyaline Casts, UA 0-6 /LPF      Methodology Automated Microscopy    Urine Culture - Urine, [[id number]] Collected:  07/03/18 0200    Specimen:  Urine from Urine, Catheter Updated:  07/03/18 0208    Potassium [[id number]]  (Normal) Collected:  07/03/18 0052    Specimen:  Blood Updated:  07/03/18 0153     Potassium 5.2 mmol/L     Potassium [[id number]]  (Normal) Collected:  07/02/18 2100    Specimen:  Blood Updated:  07/02/18 2325     Potassium 5.4 mmol/L     POC Glucose Once [[id number]]  (Abnormal) Collected:  07/02/18 2229    Specimen:  Blood Updated:  07/02/18 2231     Glucose 287 (H) mg/dL     Narrative:       Meter: [id number] : [id number] Elvin Machuca    Lactic Acid, Reflex [[id number]]  (Abnormal) Collected:  07/02/18 2132    Specimen:  Blood Updated:  07/02/18 2154     Lactate 3.8 (C) mmol/L      Comment: Falsely depressed results may occur on samples drawn from patients receiving N-Acetylcysteine (NAC) or Metamizole.       Lactic Acid, Reflex Timer (This will reflex a repeat order 3-3:15 hours after ordered.) [[id number]] Collected:  07/02/18 1615    Specimen:  Blood Updated:  07/02/18 2001     Extra Tube Hold for add-ons.     Comment: Auto resulted.       [place] Draw [[id number]] Collected:  07/02/18 1615    Specimen:  Blood Updated:  07/02/18 1730    Narrative:       The following orders were created for panel order [place] Draw.  Procedure                               Abnormality         Status                     ---------                               -----------         ------                     Light Blue  Top[[id number]]                                   Final result               Green Top (Gel)[[id number]]                                  Final result               Lavender Top[[id number]]                                     Final result               Gold Top - SST[[id number]]                                   Final result               Green Top (No Gel)[[id number]]                                                            Please view results for these tests on the individual orders.    Green Top (Gel) [[id number]] Collected:  07/02/18 1615    Specimen:  Blood Updated:  07/02/18 1730     Extra Tube Hold for add-ons.     Comment: Auto resulted.       Light Blue Top [[id number]] Collected:  07/02/18 1615    Specimen:  Blood Updated:  07/02/18 1730     Extra Tube hold for add-on     Comment: Auto resulted       Lavender Top [[id number]] Collected:  07/02/18 1615    Specimen:  Blood Updated:  07/02/18 1730     Extra Tube hold for add-on     Comment: Auto resulted       Gold Top - SST [[id number]] Collected:  07/02/18 1615    Specimen:  Blood Updated:  07/02/18 1730     Extra Tube Hold for add-ons.     Comment: Auto resulted.       Comprehensive Metabolic Panel [[id number]]  (Abnormal) Collected:  07/02/18 1615    Specimen:  Blood Updated:  07/02/18 1701     Glucose 381 (H) mg/dL      BUN 86 (H) mg/dL      Creatinine 3.21 (H) mg/dL      Sodium 142 mmol/L      Potassium 6.7 (C) mmol/L      Chloride 108 mmol/L      CO2 18.0 (L) mmol/L      Calcium 8.9 mg/dL      Total Protein 7.0 g/dL      Albumin 3.52 g/dL      ALT (SGPT) 38 U/L      AST (SGOT) 43 (H) U/L      Alkaline Phosphatase 204 (H) U/L      Total Bilirubin 0.8 mg/dL      eGFR  African Amer 17 (L) mL/min/1.73      Globulin 3.5 gm/dL      A/G Ratio 1.0 (L) g/dL      BUN/Creatinine Ratio 26.8 (H)     Anion Gap 16.0 (H) mmol/L     Narrative:       National Kidney Foundation Guidelines    Stage     Description        GFR  1         Normal or High     90+  2         Mild  decrease      60-89  3         Moderate decrease  30-59  4         Severe decrease    15-29  5         Kidney failure     <15    Lipase [[id number]]  (Normal) Collected:  07/02/18 1615    Specimen:  Blood Updated:  07/02/18 1700     Lipase 44 U/L     Lactic Acid, Plasma [[id number]]  (Abnormal) Collected:  07/02/18 1615    Specimen:  Blood Updated:  07/02/18 1655     Lactate 4.6 (C) mmol/L      Comment: Falsely depressed results may occur on samples drawn from patients receiving N-Acetylcysteine (NAC) or Metamizole.       CBC & Differential [[id number]] Collected:  07/02/18 1615    Specimen:  Blood Updated:  07/02/18 1625    Narrative:       The following orders were created for panel order CBC & Differential.  Procedure                               Abnormality         Status                     ---------                               -----------         ------                     CBC Auto Differential[[id number]]        Abnormal            Final result                 Please view results for these tests on the individual orders.    CBC Auto Differential [[id number]]  (Abnormal) Collected:  07/02/18 1615    Specimen:  Blood Updated:  07/02/18 1625     WBC 10.76 10*3/mm3      RBC 5.32 (H) 10*6/mm3      Hemoglobin 14.5 g/dL      Hematocrit 47.3 (H) %      MCV 88.9 fL      MCH 27.3 pg      MCHC 30.7 (L) g/dL      RDW 17.5 (H) %      RDW-SD 56.5 (H) fl      MPV 10.2 fL      Platelets 314 10*3/mm3      Neutrophil % 83.6 (H) %      Lymphocyte % 11.3 (L) %      Monocyte % 4.3 %      Eosinophil % 0.0 %      Basophil % 0.1 %      Immature Grans % 0.7 (H) %      Neutrophils, Absolute 9.00 (H) 10*3/mm3      Lymphocytes, Absolute 1.22 10*3/mm3      Monocytes, Absolute 0.46 10*3/mm3      Eosinophils, Absolute 0.00 10*3/mm3      Basophils, Absolute 0.01 10*3/mm3      Immature Grans, Absolute 0.07 (H) 10*3/mm3         Imaging Results (last 24 hours)     Procedure Component Value Units Date/Time    US Renal Bilateral [[id number]]  Updated:  07/03/18 1614    CT Abdomen Pelvis Stone Protocol [[id number]] Collected:  07/02/18 2157     Updated:  07/03/18 0101    Narrative:       EXAM:    CT Abdomen and Pelvis Without Intravenous Contrast    EXAM DATE/TIME:    7/2/2018 9:57 PM    CLINICAL HISTORY:    72 years old, female; Generalized abd pain, nausea, vomiting.    TECHNIQUE:    Axial computed tomography images of the abdomen and pelvis without   intravenous contrast.  All CT scans at this facility use at least one of these   dose optimization techniques: automated exposure control; mA and/or kV   adjustment per patient size (includes targeted exams where dose is matched to   clinical indication); or iterative reconstruction.    Coronal reformatted images were created and reviewed.    COMPARISON:    CT ABDOMEN PELVIS WO CONTRAST 2017-10-27 11:24    FINDINGS:    Prior near-total colectomy with a right periumbilical ileostomy.  There is no   bowel inflammation, obstruction, free intraperitoneal air, or ascites.  Stable   mild presacral stranding.      Stable cardiomegaly.  Patchy bibasilar atelectasis.  Left basilar bronchial   wall thickening.        Trace pericholecystic free fluid without biliary dilatation, gallbladder   dilatation, or visualized gallstone.      Right adrenal mass has slightly increased in size since the prior exam,   currently measuring up to 3.7 cm where it previously measured up to 3.2 cm.        Bilateral nonobstructing intrarenal calculi, measuring up to 1 cm on the   left.  Bilateral simple renal cysts, measuring up to 2.9 cm on the left.  No   ureteral or bladder calculus.  No hydronephrosis.      Pancreatic atrophy.  The unenhanced liver, spleen, and urinary bladder are   normal.  Prior hysterectomy.      Diffuse atherosclerosis without abdominal aortic aneurysm or retroperitoneal   hemorrhage.  Chronic degenerative changes throughout the spine and at both hips.      Impression:         Trace pericholecystic fluid without  layering dilatation or stone.  If there   is right upper quadrant pain, ultrasound recommended.      Right adrenal mass has slightly increased in size since the prior exam.  This   can be followed up with non-emergent washout CT or MRI.      Left basilar bronchitis.      Bilateral nephrolithiasis without urinary obstruction.      THIS DOCUMENT HAS BEEN ELECTRONICALLY SIGNED BY ERENDIRA KATE MD          Medications:  Current Facility-Administered Medications   Medication Dose Route Frequency Provider Last Rate Last Dose   • apixaban (ELIQUIS) tablet 5 mg  5 mg Oral Q12H Brian Samson MD   5 mg at 07/03/18 2018   • aspirin chewable tablet 81 mg  81 mg Oral Daily Brian Samson MD   81 mg at 07/03/18 0850   • atorvastatin (LIPITOR) tablet 10 mg  10 mg Oral Nightly Brian Samson MD   10 mg at 07/03/18 2018   • dextrose (D50W) solution 25 g  25 g Intravenous Q15 Min PRN Brian Samson MD       • dextrose (GLUTOSE) oral gel 15 g  15 g Oral Q15 Min PRN Brian Samson MD       • diltiaZEM (CARDIZEM) tablet 60 mg  60 mg Oral Q6H Jasmina V. Case, DO   60 mg at 07/03/18 1801   • esmolol (BREVIBLOC) 2500 mg/250 mL (10 mg/mL) infusion   mcg/kg/min Intravenous Titrated Tato Peraza MD 24.2 mL/hr at 07/02/18 1854 50 mcg/kg/min at 07/02/18 1854   • famotidine (PEPCID) injection 20 mg  20 mg Intravenous Q12H DANIEL Summers   20 mg at 07/03/18 2017   • glucagon (human recombinant) (GLUCAGEN DIAGNOSTIC) injection 1 mg  1 mg Subcutaneous PRN Brian Samson MD       • haloperidol lactate (HALDOL) injection 1 mg  1 mg Intravenous BID DANIEL Summers   1 mg at 07/03/18 2017   • insulin lispro (humaLOG) injection 0-7 Units  0-7 Units Subcutaneous 4x Daily With Meals & Nightly Brian Samson MD   2 Units at 07/03/18 2059   • ipratropium-albuterol (DUO-NEB) nebulizer solution 3 mL  3 mL Nebulization Q6H PRN Brian Samson MD       •  lactated ringers infusion  100 mL/hr Intravenous Continuous Jasmina V. Case,  mL/hr at 18 1159 100 mL/hr at 18 1159   • ondansetron (ZOFRAN) injection 4 mg  4 mg Intravenous Q6H PRN Brian Samson MD       • piperacillin-tazobactam (ZOSYN) 3.375 g in iso-osmotic dextrose 50 ml (premix)  3.375 g Intravenous Q8H Brian Samson MD   3.375 g at 18 1801   • povidone-iodine (BETADINE) external solution   Topical Daily Julien Carcamo, APRN   1 application at 18 1152   • sodium chloride 0.9 % flush 1-10 mL  1-10 mL Intravenous PRN Brian Samson MD       • sodium chloride 0.9 % flush 10 mL  10 mL Intravenous PRN Tato Peraza MD           Assessment/Plan     Principal Problem:    Sepsis due to urinary tract infection (CMS/HCC)  Active Problems:    Diabetes mellitus, type 2 (CMS/HCC)    Hypertension    Hyperlipidemia    Hypothyroidism    Obstructive sleep apnea syndrome    Diabetic peripheral neuropathy (CMS/HCC)    Atrial fibrillation with rapid ventricular response (CMS/HCC)    Altered mental status    Acute renal failure superimposed on stage 3 chronic kidney disease (CMS/HCC)    Hyperkalemia    Somnolence      Impression: Urinary tract infection with bilateral renal calculi    Plan: Continue antibiotics.  We will treat the stones at a later date    I discussed the patients findings and my recommendations with patient    Vern Fonseca MD  18  9:16 PM            Electronically signed by Vern Fonseca MD at 7/3/2018  9:23 PM          Physical Therapy Notes (most recent note)      Senait Kessler, PT at 2018  3:15 PM  Version 1 of 1         Acute Care - Physical Therapy Initial Evaluation  [facility]     Patient Name: Leila Smith  : 1945  MRN: [id number]  Today's Date: 2018   Onset of Illness/Injury or Date of Surgery: 18  Date of Referral to PT: 18  Referring Physician: MD Devan      Admit Date:  7/2/2018    Visit Dx:     ICD-10-CM ICD-9-CM   1. Somnolence R40.0 780.09   2. Atrial fibrillation with rapid ventricular response (CMS/HCC) I48.91 427.31   3. History of hypertension Z86.79 V12.59   4. Type 2 diabetes mellitus with hyperglycemia, unspecified long term insulin use status (CMS/HCC) E11.65 250.00   5. History of recurrent UTI (urinary tract infection) Z87.440 V13.02   6. Dysphagia, unspecified type R13.10 787.20   7. Impaired mobility and ADLs Z74.09 799.89   8. Impaired functional mobility, balance, gait, and endurance Z74.09 V49.89     Patient Active Problem List   Diagnosis   • Diabetes mellitus, type 2 (CMS/[facility])   • Hypertension   • Hyperlipidemia   • Hypothyroidism   • Chronic obstructive pulmonary disease (CMS/[facility])   • CAD (coronary artery disease)   • History of edema   • History of obesity   • Venous insufficiency   • Open wound of lower extremity   • Urinary tract infection   • T2DM (type 2 diabetes mellitus) (CMS/HCC)   • Dyspnea on exertion   • Peripheral vascular disease (CMS/HCC)   • Obstructive sleep apnea syndrome   • Ulcer of lower extremity (CMS/HCC)   • Hypoxemia   • Hematuria   • Diabetic peripheral neuropathy (CMS/HCC)   • Edema   • Chronic obstructive pulmonary disease with acute exacerbation (CMS/HCC)   • Congestive heart failure (CMS/[facility])   • Arthritis   • Neutrophilic leukocytosis   • Cystitis   • Atrial fibrillation with rapid ventricular response (CMS/HCC)   • Altered mental status   • Sepsis due to urinary tract infection (CMS/HCC)   • Acute renal failure superimposed on stage 3 chronic kidney disease (CMS/HCC)   • Hyperkalemia   • Leukocytosis   • Elevated troponin   • Somnolence     Past Medical History:   Diagnosis Date   • Atrial fibrillation (CMS/[facility])    • Chronic ulcer of right leg (CMS/[facility])    • Cognitive communication deficit    • COPD (chronic obstructive pulmonary disease) (CMS/[facility])    • Diabetes mellitus (CMS/[facility])    • Difficulty in walking    • Encephalopathy     • Generalized muscle weakness    • Hematuria    • Hyperlipidemia    • Hypertension    • Hypothyroidism    • Urinary tract infection      Past Surgical History:   Procedure Laterality Date   • CATARACT EXTRACTION     • CHOLECYSTECTOMY     • COLOSTOMY     • FOOT SURGERY Right    • HERNIA REPAIR     • HYSTERECTOMY     • TOTAL KNEE ARTHROPLASTY Right         PT ASSESSMENT (last 12 hours)      Physical Therapy Evaluation     Row Name 07/06/18 1426          PT Evaluation Time/Intention    Subjective Information no complaints  -LS     Document Type evaluation  -LS     Patient Effort adequate  -LS     Row Name 07/06/18 1426          General Information    Patient Profile Reviewed? yes  -LS     Onset of Illness/Injury or Date of Surgery 07/05/18  -LS     Referring Physician MD Devan  -     Patient Observations alert;cooperative;agree to therapy  -LS     Prior Level of Function --   TBA further; no family present to clarify  -     Equipment Currently Used at Home walker, rolling;wheelchair  -LS     Pertinent History of Current Functional Problem To ED [facility] with back pain, N/V, AMS. Found to have afib with RVR, UTI, and bilat renal calculi. PMH significant for dementia.   -LS     Existing Precautions/Restrictions fall;other (see comments)   dementia  -LS     Limitations/Impairments safety/cognitive  -LS     Risks Reviewed patient:;LOB;dizziness;increased discomfort;change in vital signs  -LS     Benefits Reviewed patient:;improve function;increase independence;increase strength;increase knowledge  -LS     Barriers to Rehab medically complex  -LS     Row Name 07/06/18 1426          Relationship/Environment    Lives With facility resident  -LS     Row Name 07/06/18 1426          Resource/Environmental Concerns    Current Living Arrangements residential facility   [facility] (per CM note)  -LS     Row Name 07/06/18 1426          Cognitive Assessment/Intervention- PT/OT    Affect/Mental  Status (Cognitive) confused;agitated  -LS     Orientation Status (Cognition) oriented to;person  -LS     Follows Commands (Cognition) follows one step commands;0-24% accuracy;increased processing time needed;initiation impaired;repetition of directions required;verbal cues/prompting required  -LS     Cognitive Function (Cognitive) safety deficit  -LS     Attention Deficit (Cognitive) severe deficit  -LS     Safety Deficit (Cognitive) insight into deficits/self awareness;safety precautions awareness  -LS     Personal Safety Interventions fall prevention program maintained;gait belt;nonskid shoes/slippers when out of bed  -LS     Row Name 07/06/18 1426          Safety Issues, Functional Mobility    Impairments Affecting Function (Mobility) balance;coordination;endurance/activity tolerance;cognition;strength  -     Row Name 07/06/18 1426          Bed Mobility Assessment/Treatment    Bed Mobility Assessment/Treatment supine-sit;sit-supine  -LS     Rolling Left [place] (Bed Mobility) maximum assist (25% patient effort);verbal cues  -LS     Rolling Right [place] (Bed Mobility) maximum assist (25% patient effort);verbal cues  -LS     Assistive Device (Bed Mobility) bed rails;head of bed elevated;draw sheet  -LS     Comment (Bed Mobility) Increased agitation with supine to sit; constant cues to remain calm. Improved participation with remainder of session.   -     Row Name 07/06/18 1426          Transfer Assessment/Treatment    Transfer Assessment/Treatment stand-sit transfer;sit-stand transfer  -LS     Comment (Transfers) Attempted STS x2 from EOB; unable to clear hips. PT/tech on either side of pt  -LS     Sit-Stand [place] (Transfers) dependent (less than 25% patient effort);1 person to manage equipment;verbal cues  -LS     Stand-Sit [place] (Transfers) dependent (less than 25% patient effort);2 person assist;verbal cues  -LS     Row Name 07/06/18 1426          Gait/Stairs Assessment/Training     [place] Level (Gait) unable to assess  -     Row Name 07/06/18 1426          General ROM    GENERAL ROM COMMENTS BLEs grossly WFL for AAROM  -     Row Name 07/06/18 1426          MMT (Manual Muscle Testing)    Additional Documentation General Assessment (Manual Muscle Testing) (Group)  -     Row Name 07/06/18 1426          General Assessment (Manual Muscle Testing)    General Manual Muscle Testing (MMT) Assessment lower extremity strength deficits identified  -     Row Name 07/06/18 1426          Lower Extremity (Manual Muscle Testing)    Comment, MMT: Lower Extremity BLE grossly 3-/5 as demonstrated functionally. Pt unable to follow commands for formal MMT  -     Row Name 07/06/18 1426          Motor Assessment/Intervention    Additional Documentation Balance (Group);Therapeutic Exercise (Group)  -     Row Name 07/06/18 1500 07/06/18 1426       Therapeutic Exercise    Therapeutic Exercise supine, lower extremities  -  --    Additional Documentation  -- Therapeutic Exercise ([facility])  -    67414 - PT Therapeutic Exercise Minutes  -- 3  -    Row Name 07/06/18 1500          Lower Extremity Supine Therapeutic Exercise    Performed, Supine Lower Extremity (Therapeutic Exercise) hip flexion/extension;knee flexion/extension;ankle dorsiflexion/plantarflexion  -     Exercise Type, Supine Lower Extremity (Therapeutic Exercise) AAROM (active assistive range of motion)  -     Sets/Reps Detail, Supine Lower Extremity (Therapeutic Exercise) 1/10  -     Row Name 07/06/18 1426          Balance    Balance static sitting balance;static standing balance  -     Row Name 07/06/18 1426          Static Sitting Balance    Level of [place] (Unsupported Sitting, Static Balance) minimal assist, 75% patient effort   progressed to CGA  -     Sitting Position (Unsupported Sitting, Static Balance) sitting on edge of bed  -     Time Able to Maintain Position (Unsupported Sitting, Static Balance) more than 5  minutes  -LS     Row Name 07/06/18 1426          Sensory Assessment/Intervention    Sensory General Assessment --   TBA further  -LS     Row Name 07/06/18 1426          Pain Assessment    Additional Documentation Pain Scale: FACES Pre/Post-Treatment (Group)  -LS     Row Name 07/06/18 1426          Pain Scale: FACES Pre/Post-Treatment    Pain: FACES Scale, Pretreatment 0-->no hurt  -LS     Pain: FACES Scale, Post-Treatment 0-->no hurt  -LS     Row Name             Wound 07/02/18 2100 Bilateral posterior coccyx pressure injury    Wound - Properties Group Date first assessed: 07/02/18  -JG Time first assessed: 2100  -JG Present On Admission : yes  -JG Side: Bilateral  -JG Orientation: posterior  -JG Location: coccyx  -JG Type: pressure injury  -JG Additional Comments: healing/scarred PI or shearing  -CP    Row Name             Wound 07/03/18 0915 Left distal toe    Wound - Properties Group Date first assessed: 07/03/18  -CP Time first assessed: 0915  -CP Present On Admission : yes;picture taken  -CP Side: Left  -CP Orientation: distal  -CP Location: toe  -CP Stage, Pressure Injury: deep tissue injury  -CP    Row Name             Wound 07/03/18 0915 Left lower leg ulceration, venous    Wound - Properties Group Date first assessed: 07/03/18  -CP Time first assessed: 0915  -CP Present On Admission : yes;picture taken  -CP Side: Left  -CP Orientation: lower  -CP Location: leg  -CP Type: ulceration, venous  -CP Additional Comments: healing  -CP    Row Name 07/06/18 1426          Plan of Care Review    Plan of Care Reviewed With patient  -LS     Row Name 07/06/18 1426          Physical Therapy Clinical Impression    Date of Referral to PT 07/05/18  -     PT Diagnosis (PT Clinical Impression) impaired functional mobility, balance, gait  -LS     Patient/Family Goals Statement (PT Clinical Impression) return to PLOF  -     Criteria for Skilled Interventions Met (PT Clinical Impression) yes;treatment indicated  -     Rehab  Potential (PT Clinical Summary) fair, will monitor progress closely  -     Care Plan Review (PT) evaluation/treatment results reviewed  -     Row Name 07/06/18 1426          Vital Signs    Pre Systolic BP Rehab --   RN gave consent for therapy; RA  -     Row Name 07/06/18 1426          Physical Therapy Goals    Bed Mobility Goal Selection (PT) bed mobility, PT goal 1  -LS     Transfer Goal Selection (PT) transfer, PT goal 1  -LS     Balance Goal Selection (PT) balance, PT goal 1  -LS     Additional Documentation Balance Goal Selection (PT) (Row)  -     Row Name 07/06/18 1426          Bed Mobility Goal 1 (PT)    Activity/Assistive Device (Bed Mobility Goal 1, PT) sit to supine/supine to sit  -LS     [place] Level/Cues Needed (Bed Mobility Goal 1, PT) minimum assist (75% or more patient effort)  -LS     Time Frame (Bed Mobility Goal 1, PT) 2 weeks  -LS     Progress/Outcomes (Bed Mobility Goal 1, PT) goal ongoing  -     Row Name 07/06/18 1426          Transfer Goal 1 (PT)    Activity/Assistive Device (Transfer Goal 1, PT) sit-to-stand/stand-to-sit  -LS     [place] Level/Cues Needed (Transfer Goal 1, PT) maximum assist (25-49% patient effort)  -LS     Time Frame (Transfer Goal 1, PT) 2 weeks  -LS     Progress/Outcome (Transfer Goal 1, PT) goal ongoing  -     Row Name 07/06/18 1426          Balance Goal 1 (PT)    Activity/Assistive Device (Balance Goal 1, PT) sitting, static  -LS     [place] Level/Cues Needed (Balance Goal 1, PT) independent  -LS     Progress/Outcomes (Balance Goal 1, PT) goal ongoing  -     Row Name 07/06/18 1426          Positioning and Restraints    Pre-Treatment Position in bed  -LS     Post Treatment Position bed  -LS     In Bed notified nsg;fowlers;call light within reach;encouraged to call for assist;exit alarm on;with nsg;heels elevated;waffle boots/both  -LS       User Key  (r) = Recorded By, (t) = Taken By, (c) = Cosigned By    Initials Name Provider Type    CP  Angélica Gavin APRN Nurse Practitioner     Senait Kessler, PT Physical Therapist    RAUDEL Krause RN Registered Nurse          Physical Therapy Education     Title: PT OT SLP Therapies (Active)     Topic: Physical Therapy (Active)     Point: Mobility training (Active)    Learning Progress Summary     Learner Status Readiness Method Response Comment Documented by    Patient Active Acceptance E,D NR  LS 07/06/18 1511          Point: Body mechanics (Active)    Learning Progress Summary     Learner Status Readiness Method Response Comment Documented by    Patient Active Acceptance E,D NR  LS 07/06/18 1511          Point: Precautions (Active)    Learning Progress Summary     Learner Status Readiness Method Response Comment Documented by    Patient Active Acceptance E,D NR  LS 07/06/18 1511                      User Key     Initials Effective Dates Name Provider Type Discipline     06/19/15 -  Senait Kessler, PT Physical Therapist PT                PT Recommendation and Plan  Anticipated Discharge Disposition (PT): skilled nursing facility  Planned Therapy Interventions (PT Eval): balance training, bed mobility training, home exercise program, patient/family education, strengthening, transfer training  Therapy Frequency (PT Clinical Impression): 3 times/wk  Outcome Summary/Treatment Plan (PT)  Anticipated Discharge Disposition (PT): skilled nursing facility  Plan of Care Reviewed With: patient  Outcome Summary: PT evaluation completed. Pt demonstrates generalized weakness and decreased indep/ safety re: functional mobility, warranting further skilled PT services to promote PLOF. Limited today by confusion and slight agitation; improved cooperation at end of session. Responds well to calm, short commands. Recommend SNF at d/c.           Outcome Measures     Row Name 07/06/18 1426 07/05/18 1440          How much help from another person do you currently need...    Turning from your back to your side  while in flat bed without using bedrails? 2  -LS  --     Moving from lying on back to sitting on the side of a flat bed without bedrails? 2  -LS  --     Moving to and from a bed to a chair (including a wheelchair)? 1  -LS  --     Standing up from a chair using your arms (e.g., wheelchair, bedside chair)? 1  -LS  --     Climbing 3-5 steps with a railing? 1  -LS  --     To walk in hospital room? 1  -LS  --     AM-PAC 6 Clicks Score 8  -LS  --        How much help from another is currently needed...    Putting on and taking off regular lower body clothing?  -- 1  -CL     Bathing (including washing, rinsing, and drying)  -- 1  -CL     Toileting (which includes using toilet bed pan or urinal)  -- 1  -CL     Putting on and taking off regular upper body clothing  -- 2  -CL     Taking care of personal grooming (such as brushing teeth)  -- 2  -CL     Eating meals  -- 2  -CL     Score  -- 9  -CL        Functional Assessment    Outcome Measure Options AM-PAC 6 Clicks Basic Mobility (PT)  -LS AM-PAC 6 Clicks Daily Activity (OT)  -CL       User Key  (r) = Recorded By, (t) = Taken By, (c) = Cosigned By    Initials Name Provider Type    JUSTYN Kessler, PT Physical Therapist    CL Kelsea Riggs, OT Occupational Therapist           Time Calculation:         PT Charges     Row Name 07/06/18 1426             Time Calculation    Start Time 1426  -      PT Received On 07/06/18  -      PT Goal Re-Cert Due Date 07/16/18  -         Time Calculation- PT    Total Timed Code Minutes- PT 3 minute(s)  -LS         Timed Charges    62797 - PT Therapeutic Exercise Minutes 3  -LS        User Key  (r) = Recorded By, (t) = Taken By, (c) = Cosigned By    Initials Name Provider Type    JUSTYN Kessler, PT Physical Therapist        Therapy Suggested Charges     Code   Minutes Charges    19301 (CPT®) Hc Pt Neuromusc Re Education Ea 15 Min      48956 (CPT®) Hc Pt Ther Proc Ea 15 Min 3     41330 (CPT®) Hc Gait Training Ea 15 Min      17709 (CPT®) Hc  Pt Therapeutic Act Ea 15 Min      35313 (CPT®) Hc Pt Manual Therapy Ea 15 Min      01900 (CPT®) Hc Pt Iontophoresis Ea 15 Min      98794 (CPT®) Hc Pt Elec Stim Ea-Per 15 Min      04763 (CPT®) Hc Pt Ultrasound Ea 15 Min      85529 (CPT®) Hc Pt Self Care/Mgmt/Train Ea 15 Min      Total  3         Therapy Charges for Today     Code Description Service Date Service Provider Modifiers Qty    41324467952 HC PT EVAL MOD COMPLEXITY 4 2018 Senait Kessler, PT GP 1    92157890371 HC PT THER SUPP EA 15 MIN 2018 Senait Kessler, PT GP 2          PT G-Codes  Outcome Measure Options: AM-PAC 6 Clicks Basic Mobility (PT)      Senait Kessler, PT  2018         Electronically signed by Senait Kessler, PT at 2018  3:15 PM          Occupational Therapy Notes (most recent note)      Kelsea Riggs, OT at 2018  3:58 PM          Acute Care - Occupational Therapy Initial Evaluation  [facility]     Patient Name: Leila Smith  : 1945  MRN: [id number]  Today's Date: 2018  Onset of Illness/Injury or Date of Surgery: 18  Date of Referral to OT: 18  Referring Physician: MD Devan    Admit Date: 2018       ICD-10-CM ICD-9-CM   1. Somnolence R40.0 780.09   2. Atrial fibrillation with rapid ventricular response (CMS/[facility]) I48.91 427.31   3. History of hypertension Z86.79 V12.59   4. Type 2 diabetes mellitus with hyperglycemia, unspecified long term insulin use status (CMS/[facility]) E11.65 250.00   5. History of recurrent UTI (urinary tract infection) Z87.440 V13.02   6. Dysphagia, unspecified type R13.10 787.20   7. Impaired mobility and ADLs Z74.09 799.89     Patient Active Problem List   Diagnosis   • Diabetes mellitus, type 2 (CMS/[facility])   • Hypertension   • Hyperlipidemia   • Hypothyroidism   • Chronic obstructive pulmonary disease (CMS/[facility])   • CAD (coronary artery disease)   • History of edema   • History of obesity   • Venous insufficiency   • Open wound of lower extremity   • Urinary tract infection   •  T2DM (type 2 diabetes mellitus) (CMS/HCC)   • Dyspnea on exertion   • Peripheral vascular disease (CMS/HCC)   • Obstructive sleep apnea syndrome   • Ulcer of lower extremity (CMS/HCC)   • Hypoxemia   • Hematuria   • Diabetic peripheral neuropathy (CMS/HCC)   • Edema   • Chronic obstructive pulmonary disease with acute exacerbation (CMS/HCC)   • Congestive heart failure (CMS/HCC)   • Arthritis   • Neutrophilic leukocytosis   • Cystitis   • Atrial fibrillation with rapid ventricular response (CMS/HCC)   • Altered mental status   • Sepsis due to urinary tract infection (CMS/HCC)   • Acute renal failure superimposed on stage 3 chronic kidney disease (CMS/HCC)   • Hyperkalemia   • Leukocytosis   • Elevated troponin   • Somnolence     Past Medical History:   Diagnosis Date   • Atrial fibrillation (CMS/HCC)    • Chronic ulcer of right leg (CMS/HCC)    • Cognitive communication deficit    • COPD (chronic obstructive pulmonary disease) (CMS/HCC)    • Diabetes mellitus (CMS/HCC)    • Difficulty in walking    • Encephalopathy    • Generalized muscle weakness    • Hematuria    • Hyperlipidemia    • Hypertension    • Hypothyroidism    • Urinary tract infection      Past Surgical History:   Procedure Laterality Date   • CATARACT EXTRACTION     • CHOLECYSTECTOMY     • COLOSTOMY     • FOOT SURGERY Right    • HERNIA REPAIR     • HYSTERECTOMY     • TOTAL KNEE ARTHROPLASTY Right           OT ASSESSMENT FLOWSHEET (last 72 hours)      Occupational Therapy Evaluation     Row Name 07/05/18 1502                   OT Evaluation Time/Intention    Subjective Information complains of;fatigue  -CL        Document Type evaluation  -CL        Patient Effort poor  -CL        Comment Pt easily overwhelmed/agitated.   -CL           General Information    Patient Profile Reviewed? yes  -CL        Onset of Illness/Injury or Date of Surgery 07/05/18  -CL        Referring Physician MD Devan  -CL        Prior Level of Function --   Pt poor historian,  no family present  -CL        Equipment Currently Used at Home walker, rolling;wheelchair   Per CM, Pt poor historian, no family present  -CL        Pertinent History of Current Functional Problem Pt presented to the ED 2/2 to back pain, N/V, and AMS. Pt found to be in A-fib w/ RVR and to have a UTI. CT (+) B renal calculi. PMH: colostomy  -CL        Existing Precautions/Restrictions fall;other (see comments)   dementia  -CL        Limitations/Impairments safety/cognitive  -CL        Risks Reviewed patient:;LOB;nausea/vomiting;increased discomfort;dizziness;lines disloged  -CL        Benefits Reviewed patient:;improve function;increase independence;increase strength;decrease pain;increase balance;increase knowledge  -CL        Barriers to Rehab medically complex;previous functional deficit;cognitive status  -CL           Relationship/Environment    Lives With facility resident  -CL           Resource/Environmental Concerns    Current Living Arrangements residential facility;extended care facility   LTC  -CL           Cognitive Assessment/Intervention- PT/OT    Affect/Mental Status (Cognitive) confused;agitated  -CL        Behavioral Issues (Cognitive) overwhelmed easily;inappropriate language;verbal outbursts;uncooperative  -CL        Orientation Status (Cognition) oriented to;person;disoriented to;place;situation;time  -CL        Follows Commands (Cognition) follows one step commands;0-24% accuracy;verbal cues/prompting required;repetition of directions required  -CL        Cognitive Function (Cognitive) safety deficit;attention deficit  -CL        Attention Deficit (Cognitive) severe deficit  -CL        Safety Deficit (Cognitive) severe deficit;awareness of need for assistance;safety precautions awareness;insight into deficits/self awareness  -CL        Personal Safety Interventions fall prevention program maintained;nonskid shoes/slippers when out of bed;supervised activity  -CL           Bed Mobility  Assessment/Treatment    Bed Mobility Assessment/Treatment rolling left;rolling right  -CL        Rolling Left [place] (Bed Mobility) maximum assist (25% patient effort);2 person assist;verbal cues  -CL        Rolling Right [place] (Bed Mobility) maximum assist (25% patient effort);2 person assist;verbal cues  -CL        Assistive Device (Bed Mobility) bed rails;draw sheet  -CL        Comment (Bed Mobility) Upon turning pt became very agitated and combative, maximum encouragement to participate.   -CL           Transfer Assessment/Treatment    Comment (Transfers) Deferred.   -CL           ADL Assessment/Intervention    BADL Assessment/Intervention feeding;grooming;toileting  -CL           Grooming Assessment/Training    [place] Level (Grooming) wash face, hands;moderate assist (50% patient effort)  -CL        Grooming Position sitting up in bed  -CL           Self-Feeding Assessment/Training    [place] Level (Feeding) maximum assist (25% patient effort);liquids to mouth  -CL        Assistive Devices (Feeding) adapted cup  -CL        Self-Feeding Assess/Train, Spillage Amount minimal  -CL           Toileting Assessment/Training    [place] Level (Toileting) adjust/manage clothing;change pad/brief;perform perineal hygiene;dependent (less than 25% patient effort);two person assist required;verbal cues  -CL        Toileting Position supine  -CL           General ROM    GENERAL ROM COMMENTS Unable to formally assess d/t cognitive status.   -CL           General Assessment (Manual Muscle Testing)    Comment, General Manual Muscle Testing (MMT) Assessment Unable to formally assess d/t cognitive status.   -CL           Sensory Assessment/Intervention    Sensory General Assessment --   Unable to formally assess d/t cognitive status.   -CL           Positioning and Restraints    Pre-Treatment Position in bed  -CL        Post Treatment Position bed  -CL        In Bed notified nsg;fowlers;call light  within reach;encouraged to call for assist;with nsg;exit alarm on;with other staff  -CL           Pain Scale: FACES Pre/Post-Treatment    Pain: FACES Scale, Pretreatment 0-->no hurt  -CL        Pain: FACES Scale, Post-Treatment 0-->no hurt  -CL           Wound 07/02/18 2100 Bilateral posterior coccyx pressure injury    Wound - Properties Group Date first assessed: 07/02/18  -JG Time first assessed: 2100  -JG Present On Admission : yes  -JG Side: Bilateral  -JG Orientation: posterior  -JG Location: coccyx  -JG Type: pressure injury  -JG Additional Comments: healing/scarred PI or shearing  -CP       Wound 07/03/18 0915 Left distal toe    Wound - Properties Group Date first assessed: 07/03/18  -CP Time first assessed: 0915  -CP Present On Admission : yes;picture taken  -CP Side: Left  -CP Orientation: distal  -CP Location: toe  -CP Stage, Pressure Injury: deep tissue injury  -CP       Wound 07/03/18 0915 Left lower leg ulceration, venous    Wound - Properties Group Date first assessed: 07/03/18  -CP Time first assessed: 0915  -CP Present On Admission : yes;picture taken  -CP Side: Left  -CP Orientation: lower  -CP Location: leg  -CP Type: ulceration, venous  -CP Additional Comments: healing  -CP       Clinical Impression (OT)    Date of Referral to OT 07/05/18  -CL        OT Diagnosis Decreased independence in ADLs.   -CL        Patient/Family Goals Statement (OT Eval) Pt unable to state.   -CL        Criteria for Skilled Therapeutic Interventions Met (OT Eval) yes;treatment indicated  -CL        Rehab Potential (OT Eval) fair, will monitor progress closely  -CL        Therapy Frequency (OT Eval) 3 times/wk   MWF  -CL        Anticipated Equipment Needs at Discharge (OT) --   TBA further  -CL        Anticipated Discharge Disposition (OT) skilled nursing facility  -CL           Vital Signs    Pre Systolic BP Rehab --   VSS, RN cleared for tx.   -CL           OT Goals    Transfer Goal Selection (OT) transfer, OT goal 1   -CL        Grooming Goal Selection (OT) grooming, OT goal 1  -CL        Self-Feeding Goal Selection (OT) self feeding, OT goal 1  -CL        Balance Goal Selection (OT) balance, OT goal 1  -CL        Additional Documentation Self-Feeding Goal Selection (OT) (Row);Grooming Goal Selection (OT) (Row);Balance Goal Selection (OT) (Row)  -CL           Transfer Goal 1 (OT)    Activity/Assistive Device (Transfer Goal 1, OT) sit-to-stand/stand-to-sit;toilet  -CL        [place] Level/Cues Needed (Transfer Goal 1, OT) maximum assist (25-49% patient effort);verbal cues required  -CL        Time Frame (Transfer Goal 1, OT) by discharge;long term goal (LTG)  -CL        Progress/Outcome (Transfer Goal 1, OT) goal ongoing  -CL           Grooming Goal 1 (OT)    Activity/Device (Grooming Goal 1, OT) wash face, hands   sitting EOB  -CL        [place] (Grooming Goal 1, OT) minimum assist (75% or more patient effort);verbal cues required  -CL        Time Frame (Grooming Goal 1, OT) by discharge;long term goal (LTG)  -CL        Progress/Outcome (Grooming Goal 1, OT) goal ongoing  -CL           Self-Feeding Goal 1 (OT)    Activity/Assistive Device (Self-Feeding Goal 1, OT) liquids to mouth;scoop food and bring to mouth  -CL        [place] Level/Cues Needed (Self-Feeding Goal 1, OT) minimum assist (75% or more patient effort);verbal cues required  -CL        Time Frame (Self-Feeding Goal 1, OT) by discharge;long term goal (LTG)  -CL        Progress/Outcomes (Self-Feeding Goal 1, OT) goal ongoing  -CL           Balance Goal 1 (OT)    Activity/Assistive Device (Balance Goal 1, OT) sitting, static  -CL        [place] Level/Cues Needed (Balance Goal 1, OT) minimum assist (75% or more patient effort);verbal cues required  -CL        Time Frame (Balance Goal 1, OT) by discharge;long term goal (LTG)  -CL        Progress/Outcomes (Balance Goal 1, OT) goal ongoing  -CL           OT Cognitive Goals    Attention Goal Selection  (OT) attention, OT goal 1  -CL          User Key  (r) = Recorded By, (t) = Taken By, (c) = Cosigned By    Initials Name Effective Dates    CP Angélica Gavin, APRN 06/08/18 -     JACQUELINE Riggs, OT 04/03/18 -     RAUDEL Krause RN 08/08/16 -            Occupational Therapy Education     Title: PT OT SLP Therapies (Active)     Topic: Occupational Therapy (Active)     Point: ADL training (Active)     Description: Instruct learner(s) on proper safety adaptation and remediation techniques during self care or transfers.   Instruct in proper use of assistive devices.   Learning Progress Summary     Learner Status Readiness Method Response Comment Documented by    Patient Active Acceptance E NR Pt educated on role of OT, AE for self-feeding, positioning, and benefits of therapy. CL 07/05/18 6235          Point: Home exercise program (Active)     Description: Instruct learner(s) on appropriate technique for monitoring, assisting and/or progressing therapeutic exercises/activities.   Learning Progress Summary     Learner Status Readiness Method Response Comment Documented by    Patient Active Acceptance E NR Pt educated on role of OT, AE for self-feeding, positioning, and benefits of therapy. CL 07/05/18 1555          Point: Precautions (Active)     Description: Instruct learner(s) on prescribed precautions during self-care and functional transfers.   Learning Progress Summary     Learner Status Readiness Method Response Comment Documented by    Patient Active Acceptance E NR Pt educated on role of OT, AE for self-feeding, positioning, and benefits of therapy. CL 07/05/18 1555                      User Key     Initials Effective Dates Name Provider Type Discipline    CL 04/03/18 -  Kelsea Riggs, OT Occupational Therapist OT                  OT Recommendation and Plan  Outcome Summary/Treatment Plan (OT)  Anticipated Equipment Needs at Discharge (OT):  (TBA further)  Anticipated Discharge Disposition (OT):  skilled nursing facility  Therapy Frequency (OT Eval): 3 times/wk (MWF)  Plan of Care Review  Plan of Care Reviewed With: patient  Plan of Care Reviewed With: patient  Outcome Summary: OT completed a brief chart review relating to presenting physical/cognitive deficits. Pt sessoin limited d/t significant confusion and pt requiring MAXIMUM encouragement to participate. Recommend cont skilled IPOT intervention 3x/wk. Recommend pt DC to SNF.           Outcome Measures     Row Name 18 1440             How much help from another is currently needed...    Putting on and taking off regular lower body clothing? 1  -CL      Bathing (including washing, rinsing, and drying) 1  -CL      Toileting (which includes using toilet bed pan or urinal) 1  -CL      Putting on and taking off regular upper body clothing 2  -CL      Taking care of personal grooming (such as brushing teeth) 2  -CL      Eating meals 2  -CL      Score 9  -CL         Functional Assessment    Outcome Measure Options AM-PAC 6 Clicks Daily Activity (OT)  -CL        User Key  (r) = Recorded By, (t) = Taken By, (c) = Cosigned By    Initials Name Provider Type    CL Kelsea Riggs OT Occupational Therapist          Time Calculation:   OT Start Time: 1440  Therapy Suggested Charges     Code   Minutes Charges    None           Therapy Charges for Today     Code Description Service Date Service Provider Modifiers Qty    37486361288  OT EVAL LOW COMPLEXITY 4 2018 Kelsea Riggs OT GO 1    [id number]  OT THER SUPP EA 15 MIN 2018 Kelsea Riggs OT GO 1               Kelsea Riggs OT  2018    Electronically signed by Kelsea Riggs OT at 2018  3:59 PM          Speech Language Pathology Notes (most recent note)      Tianna Angela MS CCC-SLP at 2018 11:06 AM          Acute Care - Speech Language Pathology   Swallow Re-Evaluation [facility]   Clinical Swallow Evaluation     Patient Name: Leila Smith  : 1945  MRN: [id number]  Today's  Date: 7/4/2018               Admit Date: 7/2/2018    Visit Dx:     ICD-10-CM ICD-9-CM   1. Somnolence R40.0 780.09   2. Atrial fibrillation with rapid ventricular response (CMS/HCC) I48.91 427.31   3. History of hypertension Z86.79 V12.59   4. Type 2 diabetes mellitus with hyperglycemia, unspecified long term insulin use status (CMS/HCC) E11.65 250.00   5. History of recurrent UTI (urinary tract infection) Z87.440 V13.02   6. Dysphagia, unspecified type R13.10 787.20     Patient Active Problem List   Diagnosis   • Diabetes mellitus, type 2 (CMS/[facility])   • Hypertension   • Hyperlipidemia   • Hypothyroidism   • Chronic obstructive pulmonary disease (CMS/[facility])   • CAD (coronary artery disease)   • History of edema   • History of obesity   • Venous insufficiency   • Open wound of lower extremity   • Urinary tract infection   • T2DM (type 2 diabetes mellitus) (CMS/HCC)   • Dyspnea on exertion   • Peripheral vascular disease (CMS/HCC)   • Obstructive sleep apnea syndrome   • Ulcer of lower extremity (CMS/HCC)   • Hypoxemia   • Hematuria   • Diabetic peripheral neuropathy (CMS/HCC)   • Edema   • Chronic obstructive pulmonary disease with acute exacerbation (CMS/HCC)   • Congestive heart failure (CMS/HCC)   • Arthritis   • Neutrophilic leukocytosis   • Cystitis   • Atrial fibrillation with rapid ventricular response (CMS/HCC)   • Altered mental status   • Sepsis due to urinary tract infection (CMS/HCC)   • Acute renal failure superimposed on stage 3 chronic kidney disease (CMS/HCC)   • Hyperkalemia   • Leukocytosis   • Elevated troponin   • Somnolence     Past Medical History:   Diagnosis Date   • Atrial fibrillation (CMS/[facility])    • Chronic ulcer of right leg (CMS/HCC)    • Cognitive communication deficit    • COPD (chronic obstructive pulmonary disease) (CMS/[facility])    • Diabetes mellitus (CMS/[facility])    • Difficulty in walking    • Encephalopathy    • Generalized muscle weakness    • Hematuria    • Hyperlipidemia    • Hypertension     • Hypothyroidism    • Urinary tract infection      Past Surgical History:   Procedure Laterality Date   • CATARACT EXTRACTION     • CHOLECYSTECTOMY     • COLOSTOMY     • FOOT SURGERY Right    • HERNIA REPAIR     • HYSTERECTOMY     • TOTAL KNEE ARTHROPLASTY Right           SWALLOW EVALUATION (last 72 hours)      SLP Adult Swallow Evaluation     Row Name 07/04/18 1030 07/03/18 1415                Rehab Evaluation    Document Type re-evaluation  - evaluation  -RD       Subjective Information no complaints  -SM no complaints  -RD       Patient Observations alert;cooperative  -SM alert;cooperative;agree to therapy  -RD       Patient/Family Observations  -- Pt upright in bed  -RD       Patient Effort  -- adequate  -RD          General Information    Patient Profile Reviewed  -- yes  -RD       Pertinent History Of Current Problem  -- Adm w/ sepsis, somnolence, AMS, afib, AMS, CKD 3, incr'd sodium, COPD, CHD. RN reports pt coughing w/ liquids  -RD       Current Method of Nutrition pureed;thin liquids  -SM clear liquids  -RD       Precautions/Limitations, Vision  -- WFL;for purposes of eval  -RD       Precautions/Limitations, Hearing  -- WFL;for purposes of eval  -RD       Prior Level of Function-Communication  -- other (see comments)   unknown  -RD       Prior Level of Function-Swallowing  -- other (see comments)   unknown  -RD       Plans/Goals Discussed with patient  -SM patient;agreed upon  -RD       Barriers to Rehab  -- cognitive status  -RD       Patient's Goals for Discharge patient did not state  - patient did not state  -RD          Pain Assessment    Additional Documentation  -- Pain Scale: FACES Pre/Post-Treatment (Group)  -RD          Pain Scale: FACES Pre/Post-Treatment    Pain: FACES Scale, Pretreatment 0-->no hurt  -SM 0-->no hurt  -RD       Pain: FACES Scale, Post-Treatment 0-->no hurt  -SM 0-->no hurt  -RD          Oral Musculature and Cranial Nerve Assessment    Oral Motor General Assessment  --  generalized oral motor weakness  -RD          General Eating/Swallowing Observations    Respiratory Support Currently in Use  -- nasal cannula  -RD       Eating/Swallowing Skills  -- fed by SLP;unsafe self-feeding skills observed;rate is too fast;unaware of safety concerns  -RD       Positioning During Eating  -- upright 90 degree;upright in bed  -RD       Utensils Used  -- spoon;cup;straw  -RD       Consistencies Trialed  -- thin liquids;nectar/syrup-thick liquids;pudding thick  -RD          Clinical Swallow Eval    Oral Prep Phase  -- --   DNT solid  -RD       Oral Transit  -- impaired  -RD       Oral Residue  -- WFL  -RD       Pharyngeal Phase no overt signs/symptoms of pharyngeal impairment  -SM suspected pharyngeal impairment  -RD       Esophageal Phase  -- suspected esophageal impairment  -RD       Clinical Swallow Evaluation Summary Back to baseline function per RN. Less impulsive today c/t yesterday's report. RN reports no issues once implemented controlled sips and cues from nursing as needed. Showing no s/s aspiration with SLP trials, even via straw. Lungs clear per RN. No obvious concern for chronic phayrngeal dysphagia, though some risk for aspiration 2' cognitive impairment, if general precautions not utilized (which nursing doing well utilizing).   -SM BS eval complete. Pt confused. Trials of thins, nectar, and pureed (clear liqs) given. No s/s w/ Nectar or pureed. Pt very impulsive and unable to control rate w/ straw. Overt s/s of aspiration c/b coughing w/ thins via straw. No s/s of aspiration w/ small single cup sips of thin. Unable to assess solids 2' current clear restriction. Pt noted burping w/all consistencies t/o eval. Suspect pharyngeal and esophageal dysphagia. RECS: continue clear thin liquid diet per MD discretion, no straws, small single sips, general aspiration and reflux precautions, Oral care BID and PRN, F/u for re-eval for diet uprade vs. instrumental eval.   -RD          Oral  Transit Concerns    Oral Transit Concerns  -- increased oral transit time  -RD       Increased Oral Transit Time  -- all consistencies  -RD          Pharyngeal Phase Concerns    Pharyngeal Phase Concerns  -- cough;wet vocal quality  -RD       Wet Vocal Quality  -- thin  -RD       Cough  -- thin  -RD          Esophageal Phase Concerns    Esophageal Phase Concerns  -- belching  -RD       Belching  -- thin;nectar;pudding  -RD          Clinical Impression    SLP Swallowing Diagnosis  -- suspected pharyngeal dysfunction;esophageal dysfunction  -RD       Functional Impact  -- risk of aspiration/pneumonia  -RD       Rehab Potential/Prognosis, Swallowing  -- good, to achieve stated therapy goals  -RD       Criteria for Skilled Therapeutic Interventions Met  -- demonstrates skilled criteria  -RD          Recommendations    Therapy Frequency (Swallow)  -- evaluation only;PRN  -RD       Predicted Duration Therapy Intervention (Days)  -- until discharge  -RD       SLP Diet Recommendation puree;thin liquids  -SM thin liquids;clear liquid diet   no straws  -RD       Recommended Diagnostics  -- reassess via clinical swallow evaluation  -RD       Recommended Precautions and Strategies upright posture during/after eating;small bites of food and sips of liquid;other (see comments)   ok to resume straws as tolerated. Remove if/when impulsive  -SM upright posture during/after eating;small bites of food and sips of liquid;no straw;other (see comments)   supervision w/ PO  -RD       SLP Rec. for Method of Medication Administration meds whole;with pudding or applesauce  -SM meds whole;with thick liquids;with pudding or applesauce;as tolerated  -RD       Monitor for Signs of Aspiration  -- yes;notify SLP if any concerns;cough;gurgly voice;throat clearing;upper respiratory;pneumonia;right lower lobe infiltrates  -RD       Anticipated Dischage Disposition  -- unknown;anticipate therapy at next level of care  -RD         User Key  (r) =  Recorded By, (t) = Taken By, (c) = Cosigned By    Initials Name Effective Dates     Tianna Angela MS CCC-SLP 06/22/15 -     RD Laura Warren MS CCC-SLP 04/03/18 -         EDUCATION  The patient has been educated in the following areas:   Dysphagia (Swallowing Impairment) Modified Diet Instruction.    SLP Recommendation and Plan     SLP Diet Recommendation: puree, thin liquids  Recommended Precautions and Strategies: upright posture during/after eating, small bites of food and sips of liquid, other (see comments) (ok to resume straws as tolerated. Remove if/when impulsive)                               Plan of Care Reviewed With: patient  Plan of Care Review  Plan of Care Reviewed With: patient             Time Calculation:         Time Calculation- SLP     Row Name 07/04/18 1101             Time Calculation- SLP    SLP Start Time 1030  -      SLP Received On 07/04/18  -        User Key  (r) = Recorded By, (t) = Taken By, (c) = Cosigned By    Initials Name Provider Type     Tianna Angela MS CCC-SLP Speech and Language Pathologist          Therapy Charges for Today     Code Description Service Date Service Provider Modifiers Qty    [id number] HC ST EVAL ORAL PHARYNG SWALLOW 3 7/4/2018 Tianna Angela MS CCC-SLP GN 1               Tianna Angela MS CCC-SLP  7/4/2018    Electronically signed by Tianna Angela MS CCC-SLP at 7/4/2018 11:06 AM

## 2018-07-07 NOTE — PROGRESS NOTES
[facility] Medicine Services  PROGRESS NOTE    Patient Name: Leila Smith  : 1945  MRN: [id number]    Date of Admission: 2018  Length of Stay: 5  Primary Care Physician: Ciaran Wakefield MD    Subjective   Subjective     CC:  F/u UTI, sepsis    HPI:  No visitors present.  She is unable to provide any history.  RN report she drank some milk/juice this morning.  Some boost for lunch - not much in the way of food though.  Continues to have some intermittent tachycardia.    Review of Systems   Unable to perform ROS: Mental status change       Objective   Objective     Vital Signs:   Temp:  [97.4 °F (36.3 °C)-99.1 °F (37.3 °C)] 97.4 °F (36.3 °C)  Heart Rate:  [] 92  Resp:  [16-20] 18  BP: (137-151)/(74-97) 151/97        Physical Exam:  Constitutional: No acute distress, awake, alert, chronically ill appearing  HENT: NCAT, mucous membranes moist  Respiratory: Clear to auscultation bilaterally, respiratory effort normal   Cardiovascular: RRR (rate in 90s), no murmurs, rubs, or gallops,  Gastrointestinal: Positive bowel sounds, soft, nontender, nondistended  Musculoskeletal: No bilateral ankle edema  Psychiatric: flat affect, cooperative  Neurologic: Oriented x 1, mostly answers yes/no, will follow commands, no obvious deficits  Skin: some chronic scaling to LEs  Exam unchanged from previous      Results Reviewed:  I have personally reviewed current lab, radiology, and data and agree.      Results from last 7 days  Lab Units 18  0518  0539 18  1615   WBC 10*3/mm3 10.48 12.26* 10.76   HEMOGLOBIN g/dL 13.3 13.0 14.5   HEMATOCRIT % 42.9 42.8 47.3*   PLATELETS 10*3/mm3 249 271 314       Results from last 7 days  Lab Units 18  0517 18  0539 18  0052  18  1615   SODIUM mmol/L 144 143  --   --  142   POTASSIUM mmol/L 4.0 4.8  4.8 5.2  < > 6.7*   CHLORIDE mmol/L 105 114*  --   --  108   CO2 mmol/L 20.0 18.0*  --   --  18.0*   BUN mg/dL 63*  76*  --   --  86*   CREATININE mg/dL 2.07* 2.69*  --   --  3.21*   GLUCOSE mg/dL 114* 214*  --   --  381*   CALCIUM mg/dL 7.8* 8.3*  --   --  8.9   ALT (SGPT) U/L  --  38  --   --  38   AST (SGOT) U/L  --  27  --   --  43*   < > = values in this interval not displayed.  Estimated Creatinine Clearance: 29.7 mL/min (A) (by C-G formula based on SCr of 2.07 mg/dL (H)).  No results found for: BNP    Microbiology Results Abnormal     Procedure Component Value - Date/Time    Blood Culture - Blood, [[id number]]  (Normal) Collected:  07/02/18 1940    Lab Status:  Preliminary result Specimen:  Blood from Arm, Right Updated:  07/06/18 2030     Blood Culture No growth at 4 days    Blood Culture - Blood, [[id number]]  (Normal) Collected:  07/02/18 1610    Lab Status:  Preliminary result Specimen:  Blood from Arm, Right Updated:  07/06/18 1815     Blood Culture No growth at 4 days    Urine Culture - Urine, [[id number]]  (Abnormal) Collected:  07/03/18 0200    Lab Status:  Final result Specimen:  Urine from Urine, Catheter Updated:  07/06/18 1206     Urine Culture <10,000 CFU/mL Candida albicans (A)          Imaging Results (last 24 hours)     ** No results found for the last 24 hours. **        Results for orders placed during the hospital encounter of 01/31/18   Adult Transesophageal Echo ([facility]) W/ Cont if Necessary Per Protocol    Narrative · Left ventricular systolic function is normal.  · Left ventricular wall thickness is consistent with moderate concentric   hypertrophy.  · Left atrial cavity size is dilated.  · Cardiac valves are morphologically within normal limits for patient's   age.          I have reviewed the medications.    Assessment/Plan   Assessment / Plan     Hospital Problem List     * (Principal)Sepsis due to urinary tract infection (CMS/HCC)    Diabetes mellitus, type 2 (CMS/HCC)    Overview Deleted 7/2/2018  9:44 PM by Brian Samson MD            Hypertension    Hyperlipidemia    Hypothyroidism     Obstructive sleep apnea syndrome    Diabetic peripheral neuropathy (CMS/HCC)    Atrial fibrillation with rapid ventricular response (CMS/HCC)    Altered mental status    Acute renal failure superimposed on stage 3 chronic kidney disease (CMS/HCC)    Hyperkalemia    Somnolence             Brief Hospital Course to date:  Leila Smith is a 72 y.o. female with dementia, DM2, HTN, presented to ED with hypotension, sepsis, ARF, and UTI.  Improved with IVF.      Assessment & Plan:  - urine culture with yeast only.  Will stop abx today.  - ARF improving, labs pending (confirmed with RN), continue to watch off IVF  - hyperkalemia resolved  - adjust basal/bolus insulin for better control  - mental status likely at baseline  - changed CD diltizem to IR due to patient chewing up pills.  Also add back BB for intermittent tachycardia.  - reviewed CM note, no bed hold.  New referrals sent for placement.  Will be here through the weekend, will discuss with CM on Monday.      DVT Prophylaxis:  eliquis    CODE STATUS:   Code Status and Medical Interventions:   Ordered at: 07/02/18 2152     Code Status:    CPR     Medical Interventions (Level of Support Prior to Arrest):    Full       Disposition: I expect the patient to be discharged to SNF next week.    Electronically signed by Oneil Hartman MD, 07/07/18, 1:38 PM.

## 2018-07-07 NOTE — PLAN OF CARE
Problem: Patient Care Overview  Goal: Plan of Care Review  Outcome: Ongoing (interventions implemented as appropriate)   07/07/18 1611   Coping/Psychosocial   Plan of Care Reviewed With patient   Plan of Care Review   Progress no change   OTHER   Outcome Summary VSS except heart rate to 100's 120's at times.Pt confused, agitated at times, verbally abusive at times, refuses blood work. Pt has purewick catheter in place. awaiting SNF placement .

## 2018-07-07 NOTE — PLAN OF CARE
Problem: Patient Care Overview  Goal: Plan of Care Review  Outcome: Ongoing (interventions implemented as appropriate)   07/07/18 0459   Coping/Psychosocial   Plan of Care Reviewed With patient   Plan of Care Review   Progress no change   OTHER   Outcome Summary VSS. afib on monitor. RA,. HR 90-110s. Pt continues to be confused, purewick in use for incontinence. waiting for placement,

## 2018-07-07 NOTE — THERAPY TREATMENT NOTE
Acute Care - Occupational Therapy Progress Note  [facility]     Patient Name: Leila Smith  : 1945  MRN: [id number]  Today's Date: 2018  Onset of Illness/Injury or Date of Surgery: 18  Date of Referral to OT: 18  Referring Physician: MD Devan    Admit Date: 2018       ICD-10-CM ICD-9-CM   1. Somnolence R40.0 780.09   2. Atrial fibrillation with rapid ventricular response (CMS/[facility]) I48.91 427.31   3. History of hypertension Z86.79 V12.59   4. Type 2 diabetes mellitus with hyperglycemia, unspecified long term insulin use status (CMS/[facility]) E11.65 250.00   5. History of recurrent UTI (urinary tract infection) Z87.440 V13.02   6. Dysphagia, unspecified type R13.10 787.20   7. Impaired mobility and ADLs Z74.09 799.89   8. Impaired functional mobility, balance, gait, and endurance Z74.09 V49.89     Patient Active Problem List   Diagnosis   • Diabetes mellitus, type 2 (CMS/[facility])   • Hypertension   • Hyperlipidemia   • Hypothyroidism   • Chronic obstructive pulmonary disease (CMS/[facility])   • CAD (coronary artery disease)   • History of edema   • History of obesity   • Venous insufficiency   • Open wound of lower extremity   • Urinary tract infection   • T2DM (type 2 diabetes mellitus) (CMS/[facility])   • Dyspnea on exertion   • Peripheral vascular disease (CMS/[facility])   • Obstructive sleep apnea syndrome   • Ulcer of lower extremity (CMS/[facility])   • Hypoxemia   • Hematuria   • Diabetic peripheral neuropathy (CMS/[facility])   • Edema   • Chronic obstructive pulmonary disease with acute exacerbation (CMS/[facility])   • Congestive heart failure (CMS/[facility])   • Arthritis   • Neutrophilic leukocytosis   • Cystitis   • Atrial fibrillation with rapid ventricular response (CMS/[facility])   • Altered mental status   • Sepsis due to urinary tract infection (CMS/[facility])   • Acute renal failure superimposed on stage 3 chronic kidney disease (CMS/[facility])   • Hyperkalemia   • Leukocytosis   • Elevated troponin   • Somnolence     Past Medical  History:   Diagnosis Date   • Atrial fibrillation (CMS/HCC)    • Chronic ulcer of right leg (CMS/HCC)    • Cognitive communication deficit    • COPD (chronic obstructive pulmonary disease) (CMS/HCC)    • Diabetes mellitus (CMS/HCC)    • Difficulty in walking    • Encephalopathy    • Generalized muscle weakness    • Hematuria    • Hyperlipidemia    • Hypertension    • Hypothyroidism    • Urinary tract infection      Past Surgical History:   Procedure Laterality Date   • CATARACT EXTRACTION     • CHOLECYSTECTOMY     • COLOSTOMY     • FOOT SURGERY Right    • HERNIA REPAIR     • HYSTERECTOMY     • TOTAL KNEE ARTHROPLASTY Right        Therapy Treatment          Rehabilitation Treatment Summary     Row Name 07/07/18 1100             Treatment Time/Intention    Discipline occupational therapist  -JR      Document Type therapy note (daily note)  -JR      Subjective Information no complaints  -JR      Mode of Treatment occupational therapy  -JR      Care Plan Review risks/benefits reviewed;patient/other agree to care plan  -JR      Patient Effort fair  -JR      Existing Precautions/Restrictions fall;other (see comments)   dementia  -JR      Recorded by [JR] Alix Newman, OT 07/07/18 1247      Row Name 07/07/18 1100             Vital Signs    Pre Systolic BP Rehab 140  -JR      Pre Treatment Diastolic BP 91  -JR      Post Systolic BP Rehab 145  -JR      Post Treatment Diastolic BP 87  -JR      Pretreatment Heart Rate (beats/min) 78  -JR      Posttreatment Heart Rate (beats/min) 93  -JR      Pre Patient Position Supine  -JR      Intra Patient Position Supine  -JR      Post Patient Position Supine  -JR      Recorded by [JR] Alix Newman OT 07/07/18 1247      Row Name 07/07/18 1100             Cognitive Assessment/Intervention- PT/OT    Affect/Mental Status (Cognitive) low arousal/lethargic;confused;agitated  -JR      Orientation Status (Cognition) disoriented to;person;place;situation;time  -JR      Follows Commands  (Cognition) follows one step commands;0-24% accuracy  -JR      Cognitive Function (Cognitive) attention deficit;executive function deficit;memory deficit;safety deficit  -JR      Attention Deficit (Cognitive) severe deficit  -JR      Executive Function Deficit (Cognition) severe deficit  -JR      Memory Deficit (Cognitive) severe deficit  -JR      Safety Deficit (Cognitive) severe deficit  -JR      Recorded by [JR] Alix Newman, OT 07/07/18 1247      Row Name 07/07/18 1100             Bed Mobility Assessment/Treatment    Rolling Left McKean (Bed Mobility) dependent (less than 25% patient effort);verbal cues  -JR      Rolling Right McKean (Bed Mobility) dependent (less than 25% patient effort);verbal cues  -JR      Bed Mobility, Safety Issues cognitive deficits limit understanding;decreased use of arms for pushing/pulling;decreased use of legs for bridging/pushing  -JR      Assistive Device (Bed Mobility) draw sheet  -JR      Recorded by [JR] Alix Newman, OT 07/07/18 1247      Row Name 07/07/18 1100             ADL Assessment/Intervention    30633 - OT Self Care/Mgmt Minutes 15  -JR      BADL Assessment/Intervention feeding;grooming;lower body dressing  -JR      Recorded by [JR] Alix Newman, OT 07/07/18 1247      Row Name 07/07/18 1100             Lower Body Dressing Assessment/Training    Lower Body Dressing McKean Level don;socks;dependent (less than 25% patient effort)   L sock only  -JR      Lower Body Dressing Position supine  -JR      Recorded by [JR] Alix Newman, OT 07/07/18 1247      Row Name 07/07/18 1100             Grooming Assessment/Training    McKean Level (Grooming) wash face, hands;dependent (less than 25% patient effort);verbal cues  -JR      Grooming Position supine  -JR      Recorded by [JR] Alix Newman, OT 07/07/18 1247      Row Name 07/07/18 1100             Self-Feeding Assessment/Training    Comment (Feeding) Pt refused  -JR      Recorded by [JR]  Alix Newman, OT 07/07/18 1247      Row Name 07/07/18 1100             Motor Skills Assessment/Interventions    Additional Documentation Therapeutic Exercise (Group);Therapeutic Exercise Interventions (Group)  -JR      Recorded by [JR] Alix Newman, OT 07/07/18 1247      Row Name 07/07/18 1100             Therapeutic Exercise    75328 - OT Therapeutic Activity Minutes 8  -JR      Recorded by [JR] Alix Newman, OT 07/07/18 1247      Row Name 07/07/18 1100             Therapeutic Exercise    Upper Extremity Range of Motion (Therapeutic Exercise) shoulder flexion/extension, bilateral;shoulder abduction/adduction, bilateral;shoulder horizontal abduction/adduction, bilateral;elbow flexion/extension, bilateral;forearm supination/pronation, bilateral;wrist flexion/extension, bilateral   Pt resisting movement at times, especially on the L  -JR      Lower Extremity Range of Motion (Therapeutic Exercise) hip flexion/extension, bilateral;hip abduction/adduction, bilateral;ankle dorsiflexion/plantar flexion, bilateral  -JR      Exercise Type (Therapeutic Exercise) PROM (passive range of motion)  -JR      Sets/Reps (Therapeutic Exercise) 10 reps  -JR      Recorded by [JR] Alix Newman, OT 07/07/18 1247      Row Name 07/07/18 1100             Positioning and Restraints    Pre-Treatment Position in bed  -JR      Post Treatment Position bed  -JR      In Bed notified nsg;supine;call light within reach;encouraged to call for assist;exit alarm on;waffle boots/both  -JR      Recorded by [JR] Alix Newman, OT 07/07/18 1247      Row Name 07/07/18 1100             Pain Scale: FACES Pre/Post-Treatment    Pain: FACES Scale, Pretreatment 0-->no hurt  -JR      Pain: FACES Scale, Post-Treatment 0-->no hurt  -JR      Recorded by [JR] Alix Newman, OT 07/07/18 1247      Row Name                Wound 07/02/18 2100 Bilateral posterior coccyx pressure injury    Wound - Properties Group Date first assessed: 07/02/18 [JG] Time  first assessed: 2100 [JG] Present On Admission : yes [JG] Side: Bilateral [JG] Orientation: posterior [JG] Location: coccyx [JG] Type: pressure injury [JG] Additional Comments: healing/scarred PI or shearing [CP] Recorded by:  [CP] Angélica Gavin, DANIEL 07/03/18 1052 [JG] Bailey Krause RN 07/03/18 0609    Row Name                Wound 07/03/18 0915 Left distal toe    Wound - Properties Group Date first assessed: 07/03/18 [CP] Time first assessed: 0915 [CP] Present On Admission : yes;picture taken [CP] Side: Left [CP] Orientation: distal [CP] Location: toe [CP] Stage, Pressure Injury: deep tissue injury [CP] Recorded by:  [CP] Angélica Gavin, DANIEL 07/03/18 1101    Row Name                Wound 07/03/18 0915 Left lower leg ulceration, venous    Wound - Properties Group Date first assessed: 07/03/18 [CP] Time first assessed: 0915 [CP] Present On Admission : yes;picture taken [CP] Side: Left [CP] Orientation: lower [CP] Location: leg [CP] Type: ulceration, venous [CP] Additional Comments: healing [CP] Recorded by:  [CP] DANIEL Osman 07/03/18 1106    Row Name 07/07/18 1100             Plan of Care Review    Plan of Care Reviewed With patient  -JR      Recorded by [JR] Alix Newman, OT 07/07/18 1247      Row Name 07/07/18 1100             Outcome Summary/Treatment Plan (OT)    Daily Summary of Progress (OT) progress toward functional goals is gradual  -JR      Barriers to Overall Progress (OT) cognitive status  -JR      Plan for Continued Treatment (OT) Continue OT per POC  -JR      Anticipated Discharge Disposition (OT) skilled nursing facility  -JR      Recorded by [JR] Alix Newman, OT 07/07/18 1247        User Key  (r) = Recorded By, (t) = Taken By, (c) = Cosigned By    Initials Name Effective Dates Discipline    CP Angélica Gavin, APRN 06/08/18 -  Nurse    JR Alix Newman, OT 06/22/15 -  OT    JG Bailey Krause RN 08/08/16 -  Nurse        Wound 07/02/18 2100  Bilateral posterior coccyx pressure injury (Active)   Dressing Appearance open to air 7/7/2018  8:20 AM   Closure Open to air 7/7/2018  8:20 AM   Base clean;dry;gray;pink 7/7/2018  8:20 AM   Periwound intact;dry;pink;blanchable 7/7/2018  8:20 AM   Edges irregular 7/7/2018  8:20 AM   Drainage Amount none 7/7/2018  8:20 AM   Dressing Care, Wound open to air 7/7/2018  8:20 AM       Wound 07/03/18 0915 Left distal toe (Active)   Dressing Appearance open to air 7/7/2018  8:20 AM   Closure Open to air 7/7/2018  8:20 AM   Base clean;dry;pink;maroon/purple 7/7/2018  8:20 AM   Periwound intact;dry;pink;blanchable 7/7/2018  8:20 AM   Edges irregular 7/7/2018  8:20 AM   Drainage Amount none 7/7/2018  8:20 AM   Care, Wound other (see comments) 7/7/2018  8:20 AM       Wound 07/03/18 0915 Left lower leg ulceration, venous (Active)   Dressing Appearance dry;intact;no drainage 7/7/2018  8:20 AM   Closure Open to air 7/6/2018  8:49 PM   Base dressing in place, unable to visualize 7/7/2018  8:20 AM   Periwound dry;intact;blanchable 7/7/2018  8:20 AM   Drainage Amount none 7/7/2018  8:20 AM   Dressing Care, Wound low-adherent 7/7/2018  8:20 AM         Occupational Therapy Education     Title: PT OT SLP Therapies (Active)     Topic: Occupational Therapy (Active)     Point: ADL training (Active)     Description: Instruct learner(s) on proper safety adaptation and remediation techniques during self care or transfers.   Instruct in proper use of assistive devices.   Learning Progress Summary     Learner Status Readiness Method Response Comment Documented by    Patient Active Acceptance E NR Pt educated on role of OT, AE for self-feeding, positioning, and benefits of therapy. CL 07/05/18 7088          Point: Home exercise program (Active)     Description: Instruct learner(s) on appropriate technique for monitoring, assisting and/or progressing therapeutic exercises/activities.   Learning Progress Summary     Learner Status Readiness Method  Response Comment Documented by    Patient Active Acceptance E NR Limited teaching this date, pt confused and no family present.  07/07/18 1247     Active Acceptance E NR Pt educated on role of OT, AE for self-feeding, positioning, and benefits of therapy. CL 07/05/18 1555          Point: Precautions (Active)     Description: Instruct learner(s) on prescribed precautions during self-care and functional transfers.   Learning Progress Summary     Learner Status Readiness Method Response Comment Documented by    Patient Active Acceptance E NR Pt educated on role of OT, AE for self-feeding, positioning, and benefits of therapy. CL 07/05/18 1555                      User Key     Initials Effective Dates Name Provider Type Discipline    JR 06/22/15 -  Alix Newman, OT Occupational Therapist OT    CL 04/03/18 -  Kelsea Riggs, OT Occupational Therapist OT                OT Recommendation and Plan  Outcome Summary/Treatment Plan (OT)  Daily Summary of Progress (OT): progress toward functional goals is gradual  Barriers to Overall Progress (OT): cognitive status  Plan for Continued Treatment (OT): Continue OT per POC  Anticipated Discharge Disposition (OT): skilled nursing facility  Daily Summary of Progress (OT): progress toward functional goals is gradual  Plan of Care Review  Plan of Care Reviewed With: patient  Plan of Care Reviewed With: patient  Outcome Summary: Pt confused, agitated at times, limited active participation. Recommend SNF at d/c.        Outcome Measures     Row Name 07/07/18 1100 07/06/18 1426 07/05/18 1440       How much help from another person do you currently need...    Turning from your back to your side while in flat bed without using bedrails?  -- 2  -LS  --    Moving from lying on back to sitting on the side of a flat bed without bedrails?  -- 2  -LS  --    Moving to and from a bed to a chair (including a wheelchair)?  -- 1  -LS  --    Standing up from a chair using your arms (e.g.,  wheelchair, bedside chair)?  -- 1  -LS  --    Climbing 3-5 steps with a railing?  -- 1  -LS  --    To walk in hospital room?  -- 1  -LS  --    AM-PAC 6 Clicks Score  -- 8  -LS  --       How much help from another is currently needed...    Putting on and taking off regular lower body clothing? 1  -JR  -- 1  -CL    Bathing (including washing, rinsing, and drying) 1  -JR  -- 1  -CL    Toileting (which includes using toilet bed pan or urinal) 1  -JR  -- 1  -CL    Putting on and taking off regular upper body clothing 1  -JR  -- 2  -CL    Taking care of personal grooming (such as brushing teeth) 1  -JR  -- 2  -CL    Eating meals 1  -JR  -- 2  -CL    Score 6  -JR  -- 9  -CL       Functional Assessment    Outcome Measure Options AM-PAC 6 Clicks Daily Activity (OT)  -JR AM-PAC 6 Clicks Basic Mobility (PT)  -LS AM-PAC 6 Clicks Daily Activity (OT)  -CL      User Key  (r) = Recorded By, (t) = Taken By, (c) = Cosigned By    Initials Name Provider Type    JR Alix Newman, OT Occupational Therapist    LS Senait Kessler, PT Physical Therapist    CL Kelsea Riggs, OT Occupational Therapist           Time Calculation:         Time Calculation- OT     Row Name 07/07/18 1100             Time Calculation- OT    OT Start Time 1100  -      Total Timed Code Minutes- OT 23 minute(s)  -      OT Received On 07/07/18  -         Timed Charges    37704 - OT Therapeutic Activity Minutes 8  -      93263 - OT Self Care/Mgmt Minutes 15  -JR        User Key  (r) = Recorded By, (t) = Taken By, (c) = Cosigned By    Initials Name Provider Type    JR Alix Newman, OT Occupational Therapist           Therapy Suggested Charges     Code   Minutes Charges    30911 (CPT®) Hc Ot Neuromusc Re Education Ea 15 Min      65200 (CPT®) Hc Ot Ther Proc Ea 15 Min      34919 (CPT®) Hc Gait Training Ea 15 Min      03826 (CPT®) Hc Ot Therapeutic Act Ea 15 Min 8 1    70858 (CPT®) Hc Ot Manual Therapy Ea 15 Min      68192 (CPT®) Hc Ot Iontophoresis Ea 15 Min       60882 (CPT®) Hc Ot Elec Stim Ea-Per 15 Min      66854 (CPT®) Hc Ot Ultrasound Ea 15 Min      11741 (CPT®) Hc Ot Self Care/Mgmt/Train Ea 15 Min 15 1    Total  23 2        Therapy Charges for Today     Code Description Service Date Service Provider Modifiers Qty    80122363196 HC OT THERAPEUTIC ACT EA 15 MIN 7/7/2018 Alix Newman, OT GO 1    72172904101 HC OT SELF CARE/MGMT/TRAIN EA 15 MIN 7/7/2018 Alix Newman, OT GO 1               Alix Newman, OT  7/7/2018

## 2018-07-07 NOTE — PLAN OF CARE
Problem: Patient Care Overview  Goal: Plan of Care Review  Outcome: Ongoing (interventions implemented as appropriate)   07/07/18 7365   Coping/Psychosocial   Plan of Care Reviewed With patient   Plan of Care Review   Progress no change   OTHER   Outcome Summary Pt confused, agitated at times, limited active participation. Recommend SNF at d/c.

## 2018-07-08 NOTE — PLAN OF CARE
Problem: Patient Care Overview  Goal: Plan of Care Review  Outcome: Ongoing (interventions implemented as appropriate)   07/08/18 0518   Coping/Psychosocial   Plan of Care Reviewed With patient   Plan of Care Review   Progress improving   OTHER   Outcome Summary Pt rested well this shift. C/o soreness, tylenol ordered and given. VSS. Afib. 2L applied for low sat. Pt confused. Purewick in place.      Goal: Individualization and Mutuality  Outcome: Ongoing (interventions implemented as appropriate)    Goal: Discharge Needs Assessment  Outcome: Ongoing (interventions implemented as appropriate)

## 2018-07-08 NOTE — THERAPY TREATMENT NOTE
Acute Care - Physical Therapy Treatment Note  [facility]     Patient Name: Leila Smith  : 1945  MRN: [id number]  Today's Date: 2018  Onset of Illness/Injury or Date of Surgery: 18  Date of Referral to PT: 18  Referring Physician: MD Devan    Admit Date: 2018    Visit Dx:    ICD-10-CM ICD-9-CM   1. Somnolence R40.0 780.09   2. Atrial fibrillation with rapid ventricular response (CMS/[facility]) I48.91 427.31   3. History of hypertension Z86.79 V12.59   4. Type 2 diabetes mellitus with hyperglycemia, unspecified long term insulin use status (CMS/[facility]) E11.65 250.00   5. History of recurrent UTI (urinary tract infection) Z87.440 V13.02   6. Dysphagia, unspecified type R13.10 787.20   7. Impaired mobility and ADLs Z74.09 799.89   8. Impaired functional mobility, balance, gait, and endurance Z74.09 V49.89     Patient Active Problem List   Diagnosis   • Diabetes mellitus, type 2 (CMS/[facility])   • Hypertension   • Hyperlipidemia   • Hypothyroidism   • Chronic obstructive pulmonary disease (CMS/[facility])   • CAD (coronary artery disease)   • History of edema   • History of obesity   • Venous insufficiency   • Open wound of lower extremity   • Urinary tract infection   • T2DM (type 2 diabetes mellitus) (CMS/[facility])   • Dyspnea on exertion   • Peripheral vascular disease (CMS/[facility])   • Obstructive sleep apnea syndrome   • Ulcer of lower extremity (CMS/[facility])   • Hypoxemia   • Hematuria   • Diabetic peripheral neuropathy (CMS/[facility])   • Edema   • Chronic obstructive pulmonary disease with acute exacerbation (CMS/[facility])   • Congestive heart failure (CMS/[facility])   • Arthritis   • Neutrophilic leukocytosis   • Cystitis   • Atrial fibrillation with rapid ventricular response (CMS/[facility])   • Altered mental status   • Sepsis due to urinary tract infection (CMS/[facility])   • Acute renal failure superimposed on stage 3 chronic kidney disease (CMS/[facility])   • Hyperkalemia   • Leukocytosis   • Elevated troponin   • Somnolence       Therapy  Treatment          Rehabilitation Treatment Summary     Row Name 07/08/18 1010             Treatment Time/Intention    Discipline physical therapist  -LM      Document Type therapy note (daily note)  -LM      Subjective Information complains of;pain  -LM      Mode of Treatment individual therapy;physical therapy  -LM      Patient Effort poor  -LM      Comment Pt initially agreeable to transferring over to chair.  However, when transferring from supine-->sit pt started screaming and became agitated.  Therefore, pt returned to supine.  PT then tried to have pt roll so sheets could be fixed but pt refused.  Attempted ther ex, but pt also refused.  Pt boosted up in bed using drawsheet dependently x2.  -LM      Existing Precautions/Restrictions fall;other (see comments)   Dementia  -LM      Recorded by [LM] Tammie Coley, PT 07/08/18 1113      Row Name 07/08/18 1010             Vital Signs    Pre Systolic BP Rehab 157  -LM      Pre Treatment Diastolic   -LM      Pretreatment Heart Rate (beats/min) 105  -LM      Posttreatment Heart Rate (beats/min) 127  -LM      Pre SpO2 (%) 94  -LM      O2 Delivery Pre Treatment room air  -LM      Post SpO2 (%) 94  -LM      O2 Delivery Post Treatment room air  -LM      Pre Patient Position Supine  -LM      Intra Patient Position Sitting   Partially sitting  -LM      Post Patient Position Supine  -LM      Recorded by [LM] Tammie Coley, PT 07/08/18 1113      Row Name 07/08/18 1010             Cognitive Assessment/Intervention    Additional Documentation Cognitive Assessment/Intervention (Group)  -LM      Recorded by [LM] Tammie Coley, PT 07/08/18 1113      Row Name 07/08/18 1010             Cognitive Assessment/Intervention- PT/OT    Affect/Mental Status (Cognitive) agitated;confused  -LM      Behavioral Issues (Cognitive) verbal outbursts;combative/physical outbursts  -LM      Orientation Status (Cognition) disoriented to;person;place;situation;time  -LM      Follows Commands  (Cognition) does not follow one step commands  -LM      Cognitive Function (Cognitive) memory deficit;safety deficit  -LM      Memory Deficit (Cognitive) severe deficit  -LM      Safety Deficit (Cognitive) severe deficit  -LM      Personal Safety Interventions fall prevention program maintained  -LM      Recorded by [LM] Tammie Coley, PT 07/08/18 1113      Row Name 07/08/18 1010             Bed Mobility Assessment/Treatment    Bed Mobility Assessment/Treatment supine-sit;sit-supine;scooting/bridging  -LM      Scooting/Bridging Tucker (Bed Mobility) dependent (less than 25% patient effort);2 person assist  -LM      Supine-Sit Tucker (Bed Mobility) dependent (less than 25% patient effort);2 person assist  -LM      Sit-Supine Tucker (Bed Mobility) dependent (less than 25% patient effort);2 person assist  -LM      Comment (Bed Mobility) See pt effort comment above.  -LM      Recorded by [LM] Tammie Coley, PT 07/08/18 1113      Row Name 07/08/18 1010             Therapeutic Exercise    79498 - PT Therapeutic Activity Minutes 14  -LM      Recorded by [LM] Tammie Coley, PT 07/08/18 1113      Row Name 07/08/18 1010             Positioning and Restraints    Pre-Treatment Position in bed  -LM      Post Treatment Position bed  -LM      In Bed supine;call light within reach;encouraged to call for assist;exit alarm on;notified nsg  -LM      Recorded by [LM] Tammie Coley, PT 07/08/18 1113      Row Name 07/08/18 1010             Pain Assessment    Additional Documentation Pain Scale: FACES Pre/Post-Treatment (Group)  -LM      Recorded by [LM] Tammie Coley, PT 07/08/18 1113      Row Name 07/08/18 1010             Pain Scale: FACES Pre/Post-Treatment    Pain: FACES Scale, Pretreatment 6-->hurts even more  -LM      Pain: FACES Scale, Post-Treatment 6-->hurts even more  -LM      Pre/Post Treatment Pain Comment 6 when moving; 0 when lying still  -LM      Recorded by [LM] Tammie Coley, PT 07/08/18 1113      Row Name                 Wound 07/02/18 2100 Bilateral posterior coccyx pressure injury    Wound - Properties Group Date first assessed: 07/02/18 [JG] Time first assessed: 2100 [JG] Present On Admission : yes [JG] Side: Bilateral [JG] Orientation: posterior [JG] Location: coccyx [JG] Type: pressure injury [JG] Additional Comments: healing/scarred PI or shearing [CP] Recorded by:  [CP] Angélica Gavin, DANIEL 07/03/18 1052 [JG] Bailey Krause RN 07/03/18 0609    Row Name                Wound 07/03/18 0915 Left distal toe    Wound - Properties Group Date first assessed: 07/03/18 [CP] Time first assessed: 0915 [CP] Present On Admission : yes;picture taken [CP] Side: Left [CP] Orientation: distal [CP] Location: toe [CP] Stage, Pressure Injury: deep tissue injury [CP] Recorded by:  [CP] Angélica Gavin, DANIEL 07/03/18 1101    Row Name                Wound 07/03/18 0915 Left lower leg ulceration, venous    Wound - Properties Group Date first assessed: 07/03/18 [CP] Time first assessed: 0915 [CP] Present On Admission : yes;picture taken [CP] Side: Left [CP] Orientation: lower [CP] Location: leg [CP] Type: ulceration, venous [CP] Additional Comments: healing [CP] Recorded by:  [CP] Angélica Gavin, DANIEL 07/03/18 1106    Row Name 07/08/18 1010             Plan of Care Review    Plan of Care Reviewed With patient  -LM      Recorded by [LM] Tammie Coley, PT 07/08/18 1113      Row Name 07/08/18 1010             Outcome Summary/Treatment Plan (PT)    Daily Summary of Progress (PT) unable to show any progress toward functional goals  -LM      Recorded by [LM] Tammie Coley, PT 07/08/18 1113        User Key  (r) = Recorded By, (t) = Taken By, (c) = Cosigned By    Initials Name Effective Dates Discipline    CP Angélica Gavin, APRN 06/08/18 -  Nurse    SANJUANA Coley, PT 06/15/16 -  PT    JG Bailey Krause RN 08/08/16 -  Nurse          Wound 07/02/18 2100 Bilateral posterior coccyx pressure injury (Active)    Dressing Appearance open to air 7/7/2018  8:00 PM   Closure Open to air 7/7/2018  8:00 PM   Base clean;dry;gray;pink 7/7/2018  8:00 PM   Periwound intact;dry;pink;blanchable 7/7/2018  8:00 PM   Edges irregular 7/7/2018  8:00 PM   Drainage Amount none 7/7/2018  8:00 PM       Wound 07/03/18 0915 Left distal toe (Active)   Dressing Appearance open to air 7/7/2018  8:00 PM   Closure Open to air 7/7/2018  8:00 PM   Base clean;dry;pink;maroon/purple 7/7/2018  8:00 PM   Periwound intact;dry;pink;blanchable 7/7/2018  8:00 PM   Edges irregular 7/7/2018  8:00 PM   Drainage Amount none 7/7/2018  8:00 PM   Dressing Care, Wound open to air 7/7/2018  8:00 PM       Wound 07/03/18 0915 Left lower leg ulceration, venous (Active)   Dressing Appearance dry;intact;no drainage 7/7/2018  8:00 PM   Base dressing in place, unable to visualize 7/7/2018  8:00 PM   Periwound dry;intact;blanchable 7/7/2018  8:00 PM   Drainage Amount none 7/8/2018  9:23 AM             Physical Therapy Education     Title: PT OT SLP Therapies (Active)     Topic: Physical Therapy (Active)     Point: Mobility training (Active)    Learning Progress Summary     Learner Status Readiness Method Response Comment Documented by    Patient Active Acceptance E,D NR  LS 07/06/18 1511          Point: Body mechanics (Active)    Learning Progress Summary     Learner Status Readiness Method Response Comment Documented by    Patient Active Acceptance E,D NR  LS 07/06/18 1511          Point: Precautions (Active)    Learning Progress Summary     Learner Status Readiness Method Response Comment Documented by    Patient Active Acceptance E,D NR  LS 07/06/18 1511                      User Key     Initials Effective Dates Name Provider Type Discipline     06/19/15 -  Senait Kessler, PT Physical Therapist PT                    PT Recommendation and Plan     Outcome Summary/Treatment Plan (PT)  Daily Summary of Progress (PT): unable to show any progress toward functional goals  Plan  of Care Reviewed With: patient  Outcome Summary: Attempted to transfer supine-->sit dependently x 2 - however, pt became agitated and started screaming.  Pt quickly returned to supine.  All further mobility including rolling in the bed and ther ex declined by pt.  Drawsheet used to dependently position pt properly in supine.  No progress made towards skilled goals.  If pt continues to be non-participatory/agitated - may consider d/c from PT.          Outcome Measures     Row Name 07/08/18 1010 07/07/18 1100 07/06/18 1426       How much help from another person do you currently need...    Turning from your back to your side while in flat bed without using bedrails? 1  -LM  -- 2  -LS    Moving from lying on back to sitting on the side of a flat bed without bedrails? 1  -LM  -- 2  -LS    Moving to and from a bed to a chair (including a wheelchair)? 1  -LM  -- 1  -LS    Standing up from a chair using your arms (e.g., wheelchair, bedside chair)? 1  -LM  -- 1  -LS    Climbing 3-5 steps with a railing? 1  -LM  -- 1  -LS    To walk in hospital room? 1  -LM  -- 1  -LS    AM-PAC 6 Clicks Score 6  -LM  -- 8  -LS       How much help from another is currently needed...    Putting on and taking off regular lower body clothing?  -- 1  -JR  --    Bathing (including washing, rinsing, and drying)  -- 1  -JR  --    Toileting (which includes using toilet bed pan or urinal)  -- 1  -JR  --    Putting on and taking off regular upper body clothing  -- 1  -JR  --    Taking care of personal grooming (such as brushing teeth)  -- 1  -JR  --    Eating meals  -- 1  -JR  --    Score  -- 6  -JR  --       Functional Assessment    Outcome Measure Options AM-PAC 6 Clicks Basic Mobility (PT)  -LM AM-PAC 6 Clicks Daily Activity (OT)  -JR AM-PAC 6 Clicks Basic Mobility (PT)  -LS    Row Name 07/05/18 1440             How much help from another is currently needed...    Putting on and taking off regular lower body clothing? 1  -CL      Bathing (including  washing, rinsing, and drying) 1  -CL      Toileting (which includes using toilet bed pan or urinal) 1  -CL      Putting on and taking off regular upper body clothing 2  -CL      Taking care of personal grooming (such as brushing teeth) 2  -CL      Eating meals 2  -CL      Score 9  -CL         Functional Assessment    Outcome Measure Options AM-PAC 6 Clicks Daily Activity (OT)  -CL        User Key  (r) = Recorded By, (t) = Taken By, (c) = Cosigned By    Initials Name Provider Type    JR Alix Newman, OT Occupational Therapist    LS Senait Kessler, PT Physical Therapist    LM Tammie Coley, PT Physical Therapist    CL Kelsea Riggs, OT Occupational Therapist           Time Calculation:         PT Charges     Row Name 07/08/18 1010             Time Calculation    Start Time 1010  -LM      PT Received On 07/08/18  -LM      PT Goal Re-Cert Due Date 07/16/18  -LM         Timed Charges    46666 - PT Therapeutic Activity Minutes 14  -LM        User Key  (r) = Recorded By, (t) = Taken By, (c) = Cosigned By    Initials Name Provider Type     Tammie Coley, PT Physical Therapist        Therapy Suggested Charges     Code   Minutes Charges    89143 (CPT®) Hc Pt Neuromusc Re Education Ea 15 Min      36258 (CPT®) Hc Pt Ther Proc Ea 15 Min      11060 (CPT®) Hc Gait Training Ea 15 Min      65950 (CPT®) Hc Pt Therapeutic Act Ea 15 Min 14 1    52386 (CPT®) Hc Pt Manual Therapy Ea 15 Min      96676 (CPT®) Hc Pt Iontophoresis Ea 15 Min      84727 (CPT®) Hc Pt Elec Stim Ea-Per 15 Min      27768 (CPT®) Hc Pt Ultrasound Ea 15 Min      20934 (CPT®) Hc Pt Self Care/Mgmt/Train Ea 15 Min      Total  14 1        Therapy Charges for Today     Code Description Service Date Service Provider Modifiers Qty    11348516320 HC PT THERAPEUTIC ACT EA 15 MIN 7/8/2018 Tammie Coley, PT GP 1    64115446407 HC PT THER SUPP EA 15 MIN 7/8/2018 Tammie Coley PT GP 1          PT G-Codes  Outcome Measure Options: AM-PAC 6 Clicks Basic Mobility (PT)    Tammie  Heladio, PT  7/8/2018

## 2018-07-08 NOTE — PROGRESS NOTES
[facility] Medicine Services  PROGRESS NOTE    Patient Name: Leila Smith  : 1945  MRN: [id number]    Date of Admission: 2018  Length of Stay: 6  Primary Care Physician: Ciaran Wakefield MD    Subjective   Subjective     CC:  F/u UTI, sepsis    HPI:  No changes or events overnight.  PT note documents agitation and unwillingness to cooperate.  Has been drinking boosts.    Review of Systems   Unable to perform ROS: Mental status change       Objective   Objective     Vital Signs:   Temp:  [97 °F (36.1 °C)-98.3 °F (36.8 °C)] 97 °F (36.1 °C)  Heart Rate:  [] 90  Resp:  [16-18] 18  BP: (133-150)/(77-99) 146/99        Physical Exam:  Constitutional: No acute distress, awake, alert, chronically ill appearing  HENT: NCAT, mucous membranes moist  Respiratory: Clear to auscultation bilaterally, respiratory effort normal   Cardiovascular: RRR (rate in 90s), no murmurs, rubs, or gallops,  Gastrointestinal: Positive bowel sounds, soft, nontender, nondistended  Musculoskeletal: No bilateral ankle edema  Psychiatric: pleasant and smiling  Neurologic: Oriented x 1, mostly answers yes/no, will follow commands, no obvious deficits  Skin: some chronic scaling to LEs  Exam unchanged from previous      Results Reviewed:  I have personally reviewed current lab, radiology, and data and agree.      Results from last 7 days  Lab Units 18  0518  0539 18  1615   WBC 10*3/mm3 10.48 12.26* 10.76   HEMOGLOBIN g/dL 13.3 13.0 14.5   HEMATOCRIT % 42.9 42.8 47.3*   PLATELETS 10*3/mm3 249 271 314       Results from last 7 days  Lab Units 18  0517 18  0539 18  0052  18  1615   SODIUM mmol/L 144 143  --   --  142   POTASSIUM mmol/L 4.0 4.8  4.8 5.2  < > 6.7*   CHLORIDE mmol/L 105 114*  --   --  108   CO2 mmol/L 20.0 18.0*  --   --  18.0*   BUN mg/dL 63* 76*  --   --  86*   CREATININE mg/dL 2.07* 2.69*  --   --  3.21*   GLUCOSE mg/dL 114* 214*  --   --  381*    CALCIUM mg/dL 7.8* 8.3*  --   --  8.9   ALT (SGPT) U/L  --  38  --   --  38   AST (SGOT) U/L  --  27  --   --  43*   < > = values in this interval not displayed.  Estimated Creatinine Clearance: 29.9 mL/min (A) (by C-G formula based on SCr of 2.07 mg/dL (H)).  No results found for: BNP    Microbiology Results Abnormal     Procedure Component Value - Date/Time    Blood Culture - Blood, [[id number]]  (Normal) Collected:  07/02/18 1940    Lab Status:  Final result Specimen:  Blood from Arm, Right Updated:  07/07/18 2030     Blood Culture No growth at 5 days    Blood Culture - Blood, [[id number]]  (Normal) Collected:  07/02/18 1610    Lab Status:  Final result Specimen:  Blood from Arm, Right Updated:  07/07/18 1815     Blood Culture No growth at 5 days    Urine Culture - Urine, [[id number]]  (Abnormal) Collected:  07/03/18 0200    Lab Status:  Final result Specimen:  Urine from Urine, Catheter Updated:  07/06/18 1206     Urine Culture <10,000 CFU/mL Candida albicans (A)          Imaging Results (last 24 hours)     ** No results found for the last 24 hours. **        Results for orders placed during the hospital encounter of 01/31/18   Adult Transesophageal Echo (YANNA) W/ Cont if Necessary Per Protocol    Narrative · Left ventricular systolic function is normal.  · Left ventricular wall thickness is consistent with moderate concentric   hypertrophy.  · Left atrial cavity size is dilated.  · Cardiac valves are morphologically within normal limits for patient's   age.          I have reviewed the medications.    Assessment/Plan   Assessment / Plan     Hospital Problem List     * (Principal)Sepsis due to urinary tract infection (CMS/HCC)    Diabetes mellitus, type 2 (CMS/HCC)    Overview Deleted 7/2/2018  9:44 PM by Brian Samson MD            Hypertension    Hyperlipidemia    Hypothyroidism    Obstructive sleep apnea syndrome    Diabetic peripheral neuropathy (CMS/HCC)    Atrial fibrillation with rapid ventricular  response (CMS/[facility])    Altered mental status    Acute renal failure superimposed on stage 3 chronic kidney disease (CMS/[facility])    Hyperkalemia    Somnolence             Brief Hospital Course to date:  Leila Smith is a 72 y.o. female with dementia, DM2, HTN, presented to ED with hypotension, sepsis, ARF, and UTI.  Improved with IVF.      Assessment & Plan:  - urine culture with yeast only.  S/p course of zostn  - ARF improved.  Has become agitated when trying to draw labs, so repeat labs deferred.   - hyperkalemia resolved  - continue basal/bolus insulin, acceptable control at this time  - mental status likely at baseline  - still some borderline/intermittent tachycardia.  Will increase metoprolol to 50mg bid.  Note that med rec lists extended forms of CCB/BB, but she chews pills so changed to immeadiate versions.  - reviewed CM note, no bed hold.  New referrals sent for placement.  Will be here through the weekend, will discuss with CM on Monday.      DVT Prophylaxis:  eliquis    CODE STATUS:   Code Status and Medical Interventions:   Ordered at: 07/02/18 2152     Code Status:    CPR     Medical Interventions (Level of Support Prior to Arrest):    Full       Disposition: I expect the patient to be discharged to SNF next week.    Electronically signed by Oneil Hartman MD, 07/08/18, 11:41 AM.

## 2018-07-08 NOTE — PLAN OF CARE
Problem: Patient Care Overview  Goal: Plan of Care Review  Outcome: Ongoing (interventions implemented as appropriate)   07/08/18 1010   Coping/Psychosocial   Plan of Care Reviewed With patient   OTHER   Outcome Summary Attempted to transfer supine-->sit dependently x 2 - however, pt became agitated and started screaming. Pt quickly returned to supine. All further mobility including rolling in the bed and ther ex declined by pt. Drawsheet used to dependently position pt properly in supine. No progress made towards skilled goals. If pt continues to be non-participatory/agitated - may consider d/c from PT.

## 2018-07-08 NOTE — PLAN OF CARE
Problem: Fall Risk (Adult)  Goal: Absence of Fall  Outcome: Ongoing (interventions implemented as appropriate)      Problem: Skin Injury Risk (Adult)  Goal: Skin Health and Integrity  Outcome: Ongoing (interventions implemented as appropriate)      Problem: Renal Failure/Kidney Injury, Acute (Adult)  Goal: Signs and Symptoms of Listed Potential Problems Will be Absent, Minimized or Managed (Renal Failure/Kidney Injury, Acute)  Outcome: Ongoing (interventions implemented as appropriate)      Problem: Arrhythmia/Dysrhythmia (Symptomatic) (Adult)  Goal: Signs and Symptoms of Listed Potential Problems Will be Absent, Minimized or Managed (Arrhythmia/Dysrhythmia)  Outcome: Ongoing (interventions implemented as appropriate)      Problem: Patient Care Overview  Goal: Plan of Care Review  Outcome: Ongoing (interventions implemented as appropriate)   07/08/18 1525   Coping/Psychosocial   Plan of Care Reviewed With daughter   Plan of Care Review   Progress improving   OTHER   Outcome Summary vss. a-fib on monitor. will continue to monitor.       Problem: Infection, Risk/Actual (Adult)  Goal: Infection Prevention/Resolution  Outcome: Ongoing (interventions implemented as appropriate)      Problem: Sepsis/Septic Shock (Adult)  Goal: Signs and Symptoms of Listed Potential Problems Will be Absent, Minimized or Managed (Sepsis/Septic Shock)  Outcome: Ongoing (interventions implemented as appropriate)

## 2018-07-09 NOTE — PROGRESS NOTES
Continued Stay Note  [facility]     Patient Name: Leila Smith  MRN: [id number]  Today's Date: 7/9/2018    Admit Date: 7/2/2018          Discharge Plan     Row Name 07/09/18 115       Plan    Plan Rehab    Patient/Family in Agreement with Plan yes    Plan Comments Spoke to Brandie at [facility] and she is verifying days at [facility] and will get back to CM regarding referrals. Left  for admissions at [facility]. Called and spoke to Miguel Angel at [facility] to f/u referrals and she has not received fax. Re efaxed referral to Miguel Angel at [facility]. Dr. Hartman to call pt's daughter regarding comfort care options since pt is confused. CM will cont to follow.               Discharge Codes    No documentation.       Expected Discharge Date and Time     Expected Discharge Date Expected Discharge Time    Jul 11, 2018             Marly Araya

## 2018-07-09 NOTE — PROGRESS NOTES
[facility] Medicine Services  PROGRESS NOTE    Patient Name: Leila Smith  : 1945  MRN: [id number]    Date of Admission: 2018  Length of Stay: 7  Primary Care Physician: Ciaran Wakefield MD    Subjective   Subjective     CC:  F/u UTI, sepsis    HPI:  No family present.  She is awake and feeding herself some food.  Overnight had persistent rapid a-fib related to not consistently taking her pills.    Review of Systems   Unable to perform ROS: Mental status change       Objective   Objective     Vital Signs:   Temp:  [96.4 °F (35.8 °C)-97.8 °F (36.6 °C)] 96.4 °F (35.8 °C)  Heart Rate:  [] 101  Resp:  [18-20] 18  BP: (125-153)/() 125/82        Physical Exam:  Constitutional: No acute distress, awake, alert, sitting up in bed and feeding herself some  HENT: NCAT, mucous membranes moist  Respiratory: Clear to auscultation bilaterally, respiratory effort normal   Cardiovascular: irregular (rate in 120s), no murmurs, rubs, or gallops,  Gastrointestinal: Positive bowel sounds, soft, nontender, nondistended  Musculoskeletal: No bilateral ankle edema  Psychiatric: pleasant and smiling  Neurologic: Oriented x 1, more talkative today, will follow commands, no obvious deficits  Skin: some chronic scaling to LEs  Exam unchanged from previous      Results Reviewed:  I have personally reviewed current lab, radiology, and data and agree.      Results from last 7 days  Lab Units 18  0539 18  1615   WBC 10*3/mm3 10.48 12.26* 10.76   HEMOGLOBIN g/dL 13.3 13.0 14.5   HEMATOCRIT % 42.9 42.8 47.3*   PLATELETS 10*3/mm3 249 271 314       Results from last 7 days  Lab Units 18  0517 18  0539 18  0052  18  1615   SODIUM mmol/L 144 143  --   --  142   POTASSIUM mmol/L 4.0 4.8  4.8 5.2  < > 6.7*   CHLORIDE mmol/L 105 114*  --   --  108   CO2 mmol/L 20.0 18.0*  --   --  18.0*   BUN mg/dL 63* 76*  --   --  86*   CREATININE mg/dL 2.07* 2.69*   --   --  3.21*   GLUCOSE mg/dL 114* 214*  --   --  381*   CALCIUM mg/dL 7.8* 8.3*  --   --  8.9   ALT (SGPT) U/L  --  38  --   --  38   AST (SGOT) U/L  --  27  --   --  43*   < > = values in this interval not displayed.  Estimated Creatinine Clearance: 30.3 mL/min (A) (by C-G formula based on SCr of 2.07 mg/dL (H)).  No results found for: BNP    Microbiology Results Abnormal     Procedure Component Value - Date/Time    Blood Culture - Blood, [[id number]]  (Normal) Collected:  07/02/18 1940    Lab Status:  Final result Specimen:  Blood from Arm, Right Updated:  07/07/18 2030     Blood Culture No growth at 5 days    Blood Culture - Blood, [[id number]]  (Normal) Collected:  07/02/18 1610    Lab Status:  Final result Specimen:  Blood from Arm, Right Updated:  07/07/18 1815     Blood Culture No growth at 5 days    Urine Culture - Urine, [[id number]]  (Abnormal) Collected:  07/03/18 0200    Lab Status:  Final result Specimen:  Urine from Urine, Catheter Updated:  07/06/18 1206     Urine Culture <10,000 CFU/mL Candida albicans (A)          Imaging Results (last 24 hours)     ** No results found for the last 24 hours. **        Results for orders placed during the hospital encounter of 01/31/18   Adult Transesophageal Echo ([facility]ANNA) W/ Cont if Necessary Per Protocol    Narrative · Left ventricular systolic function is normal.  · Left ventricular wall thickness is consistent with moderate concentric   hypertrophy.  · Left atrial cavity size is dilated.  · Cardiac valves are morphologically within normal limits for patient's   age.          I have reviewed the medications.    Assessment/Plan   Assessment / Plan     Hospital Problem List     * (Principal)Sepsis due to urinary tract infection (CMS/HCC)    Diabetes mellitus, type 2 (CMS/HCC)    Overview Deleted 7/2/2018  9:44 PM by Brian Samson MD            Hypertension    Hyperlipidemia    Hypothyroidism    Obstructive sleep apnea syndrome    Diabetic peripheral neuropathy  (CMS/[facility])    Atrial fibrillation with rapid ventricular response (CMS/[facility])    Altered mental status    Acute renal failure superimposed on stage 3 chronic kidney disease (CMS/[facility])    Hyperkalemia    Somnolence             Brief Hospital Course to date:  Leila Smith is a 72 y.o. female with dementia, DM2, HTN, presented to ED with hypotension, sepsis, ARF, and UTI.  Improved with IVF.      Assessment & Plan:  - urine culture with yeast only.  S/p course of zostn  - ARF improved.  Has become agitated when trying to draw labs, so repeat labs deferred. PO intake seems sufficient at this point.  - hyperkalemia resolved  - will add some prandial insulin for better control  - mental status likely at baseline  - still some borderline/intermittent tachycardia due to intermittently refusing pills.  D/w RN and dtr on phone.  Try to give with pudding or ice cream.  If does not and remains tachycardic then that is ok, she does not exhibit symptoms from this, so will just have to tolerate it.  Note that med rec hadextended forms of CCB/BB, but she chews pills so changed to immeadiate versions.  - d/w CM - working on placement.  - d/w dtr on phone regarding care and goals.  Remains full code.      DVT Prophylaxis:  eliquis    CODE STATUS:   Code Status and Medical Interventions:   Ordered at: 07/02/18 2152     Code Status:    CPR     Medical Interventions (Level of Support Prior to Arrest):    Full       Disposition: I expect the patient to be discharged to SNF when bed available.    Electronically signed by Oneil Hartman MD, 07/09/18, 4:14 PM.

## 2018-07-09 NOTE — PLAN OF CARE
Problem: Skin Injury Risk (Adult)  Goal: Skin Health and Integrity  Outcome: Ongoing (interventions implemented as appropriate)   07/09/18 0512   Skin Injury Risk (Adult)   Skin Health and Integrity making progress toward outcome       Problem: Patient Care Overview  Goal: Plan of Care Review  Outcome: Ongoing (interventions implemented as appropriate)   07/09/18 0512   Coping/Psychosocial   Plan of Care Reviewed With patient   Plan of Care Review   Progress improving   OTHER   Outcome Summary pt was in rapid afib in the 170's at beginning of shift. amiodarone loading dose and gtt started. pt has been rate controlled in the 90's all evening. metoprolol and cardizem held due to amiodarone gtt and pt has been spitting pills out, chewing them, and pocketing them. will continue to monitor.

## 2018-07-09 NOTE — PLAN OF CARE
Problem: Fall Risk (Adult)  Goal: Absence of Fall  Outcome: Ongoing (interventions implemented as appropriate)      Problem: Skin Injury Risk (Adult)  Goal: Skin Health and Integrity  Outcome: Ongoing (interventions implemented as appropriate)      Problem: Renal Failure/Kidney Injury, Acute (Adult)  Goal: Signs and Symptoms of Listed Potential Problems Will be Absent, Minimized or Managed (Renal Failure/Kidney Injury, Acute)  Outcome: Ongoing (interventions implemented as appropriate)      Problem: Arrhythmia/Dysrhythmia (Symptomatic) (Adult)  Goal: Signs and Symptoms of Listed Potential Problems Will be Absent, Minimized or Managed (Arrhythmia/Dysrhythmia)  Outcome: Ongoing (interventions implemented as appropriate)      Problem: Patient Care Overview  Goal: Plan of Care Review  Outcome: Ongoing (interventions implemented as appropriate)   07/09/18 1638   Coping/Psychosocial   Plan of Care Reviewed With patient   Plan of Care Review   Progress improving   OTHER   Outcome Summary VSS. A-FIB on monitor. HR stable in the 80's. aminodarone iv d/rafat. waiting on placement. will continue to monitor.       Problem: Infection, Risk/Actual (Adult)  Goal: Infection Prevention/Resolution  Outcome: Outcome(s) achieved Date Met: 07/09/18      Problem: Sepsis/Septic Shock (Adult)  Goal: Signs and Symptoms of Listed Potential Problems Will be Absent, Minimized or Managed (Sepsis/Septic Shock)  Outcome: Outcome(s) achieved Date Met: 07/09/18

## 2018-07-10 NOTE — PROGRESS NOTES
Continued Stay Note  [facility]     Patient Name: Leila Smith  MRN: [id number]  Today's Date: 7/10/2018    Admit Date: 7/2/2018          Discharge Plan     Row Name 07/10/18 1556       Plan    Plan [facility]    Patient/Family in Agreement with Plan yes    Plan Comments Received call from [facility] at [facility] and they are able to offer pt a skilled bed if medically ready. Discussed with pt and daughter and are agreeable. Efaxed updated PT notes to Senait. Senait will start insurance precert. CM will cont to follow.               Discharge Codes    No documentation.       Expected Discharge Date and Time     Expected Discharge Date Expected Discharge Time    Jul 11, 2018             Marly Araya

## 2018-07-10 NOTE — PLAN OF CARE
Problem: Patient Care Overview  Goal: Plan of Care Review  Outcome: Ongoing (interventions implemented as appropriate)   07/10/18 2496   Coping/Psychosocial   Plan of Care Reviewed With patient   Plan of Care Review   Progress improving   OTHER   Outcome Summary WOC follow up for skin breakdown r/t pressure and mild friction on patients bottom. Also patient had healing LLE venous ulceration and left great toe ulceration. At this time patients bottom is intact and all areas area blanching. LLE ulceration is resolved with complete reepithelialization with noted hypopigmentation. Left great toe ulceration is stable, dry, and intact. Will discontinue the use of Povidone-iodine. General skin care and pressure intervetions are needed. At this time WOC nurse will sign off. Please reconsult WOC nurse if future needs arise. Thanks

## 2018-07-10 NOTE — THERAPY TREATMENT NOTE
Acute Care - Physical Therapy Treatment Note  [facility]     Patient Name: Leila Smith  : 1945  MRN: [id number]  Today's Date: 7/10/2018  Onset of Illness/Injury or Date of Surgery: 18  Date of Referral to PT: 18  Referring Physician: MD Devan    Admit Date: 2018    Visit Dx:    ICD-10-CM ICD-9-CM   1. Somnolence R40.0 780.09   2. Atrial fibrillation with rapid ventricular response (CMS/[facility]) I48.91 427.31   3. History of hypertension Z86.79 V12.59   4. Type 2 diabetes mellitus with hyperglycemia, unspecified long term insulin use status (CMS/[facility]) E11.65 250.00   5. History of recurrent UTI (urinary tract infection) Z87.440 V13.02   6. Dysphagia, unspecified type R13.10 787.20   7. Impaired mobility and ADLs Z74.09 799.89   8. Impaired functional mobility, balance, gait, and endurance Z74.09 V49.89     Patient Active Problem List   Diagnosis   • Diabetes mellitus, type 2 (CMS/[facility])   • Hypertension   • Hyperlipidemia   • Hypothyroidism   • Chronic obstructive pulmonary disease (CMS/[facility])   • CAD (coronary artery disease)   • History of edema   • History of obesity   • Venous insufficiency   • Open wound of lower extremity   • Urinary tract infection   • T2DM (type 2 diabetes mellitus) (CMS/[facility])   • Dyspnea on exertion   • Peripheral vascular disease (CMS/[facility])   • Obstructive sleep apnea syndrome   • Ulcer of lower extremity (CMS/[facility])   • Hypoxemia   • Hematuria   • Diabetic peripheral neuropathy (CMS/[facility])   • Edema   • Chronic obstructive pulmonary disease with acute exacerbation (CMS/[facility])   • Congestive heart failure (CMS/[facility])   • Arthritis   • Neutrophilic leukocytosis   • Cystitis   • Atrial fibrillation with rapid ventricular response (CMS/[facility])   • Altered mental status   • Sepsis due to urinary tract infection (CMS/[facility])   • Acute renal failure superimposed on stage 3 chronic kidney disease (CMS/[facility])   • Hyperkalemia   • Leukocytosis   • Elevated troponin   • Somnolence       Therapy  Treatment          Rehabilitation Treatment Summary     Row Name 07/10/18 1418 07/10/18 1355          Treatment Time/Intention    Discipline (P)  physical therapist  -CM occupational therapist  -CL     Document Type (P)  therapy note (daily note)  -CM therapy note (daily note)  -CL     Subjective Information (P)  complains of;pain  -CM complains of;pain  -CL     Mode of Treatment (P)  physical therapy  -CM  --     Patient/Family Observations (P)  Daughter present during PT session; Pt upright sitting on EOB w/ OT and daughter when entering the room.   -CM  --     Care Plan Review (P)  evaluation/treatment results reviewed;risks/benefits reviewed;care plan/treatment goals reviewed;patient/other agree to care plan  -CM  --     Care Plan Review, Other Participant(s) (P)  daughter  -CM  --     Patient Effort (P)  good  -CM adequate  -CL     Comment (P)  Music playing in room during PT session, per daughter recommends as good distraction.   -CM  --     Existing Precautions/Restrictions (P)  fall;other (see comments)   dementia  -CM fall;other (see comments)   dementia  -CL     Treatment Considerations/Comments  -- Pt becomes easily overwhelmed and agitated, benefits from short firm commands and encouragement to participate. Pt w/ improved participation w/ music playing and when daughter present.   -CL     Recorded by [CM] Erendira Narayanan, PT Student 07/10/18 1528 [CL] Kelsea Riggs OT 07/10/18 1521     Row Name 07/10/18 1418 07/10/18 1355          Vital Signs    Pre Systolic BP Rehab (P)  150  -CM --   VSS, RN cleared for tx.   -CL     Pre Treatment Diastolic BP (P)  99  -CM  --     Post Systolic BP Rehab (P)  --   post BP not taken   -CM  --     Pretreatment Heart Rate (beats/min) (P)  --   pre HR not taken   -CM  --     Posttreatment Heart Rate (beats/min) (P)  97  -CM  --     O2 Delivery Pre Treatment (P)  room air  -CM  --     O2 Delivery Intra Treatment (P)  room air  -CM  --     Post SpO2 (%) (P)  95  -CM  --      O2 Delivery Post Treatment (P)  room air  -CM  --     Pre Patient Position (P)  Sitting  -CM  --     Intra Patient Position (P)  Standing  -CM  --     Post Patient Position (P)  Sitting  -CM  --     Recorded by [CM] Erendira Narayanan, PT Student 07/10/18 1528 [CL] Kelsea Riggs OT 07/10/18 1521     Row Name 07/10/18 1418             Cognitive Assessment/Intervention    Additional Documentation (P)  Cognitive Assessment/Intervention (Group)  -CM      Recorded by [CM] Erendira Narayanan, PT Student 07/10/18 1528      Row Name 07/10/18 1418 07/10/18 1355          Cognitive Assessment/Intervention- PT/OT    Affect/Mental Status (Cognitive) (P)  agitated;confused;emotionally labile  -CM agitated;confused;emotionally labile  -CL     Behavioral Issues (Cognitive)  -- overwhelmed easily;verbal outbursts  -CL     Orientation Status (Cognition) (P)  oriented to;person  -CM oriented to;person;disoriented to;place;situation;time  -CL     Follows Commands (Cognition) (P)  follows one step commands;0-24% accuracy;verbal cues/prompting required  -CM 0-24% accuracy;verbal cues/prompting required;repetition of directions required;increased processing time needed  -CL     Cognitive Function (Cognitive) (P)  safety deficit  -CM  --     Attention Deficit (Cognitive)  -- severe deficit  -CL     Executive Function Deficit (Cognition)  -- severe deficit  -CL     Memory Deficit (Cognitive)  -- severe deficit  -CL     Safety Deficit (Cognitive) (P)  severe deficit;safety precautions awareness;insight into deficits/self awareness;awareness of need for assistance;ability to follow commands  -CM severe deficit;awareness of need for assistance;safety precautions awareness  -CL     Personal Safety Interventions (P)  fall prevention program maintained;gait belt;nonskid shoes/slippers when out of bed  -CM fall prevention program maintained;gait belt;nonskid shoes/slippers when out of bed  -CL     Recorded by [CM] Erendira Narayanan, PT Student 07/10/18  1528 [CL] Kelsea Riggs OT 07/10/18 1521     Row Name 07/10/18 1418             Safety Issues, Functional Mobility    Safety Issues Affecting Function (Mobility) (P)  safety precaution awareness;insight into deficits/self awareness;awareness of need for assistance;ability to follow commands  -CM      Recorded by [CM] Erendira Narayanan, PT Student 07/10/18 1528      Row Name 07/10/18 1418 07/10/18 1355          Bed Mobility Assessment/Treatment    Bed Mobility Assessment/Treatment  -- supine-sit  -CL     Supine-Sit [place] (Bed Mobility)  -- dependent (less than 25% patient effort);2 person assist;verbal cues  -CL     Assistive Device (Bed Mobility)  -- bed rails;draw sheet;head of bed elevated  -CL     Comment (Bed Mobility) (P)  Sitting on EOB   -CM Pt initially overhwhelmed and agtiated w/ forward LOB upon sitting EOB. Sitting balance improved and pt calmed w/ encouragement and re-direction.   -CL     Recorded by [CM] Erendira Narayanan, PT Student 07/10/18 1528 [CL] Kelsea Riggs, OT 07/10/18 1521     Row Name 07/10/18 1418 07/10/18 1355          Transfer Assessment/Treatment    Transfer Assessment/Treatment (P)  stand pivot/stand step transfer  -CM  --     Comment (Transfers)  -- Defer to PT, pt transitioned to PT treatment from EOB.   -CL     Recorded by [CM] Erendira Narayanan, PT Student 07/10/18 1528 [CL] Kelsea Riggs, OT 07/10/18 1521     Row Name 07/10/18 1418             Stand Pivot/Stand Step Transfer    Stand Pivot/Stand Step [place] (P)  maximum assist (25% patient effort);verbal cues;other (see comments)   max A x3  -CM      Recorded by [CM] Erendira Narayanan, PT Student 07/10/18 1603      Row Name 07/10/18 1418             Gait/Stairs Assessment/Training    [place] Level (Gait) (P)  unable to assess  -CM      Comment (Gait/Stairs) (P)  will assess at appropriate date  -CM      Recorded by [CM] Erendira Narayanan, PT Student 07/10/18 1529      Row Name 07/10/18 1355             ADL  Assessment/Intervention    92957 - OT Self Care/Mgmt Minutes 3  -CL      BADL Assessment/Intervention grooming  -CL      Recorded by [CL] Kelsea Riggs OT 07/10/18 1521      Row Name 07/10/18 1355             Grooming Assessment/Training    [place] Level (Grooming) wash face, hands;dependent (less than 25% patient effort)  -CL      Grooming Position supine  -CL      Comment (Grooming) Pt refused to attempt.   -CL      Recorded by [CL] Kelsea Riggs OT 07/10/18 1521      Row Name 07/10/18 1418 07/10/18 1355          Therapeutic Exercise    Therapeutic Exercise (P)  seated, lower extremities  -CM  --     Additional Documentation (P)  Therapeutic Exercise (Row)  -CM2  --     73046 - PT Therapeutic Exercise Minutes (P)  5  -CM2  --     37188 - PT Therapeutic Activity Minutes (P)  8  -CM2  --     62017 - OT Therapeutic Activity Minutes  -- 7  -CL     Recorded by [CM] Erendira Narayanan, PT Student 07/10/18 1529  [CM2] Erendira Narayanan, PT Student 07/10/18 1532 [CL] Kelsea Riggs, OT 07/10/18 1521     Row Name 07/10/18 1418             Lower Extremity Seated Therapeutic Exercise    Performed, Seated Lower Extremity (Therapeutic Exercise) (P)  ankle dorsiflexion/plantarflexion;other (see comments)   SLR   -CM      Exercise Type, Seated Lower Extremity (Therapeutic Exercise) (P)  AAROM (active assistive range of motion)  -CM      Sets/Reps Detail, Seated Lower Extremity (Therapeutic Exercise) (P)  1/10  -CM      Recorded by [CM] Erendira Narayanan, PT Student 07/10/18 1532      Row Name 07/10/18 1418 07/10/18 1355          Balance    Balance (P)  static sitting balance  -CM static sitting balance  -CL     Recorded by [CM] Erendira Narayanan, PT Student 07/10/18 1532 [CL] Kelsea Riggs, OT 07/10/18 1521     Row Name 07/10/18 1418 07/10/18 1355          Static Sitting Balance    Level of [place] (Unsupported Sitting, Static Balance) (P)  maximal assist, 25 to 49% patient effort  -CM moderate assist, 50 to 74% patient effort    progressed to CGA at EOB  -CL     Sitting Position (Unsupported Sitting, Static Balance) (P)  sitting on edge of bed  -CM sitting on edge of bed  -CL     Time Able to Maintain Position (Unsupported Sitting, Static Balance) (P)  2 to 3 minutes  -CM 4 to 5 minutes  -CL     Comment (Unsupported Sitting, Static Balance) (P)  Pt req's constant assistance and VC's to remain upright when sitting on EOB   -CM BUE/BLE support  -CL     Recorded by [CM] Erendira Narayanan, PT Student 07/10/18 1532 [CL] Kelsea Riggs, OT 07/10/18 1521     Row Name 07/10/18 1418 07/10/18 1355          Positioning and Restraints    Pre-Treatment Position (P)  sitting in chair/recliner  -CM in bed  -CL     Post Treatment Position (P)  chair  -CM bed  -CL     In Bed  -- notified nsg;sitting EOB;call light within reach;encouraged to call for assist;with PT   pt transitioned to PT treatment  -CL     In Chair (P)  reclined;call light within reach;exit alarm on;with family/caregiver;waffle cushion;on mechanical lift sling;legs elevated  -CM2  --     Recorded by [CM] Erendira Narayanan, PT Student 07/10/18 1532  [CM2] Erendira Narayanan, PT Student 07/10/18 1533 [CL] Kelsea Riggs, OT 07/10/18 1521     Row Name 07/10/18 1418 07/10/18 1355          Pain Assessment    Additional Documentation (P)  Pain Scale: FACES Pre/Post-Treatment (Group)  -CM Pain Scale 2: FACES Pre/Post-Treatment (Group)  -CL     Recorded by [CM] Erendira Narayanan, PT Student 07/10/18 1533 [CL] Kelsea Riggs, OT 07/10/18 1521     Row Name 07/10/18 1418             Pain Scale: FACES Pre/Post-Treatment    Pain: FACES Scale, Pretreatment (P)  4-->hurts little more  -CM      Pain: FACES Scale, Post-Treatment (P)  4-->hurts little more  -CM      Recorded by [CM] Erendira Narayanan, PT Student 07/10/18 1533      Row Name 07/10/18 1355             Pain Scale 2: FACES Pre/Post-Treatment    Pain 2: FACES Scale, Pretreatment 2-->hurts little bit  -CL      Pain 2: FACES Scale, Post-Treatment 2-->hurts  little bit  -CL      Pain Location 2 abdomen  -CL      Pre/Post Treatment Pain 2 Comment Tolerated, RN notified.   -CL      Pain Intervention(s) 2 Repositioned;Ambulation/increased activity  -CL      Recorded by [CL] Kelsea Riggs OT 07/10/18 1521      Row Name                [REMOVED] Wound 07/02/18 2100 Bilateral posterior coccyx pressure injury    Wound - Properties Group Date first assessed: 07/02/18 [JG] Time first assessed: 2100 [JG] Present On Admission : yes [JG] Side: Bilateral [JG] Orientation: posterior [JG] Location: coccyx [JG] Type: pressure injury [JG] Additional Comments: healing/scarred PI or shearing [CP] Resolution Date: 07/10/18 [AS] Resolution Time: 0900 [AS] Wound Outcome: Healed [AS] Recorded by:  [AS] Palomo Szymanski RN 07/10/18 0925 [CP] DANIEL Osman 07/03/18 1052 [JG] Bailey Krause RN 07/03/18 0609    Row Name                Wound 07/03/18 0915 Left distal toe    Wound - Properties Group Date first assessed: 07/03/18 [CP] Time first assessed: 0915 [CP] Present On Admission : yes;picture taken [CP] Side: Left [CP] Orientation: distal [CP] Location: toe [CP] Stage, Pressure Injury: deep tissue injury [CP] Recorded by:  [CP] DANIEL Osman 07/03/18 1101    Row Name                [REMOVED] Wound 07/03/18 0915 Left lower leg ulceration, venous    Wound - Properties Group Date first assessed: 07/03/18 [CP] Time first assessed: 0915 [CP] Present On Admission : yes;picture taken [CP] Side: Left [CP] Orientation: lower [CP] Location: leg [CP] Type: ulceration, venous [CP] Additional Comments: healing [CP] Resolution Date: 07/10/18 [AS] Resolution Time: 0900 [AS] Wound Outcome: Healed [AS] Recorded by:  [AS] Palomo Szymanski RN 07/10/18 0926 [CP] DANIEL Osman 07/03/18 1106    Row Name 07/10/18 1418             Coping    Observed Emotional State (P)  agitated;anxious;tearful/crying;sad;irritable  -CM      Verbalized Emotional State (P)  anger  -CM      Recorded  by [CM] Erendira Narayanan, PT Student 07/10/18 1533      Row Name 07/10/18 1418             Plan of Care Review    Plan of Care Reviewed With (P)  patient;daughter  -CM      Recorded by [CM] Erendira Narayanan, PT Student 07/10/18 1533      Row Name 07/10/18 1418             Outcome Summary/Treatment Plan (PT)    Daily Summary of Progress (PT) (P)  progress toward functional goals as expected  -CM      Recorded by [CM] Erendira Narayanan, PT Student 07/10/18 1533        User Key  (r) = Recorded By, (t) = Taken By, (c) = Cosigned By    Initials Name Effective Dates Discipline    CP Angélica Gavin, DANIEL 06/08/18 -  Nurse    AS Palomo Szymanski, RN 06/16/16 -  Nurse    CL Kelsea Riggs OT 04/03/18 -  OT    RAUDEL Krause RN 08/08/16 -  Nurse    MEMO Narayanan, PT Student 06/07/18 -  PT          Wound 07/03/18 0915 Left distal toe (Active)   Dressing Appearance open to air 7/10/2018  6:00 AM   Closure Open to air 7/10/2018  8:28 AM   Base dry 7/10/2018  8:28 AM   Periwound dry 7/10/2018  8:28 AM   Drainage Amount none 7/10/2018  8:28 AM   Care, Wound other (see comments) 7/10/2018 12:00 AM   Dressing Care, Wound open to air 7/10/2018  6:00 AM   Periwound Care, Wound dry periwound area maintained 7/10/2018  6:00 AM             Physical Therapy Education     Title: PT OT SLP Therapies (Active)     Topic: Physical Therapy (Active)     Point: Mobility training (Active)    Learning Progress Summary     Learner Status Readiness Method Response Comment Documented by    Patient Active Acceptance E NR  CM 07/10/18 1418     Active Acceptance E,D NR  LS 07/06/18 1511    Family Active Acceptance E NR  CM 07/10/18 1418          Point: Home exercise program (Active)    Learning Progress Summary     Learner Status Readiness Method Response Comment Documented by    Patient Active Acceptance E NR  CM 07/10/18 1418    Family Active Acceptance E NR  CM 07/10/18 1418          Point: Body mechanics (Active)    Learning Progress  Summary     Learner Status Readiness Method Response Comment Documented by    Patient Active Acceptance E NR  CM 07/10/18 1418     Active Acceptance E,D NR  LS 07/06/18 1511    Family Active Acceptance E NR  CM 07/10/18 1418          Point: Precautions (Active)    Learning Progress Summary     Learner Status Readiness Method Response Comment Documented by    Patient Active Acceptance E NR  CM 07/10/18 1418     Active Acceptance E,D NR  LS 07/06/18 1511    Family Active Acceptance E NR  CM 07/10/18 1418                      User Key     Initials Effective Dates Name Provider Type Discipline     06/19/15 -  Senait Kessler, PT Physical Therapist PT     06/07/18 -  Erendira Narayanan, PT Student PT Student PT                    PT Recommendation and Plan     Outcome Summary/Treatment Plan (PT)  Daily Summary of Progress (PT): (P) progress toward functional goals as expected  Plan of Care Reviewed With: (P) patient, daughter  Progress: (P) improving  Outcome Summary: (P) Pt progressed to working on upright unsupported sitting on EOB (max A x2) and performing bed ---> chair transfer max A x3. Pt's daughter was present during PT session to assist w/ motivation and redirection during PT session. Pt severly limited from confusion, weakness, and fatigue. Pt would benefit from cont'd skilled PT services.          Outcome Measures     Row Name 07/10/18 1418 07/10/18 1355 07/08/18 1010       How much help from another person do you currently need...    Turning from your back to your side while in flat bed without using bedrails? (P)  2  -CM  -- 1  -LM    Moving from lying on back to sitting on the side of a flat bed without bedrails? (P)  2  -CM  -- 1  -LM    Moving to and from a bed to a chair (including a wheelchair)? (P)  2  -CM  -- 1  -LM    Standing up from a chair using your arms (e.g., wheelchair, bedside chair)? (P)  2  -CM  -- 1  -LM    Climbing 3-5 steps with a railing? (P)  1  -CM  -- 1  -LM    To walk in hospital  room? (P)  1  -CM  -- 1  -LM    AM-PAC 6 Clicks Score (P)  10  -CM  -- 6  -LM       How much help from another is currently needed...    Putting on and taking off regular lower body clothing?  -- 1  -CL  --    Bathing (including washing, rinsing, and drying)  -- 1  -CL  --    Toileting (which includes using toilet bed pan or urinal)  -- 1  -CL  --    Putting on and taking off regular upper body clothing  -- 1  -CL  --    Taking care of personal grooming (such as brushing teeth)  -- 2  -CL  --    Eating meals  -- 2  -CL  --    Score  -- 8  -CL  --       Functional Assessment    Outcome Measure Options (P)  AM-PAC 6 Clicks Basic Mobility (PT)  -CM AM-PAC 6 Clicks Daily Activity (OT)  -CL AM-PAC 6 Clicks Basic Mobility (PT)  -LM      User Key  (r) = Recorded By, (t) = Taken By, (c) = Cosigned By    Initials Name Provider Type    LM Tammie Coley, PT Physical Therapist    CL Kelsea Riggs, OT Occupational Therapist    CM Erendira Narayanan, PT Student PT Student           Time Calculation:         PT Charges     Row Name 07/10/18 1418             Time Calculation    Start Time (P)  1418  -CM      PT Received On (P)  07/10/18  -CM      PT Goal Re-Cert Due Date (P)  07/16/18  -CM         Time Calculation- PT    Total Timed Code Minutes- PT (P)  13 minute(s)  -CM         Timed Charges    88504 - PT Therapeutic Exercise Minutes (P)  5  -CM      17203 - PT Therapeutic Activity Minutes (P)  8  -CM        User Key  (r) = Recorded By, (t) = Taken By, (c) = Cosigned By    Initials Name Provider Type    MEMO Narayanan, PT Student PT Student        Therapy Suggested Charges     Code   Minutes Charges    00791 (CPT®) Hc Pt Neuromusc Re Education Ea 15 Min      03819 (CPT®) Hc Pt Ther Proc Ea 15 Min 5     12708 (CPT®) Hc Gait Training Ea 15 Min      37204 (CPT®) Hc Pt Therapeutic Act Ea 15 Min 8 1    14592 (CPT®) Hc Pt Manual Therapy Ea 15 Min      99582 (CPT®) Hc Pt Iontophoresis Ea 15 Min      97347 (CPT®) Hc Pt Elec Stim Ea-Per  15 Min      98828 (CPT®) Hc Pt Ultrasound Ea 15 Min      02287 (CPT®) Hc Pt Self Care/Mgmt/Train Ea 15 Min      Total  13 1        Therapy Charges for Today     Code Description Service Date Service Provider Modifiers Qty    29789793579 HC PT THERAPEUTIC ACT EA 15 MIN 7/10/2018 Erendira Narayanan, PT Student GP 1    98102846999 HC PT THER SUPP EA 15 MIN 7/10/2018 Erendira Narayanan, PT Student GP 1          PT G-Codes  Outcome Measure Options: (P) AM-PAC 6 Clicks Basic Mobility (PT)    Erendira Narayanan, PT Student  7/10/2018

## 2018-07-10 NOTE — PLAN OF CARE
Problem: Skin Injury Risk (Adult)  Goal: Skin Health and Integrity  Outcome: Ongoing (interventions implemented as appropriate)      Problem: Patient Care Overview  Goal: Plan of Care Review  Outcome: Ongoing (interventions implemented as appropriate)   07/10/18 0456   Coping/Psychosocial   Plan of Care Reviewed With patient   Plan of Care Review   Progress improving   OTHER   Outcome Summary Vitals stable throughout shift. Very labile affect and very forgetful. Pt goes quickly from laughing to yelling without reason. Meds given after multiple attempts of pt to spit. Pt slept only a couple of hours, constantly stating she was hungry and eating multiple snacks. No IV. Hopeful for placement?      Goal: Individualization and Mutuality  Outcome: Ongoing (interventions implemented as appropriate)

## 2018-07-10 NOTE — THERAPY TREATMENT NOTE
Acute Care - Occupational Therapy Treatment Note  [facility]     Patient Name: Leila Smith  : 1945  MRN: [id number]  Today's Date: 7/10/2018  Onset of Illness/Injury or Date of Surgery: 18  Date of Referral to OT: 18  Referring Physician: MD Devan    Admit Date: 2018       ICD-10-CM ICD-9-CM   1. Somnolence R40.0 780.09   2. Atrial fibrillation with rapid ventricular response (CMS/[facility]) I48.91 427.31   3. History of hypertension Z86.79 V12.59   4. Type 2 diabetes mellitus with hyperglycemia, unspecified long term insulin use status (CMS/[facility]) E11.65 250.00   5. History of recurrent UTI (urinary tract infection) Z87.440 V13.02   6. Dysphagia, unspecified type R13.10 787.20   7. Impaired mobility and ADLs Z74.09 799.89   8. Impaired functional mobility, balance, gait, and endurance Z74.09 V49.89     Patient Active Problem List   Diagnosis   • Diabetes mellitus, type 2 (CMS/[facility])   • Hypertension   • Hyperlipidemia   • Hypothyroidism   • Chronic obstructive pulmonary disease (CMS/[facility])   • CAD (coronary artery disease)   • History of edema   • History of obesity   • Venous insufficiency   • Open wound of lower extremity   • Urinary tract infection   • T2DM (type 2 diabetes mellitus) (CMS/[facility])   • Dyspnea on exertion   • Peripheral vascular disease (CMS/[facility])   • Obstructive sleep apnea syndrome   • Ulcer of lower extremity (CMS/[facility])   • Hypoxemia   • Hematuria   • Diabetic peripheral neuropathy (CMS/[facility])   • Edema   • Chronic obstructive pulmonary disease with acute exacerbation (CMS/[facility])   • Congestive heart failure (CMS/[facility])   • Arthritis   • Neutrophilic leukocytosis   • Cystitis   • Atrial fibrillation with rapid ventricular response (CMS/[facility])   • Altered mental status   • Sepsis due to urinary tract infection (CMS/[facility])   • Acute renal failure superimposed on stage 3 chronic kidney disease (CMS/[facility])   • Hyperkalemia   • Leukocytosis   • Elevated troponin   • Somnolence     Past Medical  History:   Diagnosis Date   • Atrial fibrillation (CMS/HCC)    • Chronic ulcer of right leg (CMS/HCC)    • Cognitive communication deficit    • COPD (chronic obstructive pulmonary disease) (CMS/HCC)    • Diabetes mellitus (CMS/HCC)    • Difficulty in walking    • Encephalopathy    • Generalized muscle weakness    • Hematuria    • Hyperlipidemia    • Hypertension    • Hypothyroidism    • Urinary tract infection      Past Surgical History:   Procedure Laterality Date   • CATARACT EXTRACTION     • CHOLECYSTECTOMY     • COLOSTOMY     • FOOT SURGERY Right    • HERNIA REPAIR     • HYSTERECTOMY     • TOTAL KNEE ARTHROPLASTY Right        Therapy Treatment          Rehabilitation Treatment Summary     Row Name 07/10/18 1355             Treatment Time/Intention    Discipline occupational therapist  -CL      Document Type therapy note (daily note)  -CL      Subjective Information complains of;pain  -CL      Patient Effort adequate  -CL      Existing Precautions/Restrictions fall;other (see comments)   dementia  -CL      Treatment Considerations/Comments Pt becomes easily overwhelmed and agitated, benefits from short firm commands and encouragement to participate. Pt w/ improved participation w/ music playing and when daughter present.   -CL      Recorded by [CL] Kelsea Riggs OT 07/10/18 1521      Row Name 07/10/18 1358             Vital Signs    Pre Systolic BP Rehab --   VSS, RN cleared for tx.   -CL      Recorded by [CL] Kelsea Riggs OT 07/10/18 1521      Row Name 07/10/18 1355             Cognitive Assessment/Intervention- PT/OT    Affect/Mental Status (Cognitive) agitated;confused;emotionally labile  -CL      Behavioral Issues (Cognitive) overwhelmed easily;verbal outbursts  -CL      Orientation Status (Cognition) oriented to;person;disoriented to;place;situation;time  -CL      Follows Commands (Cognition) 0-24% accuracy;verbal cues/prompting required;repetition of directions required;increased processing time needed  -CL       Attention Deficit (Cognitive) severe deficit  -CL      Executive Function Deficit (Cognition) severe deficit  -CL      Memory Deficit (Cognitive) severe deficit  -CL      Safety Deficit (Cognitive) severe deficit;awareness of need for assistance;safety precautions awareness  -CL      Personal Safety Interventions fall prevention program maintained;gait belt;nonskid shoes/slippers when out of bed  -CL      Recorded by [CL] Kelsea Riggs OT 07/10/18 1521      Row Name 07/10/18 1355             Bed Mobility Assessment/Treatment    Bed Mobility Assessment/Treatment supine-sit  -CL      Supine-Sit [place] (Bed Mobility) dependent (less than 25% patient effort);2 person assist;verbal cues  -CL      Assistive Device (Bed Mobility) bed rails;draw sheet;head of bed elevated  -CL      Comment (Bed Mobility) Pt initially overhwhelmed and agtiated w/ forward LOB upon sitting EOB. Sitting balance improved and pt calmed w/ encouragement and re-direction.   -CL      Recorded by [CL] Kelsea Riggs OT 07/10/18 1521      Row Name 07/10/18 1358             Transfer Assessment/Treatment    Comment (Transfers) Defer to PT, pt transitioned to PT treatment from EOB.   -CL      Recorded by [CL] Kelsea Riggs OT 07/10/18 1521      Row Name 07/10/18 1351             ADL Assessment/Intervention    45627 - OT Self Care/Mgmt Minutes 3  -CL      BADL Assessment/Intervention grooming  -CL      Recorded by [CL] Kelsea Riggs OT 07/10/18 1521      Row Name 07/10/18 1353             Grooming Assessment/Training    [place] Level (Grooming) wash face, hands;dependent (less than 25% patient effort)  -CL      Grooming Position supine  -CL      Comment (Grooming) Pt refused to attempt.   -CL      Recorded by [CL] Kelsea Riggs OT 07/10/18 1521      Row Name 07/10/18 1353             Therapeutic Exercise    64432 - OT Therapeutic Activity Minutes 7  -CL      Recorded by [CL] Kelsea Riggs OT 07/10/18 1521      Row Name 07/10/18 1351              Balance    Balance static sitting balance  -CL      Recorded by [CL] Kelsea Riggs OT 07/10/18 1521      Row Name 07/10/18 1355             Static Sitting Balance    Level of [place] (Unsupported Sitting, Static Balance) moderate assist, 50 to 74% patient effort   progressed to CGA at EOB  -CL      Sitting Position (Unsupported Sitting, Static Balance) sitting on edge of bed  -CL      Time Able to Maintain Position (Unsupported Sitting, Static Balance) 4 to 5 minutes  -CL      Comment (Unsupported Sitting, Static Balance) BUE/BLE support  -CL      Recorded by [CL] Kelsea Riggs OT 07/10/18 1521      Row Name 07/10/18 1355             Positioning and Restraints    Pre-Treatment Position in bed  -CL      Post Treatment Position bed  -CL      In Bed notified nsg;sitting EOB;call light within reach;encouraged to call for assist;with PT   pt transitioned to PT treatment  -CL      Recorded by [CL] Kelsea Riggs OT 07/10/18 1521      Row Name 07/10/18 1355             Pain Assessment    Additional Documentation Pain Scale 2: FACES Pre/Post-Treatment (Group)  -CL      Recorded by [CL] Kelsea Riggs OT 07/10/18 1521      Row Name 07/10/18 1355             Pain Scale 2: FACES Pre/Post-Treatment    Pain 2: FACES Scale, Pretreatment 2-->hurts little bit  -CL      Pain 2: FACES Scale, Post-Treatment 2-->hurts little bit  -CL      Pain Location 2 abdomen  -CL      Pre/Post Treatment Pain 2 Comment Tolerated, RN notified.   -CL      Pain Intervention(s) 2 Repositioned;Ambulation/increased activity  -CL      Recorded by [CL] Kelsea Riggs OT 07/10/18 1521      Row Name                [REMOVED] Wound 07/02/18 2100 Bilateral posterior coccyx pressure injury    Wound - Properties Group Date first assessed: 07/02/18 [JG] Time first assessed: 2100 [JG] Present On Admission : yes [JG] Side: Bilateral [JG] Orientation: posterior [JG] Location: coccyx [JG] Type: pressure injury [JG] Additional Comments: healing/scarred PI or shearing [CP]  Resolution Date: 07/10/18 [AS] Resolution Time: 0900 [AS] Wound Outcome: Healed [AS] Recorded by:  [AS] Palomo Szymanski RN 07/10/18 0925 [CP] DANIEL Osman 07/03/18 1052 [JG] Bailey Krause RN 07/03/18 0609    Row Name                Wound 07/03/18 0915 Left distal toe    Wound - Properties Group Date first assessed: 07/03/18 [CP] Time first assessed: 0915 [CP] Present On Admission : yes;picture taken [CP] Side: Left [CP] Orientation: distal [CP] Location: toe [CP] Stage, Pressure Injury: deep tissue injury [CP] Recorded by:  [CP] DANIEL Osman 07/03/18 1101    Row Name                [REMOVED] Wound 07/03/18 0915 Left lower leg ulceration, venous    Wound - Properties Group Date first assessed: 07/03/18 [CP] Time first assessed: 0915 [CP] Present On Admission : yes;picture taken [CP] Side: Left [CP] Orientation: lower [CP] Location: leg [CP] Type: ulceration, venous [CP] Additional Comments: healing [CP] Resolution Date: 07/10/18 [AS] Resolution Time: 0900 [AS] Wound Outcome: Healed [AS] Recorded by:  [AS] Palomo Szymanski RN 07/10/18 0926 [CP] Angélica Gavin, DANIEL 07/03/18 1106      User Key  (r) = Recorded By, (t) = Taken By, (c) = Cosigned By    Initials Name Effective Dates Discipline    CP DANIEL Osman 06/08/18 -  Nurse    AS Palomo Szymanski RN 06/16/16 -  Nurse    JACQUELINE Riggs OT 04/03/18 -  OT    JG Bailey Krause RN 08/08/16 -  Nurse        Wound 07/03/18 0915 Left distal toe (Active)   Dressing Appearance open to air 7/10/2018  6:00 AM   Closure Open to air 7/10/2018  8:28 AM   Base dry 7/10/2018  8:28 AM   Periwound dry 7/10/2018  8:28 AM   Drainage Amount none 7/10/2018  8:28 AM   Care, Wound other (see comments) 7/10/2018 12:00 AM   Dressing Care, Wound open to air 7/10/2018  6:00 AM   Periwound Care, Wound dry periwound area maintained 7/10/2018  6:00 AM         Occupational Therapy Education     Title: PT OT SLP Therapies (Active)     Topic:  Occupational Therapy (Active)     Point: ADL training (Active)     Description: Instruct learner(s) on proper safety adaptation and remediation techniques during self care or transfers.   Instruct in proper use of assistive devices.   Learning Progress Summary     Learner Status Readiness Method Response Comment Documented by    Patient Active Acceptance E NR Pt educated on appropriate safety precautions, t/f techniques, positioning, role of OT, and benefits of activity. CL 07/10/18 1521     Active Acceptance E NR Pt educated on role of OT, AE for self-feeding, positioning, and benefits of therapy. CL 07/05/18 1555          Point: Home exercise program (Active)     Description: Instruct learner(s) on appropriate technique for monitoring, assisting and/or progressing therapeutic exercises/activities.   Learning Progress Summary     Learner Status Readiness Method Response Comment Documented by    Patient Active Acceptance E NR Limited teaching this date, pt confused and no family present. JR 07/07/18 1247     Active Acceptance E NR Pt educated on role of OT, AE for self-feeding, positioning, and benefits of therapy. CL 07/05/18 1555          Point: Precautions (Active)     Description: Instruct learner(s) on prescribed precautions during self-care and functional transfers.   Learning Progress Summary     Learner Status Readiness Method Response Comment Documented by    Patient Active Acceptance E NR Pt educated on appropriate safety precautions, t/f techniques, positioning, role of OT, and benefits of activity. CL 07/10/18 1521     Active Acceptance E NR Pt educated on role of OT, AE for self-feeding, positioning, and benefits of therapy. CL 07/05/18 1555          Point: Body mechanics (Active)     Description: Instruct learner(s) on proper positioning and spine alignment during self-care, functional mobility activities and/or exercises.   Learning Progress Summary     Learner Status Readiness Method Response Comment  Documented by    Patient Active Acceptance E NR Pt educated on appropriate safety precautions, t/f techniques, positioning, role of OT, and benefits of activity. CL 07/10/18 1521                      User Key     Initials Effective Dates Name Provider Type Discipline     06/22/15 -  Alix Newman, OT Occupational Therapist OT    CL 04/03/18 -  Kelsea Riggs, OT Occupational Therapist OT                OT Recommendation and Plan  Outcome Summary/Treatment Plan (OT)  Anticipated Equipment Needs at Discharge (OT):  (TBA further)  Anticipated Discharge Disposition (OT): skilled nursing facility  Therapy Frequency (OT Eval): 3 times/wk (MWF)  Plan of Care Review  Plan of Care Reviewed With: patient  Plan of Care Reviewed With: patient  Outcome Summary: Pt demo improved activity tolerance by performing supine/sit t/f Depx2 and progressing towards CGA for static sitting balance at EOB. Pt conts to require significant encouragement to participate, though noted improvement w/ daugther present. Recommend cont skilled IPOT POC.         Outcome Measures     Row Name 07/10/18 1355 07/08/18 1010          How much help from another person do you currently need...    Turning from your back to your side while in flat bed without using bedrails?  -- 1  -LM     Moving from lying on back to sitting on the side of a flat bed without bedrails?  -- 1  -LM     Moving to and from a bed to a chair (including a wheelchair)?  -- 1  -LM     Standing up from a chair using your arms (e.g., wheelchair, bedside chair)?  -- 1  -LM     Climbing 3-5 steps with a railing?  -- 1  -LM     To walk in hospital room?  -- 1  -LM     AM-PAC 6 Clicks Score  -- 6  -LM        How much help from another is currently needed...    Putting on and taking off regular lower body clothing? 1  -CL  --     Bathing (including washing, rinsing, and drying) 1  -CL  --     Toileting (which includes using toilet bed pan or urinal) 1  -CL  --     Putting on and taking off  regular upper body clothing 1  -CL  --     Taking care of personal grooming (such as brushing teeth) 2  -CL  --     Eating meals 2  -CL  --     Score 8  -CL  --        Functional Assessment    Outcome Measure Options AM-PAC 6 Clicks Daily Activity (OT)  -CL AM-PAC 6 Clicks Basic Mobility (PT)  -LM       User Key  (r) = Recorded By, (t) = Taken By, (c) = Cosigned By    Initials Name Provider Type    LM Tammie Coley, PT Physical Therapist    CL Kelsea Riggs OT Occupational Therapist           Time Calculation:         Time Calculation- OT     Row Name 07/10/18 1524 07/10/18 1355          Time Calculation- OT    OT Start Time 1355  -CL  --     Total Timed Code Minutes- OT 10 minute(s)  -CL  --     OT Received On 07/10/18  -CL  --     OT Goal Re-Cert Due Date 07/15/18  -CL  --        Timed Charges    53144 - OT Therapeutic Activity Minutes  -- 7  -CL     00437 - OT Self Care/Mgmt Minutes  -- 3  -CL       User Key  (r) = Recorded By, (t) = Taken By, (c) = Cosigned By    Initials Name Provider Type    CL Kelsea Riggs OT Occupational Therapist           Therapy Suggested Charges     Code   Minutes Charges    27539 (CPT®) Hc Ot Neuromusc Re Education Ea 15 Min      93212 (CPT®) Hc Ot Ther Proc Ea 15 Min      61186 (CPT®) Hc Ot Therapeutic Act Ea 15 Min 7 1    00021 (CPT®) Hc Ot Manual Therapy Ea 15 Min      49823 (CPT®) Hc Ot Iontophoresis Ea 15 Min      86556 (CPT®) Hc Ot Elec Stim Ea-Per 15 Min      06187 (CPT®) Hc Ot Ultrasound Ea 15 Min      50128 (CPT®) Hc Ot Self Care/Mgmt/Train Ea 15 Min 3     Total  10 1        Therapy Charges for Today     Code Description Service Date Service Provider Modifiers Qty    23743509645 HC OT THERAPEUTIC ACT EA 15 MIN 7/10/2018 Kelsea Riggs OT GO 1    41862042917 HC OT THER SUPP EA 15 MIN 7/10/2018 Kelsea Riggs OT GO 1               Kelsea Riggs OT  7/10/2018

## 2018-07-10 NOTE — PLAN OF CARE
Problem: Patient Care Overview  Goal: Plan of Care Review  Outcome: Ongoing (interventions implemented as appropriate)   07/10/18 1522   Coping/Psychosocial   Plan of Care Reviewed With patient   Plan of Care Review   Progress improving   OTHER   Outcome Summary Pt demo improved activity tolerance by performing supine/sit t/f Depx2 and progressing towards CGA for static sitting balance at EOB. Pt conts to require significant encouragement to participate, though noted improvement w/ daugther present. Recommend cont skilled IPOT POC.

## 2018-07-10 NOTE — PLAN OF CARE
Problem: Fall Risk (Adult)  Goal: Absence of Fall  Outcome: Ongoing (interventions implemented as appropriate)      Problem: Skin Injury Risk (Adult)  Goal: Skin Health and Integrity  Outcome: Ongoing (interventions implemented as appropriate)      Problem: Renal Failure/Kidney Injury, Acute (Adult)  Goal: Signs and Symptoms of Listed Potential Problems Will be Absent, Minimized or Managed (Renal Failure/Kidney Injury, Acute)  Outcome: Ongoing (interventions implemented as appropriate)      Problem: Arrhythmia/Dysrhythmia (Symptomatic) (Adult)  Goal: Signs and Symptoms of Listed Potential Problems Will be Absent, Minimized or Managed (Arrhythmia/Dysrhythmia)  Outcome: Ongoing (interventions implemented as appropriate)      Problem: Patient Care Overview  Goal: Plan of Care Review  Outcome: Ongoing (interventions implemented as appropriate)   07/10/18 0661   Coping/Psychosocial   Plan of Care Reviewed With patient;daughter   Plan of Care Review   Progress improving   OTHER   Outcome Summary VSS. A-FIB on monitor with rate control. pt. up in chair. waiting on plac ement. will continue to mointor.

## 2018-07-10 NOTE — DISCHARGE PLACEMENT REQUEST
"Leila Smith (73 y.o. Female)     Date of Birth Social Security Number Address Home Phone MRN    1945  [address] [id number] [phone] [id number]    [employer] Marital Status          Unknown        Admission Date Admission Type Admitting Provider Attending Provider Department, Room/Bed    18 Emergency Oneil Hartman MD Dossett, Lee M, MD [facility] 6A, N609/1    Discharge Date Discharge Disposition Discharge Destination                       Attending Provider:  Oneil Hartman MD    Allergies:  Codeine, Tetracyclines & Related    Isolation:  None   Infection:  None   Code Status:  CPR    Ht:  180.3 cm (71\")   Wt:  81.3 kg (179 lb 3.7 oz)    Admission Cmt:  None   Principal Problem:  Sepsis due to urinary tract infection (CMS/[facility]) [A41.9,N39.0]                 Active Insurance as of 2018     Primary Coverage     Payor Plan Insurance Group Employer/Plan Group    [employer] MEDICARE REPLACEMENT [place] MEDICARE ADVANTAGE [id number]     Payor Plan Address Payor Plan Phone Number Effective From Effective To    [address] [phone] 2017     [facility] [address]       Subscriber Name Subscriber Birth Date Member ID       LEILA SMITH 1945 [id number]                 Emergency Contacts      (Rel.) Home Phone Work Phone Mobile Phone    Danilo Hope (Daughter) [phone] -- --    Kleber Johnson (Son) [phone] -- --            Insurance Information                [employer] MEDICARE REPLACEMENT/[employer] MEDICARE ADVANTAGE Phone: [phone]    Subscriber: Leila Smith [place] Subscriber#: [id number]    Group#: [id number] Precert#: [id number]             Physical Therapy Notes (most recent note)      Tammie Coley, PT at 2018 11:18 AM  Version 1 of 1         Acute Care - Physical Therapy Treatment Note  [facility]     Patient Name: Leila Smith  : 1945  MRN: [id number]  Today's Date: 2018  Onset of Illness/Injury or Date of " Surgery: 07/05/18  Date of Referral to PT: 07/05/18  Referring Physician: MD Devan    Admit Date: 7/2/2018    Visit Dx:    ICD-10-CM ICD-9-CM   1. Somnolence R40.0 780.09   2. Atrial fibrillation with rapid ventricular response (CMS/[facility]) I48.91 427.31   3. History of hypertension Z86.79 V12.59   4. Type 2 diabetes mellitus with hyperglycemia, unspecified long term insulin use status (CMS/[facility]) E11.65 250.00   5. History of recurrent UTI (urinary tract infection) Z87.440 V13.02   6. Dysphagia, unspecified type R13.10 787.20   7. Impaired mobility and ADLs Z74.09 799.89   8. Impaired functional mobility, balance, gait, and endurance Z74.09 V49.89     Patient Active Problem List   Diagnosis   • Diabetes mellitus, type 2 (CMS/[facility])   • Hypertension   • Hyperlipidemia   • Hypothyroidism   • Chronic obstructive pulmonary disease (CMS/[facility])   • CAD (coronary artery disease)   • History of edema   • History of obesity   • Venous insufficiency   • Open wound of lower extremity   • Urinary tract infection   • T2DM (type 2 diabetes mellitus) (CMS/[facility])   • Dyspnea on exertion   • Peripheral vascular disease (CMS/[facility])   • Obstructive sleep apnea syndrome   • Ulcer of lower extremity (CMS/[facility])   • Hypoxemia   • Hematuria   • Diabetic peripheral neuropathy (CMS/[facility])   • Edema   • Chronic obstructive pulmonary disease with acute exacerbation (CMS/[facility])   • Congestive heart failure (CMS/[facility])   • Arthritis   • Neutrophilic leukocytosis   • Cystitis   • Atrial fibrillation with rapid ventricular response (CMS/[facility])   • Altered mental status   • Sepsis due to urinary tract infection (CMS/[facility])   • Acute renal failure superimposed on stage 3 chronic kidney disease (CMS/[facility])   • Hyperkalemia   • Leukocytosis   • Elevated troponin   • Somnolence       Therapy Treatment          Rehabilitation Treatment Summary     Row Name 07/08/18 1010             Treatment Time/Intention    Discipline physical therapist  -      Document Type therapy note  (daily note)  -LM      Subjective Information complains of;pain  -LM      Mode of Treatment individual therapy;physical therapy  -LM      Patient Effort poor  -LM      Comment Pt initially agreeable to transferring over to chair.  However, when transferring from supine-->sit pt started screaming and became agitated.  Therefore, pt returned to supine.  PT then tried to have pt roll so sheets could be fixed but pt refused.  Attempted ther ex, but pt also refused.  Pt boosted up in bed using drawsheet dependently x2.  -LM      Existing Precautions/Restrictions fall;other (see comments)   Dementia  -LM      Recorded by [LM] Tammie Coley, PT 07/08/18 1113      Row Name 07/08/18 1010             Vital Signs    Pre Systolic BP Rehab 157  -LM      Pre Treatment Diastolic   -LM      Pretreatment Heart Rate (beats/min) 105  -LM      Posttreatment Heart Rate (beats/min) 127  -LM      Pre SpO2 (%) 94  -LM      O2 Delivery Pre Treatment room air  -LM      Post SpO2 (%) 94  -LM      O2 Delivery Post Treatment room air  -LM      Pre Patient Position Supine  -LM      Intra Patient Position Sitting   Partially sitting  -LM      Post Patient Position Supine  -LM      Recorded by [LM] Tammie Coley, PT 07/08/18 1113      Row Name 07/08/18 1010             Cognitive Assessment/Intervention    Additional Documentation Cognitive Assessment/Intervention (Group)  -LM      Recorded by [LM] Tammie Coley, PT 07/08/18 1113      Row Name 07/08/18 1010             Cognitive Assessment/Intervention- PT/OT    Affect/Mental Status (Cognitive) agitated;confused  -LM      Behavioral Issues (Cognitive) verbal outbursts;combative/physical outbursts  -LM      Orientation Status (Cognition) disoriented to;person;place;situation;time  -LM      Follows Commands (Cognition) does not follow one step commands  -LM      Cognitive Function (Cognitive) memory deficit;safety deficit  -LM      Memory Deficit (Cognitive) severe deficit  -LM      Safety Deficit  (Cognitive) severe deficit  -LM      Personal Safety Interventions fall prevention program maintained  -LM      Recorded by [LM] Tammie Coley, PT 07/08/18 1113      Row Name 07/08/18 1010             Bed Mobility Assessment/Treatment    Bed Mobility Assessment/Treatment supine-sit;sit-supine;scooting/bridging  -LM      Scooting/Bridging [place] (Bed Mobility) dependent (less than 25% patient effort);2 person assist  -LM      Supine-Sit [place] (Bed Mobility) dependent (less than 25% patient effort);2 person assist  -LM      Sit-Supine [place] (Bed Mobility) dependent (less than 25% patient effort);2 person assist  -LM      Comment (Bed Mobility) See pt effort comment above.  -LM      Recorded by [LM] Tammie Coley, PT 07/08/18 1113      Row Name 07/08/18 1010             Therapeutic Exercise    49263 - PT Therapeutic Activity Minutes 14  -LM      Recorded by [LM] Tammie Coley, PT 07/08/18 1113      Row Name 07/08/18 1010             Positioning and Restraints    Pre-Treatment Position in bed  -LM      Post Treatment Position bed  -LM      In Bed supine;call light within reach;encouraged to call for assist;exit alarm on;notified nsg  -LM      Recorded by [LM] Tammie Coley, PT 07/08/18 1113      Row Name 07/08/18 1010             Pain Assessment    Additional Documentation Pain Scale: FACES Pre/Post-Treatment (Group)  -LM      Recorded by [LM] Tammie Coley, PT 07/08/18 1113      Row Name 07/08/18 1010             Pain Scale: FACES Pre/Post-Treatment    Pain: FACES Scale, Pretreatment 6-->hurts even more  -LM      Pain: FACES Scale, Post-Treatment 6-->hurts even more  -LM      Pre/Post Treatment Pain Comment 6 when moving; 0 when lying still  -LM      Recorded by [LM] Tammie Coley, PT 07/08/18 1113      Row Name                Wound 07/02/18 2100 Bilateral posterior coccyx pressure injury    Wound - Properties Group Date first assessed: 07/02/18 [JG] Time first assessed: 2100 [JG] Present On Admission :  yes [JG] Side: Bilateral [JG] Orientation: posterior [JG] Location: coccyx [JG] Type: pressure injury [JG] Additional Comments: healing/scarred PI or shearing [CP] Recorded by:  [CP] DANIEL Osman 07/03/18 1052 [JG] Bailey Krause RN 07/03/18 0609    Row Name                Wound 07/03/18 0915 Left distal toe    Wound - Properties Group Date first assessed: 07/03/18 [CP] Time first assessed: 0915 [CP] Present On Admission : yes;picture taken [CP] Side: Left [CP] Orientation: distal [CP] Location: toe [CP] Stage, Pressure Injury: deep tissue injury [CP] Recorded by:  [CP] DANIEL Osman 07/03/18 1101    Row Name                Wound 07/03/18 0915 Left lower leg ulceration, venous    Wound - Properties Group Date first assessed: 07/03/18 [CP] Time first assessed: 0915 [CP] Present On Admission : yes;picture taken [CP] Side: Left [CP] Orientation: lower [CP] Location: leg [CP] Type: ulceration, venous [CP] Additional Comments: healing [CP] Recorded by:  [CP] DANIEL Osman 07/03/18 1106    Row Name 07/08/18 1010             Plan of Care Review    Plan of Care Reviewed With patient  -LM      Recorded by [LM] Tammie Coley, PT 07/08/18 1113      Row Name 07/08/18 1010             Outcome Summary/Treatment Plan (PT)    Daily Summary of Progress (PT) unable to show any progress toward functional goals  -LM      Recorded by [LM] Tammie Coley, PT 07/08/18 1113        User Key  (r) = Recorded By, (t) = Taken By, (c) = Cosigned By    Initials Name Effective Dates Discipline    CP DANIEL Osman 06/08/18 -  Nurse    LM Tammie Coley, PT 06/15/16 -  PT    JG Bailey Krause RN 08/08/16 -  Nurse          Wound 07/02/18 2100 Bilateral posterior coccyx pressure injury (Active)   Dressing Appearance open to air 7/7/2018  8:00 PM   Closure Open to air 7/7/2018  8:00 PM   Base clean;dry;gray;pink 7/7/2018  8:00 PM   Periwound intact;dry;pink;blanchable 7/7/2018  8:00 PM   Edges  irregular 7/7/2018  8:00 PM   Drainage Amount none 7/7/2018  8:00 PM       Wound 07/03/18 0915 Left distal toe (Active)   Dressing Appearance open to air 7/7/2018  8:00 PM   Closure Open to air 7/7/2018  8:00 PM   Base clean;dry;pink;maroon/purple 7/7/2018  8:00 PM   Periwound intact;dry;pink;blanchable 7/7/2018  8:00 PM   Edges irregular 7/7/2018  8:00 PM   Drainage Amount none 7/7/2018  8:00 PM   Dressing Care, Wound open to air 7/7/2018  8:00 PM       Wound 07/03/18 0915 Left lower leg ulceration, venous (Active)   Dressing Appearance dry;intact;no drainage 7/7/2018  8:00 PM   Base dressing in place, unable to visualize 7/7/2018  8:00 PM   Periwound dry;intact;blanchable 7/7/2018  8:00 PM   Drainage Amount none 7/8/2018  9:23 AM             Physical Therapy Education     Title: PT OT SLP Therapies (Active)     Topic: Physical Therapy (Active)     Point: Mobility training (Active)    Learning Progress Summary     Learner Status Readiness Method Response Comment Documented by    Patient Active Acceptance E,D NR  LS 07/06/18 1511          Point: Body mechanics (Active)    Learning Progress Summary     Learner Status Readiness Method Response Comment Documented by    Patient Active Acceptance E,D NR  LS 07/06/18 1511          Point: Precautions (Active)    Learning Progress Summary     Learner Status Readiness Method Response Comment Documented by    Patient Active Acceptance E,D NR   07/06/18 1511                      User Key     Initials Effective Dates Name Provider Type Discipline     06/19/15 -  Senait Kessler, PT Physical Therapist PT                    PT Recommendation and Plan     Outcome Summary/Treatment Plan (PT)  Daily Summary of Progress (PT): unable to show any progress toward functional goals  Plan of Care Reviewed With: patient  Outcome Summary: Attempted to transfer supine-->sit dependently x 2 - however, pt became agitated and started screaming.  Pt quickly returned to supine.  All further  mobility including rolling in the bed and ther ex declined by pt.  Drawsheet used to dependently position pt properly in supine.  No progress made towards skilled goals.  If pt continues to be non-participatory/agitated - may consider d/c from PT.          Outcome Measures     Row Name 07/08/18 1010 07/07/18 1100 07/06/18 1426       How much help from another person do you currently need...    Turning from your back to your side while in flat bed without using bedrails? 1  -LM  -- 2  -LS    Moving from lying on back to sitting on the side of a flat bed without bedrails? 1  -LM  -- 2  -LS    Moving to and from a bed to a chair (including a wheelchair)? 1  -LM  -- 1  -LS    Standing up from a chair using your arms (e.g., wheelchair, bedside chair)? 1  -LM  -- 1  -LS    Climbing 3-5 steps with a railing? 1  -LM  -- 1  -LS    To walk in hospital room? 1  -LM  -- 1  -LS    AM-PAC 6 Clicks Score 6  -LM  -- 8  -LS       How much help from another is currently needed...    Putting on and taking off regular lower body clothing?  -- 1  -JR  --    Bathing (including washing, rinsing, and drying)  -- 1  -JR  --    Toileting (which includes using toilet bed pan or urinal)  -- 1  -JR  --    Putting on and taking off regular upper body clothing  -- 1  -JR  --    Taking care of personal grooming (such as brushing teeth)  -- 1  -JR  --    Eating meals  -- 1  -JR  --    Score  -- 6  -JR  --       Functional Assessment    Outcome Measure Options AM-PAC 6 Clicks Basic Mobility (PT)  -LM AM-PAC 6 Clicks Daily Activity (OT)  -JR AM-PAC 6 Clicks Basic Mobility (PT)  -LS    Row Name 07/05/18 1440             How much help from another is currently needed...    Putting on and taking off regular lower body clothing? 1  -CL      Bathing (including washing, rinsing, and drying) 1  -CL      Toileting (which includes using toilet bed pan or urinal) 1  -CL      Putting on and taking off regular upper body clothing 2  -CL      Taking care of  personal grooming (such as brushing teeth) 2  -CL      Eating meals 2  -CL      Score 9  -CL         Functional Assessment    Outcome Measure Options AM-PAC 6 Clicks Daily Activity (OT)  -CL        User Key  (r) = Recorded By, (t) = Taken By, (c) = Cosigned By    Initials Name Provider Type    JR Alix Newman, OT Occupational Therapist    LS Senait Kessler, PT Physical Therapist    LM Tammie Coley, PT Physical Therapist    CL Kelsea Riggs, OT Occupational Therapist           Time Calculation:         PT Charges     Row Name 07/08/18 1010             Time Calculation    Start Time 1010  -LM      PT Received On 07/08/18  -LM      PT Goal Re-Cert Due Date 07/16/18  -LM         Timed Charges    71159 - PT Therapeutic Activity Minutes 14  -LM        User Key  (r) = Recorded By, (t) = Taken By, (c) = Cosigned By    Initials Name Provider Type    LM Tammie Coley, PT Physical Therapist        Therapy Suggested Charges     Code   Minutes Charges    09422 (CPT®) Hc Pt Neuromusc Re Education Ea 15 Min      78529 (CPT®) Hc Pt Ther Proc Ea 15 Min      15034 (CPT®) Hc Gait Training Ea 15 Min      18471 (CPT®) Hc Pt Therapeutic Act Ea 15 Min 14 1    39434 (CPT®) Hc Pt Manual Therapy Ea 15 Min      11330 (CPT®) Hc Pt Iontophoresis Ea 15 Min      73473 (CPT®) Hc Pt Elec Stim Ea-Per 15 Min      10093 (CPT®) Hc Pt Ultrasound Ea 15 Min      74215 (CPT®) Hc Pt Self Care/Mgmt/Train Ea 15 Min      Total  14 1        Therapy Charges for Today     Code Description Service Date Service Provider Modifiers Qty    71508911043 HC PT THERAPEUTIC ACT EA 15 MIN 7/8/2018 Tammie Coley, PT GP 1    97179967778 HC PT THER SUPP EA 15 MIN 7/8/2018 Tammie Coley, PT GP 1          PT G-Codes  Outcome Measure Options: AM-PAC 6 Clicks Basic Mobility (PT)    Tammie Coley PT  7/8/2018         Electronically signed by Tammie Coley PT at 7/8/2018 11:20 AM          Occupational Therapy Notes (most recent note)      Kelsea Riggs, OT at 7/10/2018  3:25 PM           Acute Care - Occupational Therapy Treatment Note  [facility]     Patient Name: Leila Smith  : 1945  MRN: [id number]  Today's Date: 7/10/2018  Onset of Illness/Injury or Date of Surgery: 18  Date of Referral to OT: 18  Referring Physician: MD Devan    Admit Date: 2018       ICD-10-CM ICD-9-CM   1. Somnolence R40.0 780.09   2. Atrial fibrillation with rapid ventricular response (CMS/[facility]) I48.91 427.31   3. History of hypertension Z86.79 V12.59   4. Type 2 diabetes mellitus with hyperglycemia, unspecified long term insulin use status (CMS/[facility]) E11.65 250.00   5. History of recurrent UTI (urinary tract infection) Z87.440 V13.02   6. Dysphagia, unspecified type R13.10 787.20   7. Impaired mobility and ADLs Z74.09 799.89   8. Impaired functional mobility, balance, gait, and endurance Z74.09 V49.89     Patient Active Problem List   Diagnosis   • Diabetes mellitus, type 2 (CMS/[facility])   • Hypertension   • Hyperlipidemia   • Hypothyroidism   • Chronic obstructive pulmonary disease (CMS/[facility])   • CAD (coronary artery disease)   • History of edema   • History of obesity   • Venous insufficiency   • Open wound of lower extremity   • Urinary tract infection   • T2DM (type 2 diabetes mellitus) (CMS/[facility])   • Dyspnea on exertion   • Peripheral vascular disease (CMS/[facility])   • Obstructive sleep apnea syndrome   • Ulcer of lower extremity (CMS/[facility])   • Hypoxemia   • Hematuria   • Diabetic peripheral neuropathy (CMS/[facility])   • Edema   • Chronic obstructive pulmonary disease with acute exacerbation (CMS/[facility])   • Congestive heart failure (CMS/[facility])   • Arthritis   • Neutrophilic leukocytosis   • Cystitis   • Atrial fibrillation with rapid ventricular response (CMS/[facility])   • Altered mental status   • Sepsis due to urinary tract infection (CMS/[facility])   • Acute renal failure superimposed on stage 3 chronic kidney disease (CMS/[facility])   • Hyperkalemia   • Leukocytosis   • Elevated troponin   • Somnolence     Past Medical  History:   Diagnosis Date   • Atrial fibrillation (CMS/HCC)    • Chronic ulcer of right leg (CMS/HCC)    • Cognitive communication deficit    • COPD (chronic obstructive pulmonary disease) (CMS/HCC)    • Diabetes mellitus (CMS/HCC)    • Difficulty in walking    • Encephalopathy    • Generalized muscle weakness    • Hematuria    • Hyperlipidemia    • Hypertension    • Hypothyroidism    • Urinary tract infection      Past Surgical History:   Procedure Laterality Date   • CATARACT EXTRACTION     • CHOLECYSTECTOMY     • COLOSTOMY     • FOOT SURGERY Right    • HERNIA REPAIR     • HYSTERECTOMY     • TOTAL KNEE ARTHROPLASTY Right        Therapy Treatment          Rehabilitation Treatment Summary     Row Name 07/10/18 1355             Treatment Time/Intention    Discipline occupational therapist  -CL      Document Type therapy note (daily note)  -CL      Subjective Information complains of;pain  -CL      Patient Effort adequate  -CL      Existing Precautions/Restrictions fall;other (see comments)   dementia  -CL      Treatment Considerations/Comments Pt becomes easily overwhelmed and agitated, benefits from short firm commands and encouragement to participate. Pt w/ improved participation w/ music playing and when daughter present.   -CL      Recorded by [CL] Kelsea Riggs OT 07/10/18 1521      Row Name 07/10/18 1351             Vital Signs    Pre Systolic BP Rehab --   VSS, RN cleared for tx.   -CL      Recorded by [CL] Kelsea Riggs OT 07/10/18 1521      Row Name 07/10/18 1355             Cognitive Assessment/Intervention- PT/OT    Affect/Mental Status (Cognitive) agitated;confused;emotionally labile  -CL      Behavioral Issues (Cognitive) overwhelmed easily;verbal outbursts  -CL      Orientation Status (Cognition) oriented to;person;disoriented to;place;situation;time  -CL      Follows Commands (Cognition) 0-24% accuracy;verbal cues/prompting required;repetition of directions required;increased processing time needed  -CL       Attention Deficit (Cognitive) severe deficit  -CL      Executive Function Deficit (Cognition) severe deficit  -CL      Memory Deficit (Cognitive) severe deficit  -CL      Safety Deficit (Cognitive) severe deficit;awareness of need for assistance;safety precautions awareness  -CL      Personal Safety Interventions fall prevention program maintained;gait belt;nonskid shoes/slippers when out of bed  -CL      Recorded by [CL] Kelsea Riggs OT 07/10/18 1521      Row Name 07/10/18 1355             Bed Mobility Assessment/Treatment    Bed Mobility Assessment/Treatment supine-sit  -CL      Supine-Sit [place] (Bed Mobility) dependent (less than 25% patient effort);2 person assist;verbal cues  -CL      Assistive Device (Bed Mobility) bed rails;draw sheet;head of bed elevated  -CL      Comment (Bed Mobility) Pt initially overhwhelmed and agtiated w/ forward LOB upon sitting EOB. Sitting balance improved and pt calmed w/ encouragement and re-direction.   -CL      Recorded by [CL] Kelsea Riggs OT 07/10/18 1521      Row Name 07/10/18 1352             Transfer Assessment/Treatment    Comment (Transfers) Defer to PT, pt transitioned to PT treatment from EOB.   -CL      Recorded by [CL] Kelsea Riggs OT 07/10/18 1521      Row Name 07/10/18 1356             ADL Assessment/Intervention    27976 - OT Self Care/Mgmt Minutes 3  -CL      BADL Assessment/Intervention grooming  -CL      Recorded by [CL] Kelsea Riggs OT 07/10/18 1521      Row Name 07/10/18 1357             Grooming Assessment/Training    [place] Level (Grooming) wash face, hands;dependent (less than 25% patient effort)  -CL      Grooming Position supine  -CL      Comment (Grooming) Pt refused to attempt.   -CL      Recorded by [CL] Kelsea Riggs OT 07/10/18 1521      Row Name 07/10/18 1352             Therapeutic Exercise    30859 - OT Therapeutic Activity Minutes 7  -CL      Recorded by [CL] Kelsea Riggs OT 07/10/18 1521      Row Name 07/10/18 1353              Balance    Balance static sitting balance  -CL      Recorded by [CL] Kelsea Riggs OT 07/10/18 1521      Row Name 07/10/18 1355             Static Sitting Balance    Level of [place] (Unsupported Sitting, Static Balance) moderate assist, 50 to 74% patient effort   progressed to CGA at EOB  -CL      Sitting Position (Unsupported Sitting, Static Balance) sitting on edge of bed  -CL      Time Able to Maintain Position (Unsupported Sitting, Static Balance) 4 to 5 minutes  -CL      Comment (Unsupported Sitting, Static Balance) BUE/BLE support  -CL      Recorded by [CL] Kelsea Riggs OT 07/10/18 1521      Row Name 07/10/18 1355             Positioning and Restraints    Pre-Treatment Position in bed  -CL      Post Treatment Position bed  -CL      In Bed notified nsg;sitting EOB;call light within reach;encouraged to call for assist;with PT   pt transitioned to PT treatment  -CL      Recorded by [CL] Kelsea Riggs OT 07/10/18 1521      Row Name 07/10/18 1355             Pain Assessment    Additional Documentation Pain Scale 2: FACES Pre/Post-Treatment (Group)  -CL      Recorded by [CL] Kelsea Riggs OT 07/10/18 1521      Row Name 07/10/18 1355             Pain Scale 2: FACES Pre/Post-Treatment    Pain 2: FACES Scale, Pretreatment 2-->hurts little bit  -CL      Pain 2: FACES Scale, Post-Treatment 2-->hurts little bit  -CL      Pain Location 2 abdomen  -CL      Pre/Post Treatment Pain 2 Comment Tolerated, RN notified.   -CL      Pain Intervention(s) 2 Repositioned;Ambulation/increased activity  -CL      Recorded by [CL] Kelsea Riggs OT 07/10/18 1521      Row Name                [REMOVED] Wound 07/02/18 2100 Bilateral posterior coccyx pressure injury    Wound - Properties Group Date first assessed: 07/02/18 [JG] Time first assessed: 2100 [JG] Present On Admission : yes [JG] Side: Bilateral [JG] Orientation: posterior [JG] Location: coccyx [JG] Type: pressure injury [JG] Additional Comments: healing/scarred PI or shearing [CP]  Resolution Date: 07/10/18 [AS] Resolution Time: 0900 [AS] Wound Outcome: Healed [AS] Recorded by:  [AS] Palomo Szymanski RN 07/10/18 0925 [CP] DANIEL Osman 07/03/18 1052 [JG] Bailey Krause RN 07/03/18 0609    Row Name                Wound 07/03/18 0915 Left distal toe    Wound - Properties Group Date first assessed: 07/03/18 [CP] Time first assessed: 0915 [CP] Present On Admission : yes;picture taken [CP] Side: Left [CP] Orientation: distal [CP] Location: toe [CP] Stage, Pressure Injury: deep tissue injury [CP] Recorded by:  [CP] DANIEL Osman 07/03/18 1101    Row Name                [REMOVED] Wound 07/03/18 0915 Left lower leg ulceration, venous    Wound - Properties Group Date first assessed: 07/03/18 [CP] Time first assessed: 0915 [CP] Present On Admission : yes;picture taken [CP] Side: Left [CP] Orientation: lower [CP] Location: leg [CP] Type: ulceration, venous [CP] Additional Comments: healing [CP] Resolution Date: 07/10/18 [AS] Resolution Time: 0900 [AS] Wound Outcome: Healed [AS] Recorded by:  [AS] Palomo Szymanski RN 07/10/18 0926 [CP] Angélica Gavin, DANIEL 07/03/18 1106      User Key  (r) = Recorded By, (t) = Taken By, (c) = Cosigned By    Initials Name Effective Dates Discipline    CP DANIEL Osman 06/08/18 -  Nurse    AS Palomo Syzmanski RN 06/16/16 -  Nurse    JACQUELINE Riggs OT 04/03/18 -  OT    JG Bailey Krause RN 08/08/16 -  Nurse        Wound 07/03/18 0915 Left distal toe (Active)   Dressing Appearance open to air 7/10/2018  6:00 AM   Closure Open to air 7/10/2018  8:28 AM   Base dry 7/10/2018  8:28 AM   Periwound dry 7/10/2018  8:28 AM   Drainage Amount none 7/10/2018  8:28 AM   Care, Wound other (see comments) 7/10/2018 12:00 AM   Dressing Care, Wound open to air 7/10/2018  6:00 AM   Periwound Care, Wound dry periwound area maintained 7/10/2018  6:00 AM         Occupational Therapy Education     Title: PT OT SLP Therapies (Active)     Topic:  Occupational Therapy (Active)     Point: ADL training (Active)     Description: Instruct learner(s) on proper safety adaptation and remediation techniques during self care or transfers.   Instruct in proper use of assistive devices.   Learning Progress Summary     Learner Status Readiness Method Response Comment Documented by    Patient Active Acceptance E NR Pt educated on appropriate safety precautions, t/f techniques, positioning, role of OT, and benefits of activity. CL 07/10/18 1521     Active Acceptance E NR Pt educated on role of OT, AE for self-feeding, positioning, and benefits of therapy. CL 07/05/18 1555          Point: Home exercise program (Active)     Description: Instruct learner(s) on appropriate technique for monitoring, assisting and/or progressing therapeutic exercises/activities.   Learning Progress Summary     Learner Status Readiness Method Response Comment Documented by    Patient Active Acceptance E NR Limited teaching this date, pt confused and no family present. JR 07/07/18 1247     Active Acceptance E NR Pt educated on role of OT, AE for self-feeding, positioning, and benefits of therapy. CL 07/05/18 1555          Point: Precautions (Active)     Description: Instruct learner(s) on prescribed precautions during self-care and functional transfers.   Learning Progress Summary     Learner Status Readiness Method Response Comment Documented by    Patient Active Acceptance E NR Pt educated on appropriate safety precautions, t/f techniques, positioning, role of OT, and benefits of activity. CL 07/10/18 1521     Active Acceptance E NR Pt educated on role of OT, AE for self-feeding, positioning, and benefits of therapy. CL 07/05/18 1555          Point: Body mechanics (Active)     Description: Instruct learner(s) on proper positioning and spine alignment during self-care, functional mobility activities and/or exercises.   Learning Progress Summary     Learner Status Readiness Method Response Comment  Documented by    Patient Active Acceptance E NR Pt educated on appropriate safety precautions, t/f techniques, positioning, role of OT, and benefits of activity. CL 07/10/18 1521                      User Key     Initials Effective Dates Name Provider Type Discipline     06/22/15 -  Alix Newman, OT Occupational Therapist OT    CL 04/03/18 -  Kelsea Riggs, OT Occupational Therapist OT                OT Recommendation and Plan  Outcome Summary/Treatment Plan (OT)  Anticipated Equipment Needs at Discharge (OT):  (TBA further)  Anticipated Discharge Disposition (OT): skilled nursing facility  Therapy Frequency (OT Eval): 3 times/wk (MWF)  Plan of Care Review  Plan of Care Reviewed With: patient  Plan of Care Reviewed With: patient  Outcome Summary: Pt demo improved activity tolerance by performing supine/sit t/f Depx2 and progressing towards CGA for static sitting balance at EOB. Pt conts to require significant encouragement to participate, though noted improvement w/ daugther present. Recommend cont skilled IPOT POC.         Outcome Measures     Row Name 07/10/18 1355 07/08/18 1010          How much help from another person do you currently need...    Turning from your back to your side while in flat bed without using bedrails?  -- 1  -LM     Moving from lying on back to sitting on the side of a flat bed without bedrails?  -- 1  -LM     Moving to and from a bed to a chair (including a wheelchair)?  -- 1  -LM     Standing up from a chair using your arms (e.g., wheelchair, bedside chair)?  -- 1  -LM     Climbing 3-5 steps with a railing?  -- 1  -LM     To walk in hospital room?  -- 1  -LM     AM-PAC 6 Clicks Score  -- 6  -LM        How much help from another is currently needed...    Putting on and taking off regular lower body clothing? 1  -CL  --     Bathing (including washing, rinsing, and drying) 1  -CL  --     Toileting (which includes using toilet bed pan or urinal) 1  -CL  --     Putting on and taking off  regular upper body clothing 1  -CL  --     Taking care of personal grooming (such as brushing teeth) 2  -CL  --     Eating meals 2  -CL  --     Score 8  -CL  --        Functional Assessment    Outcome Measure Options AM-PAC 6 Clicks Daily Activity (OT)  -CL AM-PAC 6 Clicks Basic Mobility (PT)  -LM       User Key  (r) = Recorded By, (t) = Taken By, (c) = Cosigned By    Initials Name Provider Type    LM Tammie Coley, PT Physical Therapist    CL Kelsea Riggs OT Occupational Therapist           Time Calculation:         Time Calculation- OT     Row Name 07/10/18 1524 07/10/18 1355          Time Calculation- OT    OT Start Time 1355  -CL  --     Total Timed Code Minutes- OT 10 minute(s)  -CL  --     OT Received On 07/10/18  -CL  --     OT Goal Re-Cert Due Date 07/15/18  -CL  --        Timed Charges    85174 - OT Therapeutic Activity Minutes  -- 7  -CL     52176 - OT Self Care/Mgmt Minutes  -- 3  -CL       User Key  (r) = Recorded By, (t) = Taken By, (c) = Cosigned By    Initials Name Provider Type    CL Kelsea Riggs OT Occupational Therapist           Therapy Suggested Charges     Code   Minutes Charges    08054 (CPT®) Hc Ot Neuromusc Re Education Ea 15 Min      21788 (CPT®) Hc Ot Ther Proc Ea 15 Min      33585 (CPT®) Hc Ot Therapeutic Act Ea 15 Min 7 1    92955 (CPT®) Hc Ot Manual Therapy Ea 15 Min      16881 (CPT®) Hc Ot Iontophoresis Ea 15 Min      47768 (CPT®) Hc Ot Elec Stim Ea-Per 15 Min      68229 (CPT®) Hc Ot Ultrasound Ea 15 Min      35559 (CPT®) Hc Ot Self Care/Mgmt/Train Ea 15 Min 3     Total  10 1        Therapy Charges for Today     Code Description Service Date Service Provider Modifiers Qty    16638129266 HC OT THERAPEUTIC ACT EA 15 MIN 7/10/2018 Kelsea Riggs OT GO 1    26885006392 HC OT THER SUPP EA 15 MIN 7/10/2018 Kelsea Riggs OT GO 1               Kelsea Riggs OT  7/10/2018    Electronically signed by Kelsea Riggs OT at 7/10/2018  3:26 PM

## 2018-07-10 NOTE — PLAN OF CARE
Problem: Patient Care Overview  Goal: Plan of Care Review  Outcome: Ongoing (interventions implemented as appropriate)   07/10/18 9922   Coping/Psychosocial   Plan of Care Reviewed With patient;daughter   Plan of Care Review   Progress improving   OTHER   Outcome Summary Pt progressed to working on upright unsupported sitting on EOB (max A x2) and performing bed ---> chair transfer max A x3. Pt's daughter was present during PT session to assist w/ motivation and redirection during PT session. Pt severly limited from confusion, weakness, and fatigue. Pt would benefit from cont'd skilled PT services.

## 2018-07-10 NOTE — PROGRESS NOTES
Continued Stay Note  [facility]     Patient Name: Leila Smith  MRN: [id number]  Today's Date: 7/10/2018    Admit Date: 7/2/2018          Discharge Plan     Row Name 07/10/18 1102       Plan    Plan Rehab    Patient/Family in Agreement with Plan yes    Plan Comments Pt continues to be confused. Called pt's daughter Leon to discuss that if pt continues to not work with PT her options after d/c will be home with family or long term care. Pt's daughter v.u. CM called Miguel Angel at [facility] and they are not in Network. Brandie goodman [facility] is still attempting to verify days at [facility] and [place]. Called and spoke to Victorina at [facility] and they are referring referral and will get back to .               Discharge Codes    No documentation.       Expected Discharge Date and Time     Expected Discharge Date Expected Discharge Time    Jul 11, 2018             Marly Araya

## 2018-07-11 NOTE — PROGRESS NOTES
[facility] Medicine Services  PROGRESS NOTE    Patient Name: Leila Smith  : 1945  MRN: [id number]    Date of Admission: 2018  Length of Stay: 9  Primary Care Physician: Ciaran Wakefield MD    Subjective   Subjective     CC:  F/u UTI, sepsis    HPI:  No family present.  RN reports the vomited this AM after breakfast, and she attribute it to drinking her boost too fast.  Tech attempting to feed her lunch currently, but she is not wanting to eat.    Review of Systems   Unable to perform ROS: Mental status change       Objective   Objective     Vital Signs:   Temp:  [96.6 °F (35.9 °C)-98.2 °F (36.8 °C)] 97.9 °F (36.6 °C)  Heart Rate:  [] 108  Resp:  [18-24] 22  BP: (142-175)/(71-99) 175/99        Physical Exam:  Constitutional: No acute distress, awake, alert, sitting up in bed attempting to be fed.  HENT: NCAT, mucous membranes moist  Respiratory: Clear to auscultation bilaterally, respiratory effort normal   Cardiovascular: irregular (rate in 110s), no murmurs, rubs, or gallops,  Gastrointestinal: Positive bowel sounds, soft, nontender, nondistended  Musculoskeletal: No bilateral ankle edema  Psychiatric: pleasant and smiling  Neurologic: Oriented x 1, more talkative today, will follow commands, no obvious deficits  Skin: some chronic scaling to LEs    Results Reviewed:  I have personally reviewed current lab, radiology, and data and agree.              Invalid input(s):  ALKPHOS, TROPONININT  Estimated Creatinine Clearance: 30.6 mL/min (A) (by C-G formula based on SCr of 2.07 mg/dL (H)).  No results found for: BNP    Microbiology Results Abnormal     Procedure Component Value - Date/Time    Blood Culture - Blood, [[id number]]  (Normal) Collected:  18    Lab Status:  Final result Specimen:  Blood from Arm, Right Updated:  18     Blood Culture No growth at 5 days    Blood Culture - Blood, [[id number]]  (Normal) Collected:  18 1610    Lab Status:   Final result Specimen:  Blood from Arm, Right Updated:  07/07/18 1815     Blood Culture No growth at 5 days    Urine Culture - Urine, [[id number]]  (Abnormal) Collected:  07/03/18 0200    Lab Status:  Final result Specimen:  Urine from Urine, Catheter Updated:  07/06/18 1206     Urine Culture <10,000 CFU/mL Candida albicans (A)          Imaging Results (last 24 hours)     ** No results found for the last 24 hours. **        Results for orders placed during the hospital encounter of 01/31/18   Adult Transesophageal Echo (YANNA) W/ Cont if Necessary Per Protocol    Narrative · Left ventricular systolic function is normal.  · Left ventricular wall thickness is consistent with moderate concentric   hypertrophy.  · Left atrial cavity size is dilated.  · Cardiac valves are morphologically within normal limits for patient's   age.          I have reviewed the medications.    Assessment/Plan   Assessment / Plan     Hospital Problem List     * (Principal)Sepsis due to urinary tract infection (CMS/HCC)    Diabetes mellitus, type 2 (CMS/HCC)    Overview Deleted 7/2/2018  9:44 PM by Brian Samson MD            Hypertension    Hyperlipidemia    Hypothyroidism    Obstructive sleep apnea syndrome    Diabetic peripheral neuropathy (CMS/HCC)    Atrial fibrillation with rapid ventricular response (CMS/HCC)    Altered mental status    Acute renal failure superimposed on stage 3 chronic kidney disease (CMS/HCC)    Hyperkalemia    Somnolence             Brief Hospital Course to date:  Leila Smith is a 73 y.o. female with dementia, DM2, HTN, presented to ED with hypotension, sepsis, ARF, and UTI.  Improved with IVF.  Has had issues with rapid a-fib during admission due to intermittently refusing meds.      Assessment & Plan:  - urine culture with yeast only.  S/p course of zosyn  - ARF improved.  Has become agitated when trying to draw labs, so repeat labs deferred. PO intake seems sufficient at this point.  - hyperkalemia  "resolved  - seen bu urology (Raymundo) on admission.  He recommended to \"treat stones at later date\".  Currently aysmptomatic.  Dtr says after last admission that they went to [facility] and turned away because they don't take her insurance.  Will need to confirm appointment/insurance prior to discharge.  - will increase basal insulin for better control  - mental status likely at baseline  - tachycardia/rapid a-fib is well controlled when she takes her pills.  Note that med rec hadextended forms of CCB/BB, but she chews pills so changed to immeadiate versions.  - d/w CM and daughter yesterday, working on bed at [facility], starting precert process.  Should be medically ready whenever bed available.    DVT Prophylaxis:  eliquis    CODE STATUS:   Code Status and Medical Interventions:   Ordered at: 07/02/18 2152     Code Status:    CPR     Medical Interventions (Level of Support Prior to Arrest):    Full       Disposition: I expect the patient to be discharged to SNF when bed available.    Electronically signed by Oneil Hartman MD, 07/11/18, 2:15 PM.      "

## 2018-07-11 NOTE — PROGRESS NOTES
[facility] Medicine Services  PROGRESS NOTE    Patient Name: Leila Smith  : 1945  MRN: [id number]    Date of Admission: 2018  Length of Stay: 8  Primary Care Physician: Ciaran Wakefield MD    Subjective   Subjective     CC:  F/u UTI, sepsis    HPI:  Daughter present.  Has been a little more cooperative today.  Has taken her pills and worked with therapy.  Patient voices no complaints.    Review of Systems   Unable to perform ROS: Mental status change       Objective   Objective     Vital Signs:   Temp:  [96.3 °F (35.7 °C)-97.1 °F (36.2 °C)] 97.1 °F (36.2 °C)  Heart Rate:  [68-87] 87  Resp:  [18-20] 18  BP: (139-160)/(74-99) 150/99        Physical Exam:  Constitutional: No acute distress, awake, alert, sitting up in chair and listening to music  HENT: NCAT, mucous membranes moist  Respiratory: Clear to auscultation bilaterally, respiratory effort normal   Cardiovascular: irregular (rate in 70s), no murmurs, rubs, or gallops,  Gastrointestinal: Positive bowel sounds, soft, nontender, nondistended  Musculoskeletal: No bilateral ankle edema  Psychiatric: pleasant and smiling  Neurologic: Oriented x 1, more talkative today, will follow commands, no obvious deficits  Skin: some chronic scaling to LEs  Exam unchanged from previous      Results Reviewed:  I have personally reviewed current lab, radiology, and data and agree.      Results from last 7 days  Lab Units 18  0517   WBC 10*3/mm3 10.48   HEMOGLOBIN g/dL 13.3   HEMATOCRIT % 42.9   PLATELETS 10*3/mm3 249       Results from last 7 days  Lab Units 18  0517   SODIUM mmol/L 144   POTASSIUM mmol/L 4.0   CHLORIDE mmol/L 105   CO2 mmol/L 20.0   BUN mg/dL 63*   CREATININE mg/dL 2.07*   GLUCOSE mg/dL 114*   CALCIUM mg/dL 7.8*     Estimated Creatinine Clearance: 30.1 mL/min (A) (by C-G formula based on SCr of 2.07 mg/dL (H)).  No results found for: BNP    Microbiology Results Abnormal     Procedure Component Value -  Date/Time    Blood Culture - Blood, [[id number]]  (Normal) Collected:  07/02/18 1940    Lab Status:  Final result Specimen:  Blood from Arm, Right Updated:  07/07/18 2030     Blood Culture No growth at 5 days    Blood Culture - Blood, [[id number]]  (Normal) Collected:  07/02/18 1610    Lab Status:  Final result Specimen:  Blood from Arm, Right Updated:  07/07/18 1815     Blood Culture No growth at 5 days    Urine Culture - Urine, [[id number]]  (Abnormal) Collected:  07/03/18 0200    Lab Status:  Final result Specimen:  Urine from Urine, Catheter Updated:  07/06/18 1206     Urine Culture <10,000 CFU/mL Candida albicans (A)          Imaging Results (last 24 hours)     ** No results found for the last 24 hours. **        Results for orders placed during the hospital encounter of 01/31/18   Adult Transesophageal Echo (YANNA) W/ Cont if Necessary Per Protocol    Narrative · Left ventricular systolic function is normal.  · Left ventricular wall thickness is consistent with moderate concentric   hypertrophy.  · Left atrial cavity size is dilated.  · Cardiac valves are morphologically within normal limits for patient's   age.          I have reviewed the medications.    Assessment/Plan   Assessment / Plan     Hospital Problem List     * (Principal)Sepsis due to urinary tract infection (CMS/HCC)    Diabetes mellitus, type 2 (CMS/HCC)    Overview Deleted 7/2/2018  9:44 PM by Brian Samson MD            Hypertension    Hyperlipidemia    Hypothyroidism    Obstructive sleep apnea syndrome    Diabetic peripheral neuropathy (CMS/HCC)    Atrial fibrillation with rapid ventricular response (CMS/HCC)    Altered mental status    Acute renal failure superimposed on stage 3 chronic kidney disease (CMS/HCC)    Hyperkalemia    Somnolence             Brief Hospital Course to date:  Leila Smith is a 73 y.o. female with dementia, DM2, HTN, presented to ED with hypotension, sepsis, ARF, and UTI.  Improved with IVF.      Assessment &  "Plan:  - urine culture with yeast only.  S/p course of zosyn  - ARF improved.  Has become agitated when trying to draw labs, so repeat labs deferred. PO intake seems sufficient at this point.  - hyperkalemia resolved  - seen bu urology (Raymundo) on admission.  He recommended to \"treat stones at later date\".  Currently aysmptomatic.  Dtr says after last admission that they went to [facility] and turned away because they don't take her insurance.  Will need to confirm appointment/insurance prior to discharge.  - will increase prandial insulin for better control  - mental status likely at baseline  - tachycardia/rapid a-fib is well controlled when she takes her pills.  Note that med rec hadextended forms of CCB/BB, but she chews pills so changed to immeadiate versions.  - d/w CM and daughter today, working on bed at [facility], starting precert.  Medically ready when arranged.      DVT Prophylaxis:  eliquis    CODE STATUS:   Code Status and Medical Interventions:   Ordered at: 07/02/18 2152     Code Status:    CPR     Medical Interventions (Level of Support Prior to Arrest):    Full       Disposition: I expect the patient to be discharged to SNF when bed available.    Electronically signed by Oneil Hartman MD, 07/10/18, 9:10 PM.      "

## 2018-07-11 NOTE — PLAN OF CARE
Problem: Patient Care Overview  Goal: Plan of Care Review  Outcome: Ongoing (interventions implemented as appropriate)   07/11/18 8896   Coping/Psychosocial   Plan of Care Reviewed With patient   Plan of Care Review   Progress no change   OTHER   Outcome Summary Pt performed bed --> chair transfer w/ max A x3. Pt demonstrated increased participation during PT session, evidenced by lack of combative behaviors. Pt limited from confusion, weakness, and severe safety deficits. Pt would benefit from cont'd skilled PT services.

## 2018-07-11 NOTE — THERAPY TREATMENT NOTE
Acute Care - Occupational Therapy Treatment Note  [facility]     Patient Name: Leila Smith  : 1945  MRN: [id number]  Today's Date: 2018  Onset of Illness/Injury or Date of Surgery: 18  Date of Referral to OT: 18  Referring Physician: MD Devan    Admit Date: 2018       ICD-10-CM ICD-9-CM   1. Somnolence R40.0 780.09   2. Atrial fibrillation with rapid ventricular response (CMS/[facility]) I48.91 427.31   3. History of hypertension Z86.79 V12.59   4. Type 2 diabetes mellitus with hyperglycemia, unspecified long term insulin use status (CMS/[facility]) E11.65 250.00   5. History of recurrent UTI (urinary tract infection) Z87.440 V13.02   6. Dysphagia, unspecified type R13.10 787.20   7. Impaired mobility and ADLs Z74.09 799.89   8. Impaired functional mobility, balance, gait, and endurance Z74.09 V49.89     Patient Active Problem List   Diagnosis   • Diabetes mellitus, type 2 (CMS/[facility])   • Hypertension   • Hyperlipidemia   • Hypothyroidism   • Chronic obstructive pulmonary disease (CMS/[facility])   • CAD (coronary artery disease)   • History of edema   • History of obesity   • Venous insufficiency   • Open wound of lower extremity   • Urinary tract infection   • T2DM (type 2 diabetes mellitus) (CMS/[facility])   • Dyspnea on exertion   • Peripheral vascular disease (CMS/[facility])   • Obstructive sleep apnea syndrome   • Ulcer of lower extremity (CMS/[facility])   • Hypoxemia   • Hematuria   • Diabetic peripheral neuropathy (CMS/[facility])   • Edema   • Chronic obstructive pulmonary disease with acute exacerbation (CMS/[facility])   • Congestive heart failure (CMS/[facility])   • Arthritis   • Neutrophilic leukocytosis   • Cystitis   • Atrial fibrillation with rapid ventricular response (CMS/[facility])   • Altered mental status   • Sepsis due to urinary tract infection (CMS/[facility])   • Acute renal failure superimposed on stage 3 chronic kidney disease (CMS/[facility])   • Hyperkalemia   • Leukocytosis   • Elevated troponin   • Somnolence     Past Medical  History:   Diagnosis Date   • Atrial fibrillation (CMS/HCC)    • Chronic ulcer of right leg (CMS/HCC)    • Cognitive communication deficit    • COPD (chronic obstructive pulmonary disease) (CMS/HCC)    • Diabetes mellitus (CMS/HCC)    • Difficulty in walking    • Encephalopathy    • Generalized muscle weakness    • Hematuria    • Hyperlipidemia    • Hypertension    • Hypothyroidism    • Urinary tract infection      Past Surgical History:   Procedure Laterality Date   • CATARACT EXTRACTION     • CHOLECYSTECTOMY     • COLOSTOMY     • FOOT SURGERY Right    • HERNIA REPAIR     • HYSTERECTOMY     • TOTAL KNEE ARTHROPLASTY Right        Therapy Treatment          Rehabilitation Treatment Summary     Row Name 07/11/18 1107             Treatment Time/Intention    Discipline occupational therapist   Actual start time 10:52am  -SMITH      Document Type therapy note (daily note)  -JB2      Subjective Information no complaints  -JB2      Mode of Treatment individual therapy;occupational therapy  -JB2      Patient/Family Observations No family room. Pt. leaning to left side.  Repositioned pt. up in bed and to midline with pillows.  Dark liquid down front of gown.  Nurse in short time later stating pt. had been vomiting up ensure.  -JB2      Care Plan Review care plan/treatment goals reviewed  -JB2      Care Plan Review, Other Participant(s) daughter  -JB2      Patient Effort good  -JB2      Comment --   Family recommend music to play in room to help calm pt.  -JB2      Existing Precautions/Restrictions fall   dementi  -JB2      Recorded by [SMITH] Poonam Cook, OT 07/11/18 1323  [JB2] Poonam Cook, OT 07/11/18 1317      Row Name 07/11/18 1107             Vital Signs    Pre Systolic BP Rehab --  -SMITH      Pre Treatment Diastolic BP --  -SMITH      Pretreatment Heart Rate (beats/min) 94  -SMITH      Posttreatment Heart Rate (beats/min) --  -SMITH      Pre SpO2 (%) --  -SMITH      O2 Delivery Pre Treatment --  -SMITH      Post SpO2 (%) --  -SMITH       O2 Delivery Post Treatment --  -SMITH      Pre Patient Position Supine  -JB2      Intra Patient Position Supine  -JB2      Post Patient Position Supine  -JB2      Recorded by [SMITH] Poonam Cook, OT 07/11/18 1323  [JB2] Poonam Cook, OT 07/11/18 1317      Row Name 07/11/18 1107             Cognitive Assessment/Intervention- PT/OT    Affect/Mental Status (Cognitive) agitated;confused;emotionally labile  -SMITH      Orientation Status (Cognition) oriented to;person  -SMITH      Follows Commands (Cognition) follows two step commands  -SMITH      Cognitive Function (Cognitive) safety deficit  -SMITH      Attention Deficit (Cognitive) severe deficit  -SMITH      Executive Function Deficit (Cognition) severe deficit  -SMITH      Memory Deficit (Cognitive) severe deficit  -SMITH      Safety Deficit (Cognitive) severe deficit  -SMITH      Personal Safety Interventions fall prevention program maintained;gait belt;nonskid shoes/slippers when out of bed  -SMITH      Recorded by [SMITH] Poonam Cook, OT 07/11/18 1317      Row Name 07/11/18 1107             Bed Mobility Assessment/Treatment    Comment (Bed Mobility) deferred due to pt. getting very aggitated with attempts to sit up and just vomited x 2 already.  -SMITH      Recorded by [SMITH] Poonam Cook, OT 07/11/18 1317      Row Name 07/11/18 1107             ADL Assessment/Intervention    78893 - OT Self Care/Mgmt Minutes 6  -SMITH      BADL Assessment/Intervention upper body dressing;grooming  -SMITH      Recorded by [SMITH] Poonam Cook, OT 07/11/18 1317      Row Name 07/11/18 1107             Upper Body Dressing Assessment/Training    Upper Body Dressing [place] Level doff;don;dependent (less than 25% patient effort);verbal cues   henri and doff clean gown due to vomit on gown  -SMITH      Comment (Upper Body Dressing) attempted to cues pt. to assist.  Pt. remained calm with changing, but did not initiate assisting.  -SMITH      Recorded by [SMITH] Poonam Cook, OT 07/11/18 1317      Row Name 07/11/18 1105              Grooming Assessment/Training    [place] Level (Grooming) wash face, hands;dependent (less than 25% patient effort)  -SMTIH      Assistive Devices (Grooming) hand over hand  -SMITH      Grooming Position supine  -SMITH      Comment (Grooming) Attempted cueing pt. to wash face and aske which hand pt. wanted wash cloth in.  After placing wash cloth in pt. hand she would not initiate task.  Tried hand over hand, but once close to face pt. would begin yellling out.  OT had to wash vomit from pt.'s face.  -SMITH      Recorded by [SMITH] Poonam Cook, OT 07/11/18 1317      Row Name 07/11/18 1107             BADL Safety/Performance    Impairments, BADL Safety/Performance cognition  -SMITH      Recorded by [SMITH] Poonam Cook OT 07/11/18 1317      Row Name 07/11/18 1107             Therapeutic Exercise    93258 - OT Therapeutic Exercise Minutes 8  -SMITH      Recorded by [SMITH] Poonam Cook, GOPAL 07/11/18 1323      Row Name 07/11/18 1107             Therapeutic Exercise    Upper Extremity Range of Motion (Therapeutic Exercise) shoulder flexion/extension, bilateral;shoulder horizontal abduction/adduction, bilateral;elbow flexion/extension, bilateral;wrist flexion/extension, bilateral  -SMITH      Hand (Therapeutic Exercise) finger flexion/extension, bilateral  -SMITH      Exercise Type (Therapeutic Exercise) PROM (passive range of motion)  -SMITH      Sets/Reps (Therapeutic Exercise) 1/10  -SMITH      Comment (Therapeutic Exercise) unable to get pt. to intiate with cues.  With soothing voice and slow mvmt pt. allowed.  LUE appears with some tone.  -SMITH      Recorded by [SMITH] Poonam Cook OT 07/11/18 1317      Row Name 07/11/18 1107             Positioning and Restraints    Pre-Treatment Position in bed  -SMITH      Post Treatment Position bed  -SMITH      In Bed supine;with nsg;LUE elevated   Pt. cannot use call bell.  -SMITH      Recorded by [SMITH] Poonam Cook OT 07/11/18 1317      Row Name 07/11/18 1107             Pain Scale: FACES  Pre/Post-Treatment    Pain: FACES Scale, Pretreatment 2-->hurts little bit  -SMITH      Pain: FACES Scale, Post-Treatment 2-->hurts little bit  -SMITH      Recorded by [SMITH] Poonam Cook, OT 07/11/18 1317      Row Name                Wound 07/03/18 0915 Left distal toe    Wound - Properties Group Date first assessed: 07/03/18 [CP] Time first assessed: 0915 [CP] Present On Admission : yes;picture taken [CP] Side: Left [CP] Orientation: distal [CP] Location: toe [CP] Stage, Pressure Injury: deep tissue injury [CP] Recorded by:  [CP] Angélica Gavin, APRN 07/03/18 1101    Row Name 07/11/18 1107             Plan of Care Review    Plan of Care Reviewed With patient  -SMITH      Recorded by [SIMTH] Poonam Cook, OT 07/11/18 1317      Row Name 07/11/18 1107             Outcome Summary/Treatment Plan (OT)    Daily Summary of Progress (OT) progress toward functional goals is gradual  -SMITH      Barriers to Overall Progress (OT) cognitive status  -SMITH      Plan for Continued Treatment (OT) Cont OT POC as pt. tolerates.  -SMITH      Recorded by [SMITH] Poonam Cook, OT 07/11/18 1317        User Key  (r) = Recorded By, (t) = Taken By, (c) = Cosigned By    Initials Name Effective Dates Discipline    SMITH Poonam Cook, OT 06/08/18 -  OT    CP Angélica Gavin, APRN 06/08/18 -  Nurse        Wound 07/03/18 0915 Left distal toe (Active)   Dressing Appearance open to air 7/11/2018 12:00 PM   Care, Wound other (see comments) 7/11/2018  8:00 AM   Dressing Care, Wound open to air 7/11/2018  8:00 AM             OT Rehab Goals     Row Name 07/11/18 1107             Transfer Goal 1 (OT)    Progress/Outcome (Transfer Goal 1, OT) goal ongoing  -SMITH         Grooming Goal 1 (OT)    Progress/Outcome (Grooming Goal 1, OT) goal ongoing  -SMITH         Self-Feeding Goal 1 (OT)    Progress/Outcomes (Self-Feeding Goal 1, OT) goal ongoing  -SMITH        User Key  (r) = Recorded By, (t) = Taken By, (c) = Cosigned By    Initials Name Provider Type Discipline    SMITH  Poonam Cook, OT Occupational Therapist OT        Occupational Therapy Education     Title: PT OT SLP Therapies (Active)     Topic: Occupational Therapy (Active)     Point: ADL training (Active)     Description: Instruct learner(s) on proper safety adaptation and remediation techniques during self care or transfers.   Instruct in proper use of assistive devices.   Learning Progress Summary     Learner Status Readiness Method Response Comment Documented by    Patient Active Acceptance E NR benefits of activity/ADL task/ther. exer. SMITH 07/11/18 1317     Active Acceptance E NR Pt educated on appropriate safety precautions, t/f techniques, positioning, role of OT, and benefits of activity. CL 07/10/18 1521     Active Acceptance E NR Pt educated on role of OT, AE for self-feeding, positioning, and benefits of therapy. CL 07/05/18 1555          Point: Home exercise program (Active)     Description: Instruct learner(s) on appropriate technique for monitoring, assisting and/or progressing therapeutic exercises/activities.   Learning Progress Summary     Learner Status Readiness Method Response Comment Documented by    Patient Active Acceptance E NR benefits of activity/ADL task/ther. exer. SMITH 07/11/18 1317     Active Acceptance E NR Limited teaching this date, pt confused and no family present. JR 07/07/18 1247     Active Acceptance E NR Pt educated on role of OT, AE for self-feeding, positioning, and benefits of therapy. CL 07/05/18 1555          Point: Precautions (Active)     Description: Instruct learner(s) on prescribed precautions during self-care and functional transfers.   Learning Progress Summary     Learner Status Readiness Method Response Comment Documented by    Patient Active Acceptance E NR Pt educated on appropriate safety precautions, t/f techniques, positioning, role of OT, and benefits of activity. CL 07/10/18 1521     Active Acceptance E NR Pt educated on role of OT, AE for self-feeding, positioning,  and benefits of therapy.  07/05/18 1555          Point: Body mechanics (Active)     Description: Instruct learner(s) on proper positioning and spine alignment during self-care, functional mobility activities and/or exercises.   Learning Progress Summary     Learner Status Readiness Method Response Comment Documented by    Patient Active Acceptance E NR benefits of activity/ADL task/ther. exer.  07/11/18 1317     Active Acceptance E NR Pt educated on appropriate safety precautions, t/f techniques, positioning, role of OT, and benefits of activity.  07/10/18 1521                      User Key     Initials Effective Dates Name Provider Type Discipline     06/08/18 -  Poonam Cook, OT Occupational Therapist OT     06/22/15 -  Alix Newman, OT Occupational Therapist OT     04/03/18 -  Kelsea Riggs, OT Occupational Therapist OT                OT Recommendation and Plan  Outcome Summary/Treatment Plan (OT)  Daily Summary of Progress (OT): progress toward functional goals is gradual  Barriers to Overall Progress (OT): cognitive status  Plan for Continued Treatment (OT): Cont OT POC as pt. tolerates.  Daily Summary of Progress (OT): progress toward functional goals is gradual  Plan of Care Review  Plan of Care Reviewed With: patient  Plan of Care Reviewed With: patient  Outcome Summary: Pt. allowed PROM and her face to be washed and gown changed due to had vomited, but even with hand over hand assist pt. could not intiate task or would refuse.        Outcome Measures     Row Name 07/11/18 1107 07/10/18 1418 07/10/18 1355       How much help from another person do you currently need...    Turning from your back to your side while in flat bed without using bedrails?  -- 2  -LS (r) CM (t) LS (c)  --    Moving from lying on back to sitting on the side of a flat bed without bedrails?  -- 2  -LS (r) CM (t) LS (c)  --    Moving to and from a bed to a chair (including a wheelchair)?  -- 2  -LS (r) CM (t) LS (c)  --     Standing up from a chair using your arms (e.g., wheelchair, bedside chair)?  -- 2  -LS (r) CM (t) LS (c)  --    Climbing 3-5 steps with a railing?  -- 1  -LS (r) CM (t) LS (c)  --    To walk in hospital room?  -- 1  -LS (r) CM (t) LS (c)  --    AM-PAC 6 Clicks Score  -- 10  -LS (r) CM (t)  --       How much help from another is currently needed...    Putting on and taking off regular lower body clothing? 1  -SMITH  -- 1  -CL    Bathing (including washing, rinsing, and drying) 1  -SMITH  -- 1  -CL    Toileting (which includes using toilet bed pan or urinal) 1  -SMITH  -- 1  -CL    Putting on and taking off regular upper body clothing 1  -SMITH  -- 1  -CL    Taking care of personal grooming (such as brushing teeth) 1  -SMITH  -- 2  -CL    Eating meals 2  -SMITH  -- 2  -CL    Score 7  -SMITH  -- 8  -CL       Functional Assessment    Outcome Measure Options  -- AM-PAC 6 Clicks Basic Mobility (PT)  -LS (r) CM (t) LS (c) AM-PAC 6 Clicks Daily Activity (OT)  -CL      User Key  (r) = Recorded By, (t) = Taken By, (c) = Cosigned By    Initials Name Provider Type    SMITH Cook, OT Occupational Therapist    LS Senait Kessler, PT Physical Therapist    CL Kelsea Riggs, OT Occupational Therapist    CM Erendira Narayanan, PT Student PT Student           Time Calculation:         Time Calculation- OT     Row Name 07/11/18 1320 07/11/18 1107          Time Calculation- OT    OT Start Time 1107  -SMITH  --     OT Received On 07/11/18  -SMITH  --     OT Goal Re-Cert Due Date 07/15/18  -SMITH  --        Timed Charges    25375 - OT Therapeutic Exercise Minutes  -- 8  -SMITH     52462 - OT Self Care/Mgmt Minutes  -- 6  -SMITH       User Key  (r) = Recorded By, (t) = Taken By, (c) = Cosigned By    Initials Name Provider Type    SMITH Cook, OT Occupational Therapist           Therapy Suggested Charges     Code   Minutes Charges    34024 (CPT®) Hc Ot Neuromusc Re Education Ea 15 Min      18311 (CPT®) Hc Ot Ther Proc Ea 15 Min 8 1    10632 (CPT®)  Ot Therapeutic  Act Ea 15 Min      48196 (CPT®) Hc Ot Manual Therapy Ea 15 Min      03661 (CPT®) Hc Ot Iontophoresis Ea 15 Min      43048 (CPT®) Hc Ot Elec Stim Ea-Per 15 Min      37340 (CPT®) Hc Ot Ultrasound Ea 15 Min      53274 (CPT®) Hc Ot Self Care/Mgmt/Train Ea 15 Min 6     Total  14 1        Therapy Charges for Today     Code Description Service Date Service Provider Modifiers Qty    36938102267 HC OT THER PROC EA 15 MIN 7/11/2018 Poonam Cook, OT GO 1    16260221830 HC OT THER SUPP EA 15 MIN 7/11/2018 Poonam Cook OT GO 1               Poonam Cook OT  7/11/2018

## 2018-07-11 NOTE — PLAN OF CARE
Problem: Patient Care Overview  Goal: Plan of Care Review  Outcome: Ongoing (interventions implemented as appropriate)   07/11/18 0650   Coping/Psychosocial   Plan of Care Reviewed With patient   Plan of Care Review   Progress no change   OTHER   Outcome Summary Pt. allowed PROM and her face to be washed and gown changed due to had vomited, but even with hand over hand assist pt. could not intiate task or would refuse.

## 2018-07-11 NOTE — PLAN OF CARE
Problem: Patient Care Overview  Goal: Individualization and Mutuality  Outcome: Ongoing (interventions implemented as appropriate)  Pt nauseous and vomiting early. Didn't want to eat much lunch but drank a peanut butter milkshake and boost. Pt up in chair with SCDS on.

## 2018-07-11 NOTE — THERAPY TREATMENT NOTE
Acute Care - Physical Therapy Treatment Note  [facility]     Patient Name: Leila Smith  : 1945  MRN: [id number]  Today's Date: 2018  Onset of Illness/Injury or Date of Surgery: 18  Date of Referral to PT: 18  Referring Physician: MD Devan    Admit Date: 2018    Visit Dx:    ICD-10-CM ICD-9-CM   1. Somnolence R40.0 780.09   2. Atrial fibrillation with rapid ventricular response (CMS/[facility]) I48.91 427.31   3. History of hypertension Z86.79 V12.59   4. Type 2 diabetes mellitus with hyperglycemia, unspecified long term insulin use status (CMS/[facility]) E11.65 250.00   5. History of recurrent UTI (urinary tract infection) Z87.440 V13.02   6. Dysphagia, unspecified type R13.10 787.20   7. Impaired mobility and ADLs Z74.09 799.89   8. Impaired functional mobility, balance, gait, and endurance Z74.09 V49.89     Patient Active Problem List   Diagnosis   • Diabetes mellitus, type 2 (CMS/[facility])   • Hypertension   • Hyperlipidemia   • Hypothyroidism   • Chronic obstructive pulmonary disease (CMS/[facility])   • CAD (coronary artery disease)   • History of edema   • History of obesity   • Venous insufficiency   • Open wound of lower extremity   • Urinary tract infection   • T2DM (type 2 diabetes mellitus) (CMS/[facility])   • Dyspnea on exertion   • Peripheral vascular disease (CMS/[facility])   • Obstructive sleep apnea syndrome   • Ulcer of lower extremity (CMS/[facility])   • Hypoxemia   • Hematuria   • Diabetic peripheral neuropathy (CMS/[facility])   • Edema   • Chronic obstructive pulmonary disease with acute exacerbation (CMS/[facility])   • Congestive heart failure (CMS/[facility])   • Arthritis   • Neutrophilic leukocytosis   • Cystitis   • Atrial fibrillation with rapid ventricular response (CMS/[facility])   • Altered mental status   • Sepsis due to urinary tract infection (CMS/[facility])   • Acute renal failure superimposed on stage 3 chronic kidney disease (CMS/[facility])   • Hyperkalemia   • Leukocytosis   • Elevated troponin   • Somnolence       Therapy  Treatment          Rehabilitation Treatment Summary     Row Name 07/11/18 1526 07/11/18 1107          Treatment Time/Intention    Discipline (P)  physical therapist  -CM occupational therapist   Actual start time 10:52am  -SMITH     Document Type (P)  therapy note (daily note)  -CM therapy note (daily note)  -JB2     Subjective Information (P)  no complaints  -CM no complaints  -JB2     Mode of Treatment (P)  physical therapy  -CM individual therapy;occupational therapy  -JB2     Patient/Family Observations  -- No family room. Pt. leaning to left side.  Repositioned pt. up in bed and to midline with pillows.  Dark liquid down front of gown.  Nurse in short time later stating pt. had been vomiting up ensure.  -JB2     Care Plan Review (P)  evaluation/treatment results reviewed;care plan/treatment goals reviewed;risks/benefits reviewed;patient/other agree to care plan  - care plan/treatment goals reviewed  -JB2     Care Plan Review, Other Participant(s)  -- daughter  -JB2     Patient Effort (P)  good  -CM good  -JB2     Comment  -- --   Family recommend music to play in room to help calm pt.  -JB2     Existing Precautions/Restrictions (P)  fall;other (see comments)   dementia   -CM fall   dementi  -JB2     Recorded by [CM] Erendira Narayanan, PT Student 07/11/18 1548 [SMITH] Poonam Cook, OT 07/11/18 1323  [JB2] Poonam Cook, OT 07/11/18 1317     Row Name 07/11/18 1526 07/11/18 1107          Vital Signs    Pre Systolic BP Rehab (P)  175  -CM --  -SMITH     Pre Treatment Diastolic BP (P)  99  -CM --  -SMITH     Pretreatment Heart Rate (beats/min) (P)  114  -CM 94  -SMITH     Posttreatment Heart Rate (beats/min) (P)  123  -CM --  -SMITH     Pre SpO2 (%)  -- --  -SMITH     O2 Delivery Pre Treatment (P)  room air  -CM --  -SMITH     O2 Delivery Intra Treatment (P)  room air  -CM  --     Post SpO2 (%)  -- --  -SMITH     O2 Delivery Post Treatment (P)  room air  -CM --  -SMITH     Pre Patient Position (P)  Supine  -CM Supine  -JB2     Intra Patient  Position (P)  Standing  -CM Supine  -JB2     Post Patient Position (P)  Sitting  -CM Supine  -JB2     Recorded by [CM] Erendira Narayanan, PT Student 07/11/18 1548 [SMITH] Poonam Cook, OT 07/11/18 1323  [JB2] Poonam Cook, OT 07/11/18 1317     Row Name 07/11/18 1526             Cognitive Assessment/Intervention    Additional Documentation (P)  Cognitive Assessment/Intervention (Group)  -CM      Recorded by [CM] Erendira Narayanan, PT Student 07/11/18 1548      Row Name 07/11/18 1526 07/11/18 1107          Cognitive Assessment/Intervention- PT/OT    Affect/Mental Status (Cognitive) (P)  agitated;confused;emotionally labile  -CM agitated;confused;emotionally labile  -SMITH     Orientation Status (Cognition) (P)  oriented to;person  -CM oriented to;person  -SMITH     Follows Commands (Cognition) (P)  follows one step commands;0-24% accuracy;verbal cues/prompting required;repetition of directions required;physical/tactile prompts required  -CM follows two step commands  -SMITH     Cognitive Function (Cognitive) (P)  safety deficit  -CM safety deficit  -SMITH     Attention Deficit (Cognitive)  -- severe deficit  -SMITH     Executive Function Deficit (Cognition)  -- severe deficit  -SMITH     Memory Deficit (Cognitive)  -- severe deficit  -SMITH     Safety Deficit (Cognitive) (P)  severe deficit;ability to follow commands  -CM severe deficit  -SMITH     Personal Safety Interventions (P)  fall prevention program maintained;gait belt;nonskid shoes/slippers when out of bed  -CM fall prevention program maintained;gait belt;nonskid shoes/slippers when out of bed  -SMITH     Recorded by [CM] Erendira Narayanan, PT Student 07/11/18 1548 [SMITH] Poonam Cook, OT 07/11/18 1317     Row Name 07/11/18 1526             Safety Issues, Functional Mobility    Safety Issues Affecting Function (Mobility) (P)  ability to follow commands  -CM      Recorded by [CM] Erendira Narayanan, PT Student 07/11/18 1548      Row Name 07/11/18 1526 07/11/18 1107          Bed Mobility  Assessment/Treatment    Bed Mobility Assessment/Treatment (P)  supine-sit  -CM  --     Supine-Sit V[place] (Bed Mobility) (P)  dependent (less than 25% patient effort);2 person assist;verbal cues  -CM  --     Comment (Bed Mobility)  -- deferred due to pt. getting very aggitated with attempts to sit up and just vomited x 2 already.  -SMITH     Recorded by [CM] Erendira Narayanan, PT Student 07/11/18 1548 [SMITH] Poonam Cook, OT 07/11/18 1317     Row Name 07/11/18 1526             Transfer Assessment/Treatment    Transfer Assessment/Treatment (P)  bed-chair transfer  -CM      Recorded by [CM] Erendira Narayanan, PT Student 07/11/18 1548      Row Name 07/11/18 1526             Bed-Chair Transfer    Bed-Chair Vidal (Transfers) (P)  maximum assist (25% patient effort);verbal cues;other (see comments)   max A x3  -CM      Assistive Device (Bed-Chair Transfers) (P)  walker, front-wheeled  -CM      Recorded by [CM] Erendira Narayanan, PT Student 07/11/18 1549      Row Name 07/11/18 1526             Gait/Stairs Assessment/Training    Vidal Level (Gait) (P)  unable to assess  -CM      Recorded by [CM] Erendira Narayanan, PT Student 07/11/18 1549      Row Name 07/11/18 1107             ADL Assessment/Intervention    71682 - OT Self Care/Mgmt Minutes 6  -SMITH      BADL Assessment/Intervention upper body dressing;grooming  -SMITH      Recorded by [SMITH] Poonam Cook, OT 07/11/18 1317      Row Name 07/11/18 1107             Upper Body Dressing Assessment/Training    Upper Body Dressing Vidal Level doff;don;dependent (less than 25% patient effort);verbal cues   henri and doff clean gown due to vomit on gown  -SMITH      Comment (Upper Body Dressing) attempted to cues pt. to assist.  Pt. remained calm with changing, but did not initiate assisting.  -SMITH      Recorded by [SMITH] Poonam Cook, OT 07/11/18 1317      Row Name 07/11/18 1107             Grooming Assessment/Training    [place] Level (Grooming) wash face,  hands;dependent (less than 25% patient effort)  -SMITH      Assistive Devices (Grooming) hand over hand  -SMITH      Grooming Position supine  -SMITH      Comment (Grooming) Attempted cueing pt. to wash face and aske which hand pt. wanted wash cloth in.  After placing wash cloth in pt. hand she would not initiate task.  Tried hand over hand, but once close to face pt. would begin yellling out.  OT had to wash vomit from pt.'s face.  -SMITH      Recorded by [SMITH] Poonam Cook, OT 07/11/18 1317      Row Name 07/11/18 1107             BADL Safety/Performance    Impairments, BAD Safety/Performance cognition  -SMITH      Recorded by [SMITH] Poonam Cook, OT 07/11/18 1317      Row Name 07/11/18 1526             Motor Skills Assessment/Interventions    Additional Documentation (P)  Balance (Group);Therapeutic Exercise (Group)  -CM      Recorded by [CM] Erendira Narayanan, PT Student 07/11/18 1549      Row Name 07/11/18 1526 07/11/18 1107          Therapeutic Exercise    Therapeutic Exercise (P)  seated, lower extremities;supine, lower extremities  -CM  --     06611 - PT Therapeutic Exercise Minutes (P)  5  -CM2  --     39088 - PT Therapeutic Activity Minutes (P)  10  -CM2  --     53255 - OT Therapeutic Exercise Minutes  -- 8  -SMITH     Recorded by [CM] Erendira Narayanan, PT Student 07/11/18 1549  [CM2] Erendira Narayanan, PT Student 07/11/18 1551 [SMITH] Poonam Cook, OT 07/11/18 1323     Row Name 07/11/18 1526             Lower Extremity Seated Therapeutic Exercise    Performed, Seated Lower Extremity (Therapeutic Exercise) (P)  knee flexion/extension  -CM      Exercise Type, Seated Lower Extremity (Therapeutic Exercise) (P)  PROM (passive range of motion)  -CM      Sets/Reps Detail, Seated Lower Extremity (Therapeutic Exercise) (P)  1/10  -CM      Recorded by [CM] Erendira Narayanan, PT Student 07/11/18 1551      Row Name 07/11/18 1526             Lower Extremity Supine Therapeutic Exercise    Performed, Supine Lower Extremity (Therapeutic  Exercise) (P)  ankle dorsiflexion/plantarflexion  -CM      Exercise Type, Supine Lower Extremity (Therapeutic Exercise) (P)  AAROM (active assistive range of motion)  -CM      Sets/Reps Detail, Supine Lower Extremity (Therapeutic Exercise) (P)  1/10  -CM      Recorded by [CM] Erendira Narayanan, PT Student 07/11/18 1551      Row Name 07/11/18 1107             Therapeutic Exercise    Upper Extremity Range of Motion (Therapeutic Exercise) shoulder flexion/extension, bilateral;shoulder horizontal abduction/adduction, bilateral;elbow flexion/extension, bilateral;wrist flexion/extension, bilateral  -SMITH      Hand (Therapeutic Exercise) finger flexion/extension, bilateral  -SMITH      Exercise Type (Therapeutic Exercise) PROM (passive range of motion)  -SMITH      Sets/Reps (Therapeutic Exercise) 1/10  -SMITH      Comment (Therapeutic Exercise) unable to get pt. to intiate with cues.  With soothing voice and slow mvmt pt. allowed.  LUE appears with some tone.  -SMITH      Recorded by [SMITH] Poonam Cook, OT 07/11/18 1317      Row Name 07/11/18 1526             Balance    Balance (P)  static sitting balance  -CM      Recorded by [CM] Erendira Narayanan, PT Student 07/11/18 1551      Row Name 07/11/18 1526             Static Sitting Balance    Level of [place] (Unsupported Sitting, Static Balance) (P)  maximal assist, 25 to 49% patient effort  -CM      Sitting Position (Unsupported Sitting, Static Balance) (P)  sitting on edge of bed  -CM      Time Able to Maintain Position (Unsupported Sitting, Static Balance) (P)  2 to 3 minutes  -CM      Comment (Unsupported Sitting, Static Balance) (P)  Pt req's VC's and assistance staying midline. Pt demonstrates posterior or lateral lean when sitting EOB   -CM      Recorded by [CM] Erendira Narayanan, PT Student 07/11/18 1551      Row Name 07/11/18 1526 07/11/18 1107          Positioning and Restraints    Pre-Treatment Position (P)  in bed  -CM in bed  -SMITH     Post Treatment Position (P)  chair   -CM bed  -SMITH     In Bed  -- supine;with nsg;LUE elevated   Pt. cannot use call gomez.  -SMITH     In Chair (P)  reclined;encouraged to call for assist;exit alarm on;waffle cushion;legs elevated  -CM  --     Recorded by [CM] Erendira Narayanan, PT Student 07/11/18 1551 [SMITH] Poonam Cook, OT 07/11/18 1317     Row Name 07/11/18 1526             Pain Assessment    Additional Documentation (P)  Pain Scale: FACES Pre/Post-Treatment (Group)  -CM      Recorded by [CM] Erendira Narayanan, PT Student 07/11/18 1551      Row Name 07/11/18 1526 07/11/18 1107          Pain Scale: FACES Pre/Post-Treatment    Pain: FACES Scale, Pretreatment (P)  0-->no hurt  -CM 2-->hurts little bit  -SMITH     Pain: FACES Scale, Post-Treatment (P)  0-->no hurt  -CM 2-->hurts little bit  -SMITH     Recorded by [CM] Erendira Narayanan, PT Student 07/11/18 1551 [SMITH] Poonam Cook, OT 07/11/18 1317     Row Name                Wound 07/03/18 0915 Left distal toe    Wound - Properties Group Date first assessed: 07/03/18 [CP] Time first assessed: 0915 [CP] Present On Admission : yes;picture taken [CP] Side: Left [CP] Orientation: distal [CP] Location: toe [CP] Stage, Pressure Injury: deep tissue injury [CP] Recorded by:  [CP] DANIEL Osman 07/03/18 1101    Row Name 07/11/18 1526             Coping    Observed Emotional State (P)  irritable;agitated  -CM      Verbalized Emotional State (P)  anxiety  -CM      Recorded by [CM] Erendira Narayanan, PT Student 07/11/18 1551      Row Name 07/11/18 1526 07/11/18 1107          Plan of Care Review    Plan of Care Reviewed With (P)  patient  -CM patient  -SMITH     Recorded by [CM] Erendira Narayanan, PT Student 07/11/18 1551 [SMITH] Poonam Cook, OT 07/11/18 1317     Row Name 07/11/18 1107             Outcome Summary/Treatment Plan (OT)    Daily Summary of Progress (OT) progress toward functional goals is gradual  -SMITH      Barriers to Overall Progress (OT) cognitive status  -SMITH      Plan for Continued Treatment (OT) Cont  OT POC as pt. tolerates.  -SMITH      Recorded by [SMITH] Poonam Gini Cook, OT 07/11/18 1317      Row Name 07/11/18 1526             Outcome Summary/Treatment Plan (PT)    Daily Summary of Progress (PT) (P)  progress toward functional goals as expected  -CM      Recorded by [CM] Erendira Narayanan, PT Student 07/11/18 1551        User Key  (r) = Recorded By, (t) = Taken By, (c) = Cosigned By    Initials Name Effective Dates Discipline    SMITH Poonam Ramirezjanell Cook, OT 06/08/18 -  OT    CP Angélica Gavin, APRN 06/08/18 -  Nurse    CM Erendira Narayanan, PT Student 06/07/18 -  PT          Wound 07/03/18 0915 Left distal toe (Active)   Dressing Appearance open to air 7/11/2018  2:00 PM   Care, Wound other (see comments) 7/11/2018  8:00 AM   Dressing Care, Wound open to air 7/11/2018  8:00 AM             Physical Therapy Education     Title: PT OT SLP Therapies (Active)     Topic: Physical Therapy (Active)     Point: Mobility training (Active)    Learning Progress Summary     Learner Status Readiness Method Response Comment Documented by    Patient Active Acceptance E NR  CM 07/11/18 1526     Active Acceptance E NR  CM 07/10/18 1418     Active Acceptance E,D NR  LS 07/06/18 1511    Family Active Acceptance E NR  CM 07/10/18 1418          Point: Home exercise program (Active)    Learning Progress Summary     Learner Status Readiness Method Response Comment Documented by    Patient Active Acceptance E NR  CM 07/11/18 1526     Active Acceptance E NR  CM 07/10/18 1418    Family Active Acceptance E NR  CM 07/10/18 1418          Point: Body mechanics (Active)    Learning Progress Summary     Learner Status Readiness Method Response Comment Documented by    Patient Active Acceptance E NR  CM 07/11/18 1526     Active Acceptance E NR  CM 07/10/18 1418     Active Acceptance E,D NR  LS 07/06/18 1511    Family Active Acceptance E NR  CM 07/10/18 1418          Point: Precautions (Active)    Learning Progress Summary     Learner Status Readiness  Method Response Comment Documented by    Patient Active Acceptance E NR  CM 07/11/18 1526     Active Acceptance E NR  CM 07/10/18 1418     Active Acceptance E,D NR   07/06/18 1511    Family Active Acceptance E NR   07/10/18 1418                      User Key     Initials Effective Dates Name Provider Type Discipline     06/19/15 -  Senait Kessler, PT Physical Therapist PT     06/07/18 -  Erendira Narayanan, PT Student PT Student PT                    PT Recommendation and Plan     Outcome Summary/Treatment Plan (PT)  Daily Summary of Progress (PT): (P) progress toward functional goals as expected  Plan of Care Reviewed With: (P) patient  Progress: (P) no change  Outcome Summary: (P) Pt performed bed --> chair transfer w/ max A x3. Pt demonstrated increased participation during PT session, evidenced by lack of combative behaviors. Pt limited from confusion, weakness, and severe safety deficits. Pt would benefit from cont'd skilled PT services.           Outcome Measures     Row Name 07/11/18 1526 07/11/18 1107 07/10/18 1418       How much help from another person do you currently need...    Turning from your back to your side while in flat bed without using bedrails? (P)  2  -CM  -- 2  -LS (r) CM (t) LS (c)    Moving from lying on back to sitting on the side of a flat bed without bedrails? (P)  2  -CM  -- 2  -LS (r) CM (t) LS (c)    Moving to and from a bed to a chair (including a wheelchair)? (P)  2  -CM  -- 2  -LS (r) CM (t) LS (c)    Standing up from a chair using your arms (e.g., wheelchair, bedside chair)? (P)  2  -CM  -- 2  -LS (r) CM (t) LS (c)    Climbing 3-5 steps with a railing? (P)  1  -CM  -- 1  -LS (r) CM (t) LS (c)    To walk in hospital room? (P)  1  -CM  -- 1  -LS (r) CM (t) LS (c)    AM-PAC 6 Clicks Score (P)  10  -CM  -- 10  -LS (r) CM (t)       How much help from another is currently needed...    Putting on and taking off regular lower body clothing?  -- 1  -SMITH  --    Bathing (including  washing, rinsing, and drying)  -- 1  -SMITH  --    Toileting (which includes using toilet bed pan or urinal)  -- 1  -SMITH  --    Putting on and taking off regular upper body clothing  -- 1  -SMITH  --    Taking care of personal grooming (such as brushing teeth)  -- 1  -SMITH  --    Eating meals  -- 2  -SMITH  --    Score  -- 7  -SMITH  --       Functional Assessment    Outcome Measure Options (P)  AM-PAC 6 Clicks Basic Mobility (PT)  -CM  -- AM-PAC 6 Clicks Basic Mobility (PT)  -LS (r) CM (t) LS (c)    Row Name 07/10/18 7845             How much help from another is currently needed...    Putting on and taking off regular lower body clothing? 1  -CL      Bathing (including washing, rinsing, and drying) 1  -CL      Toileting (which includes using toilet bed pan or urinal) 1  -CL      Putting on and taking off regular upper body clothing 1  -CL      Taking care of personal grooming (such as brushing teeth) 2  -CL      Eating meals 2  -CL      Score 8  -CL         Functional Assessment    Outcome Measure Options AM-PAC 6 Clicks Daily Activity (OT)  -CL        User Key  (r) = Recorded By, (t) = Taken By, (c) = Cosigned By    Initials Name Provider Type    SMITH Poonam Cook, OT Occupational Therapist    LS Senait Kessler, PT Physical Therapist    CL Kelsea Riggs, OT Occupational Therapist    CM Erendira Narayanan, PT Student PT Student           Time Calculation:         PT Charges     Row Name 07/11/18 1526             Time Calculation    Start Time (P)  1526  -CM      PT Received On (P)  07/11/18  -CM      PT Goal Re-Cert Due Date (P)  07/16/18  -CM         Time Calculation- PT    Total Timed Code Minutes- PT (P)  15 minute(s)  -CM         Timed Charges    23980 - PT Therapeutic Exercise Minutes (P)  5  -CM      84615 - PT Therapeutic Activity Minutes (P)  10  -CM        User Key  (r) = Recorded By, (t) = Taken By, (c) = Cosigned By    Initials Name Provider Type    MEMO Narayanan, PT Student PT Student        Therapy Suggested  Charges     Code   Minutes Charges    12589 (CPT®) Hc Pt Neuromusc Re Education Ea 15 Min      10522 (CPT®) Hc Pt Ther Proc Ea 15 Min 5     58995 (CPT®) Hc Gait Training Ea 15 Min      24336 (CPT®) Hc Pt Therapeutic Act Ea 15 Min 10 1    87112 (CPT®) Hc Pt Manual Therapy Ea 15 Min      87402 (CPT®) Hc Pt Iontophoresis Ea 15 Min      81520 (CPT®) Hc Pt Elec Stim Ea-Per 15 Min      92926 (CPT®) Hc Pt Ultrasound Ea 15 Min      96505 (CPT®) Hc Pt Self Care/Mgmt/Train Ea 15 Min      Total  15 1        Therapy Charges for Today     Code Description Service Date Service Provider Modifiers Qty    28205326503 HC PT THERAPEUTIC ACT EA 15 MIN 7/10/2018 Erendira Narayanan, PT Student GP 1    95658515891 HC PT THER SUPP EA 15 MIN 7/10/2018 Erendira Narayanan, PT Student GP 1    81545556546 HC PT THERAPEUTIC ACT EA 15 MIN 7/11/2018 Erendira Narayanan, PT Student GP 1          PT G-Codes  Outcome Measure Options: (P) AM-PAC 6 Clicks Basic Mobility (PT)    Erendira Narayanan, PT Student  7/11/2018

## 2018-07-12 NOTE — PLAN OF CARE
Problem: Arrhythmia/Dysrhythmia (Symptomatic) (Adult)  Goal: Signs and Symptoms of Listed Potential Problems Will be Absent, Minimized or Managed (Arrhythmia/Dysrhythmia)  Outcome: Ongoing (interventions implemented as appropriate)   07/12/18 0449   Goal/Outcome Evaluation   Problems Assessed (Arrhythmia/Dysrhythmia) all   Problems Present (Dysrhythmia) electrophysiologic conduction defect       Problem: Patient Care Overview  Goal: Plan of Care Review  Outcome: Ongoing (interventions implemented as appropriate)   07/12/18 0449   Coping/Psychosocial   Plan of Care Reviewed With patient   Plan of Care Review   Progress no change   OTHER   Outcome Summary Pt VSS. No complaints of pain or SOB. Pt becomes upset and screams when being turned. Rn encouraged fluids upon hourly rounds, the patient appers to be dehydrated. APRN paged & wanted to leave up to MD upon rounding today. Afib on tele.

## 2018-07-12 NOTE — PLAN OF CARE
Problem: Patient Care Overview  Goal: Plan of Care Review  Outcome: Ongoing (interventions implemented as appropriate)   07/12/18 9433   Coping/Psychosocial   Plan of Care Reviewed With patient   OTHER   Outcome Summary Pt able to tolerate aarom/prom with episodic emotional lability. Recommend use of lift for oob, requires multiple staff to attempt mobility which illicits emotional response. Recommend long term rehab

## 2018-07-12 NOTE — PROGRESS NOTES
Continued Stay Note  [facility]     Patient Name: Leila Smith  MRN: [id number]  Today's Date: 7/12/2018    Admit Date: 7/2/2018          Discharge Plan     Row Name 07/12/18 7450       Plan    Plan update    Plan Comments Pt's daughter voicing to staff that she is unhappy that pt's kidney stones are not being addressed. Dr. Hartman was aware of pt's daughters concerns on Tuesday 7/10 and spoke to Leon at  explaining to her that is something she can follow up with the Urology office as an outpatient.     Row Name 07/12/18 3438       Plan    Plan [facility]    Patient/Family in Agreement with Plan yes    Plan Comments Spoke to Senait at [facility] and they can accept pt when medically ready. Notified Dr. Flores. Pt continues to be confused. Pt's daughter is here at  and agreeable. Pt's daughter is still planning to do STR but once her mom is at [facility] she may decide to switch her to long term care if needed. Senait stated they had LTC beds available. Emailed . Ambulance arranged with Tye at [facility] for Friday 7/13 at 1100. Nurse to call report to [phone] and fax summary to [phone]. CM will cont to follow.               Discharge Codes    No documentation.       Expected Discharge Date and Time     Expected Discharge Date Expected Discharge Time    Jul 11, 2018             Marly Araya

## 2018-07-12 NOTE — PROGRESS NOTES
Continued Stay Note  [facility]     Patient Name: Leila Smith  MRN: [id number]  Today's Date: 7/12/2018    Admit Date: 7/2/2018          Discharge Plan     Row Name 07/12/18 1505       Plan    Plan [facility] skilled bed    Patient/Family in Agreement with Plan yes    Plan Comments Spoke to Senait at [facility] and they can accept pt when medically ready. Notified Dr. Flores. Pt continues to be confused. Pt's daughter is here at  and agreeable. Pt's daughter is still planning to do STR but once her mom is at [facility] she may decide to switch her to long term care if needed. Senait stated they had LTC beds available. Emailed . Ambulance arranged with Tye at [facility] for Friday 7/13 at 1100. PCS on chart. Nurse to call report to [phone] and fax summary to [phone]. CM will cont to follow.               Discharge Codes    No documentation.       Expected Discharge Date and Time     Expected Discharge Date Expected Discharge Time    Jul 11, 2018             Marly Araya

## 2018-07-12 NOTE — PROGRESS NOTES
"    [facility] Medicine Services  PROGRESS NOTE    Patient Name: Leila Smith  : 1945  MRN: [id number]    Date of Admission: 2018  Length of Stay: 10  Primary Care Physician: Ciaran Wakefield MD    Subjective   Subjective     CC:  F/u UTI, sepsis    HPI:  No issues overnight  Refusing to have fingersticks done, \"I wanna keep my sugar\"    Review of Systems   Unable to perform ROS: Mental status change       Objective   Objective     Vital Signs:   Temp:  [97.1 °F (36.2 °C)-98.5 °F (36.9 °C)] 97.2 °F (36.2 °C)  Heart Rate:  [] 135  Resp:  [14-22] 16  BP: ()/() 98/86        Physical Exam:  Constitutional: No acute distress, awake, alert, watching TV  HENT: NCAT, mucous membranes moist  Respiratory: Clear to auscultation bilaterally, respiratory effort normal   Cardiovascular: irregular, no murmurs, rubs, or gallops,  Gastrointestinal: Positive bowel sounds, soft, nontender, nondistended, ostomy with green output  Musculoskeletal: No bilateral ankle edema  Psychiatric: pleasant and smiling  Neurologic: Oriented x 1, talkative, follows simple commands, no obvious deficits  Skin: some chronic scaling to LEs    Results Reviewed:  I have personally reviewed current lab, radiology, and data and agree.    Estimated Creatinine Clearance: 28.6 mL/min (A) (by C-G formula based on SCr of 2.07 mg/dL (H)).      Microbiology Results Abnormal     Procedure Component Value - Date/Time    Blood Culture - Blood, [[id number]]  (Normal) Collected:  18 194    Lab Status:  Final result Specimen:  Blood from Arm, Right Updated:  18 2030     Blood Culture No growth at 5 days    Blood Culture - Blood, [[id number]]  (Normal) Collected:  18 1610    Lab Status:  Final result Specimen:  Blood from Arm, Right Updated:  18 1815     Blood Culture No growth at 5 days    Urine Culture - Urine, [[id number]]  (Abnormal) Collected:  18 0200    Lab Status:  Final result " Specimen:  Urine from Urine, Catheter Updated:  07/06/18 1206     Urine Culture <10,000 CFU/mL Candida albicans (A)        Component      Latest Ref Rng & Units 7/11/2018 7/11/2018 7/12/2018 7/12/2018           4:53 PM  8:27 PM  7:48 AM 11:04 AM   Glucose      70 - 130 mg/dL 163 (H) 197 (H) 131 (H) 215 (H)       Imaging Results (last 24 hours)     ** No results found for the last 24 hours. **        Results for orders placed during the hospital encounter of 01/31/18   Adult Transesophageal Echo (YANNA) W/ Cont if Necessary Per Protocol    Narrative · Left ventricular systolic function is normal.  · Left ventricular wall thickness is consistent with moderate concentric   hypertrophy.  · Left atrial cavity size is dilated.  · Cardiac valves are morphologically within normal limits for patient's   age.          I have reviewed the medications.    Assessment/Plan   Assessment / Plan     Hospital Problem List     * (Principal)Sepsis due to urinary tract infection (CMS/HCC)    Diabetes mellitus, type 2 (CMS/HCC)    Overview Deleted 7/2/2018  9:44 PM by Brian Samson MD            Hypertension    Hyperlipidemia    Hypothyroidism    Obstructive sleep apnea syndrome    Diabetic peripheral neuropathy (CMS/HCC)    Atrial fibrillation with rapid ventricular response (CMS/HCC)    Altered mental status    Acute renal failure superimposed on stage 3 chronic kidney disease (CMS/HCC)    Hyperkalemia    Somnolence             Brief Hospital Course to date:  Leila Smith is a 73 y.o. female with dementia, DM2, HTN, presented to ED with hypotension, sepsis, ARF, and UTI.  Improved with IVF.  Has had issues with rapid a-fib during admission due to intermittently refusing meds.      Assessment & Plan:  - urine culture with yeast only.  S/p course of zosyn  - ARF improved.  Has become agitated when trying to draw labs, so repeat labs deferred. PO intake seems sufficient at this point.  - hyperkalemia resolved  - seen by urology  "(Raymundo) on admission.  He recommended to \"treat stones at later date\".  Currently aysmptomatic.  Dtr says after last admission that they went to [facility] and turned away because they don't take her insurance.  Will need to confirm appointment/insurance prior to discharge.  - watch blood sugar for now, no adjustments.  Did not receive scheduled humalog with breakfast this am  - mental status likely at baseline  - tachycardia/rapid a-fib is well controlled when she takes her pills.  Note that med rec had extended forms of CCB/BB, but she chews pills so changed to immeadiate versions.  - medically ready when bed available.    DVT Prophylaxis:  eliquis    CODE STATUS:   Code Status and Medical Interventions:   Ordered at: 07/02/18 2152     Code Status:    CPR     Medical Interventions (Level of Support Prior to Arrest):    Full       Disposition: I expect the patient to be discharged to SNF when bed available.    Electronically signed by DANIEL Suarez, 07/12/18, 11:21 AM.      "

## 2018-07-12 NOTE — THERAPY TREATMENT NOTE
Acute Care - Physical Therapy Treatment Note  [facility]     Patient Name: Leila Smith  : 1945  MRN: [id number]  Today's Date: 2018  Onset of Illness/Injury or Date of Surgery: 18  Date of Referral to PT: 18  Referring Physician: MD Devan    Admit Date: 2018    Visit Dx:    ICD-10-CM ICD-9-CM   1. Somnolence R40.0 780.09   2. Atrial fibrillation with rapid ventricular response (CMS/[facility]) I48.91 427.31   3. History of hypertension Z86.79 V12.59   4. Type 2 diabetes mellitus with hyperglycemia, unspecified long term insulin use status (CMS/[facility]) E11.65 250.00   5. History of recurrent UTI (urinary tract infection) Z87.440 V13.02   6. Dysphagia, unspecified type R13.10 787.20   7. Impaired mobility and ADLs Z74.09 799.89   8. Impaired functional mobility, balance, gait, and endurance Z74.09 V49.89     Patient Active Problem List   Diagnosis   • Diabetes mellitus, type 2 (CMS/[facility])   • Hypertension   • Hyperlipidemia   • Hypothyroidism   • Chronic obstructive pulmonary disease (CMS/[facility])   • CAD (coronary artery disease)   • History of edema   • History of obesity   • Venous insufficiency   • Open wound of lower extremity   • Urinary tract infection   • T2DM (type 2 diabetes mellitus) (CMS/[facility])   • Dyspnea on exertion   • Peripheral vascular disease (CMS/[facility])   • Obstructive sleep apnea syndrome   • Ulcer of lower extremity (CMS/[facility])   • Hypoxemia   • Hematuria   • Diabetic peripheral neuropathy (CMS/[facility])   • Edema   • Chronic obstructive pulmonary disease with acute exacerbation (CMS/[facility])   • Congestive heart failure (CMS/[facility])   • Arthritis   • Neutrophilic leukocytosis   • Cystitis   • Atrial fibrillation with rapid ventricular response (CMS/[facility])   • Altered mental status   • Sepsis due to urinary tract infection (CMS/[facility])   • Acute renal failure superimposed on stage 3 chronic kidney disease (CMS/[facility])   • Hyperkalemia   • Leukocytosis   • Elevated troponin   • Somnolence       Therapy  Treatment          Rehabilitation Treatment Summary     Row Name 07/12/18 1055             Treatment Time/Intention    Discipline physical therapist  -KM      Document Type therapy note (daily note)  -KM      Subjective Information --   intermittent crying out, emotionally labile  -KM      Mode of Treatment physical therapy  -KM      Care Plan Review care plan/treatment goals reviewed;risks/benefits reviewed   pt responds well to calming words and smiles  -KM      Therapy Frequency (PT Clinical Impression) 3 times/wk  -KM      Existing Precautions/Restrictions fall   dementia  -KM      Treatment Considerations/Comments Easily overwhelmed and emotionally labile, provide explanation,play CARE channel on TV  -KM      Recorded by [KM] Shwetha Jorge, PT 07/12/18 1148      Row Name 07/12/18 1055             Cognitive Assessment/Intervention- PT/OT    Affect/Mental Status (Cognitive) anxious;confused;emotionally labile  -KM      Personal Safety Interventions fall prevention program maintained  -KM      Recorded by [KM] Shwetha Jorge, PT 07/12/18 1148      Row Name 07/12/18 1055             Transfer Assessment/Treatment    Comment (Transfers) Requires multiple staff, recommend lift oob  -KM      Recorded by [KM] Shwetha Jorge, PT 07/12/18 1148      Row Name 07/12/18 1055             Therapeutic Exercise    Therapeutic Exercise supine, upper extremities;supine, lower extremities  -KM      13513 - PT Therapeutic Exercise Minutes 15  -KM      Recorded by [KM] Shwetha Jorge, PT 07/12/18 1148      Row Name 07/12/18 1055             Upper Extremity Supine Therapeutic Exercise    Performed, Supine Upper Extremity (Therapeutic Exercise) shoulder flexion/extension;shoulder abduction/adduction;elbow flexion/extension  -KM      Exercise Type, Supine Upper Extremity (Therapeutic Exercise) PROM (passive range of motion);AAROM (active assistive range of motion)  -KM      Sets/Reps Detail, Supine Upper  Extremity (Therapeutic Exercise) 1/10  -KM      Recorded by [KM] Shwetha Jorge, PT 07/12/18 1148      Row Name 07/12/18 1055             Lower Extremity Supine Therapeutic Exercise    Performed, Supine Lower Extremity (Therapeutic Exercise) ankle dorsiflexion/plantarflexion;SAQ (short arc quad) over bolster;hip external/internal rotation;heel slides   pt cries out with heel slides partial range  -KM      Exercise Type, Supine Lower Extremity (Therapeutic Exercise) PROM (passive range of motion);AAROM (active assistive range of motion)  -KM      Sets/Reps Detail, Supine Lower Extremity (Therapeutic Exercise) 1/10  -KM      Recorded by [KM] Shwetha Jorge, PT 07/12/18 1148      Row Name 07/12/18 1055             Therapeutic Exercise    Lower Extremity (Therapeutic Exercise) gastroc stretch, bilateral  -KM      Recorded by [KM] Shwetha Jorge, PT 07/12/18 1148      Row Name 07/12/18 1055             Pain Scale: FACES Pre/Post-Treatment    Pain: FACES Scale, Pretreatment 0-->no hurt  -KM      Pain: FACES Scale, Post-Treatment 0-->no hurt  -KM      Recorded by [KM] Shwetha Jorge, PT 07/12/18 1148      Row Name                Wound 07/03/18 0915 Left distal toe    Wound - Properties Group Date first assessed: 07/03/18 [CP] Time first assessed: 0915 [CP] Present On Admission : yes;picture taken [CP] Side: Left [CP] Orientation: distal [CP] Location: toe [CP] Stage, Pressure Injury: deep tissue injury [CP] Recorded by:  [CP] DANIEL Osman 07/03/18 1101    Row Name 07/12/18 1055             Coping    Observed Emotional State irritable;agitated   episodic, able to calm  -KM      Recorded by [KM] Shwetha Jorge, PT 07/12/18 1148        User Key  (r) = Recorded By, (t) = Taken By, (c) = Cosigned By    Initials Name Effective Dates Discipline    KM Shwetha Jorge, PT 06/19/15 -  PT    CP DANIEL Osman 06/08/18 -  Nurse          Wound 07/03/18 0915 Left distal  toe (Active)   Dressing Appearance open to air 7/11/2018  8:00 PM             Physical Therapy Education     Title: PT OT SLP Therapies (Active)     Topic: Physical Therapy (Active)     Point: Mobility training (Active)    Learning Progress Summary     Learner Status Readiness Method Response Comment Documented by    Patient Active Acceptance D,E NR   07/12/18 1151     Active Acceptance E NR  CM 07/11/18 1526     Active Acceptance E NR  CM 07/10/18 1418     Active Acceptance E,D NR  LS 07/06/18 1511    Family Active Acceptance E NR  CM 07/10/18 1418          Point: Home exercise program (Active)    Learning Progress Summary     Learner Status Readiness Method Response Comment Documented by    Patient Active Acceptance D,E NR   07/12/18 1151     Active Acceptance E NR  CM 07/11/18 1526     Active Acceptance E NR  CM 07/10/18 1418    Family Active Acceptance E NR  CM 07/10/18 1418          Point: Body mechanics (Active)    Learning Progress Summary     Learner Status Readiness Method Response Comment Documented by    Patient Active Acceptance D,E NR   07/12/18 1151     Active Acceptance E NR  CM 07/11/18 1526     Active Acceptance E NR  CM 07/10/18 1418     Active Acceptance E,D NR  LS 07/06/18 1511    Family Active Acceptance E NR  CM 07/10/18 1418          Point: Precautions (Active)    Learning Progress Summary     Learner Status Readiness Method Response Comment Documented by    Patient Active Acceptance D,E NR   07/12/18 1151     Active Acceptance E NR  CM 07/11/18 1526     Active Acceptance E NR  CM 07/10/18 1418     Active Acceptance E,D NR   07/06/18 1511    Family Active Acceptance E NR  CM 07/10/18 1418                      User Key     Initials Effective Dates Name Provider Type Discipline     06/19/15 -  Shwetha Jorge, PT Physical Therapist PT     06/19/15 -  Senait Kessler, PT Physical Therapist PT    CM 06/07/18 -  Erendira Narayanan, PT Student PT Student PT                    PT  Recommendation and Plan  Therapy Frequency (PT Clinical Impression): 3 times/wk  Plan of Care Reviewed With: patient  Outcome Summary: Pt able to tolerate aarom/prom with episodic emotional lability. Recommend use of lift for oob, requires multiple staff to attempt mobility which illicits emotional response. Recommend long term rehab          Outcome Measures     Row Name 07/12/18 1055 07/11/18 1526 07/11/18 1107       How much help from another person do you currently need...    Turning from your back to your side while in flat bed without using bedrails? 2  -KM 2  -LS (r) CM (t) LS (c)  --    Moving from lying on back to sitting on the side of a flat bed without bedrails? 2  -KM 2  -LS (r) CM (t) LS (c)  --    Moving to and from a bed to a chair (including a wheelchair)? 2  -KM 2  -LS (r) CM (t) LS (c)  --    Standing up from a chair using your arms (e.g., wheelchair, bedside chair)? 2  -KM 2  -LS (r) CM (t) LS (c)  --    Climbing 3-5 steps with a railing? 1  -KM 1  -LS (r) CM (t) LS (c)  --    To walk in hospital room? 1  -KM 1  -LS (r) CM (t) LS (c)  --    AM-PAC 6 Clicks Score 10  -KM 10  -LS (r) CM (t)  --       How much help from another is currently needed...    Putting on and taking off regular lower body clothing?  --  -- 1  -SMITH    Bathing (including washing, rinsing, and drying)  --  -- 1  -SMITH    Toileting (which includes using toilet bed pan or urinal)  --  -- 1  -SMITH    Putting on and taking off regular upper body clothing  --  -- 1  -SMITH    Taking care of personal grooming (such as brushing teeth)  --  -- 1  -SMITH    Eating meals  --  -- 2  -SMITH    Score  --  -- 7  -SMITH       Functional Assessment    Outcome Measure Options AM-PAC 6 Clicks Basic Mobility (PT)  -KM AM-PAC 6 Clicks Basic Mobility (PT)  -LS (r) CM (t) LS (c)  --    Row Name 07/10/18 1418 07/10/18 1355          How much help from another person do you currently need...    Turning from your back to your side while in flat bed without using  bedrails? 2  -LS (r) CM (t) LS (c)  --     Moving from lying on back to sitting on the side of a flat bed without bedrails? 2  -LS (r) CM (t) LS (c)  --     Moving to and from a bed to a chair (including a wheelchair)? 2  -LS (r) CM (t) LS (c)  --     Standing up from a chair using your arms (e.g., wheelchair, bedside chair)? 2  -LS (r) CM (t) LS (c)  --     Climbing 3-5 steps with a railing? 1  -LS (r) CM (t) LS (c)  --     To walk in hospital room? 1  -LS (r) CM (t) LS (c)  --     AM-PAC 6 Clicks Score 10  -LS (r) CM (t)  --        How much help from another is currently needed...    Putting on and taking off regular lower body clothing?  -- 1  -CL     Bathing (including washing, rinsing, and drying)  -- 1  -CL     Toileting (which includes using toilet bed pan or urinal)  -- 1  -CL     Putting on and taking off regular upper body clothing  -- 1  -CL     Taking care of personal grooming (such as brushing teeth)  -- 2  -CL     Eating meals  -- 2  -CL     Score  -- 8  -CL        Functional Assessment    Outcome Measure Options AM-PAC 6 Clicks Basic Mobility (PT)  -LS (r) CM (t) LS (c) AM-PAC 6 Clicks Daily Activity (OT)  -CL       User Key  (r) = Recorded By, (t) = Taken By, (c) = Cosigned By    Initials Name Provider Type    SMITH Cook, OT Occupational Therapist    KM Shwetha Jorge, PT Physical Therapist    LS Senait Kessler, PT Physical Therapist    CL Kelsea Riggs, OT Occupational Therapist    CM Erendira Narayanan, PT Student PT Student           Time Calculation:         PT Charges     Row Name 07/12/18 1055             Time Calculation    Start Time 1055  -KM      PT Received On 07/12/18  -KM      PT Goal Re-Cert Due Date 07/16/18  -KM         Time Calculation- PT    Total Timed Code Minutes- PT 15 minute(s)  -KM         Timed Charges    59660 - PT Therapeutic Exercise Minutes 15  -KM        User Key  (r) = Recorded By, (t) = Taken By, (c) = Cosigned By    Initials Name Provider Type    MELANY Tadeo  ARJUN Jorge, PT Physical Therapist        Therapy Suggested Charges     Code   Minutes Charges    80308 (CPT®) Hc Pt Neuromusc Re Education Ea 15 Min      57982 (CPT®) Hc Pt Ther Proc Ea 15 Min 15 1    80368 (CPT®) Hc Gait Training Ea 15 Min      22280 (CPT®) Hc Pt Therapeutic Act Ea 15 Min      08572 (CPT®) Hc Pt Manual Therapy Ea 15 Min      19348 (CPT®) Hc Pt Iontophoresis Ea 15 Min      09260 (CPT®) Hc Pt Elec Stim Ea-Per 15 Min      13094 (CPT®) Hc Pt Ultrasound Ea 15 Min      88261 (CPT®) Hc Pt Self Care/Mgmt/Train Ea 15 Min      Total  15 1        Therapy Charges for Today     Code Description Service Date Service Provider Modifiers Qty    67202349682 HC PT THER PROC EA 15 MIN 7/12/2018 Shwetha Jorge, PT GP 1          PT G-Codes  Outcome Measure Options: AM-PAC 6 Clicks Basic Mobility (PT)    Shwetha Jorge, PT  7/12/2018

## 2018-07-13 PROBLEM — N39.0 SEPSIS DUE TO URINARY TRACT INFECTION (HCC): Status: RESOLVED | Noted: 2018-01-01 | Resolved: 2018-01-01

## 2018-07-13 PROBLEM — R40.0 SOMNOLENCE: Status: RESOLVED | Noted: 2018-01-01 | Resolved: 2018-01-01

## 2018-07-13 PROBLEM — A41.9 SEPSIS DUE TO URINARY TRACT INFECTION (HCC): Status: RESOLVED | Noted: 2018-01-01 | Resolved: 2018-01-01

## 2018-07-13 PROBLEM — I48.91 ATRIAL FIBRILLATION WITH RAPID VENTRICULAR RESPONSE (HCC): Status: RESOLVED | Noted: 2017-12-20 | Resolved: 2018-01-01

## 2018-07-13 PROBLEM — E87.5 HYPERKALEMIA: Status: RESOLVED | Noted: 2018-01-01 | Resolved: 2018-01-01

## 2018-07-13 PROBLEM — R41.82 ALTERED MENTAL STATUS: Status: RESOLVED | Noted: 2017-12-20 | Resolved: 2018-01-01

## 2018-07-13 NOTE — PLAN OF CARE
Problem: Fall Risk (Adult)  Goal: Absence of Fall  Outcome: Ongoing (interventions implemented as appropriate)      Problem: Skin Injury Risk (Adult)  Goal: Skin Health and Integrity  Outcome: Ongoing (interventions implemented as appropriate)      Problem: Renal Failure/Kidney Injury, Acute (Adult)  Goal: Signs and Symptoms of Listed Potential Problems Will be Absent, Minimized or Managed (Renal Failure/Kidney Injury, Acute)  Outcome: Ongoing (interventions implemented as appropriate)      Problem: Arrhythmia/Dysrhythmia (Symptomatic) (Adult)  Goal: Signs and Symptoms of Listed Potential Problems Will be Absent, Minimized or Managed (Arrhythmia/Dysrhythmia)  Outcome: Ongoing (interventions implemented as appropriate)      Problem: Patient Care Overview  Goal: Plan of Care Review  Outcome: Ongoing (interventions implemented as appropriate)    Goal: Individualization and Mutuality  Outcome: Ongoing (interventions implemented as appropriate)    Goal: Discharge Needs Assessment  Outcome: Ongoing (interventions implemented as appropriate)    Goal: Interprofessional Rounds/Family Conf  Outcome: Ongoing (interventions implemented as appropriate)

## 2018-07-13 NOTE — PAYOR COMM NOTE
"Leila Smith (73 y.o. Female)   Ref # [id number]  Stephany Sherman RN, BSN  Phone # [phone]  Fax # [phone]    Date of Birth Social Security Number Address Home Phone MRN    1945  [id number] [phone] [id number]    Buddhist Marital Status          Unknown        Admission Date Admission Type Admitting Provider Attending Provider Department, Room/Bed    7/2/18 Emergency Keo Hess MD  [facility] 6A, N609/1    Discharge Date Discharge Disposition Discharge Destination        7/13/2018 Rehab Facility or Unit (DC - External)              Attending Provider:  (none)   Allergies:  Codeine, Tetracyclines & Related    Isolation:  None   Infection:  None   Code Status:  Prior    Ht:  180.3 cm (71\")   Wt:  86.9 kg (191 lb 9.6 oz)    Admission Cmt:  None   Principal Problem:  Sepsis due to urinary tract infection (CMS/[facility]) [A41.9,N39.0]                 Active Insurance as of 7/2/2018     Primary Coverage     Payor Plan Insurance Group Employer/Plan Group    ANTHEM MEDICARE REPLACEMENT ANTHEM MEDICARE ADVANTAGE [id number]     Payor Plan Address Payor Plan Phone Number Effective From Effective To    [address] [phone] 1/1/2017     [facility] [id number]       Subscriber Name Subscriber Birth Date Member ID       LEILA SMITH 1945 [id number]                 Emergency Contacts      (Rel.) Home Phone Work Phone Mobile Phone    Danilo Hope (Daughter) [phone] -- --    Kleber Johnson (Son) [phone] -- --               Discharge Summary      DANIEL Suarez at 7/13/2018  8:43 AM     Attestation signed by Keo Hess MD at 7/13/2018 12:35 PM      Attending Skinny VENTURA supervised care of the patient on day of discharge with direct care provided by the advanced care provider (APC).    Electronically signed by Keo Hess MD, 07/13/18, 12:35 PM.                        [facility] " Medicine Services  DISCHARGE SUMMARY    Patient Name: Leila Smith  : 1945  MRN: [id number]    Date of Admission: 2018  Date of Discharge: 2018  Primary Care Physician: Ciaran Wakefield MD    Consults     Date and Time Order Name Status Description    7/3/2018 0124 Inpatient Urology Consult          Hospital Course     Presenting Problem:   Somnolence [R40.0]    Active Hospital Problems    Diagnosis Date Noted   • Acute renal failure superimposed on stage 3 chronic kidney disease (CMS/HCC) [N17.9, N18.3] 2018   • Diabetic peripheral neuropathy (CMS/HCC) [E11.42] 2016   • Obstructive sleep apnea syndrome [G47.33] 2016   • Diabetes mellitus, type 2 (CMS/HCC) [E11.9] 07/15/2016   • Hyperlipidemia [E78.5] 07/15/2016   • Hypertension [I10] 07/15/2016   • Hypothyroidism [E03.9] 07/15/2016      Resolved Hospital Problems    Diagnosis Date Noted Date Resolved   • **Sepsis due to urinary tract infection (CMS/HCC) [A41.9, N39.0] 2018   • Somnolence [R40.0] 2018   • Hyperkalemia [E87.5] 2018   • Atrial fibrillation with rapid ventricular response (CMS/HCC) [I48.91] 2017   • Altered mental status [R41.82] 2017          Hospital Course:  Leila Smith is a 73 y.o. female who presented to ED with back pain, nausea/vomiting, and altered mental status.  Daughter reported that pt has has back pain for several weeks and US confirmed kidney stones.  She had yet to see a urologist due to insurance issues.  She was found to have recurrent UTI and started on IV antibiotics, as well as afib with RVR on admission.  She was admitted to ICU.  Urology was consulted and felt the stone could be treated at a later date as she was currently asymptomatic.  She was able to be moved out of the ICU within 3 days.  Her urine culture only isolated yeast.  She remained confused but was improved from admission and it was felt she was  back to baseline.  She has otherwise remained stable and is ready for dc to SNF.      Discharge Follow Up Recommendations for labs/diagnostics:    Follow up with urology regarding kidney stones    Cardizem CD changed to immediate release q6h due to patient chewing her meds        Day of Discharge     HPI:   No issues overnight per nursing    Review of Systems   Unable to perform ROS: Mental status change     Vital Signs:   Temp:  [97.1 °F (36.2 °C)-98.3 °F (36.8 °C)] 97.5 °F (36.4 °C)  Heart Rate:  [] 88  Resp:  [16-24] 18  BP: ()/() 149/102     Physical Exam:  Constitutional: No acute distress, awake, alert  HENT: NCAT, mucous membranes moist  Respiratory: Clear to auscultation anteriorly, respiratory effort normal   Cardiovascular: irregular, no murmurs, rubs, or gallops, palpable pedal pulses bilaterally  Gastrointestinal: Positive bowel sounds, soft, nontender, nondistended, functioning ostomy with green output  Musculoskeletal: No bilateral ankle edema  Psychiatric: Flat affect, cooperative  Neurologic: Oriented x 1, strength symmetric in all extremities, speech clear    Pertinent  and/or Most Recent Results         Brief Urine Lab Results  (Last result in the past 365 days)      Color   Clarity   Blood   Leuk Est   Nitrite   Protein   CREAT   Urine HCG        07/03/18 0200 Orange(A) Turbid(A) Large (3+)(A) Moderate (2+)(A) Negative 100 mg/dL (2+)(A)         07/03/18 0200 Orange(A) Turbid(A) Large (3+)(A) Moderate (2+)(A) Negative 100 mg/dL (2+)(A)               Microbiology Results Abnormal     Procedure Component Value - Date/Time    Blood Culture - Blood, [[id number]]  (Normal) Collected:  07/02/18 1940    Lab Status:  Final result Specimen:  Blood from Arm, Right Updated:  07/07/18 2030     Blood Culture No growth at 5 days    Blood Culture - Blood, [[id number]]  (Normal) Collected:  07/02/18 1610    Lab Status:  Final result Specimen:  Blood from Arm, Right Updated:  07/07/18 1815     Blood  Culture No growth at 5 days    Urine Culture - Urine, [[id number]]  (Abnormal) Collected:  07/03/18 0200    Lab Status:  Final result Specimen:  Urine from Urine, Catheter Updated:  07/06/18 1206     Urine Culture <10,000 CFU/mL Candida albicans (A)          Imaging Results (all)     Procedure Component Value Units Date/Time    US Renal Bilateral [[id number]] Collected:  07/04/18 1302     Updated:  07/05/18 0907    Narrative:       EXAMINATION: US RENAL BILATERAL - 7/3/2018     INDICATION:  R40.0-Somnolence; I48.91-Unspecified atrial fibrillation;  Z86.79-Personal history of other diseases of the circulatory system;  E11.65-Type 2 diabetes mellitus with hyperglycemia; Z87.440-Personal  history of urinary (tract) infections; R13.10-Dysphagia, unspecified.  Renal stones, follow-up.          TECHNIQUE: Sonographic imaging is obtained of the kidneys in both the  sagittal and transverse planes.     COMPARISON: None.     FINDINGS: Right kidney measures in length from pole to pole,   12.7 cm. There is an echogenic focus with shadowing suggesting a  nonobstructing stone measuring approximately 9 mm. There is a small cyst  identified as well along the inferior aspect measuring 3 cm. No definite  solid mass identified. No hydronephrosis.     The left kidney measures in length from pole to pole, 12.0 cm. There are  several areas of increased echogenicity and shadowing suggesting  nonobstructing stones the largest measuring 1.7 cm. There are several  cysts as well seen within the left kidney the largest measuring  approximately 2.5 cm. There is no solid mass identified. Imaging of the  bladder reveals no gross abnormality.       Impression:       Bilateral renal cortical cysts as well as bilateral  nonobstructing renal stones. No evidence of obstruction. No solid mass.     DICTATED:   7/3/2018  EDITED/ls :   7/4/2018      This report was finalized on 7/5/2018 9:05 AM by Dr. Stephanie Jarrett MD.       CT Abdomen Pelvis Stone  Protocol [[id number]] Collected:  07/02/18 2157     Updated:  07/03/18 0101    Narrative:       EXAM:    CT Abdomen and Pelvis Without Intravenous Contrast    EXAM DATE/TIME:    7/2/2018 9:57 PM    CLINICAL HISTORY:    72 years old, female; Generalized abd pain, nausea, vomiting.    TECHNIQUE:    Axial computed tomography images of the abdomen and pelvis without   intravenous contrast.  All CT scans at this facility use at least one of these   dose optimization techniques: automated exposure control; mA and/or kV   adjustment per patient size (includes targeted exams where dose is matched to   clinical indication); or iterative reconstruction.    Coronal reformatted images were created and reviewed.    COMPARISON:    CT ABDOMEN PELVIS WO CONTRAST 2017-10-27 11:24    FINDINGS:    Prior near-total colectomy with a right periumbilical ileostomy.  There is no   bowel inflammation, obstruction, free intraperitoneal air, or ascites.  Stable   mild presacral stranding.      Stable cardiomegaly.  Patchy bibasilar atelectasis.  Left basilar bronchial   wall thickening.        Trace pericholecystic free fluid without biliary dilatation, gallbladder   dilatation, or visualized gallstone.      Right adrenal mass has slightly increased in size since the prior exam,   currently measuring up to 3.7 cm where it previously measured up to 3.2 cm.        Bilateral nonobstructing intrarenal calculi, measuring up to 1 cm on the   left.  Bilateral simple renal cysts, measuring up to 2.9 cm on the left.  No   ureteral or bladder calculus.  No hydronephrosis.      Pancreatic atrophy.  The unenhanced liver, spleen, and urinary bladder are   normal.  Prior hysterectomy.      Diffuse atherosclerosis without abdominal aortic aneurysm or retroperitoneal   hemorrhage.  Chronic degenerative changes throughout the spine and at both hips.      Impression:         Trace pericholecystic fluid without layering dilatation or stone.  If there   is right  upper quadrant pain, ultrasound recommended.      Right adrenal mass has slightly increased in size since the prior exam.  This   can be followed up with non-emergent washout CT or MRI.      Left basilar bronchitis.      Bilateral nephrolithiasis without urinary obstruction.      THIS DOCUMENT HAS BEEN ELECTRONICALLY SIGNED BY ERENDIRA KATE MD                    Results for orders placed during the hospital encounter of 01/31/18   Adult Transesophageal Echo (YANNA) W/ Cont if Necessary Per Protocol    Narrative · Left ventricular systolic function is normal.  · Left ventricular wall thickness is consistent with moderate concentric   hypertrophy.  · Left atrial cavity size is dilated.  · Cardiac valves are morphologically within normal limits for patient's   age.            Discharge Details        Discharge Medications      New Medications      Instructions Start Date   diltiaZEM 60 MG tablet  Commonly known as:  CARDIZEM   60 mg, Oral, Every 6 Hours Scheduled      insulin detemir 100 UNIT/ML injection  Commonly known as:  LEVEMIR   12 Units, Subcutaneous, Nightly      metoprolol tartrate 50 MG tablet  Commonly known as:  LOPRESSOR   50 mg, Oral, Every 12 Hours Scheduled         Changes to Medications      Instructions Start Date   acetaminophen 325 MG tablet  Commonly known as:  TYLENOL  What changed:  · medication strength  · how much to take   650 mg, Oral, Every 6 Hours PRN      Dimethicone 1.5 % cream  What changed:  Another medication with the same name was removed. Continue taking this medication, and follow the directions you see here.   1 application, Apply externally, As Needed      insulin lispro 100 UNIT/ML injection  Commonly known as:  humaLOG  What changed:  · how much to take  · when to take this  · additional instructions   7 Units, Subcutaneous, 3 Times Daily With Meals      insulin lispro 100 UNIT/ML injection  Commonly known as:  humaLOG  What changed:  You were already taking a medication with the  same name, and this prescription was added. Make sure you understand how and when to take each.   0-14 Units, Subcutaneous, 4 Times Daily With Meals & Nightly         Continue These Medications      Instructions Start Date   ammonium lactate 12 % lotion  Commonly known as:  LAC-HYDRIN   1 application, Topical, 2 Times Daily PRN      apixaban 5 MG tablet tablet  Commonly known as:  ELIQUIS   5 mg, Oral, Every 12 Hours Scheduled      aspirin 81 MG chewable tablet   81 mg, Oral, Daily      atorvastatin 10 MG tablet  Commonly known as:  LIPITOR   10 mg, Oral, Nightly      DULERA 100-5 MCG/ACT inhaler  Generic drug:  mometasone-formoterol   2 puffs, Inhalation, 2 Times Daily - RT      escitalopram 10 MG tablet  Commonly known as:  LEXAPRO   10 mg, Oral, Daily      famotidine 20 MG tablet  Commonly known as:  PEPCID   20 mg, Oral, Daily      haloperidol 1 MG tablet  Commonly known as:  HALDOL   1 mg, Oral, 2 Times Daily      magnesium hydroxide 400 MG/5ML suspension  Commonly known as:  MILK OF MAGNESIA   30 mL, Oral, Daily PRN      Melatonin 3 MG tablet   3 mg, Oral, Nightly      ondansetron 4 MG tablet  Commonly known as:  ZOFRAN   4 mg, Oral, Every 8 Hours PRN      TEARS NATURALE OP   1 application, Ophthalmic, 2 Times Daily         Stop These Medications    bacitracin-polymyxin b ointment ophthalmic ointment     Benzalkonium Chloride 0.1 % liquid     diltiaZEM  MG 24 hr capsule  Commonly known as:  CARDIZEM CD     levoFLOXacin 500 MG tablet  Commonly known as:  LEVAQUIN     metoprolol succinate XL 50 MG 24 hr tablet  Commonly known as:  TOPROL-XL     REMEDY CALAZIME EX              Discharge Disposition:  Rehab Facility or Unit (DC - External)    Discharge Diet:  Diet Instructions     Diet: Regular; Thin       Discharge Diet:  Regular    Fluid Consistency:  Thin    Nutrition Supplements Boost Glucose Control          Discharge Activity:   Activity Instructions     Activity as Tolerated               Code  Status/Level of Support:  Code Status and Medical Interventions:   Ordered at: 07/02/18 2152     Code Status:    CPR     Medical Interventions (Level of Support Prior to Arrest):    Full       No future appointments.    Additional Instructions for the Follow-ups that You Need to Schedule     Discharge Follow-up with PCP    As directed      Currently Documented PCP:  Ciaran Wakefield MD  PCP Phone Number:  [phone]    Follow Up Details:  1 week         Discharge Follow-up with Specified Provider: urology; 2 Weeks    As directed      To:  urology    Follow Up:  2 Weeks    Follow Up Details:  has seen magalis in past but he does not take her insurance please find urologist that does and make appointment for patient               Time Spent on Discharge:  40 minutes    Electronically signed by DANIEL Suarez, 07/13/18, 10:27 AM.        Electronically signed by Keo Andrade MD at 7/13/2018 12:35 PM       Discharge Order     Start     Ordered    07/13/18 1024  Discharge patient  Once     Expected Discharge Date:  07/13/18    Discharge Disposition:  Rehab Facility or Unit (DC - External)    Physician of Record for Attribution - Please select from Treatment Team:  KEO ANDRADE [[id number]]    Review needed by CMO to determine Physician of Record:  No       Question Answer Comment   Physician of Record for Attribution - Please select from Treatment Team KEO ANDRADE    Review needed by CMO to determine Physician of Record No        07/13/18 1027

## 2018-07-13 NOTE — DISCHARGE SUMMARY
[facility] Medicine Services  DISCHARGE SUMMARY    Patient Name: Leila Smith  : 1945  MRN: [id number]    Date of Admission: 2018  Date of Discharge: 2018  Primary Care Physician: Ciaran Wakefield MD    Consults     Date and Time Order Name Status Description    7/3/2018 0124 Inpatient Urology Consult          Hospital Course     Presenting Problem:   Somnolence [R40.0]    Active Hospital Problems    Diagnosis Date Noted   • Acute renal failure superimposed on stage 3 chronic kidney disease (CMS/HCC) [N17.9, N18.3] 2018   • Diabetic peripheral neuropathy (CMS/HCC) [E11.42] 2016   • Obstructive sleep apnea syndrome [G47.33] 2016   • Diabetes mellitus, type 2 (CMS/HCC) [E11.9] 07/15/2016   • Hyperlipidemia [E78.5] 07/15/2016   • Hypertension [I10] 07/15/2016   • Hypothyroidism [E03.9] 07/15/2016      Resolved Hospital Problems    Diagnosis Date Noted Date Resolved   • **Sepsis due to urinary tract infection (CMS/HCC) [A41.9, N39.0] 2018   • Somnolence [R40.0] 2018   • Hyperkalemia [E87.5] 2018   • Atrial fibrillation with rapid ventricular response (CMS/HCC) [I48.91] 2017   • Altered mental status [R41.82] 2017          Hospital Course:  Leila Smith is a 73 y.o. female who presented to ED with back pain, nausea/vomiting, and altered mental status.  Daughter reported that pt has has back pain for several weeks and US confirmed kidney stones.  She had yet to see a urologist due to insurance issues.  She was found to have recurrent UTI and started on IV antibiotics, as well as afib with RVR on admission.  She was admitted to ICU.  Urology was consulted and felt the stone could be treated at a later date as she was currently asymptomatic.  She was able to be moved out of the ICU within 3 days.  Her urine culture only isolated yeast.  She remained confused but was improved  from admission and it was felt she was back to baseline.  She has otherwise remained stable and is ready for dc to SNF.      Discharge Follow Up Recommendations for labs/diagnostics:    Follow up with urology regarding kidney stones    Cardizem CD changed to immediate release q6h due to patient chewing her meds        Day of Discharge     HPI:   No issues overnight per nursing    Review of Systems   Unable to perform ROS: Mental status change     Vital Signs:   Temp:  [97.1 °F (36.2 °C)-98.3 °F (36.8 °C)] 97.5 °F (36.4 °C)  Heart Rate:  [] 88  Resp:  [16-24] 18  BP: ()/() 149/102     Physical Exam:  Constitutional: No acute distress, awake, alert  HENT: NCAT, mucous membranes moist  Respiratory: Clear to auscultation anteriorly, respiratory effort normal   Cardiovascular: irregular, no murmurs, rubs, or gallops, palpable pedal pulses bilaterally  Gastrointestinal: Positive bowel sounds, soft, nontender, nondistended, functioning ostomy with green output  Musculoskeletal: No bilateral ankle edema  Psychiatric: Flat affect, cooperative  Neurologic: Oriented x 1, strength symmetric in all extremities, speech clear    Pertinent  and/or Most Recent Results         Brief Urine Lab Results  (Last result in the past 365 days)      Color   Clarity   Blood   Leuk Est   Nitrite   Protein   CREAT   Urine HCG        07/03/18 0200 Orange(A) Turbid(A) Large (3+)(A) Moderate (2+)(A) Negative 100 mg/dL (2+)(A)         07/03/18 0200 Orange(A) Turbid(A) Large (3+)(A) Moderate (2+)(A) Negative 100 mg/dL (2+)(A)               Microbiology Results Abnormal     Procedure Component Value - Date/Time    Blood Culture - Blood, [[id number]]  (Normal) Collected:  07/02/18 1940    Lab Status:  Final result Specimen:  Blood from Arm, Right Updated:  07/07/18 2030     Blood Culture No growth at 5 days    Blood Culture - Blood, [[id number]]  (Normal) Collected:  07/02/18 1610    Lab Status:  Final result Specimen:  Blood from Arm,  Right Updated:  07/07/18 1815     Blood Culture No growth at 5 days    Urine Culture - Urine, [[id number]]  (Abnormal) Collected:  07/03/18 0200    Lab Status:  Final result Specimen:  Urine from Urine, Catheter Updated:  07/06/18 1206     Urine Culture <10,000 CFU/mL Candida albicans (A)          Imaging Results (all)     Procedure Component Value Units Date/Time    US Renal Bilateral [[id number]] Collected:  07/04/18 1302     Updated:  07/05/18 0907    Narrative:       EXAMINATION: US RENAL BILATERAL - 7/3/2018     INDICATION:  R40.0-Somnolence; I48.91-Unspecified atrial fibrillation;  Z86.79-Personal history of other diseases of the circulatory system;  E11.65-Type 2 diabetes mellitus with hyperglycemia; Z87.440-Personal  history of urinary (tract) infections; R13.10-Dysphagia, unspecified.  Renal stones, follow-up.          TECHNIQUE: Sonographic imaging is obtained of the kidneys in both the  sagittal and transverse planes.     COMPARISON: None.     FINDINGS: Right kidney measures in length from pole to pole,   12.7 cm. There is an echogenic focus with shadowing suggesting a  nonobstructing stone measuring approximately 9 mm. There is a small cyst  identified as well along the inferior aspect measuring 3 cm. No definite  solid mass identified. No hydronephrosis.     The left kidney measures in length from pole to pole, 12.0 cm. There are  several areas of increased echogenicity and shadowing suggesting  nonobstructing stones the largest measuring 1.7 cm. There are several  cysts as well seen within the left kidney the largest measuring  approximately 2.5 cm. There is no solid mass identified. Imaging of the  bladder reveals no gross abnormality.       Impression:       Bilateral renal cortical cysts as well as bilateral  nonobstructing renal stones. No evidence of obstruction. No solid mass.     DICTATED:   7/3/2018  EDITED/ls :   7/4/2018      This report was finalized on 7/5/2018 9:05 AM by Dr. Brown  MD Kol[place].       CT Abdomen Pelvis Stone Protocol [[id number]] Collected:  07/02/18 2157     Updated:  07/03/18 0101    Narrative:       EXAM:    CT Abdomen and Pelvis Without Intravenous Contrast    EXAM DATE/TIME:    7/2/2018 9:57 PM    CLINICAL HISTORY:    72 years old, female; Generalized abd pain, nausea, vomiting.    TECHNIQUE:    Axial computed tomography images of the abdomen and pelvis without   intravenous contrast.  All CT scans at this facility use at least one of these   dose optimization techniques: automated exposure control; mA and/or kV   adjustment per patient size (includes targeted exams where dose is matched to   clinical indication); or iterative reconstruction.    Coronal reformatted images were created and reviewed.    COMPARISON:    CT ABDOMEN PELVIS WO CONTRAST 2017-10-27 11:24    FINDINGS:    Prior near-total colectomy with a right periumbilical ileostomy.  There is no   bowel inflammation, obstruction, free intraperitoneal air, or ascites.  Stable   mild presacral stranding.      Stable cardiomegaly.  Patchy bibasilar atelectasis.  Left basilar bronchial   wall thickening.        Trace pericholecystic free fluid without biliary dilatation, gallbladder   dilatation, or visualized gallstone.      Right adrenal mass has slightly increased in size since the prior exam,   currently measuring up to 3.7 cm where it previously measured up to 3.2 cm.        Bilateral nonobstructing intrarenal calculi, measuring up to 1 cm on the   left.  Bilateral simple renal cysts, measuring up to 2.9 cm on the left.  No   ureteral or bladder calculus.  No hydronephrosis.      Pancreatic atrophy.  The unenhanced liver, spleen, and urinary bladder are   normal.  Prior hysterectomy.      Diffuse atherosclerosis without abdominal aortic aneurysm or retroperitoneal   hemorrhage.  Chronic degenerative changes throughout the spine and at both hips.      Impression:         Trace pericholecystic fluid without  layering dilatation or stone.  If there   is right upper quadrant pain, ultrasound recommended.      Right adrenal mass has slightly increased in size since the prior exam.  This   can be followed up with non-emergent washout CT or MRI.      Left basilar bronchitis.      Bilateral nephrolithiasis without urinary obstruction.      THIS DOCUMENT HAS BEEN ELECTRONICALLY SIGNED BY EREDNIRA KATE MD                    Results for orders placed during the hospital encounter of 01/31/18   Adult Transesophageal Echo (YANNA) W/ Cont if Necessary Per Protocol    Narrative · Left ventricular systolic function is normal.  · Left ventricular wall thickness is consistent with moderate concentric   hypertrophy.  · Left atrial cavity size is dilated.  · Cardiac valves are morphologically within normal limits for patient's   age.            Discharge Details        Discharge Medications      New Medications      Instructions Start Date   diltiaZEM 60 MG tablet  Commonly known as:  CARDIZEM   60 mg, Oral, Every 6 Hours Scheduled      insulin detemir 100 UNIT/ML injection  Commonly known as:  LEVEMIR   12 Units, Subcutaneous, Nightly      metoprolol tartrate 50 MG tablet  Commonly known as:  LOPRESSOR   50 mg, Oral, Every 12 Hours Scheduled         Changes to Medications      Instructions Start Date   acetaminophen 325 MG tablet  Commonly known as:  TYLENOL  What changed:  · medication strength  · how much to take   650 mg, Oral, Every 6 Hours PRN      Dimethicone 1.5 % cream  What changed:  Another medication with the same name was removed. Continue taking this medication, and follow the directions you see here.   1 application, Apply externally, As Needed      insulin lispro 100 UNIT/ML injection  Commonly known as:  humaLOG  What changed:  · how much to take  · when to take this  · additional instructions   7 Units, Subcutaneous, 3 Times Daily With Meals      insulin lispro 100 UNIT/ML injection  Commonly known as:  humaLOG  What  changed:  You were already taking a medication with the same name, and this prescription was added. Make sure you understand how and when to take each.   0-14 Units, Subcutaneous, 4 Times Daily With Meals & Nightly         Continue These Medications      Instructions Start Date   ammonium lactate 12 % lotion  Commonly known as:  LAC-HYDRIN   1 application, Topical, 2 Times Daily PRN      apixaban 5 MG tablet tablet  Commonly known as:  ELIQUIS   5 mg, Oral, Every 12 Hours Scheduled      aspirin 81 MG chewable tablet   81 mg, Oral, Daily      atorvastatin 10 MG tablet  Commonly known as:  LIPITOR   10 mg, Oral, Nightly      DULERA 100-5 MCG/ACT inhaler  Generic drug:  mometasone-formoterol   2 puffs, Inhalation, 2 Times Daily - RT      escitalopram 10 MG tablet  Commonly known as:  LEXAPRO   10 mg, Oral, Daily      famotidine 20 MG tablet  Commonly known as:  PEPCID   20 mg, Oral, Daily      haloperidol 1 MG tablet  Commonly known as:  HALDOL   1 mg, Oral, 2 Times Daily      magnesium hydroxide 400 MG/5ML suspension  Commonly known as:  MILK OF MAGNESIA   30 mL, Oral, Daily PRN      Melatonin 3 MG tablet   3 mg, Oral, Nightly      ondansetron 4 MG tablet  Commonly known as:  ZOFRAN   4 mg, Oral, Every 8 Hours PRN      TEARS NATURALE OP   1 application, Ophthalmic, 2 Times Daily         Stop These Medications    bacitracin-polymyxin b ointment ophthalmic ointment     Benzalkonium Chloride 0.1 % liquid     diltiaZEM  MG 24 hr capsule  Commonly known as:  CARDIZEM CD     levoFLOXacin 500 MG tablet  Commonly known as:  LEVAQUIN     metoprolol succinate XL 50 MG 24 hr tablet  Commonly known as:  TOPROL-XL     REMEDY CALAZIME EX              Discharge Disposition:  Rehab Facility or Unit (DC - External)    Discharge Diet:  Diet Instructions     Diet: Regular; Thin       Discharge Diet:  Regular    Fluid Consistency:  Thin    Nutrition Supplements Boost Glucose Control          Discharge Activity:   Activity  Instructions     Activity as Tolerated               Code Status/Level of Support:  Code Status and Medical Interventions:   Ordered at: 07/02/18 2152     Code Status:    CPR     Medical Interventions (Level of Support Prior to Arrest):    Full       No future appointments.    Additional Instructions for the Follow-ups that You Need to Schedule     Discharge Follow-up with PCP    As directed      Currently Documented PCP:  Ciaran Wakefield MD  PCP Phone Number:  [phone]    Follow Up Details:  1 week         Discharge Follow-up with Specified Provider: urology; 2 Weeks    As directed      To:  urology    Follow Up:  2 Weeks    Follow Up Details:  has seen magalis in past but he does not take her insurance please find urologist that does and make appointment for patient               Time Spent on Discharge:  40 minutes    Electronically signed by DANIEL Suarez, 07/13/18, 10:27 AM.

## 2018-07-13 NOTE — NURSING NOTE
Report called to Stephany at [facility]. D/C summary faxed. EMS scheduled to transport pt at 12.

## 2018-11-28 PROBLEM — R41.82 ALTERED MENTAL STATUS: Status: ACTIVE | Noted: 2018-01-01

## 2018-11-28 PROBLEM — I47.20 VENTRICULAR TACHYARRHYTHMIA (HCC): Status: ACTIVE | Noted: 2018-01-01

## 2018-12-01 PROBLEM — K11.21 ACUTE PAROTITIS: Status: ACTIVE | Noted: 2018-01-01

## 2018-12-01 PROBLEM — A41.02 SEPSIS DUE TO METHICILLIN RESISTANT STAPHYLOCOCCUS AUREUS (MRSA) (HCC): Status: ACTIVE | Noted: 2018-01-01

## 2018-12-01 NOTE — PROGRESS NOTES
"Pharmacy Consult-Vancomycin Dosing  Leila Smith is a  73 y.o. female receiving vancomycin therapy.     Indication: sepsis/UTI  Consulting Provider: intensivist  ID Consult: no    Goal Trough: 15    Current Antimicrobial Therapy  Vancomycin (Day 4)  Zosyn (Day 4)      Allergies  Allergies as of 11/28/2018 - Reviewed 11/28/2018   Allergen Reaction Noted   • Codeine  08/03/2016   • Tetracyclines & related  07/15/2016     Labs    Results from last 7 days   Lab Units  12/01/18   0403  11/30/18   0440  11/29/18   0427   BUN mg/dL  56*  85*  144*   CREATININE mg/dL  1.53*  2.02*  3.05*     Results from last 7 days   Lab Units  12/01/18   0403  11/30/18   0440  11/29/18   0427   WBC 10*3/mm3  28.75*  33.37*  38.40*     Evaluation of Dosing     Last Dose Received in the ED/Outside Facility: 1750 mg IV x 1 on 11/28 @ 1410    Ht - 162.6 cm (64\")  Wt - 65.8 kg (145 lb 1 oz)    Estimated Creatinine Clearance: 29.6 mL/min (A) (by C-G formula based on SCr of 1.53 mg/dL (H)).    Intake & Output (last 3 days)         11/28 0701 - 11/29 0700 11/29 0701 - 11/30 0700 11/30 0701 - 12/01 0700 12/01 0701 - 12/02 0700    I.V. (mL/kg) 3489.4 (53) 4568.3 (69.4) 1756.8 (26.7)     Other 120 152 292     NG/GT 60 656 1504     IV Piggyback 2101.5 300 275.2     Total Intake(mL/kg) 5770.9 (87.7) 5676.3 (86.3) 3828 (58.2)     Urine (mL/kg/hr) 1350 3275 (2.1) 2340 (1.5)     Stool 285 100 200     Total Output 1635 3375 2540     Net +4135.9 +2301.3 +1288             Urine Unmeasured Occurrence 2 x             Microbiology and Radiology  Microbiology Results (last 10 days)       Procedure Component Value - Date/Time    Wound Culture - Drainage, Ear, Right [[id number]] Collected:  11/30/18 1647    Lab Status:  Preliminary result Specimen:  Drainage from Ear, Right Updated:  12/01/18 0611     Wound Culture No growth at less than 24 hours     Gram Stain No WBCs or organisms seen    Blood Culture - Blood, Hand, Left [[id number]] Collected:  11/30/18 1005    " Lab Status:  Preliminary result Specimen:  Blood from Hand, Left Updated:  11/30/18 2231     Blood Culture No growth at less than 24 hours    Blood Culture - Blood, Hand, Right [[id number]] Collected:  11/30/18 1005    Lab Status:  Preliminary result Specimen:  Blood from Hand, Right Updated:  11/30/18 2231     Blood Culture No growth at less than 24 hours    Tissue / Bone Culture - Tissue, Leg, Right [[id number]] Collected:  11/29/18 1801    Lab Status:  Preliminary result Specimen:  Tissue from Leg, Right Updated:  11/30/18 0706     Tissue Culture No growth at less than 24 hours     Gram Stain Many (4+) Red blood cells      Many (4+) WBCs seen      No organisms seen    Blood Culture - Blood, Arm, Left [[id number]]  (Abnormal) Collected:  11/28/18 1353    Lab Status:  Preliminary result Specimen:  Blood from Arm, Left Updated:  11/30/18 1016     Blood Culture Staphylococcus aureus     Comment:   Consider infectious disease consult to rule out distant focus of infection.        Isolated from Aerobic and Anaerobic Bottles     Gram Stain Aerobic Bottle Gram positive cocci in groups      Anaerobic Bottle Gram positive cocci in groups    Narrative:       PRIOR POSITIVE CULTURE WITH SAME MORPHOLOGY CALLED  Refer Culture to #[id number]    Blood Culture - Blood, Arm, Left [[id number]]  (Abnormal) Collected:  11/28/18 1340    Lab Status:  Preliminary result Specimen:  Blood from Arm, Left Updated:  11/30/18 1014     Blood Culture Staphylococcus aureus     Comment:   Consider infectious disease consult to rule out distant focus of infection.        Isolated from Aerobic and Anaerobic Bottles     Gram Stain Aerobic Bottle Gram positive cocci in groups      Anaerobic Bottle Gram positive cocci in clusters    Blood Culture ID, PCR - Blood, Arm, Left [[id number]]  (Abnormal) Collected:  11/28/18 1340    Lab Status:  Final result Specimen:  Blood from Arm, Left Updated:  11/29/18 0925     BCID, PCR Staphylococcus aureus. mecA  (methicillin resistance gene) detected. Identification by BCID PCR.          Evaluation of Level  Results from last 7 days   Lab Units  11/30/18   0440  11/29/18   0427   VANCOMYCIN RM mcg/mL  22.80  13.60     ~14 hour level    Assessment/Plan:  1.  Continue current dose of vancomycin.  Renal function continues appropriate improvement with acceptable UOP.  2.  Planned vancomycin trough 12/3.  3.  Will order and follow BMP tomorrow to ensure continued renal improvement.  4.  Continue current dose of Zosyn.  5.  Pharmacy will continue to follow and adjust dose based on renal function, drug levels, and clinical status.    Thanks,  Eran Huang, PharmD, BCPS  #[id number]  12/01/18  7:54 AM

## 2018-12-01 NOTE — PLAN OF CARE
Problem: Fall Risk (Adult)  Goal: Identify Related Risk Factors and Signs and Symptoms  Outcome: Ongoing (interventions implemented as appropriate)    Goal: Absence of Fall  Outcome: Ongoing (interventions implemented as appropriate)      Problem: Skin Injury Risk (Adult)  Goal: Identify Related Risk Factors and Signs and Symptoms  Outcome: Ongoing (interventions implemented as appropriate)    Goal: Skin Health and Integrity  Outcome: Ongoing (interventions implemented as appropriate)      Problem: Patient Care Overview  Goal: Plan of Care Review  Outcome: Ongoing (interventions implemented as appropriate)   12/01/18 0655   Coping/Psychosocial   Plan of Care Reviewed With patient   Plan of Care Review   Progress improving   OTHER   Outcome Summary pt rested on and off through the night. more alert and will follow commands but still not oriented. no complaints of pain. tolerating TF at goal rate. 200 ml out of colostomy. afebrile, VSS.      Goal: Individualization and Mutuality  Outcome: Ongoing (interventions implemented as appropriate)    Goal: Discharge Needs Assessment  Outcome: Ongoing (interventions implemented as appropriate)

## 2018-12-01 NOTE — PLAN OF CARE
Problem: Fall Risk (Adult)  Goal: Identify Related Risk Factors and Signs and Symptoms  Outcome: Outcome(s) achieved Date Met: 12/01/18    Goal: Absence of Fall  Outcome: Ongoing (interventions implemented as appropriate)      Problem: Skin Injury Risk (Adult)  Goal: Identify Related Risk Factors and Signs and Symptoms  Outcome: Outcome(s) achieved Date Met: 12/01/18    Goal: Skin Health and Integrity  Outcome: Ongoing (interventions implemented as appropriate)      Problem: Patient Care Overview  Goal: Plan of Care Review  Outcome: Ongoing (interventions implemented as appropriate)   12/01/18 1500   Coping/Psychosocial   Plan of Care Reviewed With patient   Plan of Care Review   Progress improving   OTHER   Outcome Summary Patient more alert and more able to follow directions. Sat up in chair for approx 6 hours. Skin tear noted on left shin while sitting in chair. Dressed wound. Daughter aware. All electrolytes replaced as per pharmacy order. Tolerating tube feedings. Sensitivities back on blood cultures, pharmacist aware. Transfered to  with belongings, daughter aware of transfer.

## 2018-12-01 NOTE — PROGRESS NOTES
"INTENSIVIST NOTE     Hospital Day: 3      Ms. Leila Smith, 73 y.o. female is followed for:   Sepsis        SUBJECTIVE     Interval history:    Mental status improved to near baseline.  Renal function improved.  Afebrile.  Fluid balance +1.2 L.    The patient's relevant past medical, surgical and social history were reviewed and updated in Epic as appropriate.       OBJECTIVE     Vital Sign Min/Max for last 24 hours  Temp  Min: 98.6 °F (37 °C)  Max: 99.1 °F (37.3 °C)   BP  Min: 102/61  Max: 147/68   Pulse  Min: 65  Max: 90   Resp  Min: 16  Max: 22   SpO2  Min: 85 %  Max: 99 %   No Data Recorded   No Data Recorded      Intake/Output Summary (Last 24 hours) at 12/1/2018 1533  Last data filed at 12/1/2018 1516  Gross per 24 hour   Intake 3042.95 ml   Output 2130 ml   Net 912.95 ml      Flowsheet Rows      First Filed Value   Admission Height  162.6 cm (64\") Documented at 11/28/2018 1153   Admission Weight  90.7 kg (200 lb) Documented at 11/28/2018 1153             11/28/18  1600 11/28/18  1756 11/29/18  0600   Weight: 60 kg (132 lb 4.4 oz) 59.9 kg (132 lb) 65.8 kg (145 lb 1 oz)            Objective:  General Appearance:  In no acute distress.    Vital signs: (most recent): Blood pressure 125/56, pulse 70, temperature 98.8 °F (37.1 °C), temperature source Bladder, resp. rate 16, height 162.6 cm (64\"), weight 65.8 kg (145 lb 1 oz), SpO2 96 %.    HEENT: (Keofeed in place)    Lungs:  Normal effort and normal respiratory rate.  She is not in respiratory distress.  No rales, wheezes or rhonchi.    Heart: Normal rate.  Regular rhythm.  S1 normal and S2 normal.  No murmur, gallop or friction rub.   Chest: Symmetric chest wall expansion.   Abdomen: Abdomen is soft and non-distended.  (Right upper quadrant ostomy).  Bowel sounds are normal.   There is no abdominal tenderness.   There is no mass. There is no splenomegaly. There is no hepatomegaly.   Extremities: There is no deformity or dependent edema.    Neurological: (Awake " though slightly drowsy).    Pupils:  Pupils are equal, round, and reactive to light.    Skin:  Warm and dry.              I reviewed the patient's new clinical results.  I reviewed the patient's new imaging results/reports including actual images and agree with reports.      Chest X-Ray:  No additional    INFUSIONS    Pharmacy to dose vancomycin     sodium chloride 50 mL/hr Last Rate: 50 mL/hr (12/01/18 1204)       Results from last 7 days   Lab Units  12/01/18   0403  11/30/18 0440 11/29/18   0427   WBC 10*3/mm3  28.75*  33.37*  38.40*   HEMOGLOBIN g/dL  10.4*  11.2*  12.6   HEMATOCRIT %  34.9  37.1  41.1   PLATELETS 10*3/mm3  195  237  282     Results from last 7 days   Lab Units  12/01/18   0403  11/30/18   0440  11/29/18   0427   SODIUM mmol/L  141  146  151*   POTASSIUM mmol/L  3.1*  3.0*  4.3   CHLORIDE mmol/L  105  107  115*   CO2 mmol/L  31.0  26.0  20.0   BUN mg/dL  56*  85*  144*   GLUCOSE mg/dL  297*  329*  324*   CREATININE mg/dL  1.53*  2.02*  3.05*   MAGNESIUM mg/dL  1.8  1.6  1.6  2.1   CALCIUM mg/dL  7.8*  8.3*  8.8         Results from last 7 days   Lab Units  11/28/18   1222   PH, ARTERIAL pH units  7.395   PCO2, ARTERIAL mm Hg  25.3*   PO2 ART mm Hg  94.9   FIO2 %  21         Mech Ventilation:      I reviewed the patient's medications.    Assessment/Plan   ASSESSMENT      Active Hospital Problems    Diagnosis   • **Altered mental status   • Sepsis due to methicillin resistant Staphylococcus aureus (MRSA) (CMS/[facility], located within [facility])   • Acute parotitis   • Acute renal failure superimposed on stage 3 chronic kidney disease (CMS/[facility] located within [facility])   • Hyperkalemia   • Ventricular tachyarrhythmia (CMS/[facility] located [facility])   • Congestive heart failure (CMS/[facility] located [facility])   • Chronic obstructive pulmonary disease with acute exacerbation (CMS/[facility] located [facility])   • CAD (coronary artery disease)     History of  Coronary Artery Disease V12.59     • Diabetes mellitus, type 2 (CMS/[facility] located [facility])   • Hypertension   • Hypothyroidism   • Hyperlipidemia     72yo F nursing home resident  who is admitted with ventricular tachycardia and hyperkalemia as well as acute on chronic renal failure. Her potassium has been treated in the ED and she is now on an Amiodarone drip. She has not had any recurrent arrhythmias. She is noted to have a UTI on admission. Vancomycin and Zosyn have been started. She developed acute swelling of the right side of her face. A CT neck showed parotid gland enlargement with no definitive abscess. ENT saw her and felt this was most consistent with parotid sialadenitis likely due to dehydration. Blood cultures are positive for MRSA. Hematoma on right leg was drained by surgery on 11/29. Echocardiogram negative for vegetations.               PLAN     1. Improved enough for telemetry   2. Continue antibiotics.    3. Replace potassium   4. Tube feeds   5. Monitor renal function          I discussed the patient's findings and my recommendations with nursing staff     Plan of care and goals reviewed with multidisciplinary team at daily rounds.    Brian Samson MD  Pulmonary and Critical Care Medicine  12/01/18 3:33 PM

## 2018-12-01 NOTE — PROGRESS NOTES
"   LOS: 3 days    Patient Care Team:  Ciaran Wakefield MD as PCP - General (Internal Medicine)    Chief Complaint:  AMS    Subjective     Interval History:   No acute events overnight. No new complaints. ALert    Review of Systems:    The following systems were reviewed and negative;  constitution, respiratory, cardiovascular and neurological    Objective     Vital Sign Min/Max for last 24 hours  Temp  Min: 98.6 °F (37 °C)  Max: 99.1 °F (37.3 °C)   BP  Min: 116/54  Max: 150/80   Pulse  Min: 73  Max: 91   Resp  Min: 16  Max: 22   SpO2  Min: 88 %  Max: 99 %   No Data Recorded   No Data Recorded     Flowsheet Rows      First Filed Value   Admission Height  162.6 cm (64\") Documented at 11/28/2018 1153   Admission Weight  90.7 kg (200 lb) Documented at 11/28/2018 1153          No intake/output data recorded.  I/O last 3 completed shifts:  In: 6483.1 [I.V.:3925.9; Other:434; NG/GT:1798; IV Piggyback:325.2]  Out: 4765 [Urine:4465; Stool:300]    Physical Exam:     General Appearance:    ALert, in no acute distress   Head:    Normocephalic, without obvious abnormality, atraumatic               Neck:   No adenopathy, supple, trachea midline, no thyromegaly, no     carotid bruit, no JVD       Lungs:     Clear to auscultation,respirations regular, even and                   unlabored    Heart:    Regular rhythm and normal rate, normal S1 and S2, no            murmur, no gallop, no rub, no click   Breast Exam:    Deferred   Abdomen:     Normal bowel sounds, no masses, no organomegaly, soft        non-tender, non-distended, no guarding, no rebound                 tenderness       Extremities:  No edema, no cyanosis, no redness               Neurologic:  Alert. No focal deficit noted.        WBC WBC   Date Value Ref Range Status   12/01/2018 28.75 (H) 3.50 - 10.80 10*3/mm3 Final   11/30/2018 33.37 (H) 3.50 - 10.80 10*3/mm3 Final   11/29/2018 38.40 (H) 3.50 - 10.80 10*3/mm3 Final   11/28/2018 40.71 (H) 3.50 - 10.80 10*3/mm3 " Final      HGB Hemoglobin   Date Value Ref Range Status   12/01/2018 10.4 (L) 11.5 - 15.5 g/dL Final   11/30/2018 11.2 (L) 11.5 - 15.5 g/dL Final   11/29/2018 12.6 11.5 - 15.5 g/dL Final   11/28/2018 13.9 12.0 - 17.0 g/dL Final     Comment:     Serial Number: [id number]:  [id number]   11/28/2018 14.8 11.5 - 15.5 g/dL Final   11/28/2018 16.3 12.0 - 17.0 g/dL Final     Comment:     Serial Number: [id number]:  [id number]      HCT Hematocrit   Date Value Ref Range Status   12/01/2018 34.9 34.5 - 44.0 % Final   11/30/2018 37.1 34.5 - 44.0 % Final   11/29/2018 41.1 34.5 - 44.0 % Final   11/28/2018 41 38 - 51 % Final   11/28/2018 49.3 (H) 34.5 - 44.0 % Final     Comment:     **Tech Alert**Hgb_Hct Correlation Failure. Repeat Test   11/28/2018 48 38 - 51 % Final      Platlets No results found for: LABPLAT   MCV MCV   Date Value Ref Range Status   12/01/2018 89.3 80.0 - 99.0 fL Final   11/30/2018 88.1 80.0 - 99.0 fL Final   11/29/2018 89.0 80.0 - 99.0 fL Final   11/28/2018 92.7 80.0 - 99.0 fL Final          Sodium Sodium   Date Value Ref Range Status   12/01/2018 141 132 - 146 mmol/L Final   11/30/2018 146 132 - 146 mmol/L Final   11/29/2018 151 (H) 132 - 146 mmol/L Final   11/28/2018 150 (H) 132 - 146 mmol/L Final   11/28/2018 146 132 - 146 mmol/L Final      Potassium Potassium   Date Value Ref Range Status   12/01/2018 3.1 (L) 3.5 - 5.5 mmol/L Final   11/30/2018 3.0 (L) 3.5 - 5.5 mmol/L Final     Comment:     Verified by repeat analysis.    11/29/2018 4.3 3.5 - 5.5 mmol/L Final   11/28/2018 5.5 3.5 - 5.5 mmol/L Final   11/28/2018 6.2 (C) 3.5 - 5.5 mmol/L Final   11/28/2018 7.3 (C) 3.5 - 5.5 mmol/L Final      Chloride Chloride   Date Value Ref Range Status   12/01/2018 105 99 - 109 mmol/L Final   11/30/2018 107 99 - 109 mmol/L Final   11/29/2018 115 (H) 99 - 109 mmol/L Final   11/28/2018 119 (H) 99 - 109 mmol/L Final   11/28/2018 123 (H) 99 - 109 mmol/L Final      CO2 CO2   Date Value Ref Range Status   12/01/2018 31.0 20.0  - 31.0 mmol/L Final   11/30/2018 26.0 20.0 - 31.0 mmol/L Final   11/29/2018 20.0 20.0 - 31.0 mmol/L Final   11/28/2018 16.0 (L) 20.0 - 31.0 mmol/L Final   11/28/2018 12.0 (L) 20.0 - 31.0 mmol/L Final      BUN BUN   Date Value Ref Range Status   12/01/2018 56 (H) 9 - 23 mg/dL Final   11/30/2018 85 (H) 9 - 23 mg/dL Final   11/29/2018 144 (H) 9 - 23 mg/dL Final   11/28/2018 175 (H) 9 - 23 mg/dL Final   11/28/2018 149 (H) 9 - 23 mg/dL Final      Creatinine Creatinine   Date Value Ref Range Status   12/01/2018 1.53 (H) 0.60 - 1.30 mg/dL Final   11/30/2018 2.02 (H) 0.60 - 1.30 mg/dL Final   11/29/2018 3.05 (H) 0.60 - 1.30 mg/dL Final   11/28/2018 3.82 (H) 0.60 - 1.30 mg/dL Final   11/28/2018 4.10 (H) 0.60 - 1.30 mg/dL Final   11/28/2018 4.27 (H) 0.60 - 1.30 mg/dL Final   11/28/2018 4.80 (H) 0.60 - 1.30 mg/dL Final      Calcium Calcium   Date Value Ref Range Status   12/01/2018 7.8 (L) 8.7 - 10.4 mg/dL Final   11/30/2018 8.3 (L) 8.7 - 10.4 mg/dL Final   11/29/2018 8.8 8.7 - 10.4 mg/dL Final   11/28/2018 9.2 8.7 - 10.4 mg/dL Final   11/28/2018 9.5 8.7 - 10.4 mg/dL Final      PO4 No results found for: CAPO4   Albumin Albumin   Date Value Ref Range Status   11/30/2018 2.70 (L) 3.20 - 4.80 g/dL Final   11/28/2018 3.17 (L) 3.20 - 4.80 g/dL Final      Magnesium Magnesium   Date Value Ref Range Status   12/01/2018 1.8 1.3 - 2.7 mg/dL Final   11/30/2018 1.6 1.3 - 2.7 mg/dL Final   11/30/2018 1.6 1.3 - 2.7 mg/dL Final   11/29/2018 2.1 1.3 - 2.7 mg/dL Final   11/28/2018 2.7 1.3 - 2.7 mg/dL Final      Uric Acid No results found for: URICACID        Results Review:     I reviewed the patient's new clinical results.      [START ON 12/3/2018] Pharmacy Consult  Does not apply Once   amiodarone 200 mg Oral Q24H   aspirin 81 mg Oral Daily   castor oil-balsam peru  Topical Q12H   diltiaZEM 60 mg Oral Q6H   insulin regular 0-14 Units Subcutaneous Q6H   insulin regular 5 Units Subcutaneous Q6H   metoprolol tartrate 50 mg Oral Q12H    piperacillin-tazobactam 3.375 g Intravenous Q12H   sodium chloride 3 mL Intravenous Q12H   thiamine 100 mg Oral Daily   vancomycin 1,000 mg Intravenous Q24H       Pharmacy to dose vancomycin     sodium chloride 60 mL/hr Last Rate: 60 mL/hr (12/01/18 0443)       Medication Review:     Assessment/Plan       Altered mental status    Diabetes mellitus, type 2 (CMS/HCC)    Hypertension    Hyperlipidemia    Hypothyroidism    CAD (coronary artery disease)    Chronic obstructive pulmonary disease with acute exacerbation (CMS/HCC)    Congestive heart failure (CMS/HCC)    Acute renal failure superimposed on stage 3 chronic kidney disease (CMS/HCC)    Hyperkalemia    Ventricular tachyarrhythmia (CMS/HCC)    1- ANGELA -non oliguric. Pre-renal azotemia secondary to Volume depletion - Improving  2- Metabolic acidosis -resolved  3- Hyperkalemia secondary to metabolic acidosis and ANGELA - resolved.   4- Hypernatremia - resolved.   5- Anemia - stable.   6- AMS   7- CKD stage III - Baseline Scr 1.2-1.4 range. Almost at baseline.     Plan:  - Replete electrolytes as needed.   - Monitor I/O   - Avoid nephrotoxic agents.   - Continue with current treatment.     Dieter Birch MD  12/01/18  8:36 AM

## 2018-12-01 NOTE — PLAN OF CARE
Problem: Patient Care Overview  Goal: Plan of Care Review  Outcome: Ongoing (interventions implemented as appropriate)   12/01/18 7306   Coping/Psychosocial   Plan of Care Reviewed With patient   OTHER   Outcome Summary Patient not compliant upon transfer. Refusing blood glucose checks, educated patient on importance of monitoring, still refuses. Tube feeding continues to infuse, tolerating well. VSS. Will continue to monitor.

## 2018-12-02 NOTE — PLAN OF CARE
Problem: Patient Care Overview  Goal: Plan of Care Review  Outcome: Ongoing (interventions implemented as appropriate)    Goal: Individualization and Mutuality  Outcome: Ongoing (interventions implemented as appropriate)    Goal: Discharge Needs Assessment  Outcome: Ongoing (interventions implemented as appropriate)    Goal: Interprofessional Rounds/Family Conf  Outcome: Ongoing (interventions implemented as appropriate)      Problem: Confusion, Acute (Adult)  Goal: Identify Related Risk Factors and Signs and Symptoms  Outcome: Ongoing (interventions implemented as appropriate)    Goal: Cognitive/Functional Impairments Minimized  Outcome: Ongoing (interventions implemented as appropriate)    Goal: Safety  Outcome: Ongoing (interventions implemented as appropriate)

## 2018-12-02 NOTE — PLAN OF CARE
Problem: Fall Risk (Adult)  Goal: Absence of Fall  Outcome: Ongoing (interventions implemented as appropriate)   12/02/18 0459   Fall Risk (Adult)   Absence of Fall making progress toward outcome       Problem: Skin Injury Risk (Adult)  Goal: Skin Health and Integrity  Outcome: Ongoing (interventions implemented as appropriate)   12/02/18 0459   Skin Injury Risk (Adult)   Skin Health and Integrity making progress toward outcome       Problem: Patient Care Overview  Goal: Plan of Care Review  Outcome: Ongoing (interventions implemented as appropriate)   12/02/18 0459   Coping/Psychosocial   Plan of Care Reviewed With patient   Plan of Care Review   Progress no change       Problem: Confusion, Acute (Adult)  Goal: Identify Related Risk Factors and Signs and Symptoms  Outcome: Ongoing (interventions implemented as appropriate)   12/02/18 0459   Confusion, Acute (Adult)   Related Risk Factors (Acute Confusion) advanced age;dehydration;metabolic abnormalities   Signs and Symptoms (Acute Confusion) disorientation;acute onset of symptoms;emotional/behavioral disturbances     Goal: Cognitive/Functional Impairments Minimized  Outcome: Ongoing (interventions implemented as appropriate)   12/02/18 0459   Confusion, Acute (Adult)   Cognitive/Functional Impairments Minimized making progress toward outcome     Goal: Safety  Outcome: Ongoing (interventions implemented as appropriate)   12/02/18 0459   Confusion, Acute (Adult)   Safety achieves outcome

## 2018-12-02 NOTE — CONSULTS
INFECTIOUS DISEASE CONSULT/INITIAL HOSPITAL VISIT    Leila Smith  1945  [id number]    Date of Consult: 12/2/2018    Admission Date: 11/28/2018      Requesting Provider: No ref. provider found  Evaluating Physician: Olivier Gtz MD    Reason for Consultation: MRSA bacteremia    History of present illness:    Patient is a 73 y.o. female presents from [facility] with ventricular tachycardia and hyperkalemia from  acute on chronic renal failure;  PAtient admitted to ICU and has been worked up for parotitis, leg abscess and has been found to have MRSA bacteremia.. Blood cultures are positive for MRSA. Hematoma on right leg was drained by surgery on 11/29. 2-dimensional Echocardiogram negative for vegetations.     Patient is obtunded and is a poor historian no family by bedside.  Opens eyes during exam but is nonverbal.        Past Medical History:   Diagnosis Date   • Atrial fibrillation (CMS/HCC)    • Chronic ulcer of right leg (CMS/HCC)    • Cognitive communication deficit    • COPD (chronic obstructive pulmonary disease) (CMS/HCC)    • Diabetes mellitus (CMS/HCC)    • Difficulty in walking    • Encephalopathy    • Generalized muscle weakness    • Hematuria    • Hyperlipidemia    • Hypertension    • Hypothyroidism    • Urinary tract infection        Past Surgical History:   Procedure Laterality Date   • CATARACT EXTRACTION     • CHOLECYSTECTOMY     • COLOSTOMY     • FOOT SURGERY Right    • HERNIA REPAIR     • HYSTERECTOMY     • TOTAL KNEE ARTHROPLASTY Right        Family History   Problem Relation Age of Onset   • Stomach cancer Mother    • Alcohol abuse Other    • Cancer Other    • Diabetes Other    • Hypertension Other    • Other Other        Social History     Socioeconomic History   • Marital status:      Spouse name: Not on file   • Number of children: Not on file   • Years of education: Not on file   • Highest education level: Not on file   Social Needs   • Financial resource  strain: Not on file   • Food insecurity - worry: Not on file   • Food insecurity - inability: Not on file   • Transportation needs - medical: Not on file   • Transportation needs - non-medical: Not on file   Occupational History   • Not on file   Tobacco Use   • Smoking status: Former Smoker     Packs/day: 0.00     Years: 50.00     Pack years: 0.00     Types: Cigarettes   • Smokeless tobacco: Never Used   Substance and Sexual Activity   • Alcohol use: No   • Drug use: No   • Sexual activity: Defer   Other Topics Concern   • Not on file   Social History Narrative   • Not on file       Allergies   Allergen Reactions   • Codeine    • Tetracyclines & Related          Medication:    Current Facility-Administered Medications:   •  [START ON 12/3/2018] ! vancomycin trough due Monday, 12/3 @ 1130. hold 1200 dose if trough > 22, , Does not apply, Once, Aysha Rocha, [facility]  •  amiodarone (PACERONE) tablet 200 mg, 200 mg, Oral, Q24H, Balwinder Marquez III, MD, 200 mg at 12/02/18 0919  •  aspirin EC tablet 81 mg, 81 mg, Oral, Daily, Jory Macedo, APRN, 81 mg at 12/02/18 0919  •  castor oil-balsam peru (VENELEX) ointment, , Topical, Q12H, Julien Carcamo APRN  •  dextrose (D50W) 25 g/ 50mL Intravenous Solution 25 g, 25 g, Intravenous, Q15 Min PRN, Julien Carcamo APRN  •  dextrose (GLUTOSE) oral gel 15 g, 15 g, Oral, Q15 Min PRN, Julien Carcamo APRN  •  diltiaZEM (CARDIZEM) tablet 60 mg, 60 mg, Oral, Q6H, Balwinder Marquez III, MD, 60 mg at 12/02/18 0543  •  glucagon (human recombinant) (GLUCAGEN DIAGNOSTIC) injection 1 mg, 1 mg, Subcutaneous, PRN, Julien Carcamo APRN  •  hydrALAZINE (APRESOLINE) tablet 10 mg, 10 mg, Oral, Q8H PRN, Case, Jasmina V., DO  •  insulin regular (humuLIN R,novoLIN R) injection 0-14 Units, 0-14 Units, Subcutaneous, Q6H, Case, Jasmina V., DO, 10 Units at 12/02/18 0543  •  insulin regular (humuLIN R,novoLIN R) injection 5 Units, 5 Units, Subcutaneous, Q6H, Case, Jasmina VLaura, DO, 5  Units at 12/02/18 0543  •  metoprolol tartrate (LOPRESSOR) tablet 50 mg, 50 mg, Oral, Q12H, Balwinder Marquez III, MD, 50 mg at 12/02/18 0919  •  Pharmacy to dose vancomycin, , Does not apply, Continuous PRN, Case, Jasmina V., DO  •  piperacillin-tazobactam (ZOSYN) 3.375 g in iso-osmotic dextrose 50 ml (premix), 3.375 g, Intravenous, Q8H, Case, Jasmina V., DO, 3.375 g at 12/02/18 0919  •  sodium chloride 0.45 % infusion, 50 mL/hr, Intravenous, Continuous, Eyal, Dieter Angeles MD, Last Rate: 50 mL/hr at 12/01/18 1204, 50 mL/hr at 12/01/18 1204  •  sodium chloride 0.9 % flush 10 mL, 10 mL, Intravenous, PRN, Rohan Ceballos MD  •  sodium chloride 0.9 % flush 3 mL, 3 mL, Intravenous, Q12H, Julien Carcamo APRN, 3 mL at 12/02/18 0920  •  sodium chloride 0.9 % flush 3-10 mL, 3-10 mL, Intravenous, PRN, Julien Carcamo APRN  •  thiamine (VITAMIN B-1) tablet 100 mg, 100 mg, Oral, Daily, Case, Jasmina V., DO, 100 mg at 12/02/18 0919  •  vancomycin (VANCOCIN) in iso-osmotic dextrose IVPB 1 g (premix) 200 mL, 1,000 mg, Intravenous, Q24H, Aysha Rocha [facility], 1,000 mg at 12/01/18 1402    Antibiotics:  Anti-Infectives (From admission, onward)    Ordered     Dose/Rate Route Frequency Start Stop    12/01/18 0942  piperacillin-tazobactam (ZOSYN) 3.375 g in iso-osmotic dextrose 50 ml (premix)     Ordering Provider:  Case, Jasmina V., DO    3.375 g  over 4 Hours Intravenous Every 8 Hours 12/01/18 1600 12/05/18 1559    11/30/18 1055  vancomycin (VANCOCIN) in iso-osmotic dextrose IVPB 1 g (premix) 200 mL     Ordering Provider:  Aj, Aysha L, RPH    1,000 mg  over 60 Minutes Intravenous Every 24 Hours 11/30/18 1200 12/12/18 1159    11/29/18 1128  vancomycin (VANCOCIN) in iso-osmotic dextrose IVPB 1 g (premix) 200 mL     Ordering Provider:  Aysha Rocha RPH    1,000 mg  over 60 Minutes Intravenous Once 11/29/18 1400 11/29/18 1448    11/28/18 1522  piperacillin-tazobactam (ZOSYN) 3.375 g in iso-osmotic dextrose 50  ml (premix)     Eran Huang [facility] reviewed the order on 18 0942.   Ordering Provider:  Case, Jasmina V., DO    3.375 g  over 4 Hours Intravenous Every 12 Hours 18 2200 18 1200    18 1521  Pharmacy to dose vancomycin     Ordering Provider:  Case, Jasmina V., DO     Does not apply Continuous PRN 18 1519 18 1518    18 1332  vancomycin 1750 mg/500 mL 0.9% NS IVPB ([facility])     Ordering Provider:  Rohan Ceballos MD    20 mg/kg × 90.7 kg  over 120 Minutes Intravenous Once 18 1334 18 1642    18 1332  piperacillin-tazobactam (ZOSYN) 3.375 g in iso-osmotic dextrose 50 ml (premix)     Ordering Provider:  Rohan Ceballos MD    3.375 g  over 30 Minutes Intravenous Once 18 1334 18 1720            Review of Systems:  unobtainable      Physical Exam:   Vital Signs  Temp (24hrs), Av.8 °F (37.1 °C), Min:98.5 °F (36.9 °C), Max:99.4 °F (37.4 °C)    Temp  Min: 98.5 °F (36.9 °C)  Max: 99.4 °F (37.4 °C)  BP  Min: 102/61  Max: 152/68  Pulse  Min: 64  Max: 74  Resp  Min: 16  Max: 20  SpO2  Min: 85 %  Max: 96 %    GENERAL: resting quietly opens eyes to exam  HEENT: Normocephalic, atraumatic.  PERRL. EOMI. No conjunctival injection. No icterus. Oropharynx clear without evidence of thrush or exudate. No evidence of peridontal disease.  No ext oral lesions    HEART: RRR; systolic murmur loudest at base left  LUNGS: Clear to auscultation bilaterally .  ABDOMEN: Soft, nontender, nondistended. Positive bowel sounds.   EXT:  No cyanosis, clubbing or edema. No cord.    MSK: FROM without joint effusions noted arms/legs.    SKIN: keloid like scarring on toes, fore feet bilaterally  Bilateral shin wounds, left side appears gto have been recently incised    NEURO: nonverbal; no spontaneous movement      Laboratory Data    Results from last 7 days   Lab Units  18   0546  18   0403  18   0440   WBC 10*3/mm3  21.88*  28.75*  33.37*   HEMOGLOBIN g/dL  10.0*   10.4*  11.2*   HEMATOCRIT %  34.2*  34.9  37.1   PLATELETS 10*3/mm3  171  195  237     Results from last 7 days   Lab Units  12/02/18   0546   SODIUM mmol/L  146   POTASSIUM mmol/L  3.5   CHLORIDE mmol/L  109   CO2 mmol/L  28.0   BUN mg/dL  43*   CREATININE mg/dL  1.31*   GLUCOSE mg/dL  311*   CALCIUM mg/dL  7.3*     Results from last 7 days   Lab Units  11/30/18   0440   ALK PHOS U/L  114*   BILIRUBIN mg/dL  0.6   ALT (SGPT) U/L  16   AST (SGOT) U/L  14             Results from last 7 days   Lab Units  11/28/18   1750   LACTATE mmol/L  2.8*         Results from last 7 days   Lab Units  11/30/18   0440  11/29/18   0427   VANCOMYCIN RM mcg/mL  22.80  13.60     Estimated Creatinine Clearance: 37.3 mL/min (A) (by C-G formula based on SCr of 1.31 mg/dL (H)).      Microbiology:  Blood Culture   Date Value Ref Range Status   11/30/2018 No growth at 24 hours  Preliminary   11/30/2018 No growth at 24 hours  Preliminary     BCID, PCR   Date Value Ref Range Status   11/28/2018 (C) No organism detected by BCID PCR. Final    Staphylococcus aureus. mecA (methicillin resistance gene) detected. Identification by BCID PCR.                    Wound Culture   Date Value Ref Range Status   11/30/2018 Culture in progress  Preliminary           Radiology:  Imaging Results (last 72 hours)     Procedure Component Value Units Date/Time    CT Soft Tissue Neck Without Contrast [[id number]] Collected:  11/28/18 1503     Updated:  12/01/18 1053    Narrative:       EXAMINATION: CT NECK SOFT TISSUE WO CONTRAST - 11/28/2018      INDICATION: Neck swelling. Evaluate for abscess.      TECHNIQUE: Unenhanced 3 mm axial images through the neck with sagittal  and coronal reconstructions The radiation dose reduction device was  turned on for each scan per the ALARA (As Low as Reasonably Achievable)  protocol.     COMPARISON: None.     FINDINGS: Patient history indicates rapidly enlarging right neck mass.  The mass in question appears to be a markedly  enlarged right parotid  gland, somewhat denser than the much smaller left parotid gland,  presumably due to edema, but otherwise with typical internal  architecture and no visible underlying abscess. There is no evidence of  abscess elsewhere. There is diffuse subcutaneous edema of the right  neck. The adjacent right masseter appears only slightly larger than the  left, and is probably mildly secondarily inflamed. The pterygoids, other  deep musculature, and the deep neck intermuscular fatty tissue shows no  evidence of inflammation. Submandibular glands are normal in size.  Prevertebral soft tissues and parapharyngeal soft tissues appear normal.  No parotid calculus is identified. No other inflammatory process is  identified elsewhere.       Impression:       Markedly enlarged right parotid gland and associated  inflammation presumably acute parotitis. No visible abscess.     DICTATED:   11/28/2018  EDITED/ls :   11/28/2018         This report was finalized on 12/1/2018 10:51 AM by DR. Brian Cosme MD.               Impression:   MRSA bactertemia  Left leg abscess  Parotitis right side  Encephalopathy  COPD  Acute renal failure on chronic      PLAN/RECOMMENDATIONS:   Thank you for asking us to see Leila Smith, I recommend the following:      Estimated Creatinine Clearance: 37.3 mL/min (A) (by C-G formula based on SCr of 1.31 mg/dL (H)).    Source of MRSA bacteremia could be from the parotid gland; no abscess seen on initial imaging;  Will be interesting to see if leg culures grow MRSA; a leg abscess can spontaneously develop and usually doesn't lead to positive blood cultures.    REgardless; high grade bacteremia is still concerning for endovascular involvement.    YANNA probably required.    Given pulmonary involvement probably not active concern would change abx:    D/c vancomycin  Start daptomycin 8 mg/kg iv now/daily unless creatinine clearance decreases below 30 mL/min  Warm compresses to parotid gland  YANNA  F/u  wound cultures    Patient is complexly ill           Olivier Gtz MD  12/2/2018  10:22 AM

## 2018-12-02 NOTE — PROGRESS NOTES
"   LOS: 4 days    Patient Care Team:  Ciaran Wakefield MD as PCP - General (Internal Medicine)    Chief Complaint:  AMS    Subjective     Interval History:   No acute events overnight. No new complaints. ALert    Review of Systems:    The following systems were reviewed and negative;  constitution, respiratory, cardiovascular and neurological    Objective     Vital Sign Min/Max for last 24 hours  Temp  Min: 98.5 °F (36.9 °C)  Max: 99.4 °F (37.4 °C)   BP  Min: 102/61  Max: 152/68   Pulse  Min: 64  Max: 74   Resp  Min: 16  Max: 20   SpO2  Min: 85 %  Max: 96 %   No Data Recorded   Weight  Min: 77 kg (169 lb 12.1 oz)  Max: 77 kg (169 lb 12.1 oz)     Flowsheet Rows      First Filed Value   Admission Height  162.6 cm (64\") Documented at 11/28/2018 1153   Admission Weight  90.7 kg (200 lb) Documented at 11/28/2018 1153          No intake/output data recorded.  I/O last 3 completed shifts:  In: 2534.2 [I.V.:1042.2; Other:261; NG/GT:1048; IV Piggyback:183]  Out: 3255 [Urine:2705; Stool:550]    Physical Exam:     General Appearance:    ALert, in no acute distress   Head:    Normocephalic, without obvious abnormality, atraumatic               Neck:   No adenopathy, supple, trachea midline, no thyromegaly, no     carotid bruit, no JVD       Lungs:     Clear to auscultation,respirations regular, even and                   unlabored    Heart:    Regular rhythm and normal rate, normal S1 and S2, no            murmur, no gallop, no rub, no click   Breast Exam:    Deferred   Abdomen:     Normal bowel sounds, no masses, no organomegaly, soft        non-tender, non-distended, no guarding, no rebound                 tenderness       Extremities:  No edema, no cyanosis, no redness               Neurologic:  Alert. No focal deficit noted.        WBC WBC   Date Value Ref Range Status   12/02/2018 21.88 (H) 3.50 - 10.80 10*3/mm3 Final   12/01/2018 28.75 (H) 3.50 - 10.80 10*3/mm3 Final   11/30/2018 33.37 (H) 3.50 - 10.80 10*3/mm3 Final "      HGB Hemoglobin   Date Value Ref Range Status   12/02/2018 10.0 (L) 11.5 - 15.5 g/dL Final   12/01/2018 10.4 (L) 11.5 - 15.5 g/dL Final   11/30/2018 11.2 (L) 11.5 - 15.5 g/dL Final      HCT Hematocrit   Date Value Ref Range Status   12/02/2018 34.2 (L) 34.5 - 44.0 % Final   12/01/2018 34.9 34.5 - 44.0 % Final   11/30/2018 37.1 34.5 - 44.0 % Final      Platlets No results found for: LABPLAT   MCV MCV   Date Value Ref Range Status   12/02/2018 90.2 80.0 - 99.0 fL Final   12/01/2018 89.3 80.0 - 99.0 fL Final   11/30/2018 88.1 80.0 - 99.0 fL Final          Sodium Sodium   Date Value Ref Range Status   12/02/2018 146 132 - 146 mmol/L Final   12/01/2018 141 132 - 146 mmol/L Final   11/30/2018 146 132 - 146 mmol/L Final      Potassium Potassium   Date Value Ref Range Status   12/02/2018 3.5 3.5 - 5.5 mmol/L Final   12/01/2018 4.1 3.5 - 5.5 mmol/L Final   12/01/2018 3.1 (L) 3.5 - 5.5 mmol/L Final   11/30/2018 3.0 (L) 3.5 - 5.5 mmol/L Final     Comment:     Verified by repeat analysis.       Chloride Chloride   Date Value Ref Range Status   12/02/2018 109 99 - 109 mmol/L Final   12/01/2018 105 99 - 109 mmol/L Final   11/30/2018 107 99 - 109 mmol/L Final      CO2 CO2   Date Value Ref Range Status   12/02/2018 28.0 20.0 - 31.0 mmol/L Final   12/01/2018 31.0 20.0 - 31.0 mmol/L Final   11/30/2018 26.0 20.0 - 31.0 mmol/L Final      BUN BUN   Date Value Ref Range Status   12/02/2018 43 (H) 9 - 23 mg/dL Final   12/01/2018 56 (H) 9 - 23 mg/dL Final   11/30/2018 85 (H) 9 - 23 mg/dL Final      Creatinine Creatinine   Date Value Ref Range Status   12/02/2018 1.31 (H) 0.60 - 1.30 mg/dL Final   12/01/2018 1.53 (H) 0.60 - 1.30 mg/dL Final   11/30/2018 2.02 (H) 0.60 - 1.30 mg/dL Final      Calcium Calcium   Date Value Ref Range Status   12/02/2018 7.3 (L) 8.7 - 10.4 mg/dL Final   12/01/2018 7.8 (L) 8.7 - 10.4 mg/dL Final   11/30/2018 8.3 (L) 8.7 - 10.4 mg/dL Final      PO4 No results found for: CAPO4   Albumin Albumin   Date Value Ref  Range Status   11/30/2018 2.70 (L) 3.20 - 4.80 g/dL Final      Magnesium Magnesium   Date Value Ref Range Status   12/02/2018 2.0 1.3 - 2.7 mg/dL Final   12/01/2018 1.8 1.3 - 2.7 mg/dL Final   11/30/2018 1.6 1.3 - 2.7 mg/dL Final   11/30/2018 1.6 1.3 - 2.7 mg/dL Final      Uric Acid No results found for: URICACID        Results Review:     I reviewed the patient's new clinical results.      [START ON 12/3/2018] Pharmacy Consult  Does not apply Once   amiodarone 200 mg Oral Q24H   aspirin 81 mg Oral Daily   castor oil-balsam peru  Topical Q12H   diltiaZEM 60 mg Oral Q6H   insulin regular 0-14 Units Subcutaneous Q6H   insulin regular 5 Units Subcutaneous Q6H   metoprolol tartrate 50 mg Oral Q12H   piperacillin-tazobactam 3.375 g Intravenous Q8H   sodium chloride 3 mL Intravenous Q12H   thiamine 100 mg Oral Daily   vancomycin 1,000 mg Intravenous Q24H       Pharmacy to dose vancomycin     sodium chloride 50 mL/hr Last Rate: 50 mL/hr (12/01/18 1204)       Medication Review:     Assessment/Plan       Altered mental status    Diabetes mellitus, type 2 (CMS/[facility])    Hypertension    Hyperlipidemia    Hypothyroidism    CAD (coronary artery disease)    Chronic obstructive pulmonary disease with acute exacerbation (CMS/[facility])    Congestive heart failure (CMS/[facility])    Acute renal failure superimposed on stage 3 chronic kidney disease (CMS/[facility])    Hyperkalemia    Ventricular tachyarrhythmia (CMS/[facility])    Sepsis due to methicillin resistant Staphylococcus aureus (MRSA) (CMS/[facility])    Acute parotitis    1- ANGELA -non oliguric. Pre-renal azotemia secondary to Volume depletion - Improving  2- Metabolic acidosis -resolved  3- Hyperkalemia secondary to metabolic acidosis and ANGELA - resolved.   4- Hypernatremia - resolved.   5- Anemia - stable.   6- AMS   7- CKD stage III - Baseline Scr 1.2-1.4 range. Almost at baseline.   8- HTn- not controlled.     Plan:  - Continue with current treatment. Will add amlodipine for BP     Dieter Birch,  MD  12/02/18  10:07 AM

## 2018-12-02 NOTE — NURSING NOTE
Called and spoke to DANIEL SERRA on call for ICU and reported midnight blood sugar of 425. Called per protocol. Ordered to give the 14units from sliding scale in addition to already scheduled 5 basal units of Humulin-R. Will give insulin and then re-check in an hour.   -Mabel Guzman RN

## 2018-12-02 NOTE — PROGRESS NOTES
[facility] Medicine Services  PROGRESS NOTE    Patient Name: Leila Smith  : 1945  MRN: [id number]    Date of Admission: 2018  Length of Stay: 4  Primary Care Physician: Ciaran Wakefield MD    Subjective   Subjective     CC:  MRSA bacteremia,     HPI:  Patient is nonverbal but does moan during points of the exam. No family at bedside. Nursing reports no active issues.    Review of Systems      Otherwise ROS is negative except as mentioned in the HPI.    Objective   Objective     Vital Signs:   Temp:  [98.5 °F (36.9 °C)-99.4 °F (37.4 °C)] 98.5 °F (36.9 °C)  Heart Rate:  [64-74] 73  Resp:  [16-20] 16  BP: (102-152)/(53-68) 152/68        Physical Exam:  GEN- chronically ill appearing , opens eyes to exam   HEENT- atraumatic, normocephlic, eomi, NGT in place  NECK- supple, trachea midline, no masses  RESP: ctab, normal effort  CV: no murmurs, s1/s2, rrr  MSK: no edema noted, spontaneous movement of all extremities  NEURO: alert, oriented, no focal deficits  SKIN: scarring on feet with some scaling, shin wounds also present some with open drainage   PSYCH: appropriate mood and affect       Results Reviewed:  I have personally reviewed current lab, radiology, and data and agree.    Results from last 7 days   Lab Units  18   0546  18   0440   WBC 10*3/mm3  21.88*  28.75*  33.37*   HEMOGLOBIN g/dL  10.0*  10.4*  11.2*   HEMATOCRIT %  34.2*  34.9  37.1   PLATELETS 10*3/mm3  171  195  237     Results from last 7 days   Lab Units  18   0546  18   2153  18   0403  18   0440   18   1238   SODIUM mmol/L  146   --   141  146   < >  146   POTASSIUM mmol/L  3.5  4.1  3.1*  3.0*   < >  7.3*   CHLORIDE mmol/L  109   --   105  107   < >  123*   CO2 mmol/L  28.0   --   31.0  26.0   < >  12.0*   BUN mg/dL  43*   --   56*  85*   < >  149*   CREATININE mg/dL  1.31*   --   1.53*  2.02*   < >  4.27*   GLUCOSE mg/dL  311*   --   297*  329*    < >  384*   CALCIUM mg/dL  7.3*   --   7.8*  8.3*   < >  9.5   ALT (SGPT) U/L   --    --    --   16   --   21   AST (SGOT) U/L   --    --    --   14   --   18    < > = values in this interval not displayed.     Estimated Creatinine Clearance: 37.3 mL/min (A) (by C-G formula based on SCr of 1.31 mg/dL (H)).  No results found for: BNP    Microbiology Results Abnormal     Procedure Component Value - Date/Time    Blood Culture - Blood, Hand, Left [[id number]] Collected:  11/30/18 1005    Lab Status:  Preliminary result Specimen:  Blood from Hand, Left Updated:  12/02/18 1030     Blood Culture No growth at 2 days    Blood Culture - Blood, Hand, Right [[id number]] Collected:  11/30/18 1005    Lab Status:  Preliminary result Specimen:  Blood from Hand, Right Updated:  12/02/18 1030     Blood Culture No growth at 2 days    Tissue / Bone Culture - Tissue, Leg, Right [[id number]] Collected:  11/29/18 1801    Lab Status:  Preliminary result Specimen:  Tissue from Leg, Right Updated:  12/02/18 0912     Tissue Culture No growth at 3 days     Gram Stain Many (4+) Red blood cells      Many (4+) WBCs seen      No organisms seen    Wound Culture - Drainage, Ear, Right [[id number]] Collected:  11/30/18 1647    Lab Status:  Preliminary result Specimen:  Drainage from Ear, Right Updated:  12/02/18 0811     Wound Culture Culture in progress     Gram Stain No WBCs or organisms seen    Blood Culture - Blood, Arm, Left [[id number]]  (Abnormal) Collected:  11/28/18 1353    Lab Status:  Final result Specimen:  Blood from Arm, Left Updated:  12/01/18 0823     Blood Culture Staphylococcus aureus, MRSA     Comment:   Consider infectious disease consult to rule out distant focus of infection.  Methicillin resistant Staphylococcus aureus, Patient may be an isolation risk.        Isolated from Aerobic and Anaerobic Bottles     Gram Stain Aerobic Bottle Gram positive cocci in groups      Anaerobic Bottle Gram positive cocci in groups    Narrative:        PRIOR POSITIVE CULTURE WITH SAME MORPHOLOGY CALLED  Refer Culture to #[id number]    Blood Culture - Blood, Arm, Left [[id number]]  (Abnormal)  (Susceptibility) Collected:  11/28/18 1340    Lab Status:  Final result Specimen:  Blood from Arm, Left Updated:  12/01/18 0822     Blood Culture Staphylococcus aureus, MRSA     Comment:   Consider infectious disease consult to rule out distant focus of infection.  Methicillin resistant Staphylococcus aureus, Patient may be an isolation risk.        Isolated from Aerobic and Anaerobic Bottles     Gram Stain Aerobic Bottle Gram positive cocci in groups      Anaerobic Bottle Gram positive cocci in clusters    Susceptibility      Staphylococcus aureus, MRSA     DIMITRIS     Ciprofloxacin Resistant     Clindamycin Susceptible     Daptomycin Susceptible     Erythromycin Susceptible     Gentamicin Susceptible     Levofloxacin Intermediate [1]      Linezolid Susceptible     Oxacillin Resistant     Penicillin G Resistant     Quinupristin + Dalfopristin Susceptible     Rifampin Susceptible     Tetracycline Susceptible     Trimethoprim + Sulfamethoxazole Susceptible     Vancomycin Susceptible            [1]   Staphylococcus species may develop resistance during prolonged therapy with quinolones.  Isolates that are initially susceptible may become resistant within three to four days after initiation of therapy. Testing of repeat isolates may be warranted.                 Blood Culture ID, PCR - Blood, Arm, Left [[id number]]  (Abnormal) Collected:  11/28/18 1340    Lab Status:  Final result Specimen:  Blood from Arm, Left Updated:  11/29/18 0925     BCID, PCR Staphylococcus aureus. mecA (methicillin resistance gene) detected. Identification by BCID PCR.          Imaging Results (last 24 hours)     ** No results found for the last 24 hours. **        Results for orders placed during the hospital encounter of 11/28/18   Adult Transthoracic Echo Complete W/ Cont if Necessary Per Protocol     Narrative · Left ventricular systolic function is normal. Estimated EF = 55%.  · Left ventricular wall thickness is consistent with moderate-to-severe   concentric hypertrophy.  · The left ventricular cavity is small.  · Left ventricular diastolic dysfunction (grade I a) consistent with   impaired relaxation.  · Left atrial cavity size is mildly dilated.  · There is moderate calcification of the aortic valve.  · Mild to moderate aortic valve regurgitation is present.  · Mild pulmonic valve regurgitation is present.  · Mild tricuspid valve regurgitation is present.  · Mild dilation of the ascending aorta is present.          I have reviewed the medications.      amiodarone 200 mg Oral Q24H   aspirin 81 mg Oral Daily   castor oil-balsam peru  Topical Q12H   DAPTOmycin 8 mg/kg (Adjusted) Intravenous Q24H   diltiaZEM 60 mg Oral Q6H   insulin regular 0-14 Units Subcutaneous Q6H   insulin regular 5 Units Subcutaneous Q6H   metoprolol tartrate 50 mg Oral Q12H   sodium chloride 3 mL Intravenous Q12H   thiamine 100 mg Oral Daily         Assessment/Plan   Assessment / Plan     Active Hospital Problems    Diagnosis   • **Altered mental status   • Sepsis due to methicillin resistant Staphylococcus aureus (MRSA) (CMS/[facility])   • Acute parotitis   • Ventricular tachyarrhythmia (CMS/HCC)   • Acute renal failure superimposed on stage 3 chronic kidney disease (CMS/HCC)   • Hyperkalemia   • Congestive heart failure (CMS/HCC)   • Chronic obstructive pulmonary disease with acute exacerbation (CMS/HCC)   • CAD (coronary artery disease)     History of  Coronary Artery Disease V12.59     • Diabetes mellitus, type 2 (CMS/HCC)   • Hypertension   • Hypothyroidism   • Hyperlipidemia          Brief Hospital Course to date:  Leila Smith is a 73 y.o. female nursing home resident who was admitted to the ICU with Vtach/hyperkalemia, also found to have acute on chronic renal failure. Noted to have UTI on admission as well as MRSA bacteremia. Also  treated with parotid sialadenitis evaluated by ENT, right leg hematoma evaluated and drained by surgery 11/29.       Assessment & Plan:    MRSA bacteremia, sepsis---- significant leukocytosis remains but is improving, no longer febrile. unclear source. ID consulted and Dr. Gtz following today. Continue IV abx per them. Patient has multiple sources of infection this admission including UTI, parotid sialdenitis, leg hematoma. Per ID, a leg abscess can spontaneously develop but usually doesn't lead to + blood cultures. Wound cultures from leg pending. Repeat blood cultures NGTD, continue to follow. Has had negative TTE for cardiac involvement, per ID may require YANNA.     ANGELA---nephrology following. Feel likely pre-renal secondary to dehydration. Improving. Baseline kidney function 1.2-1.4, monitor daily BMP.    Vtach/hyperkalemia---cardiology following. On oral amiodarone and have now restarted home metoprolol, dilt. Anticoagulation on hold at this time. Monitor closely. Hyperkalemia now normalized.    DM2, uncontrolled---blood sugars not controlled last 24h. Will increase regular insulin to 8units q6h with TF. Monitor closely.     Encephalopathy, acute on chronic----per previous notes and documentation, patient is confused at baseline, though usually more interactive than current status.         DVT Prophylaxis:  heparin    CODE STATUS:   Code Status and Medical Interventions:   Ordered at: 11/28/18 1331     Code Status:    CPR     Medical Interventions (Level of Support Prior to Arrest):    Full       Disposition: I expect the patient to be discharged back to SNF pending improvement and final abx plans.      Electronically signed by Swetha Haines MD, 12/02/18, 10:55 AM.

## 2018-12-03 NOTE — PROGRESS NOTES
[facility] Medicine Services  PROGRESS NOTE    Patient Name: Leila Smith  : 1945  MRN: [id number]    Date of Admission: 2018  Length of Stay: 5  Primary Care Physician: Ciaran Wakefield MD    Subjective   Subjective     CC:  F/U multiple issues/MRSA bacteremia    HPI:  No acute events reported from O/N. Pt was sleeping, but easily arousable, she is nonverbal, but nods and shakes head to yes/no questions. No family at bedside. Afebrile.    Review of Systems  Unobtainable    Objective   Objective     Vital Signs:   Temp:  [97.9 °F (36.6 °C)-98.6 °F (37 °C)] 98 °F (36.7 °C)  Heart Rate:  [57-66] 63  Resp:  [16] 16  BP: (126-169)/(52-82) 169/82        Physical Exam:  GEN- chronically ill appearing  HEENT- atraumatic, normocephlic, Right face neck is edematous and tender to palpation with mild skin induration, but no obvious underlying fluctuance was appreciable on my exam, NGT in place  NECK- supple, trachea midline  RESP: CTAB, normal effort, resp non-labored  CV: RRR, no murmurs, s1/s2 normal  MSK: no LE edema noted  NEURO: alert, nonverbal, face grossly symmetric, moves all ext  SKIN: Thick black/hyperplastic scales/carring on feet, bilat shin wounds packed (appears clean), + mild drainage   PSYCH: Affect is flat    Results Reviewed:  I have personally reviewed current lab, radiology, and data and agree.    Results from last 7 days   Lab Units  18   0652  18   0546  18   0403   WBC 10*3/mm3  17.14*  21.88*  28.75*   HEMOGLOBIN g/dL  9.8*  10.0*  10.4*   HEMATOCRIT %  33.6*  34.2*  34.9   PLATELETS 10*3/mm3  179  171  195     Results from last 7 days   Lab Units  18   0652  18   0546  18   2153  18   0403  18   0440   18   1238   SODIUM mmol/L  143  146   --   141  146   < >  146   POTASSIUM mmol/L  3.3*  3.5  4.1  3.1*  3.0*   < >  7.3*   CHLORIDE mmol/L  109  109   --   105  107   < >  123*   CO2 mmol/L  28.0  28.0    --   31.0  26.0   < >  12.0*   BUN mg/dL  33*  43*   --   56*  85*   < >  149*   CREATININE mg/dL  1.10  1.31*   --   1.53*  2.02*   < >  4.27*   GLUCOSE mg/dL  214*  311*   --   297*  329*   < >  384*   CALCIUM mg/dL  7.6*  7.3*   --   7.8*  8.3*   < >  9.5   ALT (SGPT) U/L  15   --    --    --   16   --   21   AST (SGOT) U/L  16   --    --    --   14   --   18    < > = values in this interval not displayed.     Estimated Creatinine Clearance: 44.4 mL/min (by C-G formula based on SCr of 1.1 mg/dL).  No results found for: BNP    Microbiology Results Abnormal     Procedure Component Value - Date/Time    Blood Culture - Blood, Hand, Left [[id number]] Collected:  11/30/18 1005    Lab Status:  Preliminary result Specimen:  Blood from Hand, Left Updated:  12/03/18 1030     Blood Culture No growth at 3 days    Blood Culture - Blood, Hand, Right [[id number]] Collected:  11/30/18 1005    Lab Status:  Preliminary result Specimen:  Blood from Hand, Right Updated:  12/03/18 1030     Blood Culture No growth at 3 days    Wound Culture - Drainage, Ear, Right [[id number]]  (Abnormal) Collected:  11/30/18 1647    Lab Status:  Final result Specimen:  Drainage from Ear, Right Updated:  12/03/18 0852     Wound Culture Scant growth (1+) Staphylococcus, coagulase negative     Comment: Susceptibility will not be done unless Doctor notifies Lab            Gram Stain No WBCs or organisms seen    Tissue / Bone Culture - Tissue, Leg, Right [[id number]] Collected:  11/29/18 1801    Lab Status:  Final result Specimen:  Tissue from Leg, Right Updated:  12/03/18 0618     Tissue Culture No growth at 4 days     Gram Stain Many (4+) Red blood cells      Many (4+) WBCs seen      No organisms seen    Blood Culture - Blood, Arm, Left [[id number]]  (Abnormal) Collected:  11/28/18 1353    Lab Status:  Final result Specimen:  Blood from Arm, Left Updated:  12/01/18 0823     Blood Culture Staphylococcus aureus, MRSA     Comment:   Consider infectious disease  consult to rule out distant focus of infection.  Methicillin resistant Staphylococcus aureus, Patient may be an isolation risk.        Isolated from Aerobic and Anaerobic Bottles     Gram Stain Aerobic Bottle Gram positive cocci in groups      Anaerobic Bottle Gram positive cocci in groups    Narrative:       PRIOR POSITIVE CULTURE WITH SAME MORPHOLOGY CALLED  Refer Culture to #[id number]    Blood Culture - Blood, Arm, Left [[id number]]  (Abnormal)  (Susceptibility) Collected:  11/28/18 1340    Lab Status:  Final result Specimen:  Blood from Arm, Left Updated:  12/01/18 0822     Blood Culture Staphylococcus aureus, MRSA     Comment:   Consider infectious disease consult to rule out distant focus of infection.  Methicillin resistant Staphylococcus aureus, Patient may be an isolation risk.        Isolated from Aerobic and Anaerobic Bottles     Gram Stain Aerobic Bottle Gram positive cocci in groups      Anaerobic Bottle Gram positive cocci in clusters    Susceptibility      Staphylococcus aureus, MRSA     DIMITRIS     Ciprofloxacin Resistant     Clindamycin Susceptible     Daptomycin Susceptible     Erythromycin Susceptible     Gentamicin Susceptible     Levofloxacin Intermediate [1]      Linezolid Susceptible     Oxacillin Resistant     Penicillin G Resistant     Quinupristin + Dalfopristin Susceptible     Rifampin Susceptible     Tetracycline Susceptible     Trimethoprim + Sulfamethoxazole Susceptible     Vancomycin Susceptible            [1]   Staphylococcus species may develop resistance during prolonged therapy with quinolones.  Isolates that are initially susceptible may become resistant within three to four days after initiation of therapy. Testing of repeat isolates may be warranted.                 Blood Culture ID, PCR - Blood, Arm, Left [[id number]]  (Abnormal) Collected:  11/28/18 1340    Lab Status:  Final result Specimen:  Blood from Arm, Left Updated:  11/29/18 0925     BCID, PCR Staphylococcus aureus. mecA  (methicillin resistance gene) detected. Identification by BCID PCR.          Imaging Results (last 24 hours)     ** No results found for the last 24 hours. **        Results for orders placed during the hospital encounter of 11/28/18   Adult Transthoracic Echo Complete W/ Cont if Necessary Per Protocol    Narrative · Left ventricular systolic function is normal. Estimated EF = 55%.  · Left ventricular wall thickness is consistent with moderate-to-severe   concentric hypertrophy.  · The left ventricular cavity is small.  · Left ventricular diastolic dysfunction (grade I a) consistent with   impaired relaxation.  · Left atrial cavity size is mildly dilated.  · There is moderate calcification of the aortic valve.  · Mild to moderate aortic valve regurgitation is present.  · Mild pulmonic valve regurgitation is present.  · Mild tricuspid valve regurgitation is present.  · Mild dilation of the ascending aorta is present.          I have reviewed the medications.      amiodarone 200 mg Oral Q24H   [START ON 12/4/2018] amLODIPine 5 mg Oral Q24H   aspirin 81 mg Oral Daily   castor oil-balsam peru  Topical Q12H   DAPTOmycin 8 mg/kg (Adjusted) Intravenous Q24H   diltiaZEM 60 mg Oral Q6H   heparin (porcine) 5,000 Units Subcutaneous Q8H   insulin regular 0-14 Units Subcutaneous Q6H   insulin regular 8 Units Subcutaneous Q6H   metoprolol tartrate 50 mg Oral Q12H   sodium chloride 3 mL Intravenous Q12H   thiamine 100 mg Oral Daily         Assessment/Plan   Assessment / Plan     Active Hospital Problems    Diagnosis   • **Altered mental status   • Sepsis due to methicillin resistant Staphylococcus aureus (MRSA) (CMS/HCC)   • Acute parotitis   • Ventricular tachyarrhythmia (CMS/HCC)   • Acute renal failure superimposed on stage 3 chronic kidney disease (CMS/HCC)   • Hyperkalemia   • Congestive heart failure (CMS/HCC)   • Chronic obstructive pulmonary disease with acute exacerbation (CMS/HCC)   • CAD (coronary artery disease)      History of  Coronary Artery Disease V12.59     • Diabetes mellitus, type 2 (CMS/HCC)   • Hypertension   • Hypothyroidism   • Hyperlipidemia          Brief Hospital Course to date:  Leila Smith is a 73 y.o. female nursing home resident who was admitted to the ICU with Vtach/hyperkalemia, also found to have acute on chronic renal failure. Noted to have UTI on admission as well as MRSA bacteremia. Also treated with parotid sialadenitis evaluated by ENT, right leg hematoma evaluated and drained by surgery 11/29.       Assessment & Plan:    MRSA bacteremia, sepsis---- significant leukocytosis remains, but trending down, no longer febrile. Unclear source, multiple potential source including UTI, parotid sialdenitis, leg hematoma. Per ID, a leg abscess can spontaneously develop but usually doesn't lead to + blood cultures. Wound cultures from leg growing coag neg staph so far. Repeat blood cultures NGTD. TTE negative for cardiac involvement, but will be getting YANNA this afternoon. Abx per Dr Gtz.     ANGELA---nephrology following. Feel likely pre-renal secondary to dehydration. Improving and creatinine back to baseline (1.2-1.4), monitor daily BMP.    Vtach/hyperkalemia---s/p cardioverted at the scene with return to sinus rhythm. An I/O needle was placed and she converted back to VT, then reconverted to SR spontaneously. She was treated with insulin/gluscose and calcium gluconate for hyperkalemia. Cardiology following. On oral amiodarone and have now restarted home metoprolol, and Diltiazem. Anticoagulation on hold at this time, defer management to cardiology. Hyperkalemia now normalized. Monitor closely    DM2---previously uncontrolled, but A1C now down to 7.9%, was 10.2 in 1/2018. Currently still hyperglycemic, likely reactive. Will increase regular insulin to 10 units q6h with TF and add basal insulin as well. Monitor closely.     Parotiditis/sialadenitis---ENT following, cont with abx.     Encephalopathy, acute on  chronic----likely multifactorial, per previous notes and documentation, patient is confused at baseline, though usually more interactive than current status.     Dysphagia---on TF, ongoing ST eval and treatment as indicated    DVT Prophylaxis:  heparin    CODE STATUS:   Code Status and Medical Interventions:   Ordered at: 11/28/18 1331     Code Status:    CPR     Medical Interventions (Level of Support Prior to Arrest):    Full       Disposition: I expect the patient to be discharged back to SNF pending improvement and final abx plans.      Electronically signed by Shandra Oscar MD, 12/03/18, 1:28 PM.

## 2018-12-03 NOTE — PROGRESS NOTES
"Patient Name:  Leila Smith  YOB: 1945  [id number]    Surgery Progress Note    Date of visit: 12/3/2018    Subjective   Subjective: More alert, though still confused.         Objective     Objective:     /82 (BP Location: Right arm, Patient Position: Lying)   Pulse 63   Temp 98 °F (36.7 °C) (Axillary)   Resp 16   Ht 162.6 cm (64\")   Wt 77 kg (169 lb 12.1 oz)   SpO2 100%   BMI 29.14 kg/m²     Intake/Output Summary (Last 24 hours) at 12/3/2018 0701  Last data filed at 12/3/2018 0600  Gross per 24 hour   Intake 3233 ml   Output 975 ml   Net 2258 ml       CV:  Rhythm  regular and rate regular   L:  Rhonchi to auscultation bilaterally   Abd:  Bowel sounds positive , soft,   Ext:  RLE wound c/d/i with healthy granulation tissue    Labs that are back at this time have been reviewed.        Assessment/Plan     Assessment/ Plan:    RLE hematoma - Doing well after I and D. Continue dressing changes.  RTC with me in 4 weeks. OK to go to rehab from my perspective. Will sign off. Please call with questions.    Hospital Problem List     * (Principal) Altered mental status    Diabetes mellitus, type 2 (CMS/HCC)    Overview Deleted 7/2/2018  9:44 PM by Brian Samson MD                Hypertension        Hyperlipidemia        Hypothyroidism    CAD (coronary artery disease)    Overview Signed 7/15/2016 10:27 AM by Yue Royal     History of  Coronary Artery Disease V12.59             Chronic obstructive pulmonary disease with acute exacerbation (CMS/HCC)        Congestive heart failure (CMS/HCC)        Acute renal failure superimposed on stage 3 chronic kidney disease (CMS/HCC)        Hyperkalemia    Ventricular tachyarrhythmia (CMS/HCC)    Sepsis due to methicillin resistant Staphylococcus aureus (MRSA) (CMS/HCC)    Acute parotitis              Juan M Gimenez MD  12/3/2018  7:01 AM      "

## 2018-12-03 NOTE — PROGRESS NOTES
Continued Stay Note  [facility]     Patient Name: Leila Smith  MRN: [id number]  Today's Date: 12/3/2018    Admit Date: 11/28/2018    Discharge Plan     Row Name 12/03/18 1440       Plan    Plan  To [facility] when medically ready     Patient/Family in Agreement with Plan  yes    Plan Comments  Patient was discussed in rounds and was determined not to be medically ready to discharge to [facility] today - I have spoke with Ginger at [facility] however and she has 8 days remaining on her bed hold and can return once she is medically ready but will need a new pre-cert. PT and OT ordered today as well to help qualify patient for rehab - Notified PT office of new order and need for patient to be seen soon. CM following - emily [phone]          Discharge Codes    No documentation.       Expected Discharge Date and Time     Expected Discharge Date Expected Discharge Time    Dec 5, 2018             Emily Sarah RN

## 2018-12-03 NOTE — CONSULTS
"                  Clinical Nutrition     Nutrition Support Assessment  Reason for Visit:   Follow up, EN      Patient Name: Leila Smith  YOB: 1945  MRN: [id number]  Date of Encounter: 12/03/18 2:49 PM  Admission date: 11/28/2018      Nutrition Assessment   Assessment       Admission diagnosis     Altered mental status    Additional applicable diagnosis/conditions/procedures   Metabolic acidosis  Acute renal failure superimposed on stage 3 chronic kidney disease (CMS/HCC)-non oliguric   Hyperkalemia-improved at present   Skin: Per WOCN 11/29: Pt has blanchable area of shearing right gluteus      Applicable PMH:  Hypertension  Hyperlipidemia  Hypothyroidism  Diabetes mellitus, type 2 (CMS/HCC)  CAD (coronary artery disease)  Chronic obstructive pulmonary disease with acute exacerbation (CMS/HCC)  Congestive heart failure (CMS/HCC)   Ventricular tachyarrhythmia (CMS/HCC)  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  Pt non-verbal but can answer yes/no questions. Shakes head no when asked if she has any nausea or stomach discomfort. Appears to be tolerating EN well.       Anthropometrics     Height: 162.6 cm (64\")  Last filed wt: Weight: 77 kg (169 lb 12.1 oz) (12/03/18 0613)  Weight Method: Bed scale    BMI: BMI (Calculated): 24.9kg/m2   Normal: 18.5-24.9kg/m2    Ideal Body Weight (IBW) (kg): 55      Last 15 Recorded Weights     Weight Weight (kg) Weight (lbs) Weight Method   11/29/2018 65.8 kg 145 lb 1 oz Bed scale   11/28/2018 59.875 kg 132 lb -   11/28/2018 60 kg 132 lb 4.4 oz Bed scale   11/28/2018 90.719 kg 200 lb Estimated   7/13/2018 86.909 kg 191 lb 9.6 oz Bed scale   7/12/2018 86.9 kg 191 lb 9.3 oz Bed scale   7/11/2018 82.5 kg 181 lb 14.1 oz Bed scale   7/10/2018 81.3 kg 179 lb 3.7 oz Bed scale   7/9/2018 80.6 kg 177 lb 11.1 oz Bed scale   7/8/2018 79.4 kg 175 lb 0.7 oz Bed scale   7/6/2018 78.8 kg 173 lb 11.6 oz Bed scale "   7/5/2018 81 kg 178 lb 9.2 oz Bed scale   7/2/2018 80.74 kg 178 lb Stated   2/13/2018 74.078 kg 163 lb 5 oz -   2/12/2018 75.388 kg 166 lb 3.2 oz        Labs reviewed     Results from last 7 days   Lab Units  12/03/18   0652  12/02/18   0546  12/01/18   2153  12/01/18   0403  11/30/18   0440   11/28/18   1238   GLUCOSE mg/dL  214*  311*   --   297*  329*   < >  384*   BUN mg/dL  33*  43*   --   56*  85*   < >  149*   CREATININE mg/dL  1.10  1.31*   --   1.53*  2.02*   < >  4.27*   SODIUM mmol/L  143  146   --   141  146   < >  146   CHLORIDE mmol/L  109  109   --   105  107   < >  123*   POTASSIUM mmol/L  3.3*  3.5  4.1  3.1*  3.0*   < >  7.3*   PHOSPHORUS mg/dL  1.2*  2.4   --   1.1*  2.3*  2.3*   < >   --    MAGNESIUM mg/dL  1.7  2.0   --   1.8  1.6  1.6   < >  2.7   ALT (SGPT) U/L  15   --    --    --   16   --   21    < > = values in this interval not displayed.     Results from last 7 days   Lab Units  12/03/18   0652  11/30/18   0440  11/28/18   1238   ALBUMIN g/dL  2.79*  2.70*  3.17*   PREALBUMIN mg/dL  11.0   --    --    CRP mg/dL  7.84*   --    --    CHOLESTEROL mg/dL  113  91   --    TRIGLYCERIDES mg/dL  161*  137   --        Results from last 7 days   Lab Units  12/03/18   1138  12/03/18   0715  12/03/18   0553  12/02/18   2338  12/02/18   1758  12/02/18   1132   GLUCOSE mg/dL  168*  221*  235*  269*  220*  206*       Medications reviewed   Yes      Intake/Ouptut 24 hrs (7:00AM - 6:59 AM)     Intake & Output (last day)       12/02 0701 - 12/03 0700 12/03 0701 - 12/04 0700    I.V. (mL/kg) 1000 (13)     Other 430     NG/GT 1803     IV Piggyback      Total Intake(mL/kg) 3233 (42)     Urine (mL/kg/hr) 825 (0.4)     Stool 150     Total Output 975     Net +2258                 Needs Assessment       Height used 162.6cm    Weight used 65.8kg          Estimated need Method/Equation used Result    Energy/Calorie need   1400-1600kcals/d   MSJ X 1.2   1366kcals     20-25kcals/kg actbw  1316-1645kcals    Protein    66g/day   1g/kg actbw-given current renal status           Current Nutrition Prescription     PO: NPO Diet  NPO Diet NPO Except: Sips With Meds    Peptamen 1.5 @ 50mL/hr via NG. Free water 10 mL/hr    2 Day EN average delivery:  1082 mL (108%)  1623 kcal (108%)   73.6 gm Protein (108%)   250 mL water flush + 833 mL = 1083 mL water (112%)    Nutrition Diagnosis       Problem Excessive enteral nutrition infusion    Etiology Transfer from unit, delivery rate   Signs/Symptoms Pt receiving 108% of needs       Nutrition Intervention   1.  Follow treatment progress, Care plan reviewed, adjust EN rate          At Target Goal Volume     % Est needs   Volume  1000ml     Energy/kcals 1500kcals 100%   Protein 68g pro 103%   Fiber 0g         Fluid via EN 770mL    Total Fluid  970mL    Meets 100% RDI yes      1.Adjust EN rate to 45 mL/hour for 22 hour delivery on floor.     2. Nutrition panel, Prealb and CRP ordered weekly to start 12/2.      Goal:   General: Nutrition support treatment  EN/PN: Adjust EN, Deliver estimated needs      Monitoring/Evaluation:   Per protocol, I&O, EN delivery/tolerance, Weight  Will Continue to follow per protocol      Lynsey Small, RD  Time Spent: 45min

## 2018-12-03 NOTE — PLAN OF CARE
Problem: Fall Risk (Adult)  Goal: Absence of Fall  Outcome: Ongoing (interventions implemented as appropriate)   12/03/18 0423   Fall Risk (Adult)   Absence of Fall making progress toward outcome       Problem: Skin Injury Risk (Adult)  Goal: Skin Health and Integrity  Outcome: Ongoing (interventions implemented as appropriate)   12/03/18 0423   Skin Injury Risk (Adult)   Skin Health and Integrity making progress toward outcome       Problem: Patient Care Overview  Goal: Plan of Care Review  Outcome: Ongoing (interventions implemented as appropriate)   12/03/18 0423   Coping/Psychosocial   Plan of Care Reviewed With patient   Plan of Care Review   Progress no change

## 2018-12-03 NOTE — PROGRESS NOTES
"   LOS: 5 days    Patient Care Team:  Ciaran Wakefield MD as PCP - General (Internal Medicine)    Chief Complaint:  AMS    Subjective     Interval History:   No acute events overnight. No new complaints. ALert    Review of Systems:    The following systems were reviewed and negative;  constitution, respiratory, cardiovascular and neurological    Objective     Vital Sign Min/Max for last 24 hours  Temp  Min: 97.5 °F (36.4 °C)  Max: 98.6 °F (37 °C)   BP  Min: 126/52  Max: 169/82   Pulse  Min: 57  Max: 66   Resp  Min: 16  Max: 18   SpO2  Min: 100 %  Max: 100 %   No Data Recorded   Weight  Min: 77 kg (169 lb 12.1 oz)  Max: 77 kg (169 lb 12.1 oz)     Flowsheet Rows      First Filed Value   Admission Height  162.6 cm (64\") Documented at 11/28/2018 1153   Admission Weight  90.7 kg (200 lb) Documented at 11/28/2018 1153          No intake/output data recorded.  I/O last 3 completed shifts:  In: 3233 [I.V.:1000; Other:430; NG/GT:1803]  Out: 2325 [Urine:1875; Stool:450]    Physical Exam:     General Appearance:    ALert, in no acute distress   Head:    Normocephalic, without obvious abnormality, atraumatic               Neck:   No adenopathy, supple, trachea midline, no thyromegaly, no     carotid bruit, no JVD       Lungs:     Clear to auscultation,respirations regular, even and                   unlabored    Heart:    Regular rhythm and normal rate, normal S1 and S2, no            murmur, no gallop, no rub, no click   Breast Exam:    Deferred   Abdomen:     Normal bowel sounds, no masses, no organomegaly, soft        non-tender, non-distended, no guarding, no rebound                 tenderness       Extremities:  No edema, no cyanosis, no redness               Neurologic:  Alert. No focal deficit noted.        WBC WBC   Date Value Ref Range Status   12/03/2018 17.14 (H) 3.50 - 10.80 10*3/mm3 Final   12/02/2018 21.88 (H) 3.50 - 10.80 10*3/mm3 Final   12/01/2018 28.75 (H) 3.50 - 10.80 10*3/mm3 Final      HGB Hemoglobin "   Date Value Ref Range Status   12/03/2018 9.8 (L) 11.5 - 15.5 g/dL Final   12/02/2018 10.0 (L) 11.5 - 15.5 g/dL Final   12/01/2018 10.4 (L) 11.5 - 15.5 g/dL Final      HCT Hematocrit   Date Value Ref Range Status   12/03/2018 33.6 (L) 34.5 - 44.0 % Final   12/02/2018 34.2 (L) 34.5 - 44.0 % Final   12/01/2018 34.9 34.5 - 44.0 % Final      Platlets No results found for: LABPLAT   MCV MCV   Date Value Ref Range Status   12/03/2018 90.8 80.0 - 99.0 fL Final   12/02/2018 90.2 80.0 - 99.0 fL Final   12/01/2018 89.3 80.0 - 99.0 fL Final          Sodium Sodium   Date Value Ref Range Status   12/03/2018 143 132 - 146 mmol/L Final   12/02/2018 146 132 - 146 mmol/L Final   12/01/2018 141 132 - 146 mmol/L Final      Potassium Potassium   Date Value Ref Range Status   12/03/2018 3.3 (L) 3.5 - 5.5 mmol/L Final   12/02/2018 3.5 3.5 - 5.5 mmol/L Final   12/01/2018 4.1 3.5 - 5.5 mmol/L Final   12/01/2018 3.1 (L) 3.5 - 5.5 mmol/L Final      Chloride Chloride   Date Value Ref Range Status   12/03/2018 109 99 - 109 mmol/L Final   12/02/2018 109 99 - 109 mmol/L Final   12/01/2018 105 99 - 109 mmol/L Final      CO2 CO2   Date Value Ref Range Status   12/03/2018 28.0 20.0 - 31.0 mmol/L Final   12/02/2018 28.0 20.0 - 31.0 mmol/L Final   12/01/2018 31.0 20.0 - 31.0 mmol/L Final      BUN BUN   Date Value Ref Range Status   12/03/2018 33 (H) 9 - 23 mg/dL Final   12/02/2018 43 (H) 9 - 23 mg/dL Final   12/01/2018 56 (H) 9 - 23 mg/dL Final      Creatinine Creatinine   Date Value Ref Range Status   12/03/2018 1.10 0.60 - 1.30 mg/dL Final   12/02/2018 1.31 (H) 0.60 - 1.30 mg/dL Final   12/01/2018 1.53 (H) 0.60 - 1.30 mg/dL Final      Calcium Calcium   Date Value Ref Range Status   12/03/2018 7.6 (L) 8.7 - 10.4 mg/dL Final   12/02/2018 7.3 (L) 8.7 - 10.4 mg/dL Final   12/01/2018 7.8 (L) 8.7 - 10.4 mg/dL Final      PO4 No results found for: CAPO4   Albumin Albumin   Date Value Ref Range Status   12/03/2018 2.79 (L) 3.20 - 4.80 g/dL Final       Magnesium Magnesium   Date Value Ref Range Status   12/03/2018 1.7 1.3 - 2.7 mg/dL Final   12/02/2018 2.0 1.3 - 2.7 mg/dL Final   12/01/2018 1.8 1.3 - 2.7 mg/dL Final      Uric Acid No results found for: URICACID        Results Review:     I reviewed the patient's new clinical results.      amiodarone 200 mg Oral Q24H   amLODIPine 2.5 mg Oral Q24H   aspirin 81 mg Oral Daily   castor oil-balsam peru  Topical Q12H   DAPTOmycin 8 mg/kg (Adjusted) Intravenous Q24H   diltiaZEM 60 mg Oral Q6H   heparin (porcine) 5,000 Units Subcutaneous Q8H   insulin regular 0-14 Units Subcutaneous Q6H   insulin regular 8 Units Subcutaneous Q6H   metoprolol tartrate 50 mg Oral Q12H   sodium chloride 3 mL Intravenous Q12H   thiamine 100 mg Oral Daily       sodium chloride 75 mL/hr Last Rate: 75 mL/hr (12/03/18 0319)       Medication Review:     Assessment/Plan       Altered mental status    Diabetes mellitus, type 2 (CMS/[facility])    Hypertension    Hyperlipidemia    Hypothyroidism    CAD (coronary artery disease)    Chronic obstructive pulmonary disease with acute exacerbation (CMS/[facility])    Congestive heart failure (CMS/[facility])    Acute renal failure superimposed on stage 3 chronic kidney disease (CMS/[facility])    Hyperkalemia    Ventricular tachyarrhythmia (CMS/[facility])    Sepsis due to methicillin resistant Staphylococcus aureus (MRSA) (CMS/[facility])    Acute parotitis    1- ANGELA -non oliguric. Pre-renal azotemia secondary to Volume depletion - Improving  2- Metabolic acidosis -resolved  3- Hyperkalemia secondary to metabolic acidosis and ANGELA - resolved.   4- Hypernatremia - resolved.   5- Anemia - stable.   6- AMS   7- CKD stage III - Baseline Scr 1.2-1.4 range. Almost at baseline.   8- HTn- not controlled.     Plan:  - Will increase dose of amlodipine.   - Monitor I/O  - Replete electrolytes   - Encourage oral hydration.       Dieter Birch MD  12/03/18  11:14 AM

## 2018-12-03 NOTE — PROGRESS NOTES
INFECTIOUS DISEASE PROGRESS NOTE    Leila Smith  1945  [id number]    Date: 12/3/2018    Admission Date: 11/28/2018    CC: MRSA bacteremia    History of present illness:    Patient is a 73 y.o. female presents from [facility] with ventricular tachycardia and hyperkalemia from  acute on chronic renal failure;  PAtient admitted to ICU and has been worked up for parotitis, leg abscess and has been found to have MRSA bacteremia.. Blood cultures are positive for MRSA. Hematoma on right leg was drained by surgery on 11/29. 2-dimensional Echocardiogram negative for vegetations.     Patient is obtunded and is a poor historian no family by bedside.  Opens eyes during exam but is nonverbal.    12/3/18: Patient is a poor historian with no family at bedside.  She says yes to any question.  She is more alert today. She remains afebrile. Right parotid gland area with swelling and pain as she swatted my hand away when palpated.         Past Medical History:   Diagnosis Date   • Atrial fibrillation (CMS/HCC)    • Chronic ulcer of right leg (CMS/HCC)    • Cognitive communication deficit    • COPD (chronic obstructive pulmonary disease) (CMS/HCC)    • Diabetes mellitus (CMS/HCC)    • Difficulty in walking    • Encephalopathy    • Generalized muscle weakness    • Hematuria    • Hyperlipidemia    • Hypertension    • Hypothyroidism    • Urinary tract infection        Past Surgical History:   Procedure Laterality Date   • CATARACT EXTRACTION     • CHOLECYSTECTOMY     • COLOSTOMY     • FOOT SURGERY Right    • HERNIA REPAIR     • HYSTERECTOMY     • TOTAL KNEE ARTHROPLASTY Right        Family History   Problem Relation Age of Onset   • Stomach cancer Mother    • Alcohol abuse Other    • Cancer Other    • Diabetes Other    • Hypertension Other    • Other Other        Social History     Socioeconomic History   • Marital status:      Spouse name: Not on file   • Number of children: Not on file   • Years of education:  Not on file   • Highest education level: Not on file   Social Needs   • Financial resource strain: Not on file   • Food insecurity - worry: Not on file   • Food insecurity - inability: Not on file   • Transportation needs - medical: Not on file   • Transportation needs - non-medical: Not on file   Occupational History   • Not on file   Tobacco Use   • Smoking status: Former Smoker     Packs/day: 0.00     Years: 50.00     Pack years: 0.00     Types: Cigarettes   • Smokeless tobacco: Never Used   Substance and Sexual Activity   • Alcohol use: No   • Drug use: No   • Sexual activity: Defer   Other Topics Concern   • Not on file   Social History Narrative   • Not on file       Allergies   Allergen Reactions   • Codeine    • Tetracyclines & Related          Medication:    Current Facility-Administered Medications:   •  amiodarone (PACERONE) tablet 200 mg, 200 mg, Oral, Q24H, Balwinder Marquez III, MD, 200 mg at 12/02/18 0919  •  amLODIPine (NORVASC) tablet 2.5 mg, 2.5 mg, Oral, Q24H, Dieter Birch MD, 2.5 mg at 12/02/18 1511  •  aspirin EC tablet 81 mg, 81 mg, Oral, Daily, Jory Macedo, APRN, 81 mg at 12/02/18 0919  •  castor oil-balsam peru (VENELEX) ointment, , Topical, Q12H, Julien Carcamo APRN  •  DAPTOmycin (CUBICIN) 500 mg in sodium chloride 0.9 % 50 mL IVPB, 8 mg/kg (Adjusted), Intravenous, Q24H, Olivier Gtz MD, Last Rate: 100 mL/hr at 12/02/18 1256, 500 mg at 12/02/18 1256  •  dextrose (D50W) 25 g/ 50mL Intravenous Solution 25 g, 25 g, Intravenous, Q15 Min PRN, Julien Carcamo APRN  •  dextrose (GLUTOSE) oral gel 15 g, 15 g, Oral, Q15 Min PRN, Julien Carcamo APRN  •  diltiaZEM (CARDIZEM) tablet 60 mg, 60 mg, Oral, Q6H, Balwinder Marquez III, MD, 60 mg at 12/03/18 0601  •  glucagon (human recombinant) (GLUCAGEN DIAGNOSTIC) injection 1 mg, 1 mg, Subcutaneous, PRN, Julien Carcamo, DANIEL  •  heparin (porcine) 5000 UNIT/ML injection 5,000 Units, 5,000 Units, Subcutaneous, Q8H,  Swetha Haines MD, 5,000 Units at 12/02/18 2128  •  hydrALAZINE (APRESOLINE) tablet 10 mg, 10 mg, Oral, Q8H PRN, Case, Jasmina V., DO  •  insulin regular (humuLIN R,novoLIN R) injection 0-14 Units, 0-14 Units, Subcutaneous, Q6H, Case, Jasmina V., DO, 5 Units at 12/03/18 0713  •  insulin regular (humuLIN R,novoLIN R) injection 8 Units, 8 Units, Subcutaneous, Q6H, Swetha Haines MD, 8 Units at 12/03/18 0714  •  metoprolol tartrate (LOPRESSOR) tablet 50 mg, 50 mg, Oral, Q12H, Balwinder Marquez III, MD, 50 mg at 12/02/18 2128  •  sodium chloride 0.45 % infusion, 75 mL/hr, Intravenous, Continuous, Eyal, Dieter Angeles MD, Last Rate: 75 mL/hr at 12/03/18 0319, 75 mL/hr at 12/03/18 0319  •  sodium chloride 0.9 % flush 10 mL, 10 mL, Intravenous, PRN, Rohan Ceballos MD  •  sodium chloride 0.9 % flush 3 mL, 3 mL, Intravenous, Q12H, Julien Carcamo APRN, 3 mL at 12/02/18 2128  •  sodium chloride 0.9 % flush 3-10 mL, 3-10 mL, Intravenous, PRN, Julien Carcamo APRN  •  thiamine (VITAMIN B-1) tablet 100 mg, 100 mg, Oral, Daily, Case, Jasmina V., DO, 100 mg at 12/02/18 0919    Antibiotics:  Anti-Infectives (From admission, onward)    Ordered     Dose/Rate Route Frequency Start Stop    12/02/18 1037  DAPTOmycin (CUBICIN) 500 mg in sodium chloride 0.9 % 50 mL IVPB     Ordering Provider:  Olivier Gtz MD    8 mg/kg × 63.6 kg (Adjusted)  100 mL/hr over 30 Minutes Intravenous Every 24 Hours 12/02/18 1200 12/16/18 1159    11/29/18 1128  vancomycin (VANCOCIN) in iso-osmotic dextrose IVPB 1 g (premix) 200 mL     Ordering Provider:  Aysha Rocha RPH    1,000 mg  over 60 Minutes Intravenous Once 11/29/18 1400 11/29/18 1448    11/28/18 1522  piperacillin-tazobactam (ZOSYN) 3.375 g in iso-osmotic dextrose 50 ml (premix)     Eran Huang RPH reviewed the order on 12/01/18 0942.   Ordering Provider:  Jasmina Maier, DO    3.375 g  over 4 Hours Intravenous Every 12 Hours 11/28/18 2200 12/01/18 1200     18 1332  vancomycin 1750 mg/500 mL 0.9% NS IVPB ([facility])     Ordering Provider:  Rohan Ceballos MD    20 mg/kg × 90.7 kg  over 120 Minutes Intravenous Once 18 1334 18 1642    18 1332  piperacillin-tazobactam (ZOSYN) 3.375 g in iso-osmotic dextrose 50 ml (premix)     Ordering Provider:  Rohan Ceballos MD    3.375 g  over 30 Minutes Intravenous Once 18 1334 18 1720            Review of Systems:  unobtainable      Physical Exam:   Vital Signs  Temp (24hrs), Av.1 °F (36.7 °C), Min:97.5 °F (36.4 °C), Max:98.6 °F (37 °C)    Temp  Min: 97.5 °F (36.4 °C)  Max: 98.6 °F (37 °C)  BP  Min: 126/52  Max: 169/82  Pulse  Min: 57  Max: 66  Resp  Min: 16  Max: 18  SpO2  Min: 100 %  Max: 100 %    GENERAL: resting quietly opens eyes to exam, more alert.  HEENT: Normocephalic, atraumatic.  PERRL. EOMI. No conjunctival injection. No icterus. Oropharynx clear without evidence of thrush or exudate. No evidence of peridontal disease.  No ext oral lesions  Right parotid area very tender to touch and she swatted my hand away.  The area is swollen and indurated.   HEART: RRR; systolic murmur loudest at base left  LUNGS: Clear to auscultation bilaterally .  ABDOMEN: Soft, nontender, nondistended. Positive bowel sounds.   EXT:  No cyanosis, clubbing or edema. No cord.  MSK: FROM without joint effusions noted arms/legs.    SKIN: keloid like scarring on toes, fore feet bilaterally  Bilateral shin wounds, left side appears gto have been recently incised    NEURO: nonverbal; no spontaneous movement      Laboratory Data    Results from last 7 days   Lab Units  18   0652  18   0546  18   0403   WBC 10*3/mm3  17.14*  21.88*  28.75*   HEMOGLOBIN g/dL  9.8*  10.0*  10.4*   HEMATOCRIT %  33.6*  34.2*  34.9   PLATELETS 10*3/mm3  179  171  195     Results from last 7 days   Lab Units  18   0546   SODIUM mmol/L  146   POTASSIUM mmol/L  3.5   CHLORIDE mmol/L  109   CO2 mmol/L  28.0   BUN mg/dL   43*   CREATININE mg/dL  1.31*   GLUCOSE mg/dL  311*   CALCIUM mg/dL  7.3*     Results from last 7 days   Lab Units  11/30/18   0440   ALK PHOS U/L  114*   BILIRUBIN mg/dL  0.6   ALT (SGPT) U/L  16   AST (SGOT) U/L  14             Results from last 7 days   Lab Units  11/28/18   1750   LACTATE mmol/L  2.8*         Results from last 7 days   Lab Units  11/30/18   0440  11/29/18   0427   VANCOMYCIN RM mcg/mL  22.80  13.60     Estimated Creatinine Clearance: 37.3 mL/min (A) (by C-G formula based on SCr of 1.31 mg/dL (H)).      Microbiology:  Blood Culture   Date Value Ref Range Status   11/30/2018 No growth at 2 days  Preliminary   11/30/2018 No growth at 2 days  Preliminary   11/28/2018 Staphylococcus aureus, MRSA (A)  Final     Comment:       Consider infectious disease consult to rule out distant focus of infection.  Methicillin resistant Staphylococcus aureus, Patient may be an isolation risk.   11/28/2018 Staphylococcus aureus, MRSA (A)  Final     Comment:       Consider infectious disease consult to rule out distant focus of infection.  Methicillin resistant Staphylococcus aureus, Patient may be an isolation risk.       BCID, PCR   Date Value Ref Range Status   11/28/2018 (C) No organism detected by BCID PCR. Final    Staphylococcus aureus. mecA (methicillin resistance gene) detected. Identification by BCID PCR.                 Wound Culture   Date Value Ref Range Status   11/30/2018 (A)  Final    Scant growth (1+) Staphylococcus, coagulase negative     Comment:     Susceptibility will not be done unless Doctor notifies Lab             Radiology:  Ct Soft Tissue Neck Without Contrast    Result Date: 12/1/2018  Markedly enlarged right parotid gland and associated inflammation presumably acute parotitis. No visible abscess.  DICTATED:   11/28/2018 EDITED/ls :   11/28/2018   This report was finalized on 12/1/2018 10:51 AM by DR. Brian Cosme MD.      Xr Chest 1 View    Result Date: 11/29/2018  Cardiomegaly and mild  pulmonary venous hypertension.  D:  11/28/2018 E:  11/28/2018  This report was finalized on 11/29/2018 9:15 AM by DR. Brian Cosme MD.      Us Nonvascular Extremity Limited    Result Date: 11/29/2018  Imaging characteristics are compatible with superficial abscess or other complex fluid collection, just over 5 cm maximally.  D:  11/29/2018 E:  11/29/2018  This report was finalized on 11/29/2018 10:14 AM by Scott Sauer.      Xr Abdomen Kub    Result Date: 11/28/2018  Tip of NASOGASTRIC/ENTERIC tube distal to the gastroesophageal junction in the gastric fundus. THIS DOCUMENT HAS BEEN ELECTRONICALLY SIGNED BY NIRAJ SANDOVAL MD        Impression:   MRSA bactertemia  Left leg abscess s/p I and D.  Cx with CNStaph  Parotitis right side  Encephalopathy  COPD  Acute renal failure on chronic      PLAN/RECOMMENDATIONS:     Estimated Creatinine Clearance: 37.3 mL/min (A) (by C-G formula based on SCr of 1.31 mg/dL (H)).    Source of MRSA bacteremia could be from the parotid gland; no abscess seen on initial imaging;  Will be interesting to see if leg culures grow MRSA; a leg abscess can spontaneously develop and usually doesn't lead to positive blood cultures.    Regardless; high grade bacteremia is still concerning for endovascular involvement.    YANNA probably required.    Given pulmonary involvement probably not active concern would change abx:    Continue daptomycin 8 mg/kg iv now/daily unless creatinine clearance decreases below 30 mL/min  Warm compresses to right parotid gland  YANNA--ordered for 12/4 and made NPO after MN  F/u wound cultures    Patient is complexly ill    Olivier Gtz MD saw and examined patient, verified hx and PE, read all radiographic studies, reviewed labs and micro data, and formulated dx, plan for treatment and all medical decision making.      ELIESER LawrenceC for Olivier Gtz MD       I have seen and examined patient and agree with above  MIGUE Lawrence  12/3/2018  8:50 AM

## 2018-12-04 NOTE — THERAPY EVALUATION
Acute Care - Occupational Therapy Initial Evaluation  [facility]     Patient Name: Leila Smith  : 1945  MRN: [id number]  Today's Date: 2018  Onset of Illness/Injury or Date of Surgery: 18  Date of Referral to OT: 18  Referring Physician: Celestina    Admit Date: 2018     No diagnosis found.  Patient Active Problem List   Diagnosis   • Diabetes mellitus, type 2 (CMS/HCC)   • Hypertension   • Hyperlipidemia   • Hypothyroidism   • Chronic obstructive pulmonary disease (CMS/HCC)   • CAD (coronary artery disease)   • History of edema   • History of obesity   • Venous insufficiency   • Open wound of lower extremity   • Urinary tract infection   • T2DM (type 2 diabetes mellitus) (CMS/[facility])   • Dyspnea on exertion   • Peripheral vascular disease (CMS/HCC)   • Obstructive sleep apnea syndrome   • Ulcer of lower extremity (CMS/HCC)   • Hypoxemia   • Hematuria   • Diabetic peripheral neuropathy (CMS/[facility])   • Edema   • Chronic obstructive pulmonary disease with acute exacerbation (CMS/[facility])   • Congestive heart failure (CMS/[facility])   • Arthritis   • Neutrophilic leukocytosis   • Cystitis   • Acute renal failure superimposed on stage 3 chronic kidney disease (CMS/HCC)   • Hyperkalemia   • Leukocytosis   • Elevated troponin   • Altered mental status   • Ventricular tachyarrhythmia (CMS/HCC)   • Sepsis due to methicillin resistant Staphylococcus aureus (MRSA) (CMS/[facility])   • Acute parotitis     Past Medical History:   Diagnosis Date   • Atrial fibrillation (CMS/HCC)    • Chronic ulcer of right leg (CMS/HCC)    • Cognitive communication deficit    • COPD (chronic obstructive pulmonary disease) (CMS/[facility])    • Diabetes mellitus (CMS/[facility])    • Difficulty in walking    • Encephalopathy    • Generalized muscle weakness    • Hematuria    • Hyperlipidemia    • Hypertension    • Hypothyroidism    • Urinary tract infection      Past Surgical History:   Procedure Laterality Date   • CATARACT EXTRACTION     •  CHOLECYSTECTOMY     • COLOSTOMY     • FOOT SURGERY Right    • HERNIA REPAIR     • HYSTERECTOMY     • TOTAL KNEE ARTHROPLASTY Right           OT ASSESSMENT FLOWSHEET (last 72 hours)      Occupational Therapy Evaluation     Row Name 12/04/18 0935                   OT Evaluation Time/Intention    Subjective Information  complains of;pain  -TB        Document Type  evaluation  -TB        Mode of Treatment  occupational therapy  -TB        Patient Effort  poor  -TB        Symptoms Noted During/After Treatment  increased pain  -TB        Comment  Per RN documentaion - pt intermittently refuses care; with OT pt denied pain at rest, became agitated/upset during bed mobility  -TB           General Information    Patient Profile Reviewed?  yes  -TB        Onset of Illness/Injury or Date of Surgery  11/28/18  -TB        Referring Physician  Celestina  -TB        Patient Observations  lethargic;poorly cooperative  -TB        Patient/Family Observations  No family present  -TB        General Observations of Patient  Pt supine in bed, wound care RN exiting room; O2, NG tube, IV, B waffle boots; exit alarms  -TB        Equipment Currently Used at Home  walker, rolling;wheelchair Pt u/a to provide history - minimally verbal at baseline  -TB        Pertinent History of Current Functional Problem  Pt to ED via EMS when NH staff u/a to obtain vitals. Pt admitted to ICU s/p cardioversion for v/tach with hyperkalemia, A/C renal failure, UTI and R LE hematoma (s/p evacuation 11/29). ENT consult for parotid sialadentitis  -TB        Existing Precautions/Restrictions  fall;other (see comments) NG tube, HOB>30 degrees; wounds B LE  -TB        Limitations/Impairments  safety/cognitive;swallowing  -TB        Risks Reviewed  patient:;increased discomfort;lines disloged  -TB        Benefits Reviewed  patient:;improve function  -TB        Barriers to Rehab  medically complex;previous functional deficit;cognitive status  -TB            Relationship/Environment    Lives With  facility resident  -TB        Family Caregiver if Needed  child(dinesh), adult  -TB           Resource/Environmental Concerns    Current Living Arrangements  extended care facility  -TB           Cognitive Assessment/Interventions    Additional Documentation  Cognitive Assessment/Intervention (Group)  -TB           Cognitive Assessment/Intervention- PT/OT    Affect/Mental Status (Cognitive)  agitated  -TB        Behavioral Issues (Cognitive)  verbal outbursts explicatives with bed mobility for toileting care  -TB        Orientation Status (Cognition)  oriented to;person  -TB        Follows Commands (Cognition)  follows one step commands;25-49% accuracy;verbal cues/prompting required;repetition of directions required;physical/tactile prompts required  -TB        Personal Safety Interventions  fall prevention program maintained  -TB           Safety Issues, Functional Mobility    Safety Issues Affecting Function (Mobility)  ability to follow commands;insight into deficits/self awareness;awareness of need for assistance  -TB        Impairments Affecting Function (Mobility)  cognition;pain;range of motion (ROM);strength  -TB           Bed Mobility Assessment/Treatment    Bed Mobility Assessment/Treatment  rolling left;rolling right  -TB        Rolling Left [place] (Bed Mobility)  dependent (less than 25% patient effort)  -TB        Rolling Right [place] (Bed Mobility)  dependent (less than 25% patient effort)  -TB        Assistive Device (Bed Mobility)  draw sheet  -TB        Comment (Bed Mobility)  following incontinent episode - Bone leaking, RN notified by wound care RN  -TB           Functional Mobility    Functional Mobility- Comment  Deferred to PT  -TB           Transfer Assessment/Treatment    Comment (Transfers)  Deferred d/t pt agitation with stimulation/bed mobility  -TB           ADL Assessment/Intervention    BADL Assessment/Intervention  toileting  -TB         "   Toileting Assessment/Training    [place] Level (Toileting)  dependent (less than 25% patient effort)  -TB        Comment (Toileting)  [place]/incontinent  -TB           General ROM    GENERAL ROM COMMENTS  Refused  -TB           MMT (Manual Muscle Testing)    General MMT Comments  Refused  -TB           Positioning and Restraints    Pre-Treatment Position  in bed  -TB        Post Treatment Position  bed  -TB        In Bed  side lying right;call light within reach;exit alarm on;pillow between legs;waffle boots/both HOB>30 degrees   -TB           Pain Assessment    Additional Documentation  Pain Scale: FACES Pre/Post-Treatment (Group)  -TB           Pain Scale: Numbers Pre/Post-Treatment    Pain Location - Side  Bilateral  -TB        Pain Location - Orientation  generalized  -TB        Pain Intervention(s)  Repositioned  -TB           Pain Scale: FACES Pre/Post-Treatment    Pain: FACES Scale, Pretreatment  0-->no hurt  -TB        Pain: FACES Scale, Post-Treatment  4-->hurts little more pt denied pain by shaking head \"no\"  -TB        Pre/Post Treatment Pain Comment  pt agitated/upset during bed mobillity rolling activity for toileting and bed linen mgmt  -TB           Wound 11/29/18 0915 Right medial gluteal other (see comments)    Wound - Properties Group Date first assessed: 11/29/18  -CP Time first assessed: 0915  -CP Present On Admission : yes;picture taken  -CP Side: Right  -CP Orientation: medial  -CP Location: gluteal  -CP Type: other (see comments)  -CP Additional Comments: shearing  -CP       Wound 11/29/18 1730 Right anterior;lower leg incision    Wound - Properties Group Date first assessed: 11/29/18  -AB Time first assessed: 1730  -AB Present On Admission : other (see comments)  -AB, wound present, excised by Dr. Gimenez  Side: Right  -AB Orientation: anterior;lower  -AB Location: leg  -AB Type: incision  -AB Stage, Pressure Injury: other (see comments)  -AB       Wound 12/01/18 1400 Left lower leg skin " tear    Wound - Properties Group Date first assessed: 12/01/18  -AB Time first assessed: 1400  -AB Present On Admission : no  -AB Side: Left  -AB Orientation: lower  -AB Location: leg  -AB Type: skin tear  -AB       Coping    Observed Emotional State  flat;agitated  -TB        Verbalized Emotional State  frustration  -TB           Plan of Care Review    Plan of Care Reviewed With  patient  -TB           Clinical Impression (OT)    Date of Referral to OT  12/03/18  -TB        OT Diagnosis  Impaired mobility and ADL  -TB        Patient/Family Goals Statement (OT Eval)  pt is minimally verbal; no family present  -TB        Criteria for Skilled Therapeutic Interventions Met (OT Eval)  yes;treatment indicated  -TB        Rehab Potential (OT Eval)  fair, will monitor progress closely  -TB        Therapy Frequency (OT Eval)  3 times/wk MWF  -TB        Care Plan Review (OT)  evaluation/treatment results reviewed;care plan/treatment goals reviewed;patient/other agree to care plan  -TB        Anticipated Equipment Needs at Discharge (OT)  -- Defer - per NH  -TB        Anticipated Discharge Disposition (OT)  extended care facility  -TB           Vital Signs    Pre Systolic BP Rehab  -- RN cleared OT  -TB        Pre Patient Position  Supine  -TB        Intra Patient Position  Side Lying L  -TB        Post Patient Position  Side Lying R  -TB           Planned OT Interventions    Planned Therapy Interventions (OT Eval)  transfer/mobility retraining;occupation/activity based interventions;BADL retraining  -TB           OT Goals    Bed Mobility Goal Selection (OT)  bed mobility, OT goal 1  -TB        Transfer Goal Selection (OT)  transfer, OT goal 1  -TB        Grooming Goal Selection (OT)  grooming, OT goal 1  -TB           Bed Mobility Goal 1 (OT)    Activity/Assistive Device (Bed Mobility Goal 1, OT)  rolling to left;rolling to right;sidelying to sit/sit to sidelying  -TB        [place] Level/Cues Needed (Bed Mobility Goal  1, OT)  maximum assist (25-49% patient effort) 2nd person for return to bed  -TB        Time Frame (Bed Mobility Goal 1, OT)  by discharge  -TB        Barriers (Bed Mobility Goal 1, OT)  cognition, strength  -TB        Progress/Outcomes (Bed Mobility Goal 1, OT)  goal ongoing  -TB           Transfer Goal 1 (OT)    Activity/Assistive Device (Transfer Goal 1, OT)  sit-to-stand/stand-to-sit;bed-to-chair/chair-to-bed;toilet;walker, rolling  -TB        [place] Level/Cues Needed (Transfer Goal 1, OT)  maximum assist (25-49% patient effort)  -TB        Time Frame (Transfer Goal 1, OT)  by discharge  -TB        Barriers (Transfers Goal 1, OT)  cognition, strength, balance  -TB        Progress/Outcome (Transfer Goal 1, OT)  goal ongoing  -TB           Grooming Goal 1 (OT)    Activity/Device (Grooming Goal 1, OT)  wash face, hands;oral care UIC  -TB        [place] (Grooming Goal 1, OT)  minimum assist (75% or more patient effort)  -TB        Time Frame (Grooming Goal 1, OT)  by discharge  -TB        Barriers (Grooming Goal 1, OT)  cognition, strength, balance  -TB        Progress/Outcome (Grooming Goal 1, OT)  goal ongoing  -TB           Living Environment    Home Accessibility  -- Facility resident - no concerns  -TB          User Key  (r) = Recorded By, (t) = Taken By, (c) = Cosigned By    Initials Name Effective Dates    Brianne Little, OT 06/08/18 -     Angélica Coppola APRN 11/05/18 -     Jag Gould RN 02/02/17 -          Occupational Therapy Education     Title: PT OT SLP Therapies (In Progress)     Topic: Occupational Therapy (In Progress)     Point: ADL training (In Progress)     Description: Instruct learner(s) on proper safety adaptation and remediation techniques during self care or transfers.   Instruct in proper use of assistive devices.    Learning Progress Summary           Patient Acceptance, E, NR by TB at 12/4/2018 11:13 AM    Comment:  Education initiated for benefits  of activity/therapy and role of OT                               User Key     Initials Effective Dates Name Provider Type Discipline     06/08/18 -  Brianne Yeung OT Occupational Therapist OT                  OT Recommendation and Plan  Outcome Summary/Treatment Plan (OT)  Anticipated Equipment Needs at Discharge (OT): (Defer - per NH)  Anticipated Discharge Disposition (OT): extended care facility  Planned Therapy Interventions (OT Eval): transfer/mobility retraining, occupation/activity based interventions, BADL retraining  Therapy Frequency (OT Eval): 3 times/wk(MWF)  Plan of Care Review  Plan of Care Reviewed With: patient  Plan of Care Reviewed With: patient  Outcome Summary: OT IE completed. Pt presents with low arousal, confusion and poor participation (easily agitated). Dependent for bed mobility. Refused B UE ROM. Transfers deferred due to increased agitation/frustration with stimulation. OT to follow 3x/week MWF. D/C plan is return to F/NH at [place]    Outcome Measures     Row Name 12/04/18 0935             How much help from another is currently needed...    Putting on and taking off regular lower body clothing?  1  -TB      Bathing (including washing, rinsing, and drying)  2  -TB      Toileting (which includes using toilet bed pan or urinal)  1  -TB      Putting on and taking off regular upper body clothing  2  -TB      Taking care of personal grooming (such as brushing teeth)  2  -TB      Eating meals  1  -TB      Score  9  -TB         Functional Assessment    Outcome Measure Options  AM-PAC 6 Clicks Daily Activity (OT)  -TB        User Key  (r) = Recorded By, (t) = Taken By, (c) = Cosigned By    Initials Name Provider Type    TB Brianne Yeung OT Occupational Therapist          Time Calculation:   Time Calculation- OT     Row Name 12/04/18 1116             Time Calculation- OT    OT Start Time  0935  -TB      OT Received On  12/04/18  -TB      OT Goal Re-Cert Due Date   12/14/18  -TB        User Key  (r) = Recorded By, (t) = Taken By, (c) = Cosigned By    Initials Name Provider Type    TB Brianne Yeung OT Occupational Therapist        Therapy Suggested Charges     Code   Minutes Charges    None           Therapy Charges for Today     Code Description Service Date Service Provider Modifiers Qty    86745620339 HC OT EVAL MOD COMPLEXITY 4 12/4/2018 Brianne Yeung OT GO 1               Brianne Yeung OT  12/4/2018

## 2018-12-04 NOTE — PLAN OF CARE
Problem: Patient Care Overview  Goal: Plan of Care Review  Outcome: Ongoing (interventions implemented as appropriate)   12/04/18 0070   Coping/Psychosocial   Plan of Care Reviewed With patient;other (see comments)  (Sue RN)   Plan of Care Review   Progress no change   OTHER   Outcome Summary WOC nurse consulted to assess peristomal skin. Pt has established colostomy. Stoma red, moist, minimally protruding above skin level. Peristomal skin denuded, but appears to be healing; crusted with stomal powder and barrier spray; [place] #[id number] 2 piece with convex wafer applied. Stomal output thick pasty brown stool; 200 cc emptied from bag. Coccyx with healing stage 2 pressure injury; cleaned with blue skin wipes; Venelex applied; covered with foam dressing. [facility]UKUND bed [facility] due to immobility, moisture, high skin risk. Black heel boots in place. Skin interventions in place. WOC nurse will f/u. Please contact WOC nurse as needed for concerns.

## 2018-12-04 NOTE — PLAN OF CARE
Problem: Fall Risk (Adult)  Goal: Absence of Fall  Outcome: Ongoing (interventions implemented as appropriate)      Problem: Skin Injury Risk (Adult)  Goal: Skin Health and Integrity  Outcome: Ongoing (interventions implemented as appropriate)      Problem: Patient Care Overview  Goal: Plan of Care Review  Outcome: Ongoing (interventions implemented as appropriate)   12/04/18 0432   Coping/Psychosocial   Plan of Care Reviewed With patient   Plan of Care Review   Progress no change   OTHER   Outcome Summary No acute overnight events. Pt continues on tube feed. VSS.       Problem: Confusion, Acute (Adult)  Goal: Identify Related Risk Factors and Signs and Symptoms  Outcome: Ongoing (interventions implemented as appropriate)    Goal: Cognitive/Functional Impairments Minimized  Outcome: Ongoing (interventions implemented as appropriate)    Goal: Safety  Outcome: Ongoing (interventions implemented as appropriate)

## 2018-12-04 NOTE — PROGRESS NOTES
"   LOS: 6 days    Patient Care Team:  Ciaran Wakefield MD as PCP - General (Internal Medicine)    Chief Complaint:  AMS    Subjective     Interval History:   No acute events overnight. No new complaints. ALert    Review of Systems:    The following systems were reviewed and negative;  constitution, respiratory, cardiovascular and neurological    Objective     Vital Sign Min/Max for last 24 hours  Temp  Min: 97.4 °F (36.3 °C)  Max: 98.1 °F (36.7 °C)   BP  Min: 131/62  Max: 154/71   Pulse  Min: 57  Max: 62   Resp  Min: 16  Max: 18   SpO2  Min: 98 %  Max: 98 %   No Data Recorded   Weight  Min: 78.8 kg (173 lb 11.6 oz)  Max: 78.8 kg (173 lb 11.6 oz)     Flowsheet Rows      First Filed Value   Admission Height  162.6 cm (64\") Documented at 11/28/2018 1153   Admission Weight  90.7 kg (200 lb) Documented at 11/28/2018 1153          No intake/output data recorded.  I/O last 3 completed shifts:  In: 8206 [I.V.:6125; Other:474; NG/GT:1607]  Out: 2050 [Urine:1825; Stool:225]    Physical Exam:     General Appearance:    ALert, in no acute distress   Head:    Normocephalic, without obvious abnormality, atraumatic               Neck:   No adenopathy, supple, trachea midline, no thyromegaly, no     carotid bruit, no JVD       Lungs:     Clear to auscultation,respirations regular, even and                   unlabored    Heart:    Regular rhythm and normal rate, normal S1 and S2, no            murmur, no gallop, no rub, no click   Breast Exam:    Deferred   Abdomen:     Normal bowel sounds, no masses, no organomegaly, soft        non-tender, non-distended, no guarding, no rebound                 tenderness       Extremities:  No edema, no cyanosis, no redness               Neurologic:  Alert. No focal deficit noted.        WBC WBC   Date Value Ref Range Status   12/03/2018 17.14 (H) 3.50 - 10.80 10*3/mm3 Final   12/02/2018 21.88 (H) 3.50 - 10.80 10*3/mm3 Final      HGB Hemoglobin   Date Value Ref Range Status   12/03/2018 9.8 (L) " 11.5 - 15.5 g/dL Final   12/02/2018 10.0 (L) 11.5 - 15.5 g/dL Final      HCT Hematocrit   Date Value Ref Range Status   12/03/2018 33.6 (L) 34.5 - 44.0 % Final   12/02/2018 34.2 (L) 34.5 - 44.0 % Final      Platlets No results found for: LABPLAT   MCV MCV   Date Value Ref Range Status   12/03/2018 90.8 80.0 - 99.0 fL Final   12/02/2018 90.2 80.0 - 99.0 fL Final          Sodium Sodium   Date Value Ref Range Status   12/04/2018 141 132 - 146 mmol/L Final   12/03/2018 143 132 - 146 mmol/L Final   12/02/2018 146 132 - 146 mmol/L Final      Potassium Potassium   Date Value Ref Range Status   12/04/2018 5.0 3.5 - 5.5 mmol/L Final     Comment:     Verified by repeat analysis.    12/03/2018 3.3 (L) 3.5 - 5.5 mmol/L Final   12/02/2018 3.5 3.5 - 5.5 mmol/L Final   12/01/2018 4.1 3.5 - 5.5 mmol/L Final      Chloride Chloride   Date Value Ref Range Status   12/04/2018 110 (H) 99 - 109 mmol/L Final   12/03/2018 109 99 - 109 mmol/L Final   12/02/2018 109 99 - 109 mmol/L Final      CO2 CO2   Date Value Ref Range Status   12/04/2018 25.0 20.0 - 31.0 mmol/L Final   12/03/2018 28.0 20.0 - 31.0 mmol/L Final   12/02/2018 28.0 20.0 - 31.0 mmol/L Final      BUN BUN   Date Value Ref Range Status   12/04/2018 31 (H) 9 - 23 mg/dL Final   12/03/2018 33 (H) 9 - 23 mg/dL Final   12/02/2018 43 (H) 9 - 23 mg/dL Final      Creatinine Creatinine   Date Value Ref Range Status   12/04/2018 0.96 0.60 - 1.30 mg/dL Final   12/03/2018 1.10 0.60 - 1.30 mg/dL Final   12/02/2018 1.31 (H) 0.60 - 1.30 mg/dL Final      Calcium Calcium   Date Value Ref Range Status   12/04/2018 6.9 (L) 8.7 - 10.4 mg/dL Final   12/03/2018 7.6 (L) 8.7 - 10.4 mg/dL Final   12/02/2018 7.3 (L) 8.7 - 10.4 mg/dL Final      PO4 No results found for: CAPO4   Albumin Albumin   Date Value Ref Range Status   12/03/2018 2.79 (L) 3.20 - 4.80 g/dL Final      Magnesium Magnesium   Date Value Ref Range Status   12/04/2018 2.3 1.3 - 2.7 mg/dL Final   12/03/2018 1.7 1.3 - 2.7 mg/dL Final    12/02/2018 2.0 1.3 - 2.7 mg/dL Final      Uric Acid No results found for: URICACID        Results Review:     I reviewed the patient's new clinical results.      amiodarone 200 mg Oral Q24H   amLODIPine 5 mg Oral Q24H   aspirin 81 mg Oral Daily   castor oil-balsam peru  Topical Q12H   DAPTOmycin 8 mg/kg (Adjusted) Intravenous Q24H   diltiaZEM 60 mg Oral Q6H   heparin (porcine) 5,000 Units Subcutaneous Q8H   insulin detemir 15 Units Subcutaneous BID   insulin regular 0-14 Units Subcutaneous Q6H   insulin regular 10 Units Subcutaneous Q6H   metoprolol tartrate 50 mg Oral Q12H   sodium chloride 3 mL Intravenous Q12H   thiamine 100 mg Oral Daily       sodium chloride 75 mL/hr Last Rate: 75 mL/hr (12/03/18 1421)       Medication Review:     Assessment/Plan       Altered mental status    Diabetes mellitus, type 2 (CMS/HCC)    Hypertension    Hyperlipidemia    Hypothyroidism    CAD (coronary artery disease)    Chronic obstructive pulmonary disease with acute exacerbation (CMS/[facility])    Congestive heart failure (CMS/[facility])    Acute renal failure superimposed on stage 3 chronic kidney disease (CMS/[facility])    Hyperkalemia    Ventricular tachyarrhythmia (CMS/[facility])    Sepsis due to methicillin resistant Staphylococcus aureus (MRSA) (CMS/[facility])    Acute parotitis    1- ANGELA -non oliguric. Pre-renal azotemia secondary to Volume depletion - Improving  2- Metabolic acidosis -resolved  3- Hyperkalemia secondary to metabolic acidosis and ANGELA - resolved.   4- Hypernatremia - resolved.   5- Anemia - stable.   6- AMS   7- CKD stage III - Baseline Scr 1.2-1.4 range. Almost at baseline.   8- HTn- not controlled.     Plan:  - Will stop IV fluids.   - Monitor I/O  - Replete electrolytes   - Encourage oral hydration.       Dieter Birch MD  12/04/18  9:02 AM

## 2018-12-04 NOTE — NURSING NOTE
Here to place PICC, called the patients daughter to discuss and get consent.   After speaking with her she wishes to hold off on PICC at present time until she speaks with the physician further.  Patient has a patent IV in R forearm.  Will inform the nurse.

## 2018-12-04 NOTE — PROGRESS NOTES
INFECTIOUS DISEASE PROGRESS NOTE    Leila Smith  1945  [id number]    Date: 12/4/2018    Admission Date: 11/28/2018    CC: MRSA bacteremia    History of present illness:    Patient is a 73 y.o. female presents from [facility] with ventricular tachycardia and hyperkalemia from  acute on chronic renal failure;  PAtient admitted to ICU and has been worked up for parotitis, leg abscess and has been found to have MRSA bacteremia.. Blood cultures are positive for MRSA. Hematoma on right leg was drained by surgery on 11/29. 2-dimensional Echocardiogram negative for vegetations.     Patient is obtunded and is a poor historian no family by bedside.  Opens eyes during exam but is nonverbal.    12/3/18: Patient is a poor historian with no family at bedside.  She says yes to any question.  She is more alert today. She remains afebrile. Right parotid gland area with swelling and pain as she swatted my hand away when palpated.     12/4/18; doing well; no events overnight; no complaints, poor historian, ros unobtainable        Past Medical History:   Diagnosis Date   • Atrial fibrillation (CMS/HCC)    • Chronic ulcer of right leg (CMS/HCC)    • Cognitive communication deficit    • COPD (chronic obstructive pulmonary disease) (CMS/HCC)    • Diabetes mellitus (CMS/HCC)    • Difficulty in walking    • Encephalopathy    • Generalized muscle weakness    • Hematuria    • Hyperlipidemia    • Hypertension    • Hypothyroidism    • Urinary tract infection        Past Surgical History:   Procedure Laterality Date   • CATARACT EXTRACTION     • CHOLECYSTECTOMY     • COLOSTOMY     • FOOT SURGERY Right    • HERNIA REPAIR     • HYSTERECTOMY     • TOTAL KNEE ARTHROPLASTY Right        Family History   Problem Relation Age of Onset   • Stomach cancer Mother    • Alcohol abuse Other    • Cancer Other    • Diabetes Other    • Hypertension Other    • Other Other        Social History     Socioeconomic History   • Marital status:       Spouse name: Not on file   • Number of children: Not on file   • Years of education: Not on file   • Highest education level: Not on file   Social Needs   • Financial resource strain: Not on file   • Food insecurity - worry: Not on file   • Food insecurity - inability: Not on file   • Transportation needs - medical: Not on file   • Transportation needs - non-medical: Not on file   Occupational History   • Not on file   Tobacco Use   • Smoking status: Former Smoker     Packs/day: 0.00     Years: 50.00     Pack years: 0.00     Types: Cigarettes   • Smokeless tobacco: Never Used   Substance and Sexual Activity   • Alcohol use: No   • Drug use: No   • Sexual activity: Defer   Other Topics Concern   • Not on file   Social History Narrative   • Not on file       Allergies   Allergen Reactions   • Codeine    • Tetracyclines & Related          Medication:    Current Facility-Administered Medications:   •  amLODIPine (NORVASC) tablet 5 mg, 5 mg, Oral, Q24H, Dieter Birch MD, 5 mg at 12/04/18 1046  •  apixaban (ELIQUIS) tablet 5 mg, 5 mg, Oral, Q12H, Balwinder Marquez III, MD  •  aspirin EC tablet 81 mg, 81 mg, Oral, Daily, Jory Macedo, APRN, 81 mg at 12/04/18 1046  •  castor oil-balsam peru (VENELEX) ointment, , Topical, Q12H, Julien Carcamo APRN  •  DAPTOmycin (CUBICIN) 500 mg in sodium chloride 0.9 % 50 mL IVPB, 8 mg/kg (Adjusted), Intravenous, Q24H, Olivier Gtz MD, Last Rate: 100 mL/hr at 12/04/18 1318, 500 mg at 12/04/18 1318  •  dextrose (D50W) 25 g/ 50mL Intravenous Solution 25 g, 25 g, Intravenous, Q15 Min PRN, Shandra Oscar MD  •  dextrose (GLUTOSE) oral gel 15 g, 15 g, Oral, Q15 Min PRN, Shandra Oscar MD  •  diltiaZEM (CARDIZEM) tablet 60 mg, 60 mg, Oral, Q6H, Balwinder Marquez III, MD, 60 mg at 12/04/18 1317  •  glucagon (human recombinant) (GLUCAGEN DIAGNOSTIC) injection 1 mg, 1 mg, Subcutaneous, PRN, Shandra Oscar MD  •  hydrALAZINE (APRESOLINE)  tablet 10 mg, 10 mg, Oral, Q8H PRN, Case, Jasmina V., DO  •  insulin detemir (LEVEMIR) injection 15 Units, 15 Units, Subcutaneous, BID, Shandra Oscar MD, 15 Units at 12/04/18 1039  •  insulin regular (humuLIN R,novoLIN R) injection 0-14 Units, 0-14 Units, Subcutaneous, Q6H, Case, Jsamina V., DO, 5 Units at 12/04/18 0606  •  Magnesium Sulfate 2 gram Bolus, followed by 8 gram infusion (total Mg dose 10 grams)- Mg less than or equal to 1mg/dL, 2 g, Intravenous, PRN **OR** Magnesium Sulfate 2 gram / 50mL Infusion (GIVE X 3 BAGS TO EQUAL 6GM TOTAL DOSE) - Mg 1.1 - 1.5 mg/dl, 2 g, Intravenous, PRN **OR** Magnesium Sulfate 4 gram infusion- Mg 1.6-1.9 mg/dL, 4 g, Intravenous, PRN, Balwinder Mccall, [facility], Last Rate: 25 mL/hr at 12/03/18 1218, 4 g at 12/03/18 1218  •  metoprolol tartrate (LOPRESSOR) tablet 50 mg, 50 mg, Oral, Q12H, Balwinder Marquez III, MD, 50 mg at 12/04/18 1044  •  potassium chloride (MICRO-K) CR capsule 40 mEq, 40 mEq, Oral, PRN **OR** potassium chloride (KLOR-CON) packet 40 mEq, 40 mEq, Oral, PRN, 40 mEq at 12/03/18 1714 **OR** potassium chloride 10 mEq in 100 mL IVPB, 10 mEq, Intravenous, Q1H PRN, Balwinder Mccall, [facility]  •  potassium phosphate 45 mmol in sodium chloride 0.9 % 500 mL infusion, 45 mmol, Intravenous, PRN, Last Rate: 62.5 mL/hr at 12/03/18 1713, 45 mmol at 12/03/18 1713 **OR** potassium phosphate 30 mmol in sodium chloride 0.9 % 250 mL infusion, 30 mmol, Intravenous, PRN **OR** potassium phosphate 15 mmol in sodium chloride 0.9 % 100 mL infusion, 15 mmol, Intravenous, PRN **OR** sodium glycerophosphate 40 mmol in sodium chloride 0.9 % 500 mL IVPB, 40 mmol, Intravenous, PRN **OR** sodium glycerophosphate 20 mmol in sodium chloride 0.9 % 250 mL IVPB, 20 mmol, Intravenous, PRN, Balwinder Mccall, [facility]  •  sodium chloride 0.9 % flush 10 mL, 10 mL, Intravenous, PRN, Rohan Ceballos MD  •  sodium chloride 0.9 % flush 3 mL, 3 mL, Intravenous, Q12H, Julien Carcamo, DANIEL, 3 mL at 12/03/18  2213  •  sodium chloride 0.9 % flush 3-10 mL, 3-10 mL, Intravenous, PRN, Julien Carcamo, DANIEL  •  thiamine (VITAMIN B-1) tablet 100 mg, 100 mg, Oral, Daily, Arsalan Maiersa V., DO, 100 mg at 18 1044    Antibiotics:  Anti-Infectives (From admission, onward)    Ordered     Dose/Rate Route Frequency Start Stop    18 1037  DAPTOmycin (CUBICIN) 500 mg in sodium chloride 0.9 % 50 mL IVPB     Ordering Provider:  Olivier Gtz MD    8 mg/kg × 63.6 kg (Adjusted)  100 mL/hr over 30 Minutes Intravenous Every 24 Hours 18 1200 18 1159    18 1128  vancomycin (VANCOCIN) in iso-osmotic dextrose IVPB 1 g (premix) 200 mL     Ordering Provider:  Aysha Rocha [facility]    1,000 mg  over 60 Minutes Intravenous Once 18 1400 18 1448    18 1522  piperacillin-tazobactam (ZOSYN) 3.375 g in iso-osmotic dextrose 50 ml (premix)     Eran Huang [facility] reviewed the order on 18 0942.   Ordering Provider:  Jasmina Maier., DO    3.375 g  over 4 Hours Intravenous Every 12 Hours 18 2200 18 1200    18 1332  vancomycin 1750 mg/500 mL 0.9% NS IVPB ([facility])     Ordering Provider:  Rohan Ceballos MD    20 mg/kg × 90.7 kg  over 120 Minutes Intravenous Once 18 1334 18 1642    18 1332  piperacillin-tazobactam (ZOSYN) 3.375 g in iso-osmotic dextrose 50 ml (premix)     Ordering Provider:  Rohan Ceballos MD    3.375 g  over 30 Minutes Intravenous Once 18 1334 18 1720            Review of Systems:  unobtainable      Physical Exam:   Vital Signs  Temp (24hrs), Av.7 °F (36.5 °C), Min:97.4 °F (36.3 °C), Max:98 °F (36.7 °C)    Temp  Min: 97.4 °F (36.3 °C)  Max: 98 °F (36.7 °C)  BP  Min: 131/62  Max: 154/71  Pulse  Min: 57  Max: 72  Resp  Min: 18  Max: 18  SpO2  Min: 98 %  Max: 99 %    GENERAL: resting quietly opens eyes to exam, more alert.  HEENT: Normocephalic, atraumatic.  PERRL. EOMI. No conjunctival injection. No icterus. Oropharynx clear  without evidence of thrush or exudate. No evidence of peridontal disease.  No ext oral lesions    HEART: RRR; systolic murmur loudest at base left  LUNGS: Clear to auscultation bilaterally .  ABDOMEN: Soft, nontender, nondistended. Positive bowel sounds.   EXT:  No cyanosis, clubbing or edema. No cord.  MSK: FROM without joint effusions noted arms/legs.    SKIN: keloid like scarring on toes, fore feet bilaterally    NEURO: nonverbal; no spontaneous movement      Laboratory Data    Results from last 7 days   Lab Units  12/03/18   0652  12/02/18   0546  12/01/18   0403   WBC 10*3/mm3  17.14*  21.88*  28.75*   HEMOGLOBIN g/dL  9.8*  10.0*  10.4*   HEMATOCRIT %  33.6*  34.2*  34.9   PLATELETS 10*3/mm3  179  171  195     Results from last 7 days   Lab Units  12/04/18   0520   SODIUM mmol/L  141   POTASSIUM mmol/L  5.0   CHLORIDE mmol/L  110*   CO2 mmol/L  25.0   BUN mg/dL  31*   CREATININE mg/dL  0.96   GLUCOSE mg/dL  229*   CALCIUM mg/dL  6.9*     Results from last 7 days   Lab Units  12/03/18   0652   ALK PHOS U/L  140*   BILIRUBIN mg/dL  0.5   ALT (SGPT) U/L  15   AST (SGOT) U/L  16         Results from last 7 days   Lab Units  12/03/18   0652   CRP mg/dL  7.84*     Results from last 7 days   Lab Units  11/28/18   1750   LACTATE mmol/L  2.8*         Results from last 7 days   Lab Units  11/30/18   0440  11/29/18   0427   VANCOMYCIN RM mcg/mL  22.80  13.60     Estimated Creatinine Clearance: 51.4 mL/min (by C-G formula based on SCr of 0.96 mg/dL).      Microbiology:  Blood Culture   Date Value Ref Range Status   11/30/2018 No growth at 2 days  Preliminary   11/30/2018 No growth at 2 days  Preliminary   11/28/2018 Staphylococcus aureus, MRSA (A)  Final     Comment:       Consider infectious disease consult to rule out distant focus of infection.  Methicillin resistant Staphylococcus aureus, Patient may be an isolation risk.   11/28/2018 Staphylococcus aureus, MRSA (A)  Final     Comment:       Consider infectious  disease consult to rule out distant focus of infection.  Methicillin resistant Staphylococcus aureus, Patient may be an isolation risk.       BCID, PCR   Date Value Ref Range Status   11/28/2018 (C) No organism detected by BCID PCR. Final    Staphylococcus aureus. mecA (methicillin resistance gene) detected. Identification by BCID PCR.                 Wound Culture   Date Value Ref Range Status   11/30/2018 (A)  Final    Scant growth (1+) Staphylococcus, coagulase negative     Comment:     Susceptibility will not be done unless Doctor notifies Lab             Radiology:  Ct Soft Tissue Neck Without Contrast    Result Date: 12/1/2018  Markedly enlarged right parotid gland and associated inflammation presumably acute parotitis. No visible abscess.  DICTATED:   11/28/2018 EDITED/ls :   11/28/2018   This report was finalized on 12/1/2018 10:51 AM by DR. Brian Cosme MD.      Xr Chest 1 View    Result Date: 11/29/2018  Cardiomegaly and mild pulmonary venous hypertension.  D:  11/28/2018 E:  11/28/2018  This report was finalized on 11/29/2018 9:15 AM by DR. Brian Cosme MD.      Us Nonvascular Extremity Limited    Result Date: 11/29/2018  Imaging characteristics are compatible with superficial abscess or other complex fluid collection, just over 5 cm maximally.  D:  11/29/2018 E:  11/29/2018  This report was finalized on 11/29/2018 10:14 AM by Scott Sauer.      Xr Abdomen Kub    Result Date: 11/28/2018  Tip of NASOGASTRIC/ENTERIC tube distal to the gastroesophageal junction in the gastric fundus. THIS DOCUMENT HAS BEEN ELECTRONICALLY SIGNED BY NIRAJ SANDOVAL MD        Impression:   MRSA bactertemia  Left leg abscess   Parotitis right side  Encephalopathy  COPD  Acute renal failure on chronic      PLAN/RECOMMENDATIONS:     Estimated Creatinine Clearance: 51.4 mL/min (by C-G formula based on SCr of 0.96 mg/dL).    Source of MRSA bacteremia could be from the parotid gland; no abscess seen on initial imaging;  Will be interesting  to see if leg culures grow MRSA; a leg abscess can spontaneously develop and usually doesn't lead to positive blood cultures.    Regardless; high grade bacteremia is still concerning for endovascular involvement.    YANNA tomorrow    Given pulmonary involvement probably not active concern would change abx:    Continue daptomycin 8 mg/kg iv now/daily unless creatinine clearance decreases below 30 mL/min  Warm compresses to right parotid gland  YANNA--ordered for 12/4 and made NPO after MN  F/u wound cultures    Patient is complexly ill    D/w family    Ok with placing picc line  Olivier Gtz MD  12/4/2018  2:44 PM

## 2018-12-04 NOTE — PROGRESS NOTES
Continued Stay Note  [facility]     Patient Name: Leila Smith  MRN: [id number]  Today's Date: 12/4/2018    Admit Date: 11/28/2018    Discharge Plan     Row Name 12/04/18 1543       Plan    Plan  To [facility] when medically ready     Plan Comments  Notified [facility] office that the patient has new PT and OT notes charted and that they can begin pre-cert with the patients insurance. Possible that the patient could get insurance approval by tomorrow afternoon. CM following - emily [phone]         Discharge Codes    No documentation.       Expected Discharge Date and Time     Expected Discharge Date Expected Discharge Time    Dec 5, 2018             Emily Sarah RN

## 2018-12-04 NOTE — PROGRESS NOTES
"[facility] at [facility] Progress Note     LOS: 6 days   Patient Care Team:  Ciaran Wakefield MD as PCP - General (Internal Medicine)  PCP:  Ciaran Wakefield MD    Chief Complaint:  Atrial fibrillation    SUBJECTIVE:   Telemetry reveals sinus rhythm.  More interactive today.  Awake and alert, remains essentially nonverbal but will smile to her greeting.      Review of Systems:   All systems have been reviewed and are negative with the exception of those mentioned above.      OBJECTIVE:    Vital Sign Min/Max for last 24 hours  Temp  Min: 97.4 °F (36.3 °C)  Max: 98 °F (36.7 °C)   BP  Min: 131/62  Max: 154/71   Pulse  Min: 57  Max: 72   Resp  Min: 18  Max: 18   SpO2  Min: 98 %  Max: 99 %   No Data Recorded   Weight  Min: 78.8 kg (173 lb 11.6 oz)  Max: 78.8 kg (173 lb 11.6 oz)     Flowsheet Rows      First Filed Value   Admission Height  162.6 cm (64\") Documented at 11/28/2018 1153   Admission Weight  90.7 kg (200 lb) Documented at 11/28/2018 1153            Intake/Output Summary (Last 24 hours) at 12/4/2018 1318  Last data filed at 12/4/2018 1055  Gross per 24 hour   Intake 6638 ml   Output 1850 ml   Net 4788 ml     Intake & Output (last 3 days)       12/01 0701 - 12/02 0700 12/02 0701 - 12/03 0700 12/03 0701 - 12/04 0700 12/04 0701 - 12/05 0700    I.V. (mL/kg) 281.9 (3.7) 1000 (13) 5125 (65)     Other 70 430 323 100    NG/ 1803 1090     IV Piggyback 151.8       Total Intake(mL/kg) 974.7 (12.7) 3233 (42) 6538 (83) 100 (1.3)    Urine (mL/kg/hr) 1390 (0.8) 825 (0.4) 1375 (0.7) 250 (0.5)    Stool 350 150 225 200    Total Output 0285 418 8398 450    Net -765.3 +2258 +4938 -350                   Physical Exam:    General Appearance:    Nonverbal.  Response to physical stimulation.     Neck:   Supple, symmetrical, trachea midline.   Lungs:     Clear to auscultation bilaterally, respirations unlabored   Chest Wall:    No tenderness or deformity    Heart:    Regular rate and rhythm, S1 and " S2 normal, no murmur, rub   or gallop, normal carotid impulse bilaterally without bruit.   Extremities:   Extremities normal, atraumatic, no cyanosis or edema   Pulses:   2+ and symmetric all extremities   Skin:   Skin color, texture, turgor normal, no rashes or lesions      LABS/DIAGNOSTIC DATA:  Results from last 7 days   Lab Units  12/03/18   0652  12/02/18   0546  12/01/18   0403   WBC 10*3/mm3  17.14*  21.88*  28.75*   HEMOGLOBIN g/dL  9.8*  10.0*  10.4*   HEMATOCRIT %  33.6*  34.2*  34.9   PLATELETS 10*3/mm3  179  171  195     No results found for: TROPONINT      Results from last 7 days   Lab Units  12/04/18   0520  12/03/18   0652  12/02/18   0546   11/30/18   0440   11/28/18   1238   SODIUM mmol/L  141  143  146   < >  146   < >  146   POTASSIUM mmol/L  5.0  3.3*  3.5   < >  3.0*   < >  7.3*   CHLORIDE mmol/L  110*  109  109   < >  107   < >  123*   CO2 mmol/L  25.0  28.0  28.0   < >  26.0   < >  12.0*   BUN mg/dL  31*  33*  43*   < >  85*   < >  149*   CREATININE mg/dL  0.96  1.10  1.31*   < >  2.02*   < >  4.27*   CALCIUM mg/dL  6.9*  7.6*  7.3*   < >  8.3*   < >  9.5   BILIRUBIN mg/dL   --   0.5   --    --   0.6   --   0.5   ALK PHOS U/L   --   140*   --    --   114*   --   141*   ALT (SGPT) U/L   --   15   --    --   16   --   21   AST (SGOT) U/L   --   16   --    --   14   --   18   GLUCOSE mg/dL  229*  214*  311*   < >  329*   < >  384*    < > = values in this interval not displayed.     Results from last 7 days   Lab Units  11/28/18   1205   HEMOGLOBIN A1C %  7.90*     Results from last 7 days   Lab Units  12/03/18   0652   CHOLESTEROL mg/dL  113   TRIGLYCERIDES mg/dL  161*     Results from last 7 days   Lab Units  11/28/18   1238   TSH mIU/mL  1.037     Results from last 7 days   Lab Units  11/28/18   1205   BNP pg/mL  441.0*       Medication Review:     amiodarone 200 mg Oral Q24H   amLODIPine 5 mg Oral Q24H   aspirin 81 mg Oral Daily   castor oil-balsam peru  Topical Q12H   DAPTOmycin 8 mg/kg  (Adjusted) Intravenous Q24H   diltiaZEM 60 mg Oral Q6H   heparin (porcine) 5,000 Units Subcutaneous Q8H   insulin detemir 15 Units Subcutaneous BID   insulin regular 0-14 Units Subcutaneous Q6H   metoprolol tartrate 50 mg Oral Q12H   sodium chloride 3 mL Intravenous Q12H   thiamine 100 mg Oral Daily              Altered mental status    Diabetes mellitus, type 2 (CMS/[facility])    Hypertension    Hyperlipidemia    Hypothyroidism    CAD (coronary artery disease)    Chronic obstructive pulmonary disease with acute exacerbation (CMS/[facility])    Congestive heart failure (CMS/[facility])    Acute renal failure superimposed on stage 3 chronic kidney disease (CMS/[facility])    Hyperkalemia    Ventricular tachyarrhythmia (CMS/[facility])    Sepsis due to methicillin resistant Staphylococcus aureus (MRSA) (CMS/[facility])    Acute parotitis      ASSESSMENT/PLAN:  -Paroxysmal atrial fibrillation  -Ventricular tachycardia versus A. fib with aberrancy in the setting of acute renal failure with hyperkalemia and sepsis  -MRSA bacteremia    -Discontinuing amiodarone  -Restarting Eliquis  -Continue beta-blockade and calcium channel blockade  -Transesophageal echocardiogram pending for tomorrow, if study is negative for endocarditis we'll sign off and follow on an as-needed basis.    Balwinder Marquez III, MD   12/04/18  1:18 PM

## 2018-12-04 NOTE — PLAN OF CARE
Problem: Patient Care Overview  Goal: Plan of Care Review  Outcome: Ongoing (interventions implemented as appropriate)   12/04/18 6800   Coping/Psychosocial   Plan of Care Reviewed With patient   OTHER   Outcome Summary OT IE completed. Pt presents with low arousal, confusion and poor participation (easily agitated). Dependent for bed mobility. Refused B UE ROM. Transfers deferred due to increased agitation/frustration with stimulation. OT to follow 3x/week MWF. D/C plan is return to ECF/NH at [facility]

## 2018-12-04 NOTE — PLAN OF CARE
Problem: Patient Care Overview  Goal: Plan of Care Review  Outcome: Ongoing (interventions implemented as appropriate)   12/04/18 2613   Coping/Psychosocial   Plan of Care Reviewed With patient   Plan of Care Review   Progress no change   OTHER   Outcome Summary PT eval completed. Patient limited by confusion, lethargy, and decreased participation. Pt. required dep A for bed mobility and became agitated w/ BLE ROM and rolling. Transfer to chair not safe to attempt. Recommend IPPT 3x/week, pending progress, and return to NH at D/C.

## 2018-12-04 NOTE — PROGRESS NOTES
[facility] Medicine Services  PROGRESS NOTE    Patient Name: Leila Smith  : 1945  MRN: [id number]    Date of Admission: 2018  Length of Stay: 6  Primary Care Physician: Ciaran Wakefield MD    Subjective   Subjective     CC:  F/U multiple issues/MRSA bacteremia    HPI:  No acute events reported from O/N. Pt is more alert, but is not cooperative today or follow any simple commands, poss a little more confused, but difficult to tell, pt is nonverbal.  No family at bedside. Afebrile.    Review of Systems  Unobtainable    Objective   Objective     Vital Signs:   Temp:  [97.4 °F (36.3 °C)-98 °F (36.7 °C)] 98 °F (36.7 °C)  Heart Rate:  [57-72] 58  Resp:  [18] 18  BP: (131-154)/(61-81) 146/73        Physical Exam:  GEN- chronically ill appearing  HEENT- atraumatic, normocephlic, Right face neck is edematous and tender to palpation with mild skin induration, but no obvious underlying fluctuance was appreciable on my exam (relatively stable from prev exam), NGT in place  NECK- supple, trachea midline  RESP: CTAB, normal effort, resp non-labored  CV: RRR, no murmurs, s1/s2 normal  MSK: no LE edema noted  NEURO: alert, nonverbal, face grossly symmetric, moves all ext  SKIN: Thick black/hyperplastic scales/carring on feet, bilat shin wounds packed (appears clean), + mild drainage   PSYCH: Affect is flat    Results Reviewed:  I have personally reviewed current lab, radiology, and data and agree.    Results from last 7 days   Lab Units  18   0652  18   0546  18   0403   WBC 10*3/mm3  17.14*  21.88*  28.75*   HEMOGLOBIN g/dL  9.8*  10.0*  10.4*   HEMATOCRIT %  33.6*  34.2*  34.9   PLATELETS 10*3/mm3  179  171  195     Results from last 7 days   Lab Units  18   0520  18   0652  18   0546   18   0440   18   1238   SODIUM mmol/L  141  143  146   < >  146   < >  146   POTASSIUM mmol/L  5.0  3.3*  3.5   < >  3.0*   < >  7.3*   CHLORIDE mmol/L   110*  109  109   < >  107   < >  123*   CO2 mmol/L  25.0  28.0  28.0   < >  26.0   < >  12.0*   BUN mg/dL  31*  33*  43*   < >  85*   < >  149*   CREATININE mg/dL  0.96  1.10  1.31*   < >  2.02*   < >  4.27*   GLUCOSE mg/dL  229*  214*  311*   < >  329*   < >  384*   CALCIUM mg/dL  6.9*  7.6*  7.3*   < >  8.3*   < >  9.5   ALT (SGPT) U/L   --   15   --    --   16   --   21   AST (SGOT) U/L   --   16   --    --   14   --   18    < > = values in this interval not displayed.     Estimated Creatinine Clearance: 51.4 mL/min (by C-G formula based on SCr of 0.96 mg/dL).  No results found for: BNP    Microbiology Results Abnormal     Procedure Component Value - Date/Time    Tissue / Bone Culture - Tissue, Leg, Right [[id number]] Collected:  11/29/18 1801    Lab Status:  Preliminary result Specimen:  Tissue from Leg, Right Updated:  12/04/18 1154     Tissue Culture No growth at 4 days     Gram Stain Many (4+) Red blood cells      Many (4+) WBCs seen      No organisms seen    Blood Culture - Blood, Hand, Left [[id number]] Collected:  11/30/18 1005    Lab Status:  Preliminary result Specimen:  Blood from Hand, Left Updated:  12/04/18 1030     Blood Culture No growth at 4 days    Blood Culture - Blood, Hand, Right [[id number]] Collected:  11/30/18 1005    Lab Status:  Preliminary result Specimen:  Blood from Hand, Right Updated:  12/04/18 1030     Blood Culture No growth at 4 days    Wound Culture - Drainage, Ear, Right [[id number]]  (Abnormal) Collected:  11/30/18 1647    Lab Status:  Final result Specimen:  Drainage from Ear, Right Updated:  12/03/18 0852     Wound Culture Scant growth (1+) Staphylococcus, coagulase negative     Comment: Susceptibility will not be done unless Doctor notifies Lab            Gram Stain No WBCs or organisms seen    Blood Culture - Blood, Arm, Left [[id number]]  (Abnormal) Collected:  11/28/18 1353    Lab Status:  Final result Specimen:  Blood from Arm, Left Updated:  12/01/18 0823     Blood Culture  Staphylococcus aureus, MRSA     Comment:   Consider infectious disease consult to rule out distant focus of infection.  Methicillin resistant Staphylococcus aureus, Patient may be an isolation risk.        Isolated from Aerobic and Anaerobic Bottles     Gram Stain Aerobic Bottle Gram positive cocci in groups      Anaerobic Bottle Gram positive cocci in groups    Narrative:       PRIOR POSITIVE CULTURE WITH SAME MORPHOLOGY CALLED  Refer Culture to #[id number]    Blood Culture - Blood, Arm, Left [[id number]]  (Abnormal)  (Susceptibility) Collected:  11/28/18 1340    Lab Status:  Final result Specimen:  Blood from Arm, Left Updated:  12/01/18 0822     Blood Culture Staphylococcus aureus, MRSA     Comment:   Consider infectious disease consult to rule out distant focus of infection.  Methicillin resistant Staphylococcus aureus, Patient may be an isolation risk.        Isolated from Aerobic and Anaerobic Bottles     Gram Stain Aerobic Bottle Gram positive cocci in groups      Anaerobic Bottle Gram positive cocci in clusters    Susceptibility      Staphylococcus aureus, MRSA     DIMITRIS     Ciprofloxacin Resistant     Clindamycin Susceptible     Daptomycin Susceptible     Erythromycin Susceptible     Gentamicin Susceptible     Levofloxacin Intermediate [1]      Linezolid Susceptible     Oxacillin Resistant     Penicillin G Resistant     Quinupristin + Dalfopristin Susceptible     Rifampin Susceptible     Tetracycline Susceptible     Trimethoprim + Sulfamethoxazole Susceptible     Vancomycin Susceptible            [1]   Staphylococcus species may develop resistance during prolonged therapy with quinolones.  Isolates that are initially susceptible may become resistant within three to four days after initiation of therapy. Testing of repeat isolates may be warranted.                 Blood Culture ID, PCR - Blood, Arm, Left [[id number]]  (Abnormal) Collected:  11/28/18 1340    Lab Status:  Final result Specimen:  Blood from Arm, Left  Updated:  11/29/18 0925     BCID, PCR Staphylococcus aureus. mecA (methicillin resistance gene) detected. Identification by BCID PCR.          Imaging Results (last 24 hours)     ** No results found for the last 24 hours. **        Results for orders placed during the hospital encounter of 11/28/18   Adult Transthoracic Echo Complete W/ Cont if Necessary Per Protocol    Narrative · Left ventricular systolic function is normal. Estimated EF = 55%.  · Left ventricular wall thickness is consistent with moderate-to-severe   concentric hypertrophy.  · The left ventricular cavity is small.  · Left ventricular diastolic dysfunction (grade I a) consistent with   impaired relaxation.  · Left atrial cavity size is mildly dilated.  · There is moderate calcification of the aortic valve.  · Mild to moderate aortic valve regurgitation is present.  · Mild pulmonic valve regurgitation is present.  · Mild tricuspid valve regurgitation is present.  · Mild dilation of the ascending aorta is present.          I have reviewed the medications.      amLODIPine 5 mg Oral Q24H   apixaban 5 mg Oral Q12H   aspirin 81 mg Oral Daily   castor oil-balsam peru  Topical Q12H   DAPTOmycin 8 mg/kg (Adjusted) Intravenous Q24H   diltiaZEM 60 mg Oral Q6H   insulin detemir 15 Units Subcutaneous BID   insulin regular 0-14 Units Subcutaneous Q6H   metoprolol tartrate 50 mg Oral Q12H   sodium chloride 3 mL Intravenous Q12H   thiamine 100 mg Oral Daily         Assessment/Plan   Assessment / Plan     Active Hospital Problems    Diagnosis   • **Altered mental status   • Sepsis due to methicillin resistant Staphylococcus aureus (MRSA) (CMS/HCC)   • Acute parotitis   • Ventricular tachyarrhythmia (CMS/HCC)   • Acute renal failure superimposed on stage 3 chronic kidney disease (CMS/HCC)   • Hyperkalemia   • Congestive heart failure (CMS/HCC)   • Chronic obstructive pulmonary disease with acute exacerbation (CMS/HCC)   • CAD (coronary artery disease)     History  of  Coronary Artery Disease V12.59     • Diabetes mellitus, type 2 (CMS/HCC)   • Hypertension   • Hypothyroidism   • Hyperlipidemia          Brief Hospital Course to date:  Leila Smith is a 73 y.o. female nursing home resident who was admitted to the ICU with Vtach/hyperkalemia, also found to have acute on chronic renal failure. Noted to have UTI on admission as well as MRSA bacteremia. Also treated with parotid sialadenitis evaluated by ENT, right leg hematoma evaluated and drained by surgery 11/29.       Assessment & Plan:    MRSA bacteremia, sepsis---- significant leukocytosis remains, but trending down, no longer febrile. Unclear source, multiple potential source including UTI, parotid sialdenitis, leg hematoma. Per ID, a leg abscess can spontaneously develop but usually doesn't lead to + blood cultures. Wound cultures from leg growing coag neg staph so far. Repeat blood cultures NGTD. TTE negative for cardiac involvement, but will be getting YANNA tomorrow. NPO MN. Abx per Dr Gtz.     ANGELA---nephrology following. Feel likely pre-renal secondary to dehydration. Improving and creatinine back to baseline (1.2-1.4), monitor daily BMP. IVF has been DC'ed. Nutritionist to follow and adjust TF/flushes to meet daily fluid requirement.    Vtach/hyperkalemia---s/p cardioverted at the scene with return to sinus rhythm. An I/O needle was placed and she converted back to VT, then reconverted to SR spontaneously. She was treated with insulin/gluscose and calcium gluconate for hyperkalemia. Cardiology following. Inder amiodarone d/c'ed today by cardiology. Pt cont on home metoprolol and Diltiazem. Anticoagulation resumed as well. Hyperkalemia now normalized. Monitor closely    DM2---previously uncontrolled, but A1C now down to 7.9%, was 10.2 in 1/2018. Currently still hyperglycemic, likely reactive. Will DC scheduled short acting insulin and add basal insulin in addition to PRN SSI.    Parotiditis/sialadenitis---ENT  following, cont with abx. No surgery needed at this time.     Encephalopathy, acute on chronic----likely multifactorial, per previous notes and documentation, patient is confused at baseline, though usually more interactive than current status.     Dysphagia---on TF, ongoing ST eval and treatment as indicated, no recent notes, RN to ask them to cont eval and treat while on TF.    Difficult IV access--keep old peripheral access for now, PICC consult, will need long term abx. Need family consent.    Debility/weakness-- will need rehab once medically stable.    DVT Prophylaxis:  heparin    CODE STATUS:   Code Status and Medical Interventions:   Ordered at: 11/28/18 1331     Code Status:    CPR     Medical Interventions (Level of Support Prior to Arrest):    Full       Disposition: I expect the patient to be discharged back to SNF pending improvement and final abx plans.      Electronically signed by Shandra Oscar MD, 12/04/18, 4:06 PM.

## 2018-12-05 NOTE — CONSULTS
Placed by Jadyn Bryson RN - confirmed with 3cg technology.  RUE single lumen PiCC with 36cm total and 0cm exposed.  PICC ok with Dr. Thakkar

## 2018-12-05 NOTE — PLAN OF CARE
Problem: Fall Risk (Adult)  Goal: Absence of Fall  Outcome: Ongoing (interventions implemented as appropriate)      Problem: Skin Injury Risk (Adult)  Goal: Skin Health and Integrity  Outcome: Ongoing (interventions implemented as appropriate)      Problem: Patient Care Overview  Goal: Plan of Care Review  Outcome: Ongoing (interventions implemented as appropriate)   12/05/18 0348   Coping/Psychosocial   Plan of Care Reviewed With patient   Plan of Care Review   Progress no change   OTHER   Outcome Summary Pt continues to become agitated each time care is provided. VSS, no events overnight. YANNA today as well as SLP re-eval with possible return home to NH soon       Problem: Confusion, Acute (Adult)  Goal: Identify Related Risk Factors and Signs and Symptoms  Outcome: Ongoing (interventions implemented as appropriate)    Goal: Cognitive/Functional Impairments Minimized  Outcome: Ongoing (interventions implemented as appropriate)    Goal: Safety  Outcome: Ongoing (interventions implemented as appropriate)

## 2018-12-05 NOTE — PROGRESS NOTES
[facility] Medicine Services  PROGRESS NOTE    Patient Name: Leila Smith  : 1945  MRN: [id number]    Date of Admission: 2018  Length of Stay: 7  Primary Care Physician: Ciaran Wakefield MD    Subjective   Subjective     CC:  F/U multiple issues/MRSA bacteremia    HPI:  No significant events O/N. Pt is alert, but have been more confused and intermittently agitated. No family present. Pt is nonverbal. Afebrile.    Review of Systems  Unobtainable    Objective   Objective     Vital Signs:   Temp:  [97 °F (36.1 °C)-98 °F (36.7 °C)] 97 °F (36.1 °C)  Heart Rate:  [61-72] 72  Resp:  [16-18] 18  BP: (105-148)/(52-84) 148/75        Physical Exam: Unchanged from yesterday  GEN- chronically ill appearing  HEENT- atraumatic, normocephlic, Right face neck is edematous and tender to palpation with mild skin induration, but no obvious underlying fluctuance was appreciable on my exam (relatively stable from prev exam), NGT in place  NECK- supple, trachea midline  RESP: CTAB, normal effort, resp non-labored  CV: RRR, no murmurs, s1/s2 normal  MSK: no LE edema noted  NEURO: alert, nonverbal, face grossly symmetric, moves all ext  SKIN: Thick black/hyperplastic scales/carring on feet, bilat shin wounds packed (appears clean), + mild drainage   PSYCH: Affect is flat    Results Reviewed:  I have personally reviewed current lab, radiology, and data and agree.    Results from last 7 days   Lab Units  18   0652  18   0546  18   0403   WBC 10*3/mm3  17.14*  21.88*  28.75*   HEMOGLOBIN g/dL  9.8*  10.0*  10.4*   HEMATOCRIT %  33.6*  34.2*  34.9   PLATELETS 10*3/mm3  179  171  195     Results from last 7 days   Lab Units  18   0520  18   0652  18   0546   18   0440   SODIUM mmol/L  141  143  146   < >  146   POTASSIUM mmol/L  5.0  3.3*  3.5   < >  3.0*   CHLORIDE mmol/L  110*  109  109   < >  107   CO2 mmol/L  25.0  28.0  28.0   < >  26.0   BUN mg/dL  31*   33*  43*   < >  85*   CREATININE mg/dL  0.96  1.10  1.31*   < >  2.02*   GLUCOSE mg/dL  229*  214*  311*   < >  329*   CALCIUM mg/dL  6.9*  7.6*  7.3*   < >  8.3*   ALT (SGPT) U/L   --   15   --    --   16   AST (SGOT) U/L   --   16   --    --   14    < > = values in this interval not displayed.     Estimated Creatinine Clearance: 52.8 mL/min (by C-G formula based on SCr of 0.96 mg/dL).  No results found for: BNP    Microbiology Results Abnormal     Procedure Component Value - Date/Time    Tissue / Bone Culture - Tissue, Leg, Right [[id number]] Collected:  11/29/18 1801    Lab Status:  Preliminary result Specimen:  Tissue from Leg, Right Updated:  12/05/18 1356     Tissue Culture No growth at 6 days     Gram Stain Many (4+) Red blood cells      Many (4+) WBCs seen      No organisms seen    Blood Culture - Blood, Hand, Left [[id number]] Collected:  11/30/18 1005    Lab Status:  Final result Specimen:  Blood from Hand, Left Updated:  12/05/18 1030     Blood Culture No growth at 5 days    Blood Culture - Blood, Hand, Right [[id number]] Collected:  11/30/18 1005    Lab Status:  Final result Specimen:  Blood from Hand, Right Updated:  12/05/18 1030     Blood Culture No growth at 5 days    Wound Culture - Drainage, Ear, Right [[id number]]  (Abnormal) Collected:  11/30/18 1647    Lab Status:  Final result Specimen:  Drainage from Ear, Right Updated:  12/03/18 0852     Wound Culture Scant growth (1+) Staphylococcus, coagulase negative     Comment: Susceptibility will not be done unless Doctor notifies Lab            Gram Stain No WBCs or organisms seen    Blood Culture - Blood, Arm, Left [[id number]]  (Abnormal) Collected:  11/28/18 1353    Lab Status:  Final result Specimen:  Blood from Arm, Left Updated:  12/01/18 0823     Blood Culture Staphylococcus aureus, MRSA     Comment:   Consider infectious disease consult to rule out distant focus of infection.  Methicillin resistant Staphylococcus aureus, Patient may be an isolation  risk.        Isolated from Aerobic and Anaerobic Bottles     Gram Stain Aerobic Bottle Gram positive cocci in groups      Anaerobic Bottle Gram positive cocci in groups    Narrative:       PRIOR POSITIVE CULTURE WITH SAME MORPHOLOGY CALLED  Refer Culture to #[id number]    Blood Culture - Blood, Arm, Left [[id number]]  (Abnormal)  (Susceptibility) Collected:  11/28/18 1340    Lab Status:  Final result Specimen:  Blood from Arm, Left Updated:  12/01/18 0822     Blood Culture Staphylococcus aureus, MRSA     Comment:   Consider infectious disease consult to rule out distant focus of infection.  Methicillin resistant Staphylococcus aureus, Patient may be an isolation risk.        Isolated from Aerobic and Anaerobic Bottles     Gram Stain Aerobic Bottle Gram positive cocci in groups      Anaerobic Bottle Gram positive cocci in clusters    Susceptibility      Staphylococcus aureus, MRSA     DIMITRIS     Ciprofloxacin Resistant     Clindamycin Susceptible     Daptomycin Susceptible     Erythromycin Susceptible     Gentamicin Susceptible     Levofloxacin Intermediate [1]      Linezolid Susceptible     Oxacillin Resistant     Penicillin G Resistant     Quinupristin + Dalfopristin Susceptible     Rifampin Susceptible     Tetracycline Susceptible     Trimethoprim + Sulfamethoxazole Susceptible     Vancomycin Susceptible            [1]   Staphylococcus species may develop resistance during prolonged therapy with quinolones.  Isolates that are initially susceptible may become resistant within three to four days after initiation of therapy. Testing of repeat isolates may be warranted.                 Blood Culture ID, PCR - Blood, Arm, Left [[id number]]  (Abnormal) Collected:  11/28/18 1340    Lab Status:  Final result Specimen:  Blood from Arm, Left Updated:  11/29/18 0925     BCID, PCR Staphylococcus aureus. mecA (methicillin resistance gene) detected. Identification by BCID PCR.          Imaging Results (last 24 hours)     ** No results  found for the last 24 hours. **        Results for orders placed during the hospital encounter of 11/28/18   Adult Transthoracic Echo Complete W/ Cont if Necessary Per Protocol    Narrative · Left ventricular systolic function is normal. Estimated EF = 55%.  · Left ventricular wall thickness is consistent with moderate-to-severe   concentric hypertrophy.  · The left ventricular cavity is small.  · Left ventricular diastolic dysfunction (grade I a) consistent with   impaired relaxation.  · Left atrial cavity size is mildly dilated.  · There is moderate calcification of the aortic valve.  · Mild to moderate aortic valve regurgitation is present.  · Mild pulmonic valve regurgitation is present.  · Mild tricuspid valve regurgitation is present.  · Mild dilation of the ascending aorta is present.          I have reviewed the medications.      amLODIPine 5 mg Oral Q24H   apixaban 5 mg Oral Q12H   aspirin 81 mg Oral Daily   castor oil-balsam peru  Topical Q12H   DAPTOmycin 8 mg/kg (Adjusted) Intravenous Q24H   diltiaZEM 60 mg Oral Q6H   insulin detemir 15 Units Subcutaneous BID   insulin regular 0-14 Units Subcutaneous Q6H   metoprolol tartrate 50 mg Oral Q12H   sodium chloride 10 mL Intravenous Q12H   sodium chloride 3 mL Intravenous Q12H   thiamine 100 mg Oral Daily         Assessment/Plan   Assessment / Plan     Active Hospital Problems    Diagnosis   • **Altered mental status   • Sepsis due to methicillin resistant Staphylococcus aureus (MRSA) (CMS/[facility])   • Acute parotitis   • Ventricular tachyarrhythmia (CMS/[facility])   • Acute renal failure superimposed on stage 3 chronic kidney disease (CMS/[facility])   • Hyperkalemia   • Congestive heart failure (CMS/[facility])   • Chronic obstructive pulmonary disease with acute exacerbation (CMS/[facility])   • CAD (coronary artery disease)     History of  Coronary Artery Disease V12.59     • Diabetes mellitus, type 2 (CMS/HCC)   • Hypertension   • Hypothyroidism   • Hyperlipidemia          Brief  Hospital Course to date:  Leila Smith is a 73 y.o. female nursing home resident who was admitted to the ICU with Vtach/hyperkalemia, also found to have acute on chronic renal failure. Noted to have UTI on admission as well as MRSA bacteremia. Also treated with parotid sialadenitis evaluated by ENT, right leg hematoma evaluated and drained by surgery 11/29.       Assessment & Plan:    MRSA bacteremia, sepsis---- significant leukocytosis remains, but trending down, no longer febrile. Unclear source, multiple potential source including UTI, parotid sialdenitis, leg hematoma. Per ID, a leg abscess can spontaneously develop but usually doesn't lead to + blood cultures. Wound cultures from leg growing coag neg staph so far. Repeat blood cultures NGTD. TTE negative for cardiac involvement. YANNA unsuccessful today due to presence of NGT/unable to pass endoscope equipment. Plan is to cont TF until MN, then DC NGT. ST will evaluate, poss need MBS. Slightly oliguric today, likely from TF being held. Will give additional 500cc NS. Abx per Dr Gtz.     ANGELA---nephrology following. Feel likely pre-renal secondary to dehydration. Improving and creatinine back to baseline (1.2-1.4), monitor daily BMP. IVF has been DC'ed. Nutritionist to follow and adjust TF/flushes to meet daily fluid requirement.    Vtach/hyperkalemia---s/p cardioverted at the scene with return to sinus rhythm. An I/O needle was placed and she converted back to VT, then reconverted to SR spontaneously. She was treated with insulin/gluscose and calcium gluconate for hyperkalemia. Cardiology following. Inder amiodarone d/c'ed today by cardiology. Pt cont on home metoprolol and Diltiazem. Anticoagulation resumed as well. Hyperkalemia now normalized. Monitor closely    DM2---previously uncontrolled, but A1C now down to 7.9%, was 10.2 in 1/2018. Currently still hyperglycemic, likely reactive. Will DC scheduled short acting insulin and add basal insulin in addition to  PRN SSI.    Parotiditis/sialadenitis---ENT following, cont with abx/compress. No surgery needed at this time.     Encephalopathy, acute on chronic----likely multifactorial, per previous notes and documentation, patient is confused at baseline, though usually more interactive than current status.     Dysphagia---on TF, ongoing ST eval and treatment as indicated. Hopefully ST can eval pt after YANNA tomorrow, if unable to pass swallow eval and not safe to eat by mouth, then will need to discuss long term nutrition route/GOC with family.    Difficult IV access--PICC inserted.    Debility/weakness-- will need rehab once medically stable.    DVT Prophylaxis:  heparin    CODE STATUS:   Code Status and Medical Interventions:   Ordered at: 11/28/18 1331     Code Status:    CPR     Medical Interventions (Level of Support Prior to Arrest):    Full       Disposition: I expect the patient to be discharged back to SNF pending improvement and final abx plans.      Electronically signed by Shandra Oscar MD, 12/05/18, 5:51 PM.

## 2018-12-05 NOTE — PLAN OF CARE
Problem: Patient Care Overview  Goal: Plan of Care Review  Outcome: Ongoing (interventions implemented as appropriate)   12/05/18 0900   Coping/Psychosocial   Plan of Care Reviewed With patient   SLP clinical swallow evaluation completed. Will continue to address suspected pharyngeal dysphagia. Please see note for further details and recommendations.

## 2018-12-05 NOTE — NURSING NOTE
Appliance intact. Large amount of liquid output noted. Left extra convexity appliance in room for patient. No identifiable issues at this time. Thanks

## 2018-12-05 NOTE — THERAPY EVALUATION
Acute Care - Speech Language Pathology   Swallow Initial Evaluation [facility]   Clinical Swallow Evaluation     Patient Name: Leila Smith  : 1945  MRN: [id number]  Today's Date: 2018  Onset of Illness/Injury or Date of Surgery: 18     Referring Physician: SAUL Oscar MD      Admit Date: 2018    Visit Dx:     ICD-10-CM ICD-9-CM   1. Impaired functional mobility, balance, gait, and endurance Z74.09 V49.89   2. Dysphagia, unspecified type R13.10 787.20     Patient Active Problem List   Diagnosis   • Diabetes mellitus, type 2 (CMS/[facility])   • Hypertension   • Hyperlipidemia   • Hypothyroidism   • Chronic obstructive pulmonary disease (CMS/[facility])   • CAD (coronary artery disease)   • History of edema   • History of obesity   • Venous insufficiency   • Open wound of lower extremity   • Urinary tract infection   • T2DM (type 2 diabetes mellitus) (CMS/[facility])   • Dyspnea on exertion   • Peripheral vascular disease (CMS/[facility])   • Obstructive sleep apnea syndrome   • Ulcer of lower extremity (CMS/[facility])   • Hypoxemia   • Hematuria   • Diabetic peripheral neuropathy (CMS/[facility])   • Edema   • Chronic obstructive pulmonary disease with acute exacerbation (CMS/[facility])   • Congestive heart failure (CMS/[facility])   • Arthritis   • Neutrophilic leukocytosis   • Cystitis   • Acute renal failure superimposed on stage 3 chronic kidney disease (CMS/[facility])   • Hyperkalemia   • Leukocytosis   • Elevated troponin   • Altered mental status   • Ventricular tachyarrhythmia (CMS/[facility])   • Sepsis due to methicillin resistant Staphylococcus aureus (MRSA) (CMS/[facility])   • Acute parotitis     Past Medical History:   Diagnosis Date   • Atrial fibrillation (CMS/[facility])    • Chronic ulcer of right leg (CMS/[facility])    • Cognitive communication deficit    • COPD (chronic obstructive pulmonary disease) (CMS/[facility])    • Diabetes mellitus (CMS/[facility])    • Difficulty in walking    • Encephalopathy    • Generalized muscle weakness    • Hematuria    •  Hyperlipidemia    • Hypertension    • Hypothyroidism    • Urinary tract infection      Past Surgical History:   Procedure Laterality Date   • CATARACT EXTRACTION     • CHOLECYSTECTOMY     • COLOSTOMY     • FOOT SURGERY Right    • HERNIA REPAIR     • HYSTERECTOMY     • TOTAL KNEE ARTHROPLASTY Right         SWALLOW EVALUATION (last 72 hours)      SLP Adult Swallow Evaluation     Row Name 12/05/18 0900                   Rehab Evaluation    Document Type  evaluation  (Pended)   -TC        Subjective Information  no complaints  (Pended)   -TC        Patient Observations  alert  (Pended)   -TC        Patient/Family Observations  No family present   (Pended)   -TC        Patient Effort  adequate  (Pended)   -TC        Comment  Pt admitted on 11/28, SLP inital consult received 12/5.   (Pended)   -TC        Symptoms Noted During/After Treatment  none  (Pended)   -TC           General Information    Patient Profile Reviewed  yes  (Pended)   -TC        Pertinent History Of Current Problem  Adm w/ AMS, MRSA, and R parotitis. H/o afib, COPD, HTN, DM, CAD, and CKD. Per chart, pt is minimally verbal at baseline.   (Pended)   -TC        Current Method of Nutrition  NPO;nasogastric feedings  (Pended)   -TC        Precautions/Limitations, Vision  WFL;for purposes of eval  (Pended)   -TC        Precautions/Limitations, Hearing  WFL;for purposes of eval  (Pended)   -TC        Prior Level of Function-Communication  other (see comments)  (Pended)  Per chart, pt is minimally verbal at baseline   -TC        Prior Level of Function-Swallowing  other (see comments)  (Pended)  unknown   -TC        Plans/Goals Discussed with  patient;agreed upon  (Pended)   -TC        Barriers to Rehab  cognitive status  (Pended)   -TC        Patient's Goals for Discharge  patient did not state  (Pended)   -TC           Pain Assessment    Additional Documentation  Pain Scale: Numbers Pre/Post-Treatment (Group)  (Pended)   -TC           Pain Scale: Numbers  Pre/Post-Treatment    Pain Scale: Numbers, Pretreatment  0/10 - no pain  (Pended)   -TC        Pain Scale: Numbers, Post-Treatment  0/10 - no pain  (Pended)   -TC           Oral Motor and Function    Dentition Assessment  poor oral hygiene  (Pended)   -TC        Secretion Management  WNL/WFL  (Pended)   -TC        Mucosal Quality  dry  (Pended)   -TC        Volitional Swallow  unable to elicit  (Pended)   -TC        Volitional Cough  unable to elicit  (Pended)   -TC           Oral Musculature and Cranial Nerve Assessment    Oral Motor General Assessment  unable to assess;other (see comments)  (Pended)  pt u/a to follow commands   -TC           General Eating/Swallowing Observations    Respiratory Support Currently in Use  nasal cannula  (Pended)   -TC        Eating/Swallowing Skills  fed by SLP  (Pended)   -TC        Positioning During Eating  upright in bed  (Pended)   -TC        Utensils Used  spoon  (Pended)   -TC        Consistencies Trialed  thin liquids;nectar/syrup-thick liquids;pureed;regular textures  (Pended)   -TC           Respiratory    Respiratory Status  increase in respiratory rate;other (see comments)  (Pended)  intermittently t/o eval   -TC           Clinical Swallow Eval    Oral Prep Phase  impaired  (Pended)   -TC        Pharyngeal Phase  suspected pharyngeal impairment  (Pended)   -TC        Clinical Swallow Evaluation Summary  Clinical swallow eval completed. Pt refused oral care. Trials given of thin and nectar-thick liquids via tsp, pureed, and solid consistencies.  Anterior loss w/ thin, intermittent wet vocal quality w/ thin and nectar-thick liquids. Pt requesting trials of solid, small trial given. Pt attempted to expel solid trial before mastication, trial was manually removed from pt's oral cavity. Pt w/ incr'd respirtory rate intermittently t/o eval. Recommend NPO w/ meds alternate route, continue oral care BID/PRN. Will plan for MBS tomorrow pending pt's cognitive status.     (Pended)    -TC           Oral Prep Concerns    Oral Prep Concerns  anterior loss;spits out food prior to swallow  (Pended)   -TC        Anterior Loss  thin  (Pended)   -TC        Spits Out Food Prior to Swallow  regular consistencies  (Pended)   -TC           Pharyngeal Phase Concerns    Pharyngeal Phase Concerns  wet vocal quality;other (see comments)  (Pended)  incr'd respirtory rate t/o eval   -TC        Wet Vocal Quality  thin;nectar  (Pended)   -TC           Clinical Impression    SLP Swallowing Diagnosis  suspected pharyngeal dysfunction  (Pended)   -TC        Functional Impact  risk of aspiration/pneumonia  (Pended)   -TC        Rehab Potential/Prognosis, Swallowing  re-evaluate goals as necessary  (Pended)   -TC        Swallow Criteria for Skilled Therapeutic Interventions Met  demonstrates skilled criteria  (Pended)   -TC           Recommendations    Therapy Frequency (Swallow)  evaluation only  (Pended)   -TC        Predicted Duration Therapy Intervention (Days)  until discharge  (Pended)   -TC        SLP Diet Recommendation  NPO  (Pended)   -TC        Recommended Diagnostics  VFSS (MBS)  (Pended)   -TC        SLP Rec. for Method of Medication Administration  meds via alternate route  (Pended)   -TC        Anticipated Dischage Disposition  skilled nursing facility;anticipate therapy at next level of care  (Pended)   -TC          User Key  (r) = Recorded By, (t) = Taken By, (c) = Cosigned By    Initials Name Effective Dates    TC Elvia Macedo, Speech Therapy Student 08/06/18 -           EDUCATION  The patient has been educated in the following areas:   Dysphagia (Swallowing Impairment) Oral Care/Hydration NPO rationale.    SLP Recommendation and Plan  SLP Swallowing Diagnosis: (P) suspected pharyngeal dysfunction  SLP Diet Recommendation: (P) NPO           Recommended Diagnostics: (P) VFSS (MBS)  Swallow Criteria for Skilled Therapeutic Interventions Met: (P) demonstrates skilled criteria  Anticipated Dischage  Disposition: (P) skilled nursing facility, anticipate therapy at next level of care  Rehab Potential/Prognosis, Swallowing: (P) re-evaluate goals as necessary  Therapy Frequency (Swallow): (P) evaluation only  Predicted Duration Therapy Intervention (Days): (P) until discharge       Plan of Care Reviewed With: (P) patient  Plan of Care Review  Plan of Care Reviewed With: (P) patient           Time Calculation:   Time Calculation- SLP     Row Name 12/05/18 1001             Time Calculation- SLP    SLP Start Time  0900  (Pended)   -TC      SLP Received On  12/05/18  (Pended)   -TC        User Key  (r) = Recorded By, (t) = Taken By, (c) = Cosigned By    Initials Name Provider Type    TC Elvia Macedo, Speech Therapy Student Speech Therapy Student          Therapy Charges for Today     Code Description Service Date Service Provider Modifiers Qty    [id number] HC ST EVAL ORAL PHARYNG SWALLOW 5 12/5/2018 Elvia Macedo, Speech Therapy Student GN 1               Elvia Macedo, Speech Therapy Student  12/5/2018

## 2018-12-05 NOTE — PROGRESS NOTES
"   LOS: 7 days    Patient Care Team:  Ciaran Wakefield MD as PCP - General (Internal Medicine)    Chief Complaint:  AMS    Subjective     Interval History:   No acute events overnight. No new complaints. ALert    Review of Systems:    The following systems were reviewed and negative;  constitution, respiratory, cardiovascular and neurological    Objective     Vital Sign Min/Max for last 24 hours  Temp  Min: 96.5 °F (35.8 °C)  Max: 98 °F (36.7 °C)   BP  Min: 105/79  Max: 146/73   Pulse  Min: 58  Max: 72   Resp  Min: 16  Max: 18   SpO2  Min: 98 %  Max: 98 %   No Data Recorded   Weight  Min: 83.3 kg (183 lb 9.6 oz)  Max: 83.3 kg (183 lb 9.6 oz)     Flowsheet Rows      First Filed Value   Admission Height  162.6 cm (64\") Documented at 11/28/2018 1153   Admission Weight  90.7 kg (200 lb) Documented at 11/28/2018 1153          No intake/output data recorded.  I/O last 3 completed shifts:  In: 7598 [I.V.:5625; Other:524; NG/GT:1449]  Out: 2625 [Urine:1725; Stool:900]    Physical Exam:     General Appearance:    ALert, in no acute distress   Head:    Normocephalic, without obvious abnormality, atraumatic               Neck:   No adenopathy, supple, trachea midline, no thyromegaly, no     carotid bruit, no JVD       Lungs:     Clear to auscultation,respirations regular, even and                   unlabored    Heart:    Regular rhythm and normal rate, normal S1 and S2, no            murmur, no gallop, no rub, no click   Breast Exam:    Deferred   Abdomen:     Normal bowel sounds, no masses, no organomegaly, soft        non-tender, non-distended, no guarding, no rebound                 tenderness       Extremities:  No edema, no cyanosis, no redness               Neurologic:  Alert. No focal deficit noted.        WBC WBC   Date Value Ref Range Status   12/03/2018 17.14 (H) 3.50 - 10.80 10*3/mm3 Final      HGB Hemoglobin   Date Value Ref Range Status   12/03/2018 9.8 (L) 11.5 - 15.5 g/dL Final      HCT Hematocrit   Date " Value Ref Range Status   12/03/2018 33.6 (L) 34.5 - 44.0 % Final      Platlets No results found for: LABPLAT   MCV MCV   Date Value Ref Range Status   12/03/2018 90.8 80.0 - 99.0 fL Final          Sodium Sodium   Date Value Ref Range Status   12/04/2018 141 132 - 146 mmol/L Final   12/03/2018 143 132 - 146 mmol/L Final      Potassium Potassium   Date Value Ref Range Status   12/04/2018 5.0 3.5 - 5.5 mmol/L Final     Comment:     Verified by repeat analysis.    12/03/2018 3.3 (L) 3.5 - 5.5 mmol/L Final      Chloride Chloride   Date Value Ref Range Status   12/04/2018 110 (H) 99 - 109 mmol/L Final   12/03/2018 109 99 - 109 mmol/L Final      CO2 CO2   Date Value Ref Range Status   12/04/2018 25.0 20.0 - 31.0 mmol/L Final   12/03/2018 28.0 20.0 - 31.0 mmol/L Final      BUN BUN   Date Value Ref Range Status   12/04/2018 31 (H) 9 - 23 mg/dL Final   12/03/2018 33 (H) 9 - 23 mg/dL Final      Creatinine Creatinine   Date Value Ref Range Status   12/04/2018 0.96 0.60 - 1.30 mg/dL Final   12/03/2018 1.10 0.60 - 1.30 mg/dL Final      Calcium Calcium   Date Value Ref Range Status   12/04/2018 6.9 (L) 8.7 - 10.4 mg/dL Final   12/03/2018 7.6 (L) 8.7 - 10.4 mg/dL Final      PO4 No results found for: CAPO4   Albumin Albumin   Date Value Ref Range Status   12/03/2018 2.79 (L) 3.20 - 4.80 g/dL Final      Magnesium Magnesium   Date Value Ref Range Status   12/04/2018 2.3 1.3 - 2.7 mg/dL Final   12/03/2018 1.7 1.3 - 2.7 mg/dL Final      Uric Acid No results found for: URICACID        Results Review:     I reviewed the patient's new clinical results.      amLODIPine 5 mg Oral Q24H   apixaban 5 mg Oral Q12H   aspirin 81 mg Oral Daily   castor oil-balsam peru  Topical Q12H   DAPTOmycin 8 mg/kg (Adjusted) Intravenous Q24H   diltiaZEM 60 mg Oral Q6H   insulin detemir 15 Units Subcutaneous BID   insulin regular 0-14 Units Subcutaneous Q6H   metoprolol tartrate 50 mg Oral Q12H   sodium chloride 3 mL Intravenous Q12H   thiamine 100 mg Oral  Daily          Medication Review:     Assessment/Plan       Altered mental status    Diabetes mellitus, type 2 (CMS/[facility])    Hypertension    Hyperlipidemia    Hypothyroidism    CAD (coronary artery disease)    Chronic obstructive pulmonary disease with acute exacerbation (CMS/[facility])    Congestive heart failure (CMS/[facility])    Acute renal failure superimposed on stage 3 chronic kidney disease (CMS/[facility])    Hyperkalemia    Ventricular tachyarrhythmia (CMS/[facility])    Sepsis due to methicillin resistant Staphylococcus aureus (MRSA) (CMS/[facility])    Acute parotitis    1- ANGELA -non oliguric. Pre-renal azotemia secondary to Volume depletion - Improving  2- Metabolic acidosis -resolved  3- Hyperkalemia secondary to metabolic acidosis and ANGELA - resolved.   4- Hypernatremia - resolved.   5- Anemia - stable.   6- AMS   7- CKD stage III - Baseline Scr 1.2-1.4 range. Almost at baseline.   8- HTn- not controlled.     Plan:  - Continue with current regimen.   - Ok to place picc line.      Will sign off. Please do not hesitate to call us with further questions.     Dieter Birch MD  12/05/18  9:14 AM

## 2018-12-05 NOTE — PROGRESS NOTES
INFECTIOUS DISEASE PROGRESS NOTE    Leila Smith  1945  [id number]    Date: 12/5/2018    Admission Date: 11/28/2018    CC: MRSA bacteremia    History of present illness:    Patient is a 73 y.o. female presents from [facility] with ventricular tachycardia and hyperkalemia from  acute on chronic renal failure;  PAtient admitted to ICU and has been worked up for parotitis, leg abscess and has been found to have MRSA bacteremia.. Blood cultures are positive for MRSA. Hematoma on right leg was drained by surgery on 11/29. 2-dimensional Echocardiogram negative for vegetations.     Patient is obtunded and is a poor historian no family by bedside.  Opens eyes during exam but is nonverbal.    12/3/18: Patient is a poor historian with no family at bedside.  She says yes to any question.  She is more alert today. She remains afebrile. Right parotid gland area with swelling and pain as she swatted my hand away when palpated.     12/4/18; doing well; no events overnight; no complaints, poor historian, ros unobtainable    12/5/18; no events overnight;  Per nurse no fever, rash, poor historian, picc placed        Past Medical History:   Diagnosis Date   • Atrial fibrillation (CMS/HCC)    • Chronic ulcer of right leg (CMS/HCC)    • Cognitive communication deficit    • COPD (chronic obstructive pulmonary disease) (CMS/HCC)    • Diabetes mellitus (CMS/HCC)    • Difficulty in walking    • Encephalopathy    • Generalized muscle weakness    • Hematuria    • Hyperlipidemia    • Hypertension    • Hypothyroidism    • Urinary tract infection        Past Surgical History:   Procedure Laterality Date   • CATARACT EXTRACTION     • CHOLECYSTECTOMY     • COLOSTOMY     • FOOT SURGERY Right    • HERNIA REPAIR     • HYSTERECTOMY     • TOTAL KNEE ARTHROPLASTY Right        Family History   Problem Relation Age of Onset   • Stomach cancer Mother    • Alcohol abuse Other    • Cancer Other    • Diabetes Other    • Hypertension Other     • Other Other        Social History     Socioeconomic History   • Marital status:      Spouse name: Not on file   • Number of children: Not on file   • Years of education: Not on file   • Highest education level: Not on file   Social Needs   • Financial resource strain: Not on file   • Food insecurity - worry: Not on file   • Food insecurity - inability: Not on file   • Transportation needs - medical: Not on file   • Transportation needs - non-medical: Not on file   Occupational History   • Not on file   Tobacco Use   • Smoking status: Former Smoker     Packs/day: 0.00     Years: 50.00     Pack years: 0.00     Types: Cigarettes   • Smokeless tobacco: Never Used   Substance and Sexual Activity   • Alcohol use: No   • Drug use: No   • Sexual activity: Defer   Other Topics Concern   • Not on file   Social History Narrative   • Not on file       Allergies   Allergen Reactions   • Codeine    • Tetracyclines & Related          Medication:    Current Facility-Administered Medications:   •  amLODIPine (NORVASC) tablet 5 mg, 5 mg, Oral, Q24H, Dieter Birch MD, 5 mg at 12/04/18 1046  •  apixaban (ELIQUIS) tablet 5 mg, 5 mg, Oral, Q12H, Balwinder Marquez III, MD, 5 mg at 12/04/18 2150  •  aspirin EC tablet 81 mg, 81 mg, Oral, Daily, Jory Macedo APRN, 81 mg at 12/04/18 1046  •  castor oil-balsam peru (VENELEX) ointment, , Topical, Q12H, Julien Carcamo APRN  •  DAPTOmycin (CUBICIN) 500 mg in sodium chloride 0.9 % 50 mL IVPB, 8 mg/kg (Adjusted), Intravenous, Q24H, Olivier Gtz MD, Last Rate: 100 mL/hr at 12/05/18 1208, 500 mg at 12/05/18 1208  •  dextrose (D50W) 25 g/ 50mL Intravenous Solution 25 g, 25 g, Intravenous, Q15 Min PRN, Shandra Oscar MD  •  dextrose (GLUTOSE) oral gel 15 g, 15 g, Oral, Q15 Min PRN, Shandra Oscar MD  •  diltiaZEM (CARDIZEM) tablet 60 mg, 60 mg, Oral, Q6H, Balwinder Marquez III, MD, 60 mg at 12/05/18 0537  •  glucagon (human recombinant) (GLUCAGEN  DIAGNOSTIC) injection 1 mg, 1 mg, Subcutaneous, PRN, Shandra Oscar MD  •  hydrALAZINE (APRESOLINE) tablet 10 mg, 10 mg, Oral, Q8H PRN, Case, Jasmina V., DO  •  insulin detemir (LEVEMIR) injection 15 Units, 15 Units, Subcutaneous, BID, Shandra Oscar MD, 15 Units at 12/04/18 2150  •  insulin regular (humuLIN R,novoLIN R) injection 0-14 Units, 0-14 Units, Subcutaneous, Q6H, Case, Jasmina V., DO, 5 Units at 12/04/18 2357  •  Magnesium Sulfate 2 gram Bolus, followed by 8 gram infusion (total Mg dose 10 grams)- Mg less than or equal to 1mg/dL, 2 g, Intravenous, PRN **OR** Magnesium Sulfate 2 gram / 50mL Infusion (GIVE X 3 BAGS TO EQUAL 6GM TOTAL DOSE) - Mg 1.1 - 1.5 mg/dl, 2 g, Intravenous, PRN **OR** Magnesium Sulfate 4 gram infusion- Mg 1.6-1.9 mg/dL, 4 g, Intravenous, PRN, Balwinder Mccall, [facility], Last Rate: 25 mL/hr at 12/03/18 1218, 4 g at 12/03/18 1218  •  metoprolol tartrate (LOPRESSOR) tablet 50 mg, 50 mg, Oral, Q12H, Balwinder Marquez III, MD, 50 mg at 12/04/18 2150  •  potassium chloride (MICRO-K) CR capsule 40 mEq, 40 mEq, Oral, PRN **OR** potassium chloride (KLOR-CON) packet 40 mEq, 40 mEq, Oral, PRN, 40 mEq at 12/03/18 1714 **OR** potassium chloride 10 mEq in 100 mL IVPB, 10 mEq, Intravenous, Q1H PRN, Balwinder Mccall, [facility]  •  potassium phosphate 45 mmol in sodium chloride 0.9 % 500 mL infusion, 45 mmol, Intravenous, PRN, Last Rate: 62.5 mL/hr at 12/03/18 1713, 45 mmol at 12/03/18 1713 **OR** potassium phosphate 30 mmol in sodium chloride 0.9 % 250 mL infusion, 30 mmol, Intravenous, PRN **OR** potassium phosphate 15 mmol in sodium chloride 0.9 % 100 mL infusion, 15 mmol, Intravenous, PRN **OR** sodium glycerophosphate 40 mmol in sodium chloride 0.9 % 500 mL IVPB, 40 mmol, Intravenous, PRN **OR** sodium glycerophosphate 20 mmol in sodium chloride 0.9 % 250 mL IVPB, 20 mmol, Intravenous, PRN, Balwinder Mccall [facility]  •  sodium chloride 0.9 % flush 10 mL, 10 mL, Intravenous, PRN, Rohan Ceballos MD  •   sodium chloride 0.9 % flush 10 mL, 10 mL, Intravenous, Q12H, Shandra Oscar MD  •  sodium chloride 0.9 % flush 10 mL, 10 mL, Intravenous, PRN, Shandra Oscar MD  •  sodium chloride 0.9 % flush 3 mL, 3 mL, Intravenous, Q12H, Julien Carcamo, DANIEL, 3 mL at 18 2150  •  sodium chloride 0.9 % flush 3-10 mL, 3-10 mL, Intravenous, PRN, Julien Carcamo, DANIEL  •  thiamine (VITAMIN B-1) tablet 100 mg, 100 mg, Oral, Daily, Case, Jasmina V., DO, 100 mg at 18 1044    Antibiotics:  Anti-Infectives (From admission, onward)    Ordered     Dose/Rate Route Frequency Start Stop    18 1037  DAPTOmycin (CUBICIN) 500 mg in sodium chloride 0.9 % 50 mL IVPB     Ordering Provider:  Olivier Gtz MD    8 mg/kg × 63.6 kg (Adjusted)  100 mL/hr over 30 Minutes Intravenous Every 24 Hours 18 1200 18 1159    18 1128  vancomycin (VANCOCIN) in iso-osmotic dextrose IVPB 1 g (premix) 200 mL     Ordering Provider:  Aysha Rocha RPH    1,000 mg  over 60 Minutes Intravenous Once 18 1400 18 1448    18 1522  piperacillin-tazobactam (ZOSYN) 3.375 g in iso-osmotic dextrose 50 ml (premix)     Eran Huang RPH reviewed the order on 18 0942.   Ordering Provider:  Case, Jasmina V., DO    3.375 g  over 4 Hours Intravenous Every 12 Hours 18 2200 18 1200    18 1332  vancomycin 1750 mg/500 mL 0.9% NS IVPB ([facility])     Ordering Provider:  Rohan Ceballos MD    20 mg/kg × 90.7 kg  over 120 Minutes Intravenous Once 18 1334 18 1642    18 1332  piperacillin-tazobactam (ZOSYN) 3.375 g in iso-osmotic dextrose 50 ml (premix)     Ordering Provider:  Rohan Ceballos MD    3.375 g  over 30 Minutes Intravenous Once 18 1334 18 1720            Review of Systems:  unobtainable      Physical Exam:   Vital Signs  Temp (24hrs), Av.6 °F (36.4 °C), Min:97.2 °F (36.2 °C), Max:98 °F (36.7 °C)    Temp  Min: 97.2 °F (36.2 °C)  Max:  98 °F (36.7 °C)  BP  Min: 105/79  Max: 141/72  Pulse  Min: 61  Max: 67  Resp  Min: 16  Max: 18  No Data Recorded    GENERAL: resting quietly opens eyes to exam, more alert.  HEENT: Normocephalic, atraumatic.  PERRL. EOMI. No conjunctival injection. No icterus. Oropharynx clear without evidence of thrush or exudate. No evidence of peridontal disease.  No ext oral lesions    HEART: RRR; systolic murmur loudest at base left  LUNGS: Clear to auscultation bilaterally .  ABDOMEN: Soft, nontender, nondistended. Positive bowel sounds.   EXT:  No cyanosis, clubbing or edema. No cord.  MSK: FROM without joint effusions noted arms/legs.    SKIN: keloid like scarring on toes, fore feet bilaterally    NEURO: nonverbal; no spontaneous movement      Laboratory Data    Results from last 7 days   Lab Units  12/03/18   0652  12/02/18   0546  12/01/18   0403   WBC 10*3/mm3  17.14*  21.88*  28.75*   HEMOGLOBIN g/dL  9.8*  10.0*  10.4*   HEMATOCRIT %  33.6*  34.2*  34.9   PLATELETS 10*3/mm3  179  171  195     Results from last 7 days   Lab Units  12/04/18   0520   SODIUM mmol/L  141   POTASSIUM mmol/L  5.0   CHLORIDE mmol/L  110*   CO2 mmol/L  25.0   BUN mg/dL  31*   CREATININE mg/dL  0.96   GLUCOSE mg/dL  229*   CALCIUM mg/dL  6.9*     Results from last 7 days   Lab Units  12/03/18   0652   ALK PHOS U/L  140*   BILIRUBIN mg/dL  0.5   ALT (SGPT) U/L  15   AST (SGOT) U/L  16         Results from last 7 days   Lab Units  12/03/18   0652   CRP mg/dL  7.84*     Results from last 7 days   Lab Units  11/28/18   1750   LACTATE mmol/L  2.8*         Results from last 7 days   Lab Units  11/30/18   0440  11/29/18   0427   VANCOMYCIN RM mcg/mL  22.80  13.60     Estimated Creatinine Clearance: 52.8 mL/min (by C-G formula based on SCr of 0.96 mg/dL).      Microbiology:  Blood Culture   Date Value Ref Range Status   11/30/2018 No growth at 2 days  Preliminary   11/30/2018 No growth at 2 days  Preliminary   11/28/2018 Staphylococcus aureus, MRSA (A)   Final     Comment:       Consider infectious disease consult to rule out distant focus of infection.  Methicillin resistant Staphylococcus aureus, Patient may be an isolation risk.   11/28/2018 Staphylococcus aureus, MRSA (A)  Final     Comment:       Consider infectious disease consult to rule out distant focus of infection.  Methicillin resistant Staphylococcus aureus, Patient may be an isolation risk.       BCID, PCR   Date Value Ref Range Status   11/28/2018 (C) No organism detected by BCID PCR. Final    Staphylococcus aureus. mecA (methicillin resistance gene) detected. Identification by BCID PCR.                 Wound Culture   Date Value Ref Range Status   11/30/2018 (A)  Final    Scant growth (1+) Staphylococcus, coagulase negative     Comment:     Susceptibility will not be done unless Doctor notifies Lab             Radiology:  Ct Soft Tissue Neck Without Contrast    Result Date: 12/1/2018  Markedly enlarged right parotid gland and associated inflammation presumably acute parotitis. No visible abscess.  DICTATED:   11/28/2018 EDITED/ls :   11/28/2018   This report was finalized on 12/1/2018 10:51 AM by DR. Brian Cosme MD.      Xr Chest 1 View    Result Date: 11/29/2018  Cardiomegaly and mild pulmonary venous hypertension.  D:  11/28/2018 E:  11/28/2018  This report was finalized on 11/29/2018 9:15 AM by DR. Brian Cosme MD.      Us Nonvascular Extremity Limited    Result Date: 11/29/2018  Imaging characteristics are compatible with superficial abscess or other complex fluid collection, just over 5 cm maximally.  D:  11/29/2018 E:  11/29/2018  This report was finalized on 11/29/2018 10:14 AM by Scott Sauer.      Xr Abdomen Kub    Result Date: 11/28/2018  Tip of NASOGASTRIC/ENTERIC tube distal to the gastroesophageal junction in the gastric fundus. THIS DOCUMENT HAS BEEN ELECTRONICALLY SIGNED BY NIRAJ SANDOVAL MD        Impression:   MRSA bactertemia  Left leg abscess   Parotitis right  side  Encephalopathy  COPD  Acute renal failure on chronic      PLAN/RECOMMENDATIONS:     Estimated Creatinine Clearance: 52.8 mL/min (by C-G formula based on SCr of 0.96 mg/dL).    Source of MRSA bacteremia could be from the parotid gland; no abscess seen on initial imaging;  Will be interesting to see if leg culures grow MRSA; a leg abscess can spontaneously develop and usually doesn't lead to positive blood cultures.    Regardless; high grade bacteremia is still concerning for endovascular involvement.    YANNA postponed because of NG tube, d/w cardiology    Given lack of  pulmonary involvement probably not active concern would change abx:    Continue daptomycin 8 mg/kg iv now/daily unless creatinine clearance decreases below 30 mL/min  Repeat blood cultures are ngtd from 11/30/18  Warm compresses to right parotid gland  YANNA--ordered for 12/4 and made NPO after MN  F/u wound cultures    Patient is complexly ill      Olivier Gtz MD  12/5/2018  3:29 PM

## 2018-12-06 NOTE — DISCHARGE PLACEMENT REQUEST
"ATTN: DORA   Most recent Infectious disease note regarding IV antbx plans     Thank you!   Jeane Sarah [phone]       Leila Smith (73 y.o. Female)     Date of Birth Social Security Number Address Home Phone MRN    1945  [id number] [facility] [phone] [id number]    [employer] Marital Status          Unknown        Admission Date Admission Type Admitting Provider Attending Provider Department, Room/Bed    11/28/18 Emergency Yumiko Sandoval, Yumiko Raines DO [facility] 6B, N627/1    Discharge Date Discharge Disposition Discharge Destination                       Attending Provider:  Yumiko Sandoval DO    Allergies:  Codeine, Tetracyclines & Related    Isolation:  None   Infection:  MRSA (11/29/18)   Code Status:  CPR    Ht:  162.6 cm (64\")   Wt:  80.7 kg (177 lb 14.4 oz)    Admission Cmt:  None   Principal Problem:  Altered mental status [R41.82]                 Active Insurance as of 11/28/2018     Primary Coverage     Payor Plan Insurance Group Employer/Plan Group    ANTHEM MEDICARE REPLACEMENT ANTHEM MEDICARE ADVANTAGE [id number]     Payor Plan Address Payor Plan Phone Number Payor Plan Fax Number Effective Dates    [address] [phone]  1/1/2017 - None Entered    [facility] [address]       Subscriber Name Subscriber Birth Date Member ID       LEILA SMITH 1945 [id number]                 Emergency Contacts      (Rel.) Home Phone Work Phone Mobile Phone    Danilo Polanco (Daughter) [phone] --    Kleber Johnson (Son) [phone] -- --                   Consult Notes (most recent note)      Olivier Gtz MD at 12/2/2018 10:21 AM      Consult Orders    1. Inpatient Infectious Diseases Consult [[id number]] ordered by Swetha Haines MD at 12/02/18 0742                INFECTIOUS DISEASE CONSULT/INITIAL HOSPITAL VISIT    Leila Smith  1945  [id number]    Date of Consult: " 12/2/2018    Admission Date: 11/28/2018      Requesting Provider: No ref. provider found  Evaluating Physician: Olivier Gtz MD    Reason for Consultation: MRSA bacteremia    History of present illness:    Patient is a 73 y.o. female presents from [facility] with ventricular tachycardia and hyperkalemia from  acute on chronic renal failure;  PAtient admitted to ICU and has been worked up for parotitis, leg abscess and has been found to have MRSA bacteremia.. Blood cultures are positive for MRSA. Hematoma on right leg was drained by surgery on 11/29. 2-dimensional Echocardiogram negative for vegetations.     Patient is obtunded and is a poor historian no family by bedside.  Opens eyes during exam but is nonverbal.        Past Medical History:   Diagnosis Date   • Atrial fibrillation (CMS/HCC)    • Chronic ulcer of right leg (CMS/HCC)    • Cognitive communication deficit    • COPD (chronic obstructive pulmonary disease) (CMS/HCC)    • Diabetes mellitus (CMS/HCC)    • Difficulty in walking    • Encephalopathy    • Generalized muscle weakness    • Hematuria    • Hyperlipidemia    • Hypertension    • Hypothyroidism    • Urinary tract infection        Past Surgical History:   Procedure Laterality Date   • CATARACT EXTRACTION     • CHOLECYSTECTOMY     • COLOSTOMY     • FOOT SURGERY Right    • HERNIA REPAIR     • HYSTERECTOMY     • TOTAL KNEE ARTHROPLASTY Right        Family History   Problem Relation Age of Onset   • Stomach cancer Mother    • Alcohol abuse Other    • Cancer Other    • Diabetes Other    • Hypertension Other    • Other Other        Social History     Socioeconomic History   • Marital status:      Spouse name: Not on file   • Number of children: Not on file   • Years of education: Not on file   • Highest education level: Not on file   Social Needs   • Financial resource strain: Not on file   • Food insecurity - worry: Not on file   • Food insecurity - inability: Not on file   •  Transportation needs - medical: Not on file   • Transportation needs - non-medical: Not on file   Occupational History   • Not on file   Tobacco Use   • Smoking status: Former Smoker     Packs/day: 0.00     Years: 50.00     Pack years: 0.00     Types: Cigarettes   • Smokeless tobacco: Never Used   Substance and Sexual Activity   • Alcohol use: No   • Drug use: No   • Sexual activity: Defer   Other Topics Concern   • Not on file   Social History Narrative   • Not on file       Allergies   Allergen Reactions   • Codeine    • Tetracyclines & Related          Medication:    Current Facility-Administered Medications:   •  [START ON 12/3/2018] ! vancomycin trough due Monday, 12/3 @ 1130. hold 1200 dose if trough > 22, , Does not apply, Once, Aysha Rocha, [facility]  •  amiodarone (PACERONE) tablet 200 mg, 200 mg, Oral, Q24H, Balwinder Marquez III, MD, 200 mg at 12/02/18 0919  •  aspirin EC tablet 81 mg, 81 mg, Oral, Daily, Jory Macedo APRN, 81 mg at 12/02/18 0919  •  castor oil-balsam peru (VENELEX) ointment, , Topical, Q12H, Julien Carcamo APRN  •  dextrose (D50W) 25 g/ 50mL Intravenous Solution 25 g, 25 g, Intravenous, Q15 Min PRN, Julien Carcamo APRN  •  dextrose (GLUTOSE) oral gel 15 g, 15 g, Oral, Q15 Min PRN, Julien Carcamo APRN  •  diltiaZEM (CARDIZEM) tablet 60 mg, 60 mg, Oral, Q6H, Balwinder Marquez III, MD, 60 mg at 12/02/18 0543  •  glucagon (human recombinant) (GLUCAGEN DIAGNOSTIC) injection 1 mg, 1 mg, Subcutaneous, PRN, Julien Carcamo APRN  •  hydrALAZINE (APRESOLINE) tablet 10 mg, 10 mg, Oral, Q8H PRN, Case, Jasmina V., DO  •  insulin regular (humuLIN R,novoLIN R) injection 0-14 Units, 0-14 Units, Subcutaneous, Q6H, Case, Jasmina V., DO, 10 Units at 12/02/18 0543  •  insulin regular (humuLIN R,novoLIN R) injection 5 Units, 5 Units, Subcutaneous, Q6H, Jasmina Maier, DO, 5 Units at 12/02/18 0543  •  metoprolol tartrate (LOPRESSOR) tablet 50 mg, 50 mg, Oral, Q12H, Balwinder Marquez III,  MD, 50 mg at 12/02/18 0919  •  Pharmacy to dose vancomycin, , Does not apply, Continuous PRN, Case, Jasmina V., DO  •  piperacillin-tazobactam (ZOSYN) 3.375 g in iso-osmotic dextrose 50 ml (premix), 3.375 g, Intravenous, Q8H, Case, Jasmina V., DO, 3.375 g at 12/02/18 0919  •  sodium chloride 0.45 % infusion, 50 mL/hr, Intravenous, Continuous, Eyal, Dieter Angeles MD, Last Rate: 50 mL/hr at 12/01/18 1204, 50 mL/hr at 12/01/18 1204  •  sodium chloride 0.9 % flush 10 mL, 10 mL, Intravenous, PRN, Rohan Ceballos MD  •  sodium chloride 0.9 % flush 3 mL, 3 mL, Intravenous, Q12H, Julien Carcamo APRN, 3 mL at 12/02/18 0920  •  sodium chloride 0.9 % flush 3-10 mL, 3-10 mL, Intravenous, PRN, Julien Carcamo APRN  •  thiamine (VITAMIN B-1) tablet 100 mg, 100 mg, Oral, Daily, Case, Jasmina V., DO, 100 mg at 12/02/18 0919  •  vancomycin (VANCOCIN) in iso-osmotic dextrose IVPB 1 g (premix) 200 mL, 1,000 mg, Intravenous, Q24H, Aysha Rocha RPH, 1,000 mg at 12/01/18 1402    Antibiotics:  Anti-Infectives (From admission, onward)    Ordered     Dose/Rate Route Frequency Start Stop    12/01/18 0942  piperacillin-tazobactam (ZOSYN) 3.375 g in iso-osmotic dextrose 50 ml (premix)     Ordering Provider:  Case, Jasmina V., DO    3.375 g  over 4 Hours Intravenous Every 8 Hours 12/01/18 1600 12/05/18 1559    11/30/18 1055  vancomycin (VANCOCIN) in iso-osmotic dextrose IVPB 1 g (premix) 200 mL     Ordering Provider:  Aysha Rocha, RPH    1,000 mg  over 60 Minutes Intravenous Every 24 Hours 11/30/18 1200 12/12/18 1159    11/29/18 1128  vancomycin (VANCOCIN) in iso-osmotic dextrose IVPB 1 g (premix) 200 mL     Ordering Provider:  Aysha Rocha RPH    1,000 mg  over 60 Minutes Intravenous Once 11/29/18 1400 11/29/18 1448    11/28/18 1522  piperacillin-tazobactam (ZOSYN) 3.375 g in iso-osmotic dextrose 50 ml (premix)     Eran Huang RPH reviewed the order on 12/01/18 0999.   Ordering Provider:  Jasmina Maier  V., DO    3.375 g  over 4 Hours Intravenous Every 12 Hours 18 2200 18 1200    18 1521  Pharmacy to dose vancomycin     Ordering Provider:  Case, Jasmina V., DO     Does not apply Continuous PRN 18 1519 18 1518    18 1332  vancomycin 1750 mg/500 mL 0.9% NS IVPB ([facility])     Ordering Provider:  Rohan Ceballos MD    20 mg/kg × 90.7 kg  over 120 Minutes Intravenous Once 18 1334 18 1642    18 1332  piperacillin-tazobactam (ZOSYN) 3.375 g in iso-osmotic dextrose 50 ml (premix)     Ordering Provider:  Rohan Ceballos MD    3.375 g  over 30 Minutes Intravenous Once 18 1334 18 1720            Review of Systems:  unobtainable      Physical Exam:   Vital Signs  Temp (24hrs), Av.8 °F (37.1 °C), Min:98.5 °F (36.9 °C), Max:99.4 °F (37.4 °C)    Temp  Min: 98.5 °F (36.9 °C)  Max: 99.4 °F (37.4 °C)  BP  Min: 102/61  Max: 152/68  Pulse  Min: 64  Max: 74  Resp  Min: 16  Max: 20  SpO2  Min: 85 %  Max: 96 %    GENERAL: resting quietly opens eyes to exam  HEENT: Normocephalic, atraumatic.  PERRL. EOMI. No conjunctival injection. No icterus. Oropharynx clear without evidence of thrush or exudate. No evidence of peridontal disease.  No ext oral lesions    HEART: RRR; systolic murmur loudest at base left  LUNGS: Clear to auscultation bilaterally .  ABDOMEN: Soft, nontender, nondistended. Positive bowel sounds.   EXT:  No cyanosis, clubbing or edema. No cord.    MSK: FROM without joint effusions noted arms/legs.    SKIN: keloid like scarring on toes, fore feet bilaterally  Bilateral shin wounds, left side appears gto have been recently incised    NEURO: nonverbal; no spontaneous movement      Laboratory Data    Results from last 7 days   Lab Units  18   0546  18   0403  18   0440   WBC 10*3/mm3  21.88*  28.75*  33.37*   HEMOGLOBIN g/dL  10.0*  10.4*  11.2*   HEMATOCRIT %  34.2*  34.9  37.1   PLATELETS 10*3/mm3  171  195  237     Results from last 7 days    Lab Units  12/02/18   0546   SODIUM mmol/L  146   POTASSIUM mmol/L  3.5   CHLORIDE mmol/L  109   CO2 mmol/L  28.0   BUN mg/dL  43*   CREATININE mg/dL  1.31*   GLUCOSE mg/dL  311*   CALCIUM mg/dL  7.3*     Results from last 7 days   Lab Units  11/30/18   0440   ALK PHOS U/L  114*   BILIRUBIN mg/dL  0.6   ALT (SGPT) U/L  16   AST (SGOT) U/L  14             Results from last 7 days   Lab Units  11/28/18   1750   LACTATE mmol/L  2.8*         Results from last 7 days   Lab Units  11/30/18   0440  11/29/18   0427   VANCOMYCIN RM mcg/mL  22.80  13.60     Estimated Creatinine Clearance: 37.3 mL/min (A) (by C-G formula based on SCr of 1.31 mg/dL (H)).      Microbiology:  Blood Culture   Date Value Ref Range Status   11/30/2018 No growth at 24 hours  Preliminary   11/30/2018 No growth at 24 hours  Preliminary     BCID, PCR   Date Value Ref Range Status   11/28/2018 (C) No organism detected by BCID PCR. Final    Staphylococcus aureus. mecA (methicillin resistance gene) detected. Identification by BCID PCR.                    Wound Culture   Date Value Ref Range Status   11/30/2018 Culture in progress  Preliminary           Radiology:  Imaging Results (last 72 hours)     Procedure Component Value Units Date/Time    CT Soft Tissue Neck Without Contrast [[id number]] Collected:  11/28/18 1503     Updated:  12/01/18 1053    Narrative:       EXAMINATION: CT NECK SOFT TISSUE WO CONTRAST - 11/28/2018      INDICATION: Neck swelling. Evaluate for abscess.      TECHNIQUE: Unenhanced 3 mm axial images through the neck with sagittal  and coronal reconstructions The radiation dose reduction device was  turned on for each scan per the ALARA (As Low as Reasonably Achievable)  protocol.     COMPARISON: None.     FINDINGS: Patient history indicates rapidly enlarging right neck mass.  The mass in question appears to be a markedly enlarged right parotid  gland, somewhat denser than the much smaller left parotid gland,  presumably due to edema,  but otherwise with typical internal  architecture and no visible underlying abscess. There is no evidence of  abscess elsewhere. There is diffuse subcutaneous edema of the right  neck. The adjacent right masseter appears only slightly larger than the  left, and is probably mildly secondarily inflamed. The pterygoids, other  deep musculature, and the deep neck intermuscular fatty tissue shows no  evidence of inflammation. Submandibular glands are normal in size.  Prevertebral soft tissues and parapharyngeal soft tissues appear normal.  No parotid calculus is identified. No other inflammatory process is  identified elsewhere.       Impression:       Markedly enlarged right parotid gland and associated  inflammation presumably acute parotitis. No visible abscess.     DICTATED:   11/28/2018  EDITED/ls :   11/28/2018         This report was finalized on 12/1/2018 10:51 AM by DR. Brian Cosme MD.               Impression:   MRSA bactertemia  Left leg abscess  Parotitis right side  Encephalopathy  COPD  Acute renal failure on chronic      PLAN/RECOMMENDATIONS:   Thank you for asking us to see Lelia Smith, I recommend the following:      Estimated Creatinine Clearance: 37.3 mL/min (A) (by C-G formula based on SCr of 1.31 mg/dL (H)).    Source of MRSA bacteremia could be from the parotid gland; no abscess seen on initial imaging;  Will be interesting to see if leg culures grow MRSA; a leg abscess can spontaneously develop and usually doesn't lead to positive blood cultures.    REgardless; high grade bacteremia is still concerning for endovascular involvement.    YANNA probably required.    Given pulmonary involvement probably not active concern would change abx:    D/c vancomycin  Start daptomycin 8 mg/kg iv now/daily unless creatinine clearance decreases below 30 mL/min  Warm compresses to parotid gland  YANNA  F/u wound cultures    Patient is complexly ill           Olivier Gtz MD  12/2/2018  10:22  AM                    Electronically signed by Olivier Gtz MD at 12/2/2018 10:38 AM

## 2018-12-06 NOTE — NURSING NOTE
Here again to see Ms Smith RE PICC bleeding, removed old dressing and replaced with new sterile dressing now has a GuardIVa in place which should slow or stop bleeding at insertion site. PICC flushes easily with brisk blood return, PICC is ok for use.

## 2018-12-06 NOTE — PLAN OF CARE
Problem: Fall Risk (Adult)  Goal: Absence of Fall  Outcome: Ongoing (interventions implemented as appropriate)      Problem: Skin Injury Risk (Adult)  Goal: Skin Health and Integrity  Outcome: Ongoing (interventions implemented as appropriate)      Problem: Patient Care Overview  Goal: Plan of Care Review  Outcome: Ongoing (interventions implemented as appropriate)    Goal: Individualization and Mutuality  Outcome: Ongoing (interventions implemented as appropriate)    Goal: Discharge Needs Assessment  Outcome: Ongoing (interventions implemented as appropriate)    Goal: Interprofessional Rounds/Family Conf  Outcome: Ongoing (interventions implemented as appropriate)      Problem: Confusion, Acute (Adult)  Goal: Identify Related Risk Factors and Signs and Symptoms  Outcome: Ongoing (interventions implemented as appropriate)    Goal: Cognitive/Functional Impairments Minimized  Outcome: Ongoing (interventions implemented as appropriate)    Goal: Safety  Outcome: Ongoing (interventions implemented as appropriate)

## 2018-12-06 NOTE — PROGRESS NOTES
[facility] Medicine Services  PROGRESS NOTE    Patient Name: Leila Smith  : 1945  MRN: [id number]    Date of Admission: 2018  Length of Stay: 8  Primary Care Physician: Ciaran Wakefield MD    Subjective   Subjective     CC:  F/U multiple issues/MRSA bacteremia    HPI:  Pt lying in bed, reportedly combative at times.  Some edema of right upper ext and bleeding at picc site.  She is calm and cooperative with me.  Shakes her head yes and no to questions. .    Review of Systems  Unobtainable    Objective   Objective     Vital Signs:   Temp:  [96.6 °F (35.9 °C)-97.1 °F (36.2 °C)] 97.1 °F (36.2 °C)  Heart Rate:  [61-84] 63  Resp:  [12-18] 18  BP: (134-165)/(34-96) 148/67        Physical Exam: Unchanged from yesterday  GEN- chronically ill appearing, leaning to right side  HEENT- atraumatic, normocephlic,  NGT in place  NECK- supple, trachea midline  RESP: CTAB, normal effort, resp non-labored  CV: RRR, no murmurs, s1/s2 normal  MSK: no LE edema noted, pos pressure dressing RUE, mild edema of forearm, good pulses, PICC RUE   NEURO: alert, nonverbal, face grossly symmetric, moves all ext  SKIN: Thick  scales/scarring on feet, bilat shin wounds, + mild drainage   PSYCH: Affect is flat    Results Reviewed:  I have personally reviewed current lab, radiology, and data and agree.    Results from last 7 days   Lab Units  18   0701  18   0652  18   0546   WBC 10*3/mm3  9.32  17.14*  21.88*   HEMOGLOBIN g/dL  11.1*  9.8*  10.0*   HEMATOCRIT %  36.3  33.6*  34.2*   PLATELETS 10*3/mm3  192  179  171     Results from last 7 days   Lab Units  18   0520  18   0652  18   0546   18   0440   SODIUM mmol/L  141  143  146   < >  146   POTASSIUM mmol/L  5.0  3.3*  3.5   < >  3.0*   CHLORIDE mmol/L  110*  109  109   < >  107   CO2 mmol/L  25.0  28.0  28.0   < >  26.0   BUN mg/dL  31*  33*  43*   < >  85*   CREATININE mg/dL  0.96  1.10  1.31*   < >  2.02*    GLUCOSE mg/dL  229*  214*  311*   < >  329*   CALCIUM mg/dL  6.9*  7.6*  7.3*   < >  8.3*   ALT (SGPT) U/L   --   15   --    --   16   AST (SGOT) U/L   --   16   --    --   14    < > = values in this interval not displayed.     Estimated Creatinine Clearance: 52 mL/min (by C-G formula based on SCr of 0.96 mg/dL).  No results found for: BNP    Microbiology Results Abnormal     Procedure Component Value - Date/Time    Tissue / Bone Culture - Tissue, Leg, Right [[id number]] Collected:  11/29/18 1801    Lab Status:  Edited Result - FINAL Specimen:  Tissue from Leg, Right Updated:  12/06/18 1130     Tissue Culture No growth at 1 week     Gram Stain Many (4+) Red blood cells      Many (4+) WBCs seen      No organisms seen    Blood Culture - Blood, Hand, Left [[id number]] Collected:  11/30/18 1005    Lab Status:  Final result Specimen:  Blood from Hand, Left Updated:  12/05/18 1030     Blood Culture No growth at 5 days    Blood Culture - Blood, Hand, Right [[id number]] Collected:  11/30/18 1005    Lab Status:  Final result Specimen:  Blood from Hand, Right Updated:  12/05/18 1030     Blood Culture No growth at 5 days    Wound Culture - Drainage, Ear, Right [[id number]]  (Abnormal) Collected:  11/30/18 1647    Lab Status:  Final result Specimen:  Drainage from Ear, Right Updated:  12/03/18 0852     Wound Culture Scant growth (1+) Staphylococcus, coagulase negative     Comment: Susceptibility will not be done unless Doctor notifies Lab            Gram Stain No WBCs or organisms seen    Blood Culture - Blood, Arm, Left [[id number]]  (Abnormal) Collected:  11/28/18 1353    Lab Status:  Final result Specimen:  Blood from Arm, Left Updated:  12/01/18 0823     Blood Culture Staphylococcus aureus, MRSA     Comment:   Consider infectious disease consult to rule out distant focus of infection.  Methicillin resistant Staphylococcus aureus, Patient may be an isolation risk.        Isolated from Aerobic and Anaerobic Bottles     Gram  Stain Aerobic Bottle Gram positive cocci in groups      Anaerobic Bottle Gram positive cocci in groups    Narrative:       PRIOR POSITIVE CULTURE WITH SAME MORPHOLOGY CALLED  Refer Culture to #[id number]    Blood Culture - Blood, Arm, Left [[id number]]  (Abnormal)  (Susceptibility) Collected:  11/28/18 1340    Lab Status:  Final result Specimen:  Blood from Arm, Left Updated:  12/01/18 0822     Blood Culture Staphylococcus aureus, MRSA     Comment:   Consider infectious disease consult to rule out distant focus of infection.  Methicillin resistant Staphylococcus aureus, Patient may be an isolation risk.        Isolated from Aerobic and Anaerobic Bottles     Gram Stain Aerobic Bottle Gram positive cocci in groups      Anaerobic Bottle Gram positive cocci in clusters    Susceptibility      Staphylococcus aureus, MRSA     DIMITRIS     Ciprofloxacin Resistant     Clindamycin Susceptible     Daptomycin Susceptible     Erythromycin Susceptible     Gentamicin Susceptible     Levofloxacin Intermediate [1]      Linezolid Susceptible     Oxacillin Resistant     Penicillin G Resistant     Quinupristin + Dalfopristin Susceptible     Rifampin Susceptible     Tetracycline Susceptible     Trimethoprim + Sulfamethoxazole Susceptible     Vancomycin Susceptible            [1]   Staphylococcus species may develop resistance during prolonged therapy with quinolones.  Isolates that are initially susceptible may become resistant within three to four days after initiation of therapy. Testing of repeat isolates may be warranted.                 Blood Culture ID, PCR - Blood, Arm, Left [[id number]]  (Abnormal) Collected:  11/28/18 1340    Lab Status:  Final result Specimen:  Blood from Arm, Left Updated:  11/29/18 0925     BCID, PCR Staphylococcus aureus. mecA (methicillin resistance gene) detected. Identification by BCID PCR.          Imaging Results (last 24 hours)     ** No results found for the last 24 hours. **        Results for orders placed  during the hospital encounter of 11/28/18   Adult Transthoracic Echo Complete W/ Cont if Necessary Per Protocol    Narrative · Left ventricular systolic function is normal. Estimated EF = 55%.  · Left ventricular wall thickness is consistent with moderate-to-severe   concentric hypertrophy.  · The left ventricular cavity is small.  · Left ventricular diastolic dysfunction (grade I a) consistent with   impaired relaxation.  · Left atrial cavity size is mildly dilated.  · There is moderate calcification of the aortic valve.  · Mild to moderate aortic valve regurgitation is present.  · Mild pulmonic valve regurgitation is present.  · Mild tricuspid valve regurgitation is present.  · Mild dilation of the ascending aorta is present.          I have reviewed the medications.      amLODIPine 5 mg Oral Q24H   apixaban 5 mg Oral Q12H   aspirin 81 mg Oral Daily   castor oil-balsam peru  Topical Q12H   DAPTOmycin 8 mg/kg (Adjusted) Intravenous Q24H   diltiaZEM 60 mg Oral Q6H   insulin detemir 15 Units Subcutaneous BID   insulin regular 0-14 Units Subcutaneous Q6H   metoprolol tartrate 50 mg Oral Q12H   sodium chloride 10 mL Intravenous Q12H   thiamine 100 mg Oral Daily         Assessment/Plan   Assessment / Plan     Active Hospital Problems    Diagnosis   • **Altered mental status   • Sepsis due to methicillin resistant Staphylococcus aureus (MRSA) (CMS/HCC)   • Acute parotitis   • Ventricular tachyarrhythmia (CMS/HCC)   • Acute renal failure superimposed on stage 3 chronic kidney disease (CMS/HCC)   • Hyperkalemia   • Congestive heart failure (CMS/HCC)   • Chronic obstructive pulmonary disease with acute exacerbation (CMS/HCC)   • CAD (coronary artery disease)     History of  Coronary Artery Disease V12.59     • Diabetes mellitus, type 2 (CMS/HCC)   • Hypertension   • Hypothyroidism   • Hyperlipidemia          Brief Hospital Course to date:  Leilamarian Smith is a 73 y.o. female nursing home resident who was admitted to the ICU  with Vtach/hyperkalemia, also found to have acute on chronic renal failure. Noted to have UTI on admission as well as MRSA bacteremia. Also treated with parotid sialadenitis evaluated by ENT, right leg hematoma evaluated and drained by surgery 11/29.       Assessment & Plan:    MRSA bacteremia, sepsis----  leukocytosis improved,  no longer febrile. Unclear source, multiple potential sources including UTI, parotid sialdenitis, leg hematoma.  -  Wound cultures from leg growing coag neg staph so far.   - Repeat blood cultures NGTD. TTE negative for cardiac involvement. YANNA unsuccessful today due to presence of NGT/unable to pass endoscope equipment as well as issues with PICC line.   - Plan is to cont TF , ST will reevaluate, poss need MBS. May ultimately need PEG.  Abx per Dr Gtz.     ANGELA---nephrology following. Feel likely pre-renal secondary to dehydration. Improving and creatinine back to baseline (1.2-1.4), monitor BMP, will check in am. IVF has been DC'ed. Nutritionist to follow and adjust TF/flushes to meet daily fluid requirement.    Vtach/hyperkalemia---s/p cardioverted at the scene with return to sinus rhythm. An I/O needle was placed and she converted back to VT, then reconverted to SR spontaneously. She was treated with insulin/gluscose and calcium gluconate for hyperkalemia. Cardiology following. Oral amiodarone d/c'ed  by cardiology. Pt cont on home metoprolol and Diltiazem. Anticoagulation resumed yesterday now with bleeding at PICC, will continue to  Hold Eliquis, I have DW Dr. Marquez, will hold until bleeding controlled.  Hyperkalemia now normalized. Monitor closely HH, if drops will need to pull PICC, I have DW PICC nurse. No DVT in upper arm, small superficial clot in forearm.     DM2---previously uncontrolled, but A1C now down to 7.9%, was 10.2 in 1/2018. Currently still hyperglycemic, likely reactive. adjust basal insulin  PRN SSI.    Parotiditis/sialadenitis---ENT following, cont with  abx/compress. No surgery needed at this time.     Encephalopathy, acute on chronic----likely multifactorial, per previous notes and documentation, patient is confused at baseline    Dysphagia---on TF, ongoing ST eval and treatment as indicated. Hopefully ST can eval pt today. And we will be able to proceed YANNA tomorrow, if unable to pass swallow eval and not safe to eat by mouth, then will need to discuss long term nutrition route/GOC with family.    Difficult IV access--PICC inserted,, I have DW PICC nurse, PICC line functioning but with bleeding, will monitor closely if bleeding continues will pull but for now PICC functioning.     Debility/weakness-- will need rehab once medically stable.    DVT Prophylaxis:  heparin    CODE STATUS:   Code Status and Medical Interventions:   Ordered at: 11/28/18 1331     Code Status:    CPR     Medical Interventions (Level of Support Prior to Arrest):    Full       Disposition: I expect the patient to be discharged back to SNF pending improvement and final abx plans.      Electronically signed by Yumiko Sandoval DO, 12/06/18, 1:59 PM.

## 2018-12-06 NOTE — CONSULTS
"                  Clinical Nutrition     Nutrition Support Assessment  Reason for Visit:   Follow up, EN      Patient Name: Leila Smith  YOB: 1945  MRN: [id number]  Date of Encounter: 12/06/18 2:07 PM  Admission date: 11/28/2018    Comments: Pt was NPO at [facility] for YANNA 12/6, however pt having bleeding from PICC and unable to do procedure, rescheduled for 12/6.. Pt also scheduled for MBS 12/5 but too agitated and combative to participate in test. Also rescheduled for 12/6.  Pt EN resumed but will be on hold again at [facility].    Nutrition Assessment   Assessment       Admission diagnosis     Altered mental status    Additional applicable diagnosis/conditions/procedures   Metabolic acidosis  Acute renal failure superimposed on stage 3 chronic kidney disease (CMS/HCC)-non oliguric   Hyperkalemia-improved at present   Skin: Per WOCN 11/29: Pt has blanchable area of shearing right gluteus      Applicable PMH:  Hypertension  Hyperlipidemia  Hypothyroidism  Diabetes mellitus, type 2 (CMS/HCC)  CAD (coronary artery disease)  Chronic obstructive pulmonary disease with acute exacerbation (CMS/HCC)  Congestive heart failure (CMS/HCC)   Ventricular tachyarrhythmia (CMS/HCC)  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  Pt non-verbal but can answer yes/no questions. Shakes head no when asked if she has any nausea or stomach discomfort. Appears to be tolerating EN well.       Anthropometrics     Height: 162.6 cm (64\")  Last filed wt: Weight: 80.7 kg (177 lb 14.4 oz) (12/06/18 0600)  Weight Method: Bed scale    BMI: BMI (Calculated): 24.9kg/m2   Normal: 18.5-24.9kg/m2    Ideal Body Weight (IBW) (kg): 55      Last 15 Recorded Weights     Weight Weight (kg) Weight (lbs) Weight Method   11/29/2018 65.8 kg 145 lb 1 oz Bed scale   11/28/2018 59.875 kg 132 lb -   11/28/2018 60 kg 132 lb 4.4 oz Bed scale   11/28/2018 90.719 kg 200 lb Estimated   7/13/2018 86.909 kg " 191 lb 9.6 oz Bed scale   7/12/2018 86.9 kg 191 lb 9.3 oz Bed scale   7/11/2018 82.5 kg 181 lb 14.1 oz Bed scale   7/10/2018 81.3 kg 179 lb 3.7 oz Bed scale   7/9/2018 80.6 kg 177 lb 11.1 oz Bed scale   7/8/2018 79.4 kg 175 lb 0.7 oz Bed scale   7/6/2018 78.8 kg 173 lb 11.6 oz Bed scale   7/5/2018 81 kg 178 lb 9.2 oz Bed scale   7/2/2018 80.74 kg 178 lb Stated   2/13/2018 74.078 kg 163 lb 5 oz -   2/12/2018 75.388 kg 166 lb 3.2 oz        Labs reviewed   Unable to obtain new labs as pt combative.     Results from last 7 days   Lab Units  12/04/18   0520  12/03/18   0652  12/02/18   0546   12/01/18   0403  11/30/18   0440   GLUCOSE mg/dL  229*  214*  311*   --   297*  329*   BUN mg/dL  31*  33*  43*   --   56*  85*   CREATININE mg/dL  0.96  1.10  1.31*   --   1.53*  2.02*   SODIUM mmol/L  141  143  146   --   141  146   CHLORIDE mmol/L  110*  109  109   --   105  107   POTASSIUM mmol/L  5.0  3.3*  3.5   < >  3.1*  3.0*   PHOSPHORUS mg/dL   --   1.2*  2.4   --   1.1*  2.3*  2.3*   MAGNESIUM mg/dL  2.3  1.7  2.0   --   1.8  1.6  1.6   ALT (SGPT) U/L   --   15   --    --    --   16    < > = values in this interval not displayed.     Results from last 7 days   Lab Units  12/03/18   0652  11/30/18   0440   ALBUMIN g/dL  2.79*  2.70*   PREALBUMIN mg/dL  11.0   --    CRP mg/dL  7.84*   --    CHOLESTEROL mg/dL  113  91   TRIGLYCERIDES mg/dL  161*  137       Medications reviewed   Yes      Intake/Ouptut 24 hrs (7:00AM - 6:59 AM)     Intake & Output (last day)       12/05 0701 - 12/06 0700 12/06 0701 - 12/07 0700    I.V. (mL/kg)      Other 101 20    NG/     Total Intake(mL/kg) 692 (8.6) 20 (0.2)    Urine (mL/kg/hr) 625 (0.3) 400 (0.7)    Stool 1380 850    Total Output 2005 1250    Net -1313 -1230                Needs Assessment       Height used 162.6cm    Weight used 65.8kg          Estimated need Method/Equation used Result    Energy/Calorie need   1400-1600kcals/d   MSJ X 1.2   1366kcals     20-25kcals/kg actbw   1316-1645kcals    Protein   66g/day   1g/kg actbw-given current renal status           Current Nutrition Prescription     PO: NPO Diet    Peptamen 1.5 @ 45mL/hr via NG. Free water 10 mL/hr    2 Day EN average delivery:  693 mL (69%)  1040 kcal (69%)  47 gm protein (71%)  Nutrition Diagnosis       Problem Inadequate enteral nutrition infusion    Etiology Transfer from unit, EN on hold for procedures   Signs/Symptoms Pt receiving 69% of needs       Nutrition Intervention   1.  Follow treatment progress, Care plan reviewed, adjust EN rate          At Target Goal Volume     % Est needs   Volume  1000ml     Energy/kcals 1500kcals 100%   Protein 68g pro 103%   Fiber 0g         Fluid via EN 770mL    Total Fluid  970mL    Meets 100% RDI yes      1.Adjust EN rate to 45 mL/hour for 22 hour delivery on floor.     2. Nutrition panel, Prealb and CRP ordered weekly to start 12/2.      Goal:   General: Nutrition support treatment  EN/PN: Maintain EN, Deliver estimated needs      Monitoring/Evaluation:   Per protocol, I&O, EN delivery/tolerance, Weight, Swallow function  Will Continue to follow per protocol      Lynsey Small, RD  Time Spent: 45min

## 2018-12-06 NOTE — PLAN OF CARE
Problem: Fall Risk (Adult)  Goal: Absence of Fall  Outcome: Ongoing (interventions implemented as appropriate)      Problem: Skin Injury Risk (Adult)  Goal: Skin Health and Integrity  Outcome: Ongoing (interventions implemented as appropriate)      Problem: Patient Care Overview  Goal: Plan of Care Review  Outcome: Ongoing (interventions implemented as appropriate)   12/06/18 0419   Coping/Psychosocial   Plan of Care Reviewed With patient   Plan of Care Review   Progress no change   OTHER   Outcome Summary patient VSS this shift. confused, aggitated each time care is provided. PICC in place and IVF infusing, dressing re-enforced. patient without urine output last bladder scan 1700. Bladder scan at 2230, 550mL. in and out catheter 650mL returned. rn to bladder scan this am.        Problem: Confusion, Acute (Adult)  Goal: Cognitive/Functional Impairments Minimized  Outcome: Ongoing (interventions implemented as appropriate)    Goal: Safety  Outcome: Ongoing (interventions implemented as appropriate)

## 2018-12-06 NOTE — PROGRESS NOTES
Continued Stay Note  [facility]     Patient Name: Leila Smith  MRN: [id number]  Today's Date: 2018    Admit Date: 2018    Discharge Plan     Row Name 18 1107       Plan    Plan  To [facility] when medically ready     Patient/Family in Agreement with Plan  yes    Plan Comments  Patient is not yet ready for discharge. During rounds it was discussed that she may need to have a deepline placed and possible PEG. CM will be following for when patient is medically ready to return to [facility]. I have updated Ginger at [facility] and her bed hold has  but they are still able to accept the patient when she is ready to return - Will be following as hospital course continues - Starr [phone]         Discharge Codes    No documentation.       Expected Discharge Date and Time     Expected Discharge Date Expected Discharge Time    Dec 5, 2018             Starr Sarah RN

## 2018-12-06 NOTE — PLAN OF CARE
Problem: Patient Care Overview  Goal: Plan of Care Review  Outcome: Ongoing (interventions implemented as appropriate)   12/06/18 1028   Coping/Psychosocial   Plan of Care Reviewed With patient   Plan of Care Review   Progress no change   OTHER   Outcome Summary Pt agreeable to supine B UE and B LE ther ex this session. Pt unable to assist with exercise and resisted movement intemittently. Pt able to move BLEs spontaneiously, but not to command. Pt denied any pain. Continue to progress as appropriate.

## 2018-12-06 NOTE — NURSING NOTE
Asked to see due to bleeding at PICC site, she is on eliquis, there is a pressure dressing in place. PICC flushes with brisk blood return. Please call PICC team with any questions.

## 2018-12-06 NOTE — PLAN OF CARE
Problem: Patient Care Overview  Goal: Plan of Care Review  Outcome: Ongoing (interventions implemented as appropriate)   12/06/18 0920   Coping/Psychosocial   Plan of Care Reviewed With patient   Plan of Care Review   Progress no change   OTHER   Outcome Summary Appliance intact. Will need to be changed tomorrow or Saturday. Extra appliance in room. Contact if needs arise. Thanks

## 2018-12-06 NOTE — PROGRESS NOTES
INFECTIOUS DISEASE PROGRESS NOTE    Leila Smith  1945  [id number]    Date: 12/6/2018    Admission Date: 11/28/2018    CC: MRSA bacteremia    History of present illness:    Patient is a 73 y.o. female presents from [facility] with ventricular tachycardia and hyperkalemia from  acute on chronic renal failure;  PAtient admitted to ICU and has been worked up for parotitis, leg abscess and has been found to have MRSA bacteremia.. Blood cultures are positive for MRSA. Hematoma on right leg was drained by surgery on 11/29. 2-dimensional Echocardiogram negative for vegetations.     Patient is obtunded and is a poor historian no family by bedside.  Opens eyes during exam but is nonverbal.    12/3/18: Patient is a poor historian with no family at bedside.  She says yes to any question.  She is more alert today. She remains afebrile. Right parotid gland area with swelling and pain as she swatted my hand away when palpated.     12/4/18; doing well; no events overnight; no complaints, poor historian, ros unobtainable    12/5/18; no events overnight;  Per nurse no fever, rash, poor historian, picc placed    12/6/18; doing well; no events overnight; ros unobtainable      Past Medical History:   Diagnosis Date   • Atrial fibrillation (CMS/HCC)    • Chronic ulcer of right leg (CMS/HCC)    • Cognitive communication deficit    • COPD (chronic obstructive pulmonary disease) (CMS/HCC)    • Diabetes mellitus (CMS/HCC)    • Difficulty in walking    • Encephalopathy    • Generalized muscle weakness    • Hematuria    • Hyperlipidemia    • Hypertension    • Hypothyroidism    • Urinary tract infection        Past Surgical History:   Procedure Laterality Date   • CATARACT EXTRACTION     • CHOLECYSTECTOMY     • COLOSTOMY     • FOOT SURGERY Right    • HERNIA REPAIR     • HYSTERECTOMY     • TOTAL KNEE ARTHROPLASTY Right        Family History   Problem Relation Age of Onset   • Stomach cancer Mother    • Alcohol abuse Other     • Cancer Other    • Diabetes Other    • Hypertension Other    • Other Other        Social History     Socioeconomic History   • Marital status:      Spouse name: Not on file   • Number of children: Not on file   • Years of education: Not on file   • Highest education level: Not on file   Social Needs   • Financial resource strain: Not on file   • Food insecurity - worry: Not on file   • Food insecurity - inability: Not on file   • Transportation needs - medical: Not on file   • Transportation needs - non-medical: Not on file   Occupational History   • Not on file   Tobacco Use   • Smoking status: Former Smoker     Packs/day: 0.00     Years: 50.00     Pack years: 0.00     Types: Cigarettes   • Smokeless tobacco: Never Used   Substance and Sexual Activity   • Alcohol use: No   • Drug use: No   • Sexual activity: Defer   Other Topics Concern   • Not on file   Social History Narrative   • Not on file       Allergies   Allergen Reactions   • Codeine    • Tetracyclines & Related          Medication:    Current Facility-Administered Medications:   •  amLODIPine (NORVASC) tablet 5 mg, 5 mg, Oral, Q24H, Dieter Birch MD, 5 mg at 12/06/18 0821  •  apixaban (ELIQUIS) tablet 5 mg, 5 mg, Oral, Q12H, Balwinder Marquez III, MD, 5 mg at 12/05/18 2119  •  aspirin EC tablet 81 mg, 81 mg, Oral, Daily, Jory Macedo APRN, 81 mg at 12/04/18 1046  •  castor oil-balsam peru (VENELEX) ointment, , Topical, Q12H, Julien Carcamo APRN  •  DAPTOmycin (CUBICIN) 500 mg in sodium chloride 0.9 % 50 mL IVPB, 8 mg/kg (Adjusted), Intravenous, Q24H, Olivier Gtz MD, Last Rate: 100 mL/hr at 12/05/18 1208, 500 mg at 12/05/18 1208  •  dextrose (D50W) 25 g/ 50mL Intravenous Solution 25 g, 25 g, Intravenous, Q15 Min PRN, Shandra Oscar MD  •  dextrose (GLUTOSE) oral gel 15 g, 15 g, Oral, Q15 Min PRN, Shandra Oscar MD  •  diltiaZEM (CARDIZEM) tablet 60 mg, 60 mg, Oral, Q6H, Balwinder Marquez III, MD, 60 mg at  12/06/18 1205  •  glucagon (human recombinant) (GLUCAGEN DIAGNOSTIC) injection 1 mg, 1 mg, Subcutaneous, PRN, Shandra Oscar MD  •  hydrALAZINE (APRESOLINE) tablet 10 mg, 10 mg, Oral, Q8H PRN, Case, Jasmina V., DO  •  insulin detemir (LEVEMIR) injection 15 Units, 15 Units, Subcutaneous, BID, Shandra Oscar MD, 15 Units at 12/05/18 2119  •  insulin regular (humuLIN R,novoLIN R) injection 0-14 Units, 0-14 Units, Subcutaneous, Q6H, Case, Jasmina V., DO, 5 Units at 12/06/18 0018  •  Magnesium Sulfate 2 gram Bolus, followed by 8 gram infusion (total Mg dose 10 grams)- Mg less than or equal to 1mg/dL, 2 g, Intravenous, PRN **OR** Magnesium Sulfate 2 gram / 50mL Infusion (GIVE X 3 BAGS TO EQUAL 6GM TOTAL DOSE) - Mg 1.1 - 1.5 mg/dl, 2 g, Intravenous, PRN **OR** Magnesium Sulfate 4 gram infusion- Mg 1.6-1.9 mg/dL, 4 g, Intravenous, PRN, Balwinder Mccall, [facility], Last Rate: 25 mL/hr at 12/03/18 1218, 4 g at 12/03/18 1218  •  metoprolol tartrate (LOPRESSOR) tablet 50 mg, 50 mg, Oral, Q12H, Balwinder Marquez III, MD, 50 mg at 12/06/18 0821  •  potassium chloride (MICRO-K) CR capsule 40 mEq, 40 mEq, Oral, PRN **OR** potassium chloride (KLOR-CON) packet 40 mEq, 40 mEq, Oral, PRN, 40 mEq at 12/03/18 1714 **OR** potassium chloride 10 mEq in 100 mL IVPB, 10 mEq, Intravenous, Q1H PRN, Balwinder Mccall, [facility]  •  potassium phosphate 45 mmol in sodium chloride 0.9 % 500 mL infusion, 45 mmol, Intravenous, PRN, Last Rate: 62.5 mL/hr at 12/03/18 1713, 45 mmol at 12/03/18 1713 **OR** potassium phosphate 30 mmol in sodium chloride 0.9 % 250 mL infusion, 30 mmol, Intravenous, PRN **OR** potassium phosphate 15 mmol in sodium chloride 0.9 % 100 mL infusion, 15 mmol, Intravenous, PRN **OR** sodium glycerophosphate 40 mmol in sodium chloride 0.9 % 500 mL IVPB, 40 mmol, Intravenous, PRN **OR** sodium glycerophosphate 20 mmol in sodium chloride 0.9 % 250 mL IVPB, 20 mmol, Intravenous, PRN, Balwinder Mccall, [facility]  •  sodium chloride 0.9 %  flush 10 mL, 10 mL, Intravenous, PRN, Rohan Ceballos MD  •  sodium chloride 0.9 % flush 10 mL, 10 mL, Intravenous, Q12H, Shandra Oscar MD  •  sodium chloride 0.9 % flush 10 mL, 10 mL, Intravenous, PRN, Shandra Oscar MD  •  sodium chloride 0.9 % flush 3-10 mL, 3-10 mL, Intravenous, PRN, Julien Carcamo APRN  •  thiamine (VITAMIN B-1) tablet 100 mg, 100 mg, Oral, Daily, Case, Jasmina V., DO, 100 mg at 18 0821    Antibiotics:  Anti-Infectives (From admission, onward)    Ordered     Dose/Rate Route Frequency Start Stop    18 1037  DAPTOmycin (CUBICIN) 500 mg in sodium chloride 0.9 % 50 mL IVPB     Ordering Provider:  Olivier Gtz MD    8 mg/kg × 63.6 kg (Adjusted)  100 mL/hr over 30 Minutes Intravenous Every 24 Hours 18 1200 18 1159    18 1128  vancomycin (VANCOCIN) in iso-osmotic dextrose IVPB 1 g (premix) 200 mL     Ordering Provider:  Aysha Rocha [facility]    1,000 mg  over 60 Minutes Intravenous Once 18 1400 18 1448    18 1522  piperacillin-tazobactam (ZOSYN) 3.375 g in iso-osmotic dextrose 50 ml (premix)     Eran Huang [facility] reviewed the order on 18 0942.   Ordering Provider:  Case, Jasmina V., DO    3.375 g  over 4 Hours Intravenous Every 12 Hours 18 2200 18 1200    18 1332  vancomycin 1750 mg/500 mL 0.9% NS IVPB ([facility])     Ordering Provider:  Rohan Ceballos MD    20 mg/kg × 90.7 kg  over 120 Minutes Intravenous Once 18 1334 18 1642    18 1332  piperacillin-tazobactam (ZOSYN) 3.375 g in iso-osmotic dextrose 50 ml (premix)     Ordering Provider:  Rohan Ceballos MD    3.375 g  over 30 Minutes Intravenous Once 18 1334 18 1720            Review of Systems:  unobtainable      Physical Exam:   Vital Signs  Temp (24hrs), Av.9 °F (36.1 °C), Min:96.6 °F (35.9 °C), Max:97.1 °F (36.2 °C)    Temp  Min: 96.6 °F (35.9 °C)  Max: 97.1 °F (36.2 °C)  BP  Min: 134/86  Max:  165/34  Pulse  Min: 61  Max: 84  Resp  Min: 12  Max: 18  SpO2  Min: 96 %  Max: 99 %    GENERAL: resting quietly opens eyes to exam, more alert.  HEENT: Normocephalic, atraumatic.  PERRL. EOMI. No conjunctival injection. No icterus. Oropharynx clear without evidence of thrush or exudate. No evidence of peridontal disease.  No ext oral lesions    HEART: RRR; systolic murmur loudest at base left  LUNGS: Clear to auscultation bilaterally .  ABDOMEN: Soft, nontender, nondistended. Positive bowel sounds.   EXT:  No cyanosis, clubbing or edema. No cord.  MSK: FROM without joint effusions noted arms/legs.    SKIN: keloid like scarring on toes, fore feet bilaterally    NEURO: nonverbal; no spontaneous movement      Laboratory Data    Results from last 7 days   Lab Units  12/06/18   0701  12/03/18   0652  12/02/18   0546   WBC 10*3/mm3  9.32  17.14*  21.88*   HEMOGLOBIN g/dL  11.1*  9.8*  10.0*   HEMATOCRIT %  36.3  33.6*  34.2*   PLATELETS 10*3/mm3  192  179  171     Results from last 7 days   Lab Units  12/04/18   0520   SODIUM mmol/L  141   POTASSIUM mmol/L  5.0   CHLORIDE mmol/L  110*   CO2 mmol/L  25.0   BUN mg/dL  31*   CREATININE mg/dL  0.96   GLUCOSE mg/dL  229*   CALCIUM mg/dL  6.9*     Results from last 7 days   Lab Units  12/03/18   0652   ALK PHOS U/L  140*   BILIRUBIN mg/dL  0.5   ALT (SGPT) U/L  15   AST (SGOT) U/L  16         Results from last 7 days   Lab Units  12/03/18   0652   CRP mg/dL  7.84*             Results from last 7 days   Lab Units  11/30/18   0440   VANCOMYCIN RM mcg/mL  22.80     Estimated Creatinine Clearance: 52 mL/min (by C-G formula based on SCr of 0.96 mg/dL).      Microbiology:  Blood Culture   Date Value Ref Range Status   11/30/2018 No growth at 2 days  Preliminary   11/30/2018 No growth at 2 days  Preliminary   11/28/2018 Staphylococcus aureus, MRSA (A)  Final     Comment:       Consider infectious disease consult to rule out distant focus of infection.  Methicillin resistant  Staphylococcus aureus, Patient may be an isolation risk.   11/28/2018 Staphylococcus aureus, MRSA (A)  Final     Comment:       Consider infectious disease consult to rule out distant focus of infection.  Methicillin resistant Staphylococcus aureus, Patient may be an isolation risk.       BCID, PCR   Date Value Ref Range Status   11/28/2018 (C) No organism detected by BCID PCR. Final    Staphylococcus aureus. mecA (methicillin resistance gene) detected. Identification by BCID PCR.                 Wound Culture   Date Value Ref Range Status   11/30/2018 (A)  Final    Scant growth (1+) Staphylococcus, coagulase negative     Comment:     Susceptibility will not be done unless Doctor notifies Lab             Radiology:  Us Nonvascular Extremity Limited    Result Date: 11/29/2018  Imaging characteristics are compatible with superficial abscess or other complex fluid collection, just over 5 cm maximally.  D:  11/29/2018 E:  11/29/2018  This report was finalized on 11/29/2018 10:14 AM by Scott Sauer.      Xr Abdomen Kub    Result Date: 11/28/2018  Tip of NASOGASTRIC/ENTERIC tube distal to the gastroesophageal junction in the gastric fundus. THIS DOCUMENT HAS BEEN ELECTRONICALLY SIGNED BY NIRAJ SANDOVAL MD        Impression:   MRSA bactertemia  Left leg abscess   Parotitis right side  Encephalopathy  COPD  Acute renal failure on chronic      PLAN/RECOMMENDATIONS:     Estimated Creatinine Clearance: 52 mL/min (by C-G formula based on SCr of 0.96 mg/dL).    Source of MRSA bacteremia could be from the parotid gland; no abscess seen on initial imaging;  Will be interesting to see if leg culures grow MRSA; a leg abscess can spontaneously develop and usually doesn't lead to positive blood cultures.    Regardless; high grade bacteremia is still concerning for endovascular involvement.    YANNA p.    Given lack of  pulmonary involvement probably not active concern would change abx:    Continue daptomycin 8 mg/kg iv now/daily unless  creatinine clearance decreases below 30 mL/min  Repeat blood cultures are ngtd from 11/30/18  Warm compresses to right parotid gland  YANNA--ordered for 12/4 and made NPO after MN  F/u wound cultures    Patient is complexly ill      Olivier Gtz MD  12/6/2018  2:54 PM

## 2018-12-06 NOTE — SIGNIFICANT NOTE
12/06/18 1306   SLP Deferred Reason   SLP Deferred Reason Unable to evaluate, medical status change  (Patient agitated and combative today.  Has been NPO for YANNA now rescheduled for tomorrow. Will follow up with plan of care tomorrow as patient not participating at this itme. )

## 2018-12-06 NOTE — THERAPY TREATMENT NOTE
Acute Care - Physical Therapy Treatment Note  [facility]     Patient Name: Leila Smith  : 1945  MRN: [id number]  Today's Date: 2018  Onset of Illness/Injury or Date of Surgery: 18  Date of Referral to PT: 18  Referring Physician: SAUL Oscar MD    Admit Date: 2018    Visit Dx:    ICD-10-CM ICD-9-CM   1. Impaired functional mobility, balance, gait, and endurance Z74.09 V49.89   2. Dysphagia, unspecified type R13.10 787.20   3. Ventricular tachycardia (CMS/HCC) I47.2 427.1   4. Altered mental status, unspecified altered mental status type R41.82 780.97     Patient Active Problem List   Diagnosis   • Diabetes mellitus, type 2 (CMS/HCC)   • Hypertension   • Hyperlipidemia   • Hypothyroidism   • Chronic obstructive pulmonary disease (CMS/HCC)   • CAD (coronary artery disease)   • History of edema   • History of obesity   • Venous insufficiency   • Open wound of lower extremity   • Urinary tract infection   • T2DM (type 2 diabetes mellitus) (CMS/[facility])   • Dyspnea on exertion   • Peripheral vascular disease (CMS/HCC)   • Obstructive sleep apnea syndrome   • Ulcer of lower extremity (CMS/[facility])   • Hypoxemia   • Hematuria   • Diabetic peripheral neuropathy (CMS/HCC)   • Edema   • Chronic obstructive pulmonary disease with acute exacerbation (CMS/HCC)   • Congestive heart failure (CMS/HCC)   • Arthritis   • Neutrophilic leukocytosis   • Cystitis   • Acute renal failure superimposed on stage 3 chronic kidney disease (CMS/HCC)   • Hyperkalemia   • Leukocytosis   • Elevated troponin   • Altered mental status   • Ventricular tachyarrhythmia (CMS/HCC)   • Sepsis due to methicillin resistant Staphylococcus aureus (MRSA) (CMS/[facility])   • Acute parotitis       Therapy Treatment    Rehabilitation Treatment Summary     Row Name 18 1028             Treatment Time/Intention    Discipline  physical therapist  -KR      Document Type  therapy note (daily note)  -KR      Subjective Information  no  complaints  -KR      Mode of Treatment  physical therapy  -KR      Care Plan Review  care plan/treatment goals reviewed;risks/benefits reviewed;patient/other agree to care plan  -KR      Therapy Frequency (PT Clinical Impression)  3 times/wk  -KR      Patient Effort  fair  -KR      Existing Precautions/Restrictions  fall;other (see comments) NG; decreased skin integrity  -KR      Recorded by [KR] Anna Foster, PT 12/06/18 1541      Row Name 12/06/18 1028             Vital Signs    Pre Systolic BP Rehab  134  -KR      Pre Treatment Diastolic BP  86  -KR      Pretreatment Heart Rate (beats/min)  62  -KR      Posttreatment Heart Rate (beats/min)  67  -KR      O2 Delivery Pre Treatment  room air  -KR      O2 Delivery Post Treatment  room air  -KR      Pre Patient Position  Supine  -KR      Intra Patient Position  Supine  -KR      Post Patient Position  Supine  -KR      Recorded by [KR] Anna Foster, PT 12/06/18 1541      Row Name 12/06/18 1028             Cognitive Assessment/Intervention    Additional Documentation  Cognitive Assessment/Intervention (Group)  -KR      Recorded by [KR] Anna Foster, PT 12/06/18 1541      Row Name 12/06/18 1028             Cognitive Assessment/Intervention- PT/OT    Affect/Mental Status (Cognitive)  low arousal/lethargic;confused  -KR      Orientation Status (Cognition)  oriented to;person  -KR      Follows Commands (Cognition)  follows one step commands;0-24% accuracy;verbal cues/prompting required  -KR      Cognitive Function (Cognitive)  safety deficit  -KR      Safety Deficit (Cognitive)  severe deficit;ability to follow commands;awareness of need for assistance;insight into deficits/self awareness;safety precautions awareness;safety precautions follow-through/compliance  -KR      Personal Safety Interventions  fall prevention program maintained;supervised activity  -KR      Recorded by [KR] Anna Foster, PT 12/06/18 1541      Row Name 12/06/18 1028             Safety  Issues, Functional Mobility    Safety Issues Affecting Function (Mobility)  ability to follow commands;awareness of need for assistance;insight into deficits/self awareness;safety precaution awareness;safety precautions follow-through/compliance  -KR      Impairments Affecting Function (Mobility)  cognition;endurance/activity tolerance;pain;range of motion (ROM);strength  -KR      Recorded by [KR] Anna Foster, PT 12/06/18 1541      Row Name 12/06/18 1028             Bed Mobility Assessment/Treatment    Comment (Bed Mobility)  Pt declined any EOB or OOB activity; agreeable to supine ther ex  -KR      Recorded by [KR] Anna Foster, PT 12/06/18 1541      Row Name 12/06/18 1028             Transfer Assessment/Treatment    Comment (Transfers)  Transfers deferred. Not appropriate to assess.   -KR      Recorded by [KR] Anna Foster, PT 12/06/18 1541      Row Name 12/06/18 1028             Gait/Stairs Assessment/Training    [place] Level (Gait)  unable to assess  -KR      Comment (Gait/Stairs)  Ambulation deferred. Not appropriate to assess.   -KR      Recorded by [KR] Anna Foster, PT 12/06/18 1541      Row Name 12/06/18 1028             Motor Skills Assessment/Interventions    Additional Documentation  Therapeutic Exercise (Group)  -KR      Recorded by [KR] Anna Foster, PT 12/06/18 1541      Row Name 12/06/18 1028             Therapeutic Exercise    Therapeutic Exercise  supine, upper extremities;supine, lower extremities  -KR      Additional Documentation  Therapeutic Exercise (Row)  -KR      25999 - PT Therapeutic Exercise Minutes  12  -KR      Recorded by [KR] Anna Foster, PT 12/06/18 1541      Row Name 12/06/18 1028             Upper Extremity Supine Therapeutic Exercise    Performed, Supine Upper Extremity (Therapeutic Exercise)  shoulder flexion/extension;shoulder abduction/adduction;elbow flexion/extension;other (see comments) wrist flex/ext; finger flex/ext  -KR      Exercise Type, Supine  Upper Extremity (Therapeutic Exercise)  PROM (passive range of motion)  -KR      Sets/Reps Detail, Supine Upper Extremity (Therapeutic Exercise)  BUE 1/10  -KR      Recorded by [KR] Anna Foster, PT 12/06/18 1541      Row Name 12/06/18 1028             Lower Extremity Supine Therapeutic Exercise    Performed, Supine Lower Extremity (Therapeutic Exercise)  SLR (straight leg raise);SAQ (short arc quad) over bolster;ankle pumps;hip abduction/adduction  -KR      Exercise Type, Supine Lower Extremity (Therapeutic Exercise)  PROM (passive range of motion)  -KR      Sets/Reps Detail, Supine Lower Extremity (Therapeutic Exercise)  BLE 1/10  -KR      Comment, Supine Lower Extremity (Therapeutic Exercise)  pt resisting movement; some spontaneous movement of BLEs, but not to command  -KR      Recorded by [KR] Anna Foster, PT 12/06/18 1541      Row Name 12/06/18 1028             Positioning and Restraints    Pre-Treatment Position  in bed  -KR      Post Treatment Position  bed  -KR      In Bed  supine;call light within reach;encouraged to call for assist;exit alarm on;with nsg;side rails up x3;R waffle boot;L waffle boot  -KR      Recorded by [KR] Anna Foster, PT 12/06/18 1541      Row Name 12/06/18 1028             Pain Assessment    Additional Documentation  Pain Scale: FACES Pre/Post-Treatment (Group)  -KR      Recorded by [KR] Anna Foster, PT 12/06/18 1541      Row Name 12/06/18 1028             Pain Scale: FACES Pre/Post-Treatment    Pain: FACES Scale, Pretreatment  0-->no hurt  -KR      Pain: FACES Scale, Post-Treatment  0-->no hurt  -KR      Recorded by [KR] Anna Foster, PT 12/06/18 1541      Row Name                Wound 11/29/18 0915 Right medial gluteal other (see comments)    Wound - Properties Group Date first assessed: 11/29/18 [CP] Time first assessed: 0915 [CP] Present On Admission : yes;picture taken [CP] Side: Right [CP] Orientation: medial [CP] Location: gluteal [CP] Type: other (see  comments) [CP] Additional Comments: shearing [CP] Recorded by:  [CP] Angélica Gavin APRN 11/29/18 1100    Row Name                Wound 11/29/18 1730 Right anterior;lower leg incision    Wound - Properties Group Date first assessed: 11/29/18 [AB] Time first assessed: 1730 [AB] Present On Admission : other (see comments) [AB], wound present, excised by Dr. Gimenez  Side: Right [AB] Orientation: anterior;lower [AB] Location: leg [AB] Type: incision [AB] Stage, Pressure Injury: other (see comments) [AB] Recorded by:  [AB] Jag Amaral RN 11/29/18 1753    Row Name                Wound 12/01/18 1400 Left lower leg skin tear    Wound - Properties Group Date first assessed: 12/01/18 [AB] Time first assessed: 1400 [AB] Present On Admission : no [AB] Side: Left [AB] Orientation: lower [AB] Location: leg [AB] Type: skin tear [AB] Recorded by:  [AB] Jag Amaral RN 12/01/18 1430    Row Name 12/06/18 1028             Plan of Care Review    Plan of Care Reviewed With  patient  -KR      Recorded by [KR] Anna Foster, PT 12/06/18 1541      Row Name 12/06/18 1028             Outcome Summary/Treatment Plan (PT)    Daily Summary of Progress (PT)  unable to show any progress toward functional goals  -KR      Recorded by [KR] Anna Foster, PT 12/06/18 1541        User Key  (r) = Recorded By, (t) = Taken By, (c) = Cosigned By    Initials Name Effective Dates Discipline    CP Angélica Gavin APRN 11/05/18 -  Nurse    Jag Gould RN 02/02/17 -  Nurse    Anna Carrera, PT 04/03/18 -  PT          Wound 11/29/18 0915 Right medial gluteal other (see comments) (Active)   Dressing Appearance dry;intact 12/6/2018  8:00 AM   Closure None 12/6/2018  8:00 AM   Base clean;dry;pink;white 12/6/2018  8:00 AM   Periwound blanchable 12/6/2018  8:00 AM   Edges irregular;open 12/6/2018  8:00 AM   Drainage Amount none 12/6/2018  8:00 AM   Dressing Care, Wound dressing applied;dressing changed 12/6/2018  8:00 AM        Wound 11/29/18 1730 Right anterior;lower leg incision (Active)   Dressing Appearance dry;intact 12/6/2018  8:00 AM   Closure [place] 12/5/2018  9:15 PM   Base clean;moist;red/granulating 12/6/2018  8:00 AM   Periwound intact;dry 12/6/2018  8:00 AM   Periwound Temperature warm 12/6/2018  8:00 AM   Periwound Skin Turgor soft 12/6/2018  8:00 AM   Edges irregular;rolled/closed 12/6/2018  8:00 AM   Drainage Characteristics/Odor sanguineous 12/6/2018  8:00 AM   Drainage Amount scant 12/6/2018  8:00 AM   Care, Wound cleansed with;sterile normal saline 12/6/2018  8:00 AM   Dressing Care, Wound dressing applied;dressing changed 12/6/2018  8:00 AM   Periwound Care, Wound dry periwound area maintained 12/6/2018  8:00 AM       Wound 12/01/18 1400 Left lower leg skin tear (Active)   Dressing Appearance dry;intact 12/6/2018  8:00 AM   Closure Open to air 12/6/2018  8:00 AM   Base bleeding;red/granulating 12/6/2018  8:00 AM   Periwound dry;intact 12/6/2018  8:00 AM   Periwound Temperature warm 12/6/2018  8:00 AM   Periwound Skin Turgor soft 12/6/2018  8:00 AM   Edges irregular 12/6/2018  8:00 AM   Drainage Characteristics/Odor bleeding controlled 12/6/2018  8:00 AM   Drainage Amount small 12/6/2018  8:00 AM   Care, Wound cleansed with;sterile normal saline 12/6/2018  8:00 AM   Dressing Care, Wound dressing applied;dressing changed 12/6/2018  8:00 AM   Periwound Care, Wound dry periwound area maintained 12/6/2018  8:00 AM           Physical Therapy Education     Title: PT OT SLP Therapies (In Progress)     Topic: Physical Therapy (In Progress)     Point: Home exercise program (In Progress)     Learning Progress Summary           Patient Acceptance, E, NR by KR at 12/6/2018 10:28 AM                   Point: Body mechanics (In Progress)     Learning Progress Summary           Patient Acceptance, E, NR by KR at 12/6/2018 10:28 AM                   Point: Precautions (In Progress)     Learning Progress Summary           Patient  Acceptance, E, NR by DAVID at 12/6/2018 10:28 AM                               User Key     Initials Effective Dates Name Provider Type Discipline    DAVID 04/03/18 -  Anna Foster, PT Physical Therapist PT                PT Recommendation and Plan  Therapy Frequency (PT Clinical Impression): 3 times/wk  Outcome Summary/Treatment Plan (PT)  Daily Summary of Progress (PT): unable to show any progress toward functional goals  Plan of Care Reviewed With: patient  Progress: no change  Outcome Summary: Pt agreeable to supine B UE and B LE ther ex this session. Pt unable to assist with exercise and resisted movement intemittently. Pt able to move BLEs spontaneiously, but not to command. Pt denied any pain. Continue to progress as appropriate.   Outcome Measures     Row Name 12/06/18 1028 12/04/18 1120 12/04/18 0935       How much help from another person do you currently need...    Turning from your back to your side while in flat bed without using bedrails?  1  -KR  1  -MB  --    Moving from lying on back to sitting on the side of a flat bed without bedrails?  1  -KR  1  -MB  --    Moving to and from a bed to a chair (including a wheelchair)?  1  -KR  1  -MB  --    Standing up from a chair using your arms (e.g., wheelchair, bedside chair)?  1  -KR  1  -MB  --    Climbing 3-5 steps with a railing?  1  -KR  1  -MB  --    To walk in hospital room?  1  -KR  1  -MB  --    AM-PAC 6 Clicks Score  6  -KR  6  -MB  --       How much help from another is currently needed...    Putting on and taking off regular lower body clothing?  --  1  -MB  1  -TB    Bathing (including washing, rinsing, and drying)  --  --  2  -TB    Toileting (which includes using toilet bed pan or urinal)  --  --  1  -TB    Putting on and taking off regular upper body clothing  --  --  2  -TB    Taking care of personal grooming (such as brushing teeth)  --  --  2  -TB    Eating meals  --  --  1  -TB    Score  --  --  9  -TB       Functional Assessment    Outcome  Measure Options  AM-PAC 6 Clicks Basic Mobility (PT)  -KR  AM-PAC 6 Clicks Basic Mobility (PT)  -MB  AM-PAC 6 Clicks Daily Activity (OT)  -TB      User Key  (r) = Recorded By, (t) = Taken By, (c) = Cosigned By    Initials Name Provider Type    TB Brianne Yeung, OT Occupational Therapist    MB Pamela Hernandez, PT Physical Therapist    KR Anna Foster, PT Physical Therapist         Time Calculation:   PT Charges     Row Name 12/06/18 1028             Time Calculation    Start Time  1028  -KR      PT Received On  12/06/18  -KR      PT Goal Re-Cert Due Date  12/14/18  -KR         Time Calculation- PT    Total Timed Code Minutes- PT  12 minute(s)  -KR         Timed Charges    48255 - PT Therapeutic Exercise Minutes  12  -KR        User Key  (r) = Recorded By, (t) = Taken By, (c) = Cosigned By    Initials Name Provider Type    KR Anna Foster, PT Physical Therapist        Therapy Suggested Charges     Code   Minutes Charges    75177 (CPT®) Hc Pt Neuromusc Re Education Ea 15 Min      58882 (CPT®) Hc Pt Ther Proc Ea 15 Min 12 1    80573 (CPT®) Hc Gait Training Ea 15 Min      48591 (CPT®) Hc Pt Therapeutic Act Ea 15 Min      15062 (CPT®) Hc Pt Manual Therapy Ea 15 Min      66286 (CPT®) Hc Pt Iontophoresis Ea 15 Min      90931 (CPT®) Hc Pt Elec Stim Ea-Per 15 Min      24344 (CPT®) Hc Pt Ultrasound Ea 15 Min      48902 (CPT®) Hc Pt Self Care/Mgmt/Train Ea 15 Min      91682 (CPT®) Hc Pt Prosthetic (S) Train Initial Encounter, Each 15 Min      12780 (CPT®) Hc Pt Orthotic(S)/Prosthetic(S) Encounter, Each 15 Min      25245 (CPT®) Hc Orthotic(S) Mgmt/Train Initial Encounter, Each 15min      Total  12 1        Therapy Charges for Today     Code Description Service Date Service Provider Modifiers Qty    51131421909 HC PT THER PROC EA 15 MIN 12/6/2018 Anna Foster, PT GP 1    17325002119 HC PT THER SUPP EA 15 MIN 12/6/2018 Anna Foster, PT GP 1          PT G-Codes  Outcome Measure Options: AM-PAC 6 Clicks  Basic Mobility (PT)  AM-PAC 6 Clicks Score: 6  Score: 9  Functional Limitation: Mobility: Walking and moving around  Mobility: Walking and Moving Around Current Status (): 100 percent impaired, limited or restricted  Mobility: Walking and Moving Around Goal Status (): At least 80 percent but less than 100 percent impaired, limited or restricted    Sue Foster, PT  12/6/2018

## 2018-12-07 NOTE — PROGRESS NOTES
INFECTIOUS DISEASE PROGRESS NOTE    Leila Smith  1945  [id number]    Date: 12/7/2018    Admission Date: 11/28/2018    CC: MRSA bacteremia    History of present illness:    Patient is a 73 y.o. female presents from [facility] with ventricular tachycardia and hyperkalemia from  acute on chronic renal failure;  PAtient admitted to ICU and has been worked up for parotitis, leg abscess and has been found to have MRSA bacteremia.. Blood cultures are positive for MRSA. Hematoma on right leg was drained by surgery on 11/29. 2-dimensional Echocardiogram negative for vegetations.     Patient is obtunded and is a poor historian no family by bedside.  Opens eyes during exam but is nonverbal.    12/3/18: Patient is a poor historian with no family at bedside.  She says yes to any question.  She is more alert today. She remains afebrile. Right parotid gland area with swelling and pain as she swatted my hand away when palpated.     12/4/18; doing well; no events overnight; no complaints, poor historian, ros unobtainable    12/5/18; no events overnight;  Per nurse no fever, rash, poor historian, picc placed    12/6/18; doing well; no events overnight; ros unobtainable    12/7/18; doing fair, poor historian, no complaints, nonverbal; ros unobtainable    Past Medical History:   Diagnosis Date   • Atrial fibrillation (CMS/HCC)    • Chronic ulcer of right leg (CMS/HCC)    • Cognitive communication deficit    • COPD (chronic obstructive pulmonary disease) (CMS/HCC)    • Diabetes mellitus (CMS/HCC)    • Difficulty in walking    • Encephalopathy    • Generalized muscle weakness    • Hematuria    • Hyperlipidemia    • Hypertension    • Hypothyroidism    • Urinary tract infection        Past Surgical History:   Procedure Laterality Date   • CATARACT EXTRACTION     • CHOLECYSTECTOMY     • COLOSTOMY     • FOOT SURGERY Right    • HERNIA REPAIR     • HYSTERECTOMY     • TOTAL KNEE ARTHROPLASTY Right        Family History    Problem Relation Age of Onset   • Stomach cancer Mother    • Alcohol abuse Other    • Cancer Other    • Diabetes Other    • Hypertension Other    • Other Other        Social History     Socioeconomic History   • Marital status:      Spouse name: Not on file   • Number of children: Not on file   • Years of education: Not on file   • Highest education level: Not on file   Social Needs   • Financial resource strain: Not on file   • Food insecurity - worry: Not on file   • Food insecurity - inability: Not on file   • Transportation needs - medical: Not on file   • Transportation needs - non-medical: Not on file   Occupational History   • Not on file   Tobacco Use   • Smoking status: Former Smoker     Packs/day: 0.00     Years: 50.00     Pack years: 0.00     Types: Cigarettes   • Smokeless tobacco: Never Used   Substance and Sexual Activity   • Alcohol use: No   • Drug use: No   • Sexual activity: Defer   Other Topics Concern   • Not on file   Social History Narrative   • Not on file       Allergies   Allergen Reactions   • Codeine    • Tetracyclines & Related          Medication:    Current Facility-Administered Medications:   •  amLODIPine (NORVASC) tablet 5 mg, 5 mg, Oral, Q24H, Dieter Birch MD, 5 mg at 12/07/18 0905  •  aspirin EC tablet 81 mg, 81 mg, Oral, Daily, Jory Macedo, APRN, 81 mg at 12/07/18 0905  •  castor oil-balsam peru (VENELEX) ointment, , Topical, Q12H, Julien Carcamo APRN  •  DAPTOmycin (CUBICIN) 500 mg in sodium chloride 0.9 % 50 mL IVPB, 8 mg/kg (Adjusted), Intravenous, Q24H, Olivier Gtz MD, Last Rate: 100 mL/hr at 12/06/18 1208, 500 mg at 12/06/18 1208  •  dextrose (D50W) 25 g/ 50mL Intravenous Solution 25 g, 25 g, Intravenous, Q15 Min PRN, Shandra Oscar MD, 25 g at 12/07/18 0503  •  dextrose (GLUTOSE) oral gel 15 g, 15 g, Oral, Q15 Min PRN, Shandra Oscar MD  •  diltiaZEM (CARDIZEM) tablet 60 mg, 60 mg, Oral, Q6H, Balwinder Marquez III, MD, 60  mg at 12/07/18 0503  •  glucagon (human recombinant) (GLUCAGEN DIAGNOSTIC) injection 1 mg, 1 mg, Subcutaneous, PRN, Shandra Oscar MD  •  hydrALAZINE (APRESOLINE) tablet 10 mg, 10 mg, Oral, Q8H PRN, Case, Jasmina V., DO  •  insulin detemir (LEVEMIR) injection 15 Units, 15 Units, Subcutaneous, BID, Shandra Oscar MD, 15 Units at 12/06/18 2023  •  insulin regular (humuLIN R,novoLIN R) injection 0-14 Units, 0-14 Units, Subcutaneous, Q6H, Case, Jasmina V., DO, 5 Units at 12/07/18 0004  •  Magnesium Sulfate 2 gram Bolus, followed by 8 gram infusion (total Mg dose 10 grams)- Mg less than or equal to 1mg/dL, 2 g, Intravenous, PRN **OR** Magnesium Sulfate 2 gram / 50mL Infusion (GIVE X 3 BAGS TO EQUAL 6GM TOTAL DOSE) - Mg 1.1 - 1.5 mg/dl, 2 g, Intravenous, PRN **OR** Magnesium Sulfate 4 gram infusion- Mg 1.6-1.9 mg/dL, 4 g, Intravenous, PRN, Balwinder Mccall, [facility], Last Rate: 25 mL/hr at 12/03/18 1218, 4 g at 12/03/18 1218  •  metoprolol tartrate (LOPRESSOR) tablet 50 mg, 50 mg, Oral, Q12H, Balwinder Marquez III, MD, 50 mg at 12/07/18 0905  •  potassium chloride (MICRO-K) CR capsule 40 mEq, 40 mEq, Oral, PRN **OR** potassium chloride (KLOR-CON) packet 40 mEq, 40 mEq, Oral, PRN, 40 mEq at 12/03/18 1714 **OR** potassium chloride 10 mEq in 100 mL IVPB, 10 mEq, Intravenous, Q1H PRN, Balwinder Mccall, [facility]  •  potassium phosphate 45 mmol in sodium chloride 0.9 % 500 mL infusion, 45 mmol, Intravenous, PRN, Last Rate: 62.5 mL/hr at 12/03/18 1713, 45 mmol at 12/03/18 1713 **OR** potassium phosphate 30 mmol in sodium chloride 0.9 % 250 mL infusion, 30 mmol, Intravenous, PRN **OR** potassium phosphate 15 mmol in sodium chloride 0.9 % 100 mL infusion, 15 mmol, Intravenous, PRN **OR** sodium glycerophosphate 40 mmol in sodium chloride 0.9 % 500 mL IVPB, 40 mmol, Intravenous, PRN **OR** sodium glycerophosphate 20 mmol in sodium chloride 0.9 % 250 mL IVPB, 20 mmol, Intravenous, PRN, Balwinder Mccall, [facility]  •  sodium chloride 0.9  % flush 10 mL, 10 mL, Intravenous, PRN, Rohan Ceballos MD  •  sodium chloride 0.9 % flush 10 mL, 10 mL, Intravenous, Q12H, Shandra Oscar MD, 10 mL at 18 0904  •  sodium chloride 0.9 % flush 10 mL, 10 mL, Intravenous, PRN, Shandra Oscar MD  •  sodium chloride 0.9 % flush 3-10 mL, 3-10 mL, Intravenous, PRN, Julien Carcamo APRN  •  thiamine (VITAMIN B-1) tablet 100 mg, 100 mg, Oral, Daily, Case, Jasmina V., DO, 100 mg at 18 0905    Antibiotics:  Anti-Infectives (From admission, onward)    Ordered     Dose/Rate Route Frequency Start Stop    18 1037  DAPTOmycin (CUBICIN) 500 mg in sodium chloride 0.9 % 50 mL IVPB     Ordering Provider:  Olivier Gtz MD    8 mg/kg × 63.6 kg (Adjusted)  100 mL/hr over 30 Minutes Intravenous Every 24 Hours 18 1200 18 1159    18 1128  vancomycin (VANCOCIN) in iso-osmotic dextrose IVPB 1 g (premix) 200 mL     Ordering Provider:  Aysha Rocha RP    1,000 mg  over 60 Minutes Intravenous Once 18 1400 18 1448    18 1522  piperacillin-tazobactam (ZOSYN) 3.375 g in iso-osmotic dextrose 50 ml (premix)     Eran Huang [facility] reviewed the order on 18 0942.   Ordering Provider:  Case, Jasmina V., DO    3.375 g  over 4 Hours Intravenous Every 12 Hours 18 2200 18 1200    18 1332  vancomycin 1750 mg/500 mL 0.9% NS IVPB ([facility])     Ordering Provider:  Rohan Ceballos MD    20 mg/kg × 90.7 kg  over 120 Minutes Intravenous Once 18 1334 18 1642    18 1332  piperacillin-tazobactam (ZOSYN) 3.375 g in iso-osmotic dextrose 50 ml (premix)     Ordering Provider:  Rohan Ceballos MD    3.375 g  over 30 Minutes Intravenous Once 18 1334 18 1720            Review of Systems:  unobtainable      Physical Exam:   Vital Signs  Temp (24hrs), Av.4 °F (36.3 °C), Min:96.6 °F (35.9 °C), Max:97.8 °F (36.6 °C)    Temp  Min: 96.6 °F (35.9 °C)  Max: 97.8 °F (36.6 °C)  BP   Min: 101/62  Max: 154/80  Pulse  Min: 54  Max: 80  Resp  Min: 16  Max: 24  SpO2  Min: 92 %  Max: 100 %    GENERAL: resting quietly opens eyes to exam, more alert.  HEENT: Normocephalic, atraumatic.  PERRL. EOMI. No conjunctival injection. No icterus. Oropharynx clear without evidence of thrush or exudate. No evidence of peridontal disease.  No ext oral lesions    HEART: RRR; systolic murmur loudest at base left  LUNGS: Clear to auscultation bilaterally .  ABDOMEN: Soft, nontender, nondistended. Positive bowel sounds.   EXT:  No cyanosis, clubbing or edema. No cord.  MSK: FROM without joint effusions noted arms/legs.    SKIN: keloid like scarring on toes, fore feet bilaterally    NEURO: nonverbal; no spontaneous movement      Laboratory Data    Results from last 7 days   Lab Units  12/07/18   0655  12/06/18   2242  12/06/18 2021 12/06/18   0701  12/03/18 0652   WBC 10*3/mm3   --    --   9.74  9.32  17.14*   HEMOGLOBIN g/dL  11.3*  10.4*  10.4*  11.1*  9.8*   HEMATOCRIT %  36.6  35.0  34.8  36.3  33.6*   PLATELETS 10*3/mm3   --    --   273  192  179     Results from last 7 days   Lab Units  12/06/18 2022   SODIUM mmol/L  139   POTASSIUM mmol/L  4.0   CHLORIDE mmol/L  111*   CO2 mmol/L  22.0   BUN mg/dL  26*   CREATININE mg/dL  0.99   GLUCOSE mg/dL  168*   CALCIUM mg/dL  8.1*     Results from last 7 days   Lab Units  12/03/18   0652   ALK PHOS U/L  140*   BILIRUBIN mg/dL  0.5   ALT (SGPT) U/L  15   AST (SGOT) U/L  16         Results from last 7 days   Lab Units  12/03/18 0652   CRP mg/dL  7.84*                 Estimated Creatinine Clearance: 49.8 mL/min (by C-G formula based on SCr of 0.99 mg/dL).      Microbiology:  Blood Culture   Date Value Ref Range Status   11/30/2018 No growth at 2 days  Preliminary   11/30/2018 No growth at 2 days  Preliminary   11/28/2018 Staphylococcus aureus, MRSA (A)  Final     Comment:       Consider infectious disease consult to rule out distant focus of infection.  Methicillin  resistant Staphylococcus aureus, Patient may be an isolation risk.   11/28/2018 Staphylococcus aureus, MRSA (A)  Final     Comment:       Consider infectious disease consult to rule out distant focus of infection.  Methicillin resistant Staphylococcus aureus, Patient may be an isolation risk.       BCID, PCR   Date Value Ref Range Status   11/28/2018 (C) No organism detected by BCID PCR. Final    Staphylococcus aureus. mecA (methicillin resistance gene) detected. Identification by BCID PCR.                 Wound Culture   Date Value Ref Range Status   11/30/2018 (A)  Final    Scant growth (1+) Staphylococcus, coagulase negative     Comment:     Susceptibility will not be done unless Doctor notifies Lab             Radiology:  No radiology results for the last 7 days      Impression:   MRSA bactertemia  Left leg abscess   Parotitis right side  Encephalopathy  COPD  Acute renal failure on chronic      PLAN/RECOMMENDATIONS:     Estimated Creatinine Clearance: 49.8 mL/min (by C-G formula based on SCr of 0.99 mg/dL).    Source of MRSA bacteremia could be from the parotid gland; no abscess seen on initial imaging;  Will be interesting to see if leg culures grow MRSA; a leg abscess can spontaneously develop and usually doesn't lead to positive blood cultures.    Regardless; high grade bacteremia is still concerning for endovascular involvement.    YANNA -ve for endocarditis    Given lack of  pulmonary involvement probably not active concern would change abx:    Continue daptomycin 8 mg/kg iv now/daily unless creatinine clearance decreases below 30 mL/min  Repeat blood cultures are ngtd from 11/30/18  Warm compresses to right parotid gland    F/u wound cultures    Patient is complexly ill      I will reassess Monday; call partners if problems arise  Olivier Gtz MD  12/7/2018  12:52 PM

## 2018-12-07 NOTE — PROGRESS NOTES
[facility] Medicine Services  PROGRESS NOTE    Patient Name: Leila Smith  : 1945  MRN: [id number]    Date of Admission: 2018  Length of Stay: 9  Primary Care Physician: Ciaran Wakefield MD    Subjective   Subjective     CC:  F/U multiple issues/MRSA bacteremia    HPI:  Pt lying in bed, back from procedure, NGT out now after YANNA done. No family at bedside.     Review of Systems  Unobtainable    Objective   Objective     Vital Signs:   Temp:  [96.6 °F (35.9 °C)-97.8 °F (36.6 °C)] 97.8 °F (36.6 °C)  Heart Rate:  [54-80] 54  Resp:  [16-24] 18  BP: (101-154)/(52-89) 125/64  FiO2 (%):  [100 %] 100 %        Physical Exam: Unchanged from yesterday  GEN- chronically ill appearing, laying in bed, ngt out   HEENT- atraumatic, normocephlic,  NECK- supple, trachea midline  RESP: CTAB, normal effort, resp non-labored  CV: RRR, no murmurs, s1/s2 normal  MSK: no LE edema noted, pos pressure dressing RUE, mild edema of forearm, good pulses, PICC RUE   NEURO:  nonverbal, face grossly symmetric, moves all ext  SKIN: Thick  scales/scarring on feet, bilat shin wounds, + mild drainage       Results Reviewed:  I have personally reviewed current lab, radiology, and data and agree.    Results from last 7 days   Lab Units  18   0655  18   0701  18   0652   WBC 10*3/mm3   --    --    --   9.74  9.32  17.14*   HEMOGLOBIN g/dL  11.3*  10.4*   --   10.4*  11.1*  9.8*   HEMATOCRIT %  36.6  35.0   --   34.8  36.3  33.6*   PLATELETS 10*3/mm3   --    --    --   273  192  179   INR    --    --   1.01   --    --    --      Results from last 7 days   Lab Units  18   0520  18   0652   SODIUM mmol/L  139  141  143   POTASSIUM mmol/L  4.0  5.0  3.3*   CHLORIDE mmol/L  111*  110*  109   CO2 mmol/L  22.0  25.0  28.0   BUN mg/dL  26*  31*  33*   CREATININE mg/dL  0.99  0.96  1.10   GLUCOSE mg/dL  168*  229*  214*    CALCIUM mg/dL  8.1*  6.9*  7.6*   ALT (SGPT) U/L   --    --   15   AST (SGOT) U/L   --    --   16     Estimated Creatinine Clearance: 49.8 mL/min (by C-G formula based on SCr of 0.99 mg/dL).  No results found for: BNP    Microbiology Results Abnormal     Procedure Component Value - Date/Time    Tissue / Bone Culture - Tissue, Leg, Right [[id number]] Collected:  11/29/18 1801    Lab Status:  Edited Result - FINAL Specimen:  Tissue from Leg, Right Updated:  12/06/18 1130     Tissue Culture No growth at 1 week     Gram Stain Many (4+) Red blood cells      Many (4+) WBCs seen      No organisms seen    Blood Culture - Blood, Hand, Left [[id number]] Collected:  11/30/18 1005    Lab Status:  Final result Specimen:  Blood from Hand, Left Updated:  12/05/18 1030     Blood Culture No growth at 5 days    Blood Culture - Blood, Hand, Right [[id number]] Collected:  11/30/18 1005    Lab Status:  Final result Specimen:  Blood from Hand, Right Updated:  12/05/18 1030     Blood Culture No growth at 5 days    Wound Culture - Drainage, Ear, Right [[id number]]  (Abnormal) Collected:  11/30/18 1647    Lab Status:  Final result Specimen:  Drainage from Ear, Right Updated:  12/03/18 0852     Wound Culture Scant growth (1+) Staphylococcus, coagulase negative     Comment: Susceptibility will not be done unless Doctor notifies Lab            Gram Stain No WBCs or organisms seen    Blood Culture - Blood, Arm, Left [[id number]]  (Abnormal) Collected:  11/28/18 1353    Lab Status:  Final result Specimen:  Blood from Arm, Left Updated:  12/01/18 0823     Blood Culture Staphylococcus aureus, MRSA     Comment:   Consider infectious disease consult to rule out distant focus of infection.  Methicillin resistant Staphylococcus aureus, Patient may be an isolation risk.        Isolated from Aerobic and Anaerobic Bottles     Gram Stain Aerobic Bottle Gram positive cocci in groups      Anaerobic Bottle Gram positive cocci in groups    Narrative:       PRIOR  POSITIVE CULTURE WITH SAME MORPHOLOGY CALLED  Refer Culture to #[id number]    Blood Culture - Blood, Arm, Left [[id number]]  (Abnormal)  (Susceptibility) Collected:  11/28/18 1340    Lab Status:  Final result Specimen:  Blood from Arm, Left Updated:  12/01/18 0822     Blood Culture Staphylococcus aureus, MRSA     Comment:   Consider infectious disease consult to rule out distant focus of infection.  Methicillin resistant Staphylococcus aureus, Patient may be an isolation risk.        Isolated from Aerobic and Anaerobic Bottles     Gram Stain Aerobic Bottle Gram positive cocci in groups      Anaerobic Bottle Gram positive cocci in clusters    Susceptibility      Staphylococcus aureus, MRSA     DIMITRIS     Ciprofloxacin Resistant     Clindamycin Susceptible     Daptomycin Susceptible     Erythromycin Susceptible     Gentamicin Susceptible     Levofloxacin Intermediate [1]      Linezolid Susceptible     Oxacillin Resistant     Penicillin G Resistant     Quinupristin + Dalfopristin Susceptible     Rifampin Susceptible     Tetracycline Susceptible     Trimethoprim + Sulfamethoxazole Susceptible     Vancomycin Susceptible            [1]   Staphylococcus species may develop resistance during prolonged therapy with quinolones.  Isolates that are initially susceptible may become resistant within three to four days after initiation of therapy. Testing of repeat isolates may be warranted.                 Blood Culture ID, PCR - Blood, Arm, Left [[id number]]  (Abnormal) Collected:  11/28/18 1340    Lab Status:  Final result Specimen:  Blood from Arm, Left Updated:  11/29/18 0925     BCID, PCR Staphylococcus aureus. mecA (methicillin resistance gene) detected. Identification by BCID PCR.          Imaging Results (last 24 hours)     ** No results found for the last 24 hours. **        Results for orders placed during the hospital encounter of 11/28/18   Adult Transthoracic Echo Complete W/ Cont if Necessary Per Protocol    Narrative ·  Left ventricular systolic function is normal. Estimated EF = 55%.  · Left ventricular wall thickness is consistent with moderate-to-severe   concentric hypertrophy.  · The left ventricular cavity is small.  · Left ventricular diastolic dysfunction (grade I a) consistent with   impaired relaxation.  · Left atrial cavity size is mildly dilated.  · There is moderate calcification of the aortic valve.  · Mild to moderate aortic valve regurgitation is present.  · Mild pulmonic valve regurgitation is present.  · Mild tricuspid valve regurgitation is present.  · Mild dilation of the ascending aorta is present.          I have reviewed the medications.      amLODIPine 5 mg Oral Q24H   aspirin 81 mg Oral Daily   castor oil-balsam peru  Topical Q12H   DAPTOmycin 8 mg/kg (Adjusted) Intravenous Q24H   diltiaZEM 60 mg Oral Q6H   insulin detemir 15 Units Subcutaneous BID   insulin regular 0-14 Units Subcutaneous Q6H   metoprolol tartrate 50 mg Oral Q12H   sodium chloride 10 mL Intravenous Q12H   thiamine 100 mg Oral Daily         Assessment/Plan   Assessment / Plan     Active Hospital Problems    Diagnosis   • **Altered mental status   • Sepsis due to methicillin resistant Staphylococcus aureus (MRSA) (CMS/HCC)   • Acute parotitis   • Ventricular tachyarrhythmia (CMS/HCC)   • Acute renal failure superimposed on stage 3 chronic kidney disease (CMS/HCC)   • Hyperkalemia   • Congestive heart failure (CMS/HCC)   • Chronic obstructive pulmonary disease with acute exacerbation (CMS/HCC)   • CAD (coronary artery disease)     History of  Coronary Artery Disease V12.59     • Diabetes mellitus, type 2 (CMS/HCC)   • Hypertension   • Hypothyroidism   • Hyperlipidemia          Brief Hospital Course to date:  Leila Smith is a 73 y.o. female nursing home resident who was admitted to the ICU with Vtach/hyperkalemia, also found to have acute on chronic renal failure. Noted to have UTI on admission as well as MRSA bacteremia. Also treated with  parotid sialadenitis evaluated by ENT, right leg hematoma evaluated and drained by surgery 11/29.       Assessment & Plan:    MRSA bacteremia, sepsis----  leukocytosis improved,  no longer febrile. Unclear source, multiple potential sources including UTI, parotid sialdenitis, leg hematoma.  -  Wound cultures from leg growing coag neg staph so far.   - Repeat blood cultures NGTD. TTE negative for cardiac involvement. YANNA done 12/7/18 pending,   - Plan is to cont TF , ST will reevaluate today with NGT out, poss need MBS. May ultimately need PEG, I will attempt to talk to family .  Abx per ID      ANGELA---nephrology following. Feel likely pre-renal secondary to dehydration. Improving and creatinine back to baseline (1.2-1.4), monitor BMP.  IVF has been DC'ed. Nutritionist to follow and adjust TF/flushes to meet daily fluid requirement.    Vtach/hyperkalemia---s/p cardioverted at the scene with return to sinus rhythm. An I/O needle was placed and she converted back to VT, then reconverted to SR spontaneously. She was treated with insulin/glucose and calcium gluconate for hyperkalemia. Cardiology following. Oral amiodarone d/c'ed  by cardiology. Pt cont on home metoprolol and Diltiazem. Anticoagulation resumed 12/5 but developed bleeding at PICC sit, will continue to  Hold Eliquis for now, I have DW Dr. Marquez. May try to restart in a few days. .  Hyperkalemia now normalized.  I have DW PICC nurse. No DVT in upper arm, small superficial clot in forearm. Functioning picc currently      DM2---previously uncontrolled, but A1C now down to 7.9%, was 10.2 in 1/2018. Currently still hyperglycemic, likely reactive. adjust basal insulin  PRN SSI.    Parotiditis/sialadenitis---ENT following, cont with abx/compress. No surgery needed at this time.     Encephalopathy, acute on chronic----likely multifactorial, per previous notes and documentation, patient is confused at baseline    Dysphagia---on TF, ongoing ST eval and treatment as  indicated. Hopefully ST can eval pt today with NGT out., if unable to pass swallow eval and not safe to eat by mouth, then will need to discuss long term nutrition route/GOC with family and will need PEG placed.    Difficult IV access--PICC inserted,, PICC line functioning now without bleeding, will monitor closely if bleeding continues will pull but for now PICC functioning.     Debility/weakness-- will need rehab once medically stable.    Urinary retention- orosco placed today, pt with recurrent I/o cath and pt very combative with nursing staff    DVT Prophylaxis:  heparin    CODE STATUS:   Code Status and Medical Interventions:   Ordered at: 11/28/18 1331     Code Status:    CPR     Medical Interventions (Level of Support Prior to Arrest):    Full       Disposition: I expect the patient to be discharged back to SNF pending improvement and final abx plans.      Electronically signed by Yumiko Sandoval DO, 12/07/18, 12:04 PM.

## 2018-12-07 NOTE — PLAN OF CARE
Problem: Fall Risk (Adult)  Goal: Absence of Fall  Outcome: Ongoing (interventions implemented as appropriate)      Problem: Skin Injury Risk (Adult)  Goal: Skin Health and Integrity  Outcome: Ongoing (interventions implemented as appropriate)      Problem: Patient Care Overview  Goal: Plan of Care Review  Outcome: Ongoing (interventions implemented as appropriate)   12/07/18 0330   Coping/Psychosocial   Plan of Care Reviewed With patient   Plan of Care Review   Progress no change   OTHER   Outcome Summary patient awake during most of the shift. more alert and talkative this shift. patient calm and corapative this shift. Labs obtained this shift. denies pain. patient RUE warm, with 1+ pulses. PICC line flushes and blood return noted, picc wrapped and dressing clean dry and intact. swelling in RUE reduced. patient continues to refuse turning. Bladder scan at 0000 235mL, rn will scan agian in am, patient denies urge to urinate/bladder discomfort. VSS.        Problem: Confusion, Acute (Adult)  Goal: Cognitive/Functional Impairments Minimized  Outcome: Ongoing (interventions implemented as appropriate)    Goal: Safety  Outcome: Ongoing (interventions implemented as appropriate)

## 2018-12-07 NOTE — PLAN OF CARE
"Problem: Patient Care Overview  Goal: Plan of Care Review  Outcome: Ongoing (interventions implemented as appropriate)   12/07/18 0924   Coping/Psychosocial   Plan of Care Reviewed With patient   Plan of Care Review   Progress no change   OTHER   Outcome Summary Appliance change performed. Stoma looks good. Continue liquid output. Placed [place] [facility] 2 1/4\" shallow convexity. Encouragement provided. Please contact if needs arise. Thanks          "

## 2018-12-07 NOTE — MBS/VFSS/FEES
Acute Care - Speech Language Pathology   Swallow Initial Evaluation  [place]   Modified Barium Swallow Study (MBS)     Patient Name: Leila Smith  : 1945  MRN: [id number]  Today's Date: 2018  Onset of Illness/Injury or Date of Surgery: 18     Referring Physician: SAUL Oscar MD      Admit Date: 2018    Visit Dx:     ICD-10-CM ICD-9-CM   1. Impaired functional mobility, balance, gait, and endurance Z74.09 V49.89   2. Dysphagia, unspecified type R13.10 787.20   3. Ventricular tachycardia (CMS/[facility]) I47.2 427.1   4. Altered mental status, unspecified altered mental status type R41.82 780.97     Patient Active Problem List   Diagnosis   • Diabetes mellitus, type 2 (CMS/[facility])   • Hypertension   • Hyperlipidemia   • Hypothyroidism   • Chronic obstructive pulmonary disease (CMS/[facility])   • CAD (coronary artery disease)   • History of edema   • History of obesity   • Venous insufficiency   • Open wound of lower extremity   • Urinary tract infection   • T2DM (type 2 diabetes mellitus) (CMS/[facility])   • Dyspnea on exertion   • Peripheral vascular disease (CMS/[facility])   • Obstructive sleep apnea syndrome   • Ulcer of lower extremity (CMS/[facility])   • Hypoxemia   • Hematuria   • Diabetic peripheral neuropathy (CMS/[facility])   • Edema   • Chronic obstructive pulmonary disease with acute exacerbation (CMS/[facility])   • Congestive heart failure (CMS/[facility])   • Arthritis   • Neutrophilic leukocytosis   • Cystitis   • Acute renal failure superimposed on stage 3 chronic kidney disease (CMS/[facility])   • Hyperkalemia   • Leukocytosis   • Elevated troponin   • Altered mental status   • Ventricular tachyarrhythmia (CMS/[facility])   • Sepsis due to methicillin resistant Staphylococcus aureus (MRSA) (CMS/[facility])   • Acute parotitis     Past Medical History:   Diagnosis Date   • Atrial fibrillation (CMS/HCC)    • Chronic ulcer of right leg (CMS/[facility])    • Cognitive communication deficit    • COPD (chronic obstructive pulmonary disease) (CMS/[facility])     • Diabetes mellitus (CMS/HCC)    • Difficulty in walking    • Encephalopathy    • Generalized muscle weakness    • Hematuria    • Hyperlipidemia    • Hypertension    • Hypothyroidism    • Urinary tract infection      Past Surgical History:   Procedure Laterality Date   • CATARACT EXTRACTION     • CHOLECYSTECTOMY     • COLOSTOMY     • FOOT SURGERY Right    • HERNIA REPAIR     • HYSTERECTOMY     • TOTAL KNEE ARTHROPLASTY Right         SWALLOW EVALUATION (last 72 hours)      SLP Adult Swallow Evaluation     Row Name 12/07/18 1330 12/05/18 0900                Rehab Evaluation    Document Type  evaluation  -SM  evaluation  -RD (r) TC (t) RD (c)       Subjective Information  no complaints  -SM  no complaints  -RD (r) TC (t) RD (c)       Patient Observations  alert somewhat cooperative with encouragement  -SM  alert  -RD (r) TC (t) RD (c)       Patient/Family Observations  No family present  -SM  No family present   -RD (r) TC (t) RD (c)       Patient Effort  --  adequate  -RD (r) TC (t) RD (c)       Comment  --  Pt admitted on 11/28, SLP inital consult received 12/5.   -RD (r) TC (t) RD (c)       Symptoms Noted During/After Treatment  --  none  -RD (r) TC (t) RD (c)          General Information    Patient Profile Reviewed  --  yes  -RD (r) TC (t) RD (c)       Pertinent History Of Current Problem  --  Adm w/ AMS, MRSA, and R parotitis. H/o afib, COPD, HTN, DM, CAD, and CKD. Per chart, pt is minimally verbal at baseline.   -RD (r) TC (t) RD (c)       Current Method of Nutrition  --  NPO;nasogastric feedings  -RD (r) TC (t) RD (c)       Precautions/Limitations, Vision  --  WFL;for purposes of eval  -RD (r) TC (t) RD (c)       Precautions/Limitations, Hearing  --  WFL;for purposes of eval  -RD (r) TC (t) RD (c)       Prior Level of Function-Communication  --  other (see comments) Per chart, pt is minimally verbal at baseline   -RD (r) TC (t) RD (c)       Prior Level of Function-Swallowing  --  other (see comments) unknown    -RD (r) TC (t) RD (c)       Plans/Goals Discussed with  patient  -  patient;agreed upon  -RD (r) TC (t) RD (c)       Barriers to Rehab  cognitive status  -  cognitive status  -RD (r) TC (t) RD (c)       Patient's Goals for Discharge  patient did not state  -  patient did not state  -RD (r) TC (t) RD (c)          Pain Assessment    Additional Documentation  --  Pain Scale: Numbers Pre/Post-Treatment (Group)  -RD (r) TC (t) RD (c)          Pain Scale: Numbers Pre/Post-Treatment    Pain Scale: Numbers, Pretreatment  --  0/10 - no pain  -RD (r) TC (t) RD (c)       Pain Scale: Numbers, Post-Treatment  --  0/10 - no pain  -RD (r) TC (t) RD (c)          Pain Scale: FACES Pre/Post-Treatment    Pain: FACES Scale, Pretreatment  0-->no hurt  -SM  --       Pain: FACES Scale, Post-Treatment  0-->no hurt  -SM  --          Oral Motor and Function    Dentition Assessment  --  poor oral hygiene  -RD (r) TC (t) RD (c)       Secretion Management  --  WNL/WFL  -RD (r) TC (t) RD (c)       Mucosal Quality  --  dry  -RD (r) TC (t) RD (c)       Volitional Swallow  --  unable to elicit  -RD (r) TC (t) RD (c)       Volitional Cough  --  unable to elicit  -RD (r) TC (t) RD (c)          Oral Musculature and Cranial Nerve Assessment    Oral Motor General Assessment  --  unable to assess;other (see comments) pt u/a to follow commands   -RD (r) TC (t) RD (c)          General Eating/Swallowing Observations    Respiratory Support Currently in Use  --  nasal cannula  -RD (r) TC (t) RD (c)       Eating/Swallowing Skills  --  fed by SLP  -RD (r) TC (t) RD (c)       Positioning During Eating  --  upright in bed  -RD (r) TC (t) RD (c)       Utensils Used  --  spoon  -RD (r) TC (t) RD (c)       Consistencies Trialed  --  thin liquids;nectar/syrup-thick liquids;pureed;regular textures  -RD (r) TC (t) RD (c)          Respiratory    Respiratory Status  --  increase in respiratory rate;other (see comments) intermittently t/o eval   -RD (r) TC (t) RD  (c)          Clinical Swallow Eval    Oral Prep Phase  --  impaired  -RD (r) TC (t) RD (c)       Pharyngeal Phase  --  suspected pharyngeal impairment  -RD (r) TC (t) RD (c)       Clinical Swallow Evaluation Summary  --  Clinical swallow eval completed. Pt refused oral care. Trials given of thin and nectar-thick liquids via tsp, pureed, and solid consistencies.  Anterior loss w/ thin, intermittent wet vocal quality w/ thin and nectar-thick liquids. Pt requesting trials of solid, small trial given. Pt attempted to expel solid trial before mastication, trial was manually removed from pt's oral cavity. Pt w/ incr'd respirtory rate intermittently t/o eval. Recommend NPO w/ meds alternate route, continue oral care BID/PRN. Will plan for MBS tomorrow pending pt's cognitive status.     -RD (r) TC (t) RD (c)          Oral Prep Concerns    Oral Prep Concerns  --  anterior loss;spits out food prior to swallow  -RD (r) TC (t) RD (c)       Anterior Loss  --  thin  -RD (r) TC (t) RD (c)       Spits Out Food Prior to Swallow  --  regular consistencies  -RD (r) TC (t) RD (c)          Pharyngeal Phase Concerns    Pharyngeal Phase Concerns  --  wet vocal quality;other (see comments) incr'd respirtory rate t/o eval   -RD (r) TC (t) RD (c)       Wet Vocal Quality  --  thin;nectar  -RD (r) TC (t) RD (c)          MBS/VFSS    Consistencies Trialed  thin liquids;pureed  -SM  --          MBS/VFSS Interpretation    Oral Phase, Comment  Limited trials accepted. Mild prolonged with pudding though adequate  -SM  --          Initiation of Pharyngeal Swallow    Pharyngeal Phase  functional pharyngeal phase of swallowing  -SM  --       Pharyngeal Phase, Comment  No aspiration with any consistency, would not accept solid trial. Penetration with thin liquids and mild pharyngeal residue with pudding, though no obvious impact to swallow safety.   -SM  --          Clinical Impression    SLP Swallowing Diagnosis  mild;oral dysfunction  -SM  suspected  pharyngeal dysfunction  -RD (r) TC (t) RD (c)       Functional Impact  risk of malnutrition  -  risk of aspiration/pneumonia  -RD (r) TC (t) RD (c)       Rehab Potential/Prognosis, Swallowing  good, to achieve stated therapy goals  -  re-evaluate goals as necessary  -RD (r) TC (t) RD (c)       Swallow Criteria for Skilled Therapeutic Interventions Met  demonstrates skilled criteria  -SM  demonstrates skilled criteria  -RD (r) TC (t) RD (c)          Recommendations    Therapy Frequency (Swallow)  PRN;5 days per week  -  evaluation only  -RD (r) TC (t) RD (c)       Predicted Duration Therapy Intervention (Days)  until discharge  -  until discharge  -RD (r) TC (t) RD (c)       SLP Diet Recommendation  puree with some mashed;thin liquids  -  NPO  -RD (r) TC (t) RD (c)       Recommended Diagnostics  --  VFSS (MBS)  -RD (r) TC (t) RD (c)       Recommended Precautions and Strategies  upright posture during/after eating;small bites of food and sips of liquid;other (see comments) encourage as able  -  --       SLP Rec. for Method of Medication Administration  meds whole;meds crushed;with pudding or applesauce;as tolerated  -  meds via alternate route  -RD (r) TC (t) RD (c)       Anticipated Dischage Disposition  --  skilled nursing facility;anticipate therapy at next level of care  -RD (r) TC (t) RD (c)         User Key  (r) = Recorded By, (t) = Taken By, (c) = Cosigned By    Initials Name Effective Dates     Tianna Angela, MS CCC-SLP 06/22/15 -     Laura Asencio, MS CCC-SLP 04/03/18 -     Elvia Hughes, Speech Therapy Student 08/06/18 -           EDUCATION  The patient has been educated in the following areas:   Dysphagia (Swallowing Impairment) Modified Diet Instruction.    SLP Recommendation and Plan  SLP Swallowing Diagnosis: mild, oral dysfunction  SLP Diet Recommendation: puree with some mashed, thin liquids  Recommended Precautions and Strategies: upright posture during/after  eating, small bites of food and sips of liquid, other (see comments)(encourage as able)           Swallow Criteria for Skilled Therapeutic Interventions Met: demonstrates skilled criteria     Rehab Potential/Prognosis, Swallowing: good, to achieve stated therapy goals  Therapy Frequency (Swallow): PRN, 5 days per week  Predicted Duration Therapy Intervention (Days): until discharge       Plan of Care Reviewed With: patient  Plan of Care Review  Plan of Care Reviewed With: patient    SLP GOALS     Row Name 12/07/18 1330             Oral Nutrition/Hydration Goal 1 (SLP)    Oral Nutrition/Hydration Goal 1, SLP  LTG: Tolerate soft solids and thin liquids without s/s aspiration or difficulty with 100% accuracy and caregiver assistance  -SM      Time Frame (Oral Nutrition/Hydration Goal 1, SLP)  by discharge  -         Oral Nutrition/Hydration Goal 2 (SLP)    Oral Nutrition/Hydration Goal 2, SLP  Tolerate trials of soft solids without signs of difficulty with 100% accuracy and cues  -SM      Time Frame (Oral Nutrition/Hydration Goal 2, SLP)  short term goal (STG);by discharge  -         Swallow Management Recall Goal 1 (SLP)    Activity (Swallow Management Recall Goal 1, SLP)  safe diet level/texture  -SM      Cade/Accuracy (Swallow Management Recall Goal 1, SLP)  with minimal cues (75-90% accuracy)  -SM      Time Frame (Swallow Management Recall Goal 1, SLP)  short term goal (STG);by discharge  -        User Key  (r) = Recorded By, (t) = Taken By, (c) = Cosigned By    Initials Name Provider Type    Tianna Olsen MS CCC-SLP Speech and Language Pathologist             Time Calculation:   Time Calculation- SLP     Row Name 12/07/18 1530             Time Calculation- SLP    SLP Start Time  1330  -      SLP Received On  12/07/18  -        User Key  (r) = Recorded By, (t) = Taken By, (c) = Cosigned By    Initials Name Provider Type    Tianna Olsen MS CCC-SLP Speech and Language  Pathologist          Therapy Charges for Today     Code Description Service Date Service Provider Modifiers Qty    [id number] HC ST MOTION FLUORO EVAL SWALLOW 3 12/7/2018 Tianna Angela, MS CCC-SLP GN 1               Tianna Angela, MS CCC-SLP  12/7/2018

## 2018-12-07 NOTE — PLAN OF CARE
Problem: Patient Care Overview  Goal: Plan of Care Review  Outcome: Ongoing (interventions implemented as appropriate)   12/07/18 9858   Coping/Psychosocial   Plan of Care Reviewed With patient   SLP evaluation with MBS completed. Will continue to address dysphagia. Please see note for further details and recommendations.

## 2018-12-07 NOTE — DISCHARGE INSTR - DIET
MBS/VFSS/FEES 12/7/18    Reason for Referral  Patient was referred for a MBS to assess the efficiency of his/her swallow function, rule out aspiration and make recommendations regarding safe dietary consistencies, effective compensatory strategies, and safe eating environment.        Referring Physician: SAUL Oscar MD          Recommendations/Treatment  Oral Phase, Comment: Limited trials accepted. Mild prolonged with pudding though adequate              SLP Swallowing Diagnosis: mild, oral dysfunction  Functional Impact: risk of malnutrition  Rehab Potential/Prognosis, Swallowing: good, to achieve stated therapy goals  Swallow Criteria for Skilled Therapeutic Interventions Met: demonstrates skilled criteria    Therapy Frequency (Swallow): PRN, 5 days per week  Predicted Duration Therapy Intervention (Days): until discharge  SLP Diet Recommendation: puree with some mashed, thin liquids  Recommended Precautions and Strategies: upright posture during/after eating, small bites of food and sips of liquid, other (see comments)(encourage as able)  SLP Rec. for Method of Medication Administration: meds whole, meds crushed, with pudding or applesauce, as tolerated      Oral Preparation/ Oral Phase            Pharyngeal Phase  Pharyngeal Phase: functional pharyngeal phase of swallowing  Pharyngeal Phase, Comment: No aspiration with any consistency, would not accept solid trial. Penetration with thin liquids and mild pharyngeal residue with pudding, though no obvious impact to swallow safety.              Cervical Esophageal Phase              Prognosis

## 2018-12-07 NOTE — PROGRESS NOTES
Continued Stay Note   [facility]     Patient Name: Leila Smith  MRN: [id number]  Today's Date: 12/7/2018    Admit Date: 11/28/2018    Discharge Plan     Row Name 12/07/18 1608       Plan    Plan  To [facility] when medically ready     Patient/Family in Agreement with Plan  yes    Plan Comments  Patient is not yet ready for discharge - Her referral is active at [facility] - they have accepted her but can not start her official pre-cert until she is closer to being medically ready - Ginger is aware of patients condition and also states the patient has medicaid initiated with them. I will follow up with ginger at [facility] on Monday - Jeane [phone]     Final Discharge Disposition Code  03 - skilled nursing facility (SNF)        Discharge Codes    No documentation.       Expected Discharge Date and Time     Expected Discharge Date Expected Discharge Time    Dec 5, 2018             Jeane Sarah RN

## 2018-12-08 NOTE — PLAN OF CARE
Problem: Fall Risk (Adult)  Goal: Absence of Fall  Outcome: Ongoing (interventions implemented as appropriate)      Problem: Skin Injury Risk (Adult)  Goal: Skin Health and Integrity  Outcome: Ongoing (interventions implemented as appropriate)      Problem: Patient Care Overview  Goal: Plan of Care Review  Outcome: Ongoing (interventions implemented as appropriate)   12/08/18 0335   Coping/Psychosocial   Plan of Care Reviewed With patient   Plan of Care Review   Progress no change   OTHER   Outcome Summary patient rested during the night. no acute overnight events this shift. patient refused 0000 cardizem dose. tolerating new diet. PO fluids encouraged. orosco output 250mL at this time will continue to monitor. orosco catheter noted to have sediment and flushed without difficulty.        Problem: Confusion, Acute (Adult)  Goal: Identify Related Risk Factors and Signs and Symptoms  Outcome: Ongoing (interventions implemented as appropriate)    Goal: Cognitive/Functional Impairments Minimized  Outcome: Ongoing (interventions implemented as appropriate)    Goal: Safety  Outcome: Ongoing (interventions implemented as appropriate)

## 2018-12-08 NOTE — PLAN OF CARE
Problem: Patient Care Overview  Goal: Plan of Care Review  Outcome: Ongoing (interventions implemented as appropriate)   12/08/18 1400   Coping/Psychosocial   Plan of Care Reviewed With patient;other (see comments)  (Roxanne CONRAD)   Plan of Care Review   Progress improving   OTHER   Outcome Summary WOC nurse f/u for colostomy. Appliance intact with no leakage; RN changed appliance this morning due to leakage; Chris #[phone] 2 piece with flat wafer used. Stoma pink and moist; brown liquid stool output. WOC nurse will f/u. Please contact WOC nurse as needed for concerns.

## 2018-12-08 NOTE — PROGRESS NOTES
[facility] Medicine Services  PROGRESS NOTE    Patient Name: Leila Smith  : 1945  MRN: [id number]    Date of Admission: 2018  Length of Stay: 10  Primary Care Physician: Ciaran Wakefield MD    Subjective   Subjective     CC:  F/U multiple issues/MRSA bacteremia    HPI:  Pt lying in bed, back from procedure, NGT out now after YANNA done. No family at bedside.     Review of Systems  Unobtainable    Objective   Objective     Vital Signs:   Temp:  [96.2 °F (35.7 °C)-97.9 °F (36.6 °C)] 97.9 °F (36.6 °C)  Heart Rate:  [54-75] 72  Resp:  [18-24] 18  BP: (101-154)/(54-81) 110/57  FiO2 (%):  [100 %] 100 %        Physical Exam: Unchanged from yesterday  GEN- chronically ill appearing, laying in bed, ngt out   HEENT- atraumatic, normocephlic,  NECK- supple, trachea midline  RESP: CTAB, normal effort, resp non-labored  CV: RRR, no murmurs, s1/s2 normal  MSK: no LE edema noted, pos pressure dressing RUE, mild edema of forearm, good pulses, PICC RUE   NEURO:  nonverbal, face grossly symmetric, moves all ext  SKIN: Thick  scales/scarring on feet, bilat shin wounds, + mild drainage       Results Reviewed:  I have personally reviewed current lab, radiology, and data and agree.    Results from last 7 days   Lab Units  18   0637  18   0655  182  18   0701   WBC 10*3/mm3  9.17   --    --    --   9.74  9.32   HEMOGLOBIN g/dL  11.1*  11.3*  10.4*   --   10.4*  11.1*   HEMATOCRIT %  37.6  36.6  35.0   --   34.8  36.3   PLATELETS 10*3/mm3  331   --    --    --   273  192   INR    --    --    --   1.01   --    --      Results from last 7 days   Lab Units  18   0520  18   0652   SODIUM mmol/L  139  141  143   POTASSIUM mmol/L  4.0  5.0  3.3*   CHLORIDE mmol/L  111*  110*  109   CO2 mmol/L  22.0  25.0  28.0   BUN mg/dL  26*  31*  33*   CREATININE mg/dL  0.99  0.96  1.10   GLUCOSE mg/dL  168*  229*  214*      CALCIUM mg/dL  8.1*  6.9*  7.6*   ALT (SGPT) U/L   --    --   15   AST (SGOT) U/L   --    --   16     Estimated Creatinine Clearance: 49.5 mL/min (by C-G formula based on SCr of 0.99 mg/dL).  No results found for: BNP    Microbiology Results Abnormal     Procedure Component Value - Date/Time    Tissue / Bone Culture - Tissue, Leg, Right [[id number]] Collected:  11/29/18 1801    Lab Status:  Edited Result - FINAL Specimen:  Tissue from Leg, Right Updated:  12/06/18 1130     Tissue Culture No growth at 1 week     Gram Stain Many (4+) Red blood cells      Many (4+) WBCs seen      No organisms seen    Blood Culture - Blood, Hand, Left [[id number]] Collected:  11/30/18 1005    Lab Status:  Final result Specimen:  Blood from Hand, Left Updated:  12/05/18 1030     Blood Culture No growth at 5 days    Blood Culture - Blood, Hand, Right [[id number]] Collected:  11/30/18 1005    Lab Status:  Final result Specimen:  Blood from Hand, Right Updated:  12/05/18 1030     Blood Culture No growth at 5 days    Wound Culture - Drainage, Ear, Right [[id number]]  (Abnormal) Collected:  11/30/18 1647    Lab Status:  Final result Specimen:  Drainage from Ear, Right Updated:  12/03/18 0852     Wound Culture Scant growth (1+) Staphylococcus, coagulase negative     Comment: Susceptibility will not be done unless Doctor notifies Lab            Gram Stain No WBCs or organisms seen    Blood Culture - Blood, Arm, Left [[id number]]  (Abnormal) Collected:  11/28/18 1353    Lab Status:  Final result Specimen:  Blood from Arm, Left Updated:  12/01/18 0823     Blood Culture Staphylococcus aureus, MRSA     Comment:   Consider infectious disease consult to rule out distant focus of infection.  Methicillin resistant Staphylococcus aureus, Patient may be an isolation risk.        Isolated from Aerobic and Anaerobic Bottles     Gram Stain Aerobic Bottle Gram positive cocci in groups      Anaerobic Bottle Gram positive cocci in groups    Narrative:       PRIOR  POSITIVE CULTURE WITH SAME MORPHOLOGY CALLED  Refer Culture to #[id number]    Blood Culture - Blood, Arm, Left [[id number]]  (Abnormal)  (Susceptibility) Collected:  11/28/18 1340    Lab Status:  Final result Specimen:  Blood from Arm, Left Updated:  12/01/18 0822     Blood Culture Staphylococcus aureus, MRSA     Comment:   Consider infectious disease consult to rule out distant focus of infection.  Methicillin resistant Staphylococcus aureus, Patient may be an isolation risk.        Isolated from Aerobic and Anaerobic Bottles     Gram Stain Aerobic Bottle Gram positive cocci in groups      Anaerobic Bottle Gram positive cocci in clusters    Susceptibility      Staphylococcus aureus, MRSA     DIMITRIS     Ciprofloxacin Resistant     Clindamycin Susceptible     Daptomycin Susceptible     Erythromycin Susceptible     Gentamicin Susceptible     Levofloxacin Intermediate [1]      Linezolid Susceptible     Oxacillin Resistant     Penicillin G Resistant     Quinupristin + Dalfopristin Susceptible     Rifampin Susceptible     Tetracycline Susceptible     Trimethoprim + Sulfamethoxazole Susceptible     Vancomycin Susceptible            [1]   Staphylococcus species may develop resistance during prolonged therapy with quinolones.  Isolates that are initially susceptible may become resistant within three to four days after initiation of therapy. Testing of repeat isolates may be warranted.                 Blood Culture ID, PCR - Blood, Arm, Left [[id number]]  (Abnormal) Collected:  11/28/18 1340    Lab Status:  Final result Specimen:  Blood from Arm, Left Updated:  11/29/18 0925     BCID, PCR Staphylococcus aureus. mecA (methicillin resistance gene) detected. Identification by BCID PCR.          Imaging Results (last 24 hours)     Procedure Component Value Units Date/Time    FL Video Swallow With Speech [[id number]] Collected:  12/07/18 1543     Updated:  12/07/18 2228    Narrative:       EXAMINATION: FL VIDEO SWALLOW W SPEECH-        INDICATION: DYSPHAGIA, OROPHARYNGEAL, HAS ATTRIBUTABLE CAUSE     TECHNIQUE: 12 seconds of fluoroscopic time was used for this exam. 1  associated image was saved. The patient was evaluated in the seated  lateral position while taking a variety of consistencies of barium by  mouth under the direction of speech pathology.     COMPARISON: NONE     FINDINGS: There was penetration that cleared without aspiration with  sips of thin barium. There was no penetration and no aspiration with  pudding consistency barium.          Impression:       Fluoroscopy provided for a modified barium swallow. Please  see speech therapy report for full details and recommendations.         This report was finalized on 12/7/2018 10:25 PM by DR. Brian Cosme MD.           Results for orders placed during the hospital encounter of 11/28/18   Adult Transesophageal Echo (YANNA) W/ Cont if Necessary Per Protocol    Narrative · Left ventricular systolic function is normal.  · Left ventricular wall thickness is consistent with severe concentric   hypertrophy.  · Left atrial cavity size is mildly dilated.  · Mild dilation of the ascending aorta is present. 4.2cm..  · No evidence of a left atrial appendage thrombus was present.  · No echocardiographic evidence of endocarditis          I have reviewed the medications.      amLODIPine 5 mg Oral Q24H   aspirin 81 mg Oral Daily   castor oil-balsam peru  Topical Q12H   DAPTOmycin 8 mg/kg (Adjusted) Intravenous Q24H   diltiaZEM 60 mg Oral Q6H   insulin detemir 15 Units Subcutaneous BID   insulin regular 0-14 Units Subcutaneous Q6H   metoprolol tartrate 50 mg Oral Q12H   sodium chloride 10 mL Intravenous Q12H   thiamine 100 mg Oral Daily         Assessment/Plan   Assessment / Plan     Active Hospital Problems    Diagnosis   • **Altered mental status   • Sepsis due to methicillin resistant Staphylococcus aureus (MRSA) (CMS/[facility])   • Acute parotitis   • Ventricular tachyarrhythmia (CMS/HCC)   • Acute renal  failure superimposed on stage 3 chronic kidney disease (CMS/HCC)   • Hyperkalemia   • Congestive heart failure (CMS/HCC)   • Chronic obstructive pulmonary disease with acute exacerbation (CMS/HCC)   • CAD (coronary artery disease)     History of  Coronary Artery Disease V12.59     • Diabetes mellitus, type 2 (CMS/HCC)   • Hypertension   • Hypothyroidism   • Hyperlipidemia          Brief Hospital Course to date:  Leila Smith is a 73 y.o. female nursing home resident who was admitted to the ICU with Vtach/hyperkalemia, also found to have acute on chronic renal failure. Noted to have UTI on admission as well as MRSA bacteremia. Also treated with parotid sialadenitis evaluated by ENT, right leg hematoma evaluated and drained by surgery 11/29.       Assessment & Plan:    MRSA bacteremia, sepsis----  leukocytosis improved,  no longer febrile. Unclear source, multiple potential sources including UTI, parotid sialdenitis, leg hematoma.  -  Wound cultures from leg growing coag neg staph so far.   - Repeat blood cultures NGTD. TTE negative for cardiac involvement. YANNA done 12/7/18 negative for endocarditis as well. ,   - s/p  ST reevaluate yesterday with NGT out,pt tolerated puree with some mashed and thin liquids, will order calorie counts. Very little intake overnight, push po, light IVF for now. May ultimately need PEG, I will attempt to talk to family .  Abx per ID      ANGELA---nephrology following. Feel likely pre-renal secondary to dehydration. Improving and creatinine back to baseline (1.2-1.4), monitor BMP.  IVF has been DC'ed. Nutritionist to follow and adjust TF/flushes to meet daily fluid requirement.    Vtach/hyperkalemia---s/p cardioverted at the scene with return to sinus rhythm. An I/O needle was placed and she converted back to VT, then reconverted to SR spontaneously. She was treated with insulin/glucose and calcium gluconate for hyperkalemia. Cardiology following. Oral amiodarone d/c'ed  by cardiology. Pt  cont on home metoprolol and Diltiazem. Anticoagulation resumed 12/5 but developed bleeding at PICC sit, will continue to  Hold Eliquis for now, I have DW Dr. Marquez. May try to restart in a few days. .  Hyperkalemia now normalized.  I have DW PICC nurse. No DVT in upper arm, small superficial clot in forearm. Functioning picc currently, will attempt to restart eliquis in the next day or so.      DM2---previously uncontrolled, but A1C now down to 7.9%, was 10.2 in 1/2018. Currently still hyperglycemic, likely reactive. adjust basal insulin  PRN SSI, will change accuchecks to ac and hs .    Parotiditis/sialadenitis---ENT following, cont with abx/compress. No surgery needed at this time.     Encephalopathy, acute on chronic----likely multifactorial, per previous notes and documentation, patient is confused at baseline    Dysphagia---sp TF,  Now passed swallow eval, will monitor intake, if continues with poor intake then will need to discuss long term nutrition route/GOC with family and will need PEG placed.    Difficult IV access--PICC line functioning now without bleeding, will monitor closely if bleeding continues will pull but for now PICC functioning.     Debility/weakness-- will need rehab once medically stable.    Urinary retention- orosco placed today, pt with recurrent I/o cath and pt very combative with nursing staff    DVT Prophylaxis:  heparin    CODE STATUS:   Code Status and Medical Interventions:   Ordered at: 11/28/18 1331     Code Status:    CPR     Medical Interventions (Level of Support Prior to Arrest):    Full       Disposition: I expect the patient to be discharged back to SNF pending improvement and final abx plans.      Electronically signed by Yumiko Sandoval DO, 12/08/18, 7:36 AM.

## 2018-12-08 NOTE — PROGRESS NOTES
"    [facility] Medicine Services  PROGRESS NOTE    Patient Name: Leila Smith  : 1945  MRN: [id number]    Date of Admission: 2018  Length of Stay: 10  Primary Care Physician: Ciaran Wakefield MD    Subjective   Subjective     CC:  F/U multiple issues/MRSA bacteremia    HPI:  Pt lying in bed, awake and alert and smiling, says she feels \"fine\" today. Much more interactive than yesterday. No family. Agrees to try harder to eat and drink more     Review of Systems  Unobtainable    Objective   Objective     Vital Signs:   Temp:  [96.2 °F (35.7 °C)-97.9 °F (36.6 °C)] 96.2 °F (35.7 °C)  Heart Rate:  [54-77] 77  Resp:  [16-24] 16  BP: (101-154)/(54-81) 116/66  FiO2 (%):  [100 %] 100 %        Physical Exam: Unchanged from yesterday  GEN- chronically ill appearing,awake and alert, smiling today   HEENT- atraumatic, normocephlic,  NECK- supple, trachea midline  RESP: CTAB, normal effort, resp non-labored  CV: RRR, no murmurs, s1/s2 normal  MSK: no LE edema noted, pos pressure dressing RUE, mild edema of forearm, good pulses, PICC RUE   NEURO:   face grossly symmetric, moves all ext  SKIN: Thick  scales/scarring on feet, bilat shin wounds, + mild drainage       Results Reviewed:  I have personally reviewed current lab, radiology, and data and agree.    Results from last 7 days   Lab Units  18   0637  18   0655  18   2242  18   0701   WBC 10*3/mm3  9.17   --    --    --   9.74  9.32   HEMOGLOBIN g/dL  11.1*  11.3*  10.4*   --   10.4*  11.1*   HEMATOCRIT %  37.6  36.6  35.0   --   34.8  36.3   PLATELETS 10*3/mm3  331   --    --    --   273  192   INR    --    --    --   1.01   --    --      Results from last 7 days   Lab Units  18   0637  18   0520  18   0652   SODIUM mmol/L  141  139  141  143   POTASSIUM mmol/L  4.1  4.0  5.0  3.3*   CHLORIDE mmol/L  116*  111*  110*  109   CO2 mmol/L  17.0*  " 22.0  25.0  28.0   BUN mg/dL  25*  26*  31*  33*   CREATININE mg/dL  1.09  0.99  0.96  1.10   GLUCOSE mg/dL  90  168*  229*  214*   CALCIUM mg/dL  8.4*  8.1*  6.9*  7.6*   ALT (SGPT) U/L  26   --    --   15   AST (SGOT) U/L  24   --    --   16     Estimated Creatinine Clearance: 45 mL/min (by C-G formula based on SCr of 1.09 mg/dL).  No results found for: BNP    Microbiology Results Abnormal     Procedure Component Value - Date/Time    Tissue / Bone Culture - Tissue, Leg, Right [[id number]] Collected:  11/29/18 1801    Lab Status:  Edited Result - FINAL Specimen:  Tissue from Leg, Right Updated:  12/06/18 1130     Tissue Culture No growth at 1 week     Gram Stain Many (4+) Red blood cells      Many (4+) WBCs seen      No organisms seen    Blood Culture - Blood, Hand, Left [[id number]] Collected:  11/30/18 1005    Lab Status:  Final result Specimen:  Blood from Hand, Left Updated:  12/05/18 1030     Blood Culture No growth at 5 days    Blood Culture - Blood, Hand, Right [[id number]] Collected:  11/30/18 1005    Lab Status:  Final result Specimen:  Blood from Hand, Right Updated:  12/05/18 1030     Blood Culture No growth at 5 days    Wound Culture - Drainage, Ear, Right [[id number]]  (Abnormal) Collected:  11/30/18 1647    Lab Status:  Final result Specimen:  Drainage from Ear, Right Updated:  12/03/18 0852     Wound Culture Scant growth (1+) Staphylococcus, coagulase negative     Comment: Susceptibility will not be done unless Doctor notifies Lab            Gram Stain No WBCs or organisms seen    Blood Culture - Blood, Arm, Left [[id number]]  (Abnormal) Collected:  11/28/18 1353    Lab Status:  Final result Specimen:  Blood from Arm, Left Updated:  12/01/18 0823     Blood Culture Staphylococcus aureus, MRSA     Comment:   Consider infectious disease consult to rule out distant focus of infection.  Methicillin resistant Staphylococcus aureus, Patient may be an isolation risk.        Isolated from Aerobic and Anaerobic  Bottles     Gram Stain Aerobic Bottle Gram positive cocci in groups      Anaerobic Bottle Gram positive cocci in groups    Narrative:       PRIOR POSITIVE CULTURE WITH SAME MORPHOLOGY CALLED  Refer Culture to #[id number]    Blood Culture - Blood, Arm, Left [[id number]]  (Abnormal)  (Susceptibility) Collected:  11/28/18 1340    Lab Status:  Final result Specimen:  Blood from Arm, Left Updated:  12/01/18 0822     Blood Culture Staphylococcus aureus, MRSA     Comment:   Consider infectious disease consult to rule out distant focus of infection.  Methicillin resistant Staphylococcus aureus, Patient may be an isolation risk.        Isolated from Aerobic and Anaerobic Bottles     Gram Stain Aerobic Bottle Gram positive cocci in groups      Anaerobic Bottle Gram positive cocci in clusters    Susceptibility      Staphylococcus aureus, MRSA     DIMITRIS     Ciprofloxacin Resistant     Clindamycin Susceptible     Daptomycin Susceptible     Erythromycin Susceptible     Gentamicin Susceptible     Levofloxacin Intermediate [1]      Linezolid Susceptible     Oxacillin Resistant     Penicillin G Resistant     Quinupristin + Dalfopristin Susceptible     Rifampin Susceptible     Tetracycline Susceptible     Trimethoprim + Sulfamethoxazole Susceptible     Vancomycin Susceptible            [1]   Staphylococcus species may develop resistance during prolonged therapy with quinolones.  Isolates that are initially susceptible may become resistant within three to four days after initiation of therapy. Testing of repeat isolates may be warranted.                 Blood Culture ID, PCR - Blood, Arm, Left [[id number]]  (Abnormal) Collected:  11/28/18 1340    Lab Status:  Final result Specimen:  Blood from Arm, Left Updated:  11/29/18 0925     BCID, PCR Staphylococcus aureus. mecA (methicillin resistance gene) detected. Identification by BCID PCR.          Imaging Results (last 24 hours)     Procedure Component Value Units Date/Time    FL Video Swallow  With Speech [[id number]] Collected:  12/07/18 1543     Updated:  12/07/18 2228    Narrative:       EXAMINATION: FL VIDEO SWALLOW W SPEECH-     INDICATION: DYSPHAGIA, OROPHARYNGEAL, HAS ATTRIBUTABLE CAUSE     TECHNIQUE: 12 seconds of fluoroscopic time was used for this exam. 1  associated image was saved. The patient was evaluated in the seated  lateral position while taking a variety of consistencies of barium by  mouth under the direction of speech pathology.     COMPARISON: NONE     FINDINGS: There was penetration that cleared without aspiration with  sips of thin barium. There was no penetration and no aspiration with  pudding consistency barium.          Impression:       Fluoroscopy provided for a modified barium swallow. Please  see speech therapy report for full details and recommendations.         This report was finalized on 12/7/2018 10:25 PM by DR. Brian Cosme MD.           Results for orders placed during the hospital encounter of 11/28/18   Adult Transesophageal Echo (YANNA) W/ Cont if Necessary Per Protocol    Narrative · Left ventricular systolic function is normal.  · Left ventricular wall thickness is consistent with severe concentric   hypertrophy.  · Left atrial cavity size is mildly dilated.  · Mild dilation of the ascending aorta is present. 4.2cm..  · No evidence of a left atrial appendage thrombus was present.  · No echocardiographic evidence of endocarditis          I have reviewed the medications.      amLODIPine 5 mg Oral Q24H   aspirin 81 mg Oral Daily   castor oil-balsam peru  Topical Q12H   DAPTOmycin 8 mg/kg (Adjusted) Intravenous Q24H   diltiaZEM 60 mg Oral Q6H   insulin detemir 15 Units Subcutaneous BID   insulin regular 0-14 Units Subcutaneous Q6H   metoprolol tartrate 50 mg Oral Q12H   sodium chloride 10 mL Intravenous Q12H   thiamine 100 mg Oral Daily         Assessment/Plan   Assessment / Plan     Active Hospital Problems    Diagnosis   • **Altered mental status   • Sepsis due to  methicillin resistant Staphylococcus aureus (MRSA) (CMS/[facility])   • Acute parotitis   • Ventricular tachyarrhythmia (CMS/HCC)   • Acute renal failure superimposed on stage 3 chronic kidney disease (CMS/HCC)   • Hyperkalemia   • Congestive heart failure (CMS/HCC)   • Chronic obstructive pulmonary disease with acute exacerbation (CMS/[facility])   • CAD (coronary artery disease)     History of  Coronary Artery Disease V12.59     • Diabetes mellitus, type 2 (CMS/[facility])   • Hypertension   • Hypothyroidism   • Hyperlipidemia          Brief Hospital Course to date:  Leila Smith is a 73 y.o. female nursing home resident who was admitted to the ICU with Vtach/hyperkalemia, also found to have acute on chronic renal failure. Noted to have UTI on admission as well as MRSA bacteremia. Also treated with parotid sialadenitis evaluated by ENT, right leg hematoma evaluated and drained by surgery 11/29.       Assessment & Plan:    MRSA bacteremia, sepsis----  leukocytosis improved,  no longer febrile. Unclear source, multiple potential sources including UTI, parotid sialdenitis, leg hematoma.  -  Wound cultures from leg growing coag neg staph so far.   - Repeat blood cultures NGTD. TTE negative for cardiac involvement. YANNA done 12/7/18 negative for endocarditis as well. ,   - s/p  ST reevaluate yesterday with NGT out,pt tolerated puree with some mashed and thin liquids, will order calorie counts. Very little intake overnight, push po, light IVF for now. May ultimately need PEG, I will attempt to talk to family .  Abx per ID      ANGELA---nephrology following. Feel likely pre-renal secondary to dehydration. Improving and creatinine back to baseline (1.2-1.4), monitor BMP.  IVF has been DC'ed. Nutritionist to follow and adjust TF/flushes to meet daily fluid requirement.    Vtach/hyperkalemia---s/p cardioverted at the scene with return to sinus rhythm. An I/O needle was placed and she converted back to VT, then reconverted to SR spontaneously. She  was treated with insulin/glucose and calcium gluconate for hyperkalemia. Cardiology following. Oral amiodarone d/c'ed  by cardiology. Pt cont on home metoprolol and Diltiazem. Anticoagulation resumed 12/5 but developed bleeding at PICC sit, will continue to  Hold Eliquis for now, I have DW Dr. Marquez. May try to restart in a few days. .  Hyperkalemia now normalized.  I have DW PICC nurse. No DVT in upper arm, small superficial clot in forearm. Functioning picc currently, will attempt to restart eliquis in the next day or so.      DM2---previously uncontrolled, but A1C now down to 7.9%, was 10.2 in 1/2018. Currently still hyperglycemic, likely reactive. adjust basal insulin  PRN SSI, will change accuchecks to ac and hs .    Parotiditis/sialadenitis---ENT following, cont with abx/compress. No surgery needed at this time.     Encephalopathy, acute on chronic----likely multifactorial, per previous notes and documentation, patient is confused at baseline    Dysphagia---sp TF,  Now passed swallow eval, will monitor intake, if continues with poor intake then will need to discuss long term nutrition route/GOC with family and will need PEG placed.    Difficult IV access--PICC line functioning now without bleeding, will monitor closely if bleeding continues will pull but for now PICC functioning.     Debility/weakness-- will need rehab once medically stable.    Urinary retention- orosco placed today, pt with recurrent I/o cath and pt very combative with nursing staff    DVT Prophylaxis:  heparin    CODE STATUS:   Code Status and Medical Interventions:   Ordered at: 11/28/18 1331     Code Status:    CPR     Medical Interventions (Level of Support Prior to Arrest):    Full       Disposition: I expect the patient to be discharged back to SNF pending improvement and final abx plans.      Electronically signed by Yumiko Sandoval DO, 12/08/18, 7:40 AM.

## 2018-12-08 NOTE — PLAN OF CARE
Problem: Skin Injury Risk (Adult)  Goal: Skin Health and Integrity  Outcome: Ongoing (interventions implemented as appropriate)   12/08/18 1826   Skin Injury Risk (Adult)   Skin Health and Integrity unable to achieve outcome       Problem: Patient Care Overview  Goal: Plan of Care Review  Outcome: Ongoing (interventions implemented as appropriate)   12/08/18 1826   Coping/Psychosocial   Plan of Care Reviewed With patient   Plan of Care Review   Progress no change   OTHER   Outcome Summary colostomy appliance changed today, f/c intact, low output today. except for liquid stool . mostly cooperative with a few episodes of swinging at staff.       Problem: Confusion, Acute (Adult)  Goal: Identify Related Risk Factors and Signs and Symptoms  Outcome: Ongoing (interventions implemented as appropriate)   12/08/18 1826   Confusion, Acute (Adult)   Related Risk Factors (Acute Confusion) advanced age;cognitive impairment;infection;metabolic abnormalities;mobility decreased   Signs and Symptoms (Acute Confusion) thought process diminished/disorganized;understanding disturbed;reasoning/planning disturbed;emotional/behavioral disturbances

## 2018-12-09 NOTE — CONSULTS
Adult Nutrition  Assessment/PES    Patient Name:  Leila Smith  YOB: 1945  MRN: [id number]  Admit Date:  11/28/2018    Assessment Date:  12/9/2018    Comments:  Calorie count in progress; pt interviewed some food preferences obtained, staff helpful. Nutrition assessment completed. RD will follow for po intake adequacy.    Reason for Assessment     Row Name 12/09/18 1343          Reason for Assessment    Reason For Assessment  calorie count order;physician consult nutrition for calorie count; 45 mins     Diagnosis  infection/sepsis;neurologic conditions     Identified At Risk by Screening Criteria  large or nonhealing wound, burn or pressure injury         Nutrition/Diet History     Row Name 12/09/18 1344          Nutrition/Diet History    Typical Food/Fluid Intake  RN reports pt is selective eater prefers anything sweet     Food Preferences  attempted; pt shakes head no to most vegetables and starches and yes to meats, desserts and fruits     Functional Status  not able to prepare meals;not able to purchase food     Factors Affecting Nutritional Intake  inability to feed self RN reports pt requires full feeding assistance           Labs/Tests/Procedures/Meds     Row Name 12/09/18 1349          Labs/Procedures/Meds    Lab Results Comments  n/a        Diagnostic Tests/Procedures    Diagnostic Test/Procedure Reviewed  reviewed, pertinent     Diagnostic Test/Procedures Comments  SLP note        Medications    Pertinent Medications Reviewed  reviewed, pertinent         Physical Findings     Row Name 12/09/18 1349          Physical Findings    Skin  pressure injury coccyx- WOCN following         Estimated/Assessed Needs     Row Name 12/09/18 1350          Calculation Measurements    Weight Used For Calculations  77.6 kg (171 lb 1.2 oz)        Estimated/Assessed Needs    Additional Documentation  KCAL/KG (Group);Protein Requirements (Group)        KCAL/KG    20 Kcal/Kg (kcal)  1552     25 Kcal/Kg (kcal)   1940     kcal/kg (Specify)  25        Manati-St. Jeor Equation    RMR (Manati-St. Jeor Equation)  1256 x 1.2 =1507        Protein Requirements    Est Protein Requirement Amount (gms/kg)  1.2 gm protein     Estimated Protein Requirements (gms/day)  93.12        Fluid Requirements    Chaim-Aga Method (over 20 kg)  3052         Nutrition Prescription Ordered     Row Name 12/09/18 1351          Nutrition Prescription PO    Current PO Diet  Dysphagia     Dysphagia Level  3  Pureed with some mashed     Fluid Consistency  Thin        Nutrition Prescription EN    Enteral Route  Other (comment) dc'd         Evaluation of Received Nutrient/Fluid Intake     Row Name 12/09/18 1350          Calculation Measurements    Weight Used For Calculations  77.6 kg (171 lb 1.2 oz)         Evaluation of Prescribed Nutrient/Fluid Intake     Row Name 12/09/18 1350          Calculation Measurements    Weight Used For Calculations  77.6 kg (171 lb 1.2 oz)             Problem/Interventions:  Problem 1     Row Name 12/09/18 1353          Nutrition Diagnoses Problem 1    Problem 1  Inadequate Intake/Infusion     Inadequate Intake Type  Oral     Macronutrient  Kcal;Protein;Fluid     Etiology (related to)  Functional Diagnosis     Functional Diagnosis  Mobility limitation;Dysphagia;Feeding skill deficit     Signs/Symptoms (evidenced by)  Report of Mnimal PO Intake         Problem 2     Row Name 12/09/18 1354          Nutrition Diagnoses Problem 2    Problem 2  Increased Nutrient Needs     Macronutrient  Protein;Kcal     Micronutrient  Vitamin C;Vitamin E;Other (comment) [employer]     Etiology (related to)  Medical Diagnosis     Skin  Pressure injury coccyx     Signs/Symptoms (evidenced by)  Report of Minimal PO Intake               Intervention Goal     Row Name 12/09/18 1352          Intervention Goal    General  Meet nutritional needs for age/condition     PO  Increase intake;Tolerate PO         Nutrition Intervention     Row Name 12/09/18 5381           Nutrition Intervention    RD/Tech Action  Interview for preference;Recommend/ordered;Supplement provided;Follow Tx progress;Care plan reviewd;Other (comment) calorie count in progress     Recommended/Ordered  Supplement         Nutrition Prescription     Row Name 12/09/18 8361          Nutrition Prescription PO    PO Prescription  Begin/change supplement     Supplement  Boost Pudding;Boost Glucose Control     Supplement Frequency  2 times a day;3 times a day     New PO Prescription Ordered?  Yes RN to use pudding w/ medication administration        Other Orders    Other  day 1 of calorie count in progress         Education/Evaluation     Row Name 12/09/18 0340          Monitor/Evaluation    Monitor  Per protocol;PO intake;Supplement intake;Weight;Skin status;Symptoms           Electronically signed by:  Carline Gordon MS,RD,LD  12/09/18 1:59 PM

## 2018-12-09 NOTE — PROGRESS NOTES
[facility] Medicine Services  PROGRESS NOTE    Patient Name: Leila Smith  : 1945  MRN: [id number]    Date of Admission: 2018  Length of Stay: 11  Primary Care Physician: Ciaran Wakefield MD    Subjective   Subjective     CC:  F/U multiple issues/MRSA bacteremia    HPI:  Pt lying in bed, no family at bedside. Resting today and less conversive than yesterday. Some loose stool overnight.     Review of Systems  Unobtainable    Objective   Objective     Vital Signs:   Temp:  [97 °F (36.1 °C)-98.4 °F (36.9 °C)] 97.5 °F (36.4 °C)  Heart Rate:  [64-77] 71  Resp:  [18-23] 18  BP: (110-140)/(62-89) 137/89        Physical Exam: Unchanged from yesterday  GEN- chronically ill appearing, sleepy but awakens and answers questions   HEENT- atraumatic, normocephlic,  NECK- supple, trachea midline  RESP: CTAB, normal effort, resp non-labored  CV: RRR, no murmurs, s1/s2 normal  MSK: no LE edema noted, pos pressure dressing RUE, mild edema of forearm, good pulses, PICC RUE   NEURO:   face grossly symmetric, moves all ext  SKIN: Thick  scales/scarring on feet, bilat shin wounds, + mild drainage       Results Reviewed:  I have personally reviewed current lab, radiology, and data and agree.    Results from last 7 days   Lab Units  18   0637  18   0655  182  18   0701   WBC 10*3/mm3  9.17   --    --    --   9.74  9.32   HEMOGLOBIN g/dL  11.1*  11.3*  10.4*   --   10.4*  11.1*   HEMATOCRIT %  37.6  36.6  35.0   --   34.8  36.3   PLATELETS 10*3/mm3  331   --    --    --   273  192   INR    --    --    --   1.01   --    --      Results from last 7 days   Lab Units  18   0637  18   0520  18   0652   SODIUM mmol/L  141  139  141  143   POTASSIUM mmol/L  4.1  4.0  5.0  3.3*   CHLORIDE mmol/L  116*  111*  110*  109   CO2 mmol/L  17.0*  22.0  25.0  28.0   BUN mg/dL  25*  26*  31*  33*   CREATININE mg/dL   1.09  0.99  0.96  1.10   GLUCOSE mg/dL  90  168*  229*  214*   CALCIUM mg/dL  8.4*  8.1*  6.9*  7.6*   ALT (SGPT) U/L  26   --    --   15   AST (SGOT) U/L  24   --    --   16     Estimated Creatinine Clearance: 45 mL/min (by C-G formula based on SCr of 1.09 mg/dL).  No results found for: BNP    Microbiology Results Abnormal     Procedure Component Value - Date/Time    Tissue / Bone Culture - Tissue, Leg, Right [[id number]] Collected:  11/29/18 1801    Lab Status:  Edited Result - FINAL Specimen:  Tissue from Leg, Right Updated:  12/06/18 1130     Tissue Culture No growth at 1 week     Gram Stain Many (4+) Red blood cells      Many (4+) WBCs seen      No organisms seen    Blood Culture - Blood, Hand, Left [[id number]] Collected:  11/30/18 1005    Lab Status:  Final result Specimen:  Blood from Hand, Left Updated:  12/05/18 1030     Blood Culture No growth at 5 days    Blood Culture - Blood, Hand, Right [[id number]] Collected:  11/30/18 1005    Lab Status:  Final result Specimen:  Blood from Hand, Right Updated:  12/05/18 1030     Blood Culture No growth at 5 days    Wound Culture - Drainage, Ear, Right [[id number]]  (Abnormal) Collected:  11/30/18 1647    Lab Status:  Final result Specimen:  Drainage from Ear, Right Updated:  12/03/18 0852     Wound Culture Scant growth (1+) Staphylococcus, coagulase negative     Comment: Susceptibility will not be done unless Doctor notifies Lab            Gram Stain No WBCs or organisms seen    Blood Culture - Blood, Arm, Left [[id number]]  (Abnormal) Collected:  11/28/18 1353    Lab Status:  Final result Specimen:  Blood from Arm, Left Updated:  12/01/18 0823     Blood Culture Staphylococcus aureus, MRSA     Comment:   Consider infectious disease consult to rule out distant focus of infection.  Methicillin resistant Staphylococcus aureus, Patient may be an isolation risk.        Isolated from Aerobic and Anaerobic Bottles     Gram Stain Aerobic Bottle Gram positive cocci in groups       Anaerobic Bottle Gram positive cocci in groups    Narrative:       PRIOR POSITIVE CULTURE WITH SAME MORPHOLOGY CALLED  Refer Culture to #[id number]    Blood Culture - Blood, Arm, Left [[id number]]  (Abnormal)  (Susceptibility) Collected:  11/28/18 1340    Lab Status:  Final result Specimen:  Blood from Arm, Left Updated:  12/01/18 0822     Blood Culture Staphylococcus aureus, MRSA     Comment:   Consider infectious disease consult to rule out distant focus of infection.  Methicillin resistant Staphylococcus aureus, Patient may be an isolation risk.        Isolated from Aerobic and Anaerobic Bottles     Gram Stain Aerobic Bottle Gram positive cocci in groups      Anaerobic Bottle Gram positive cocci in clusters    Susceptibility      Staphylococcus aureus, MRSA     DIMITRIS     Ciprofloxacin Resistant     Clindamycin Susceptible     Daptomycin Susceptible     Erythromycin Susceptible     Gentamicin Susceptible     Levofloxacin Intermediate [1]      Linezolid Susceptible     Oxacillin Resistant     Penicillin G Resistant     Quinupristin + Dalfopristin Susceptible     Rifampin Susceptible     Tetracycline Susceptible     Trimethoprim + Sulfamethoxazole Susceptible     Vancomycin Susceptible            [1]   Staphylococcus species may develop resistance during prolonged therapy with quinolones.  Isolates that are initially susceptible may become resistant within three to four days after initiation of therapy. Testing of repeat isolates may be warranted.                 Blood Culture ID, PCR - Blood, Arm, Left [[id number]]  (Abnormal) Collected:  11/28/18 1340    Lab Status:  Final result Specimen:  Blood from Arm, Left Updated:  11/29/18 0925     BCID, PCR Staphylococcus aureus. mecA (methicillin resistance gene) detected. Identification by BCID PCR.          Imaging Results (last 24 hours)     ** No results found for the last 24 hours. **        Results for orders placed during the hospital encounter of 11/28/18   Adult  Transesophageal Echo (YANNA) W/ Cont if Necessary Per Protocol    Narrative · Left ventricular systolic function is normal.  · Left ventricular wall thickness is consistent with severe concentric   hypertrophy.  · Left atrial cavity size is mildly dilated.  · Mild dilation of the ascending aorta is present. 4.2cm..  · No evidence of a left atrial appendage thrombus was present.  · No echocardiographic evidence of endocarditis          I have reviewed the medications.      amLODIPine 5 mg Oral Q24H   aspirin 81 mg Oral Daily   castor oil-balsam peru  Topical Q12H   DAPTOmycin 8 mg/kg (Adjusted) Intravenous Q24H   diltiaZEM 60 mg Oral Q6H   insulin detemir 15 Units Subcutaneous BID   metoprolol tartrate 50 mg Oral Q12H   sodium chloride 10 mL Intravenous Q12H   thiamine 100 mg Oral Daily         Assessment/Plan   Assessment / Plan     Active Hospital Problems    Diagnosis   • **Altered mental status   • Sepsis due to methicillin resistant Staphylococcus aureus (MRSA) (CMS/HCC)   • Acute parotitis   • Ventricular tachyarrhythmia (CMS/HCC)   • Acute renal failure superimposed on stage 3 chronic kidney disease (CMS/HCC)   • Hyperkalemia   • Congestive heart failure (CMS/HCC)   • Chronic obstructive pulmonary disease with acute exacerbation (CMS/HCC)   • CAD (coronary artery disease)     History of  Coronary Artery Disease V12.59     • Diabetes mellitus, type 2 (CMS/HCC)   • Hypertension   • Hypothyroidism   • Hyperlipidemia          Brief Hospital Course to date:  Leila Smith is a 73 y.o. female nursing home resident who was admitted to the ICU with Vtach/hyperkalemia, also found to have acute on chronic renal failure. Noted to have UTI on admission as well as MRSA bacteremia. Also treated with parotid sialadenitis evaluated by ENT, right leg hematoma evaluated and drained by surgery 11/29.       Assessment & Plan:    MRSA bacteremia, sepsis----  leukocytosis improved,  no longer febrile. Unclear source, multiple  potential sources including UTI, parotid sialdenitis, leg hematoma.  -  Wound cultures from leg growing coag neg staph so far.   - Repeat blood cultures NGTD. TTE negative for cardiac involvement. YANNA done 12/7/18 negative for endocarditis as well. ,   - s/p  ST reevaluate  with NGT out,pt tolerated puree with some mashed and thin liquids, will continue calorie counts. Very little intake overnight, push po, light IVF for now. May ultimately need PEG, I will attempt to talk to family .  Abx per ID      NAGELA---nephrology following. Feel likely pre-renal secondary to dehydration. Improving and creatinine back to baseline (1.2-1.4), monitor BMP.  IVF has been DC'ed. Nutritionist to follow and adjust TF/flushes to meet daily fluid requirement.    Vtach/hyperkalemia---s/p cardioverted at the scene with return to sinus rhythm. An I/O needle was placed and she converted back to VT, then reconverted to SR spontaneously. She was treated with insulin/glucose and calcium gluconate for hyperkalemia. Cardiology following. Oral amiodarone d/c'ed  by cardiology. Pt cont on home metoprolol and Diltiazem. Anticoagulation resumed 12/5 but developed bleeding at PICC sit, will continue to  Hold Eliquis for now, I have DW Dr. Marquez. May try to restart in a few days. .  Hyperkalemia now normalized.  I have DW PICC nurse. No DVT in upper arm, small superficial clot in forearm. Functioning picc currently, will attempt to restart eliquis in the am.   DM2---previously uncontrolled, but A1C now down to 7.9%, was 10.2 in 1/2018. Currently still hyperglycemic, likely reactive. adjust basal insulin  PRN SSI, will change accuchecks to ac and hs .    Parotiditis/sialadenitis---ENT following, cont with abx/compress. No surgery needed at this time.     Encephalopathy, acute on chronic----likely multifactorial, per previous notes and documentation, patient is confused at baseline    Dysphagia---sp TF,  Now passed swallow eval, will monitor intake, if  continues with poor intake then will need to discuss long term nutrition route/GOC with family and will need PEG placed.    Difficult IV access--PICC line functioning now without bleeding, will monitor closely if bleeding continues will pull but for now PICC functioning.     Debility/weakness-- will need rehab once medically stable.    Urinary retention- orosco placed. pt with recurrent I/o cath and pt very combative with nursing staff, would try voiding trial in the next few days    Loose stool- monitor, will hold on sending stool studies now    DVT Prophylaxis:  heparin    CODE STATUS:   Code Status and Medical Interventions:   Ordered at: 11/28/18 1331     Code Status:    CPR     Medical Interventions (Level of Support Prior to Arrest):    Full       Disposition: I expect the patient to be discharged back to SNF pending improvement and final abx plans.      Electronically signed by Yumiko Sandoval DO, 12/09/18, 8:41 AM.

## 2018-12-09 NOTE — PLAN OF CARE
Problem: Patient Care Overview  Goal: Plan of Care Review  Outcome: Ongoing (interventions implemented as appropriate)   12/09/18 0938   Coping/Psychosocial   Plan of Care Reviewed With patient;other (see comments)  (Luz CONRAD)   Plan of Care Review   Progress no change   OTHER   Outcome Summary WOC nurse f/u for colostomy. Appliance intact with no leakage; last changed by RN 12/8. Stoma red and moist; 300 cc watery brown stool emptied from bag. WOC nurse will f/u. Please contact WOC nurse as needed for concerns.

## 2018-12-09 NOTE — PLAN OF CARE
Problem: Fall Risk (Adult)  Goal: Absence of Fall  Outcome: Ongoing (interventions implemented as appropriate)      Problem: Skin Injury Risk (Adult)  Goal: Skin Health and Integrity  Outcome: Ongoing (interventions implemented as appropriate)      Problem: Patient Care Overview  Goal: Plan of Care Review  Outcome: Ongoing (interventions implemented as appropriate)   12/09/18 0352   Coping/Psychosocial   Plan of Care Reviewed With patient   Plan of Care Review   Progress no change   OTHER   Outcome Summary patient able to rest more this shift. f/c in place and flushed this shift. urine output this shift 275mL, Hospitalist aprn notified and 250mL Normal saline bolus given. will continue to monitor. patient colostomy leaked this shift and bag changed. patient continues to have high stool output. stool all liquid. denies pain when asked but immediately yells and screams when touched. oral care continues to be difficult, patient attempting to bite RN during oral care.        Problem: Confusion, Acute (Adult)  Goal: Identify Related Risk Factors and Signs and Symptoms  Outcome: Ongoing (interventions implemented as appropriate)    Goal: Cognitive/Functional Impairments Minimized  Outcome: Ongoing (interventions implemented as appropriate)    Goal: Safety  Outcome: Ongoing (interventions implemented as appropriate)

## 2018-12-10 NOTE — PROGRESS NOTES
"                  Clinical Nutrition     Reason for Visit:   Calorie count - day 1    Patient Name: Leila Smith  YOB: 1945  MRN: [id number]  Date of Encounter: 12/10/18 3:16 PM  Admission date: 11/28/2018      Comments: Day 1 of calorie count - per calorie count sheet and RN report, patient with minimal intake. Per data on calorie count sheet from (12/9), 250 calories (17% of est calorie needs) and 3g of protein (3% of est protein needs). Will continue to follow and proceed with calorie count.      Nutrition Assessment   Assessment      Admission diagnosis     Altered mental status     Additional applicable diagnosis/conditions/procedures   Metabolic acidosis  Acute renal failure superimposed on stage 3 chronic kidney disease (CMS/HCC)-non oliguric   Hyperkalemia-improved at present   Skin: Per WOCN 11/29: Pt has blanchable area of shearing right gluteus  (12/9) 3 Day Calorie Count started        Applicable PMH:  Hypertension  Hyperlipidemia  Hypothyroidism  Diabetes mellitus, type 2 (CMS/HCC)  CAD (coronary artery disease)  Chronic obstructive pulmonary disease with acute exacerbation (CMS/HCC)  Congestive heart failure (CMS/HCC)   Ventricular tachyarrhythmia (CMS/HCC)  Colostomy-remains in place     Reported/Observed/Food/Nutrition Related History:     Calorie count sheet hanging in room. Patient asleep at visit. Did not wake up when attempting to clarify PO intake from (12/9). No family in room. Called and spoke with RN who reports unsure if patient has been utilizing nutrition supplements. Notes that patients PO intake has been poor, eating minimal amounts.    Spoke with  on floor about calorie count documentation continuing through (12/11).      Anthropometrics     Height: 162.6 cm (64\")  Last filed wt: Weight: 77.6 kg (171 lb) (12/08/18 0640)  Weight Method: Bed scale    BMI: BMI (Calculated): 29.35 kg/m²  Overweight: 25.0-29.9kg/m2     Ideal Body Weight (IBW) (kg): " 55  Admission wt:  Method obtained:    Medications reviewed   Pertinent: Yes    Current Nutrition Prescription     PO: Diet Dysphagia; III - Pureed With Some Mashed; Thin; Cardiac, Consistent Carbohydrate      DIET MESSAGE 3 day calorie count, starting 12/9-12/11/18.  Thanks!    Intake from Day 1 of Calorie Count:   250 calories (17% of est calorie needs) and 3g of protein (3% of est protein needs)  Unsure if patient taking nutrition supplements    Nutrition Intervention   1.  Follow treatment progress, Care plan reviewed   2. Continue with day 2 of calorie count per MD order.    Goal:   General: Nutrition support treatment  PO: Increase intake    Monitoring/Evaluation:   Per protocol, PO intake, Supplement intake, GI status, Symptoms, POC/GOC    Will Continue to follow per protocol    Yaneth Lazaro  Time Spent: 45 minutes

## 2018-12-10 NOTE — PROGRESS NOTES
INFECTIOUS DISEASE PROGRESS NOTE    Leila Smith  1945  [id number]    Date: 12/10/2018    Admission Date: 11/28/2018    CC: MRSA bacteremia    History of present illness:    Patient is a 73 y.o. female presents from [facility] with ventricular tachycardia and hyperkalemia from  acute on chronic renal failure;  PAtient admitted to ICU and has been worked up for parotitis, leg abscess and has been found to have MRSA bacteremia.. Blood cultures are positive for MRSA. Hematoma on right leg was drained by surgery on 11/29. 2-dimensional Echocardiogram negative for vegetations.     Patient is obtunded and is a poor historian no family by bedside.  Opens eyes during exam but is nonverbal.    12/3/18: Patient is a poor historian with no family at bedside.  She says yes to any question.  She is more alert today. She remains afebrile. Right parotid gland area with swelling and pain as she swatted my hand away when palpated.     12/4/18; doing well; no events overnight; no complaints, poor historian, ros unobtainable    12/5/18; no events overnight;  Per nurse no fever, rash, poor historian, picc placed    12/6/18; doing well; no events overnight; ros unobtainable    12/7/18; doing fair, poor historian, no complaints, nonverbal; ros unobtainable    12/10/18; no events overnight; feels well; no complaints, no diarrhea, rash, sore throat; awake says a few words    Past Medical History:   Diagnosis Date   • Atrial fibrillation (CMS/HCC)    • Chronic ulcer of right leg (CMS/HCC)    • Cognitive communication deficit    • COPD (chronic obstructive pulmonary disease) (CMS/HCC)    • Diabetes mellitus (CMS/HCC)    • Difficulty in walking    • Encephalopathy    • Generalized muscle weakness    • Hematuria    • Hyperlipidemia    • Hypertension    • Hypothyroidism    • Urinary tract infection        Past Surgical History:   Procedure Laterality Date   • CATARACT EXTRACTION     • CHOLECYSTECTOMY     • COLOSTOMY     •  FOOT SURGERY Right    • HERNIA REPAIR     • HYSTERECTOMY     • TOTAL KNEE ARTHROPLASTY Right        Family History   Problem Relation Age of Onset   • Stomach cancer Mother    • Alcohol abuse Other    • Cancer Other    • Diabetes Other    • Hypertension Other    • Other Other        Social History     Socioeconomic History   • Marital status:      Spouse name: Not on file   • Number of children: Not on file   • Years of education: Not on file   • Highest education level: Not on file   Social Needs   • Financial resource strain: Not on file   • Food insecurity - worry: Not on file   • Food insecurity - inability: Not on file   • Transportation needs - medical: Not on file   • Transportation needs - non-medical: Not on file   Occupational History   • Not on file   Tobacco Use   • Smoking status: Former Smoker     Packs/day: 0.00     Years: 50.00     Pack years: 0.00     Types: Cigarettes   • Smokeless tobacco: Never Used   Substance and Sexual Activity   • Alcohol use: No   • Drug use: No   • Sexual activity: Defer   Other Topics Concern   • Not on file   Social History Narrative   • Not on file       Allergies   Allergen Reactions   • Codeine    • Tetracyclines & Related          Medication:    Current Facility-Administered Medications:   •  amLODIPine (NORVASC) tablet 5 mg, 5 mg, Oral, Q24H, Dieter Birch MD, 5 mg at 12/10/18 0910  •  apixaban (ELIQUIS) tablet 2.5 mg, 2.5 mg, Oral, Q12H, Yumiko Sandoval DO, 2.5 mg at 12/10/18 0911  •  aspirin EC tablet 81 mg, 81 mg, Oral, Daily, Jory Macedo, APRN, 81 mg at 12/10/18 0910  •  castor oil-balsam peru (VENELEX) ointment, , Topical, Q12H, Julien Carcamo, APRN  •  DAPTOmycin (CUBICIN) 500 mg in sodium chloride 0.9 % 50 mL IVPB, 8 mg/kg (Adjusted), Intravenous, Q24H, Olivier Gtz MD, Last Rate: 100 mL/hr at 12/10/18 1229, 500 mg at 12/10/18 1229  •  dextrose (D50W) 25 g/ 50mL Intravenous Solution 25 g, 25 g, Intravenous, Q15 Min PRN,  Yumiko Sandoval, DO  •  dextrose (GLUTOSE) oral gel 15 g, 15 g, Oral, Q15 Min PRN, Yumiko Sandoval DO  •  diltiaZEM (CARDIZEM) tablet 60 mg, 60 mg, Oral, Q6H, Balwinder Marquez III, MD, 60 mg at 12/10/18 1229  •  glucagon (human recombinant) (GLUCAGEN DIAGNOSTIC) injection 1 mg, 1 mg, Subcutaneous, PRN, Yumiko Sandoval, DO  •  hydrALAZINE (APRESOLINE) tablet 10 mg, 10 mg, Oral, Q8H PRN, Case, Jasmina V., DO  •  insulin detemir (LEVEMIR) injection 15 Units, 15 Units, Subcutaneous, BID, Shandra Oscar MD, 15 Units at 12/10/18 0914  •  Magnesium Sulfate 2 gram Bolus, followed by 8 gram infusion (total Mg dose 10 grams)- Mg less than or equal to 1mg/dL, 2 g, Intravenous, PRN **OR** Magnesium Sulfate 2 gram / 50mL Infusion (GIVE X 3 BAGS TO EQUAL 6GM TOTAL DOSE) - Mg 1.1 - 1.5 mg/dl, 2 g, Intravenous, PRN **OR** Magnesium Sulfate 4 gram infusion- Mg 1.6-1.9 mg/dL, 4 g, Intravenous, PRN, Balwinder Mccall, [facility], Last Rate: 25 mL/hr at 12/03/18 1218, 4 g at 12/03/18 1218  •  metoprolol tartrate (LOPRESSOR) tablet 50 mg, 50 mg, Oral, Q12H, Balwinder Marquez III, MD, 50 mg at 12/10/18 0910  •  potassium chloride (MICRO-K) CR capsule 40 mEq, 40 mEq, Oral, PRN **OR** potassium chloride (KLOR-CON) packet 40 mEq, 40 mEq, Oral, PRN, 40 mEq at 12/03/18 1714 **OR** potassium chloride 10 mEq in 100 mL IVPB, 10 mEq, Intravenous, Q1H PRN, Balwinder Mccall, RP  •  potassium phosphate 45 mmol in sodium chloride 0.9 % 500 mL infusion, 45 mmol, Intravenous, PRN, Last Rate: 62.5 mL/hr at 12/03/18 1713, 45 mmol at 12/03/18 1713 **OR** potassium phosphate 30 mmol in sodium chloride 0.9 % 250 mL infusion, 30 mmol, Intravenous, PRN **OR** potassium phosphate 15 mmol in sodium chloride 0.9 % 100 mL infusion, 15 mmol, Intravenous, PRN **OR** sodium glycerophosphate 40 mmol in sodium chloride 0.9 % 500 mL IVPB, 40 mmol, Intravenous, PRN **OR** sodium glycerophosphate 20 mmol in sodium chloride 0.9 % 250 mL IVPB, 20 mmol, Intravenous, PRN,  Balwinder Mccall, [facility]  •  sodium chloride 0.9 % flush 10 mL, 10 mL, Intravenous, PRN, Rohan Ceballos MD  •  sodium chloride 0.9 % flush 10 mL, 10 mL, Intravenous, Q12H, Shandra Oscar MD, 10 mL at 12/10/18 0911  •  sodium chloride 0.9 % flush 10 mL, 10 mL, Intravenous, PRN, Shandra Oscar MD  •  sodium chloride 0.9 % flush 3-10 mL, 3-10 mL, Intravenous, PRN, Julien Carcamo, APRN, 10 mL at 18 0835  •  thiamine (VITAMIN B-1) tablet 100 mg, 100 mg, Oral, Daily, Case, Jasmina V., DO, 100 mg at 12/10/18 0911    Antibiotics:  Anti-Infectives (From admission, onward)    Ordered     Dose/Rate Route Frequency Start Stop    18 1037  DAPTOmycin (CUBICIN) 500 mg in sodium chloride 0.9 % 50 mL IVPB     Ordering Provider:  Olivier Gtz MD    8 mg/kg × 63.6 kg (Adjusted)  100 mL/hr over 30 Minutes Intravenous Every 24 Hours 18 1200 18 1159    18 1128  vancomycin (VANCOCIN) in iso-osmotic dextrose IVPB 1 g (premix) 200 mL     Ordering Provider:  Aysha Rocha [facility]    1,000 mg  over 60 Minutes Intravenous Once 18 1400 18 1448    18 1522  piperacillin-tazobactam (ZOSYN) 3.375 g in iso-osmotic dextrose 50 ml (premix)     Eran Hunag [facility] reviewed the order on 18 0942.   Ordering Provider:  Case, Jasmina V., DO    3.375 g  over 4 Hours Intravenous Every 12 Hours 18 2200 18 1200    18 1332  vancomycin 1750 mg/500 mL 0.9% NS IVPB ([facility])     Ordering Provider:  Rohan Ceballos MD    20 mg/kg × 90.7 kg  over 120 Minutes Intravenous Once 18 1334 18 1642    18 1332  piperacillin-tazobactam (ZOSYN) 3.375 g in iso-osmotic dextrose 50 ml (premix)     Ordering Provider:  Rohan Ceballos MD    3.375 g  over 30 Minutes Intravenous Once 18 1334 18 1720            Review of Systems:  See hpi      Physical Exam:   Vital Signs  Temp (24hrs), Av.3 °F (36.3 °C), Min:97.2 °F (36.2 °C), Max:97.4 °F (36.3  °C)    Temp  Min: 97.2 °F (36.2 °C)  Max: 97.4 °F (36.3 °C)  BP  Min: 107/60  Max: 125/75  Pulse  Min: 65  Max: 88  Resp  Min: 16  Max: 18  SpO2  Min: 94 %  Max: 94 %    GENERAL: resting quietly opens eyes to exam, more alert.  HEENT: Normocephalic, atraumatic.  PERRL. EOMI. No conjunctival injection. No icterus. Oropharynx clear without evidence of thrush or exudate. No evidence of peridontal disease.  No ext oral lesions    HEART: RRR; systolic murmur loudest at base left  LUNGS: Clear to auscultation bilaterally .  ABDOMEN: Soft, nontender, nondistended. Positive bowel sounds.   EXT:  No cyanosis, clubbing or edema. No cord.  MSK: FROM without joint effusions noted arms/legs.    SKIN: keloid like scarring on toes, fore feet bilaterally    NEURO: nonverbal; no spontaneous movement      Laboratory Data    Results from last 7 days   Lab Units  12/10/18   0847  12/08/18   0637  12/07/18   0655   12/06/18 2021   WBC 10*3/mm3  9.59  9.17   --    --   9.74   HEMOGLOBIN g/dL  12.4  11.1*  11.3*   < >  10.4*   HEMATOCRIT %  41.2  37.6  36.6   < >  34.8   PLATELETS 10*3/mm3  398  331   --    --   273    < > = values in this interval not displayed.     Results from last 7 days   Lab Units  12/10/18   0847   SODIUM mmol/L  141   POTASSIUM mmol/L  4.5   CHLORIDE mmol/L  118*   CO2 mmol/L  13.0*   BUN mg/dL  30*   CREATININE mg/dL  1.47*   GLUCOSE mg/dL  101*   CALCIUM mg/dL  8.0*     Results from last 7 days   Lab Units  12/08/18   0637   ALK PHOS U/L  206*   BILIRUBIN mg/dL  0.4   ALT (SGPT) U/L  26   AST (SGOT) U/L  24                         Estimated Creatinine Clearance: 33.4 mL/min (A) (by C-G formula based on SCr of 1.47 mg/dL (H)).      Microbiology:  Blood Culture   Date Value Ref Range Status   11/30/2018 No growth at 2 days  Preliminary   11/30/2018 No growth at 2 days  Preliminary   11/28/2018 Staphylococcus aureus, MRSA (A)  Final     Comment:       Consider infectious disease consult to rule out distant focus  of infection.  Methicillin resistant Staphylococcus aureus, Patient may be an isolation risk.   11/28/2018 Staphylococcus aureus, MRSA (A)  Final     Comment:       Consider infectious disease consult to rule out distant focus of infection.  Methicillin resistant Staphylococcus aureus, Patient may be an isolation risk.       BCID, PCR   Date Value Ref Range Status   11/28/2018 (C) No organism detected by BCID PCR. Final    Staphylococcus aureus. mecA (methicillin resistance gene) detected. Identification by BCID PCR.                 Wound Culture   Date Value Ref Range Status   11/30/2018 (A)  Final    Scant growth (1+) Staphylococcus, coagulase negative     Comment:     Susceptibility will not be done unless Doctor notifies Lab             Radiology:  Fl Video Swallow With Speech    Result Date: 12/7/2018  Fluoroscopy provided for a modified barium swallow. Please see speech therapy report for full details and recommendations.    This report was finalized on 12/7/2018 10:25 PM by DR. Brian Cosme MD.          Impression:   MRSA bactertemia  Left leg abscess   Parotitis right side  Encephalopathy  COPD  Acute renal failure on chronic      PLAN/RECOMMENDATIONS:     Estimated Creatinine Clearance: 33.4 mL/min (A) (by C-G formula based on SCr of 1.47 mg/dL (H)).    Source of MRSA bacteremia could be from the parotid gland; no abscess seen on initial imaging;  Will be interesting to see if leg culures grow MRSA; a leg abscess can spontaneously develop and usually doesn't lead to positive blood cultures.    Regardless; high grade bacteremia is still concerning for endovascular involvement.    YANNA -ve for endocarditis    Given lack of  pulmonary involvement probably not active concern would change abx:    Continue daptomycin 8 mg/kg iv now/daily unless creatinine clearance decreases below 30 mL/min continue through 12/13/18; may change to oral abx thereafter  Repeat blood cultures are ngtd from 11/30/18  Warm compresses to  right parotid gland    F/u wound cultures    Patient is complexly ill    D/w Dr. Mark Gtz MD  12/10/2018  4:43 PM

## 2018-12-10 NOTE — THERAPY TREATMENT NOTE
Acute Care - Occupational Therapy Treatment Note  [facility]     Patient Name: Leila Smith  : 1945  MRN: [id number]  Today's Date: 12/10/2018  Onset of Illness/Injury or Date of Surgery: 18  Date of Referral to OT: 18  Referring Physician: SAUL Oscar MD    Admit Date: 2018       ICD-10-CM ICD-9-CM   1. Impaired functional mobility, balance, gait, and endurance Z74.09 V49.89   2. Dysphagia, unspecified type R13.10 787.20   3. Ventricular tachycardia (CMS/Formerly [facility]) I47.2 427.1   4. Altered mental status, unspecified altered mental status type R41.82 780.97     Patient Active Problem List   Diagnosis   • Diabetes mellitus, type 2 (CMS/Formerly [facility])   • Hypertension   • Hyperlipidemia   • Hypothyroidism   • Chronic obstructive pulmonary disease (CMS/Formerly [facility])   • CAD (coronary artery disease)   • History of edema   • History of obesity   • Venous insufficiency   • Open wound of lower extremity   • Urinary tract infection   • T2DM (type 2 diabetes mellitus) (CMS/Formerly [facility])   • Dyspnea on exertion   • Peripheral vascular disease (CMS/Formerly [facility])   • Obstructive sleep apnea syndrome   • Ulcer of lower extremity (CMS/Formerly [facility])   • Hypoxemia   • Hematuria   • Diabetic peripheral neuropathy (CMS/Formerly [facility])   • Edema   • Chronic obstructive pulmonary disease with acute exacerbation (CMS/Formerly [facility])   • Congestive heart failure (CMS/Formerly [facility])   • Arthritis   • Neutrophilic leukocytosis   • Cystitis   • Acute renal failure superimposed on stage 3 chronic kidney disease (CMS/Formerly [facility])   • Hyperkalemia   • Leukocytosis   • Elevated troponin   • Altered mental status   • Ventricular tachyarrhythmia (CMS/Formerly [facility])   • Sepsis due to methicillin resistant Staphylococcus aureus (MRSA) (CMS/Formerly [facility])   • Acute parotitis     Past Medical History:   Diagnosis Date   • Atrial fibrillation (CMS/HCC)    • Chronic ulcer of right leg (CMS/Formerly [facility])    • Cognitive communication deficit    • COPD (chronic obstructive pulmonary disease) (CMS/[facility]ly [facility])    • Diabetes mellitus (CMS/[facility]ly [facility])    •  Difficulty in walking    • Encephalopathy    • Generalized muscle weakness    • Hematuria    • Hyperlipidemia    • Hypertension    • Hypothyroidism    • Urinary tract infection      Past Surgical History:   Procedure Laterality Date   • CATARACT EXTRACTION     • CHOLECYSTECTOMY     • COLOSTOMY     • FOOT SURGERY Right    • HERNIA REPAIR     • HYSTERECTOMY     • TOTAL KNEE ARTHROPLASTY Right        Therapy Treatment    Rehabilitation Treatment Summary     Row Name 12/10/18 1042             Treatment Time/Intention    Discipline  occupational therapist  -SD      Document Type  therapy note (daily note)  -SD      Subjective Information  no complaints  -SD      Mode of Treatment  occupational therapy  -SD      Patient/Family Observations  Pt. in bed. No family present. Pt. on tele, bed check, IV, & B waffle boots.  -SD      Care Plan Review  care plan/treatment goals reviewed;risks/benefits reviewed;current/potential barriers reviewed;patient/other agree to care plan pt. confused & unable to clearly assess her comprehension  -SD      Total Minutes, Occupational Therapy Treatment  23  -SD      Patient Effort  poor  -SD      Comment  pt. stating one moment that she wanted to sit up, but would then physically resist when therapist attempting to assist her  -SD      Recorded by [SD] Kaitlin Zavala OT 12/10/18 1117      Row Name 12/10/18 1042             Vital Signs    Pre Patient Position  Supine  -SD      Intra Patient Position  Supine  -SD      Post Patient Position  Supine  -SD      Recorded by [SD] Kaitlin Zavala OT 12/10/18 1117      Row Name 12/10/18 1042             Cognitive Assessment/Intervention- PT/OT    Affect/Mental Status (Cognitive)  confused;other (see comments) agitated intermittently  -SD      Behavioral Issues (Cognitive)  uncooperative  -SD      Orientation Status (Cognition)  oriented to;person  -SD      Follows Commands (Cognition)  follows one step commands;0-24% accuracy;verbal  cues/prompting required;physical/tactile prompts required;repetition of directions required  -SD      Cognitive Function (Cognitive)  safety deficit  -SD      Safety Deficit (Cognitive)  severe deficit;ability to follow commands;awareness of need for assistance;insight into deficits/self awareness;safety precautions awareness;judgment;problem solving  -SD      Personal Safety Interventions  fall prevention program maintained  -SD      Recorded by [SD] Kaitlin Zavala, OT 12/10/18 1117      Row Name 12/10/18 1042             Bed Mobility Assessment/Treatment    Comment (Bed Mobility)  attempted to bring pt. to sit on EOB as pt. requested, but pt. physically resisted  -SD      Recorded by [SD] Kaitlin Zavala, OT 12/10/18 1117      Row Name 12/10/18 1042             Functional Mobility    Functional Mobility- Comment  deferred, pt. resisting EOB  -SD      Recorded by [SD] Kaitlin Zavala, OT 12/10/18 1117      Row Name 12/10/18 1042             Transfer Assessment/Treatment    Comment (Transfers)  deferred; pt. stating that she wants to sit on EOB or in chair, but then physically resisting when therapist attempts to assist  -SD      Recorded by [SD] Kaitlin Zavala, OT 12/10/18 1117      Row Name 12/10/18 1042             ADL Assessment/Intervention    98389 - OT Self Care/Mgmt Minutes  5  -SD      BADL Assessment/Intervention  lower body dressing;grooming  -SD      Recorded by [SD] Kaitlin Zavala OT 12/10/18 1117      Row Name 12/10/18 1042             Lower Body Dressing Assessment/Training    Lower Body Dressing [place] Level  don;socks;dependent (less than 25% patient effort)  -SD      Lower Body Dressing Position  supine  -SD      Recorded by [SD] Kaitlin Zavala, OT 12/10/18 1117      Row Name 12/10/18 1042             Grooming Assessment/Training    [place] Level (Grooming)  wash face, hands;dependent (less than 25% patient effort) pt. stated that she wanted  to wash face, but resisted washcl  -SD      Recorded by [SD] Kaitlin Zaavla, OT 12/10/18 1117      Row Name 12/10/18 1042             Therapeutic Exercise    79544 - OT Therapeutic Activity Minutes  18  -SD      Recorded by [SD] Kaitlin Zavala OT 12/10/18 1117      Row Name 12/10/18 1042             Upper Extremity Supine Therapeutic Exercise    Comment, Supine Upper Extremity (Therapeutic Exercise)  attempted to assist in UE ROM exercises, but pt. resisting  -SD      Recorded by [SD] Kaitlin Zavala, OT 12/10/18 1117      Row Name 12/10/18 1042             Lower Extremity Supine Therapeutic Exercise    Performed, Supine Lower Extremity (Therapeutic Exercise)  knee flexion/extension;hip abduction/adduction;ankle dorsiflexion/plantarflexion  -SD      Exercise Type, Supine Lower Extremity (Therapeutic Exercise)  AAROM (active assistive range of motion)  -SD      Sets/Reps Detail, Supine Lower Extremity (Therapeutic Exercise)  4 sets  -SD      Recorded by [SD] Kaitlin Zavala, OT 12/10/18 1117      Row Name 12/10/18 1042             Positioning and Restraints    Pre-Treatment Position  in bed  -SD      Post Treatment Position  bed  -SD      In Bed  supine;call light within reach;encouraged to call for assist;exit alarm on;side rails up x3  -SD      Recorded by [SD] Kaitlin Zavala, OT 12/10/18 1117      Row Name 12/10/18 1042             Pain Scale: FACES Pre/Post-Treatment    Pain: FACES Scale, Pretreatment  0-->no hurt  -SD      Pain: FACES Scale, Post-Treatment  0-->no hurt  -SD      Recorded by [SD] Kaitlin Zavala, OT 12/10/18 1117      Row Name                Wound 11/29/18 0915 Right medial gluteal other (see comments)    Wound - Properties Group Date first assessed: 11/29/18 [CP] Time first assessed: 0915 [CP] Present On Admission : yes;picture taken [CP] Side: Right [CP] Orientation: medial [CP] Location: gluteal [CP] Type: other (see comments) [CP] Additional  Comments: shearing [CP] Recorded by:  [CP] Angélica Gavin APRN 11/29/18 1100    Row Name                Wound 11/29/18 1730 Right anterior;lower leg incision    Wound - Properties Group Date first assessed: 11/29/18 [AB] Time first assessed: 1730 [AB] Present On Admission : other (see comments) [AB], wound present, excised by Dr. Gimenez  Side: Right [AB] Orientation: anterior;lower [AB] Location: leg [AB] Type: incision [AB] Stage, Pressure Injury: other (see comments) [AB] Recorded by:  [AB] Jag Amaral RN 11/29/18 1753    Row Name                Wound 12/01/18 1400 Left lower leg skin tear    Wound - Properties Group Date first assessed: 12/01/18 [AB] Time first assessed: 1400 [AB] Present On Admission : no [AB] Side: Left [AB] Orientation: lower [AB] Location: leg [AB] Type: skin tear [AB] Recorded by:  [AB] Jag Amaral RN 12/01/18 1430    Row Name 12/10/18 1042             Coping    Observed Emotional State  -- pt. appearing calm & wanting to participate, then fearful  -SD      Recorded by [SD] Kaitlin Zavala OT 12/10/18 1117      Row Name 12/10/18 1042             Plan of Care Review    Plan of Care Reviewed With  patient  -SD      Recorded by [SD] Kaitlin Zavala, OT 12/10/18 1117      Row Name 12/10/18 1042             Outcome Summary/Treatment Plan (OT)    Daily Summary of Progress (OT)  unable to show any progress toward functional goals  -SD      Barriers to Overall Progress (OT)  pt.'s confusion & agitation  -SD      Plan for Continued Treatment (OT)  cont OT POC for 3x/wk. unless pt.'s participation improves, will consider d/c from OT skilled services  -SD      Anticipated Discharge Disposition (OT)  extended care facility  -SD      Recorded by [SD] Kaitlin Zavala, OT 12/10/18 1117        User Key  (r) = Recorded By, (t) = Taken By, (c) = Cosigned By    Initials Name Effective Dates Discipline    SD Kaitlin Zavala OT 06/08/18 -  OT    Angélica Coppola  JOSE ANTONIO, APRN 11/05/18 -  Nurse    AB Jag Amaral RN 02/02/17 -  Nurse        Wound 11/29/18 0915 Right medial gluteal other (see comments) (Active)   Dressing Appearance dry;intact 12/10/2018  9:17 AM   Closure None 12/10/2018  9:17 AM   Base clean;dry;pink;white 12/10/2018  9:17 AM   Periwound blanchable 12/10/2018  9:17 AM   Edges irregular;open 12/10/2018  9:17 AM   Drainage Amount none 12/10/2018  9:17 AM   Care, Wound cleansed with;soap and water 12/10/2018  3:00 AM   Dressing Care, Wound dressing changed;low-adherent 12/10/2018  3:00 AM       Wound 11/29/18 1730 Right anterior;lower leg incision (Active)   Dressing Appearance dry;intact 12/10/2018  9:17 AM   Closure [place] 12/10/2018  9:17 AM   Base dressing in place, unable to visualize 12/10/2018  9:17 AM   Drainage Characteristics/Odor serosanguineous 12/10/2018  3:00 AM   Drainage Amount scant 12/10/2018  3:00 AM   Care, Wound cleansed with;sterile normal saline 12/10/2018  3:00 AM   Dressing Care, Wound dressing changed;gauze, wet-to-dry;low-adherent 12/10/2018  3:00 AM       Wound 12/01/18 1400 Left lower leg skin tear (Active)   Dressing Appearance dry;intact 12/10/2018  9:17 AM   Closure [place] 12/10/2018  9:17 AM   Base dressing in place, unable to visualize 12/10/2018  9:17 AM   Drainage Amount none 12/10/2018  3:00 AM   Care, Wound cleansed with;sterile normal saline 12/10/2018  3:00 AM   Dressing Care, Wound dressing changed;gauze, wet-to-dry;low-adherent 12/10/2018  3:00 AM       Occupational Therapy Education     Title: PT OT SLP Therapies (In Progress)     Topic: Occupational Therapy (In Progress)     Point: ADL training (In Progress)     Description: Instruct learner(s) on proper safety adaptation and remediation techniques during self care or transfers.   Instruct in proper use of assistive devices.    Learning Progress Summary           Patient Acceptance, E, NR by TB at 12/4/2018 11:13 AM    Comment:  Education initiated for benefits of  activity/therapy and role of OT                               User Key     Initials Effective Dates Name Provider Type Discipline    TB 06/08/18 -  Brianne Yeung, OT Occupational Therapist OT                OT Recommendation and Plan  Outcome Summary/Treatment Plan (OT)  Daily Summary of Progress (OT): unable to show any progress toward functional goals  Barriers to Overall Progress (OT): pt.'s confusion & agitation  Plan for Continued Treatment (OT): cont OT POC for 3x/wk. unless pt.'s participation improves, will consider d/c from OT skilled services  Anticipated Discharge Disposition (OT): extended care facility  Daily Summary of Progress (OT): unable to show any progress toward functional goals  Plan of Care Review  Plan of Care Reviewed With: patient  Plan of Care Reviewed With: patient  Outcome Summary: Pt. stating that she wanted to sit up & get to chair, but when therapist attempted to assist with bed mobility pt. physically resisted. Pt. stating that she wanted to wash her face, but resisting washcloth when it was brought to her. Pt. followed a few v/c's for LE exercises in bed, but refused UE exercises.  Outcome Measures     Row Name 12/10/18 1042             How much help from another is currently needed...    Putting on and taking off regular lower body clothing?  1  -SD      Bathing (including washing, rinsing, and drying)  1  -SD      Toileting (which includes using toilet bed pan or urinal)  1  -SD      Putting on and taking off regular upper body clothing  2  -SD      Taking care of personal grooming (such as brushing teeth)  2  -SD      Eating meals  1  -SD      Score  8  -SD         Functional Assessment    Outcome Measure Options  AM-PAC 6 Clicks Daily Activity (OT)  -SD        User Key  (r) = Recorded By, (t) = Taken By, (c) = Cosigned By    Initials Name Provider Type    Kaitlin Silva, OT Occupational Therapist           Time Calculation:   Time Calculation- OT     Row Name  12/10/18 1042             Time Calculation- OT    OT Start Time  1042  -SD      OT Stop Time  1105  -SD      OT Time Calculation (min)  23 min  -SD      OT Received On  12/10/18  -SD      OT Goal Re-Cert Due Date  12/14/18  -SD         Timed Charges    66401 - OT Therapeutic Activity Minutes  18  -SD      47713 - OT Self Care/Mgmt Minutes  5  -SD        User Key  (r) = Recorded By, (t) = Taken By, (c) = Cosigned By    Initials Name Provider Type    Kaitlin Silva, OT Occupational Therapist           Therapy Suggested Charges     Code   Minutes Charges    06097 (CPT®) Hc Ot Neuromusc Re Education Ea 15 Min      42957 (CPT®) Hc Ot Ther Proc Ea 15 Min      74728 (CPT®) Hc Ot Therapeutic Act Ea 15 Min 18 1    89303 (CPT®) Hc Ot Manual Therapy Ea 15 Min      53429 (CPT®) Hc Ot Iontophoresis Ea 15 Min      93980 (CPT®) Hc Ot Elec Stim Ea-Per 15 Min      61803 (CPT®) Hc Ot Ultrasound Ea 15 Min      94915 (CPT®) Hc Ot Self Care/Mgmt/Train Ea 15 Min 5 1    Total  23 2        Therapy Charges for Today     Code Description Service Date Service Provider Modifiers Qty    59720120913 HC OT THERAPEUTIC ACT EA 15 MIN 12/10/2018 Kaitlin Zavala, OT GO 1    [id number] HC OT SELF CARE/MGMT/TRAIN EA 15 MIN 12/10/2018 Kaitlin Zavala OT GO 1               Kaitlin Zavala OT  12/10/2018

## 2018-12-10 NOTE — PLAN OF CARE
Problem: Fall Risk (Adult)  Goal: Absence of Fall  Outcome: Ongoing (interventions implemented as appropriate)   12/10/18 0426   Fall Risk (Adult)   Absence of Fall making progress toward outcome       Problem: Skin Injury Risk (Adult)  Goal: Skin Health and Integrity  Outcome: Ongoing (interventions implemented as appropriate)   12/10/18 0426   Skin Injury Risk (Adult)   Skin Health and Integrity making progress toward outcome       Problem: Patient Care Overview  Goal: Plan of Care Review  Outcome: Ongoing (interventions implemented as appropriate)   12/10/18 0426   Coping/Psychosocial   Plan of Care Reviewed With patient   Plan of Care Review   Progress no change   OTHER   Outcome Summary Pt is confused and sometimes refuses meds. Skin integrity and safety maintained. Pt had a bath and wound dressings changed. Denies pain and doesnot appears to be in any distress. Low UOP. High ostomy output. Ostomy was changed at shift changed due to leaking.       Problem: Confusion, Acute (Adult)  Goal: Identify Related Risk Factors and Signs and Symptoms  Outcome: Ongoing (interventions implemented as appropriate)   12/08/18 1826   Confusion, Acute (Adult)   Related Risk Factors (Acute Confusion) advanced age;cognitive impairment;infection;metabolic abnormalities;mobility decreased   Signs and Symptoms (Acute Confusion) thought process diminished/disorganized;understanding disturbed;reasoning/planning disturbed;emotional/behavioral disturbances     Goal: Cognitive/Functional Impairments Minimized  Outcome: Ongoing (interventions implemented as appropriate)   12/10/18 0426   Confusion, Acute (Adult)   Cognitive/Functional Impairments Minimized making progress toward outcome     Goal: Safety  Outcome: Ongoing (interventions implemented as appropriate)   12/10/18 0426   Confusion, Acute (Adult)   Safety making progress toward outcome

## 2018-12-10 NOTE — PLAN OF CARE
Problem: Patient Care Overview  Goal: Plan of Care Review  Outcome: Ongoing (interventions implemented as appropriate)   12/10/18 0114   Coping/Psychosocial   Plan of Care Reviewed With patient   Plan of Care Review   Progress improving   OTHER   Outcome Summary At this time appliance is intact. Continued large amount liquid output. No identifiable issues at this time. Please contact if needs arise. Thanks

## 2018-12-10 NOTE — PROGRESS NOTES
Continued Stay Note  [facility]     Patient Name: Leila Smith  MRN: [id number]  Today's Date: 12/10/2018    Admit Date: 11/28/2018    Discharge Plan     Row Name 12/10/18 1457       Plan    Plan  To [facility] when medically ready     Patient/Family in Agreement with Plan  yes    Plan Comments  patient discussed during rounds - she is not yet medically ready for discharge. I have spoke with Senait at [facility] who will be following her this week and awaiting CM to notify her when patient is nearing the point of being medically ready so she can begin the pre-cert with her insurance. CM will be following - Please notify CM when patient appears a day or two away from being medically ready for discharge to [facility]. - Jeane [phone]         Discharge Codes    No documentation.       Expected Discharge Date and Time     Expected Discharge Date Expected Discharge Time    Dec 5, 2018             Jeane Sarah RN

## 2018-12-10 NOTE — PROGRESS NOTES
[facility] Medicine Services  PROGRESS NOTE    Patient Name: Leila Smith  : 1945  MRN: [id number]    Date of Admission: 2018  Length of Stay: 12  Primary Care Physician: Ciaran Wakefield MD    Subjective   Subjective     CC:  F/U multiple issues/MRSA bacteremia    HPI:  Pt lying in bed, no family at bedside. Resting today and less conversive than yesterday. Some loose stool overnight.     Review of Systems  Unobtainable    Objective   Objective     Vital Signs:   Temp:  [97.2 °F (36.2 °C)-97.4 °F (36.3 °C)] 97.3 °F (36.3 °C)  Heart Rate:  [65-88] 71  Resp:  [16-18] 18  BP: (107-125)/(53-80) 112/53        Physical Exam: Unchanged from yesterday  GEN- chronically ill appearing, sleepy but awakens and answers questions   HEENT- atraumatic, normocephlic,  NECK- supple, trachea midline  RESP: CTAB, normal effort, resp non-labored  CV: RRR, no murmurs, s1/s2 normal  MSK: no LE edema noted, pos pressure dressing RUE, mild edema of forearm, good pulses, PICC RUE   NEURO:   face grossly symmetric, moves all ext  SKIN: Thick  scales/scarring on feet, bilat shin wounds, + mild drainage       Results Reviewed:  I have personally reviewed current lab, radiology, and data and agree.    Results from last 7 days   Lab Units  12/10/18   0847  18   0637  18   0655   18   WBC 10*3/mm3  9.59  9.17   --    --    --   9.74   HEMOGLOBIN g/dL  12.4  11.1*  11.3*   < >   --   10.4*   HEMATOCRIT %  41.2  37.6  36.6   < >   --   34.8   PLATELETS 10*3/mm3  398  331   --    --    --   273   INR    --    --    --    --   1.01   --     < > = values in this interval not displayed.     Results from last 7 days   Lab Units  12/10/18   0847  18   0637  18   SODIUM mmol/L  141  141  139   POTASSIUM mmol/L  4.5  4.1  4.0   CHLORIDE mmol/L  118*  116*  111*   CO2 mmol/L  13.0*  17.0*  22.0   BUN mg/dL  30*  25*  26*   CREATININE mg/dL  1.47*  1.09   0.99   GLUCOSE mg/dL  101*  90  168*   CALCIUM mg/dL  8.0*  8.4*  8.1*   ALT (SGPT) U/L   --   26   --    AST (SGOT) U/L   --   24   --      Estimated Creatinine Clearance: 33.4 mL/min (A) (by C-G formula based on SCr of 1.47 mg/dL (H)).  No results found for: BNP    Microbiology Results Abnormal     Procedure Component Value - Date/Time    Clostridium Difficile Toxin - Stool, Per Rectum [[id number]] Collected:  12/09/18 0536    Lab Status:  Final result Specimen:  Stool from Per Rectum Updated:  12/09/18 1104    Narrative:       The following orders were created for panel order Clostridium Difficile Toxin - Stool, Per Rectum.  Procedure                               Abnormality         Status                     ---------                               -----------         ------                     Clostridium Difficile To...[[id number]]  Normal              Final result                 Please view results for these tests on the individual orders.    Clostridium Difficile Toxin, PCR - Stool, Per Rectum [[id number]]  (Normal) Collected:  12/09/18 0536    Lab Status:  Final result Specimen:  Stool from Per Rectum Updated:  12/09/18 1104     C. Difficile Toxins by PCR Not Detected    Narrative:       Performance characteristics of test not established for patients <2 years of age.  Performance characteristics of test not established for patients <2 years of age.  Negative for Toxigenic C. Difficile    Tissue / Bone Culture - Tissue, Leg, Right [[id number]] Collected:  11/29/18 1801    Lab Status:  Edited Result - FINAL Specimen:  Tissue from Leg, Right Updated:  12/06/18 1130     Tissue Culture No growth at 1 week     Gram Stain Many (4+) Red blood cells      Many (4+) WBCs seen      No organisms seen    Blood Culture - Blood, Hand, Left [[id number]] Collected:  11/30/18 1005    Lab Status:  Final result Specimen:  Blood from Hand, Left Updated:  12/05/18 1030     Blood Culture No growth at 5 days    Blood Culture - Blood,  Hand, Right [[id number]] Collected:  11/30/18 1005    Lab Status:  Final result Specimen:  Blood from Hand, Right Updated:  12/05/18 1030     Blood Culture No growth at 5 days    Wound Culture - Drainage, Ear, Right [[id number]]  (Abnormal) Collected:  11/30/18 1647    Lab Status:  Final result Specimen:  Drainage from Ear, Right Updated:  12/03/18 0852     Wound Culture Scant growth (1+) Staphylococcus, coagulase negative     Comment: Susceptibility will not be done unless Doctor notifies Lab            Gram Stain No WBCs or organisms seen    Blood Culture - Blood, Arm, Left [[id number]]  (Abnormal) Collected:  11/28/18 1353    Lab Status:  Final result Specimen:  Blood from Arm, Left Updated:  12/01/18 0823     Blood Culture Staphylococcus aureus, MRSA     Comment:   Consider infectious disease consult to rule out distant focus of infection.  Methicillin resistant Staphylococcus aureus, Patient may be an isolation risk.        Isolated from Aerobic and Anaerobic Bottles     Gram Stain Aerobic Bottle Gram positive cocci in groups      Anaerobic Bottle Gram positive cocci in groups    Narrative:       PRIOR POSITIVE CULTURE WITH SAME MORPHOLOGY CALLED  Refer Culture to #[id number]    Blood Culture - Blood, Arm, Left [[id number]]  (Abnormal)  (Susceptibility) Collected:  11/28/18 1340    Lab Status:  Final result Specimen:  Blood from Arm, Left Updated:  12/01/18 0822     Blood Culture Staphylococcus aureus, MRSA     Comment:   Consider infectious disease consult to rule out distant focus of infection.  Methicillin resistant Staphylococcus aureus, Patient may be an isolation risk.        Isolated from Aerobic and Anaerobic Bottles     Gram Stain Aerobic Bottle Gram positive cocci in groups      Anaerobic Bottle Gram positive cocci in clusters    Susceptibility      Staphylococcus aureus, MRSA     DIMITRIS     Ciprofloxacin Resistant     Clindamycin Susceptible     Daptomycin Susceptible     Erythromycin Susceptible      Gentamicin Susceptible     Levofloxacin Intermediate [1]      Linezolid Susceptible     Oxacillin Resistant     Penicillin G Resistant     Quinupristin + Dalfopristin Susceptible     Rifampin Susceptible     Tetracycline Susceptible     Trimethoprim + Sulfamethoxazole Susceptible     Vancomycin Susceptible            [1]   Staphylococcus species may develop resistance during prolonged therapy with quinolones.  Isolates that are initially susceptible may become resistant within three to four days after initiation of therapy. Testing of repeat isolates may be warranted.                 Blood Culture ID, PCR - Blood, Arm, Left [[id number]]  (Abnormal) Collected:  11/28/18 1340    Lab Status:  Final result Specimen:  Blood from Arm, Left Updated:  11/29/18 0925     BCID, PCR Staphylococcus aureus. mecA (methicillin resistance gene) detected. Identification by BCID PCR.          Imaging Results (last 24 hours)     ** No results found for the last 24 hours. **        Results for orders placed during the hospital encounter of 11/28/18   Adult Transesophageal Echo (YANNA) W/ Cont if Necessary Per Protocol    Narrative · Left ventricular systolic function is normal.  · Left ventricular wall thickness is consistent with severe concentric   hypertrophy.  · Left atrial cavity size is mildly dilated.  · Mild dilation of the ascending aorta is present. 4.2cm..  · No evidence of a left atrial appendage thrombus was present.  · No echocardiographic evidence of endocarditis          I have reviewed the medications.      amLODIPine 5 mg Oral Q24H   apixaban 2.5 mg Oral Q12H   aspirin 81 mg Oral Daily   castor oil-balsam peru  Topical Q12H   DAPTOmycin 8 mg/kg (Adjusted) Intravenous Q24H   diltiaZEM 60 mg Oral Q6H   insulin detemir 15 Units Subcutaneous BID   metoprolol tartrate 50 mg Oral Q12H   sodium chloride 10 mL Intravenous Q12H   thiamine 100 mg Oral Daily         Assessment/Plan   Assessment / Plan     Active Hospital Problems     Diagnosis   • **Altered mental status   • Sepsis due to methicillin resistant Staphylococcus aureus (MRSA) (CMS/[facility])   • Acute parotitis   • Ventricular tachyarrhythmia (CMS/[facility])   • Acute renal failure superimposed on stage 3 chronic kidney disease (CMS/[facility])   • Hyperkalemia   • Congestive heart failure (CMS/[facility])   • Chronic obstructive pulmonary disease with acute exacerbation (CMS/[facility])   • CAD (coronary artery disease)     History of  Coronary Artery Disease V12.59     • Diabetes mellitus, type 2 (CMS/[facility])   • Hypertension   • Hypothyroidism   • Hyperlipidemia          Brief Hospital Course to date:  Leila Smith is a 73 y.o. female nursing home resident who was admitted to the ICU with Vtach/hyperkalemia, also found to have acute on chronic renal failure. Noted to have UTI on admission as well as MRSA bacteremia. Also treated with parotid sialadenitis evaluated by ENT, right leg hematoma evaluated and drained by surgery 11/29.       Assessment    -mrsa bacteremia/sepsis (likely from sialadenitis)   -Anil negative; source likely parotitis  -right parotitis/sialadenitis   -s/p ent eval; warm compresses; no surgical intervention (s/p dr. Macedo consult)  -encephalopathy  -s/p ANGELA, resolved (due to volume depletion)   -s/p nephrology consultation  -ckd 3 (baseline cr 1.2-1.4)   -s/p nephrology consultation  -superficial thrombophlebitis   -duplex rue 12/6/18: superficial thrombophlebitis cephlic (forearm) vein  -right leg hematoma (s/p I&D)   -growing coag neg staph  -dysphagia    -previously on tube feeds, now on modified diet per SLP  -s/p vtach due to hyperkalemia   -s/p cards consult; ultimately stopped amiodarone and tolerating metoprolol and diltiazem  -hx parox afib (on chronic eliquis)   -ANIL 12/3/18: normal LF systolic function, severe clvh, no atrial appendage thrombus nor echocardiographic evidence of endocarditis  -probable underlying cognitive impairment  -DM   -difficult iv access, s/p picc line w/  bleeding at insertion site   -eliquis restarted 12/10/18  -urinary retention   -s/p voiding trial which she failed; orosco placed again evening of 12/9; voiding trial once more mobile (likely at rehab facility)    --------------------------------------------------------------------------------------  Plan:  -continue dapto per dr. Gtz  -right parotid compresses  -gently hydrate w/ normal saline 75cc/hr (cr up today to 1.4, not eating much)  -back off night levemir to 5 units (from 15); continue a.m. levemir 15 units, titrate   -plan to discuss with family tomorrow and clarify baseline mental status (? Dementia baseline)  -eliquis restarted 12/10/18, watch picc for bleeding/oozing  -continue dilt & metoprolol (s/p cards evaluation)  -continue orosco for now (failed voiding), voiding once more ambulatory (perhaps at rehab)  -cbc, bmp in a.m. (watch hgb, renal function; baseline cr 1.2-1.4)    -to rehab at d/c, possibly medically ready in couple days    Other pertinent details as below:      MRSA bacteremia, sepsis----  leukocytosis improved,  no longer febrile. Unclear source, multiple potential sources including UTI, parotid sialdenitis, leg hematoma.  -  Wound cultures from leg growing coag neg staph so far.   - Repeat blood cultures NGTD. TTE negative for cardiac involvement. YANNA done 12/7/18 negative for endocarditis as well. ,   - s/p  ST reevaluate  with NGT out,pt tolerated puree with some mashed and thin liquids, will continue calorie counts. Very little intake overnight, push po, light IVF for now. May ultimately need PEG, I will attempt to talk to family .  Abx per ID      ANGELA---nephrology following. Feel likely pre-renal secondary to dehydration. Improving and creatinine back to baseline (1.2-1.4), monitor BMP.  IVF has been DC'ed. Nutritionist to follow and adjust TF/flushes to meet daily fluid requirement.    Vtach/hyperkalemia---s/p cardioverted at the scene with return to sinus rhythm. An I/O needle was  placed and she converted back to VT, then reconverted to SR spontaneously. She was treated with insulin/glucose and calcium gluconate for hyperkalemia. Cardiology following. Oral amiodarone d/c'ed  by cardiology. Pt cont on home metoprolol and Diltiazem. Anticoagulation resumed 12/5 but developed bleeding at PICC sit, will continue to  Hold Eliquis for now, I have DW Dr. Marquez. May try to restart in a few days. .  Hyperkalemia now normalized.  I have DW PICC nurse. No DVT in upper arm, small superficial clot in forearm. Functioning picc currently, will attempt to restart eliquis in the am.   DM2---previously uncontrolled, but A1C now down to 7.9%, was 10.2 in 1/2018. Currently still hyperglycemic, likely reactive. adjust basal insulin  PRN SSI, will change accuchecks to ac and hs .    Parotiditis/sialadenitis---ENT following, cont with abx/compress. No surgery needed at this time.     Encephalopathy, acute on chronic----likely multifactorial, per previous notes and documentation, patient is confused at baseline    Dysphagia---sp TF,  Now passed swallow eval, will monitor intake, if continues with poor intake then will need to discuss long term nutrition route/GOC with family and will need PEG placed.    Difficult IV access--PICC line functioning now without bleeding, will monitor closely if bleeding continues will pull but for now PICC functioning.     Debility/weakness-- will need rehab once medically stable.    Urinary retention- orosco placed. pt with recurrent I/o cath and pt very combative with nursing staff, would try voiding trial in the next few days    Loose stool- monitor, will hold on sending stool studies now      DVT Prophylaxis:  heparin    CODE STATUS:   Code Status and Medical Interventions:   Ordered at: 11/28/18 5087     Code Status:    CPR     Medical Interventions (Level of Support Prior to Arrest):    Full       Disposition: I expect the patient to be discharged back to SNF pending improvement  and final abx plans.      Electronically signed by Rod Flores MD, 12/10/18, 6:02 PM.

## 2018-12-10 NOTE — PLAN OF CARE
Problem: Patient Care Overview  Goal: Plan of Care Review  Outcome: Ongoing (interventions implemented as appropriate)   12/10/18 1117   Coping/Psychosocial   Plan of Care Reviewed With patient   Plan of Care Review   Progress no change   OTHER   Outcome Summary Pt. stating that she wanted to sit up & get to chair, but when therapist attempted to assist with bed mobility pt. physically resisted. Pt. stating that she wanted to wash her face, but resisting washcloth when it was brought to her. Pt. followed a few v/c's for LE exercises in bed, but refused UE exercises.

## 2018-12-11 NOTE — PLAN OF CARE
Problem: Patient Care Overview  Goal: Plan of Care Review  Outcome: Ongoing (interventions implemented as appropriate)   12/11/18 1028   Coping/Psychosocial   Plan of Care Reviewed With patient   Plan of Care Review   Progress improving   OTHER   Outcome Summary Pt tolerated transfer from bed to chair with mechanical lift. Pt required depA for rolling L and R for placement of sling; pt with increased fear and anxiety during mobility. Pt tolerated B LE PROM ther ex while seated in chair. Pt did not c/o pain. Continue to progress as appropriate.

## 2018-12-11 NOTE — PROGRESS NOTES
INFECTIOUS DISEASE PROGRESS NOTE    Leila Smith  1945  [id number]    Date: 12/11/2018    Admission Date: 11/28/2018    CC: MRSA bacteremia    History of present illness:    Patient is a 73 y.o. female presents from [facility] with ventricular tachycardia and hyperkalemia from  acute on chronic renal failure;  PAtient admitted to ICU and has been worked up for parotitis, leg abscess and has been found to have MRSA bacteremia.. Blood cultures are positive for MRSA. Hematoma on right leg was drained by surgery on 11/29. 2-dimensional Echocardiogram negative for vegetations.     Patient is obtunded and is a poor historian no family by bedside.  Opens eyes during exam but is nonverbal.    12/3/18: Patient is a poor historian with no family at bedside.  She says yes to any question.  She is more alert today. She remains afebrile. Right parotid gland area with swelling and pain as she swatted my hand away when palpated.     12/4/18; doing well; no events overnight; no complaints, poor historian, ros unobtainable    12/5/18; no events overnight;  Per nurse no fever, rash, poor historian, picc placed    12/6/18; doing well; no events overnight; ros unobtainable    12/7/18; doing fair, poor historian, no complaints, nonverbal; ros unobtainable    12/10/18; no events overnight; feels well; no complaints, no diarrhea, rash, sore throat; awake says a few words    12/11/18 doing well; more awake, no complaints, no fever, rash, sore throat or diarrhea  Denies right parotid pain.    Past Medical History:   Diagnosis Date   • Atrial fibrillation (CMS/HCC)    • Chronic ulcer of right leg (CMS/HCC)    • Cognitive communication deficit    • COPD (chronic obstructive pulmonary disease) (CMS/HCC)    • Diabetes mellitus (CMS/HCC)    • Difficulty in walking    • Encephalopathy    • Generalized muscle weakness    • Hematuria    • Hyperlipidemia    • Hypertension    • Hypothyroidism    • Urinary tract infection         Past Surgical History:   Procedure Laterality Date   • CATARACT EXTRACTION     • CHOLECYSTECTOMY     • COLOSTOMY     • FOOT SURGERY Right    • HERNIA REPAIR     • HYSTERECTOMY     • TOTAL KNEE ARTHROPLASTY Right        Family History   Problem Relation Age of Onset   • Stomach cancer Mother    • Alcohol abuse Other    • Cancer Other    • Diabetes Other    • Hypertension Other    • Other Other        Social History     Socioeconomic History   • Marital status:      Spouse name: Not on file   • Number of children: Not on file   • Years of education: Not on file   • Highest education level: Not on file   Social Needs   • Financial resource strain: Not on file   • Food insecurity - worry: Not on file   • Food insecurity - inability: Not on file   • Transportation needs - medical: Not on file   • Transportation needs - non-medical: Not on file   Occupational History   • Not on file   Tobacco Use   • Smoking status: Former Smoker     Packs/day: 0.00     Years: 50.00     Pack years: 0.00     Types: Cigarettes   • Smokeless tobacco: Never Used   Substance and Sexual Activity   • Alcohol use: No   • Drug use: No   • Sexual activity: Defer   Other Topics Concern   • Not on file   Social History Narrative   • Not on file       Allergies   Allergen Reactions   • Codeine    • Tetracyclines & Related          Medication:    Current Facility-Administered Medications:   •  apixaban (ELIQUIS) tablet 2.5 mg, 2.5 mg, Oral, Q12H, Atkins, Yumiko L, DO, 2.5 mg at 12/11/18 0911  •  aspirin EC tablet 81 mg, 81 mg, Oral, Daily, Jory Macedo APRN, 81 mg at 12/11/18 0911  •  castor oil-balsam peru (VENELEX) ointment, , Topical, Q12H, Julien Carcamo APRN  •  DAPTOmycin (CUBICIN) 500 mg in sodium chloride 0.9 % 50 mL IVPB, 8 mg/kg (Adjusted), Intravenous, Q24H, Olivier Gtz MD, Last Rate: 100 mL/hr at 12/11/18 1224, 500 mg at 12/11/18 1224  •  dextrose (D50W) 25 g/ 50mL Intravenous Solution 25 g, 25 g,  Intravenous, Q15 Min PRN, Yumiko Sandoval, DO  •  dextrose (GLUTOSE) oral gel 15 g, 15 g, Oral, Q15 Min PRN, Yumiko Sandoval, DO  •  diltiaZEM (CARDIZEM) tablet 60 mg, 60 mg, Oral, Q6H, Balwinder Marquez III, MD, 60 mg at 12/11/18 1224  •  glucagon (human recombinant) (GLUCAGEN DIAGNOSTIC) injection 1 mg, 1 mg, Subcutaneous, PRN, Yumiko Sandoval, DO  •  hydrALAZINE (APRESOLINE) tablet 10 mg, 10 mg, Oral, Q8H PRN, Case, Jasmina V., DO  •  insulin detemir (LEVEMIR) injection 15 Units, 15 Units, Subcutaneous, Daily, Rod Flores MD, 15 Units at 12/11/18 0924  •  insulin detemir (LEVEMIR) injection 5 Units, 5 Units, Subcutaneous, Nightly, Rod Flores MD, 5 Units at 12/10/18 2143  •  Magnesium Sulfate 2 gram Bolus, followed by 8 gram infusion (total Mg dose 10 grams)- Mg less than or equal to 1mg/dL, 2 g, Intravenous, PRN **OR** Magnesium Sulfate 2 gram / 50mL Infusion (GIVE X 3 BAGS TO EQUAL 6GM TOTAL DOSE) - Mg 1.1 - 1.5 mg/dl, 2 g, Intravenous, PRN **OR** Magnesium Sulfate 4 gram infusion- Mg 1.6-1.9 mg/dL, 4 g, Intravenous, PRN, Balwinder Mccall, [facility], Last Rate: 25 mL/hr at 12/03/18 1218, 4 g at 12/03/18 1218  •  metoprolol tartrate (LOPRESSOR) tablet 50 mg, 50 mg, Oral, Q12H, Balwinder Marquez III, MD, 50 mg at 12/11/18 1022  •  potassium chloride (MICRO-K) CR capsule 40 mEq, 40 mEq, Oral, PRN **OR** potassium chloride (KLOR-CON) packet 40 mEq, 40 mEq, Oral, PRN, 40 mEq at 12/03/18 1714 **OR** potassium chloride 10 mEq in 100 mL IVPB, 10 mEq, Intravenous, Q1H PRN, Balwinder Mccall, [facility]  •  potassium phosphate 45 mmol in sodium chloride 0.9 % 500 mL infusion, 45 mmol, Intravenous, PRN, Last Rate: 62.5 mL/hr at 12/03/18 1713, 45 mmol at 12/03/18 1713 **OR** potassium phosphate 30 mmol in sodium chloride 0.9 % 250 mL infusion, 30 mmol, Intravenous, PRN **OR** potassium phosphate 15 mmol in sodium chloride 0.9 % 100 mL infusion, 15 mmol, Intravenous, PRN **OR** sodium glycerophosphate 40 mmol in sodium  chloride 0.9 % 500 mL IVPB, 40 mmol, Intravenous, PRN **OR** sodium glycerophosphate 20 mmol in sodium chloride 0.9 % 250 mL IVPB, 20 mmol, Intravenous, PRN, Balwinder Mccall [facility]  •  sodium chloride 0.9 % flush 10 mL, 10 mL, Intravenous, PRN, Rohan Ceballos MD  •  sodium chloride 0.9 % flush 10 mL, 10 mL, Intravenous, Q12H, Shandra Oscar MD, 10 mL at 12/10/18 0911  •  sodium chloride 0.9 % flush 10 mL, 10 mL, Intravenous, PRN, Shandra Oscar MD  •  sodium chloride 0.9 % flush 3-10 mL, 3-10 mL, Intravenous, PRN, Julien Carcamo APRN, 10 mL at 12/08/18 0835  •  sodium chloride 0.9 % infusion, 75 mL/hr, Intravenous, Continuous, Rod Flores MD, Last Rate: 75 mL/hr at 12/11/18 0911, 75 mL/hr at 12/11/18 0911  •  thiamine (VITAMIN B-1) tablet 100 mg, 100 mg, Oral, Daily, Jasmina Maier DO, 100 mg at 12/11/18 0911    Antibiotics:  Anti-Infectives (From admission, onward)    Ordered     Dose/Rate Route Frequency Start Stop    12/02/18 1037  DAPTOmycin (CUBICIN) 500 mg in sodium chloride 0.9 % 50 mL IVPB     Ordering Provider:  Olivier Gtz MD    8 mg/kg × 63.6 kg (Adjusted)  100 mL/hr over 30 Minutes Intravenous Every 24 Hours 12/02/18 1200 12/16/18 1159    11/29/18 1128  vancomycin (VANCOCIN) in iso-osmotic dextrose IVPB 1 g (premix) 200 mL     Ordering Provider:  Aysha Rocha RPH    1,000 mg  over 60 Minutes Intravenous Once 11/29/18 1400 11/29/18 1448    11/28/18 1522  piperacillin-tazobactam (ZOSYN) 3.375 g in iso-osmotic dextrose 50 ml (premix)     Eran Huang [facility] reviewed the order on 12/01/18 0942.   Ordering Provider:  Case, Jasmina V., DO    3.375 g  over 4 Hours Intravenous Every 12 Hours 11/28/18 2200 12/01/18 1200    11/28/18 1332  vancomycin 1750 mg/500 mL 0.9% NS IVPB ([facility])     Ordering Provider:  Rohan Ceballos MD    20 mg/kg × 90.7 kg  over 120 Minutes Intravenous Once 11/28/18 1334 11/28/18 1642    11/28/18 1332  piperacillin-tazobactam (ZOSYN)  3.375 g in iso-osmotic dextrose 50 ml (premix)     Ordering Provider:  Rohan Ceballos MD    3.375 g  over 30 Minutes Intravenous Once 18 1334 18 1720            Review of Systems:  See hpi      Physical Exam:   Vital Signs  Temp (24hrs), Av.8 °F (36.6 °C), Min:97.3 °F (36.3 °C), Max:98.2 °F (36.8 °C)    Temp  Min: 97.3 °F (36.3 °C)  Max: 98.2 °F (36.8 °C)  BP  Min: 107/58  Max: 146/70  Pulse  Min: 68  Max: 84  Resp  Min: 16  Max: 18  No Data Recorded    GENERAL: resting quietly opens eyes to exam, more alert.  HEENT: Normocephalic, atraumatic.  PERRL. EOMI. No conjunctival injection. No icterus. Oropharynx clear without evidence of thrush or exudate. No evidence of peridontal disease.  No ext oral lesions    HEART: RRR; systolic murmur loudest at base left  LUNGS: Clear to auscultation bilaterally   ABDOMEN: Soft, nontender, nondistended. Positive bowel sounds.   EXT:  No cyanosis, clubbing or edema. No cord.  MSK: FROM without joint effusions noted arms/legs.    SKIN: keloid like scarring on toes, fore feet bilaterally    NEURO: nonverbal; no spontaneous movement      Laboratory Data    Results from last 7 days   Lab Units  12/10/18   0847  18   0637  18   0655   18   2021   WBC 10*3/mm3  9.59  9.17   --    --   9.74   HEMOGLOBIN g/dL  12.4  11.1*  11.3*   < >  10.4*   HEMATOCRIT %  41.2  37.6  36.6   < >  34.8   PLATELETS 10*3/mm3  398  331   --    --   273    < > = values in this interval not displayed.     Results from last 7 days   Lab Units  12/10/18   0847   SODIUM mmol/L  141   POTASSIUM mmol/L  4.5   CHLORIDE mmol/L  118*   CO2 mmol/L  13.0*   BUN mg/dL  30*   CREATININE mg/dL  1.47*   GLUCOSE mg/dL  101*   CALCIUM mg/dL  8.0*     Results from last 7 days   Lab Units  18   0637   ALK PHOS U/L  206*   BILIRUBIN mg/dL  0.4   ALT (SGPT) U/L  26   AST (SGOT) U/L  24                         Estimated Creatinine Clearance: 33.4 mL/min (A) (by C-G formula based on SCr of  1.47 mg/dL (H)).      Microbiology:  Blood Culture   Date Value Ref Range Status   11/30/2018 No growth at 2 days  Preliminary   11/30/2018 No growth at 2 days  Preliminary   11/28/2018 Staphylococcus aureus, MRSA (A)  Final     Comment:       Consider infectious disease consult to rule out distant focus of infection.  Methicillin resistant Staphylococcus aureus, Patient may be an isolation risk.   11/28/2018 Staphylococcus aureus, MRSA (A)  Final     Comment:       Consider infectious disease consult to rule out distant focus of infection.  Methicillin resistant Staphylococcus aureus, Patient may be an isolation risk.       BCID, PCR   Date Value Ref Range Status   11/28/2018 (C) No organism detected by BCID PCR. Final    Staphylococcus aureus. mecA (methicillin resistance gene) detected. Identification by BCID PCR.                 Wound Culture   Date Value Ref Range Status   11/30/2018 (A)  Final    Scant growth (1+) Staphylococcus, coagulase negative     Comment:     Susceptibility will not be done unless Doctor notifies Lab             Radiology:  Fl Video Swallow With Speech    Result Date: 12/7/2018  Fluoroscopy provided for a modified barium swallow. Please see speech therapy report for full details and recommendations.    This report was finalized on 12/7/2018 10:25 PM by DR. Brian Cosme MD.          Impression:   MRSA bactertemia  Left leg abscess   Parotitis right side  Encephalopathy  COPD  Acute renal failure on chronic      PLAN/RECOMMENDATIONS:     Estimated Creatinine Clearance: 33.4 mL/min (A) (by C-G formula based on SCr of 1.47 mg/dL (H)).    Source of MRSA bacteremia could be from the parotid gland; no abscess seen on initial imaging;  Will be interesting to see if leg culures grow MRSA; a leg abscess can spontaneously develop and usually doesn't lead to positive blood cultures.    Regardless; high grade bacteremia is still concerning for endovascular involvement.    YANNA -ve for  endocarditis    Given lack of  pulmonary involvement probably not active concern would change abx:    Continue daptomycin 8 mg/kg iv now/daily unless creatinine clearance decreases below 30 mL/min continue through 12/13/18; may change to oral abx thereafter  Repeat blood cultures are ngtd from 11/30/18  Warm compresses to right parotid gland    F/u wound cultures    Patient is complexly ill    Possible change to oral abx after 12/13/18    Clinically improving        Olivier Gtz MD  12/11/2018  4:21 PM

## 2018-12-11 NOTE — PLAN OF CARE
Problem: Patient Care Overview  Goal: Plan of Care Review   12/11/18 3692   Coping/Psychosocial   Plan of Care Reviewed With patient   Plan of Care Review   Progress no change     SLP treatment completed. Will continue to address dysphagia tx for possible diet texture upgrade. Please see note for further details and recommendations.

## 2018-12-11 NOTE — PROGRESS NOTES
"                  Clinical Nutrition     Reason for Visit:   Calorie count - day 2    Patient Name: Leila Smith  YOB: 1945  MRN: [id number]  Date of Encounter: 12/11/18 6:58 PM  Admission date: 11/28/2018      Comments: Pt changed rooms, calorie count sheet lost. Per RN pt drinking sum supplements as long as they are chocolate.  States she is in a better frame of mind today and is eating slightly better.       Nutrition Assessment   Assessment      Admission diagnosis     Altered mental status     Additional applicable diagnosis/conditions/procedures   Metabolic acidosis  Acute renal failure superimposed on stage 3 chronic kidney disease (CMS/HCC)-non oliguric   Hyperkalemia-improved at present   Skin: Per WOCN 11/29: Pt has blanchable area of shearing right gluteus  (12/9) 3 Day Calorie Count started        Applicable PMH:  Hypertension  Hyperlipidemia  Hypothyroidism  Diabetes mellitus, type 2 (CMS/HCC)  CAD (coronary artery disease)  Chronic obstructive pulmonary disease with acute exacerbation (CMS/HCC)  Congestive heart failure (CMS/HCC)   Ventricular tachyarrhythmia (CMS/HCC)  Colostomy-remains in place     Reported/Observed/Food/Nutrition Related History:   Calorie count sheet missing.  Spoke with RN who can only give account of today's intake. States she ate 2 Boost puddings, drank some Boost GC and ate her tomato soup.       Anthropometrics     Height: 162.6 cm (64\")  Last filed wt: Weight: 77.6 kg (171 lb) (12/08/18 0640)  Weight Method: Bed scale    BMI: BMI (Calculated): 29.35 kg/m²  Overweight: 25.0-29.9kg/m2         Medications reviewed   Pertinent: Yes    Current Nutrition Prescription     PO: Diet Dysphagia; III - Pureed With Some Mashed; Thin; Cardiac, Consistent Carbohydrate      DIET MESSAGE 3 day calorie count, starting 12/9-12/11/18.  Thanks!    Intake from Day 1 of Calorie Count:   Sheet missing -- unable to calculate    Nutrition Intervention   1.  Follow treatment progress, " Care plan reviewed, Adjusted supplement, Encourage intake   2. Extend calorie count another day    Goal:   General: Nutrition support treatment  PO: Tolerate PO, Increase intake, Continue positive trend    Monitoring/Evaluation:   Per protocol, PO intake, Supplement intake, GI status, Symptoms, POC/GOC    Will Continue to follow per protocol    Lynsey Small RD  Time Spent: 45 minutes

## 2018-12-11 NOTE — PLAN OF CARE
Problem: Fall Risk (Adult)  Goal: Identify Related Risk Factors and Signs and Symptoms  Outcome: Ongoing (interventions implemented as appropriate)      Problem: Skin Injury Risk (Adult)  Goal: Identify Related Risk Factors and Signs and Symptoms  Outcome: Ongoing (interventions implemented as appropriate)      Problem: Patient Care Overview  Goal: Plan of Care Review  Outcome: Ongoing (interventions implemented as appropriate)   12/11/18 3238   OTHER   Outcome Summary pt has very low UOP. 75 ml total for dayshift, 110 for nights. NS going at 75 with no resolve. refused po medications for hours. hits and cusses staff when touched. colostomy bag changed due to leaking at beginning of shift. vss. will continue to monitor.        Problem: Confusion, Acute (Adult)  Goal: Identify Related Risk Factors and Signs and Symptoms  Outcome: Ongoing (interventions implemented as appropriate)

## 2018-12-11 NOTE — PROGRESS NOTES
[facility] Medicine Services  PROGRESS NOTE    Patient Name: Leila Smith  : 1945  MRN: [id number]    Date of Admission: 2018  Length of Stay: 13  Primary Care Physician: Ciaran Wakefield MD    Subjective   Subjective     CC:  F/U multiple issues/MRSA bacteremia    HPI:  Still lots of stool output. No fever. No abd pain.     Review of Systems  Unobtainable due to cognitive impairment    Objective   Objective     Vital Signs:   Temp:  [97.3 °F (36.3 °C)-98.2 °F (36.8 °C)] 97.6 °F (36.4 °C)  Heart Rate:  [68-84] 73  Resp:  [16-18] 18  BP: (107-146)/(44-74) 121/62        Physical Exam: Unchanged from yesterday  GEN- chronically ill appearing, sleepy but awakens and answers questions, confused to details  HEENT- atraumatic, normocephlic,  NECK- supple, trachea midline  RESP: CTAB, normal effort, resp non-labored  CV: RRR, no murmurs, s1/s2 normal  MSK: no LE edema noted, pos pressure dressing RUE, mild edema of forearm, good pulses, PICC RUE   NEURO:   face grossly symmetric, moves all ext  SKIN: Thick  scales/scarring on feet, bilat shin wounds, + mild drainage       Results Reviewed:  I have personally reviewed current lab, radiology, and data and agree.    Results from last 7 days   Lab Units  12/10/18   0847  18   0637  18   0655   18   WBC 10*3/mm3  9.59  9.17   --    --    --   9.74   HEMOGLOBIN g/dL  12.4  11.1*  11.3*   < >   --   10.4*   HEMATOCRIT %  41.2  37.6  36.6   < >   --   34.8   PLATELETS 10*3/mm3  398  331   --    --    --   273   INR    --    --    --    --   1.01   --     < > = values in this interval not displayed.     Results from last 7 days   Lab Units  12/10/18   0847  18   0618   SODIUM mmol/L  141  141  139   POTASSIUM mmol/L  4.5  4.1  4.0   CHLORIDE mmol/L  118*  116*  111*   CO2 mmol/L  13.0*  17.0*  22.0   BUN mg/dL  30*  25*  26*   CREATININE mg/dL  1.47*  1.09  0.99   GLUCOSE  mg/dL  101*  90  168*   CALCIUM mg/dL  8.0*  8.4*  8.1*   ALT (SGPT) U/L   --   26   --    AST (SGOT) U/L   --   24   --      Estimated Creatinine Clearance: 33.4 mL/min (A) (by C-G formula based on SCr of 1.47 mg/dL (H)).  No results found for: BNP    Microbiology Results Abnormal     Procedure Component Value - Date/Time    Clostridium Difficile Toxin - Stool, Per Rectum [[id number]] Collected:  12/09/18 0536    Lab Status:  Final result Specimen:  Stool from Per Rectum Updated:  12/09/18 1104    Narrative:       The following orders were created for panel order Clostridium Difficile Toxin - Stool, Per Rectum.  Procedure                               Abnormality         Status                     ---------                               -----------         ------                     Clostridium Difficile To...[[id number]]  Normal              Final result                 Please view results for these tests on the individual orders.    Clostridium Difficile Toxin, PCR - Stool, Per Rectum [[id number]]  (Normal) Collected:  12/09/18 0536    Lab Status:  Final result Specimen:  Stool from Per Rectum Updated:  12/09/18 1104     C. Difficile Toxins by PCR Not Detected    Narrative:       Performance characteristics of test not established for patients <2 years of age.  Performance characteristics of test not established for patients <2 years of age.  Negative for Toxigenic C. Difficile    Tissue / Bone Culture - Tissue, Leg, Right [[id number]] Collected:  11/29/18 1801    Lab Status:  Edited Result - FINAL Specimen:  Tissue from Leg, Right Updated:  12/06/18 1130     Tissue Culture No growth at 1 week     Gram Stain Many (4+) Red blood cells      Many (4+) WBCs seen      No organisms seen    Blood Culture - Blood, Hand, Left [[id number]] Collected:  11/30/18 1005    Lab Status:  Final result Specimen:  Blood from Hand, Left Updated:  12/05/18 1030     Blood Culture No growth at 5 days    Blood Culture - Blood, Hand, Right  [[id number]] Collected:  11/30/18 1005    Lab Status:  Final result Specimen:  Blood from Hand, Right Updated:  12/05/18 1030     Blood Culture No growth at 5 days    Wound Culture - Drainage, Ear, Right [[id number]]  (Abnormal) Collected:  11/30/18 1647    Lab Status:  Final result Specimen:  Drainage from Ear, Right Updated:  12/03/18 0852     Wound Culture Scant growth (1+) Staphylococcus, coagulase negative     Comment: Susceptibility will not be done unless Doctor notifies Lab            Gram Stain No WBCs or organisms seen    Blood Culture - Blood, Arm, Left [[id number]]  (Abnormal) Collected:  11/28/18 1353    Lab Status:  Final result Specimen:  Blood from Arm, Left Updated:  12/01/18 0823     Blood Culture Staphylococcus aureus, MRSA     Comment:   Consider infectious disease consult to rule out distant focus of infection.  Methicillin resistant Staphylococcus aureus, Patient may be an isolation risk.        Isolated from Aerobic and Anaerobic Bottles     Gram Stain Aerobic Bottle Gram positive cocci in groups      Anaerobic Bottle Gram positive cocci in groups    Narrative:       PRIOR POSITIVE CULTURE WITH SAME MORPHOLOGY CALLED  Refer Culture to #[id number]    Blood Culture - Blood, Arm, Left [[id number]]  (Abnormal)  (Susceptibility) Collected:  11/28/18 1340    Lab Status:  Final result Specimen:  Blood from Arm, Left Updated:  12/01/18 0822     Blood Culture Staphylococcus aureus, MRSA     Comment:   Consider infectious disease consult to rule out distant focus of infection.  Methicillin resistant Staphylococcus aureus, Patient may be an isolation risk.        Isolated from Aerobic and Anaerobic Bottles     Gram Stain Aerobic Bottle Gram positive cocci in groups      Anaerobic Bottle Gram positive cocci in clusters    Susceptibility      Staphylococcus aureus, MRSA     DIMITRIS     Ciprofloxacin Resistant     Clindamycin Susceptible     Daptomycin Susceptible     Erythromycin Susceptible     Gentamicin  Susceptible     Levofloxacin Intermediate [1]      Linezolid Susceptible     Oxacillin Resistant     Penicillin G Resistant     Quinupristin + Dalfopristin Susceptible     Rifampin Susceptible     Tetracycline Susceptible     Trimethoprim + Sulfamethoxazole Susceptible     Vancomycin Susceptible            [1]   Staphylococcus species may develop resistance during prolonged therapy with quinolones.  Isolates that are initially susceptible may become resistant within three to four days after initiation of therapy. Testing of repeat isolates may be warranted.                 Blood Culture ID, PCR - Blood, Arm, Left [[id number]]  (Abnormal) Collected:  11/28/18 1340    Lab Status:  Final result Specimen:  Blood from Arm, Left Updated:  11/29/18 0925     BCID, PCR Staphylococcus aureus. mecA (methicillin resistance gene) detected. Identification by BCID PCR.          Imaging Results (last 24 hours)     ** No results found for the last 24 hours. **        Results for orders placed during the hospital encounter of 11/28/18   Adult Transesophageal Echo (YANNA) W/ Cont if Necessary Per Protocol    Narrative · Left ventricular systolic function is normal.  · Left ventricular wall thickness is consistent with severe concentric   hypertrophy.  · Left atrial cavity size is mildly dilated.  · Mild dilation of the ascending aorta is present. 4.2cm..  · No evidence of a left atrial appendage thrombus was present.  · No echocardiographic evidence of endocarditis          I have reviewed the medications.      apixaban 2.5 mg Oral Q12H   aspirin 81 mg Oral Daily   castor oil-balsam peru  Topical Q12H   DAPTOmycin 8 mg/kg (Adjusted) Intravenous Q24H   diltiaZEM 60 mg Oral Q6H   insulin detemir 15 Units Subcutaneous Daily   insulin detemir 5 Units Subcutaneous Nightly   metoprolol tartrate 50 mg Oral Q12H   sodium chloride 10 mL Intravenous Q12H   thiamine 100 mg Oral Daily         Assessment/Plan   Assessment / Plan     Active Hospital  Problems    Diagnosis   • **Altered mental status   • Sepsis due to methicillin resistant Staphylococcus aureus (MRSA) (CMS/Formerly [facility])   • Acute parotitis   • Ventricular tachyarrhythmia (CMS/[facility])   • Acute renal failure superimposed on stage 3 chronic kidney disease (CMS/[facility]ly [facility])   • Hyperkalemia   • Congestive heart failure (CMS/[facility])   • Chronic obstructive pulmonary disease with acute exacerbation (CMS/[facility])   • CAD (coronary artery disease)     History of  Coronary Artery Disease V12.59     • Diabetes mellitus, type 2 (CMS/[facility])   • Hypertension   • Hypothyroidism   • Hyperlipidemia          Brief Hospital Course to date:  Leila Smith is a 73 y.o. female nursing home resident who was admitted to the ICU with Vtach/hyperkalemia, also found to have acute on chronic renal failure. Noted to have UTI on admission as well as MRSA bacteremia. Also treated with parotid sialadenitis evaluated by ENT, right leg hematoma evaluated and drained by surgery 11/29.       Assessment    -mrsa bacteremia/sepsis (likely from sialadenitis)   -Anil negative; source likely parotitis  -right parotitis/sialadenitis   -s/p ent eval; warm compresses; no surgical intervention (s/p dr. Macedo consult)  -encephalopathy  -s/p ANGELA, resolved (due to volume depletion)   -s/p nephrology consultation  -ckd 3 (baseline cr 1.2-1.4)   -s/p nephrology consultation  -superficial thrombophlebitis   -duplex rue 12/6/18: superficial thrombophlebitis cephlic (forearm) vein  -right leg hematoma (s/p I&D)   -growing coag neg staph  -dysphagia    -previously on tube feeds, now on modified diet per SLP  -s/p vtach due to hyperkalemia   -s/p cards consult; ultimately stopped amiodarone and tolerating metoprolol and diltiazem  -hx parox afib (on chronic eliquis)   -ANIL 12/3/18: normal LF systolic function, severe clvh, no atrial appendage thrombus nor echocardiographic evidence of endocarditis  -probable underlying cognitive impairment  -high output  ostomy/diarrhea   -c.diff pcr negative 12/9  -DM   -difficult iv access, s/p picc line w/ bleeding at insertion site   -eliquis restarted 12/10/18  -urinary retention   -s/p voiding trial which she failed; roosco placed again evening of 12/9; voiding trial once more mobile (likely at rehab facility)    --------------------------------------------------------------------------------------  Plan:  -continue iv abx per dr. Gtz (likely change to po abx 12/13)  -right parotid compresses   -imodium prn (c.diff negative 12/9)  -normal saline 75cc/hr  -adjust insulins prn (backed off regimen on 12/10/18), titrate insulins prn  -eliquis restarted 12/10/18, watch picc for bleeding/oozing  -continue dilt & metoprolol (s/p cards evaluation)  -continue orosco for now (failed voiding), voiding trial once more ambulatory (perhaps at rehab)  -cbc, bmp in a.m. (watch hgb, renal function; baseline cr 1.2-1.4)    -to [facility] at d/c, possibly medically ready by 12/13      DVT Prophylaxis:  eliquis    CODE STATUS:   Code Status and Medical Interventions:   Ordered at: 11/28/18 1333     Code Status:    CPR     Medical Interventions (Level of Support Prior to Arrest):    Full       Disposition: I expect the patient to be discharged back to SNF pending improvement and final abx plans.      Electronically signed by Rod Flores MD, 12/11/18, 6:32 PM.

## 2018-12-11 NOTE — PROGRESS NOTES
Continued Stay Note  [facility]     Patient Name: Leila Smith  MRN: [id number]  Today's Date: 12/11/2018    Admit Date: 11/28/2018    Discharge Plan     Row Name 12/11/18 1551       Plan    Plan  update    Patient/Family in Agreement with Plan  yes    Plan Comments  Patient not ready for discharge at this time but per discussion in rounds today patient likely ready Thursday or Friday.  Called [facility] with NP who will start precert and will be able to take patient as soon as insurance approves.  CM following.  Patient plan is to discharge to [facility] via ambulance for transport when medically ready and insurance approved.      Final Discharge Disposition Code  03 - skilled nursing facility (SNF)        Discharge Codes    No documentation.       Expected Discharge Date and Time     Expected Discharge Date Expected Discharge Time    Dec 5, 2018             Priti Caceres RN

## 2018-12-11 NOTE — PLAN OF CARE
Problem: Fall Risk (Adult)  Goal: Identify Related Risk Factors and Signs and Symptoms  Outcome: Ongoing (interventions implemented as appropriate)    Goal: Absence of Fall  Outcome: Ongoing (interventions implemented as appropriate)      Problem: Skin Injury Risk (Adult)  Goal: Identify Related Risk Factors and Signs and Symptoms  Outcome: Ongoing (interventions implemented as appropriate)    Goal: Skin Health and Integrity  Outcome: Ongoing (interventions implemented as appropriate)      Problem: Patient Care Overview  Goal: Plan of Care Review  Outcome: Ongoing (interventions implemented as appropriate)   12/11/18 2004   OTHER   Outcome Summary Pt up in chair with lift for most of the day. Pt still confused and at times combative. I was able to get all her meds given with chocolate pudding. She continues to have very little urine output but large amounts of stool in colostomy. VS stable, will continue to monitor.      Goal: Individualization and Mutuality  Outcome: Ongoing (interventions implemented as appropriate)    Goal: Discharge Needs Assessment  Outcome: Ongoing (interventions implemented as appropriate)    Goal: Interprofessional Rounds/Family Conf  Outcome: Ongoing (interventions implemented as appropriate)      Problem: Confusion, Acute (Adult)  Goal: Identify Related Risk Factors and Signs and Symptoms  Outcome: Ongoing (interventions implemented as appropriate)    Goal: Cognitive/Functional Impairments Minimized  Outcome: Ongoing (interventions implemented as appropriate)    Goal: Safety  Outcome: Ongoing (interventions implemented as appropriate)

## 2018-12-11 NOTE — PLAN OF CARE
Problem: Patient Care Overview  Goal: Plan of Care Review  Outcome: Ongoing (interventions implemented as appropriate)   12/11/18 7398   Coping/Psychosocial   Plan of Care Reviewed With patient   Plan of Care Review   Progress no change   OTHER   Outcome Summary Appliance intact. Continued liquid output. No identifiable issues at this time r/t ostomy. Will need appliance change tomorrow. Please contact if needs arise. Thanks

## 2018-12-11 NOTE — THERAPY TREATMENT NOTE
Acute Care - Physical Therapy Treatment Note  [facility]     Patient Name: Leila Smith  : 1945  MRN: [id number]  Today's Date: 2018  Onset of Illness/Injury or Date of Surgery: 18  Date of Referral to PT: 18  Referring Physician: SAUL Oscar MD    Admit Date: 2018    Visit Dx:    ICD-10-CM ICD-9-CM   1. Impaired functional mobility, balance, gait, and endurance Z74.09 V49.89   2. Dysphagia, unspecified type R13.10 787.20   3. Ventricular tachycardia (CMS/HCC) I47.2 427.1   4. Altered mental status, unspecified altered mental status type R41.82 780.97     Patient Active Problem List   Diagnosis   • Diabetes mellitus, type 2 (CMS/HCC)   • Hypertension   • Hyperlipidemia   • Hypothyroidism   • Chronic obstructive pulmonary disease (CMS/HCC)   • CAD (coronary artery disease)   • History of edema   • History of obesity   • Venous insufficiency   • Open wound of lower extremity   • Urinary tract infection   • T2DM (type 2 diabetes mellitus) (CMS/[facility])   • Dyspnea on exertion   • Peripheral vascular disease (CMS/HCC)   • Obstructive sleep apnea syndrome   • Ulcer of lower extremity (CMS/[facility])   • Hypoxemia   • Hematuria   • Diabetic peripheral neuropathy (CMS/HCC)   • Edema   • Chronic obstructive pulmonary disease with acute exacerbation (CMS/HCC)   • Congestive heart failure (CMS/HCC)   • Arthritis   • Neutrophilic leukocytosis   • Cystitis   • Acute renal failure superimposed on stage 3 chronic kidney disease (CMS/HCC)   • Hyperkalemia   • Leukocytosis   • Elevated troponin   • Altered mental status   • Ventricular tachyarrhythmia (CMS/HCC)   • Sepsis due to methicillin resistant Staphylococcus aureus (MRSA) (CMS/[facility])   • Acute parotitis       Therapy Treatment    Rehabilitation Treatment Summary     Row Name 18 1028             Treatment Time/Intention    Discipline  physical therapist  -KR      Document Type  therapy note (daily note)  -KR      Subjective Information  no  complaints  -KR      Mode of Treatment  physical therapy  -KR      Care Plan Review  care plan/treatment goals reviewed;risks/benefits reviewed;patient/other agree to care plan  -KR      Therapy Frequency (PT Clinical Impression)  3 times/wk  -KR      Patient Effort  fair  -KR      Existing Precautions/Restrictions  fall  -KR      Recorded by [KR] Anna Foster, PT 12/11/18 5014      Row Name 12/11/18 1028             Vital Signs    Pre Systolic BP Rehab  -- RN cleared for treatment; VSS  -KR      O2 Delivery Pre Treatment  room air  -KR      O2 Delivery Post Treatment  room air  -KR      Pre Patient Position  Supine  -KR      Intra Patient Position  Supine  -KR      Post Patient Position  Sitting  -KR      Recorded by [KR] Anna Foster, PT 12/11/18 1454      Row Name 12/11/18 1028             Cognitive Assessment/Intervention    Additional Documentation  Cognitive Assessment/Intervention (Group)  -KR      Recorded by [KR] Anna Foster, PT 12/11/18 1454      Row Name 12/11/18 1028             Cognitive Assessment/Intervention- PT/OT    Affect/Mental Status (Cognitive)  confused;flat/blunted affect  -KR      Behavioral Issues (Cognitive)  overwhelmed easily  -KR      Orientation Status (Cognition)  oriented to;person  -KR      Follows Commands (Cognition)  follows one step commands;0-24% accuracy;repetition of directions required;verbal cues/prompting required  -KR      Cognitive Function (Cognitive)  safety deficit  -KR      Safety Deficit (Cognitive)  severe deficit;ability to follow commands;awareness of need for assistance;insight into deficits/self awareness;safety precautions awareness;safety precautions follow-through/compliance  -KR      Personal Safety Interventions  nonskid shoes/slippers when out of bed;supervised activity  -KR      Recorded by [KR] Anna Foster, PT 12/11/18 2860      Row Name 12/11/18 1028             Safety Issues, Functional Mobility    Safety Issues Affecting Function  (Mobility)  ability to follow commands;awareness of need for assistance;insight into deficits/self awareness;safety precaution awareness;safety precautions follow-through/compliance  -KR      Impairments Affecting Function (Mobility)  cognition;endurance/activity tolerance;strength  -KR      Recorded by [KR] Anna Foster, PT 12/11/18 1454      Row Name 12/11/18 1028             Bed Mobility Assessment/Treatment    Bed Mobility Assessment/Treatment  rolling left;rolling right  -KR      Rolling Left [place] (Bed Mobility)  dependent (less than 25% patient effort);verbal cues  -KR      Rolling Right [place] (Bed Mobility)  dependent (less than 25% patient effort);verbal cues  -KR      Bed Mobility, Safety Issues  cognitive deficits limit understanding;decreased use of arms for pushing/pulling;decreased use of legs for bridging/pushing  -KR      Assistive Device (Bed Mobility)  draw sheet  -KR      Comment (Bed Mobility)  Pt rolled L and R for placement of sling; pt with increased fear during initiation of rolling  -KR      Recorded by [KR] Anna Foster, PT 12/11/18 1454      Row Name 12/11/18 1028             Transfer Assessment/Treatment    Transfer Assessment/Treatment  bed-chair transfer  -KR      Comment (Transfers)  Pt transferred from bed to chair with mechanical lift.   -KR      Recorded by [KR] Anna Foster, PT 12/11/18 1454      Row Name 12/11/18 1028             Bed-Chair Transfer    Bed-Chair [place] (Transfers)  dependent (less than 25% patient effort);2 person assist  -KR      Assistive Device (Bed-Chair Transfers)  mechanical lift/aid  -KR      Recorded by [KR] Anna Foster, PT 12/11/18 1454      Row Name 12/11/18 1028             Gait/Stairs Assessment/Training    [place] Level (Gait)  unable to assess  -KR      Comment (Gait/Stairs)  Ambulation deferred. Not appropriate to assess.   -KR      Recorded by [KR] Anna Foster, PT 12/11/18 1454      Row Name 12/11/18 1028              Motor Skills Assessment/Interventions    Additional Documentation  Balance (Group);Therapeutic Exercise (Group)  -KR      Recorded by [KR] Anna Foster, PT 12/11/18 1454      Row Name 12/11/18 1028             Therapeutic Exercise    Therapeutic Exercise  supine, lower extremities  -KR      Additional Documentation  Therapeutic Exercise (Row)  -KR      79014 - PT Therapeutic Exercise Minutes  8  -KR      18870 - PT Therapeutic Activity Minutes  15  -KR      Recorded by [KR] Anna Foster, PT 12/11/18 1454      Row Name 12/11/18 1028             Lower Extremity Supine Therapeutic Exercise    Performed, Supine Lower Extremity (Therapeutic Exercise)  hip abduction/adduction;SLR (straight leg raise);ankle pumps;heel slides  -KR      Exercise Type, Supine Lower Extremity (Therapeutic Exercise)  PROM (passive range of motion)  -KR      Sets/Reps Detail, Supine Lower Extremity (Therapeutic Exercise)  BLE 1/10  -KR      Comment, Supine Lower Extremity (Therapeutic Exercise)  performed in long sitting  -KR      Recorded by [KR] Anna Foster, PT 12/11/18 1454      Row Name 12/11/18 1028             Balance    Balance  static sitting balance  -KR      Recorded by [KR] Anna Foster, PT 12/11/18 1454      Row Name 12/11/18 1028             Static Sitting Balance    Level of [place] (Unsupported Sitting, Static Balance)  maximal assist, 25 to 49% patient effort  -KR      Sitting Position (Unsupported Sitting, Static Balance)  sitting in chair  -KR      Recorded by [KR] Anna Foster, PT 12/11/18 1454      Row Name 12/11/18 1028             Positioning and Restraints    Pre-Treatment Position  in bed  -KR      Post Treatment Position  chair  -KR      In Chair  notified nsg;reclined;call light within reach;encouraged to call for assist;exit alarm on;waffle cushion;on mechanical lift sling;legs elevated  -KR      Recorded by [KR] Anna Foster, PT 12/11/18 1454      Row Name 12/11/18 1028              Pain Assessment    Additional Documentation  Pain Scale: FACES Pre/Post-Treatment (Group)  -KR      Recorded by [KR] Anna Foster, PT 12/11/18 1454      Row Name 12/11/18 1028             Pain Scale: FACES Pre/Post-Treatment    Pain: FACES Scale, Pretreatment  0-->no hurt  -KR      Pain: FACES Scale, Post-Treatment  0-->no hurt  -KR      Recorded by [KR] Anna Foster, PT 12/11/18 1454      Row Name                Wound 11/29/18 0915 Right medial gluteal other (see comments)    Wound - Properties Group Date first assessed: 11/29/18 [CP] Time first assessed: 0915 [CP] Present On Admission : yes;picture taken [CP] Side: Right [CP] Orientation: medial [CP] Location: gluteal [CP] Type: other (see comments) [CP] Additional Comments: shearing [CP] Recorded by:  [CP] Angélica Gavin APRN 11/29/18 1100    Row Name                Wound 11/29/18 1730 Right anterior;lower leg incision    Wound - Properties Group Date first assessed: 11/29/18 [AB] Time first assessed: 1730 [AB] Present On Admission : other (see comments) [AB], wound present, excised by Dr. Gimenez  Side: Right [AB] Orientation: anterior;lower [AB] Location: leg [AB] Type: incision [AB] Stage, Pressure Injury: other (see comments) [AB] Recorded by:  [AB] Jag Amaral RN 11/29/18 1753    Row Name                Wound 12/01/18 1400 Left lower leg skin tear    Wound - Properties Group Date first assessed: 12/01/18 [AB] Time first assessed: 1400 [AB] Present On Admission : no [AB] Side: Left [AB] Orientation: lower [AB] Location: leg [AB] Type: skin tear [AB] Recorded by:  [AB] Jag Amaral RN 12/01/18 1430    Row Name 12/11/18 1028             Plan of Care Review    Plan of Care Reviewed With  patient  -KR      Recorded by [KR] Anna Foster, PT 12/11/18 1454      Row Name 12/11/18 1028             Outcome Summary/Treatment Plan (PT)    Daily Summary of Progress (PT)  progress toward functional goals is gradual  -KR      Recorded by [KR] Ring,  Anna MA, PT 12/11/18 1454        User Key  (r) = Recorded By, (t) = Taken By, (c) = Cosigned By    Initials Name Effective Dates Discipline    CP Angélica Gavin, APRN 11/05/18 -  Nurse    Jag Gould RN 02/02/17 -  Nurse    Anna Carrera, PT 04/03/18 -  PT          Wound 11/29/18 0915 Right medial gluteal other (see comments) (Active)   Base clean;pink;white 12/11/2018  8:00 AM   Periwound blanchable 12/11/2018  8:00 AM   Drainage Amount none 12/11/2018  8:00 AM   Care, Wound cleansed with;soap and water 12/11/2018  8:00 AM   Dressing Care, Wound foam;low-adherent 12/11/2018  8:00 AM       Wound 11/29/18 1730 Right anterior;lower leg incision (Active)   Dressing Appearance dry;intact;no drainage 12/11/2018  8:00 AM   Closure [place] 12/11/2018  8:00 AM   Base clean;yeast;red/granulating;slough 12/11/2018  8:00 AM   Periwound intact;dry 12/11/2018  8:00 AM   Periwound Temperature warm 12/11/2018  8:00 AM   Periwound Skin Turgor soft 12/11/2018  8:00 AM   Edges irregular;rolled/closed 12/11/2018  8:00 AM   Drainage Characteristics/Odor serosanguineous 12/11/2018  8:00 AM   Drainage Amount none 12/11/2018  8:00 AM   Care, Wound cleansed with;sterile normal saline 12/11/2018 12:00 AM   Dressing Care, Wound dressing changed;gauze, wet-to-dry;foam;low-adherent 12/11/2018 12:00 AM       Wound 12/01/18 1400 Left lower leg skin tear (Active)   Dressing Appearance dry;intact;no drainage 12/11/2018  8:00 AM   Closure [place] 12/11/2018  8:00 AM   Base dressing in place, unable to visualize 12/11/2018  8:00 AM   Periwound dry;intact 12/11/2018  8:00 AM   Periwound Temperature warm 12/11/2018  8:00 AM   Periwound Skin Turgor soft 12/11/2018  8:00 AM   Edges irregular 12/11/2018  8:00 AM   Drainage Amount scant 12/11/2018  8:00 AM           Physical Therapy Education     Title: PT OT SLP Therapies (In Progress)     Topic: Physical Therapy (In Progress)     Point: Mobility training (In Progress)     Learning Progress  Summary           Patient Acceptance, E, NR by KR at 12/11/2018 10:28 AM                   Point: Home exercise program (In Progress)     Learning Progress Summary           Patient Acceptance, E, NR by KR at 12/11/2018 10:28 AM    Acceptance, E, NR by KR at 12/6/2018 10:28 AM                   Point: Body mechanics (In Progress)     Learning Progress Summary           Patient Acceptance, E, NR by KR at 12/11/2018 10:28 AM    Acceptance, E, NR by KR at 12/6/2018 10:28 AM                   Point: Precautions (In Progress)     Learning Progress Summary           Patient Acceptance, E, NR by KR at 12/11/2018 10:28 AM    Acceptance, E, NR by KR at 12/6/2018 10:28 AM                               User Key     Initials Effective Dates Name Provider Type Discipline     04/03/18 -  Anna Foster, PT Physical Therapist PT                PT Recommendation and Plan  Therapy Frequency (PT Clinical Impression): 3 times/wk  Outcome Summary/Treatment Plan (PT)  Daily Summary of Progress (PT): progress toward functional goals is gradual  Plan of Care Reviewed With: patient  Progress: improving  Outcome Summary: Pt tolerated transfer from bed to chair with mechanical lift. Pt required depA for rolling L and R for placement of sling; pt with increased fear and anxiety during mobility. Pt tolerated B LE PROM ther ex while seated in chair. Pt did not c/o pain. Continue to progress as appropriate.   Outcome Measures     Row Name 12/11/18 1028 12/10/18 1042          How much help from another person do you currently need...    Turning from your back to your side while in flat bed without using bedrails?  1  -KR  --     Moving from lying on back to sitting on the side of a flat bed without bedrails?  1  -KR  --     Moving to and from a bed to a chair (including a wheelchair)?  1  -KR  --     Standing up from a chair using your arms (e.g., wheelchair, bedside chair)?  1  -KR  --     Climbing 3-5 steps with a railing?  1  -KR  --     To  walk in hospital room?  1  -KR  --     AM-PAC 6 Clicks Score  6  -KR  --        How much help from another is currently needed...    Putting on and taking off regular lower body clothing?  --  1  -SD     Bathing (including washing, rinsing, and drying)  --  1  -SD     Toileting (which includes using toilet bed pan or urinal)  --  1  -SD     Putting on and taking off regular upper body clothing  --  2  -SD     Taking care of personal grooming (such as brushing teeth)  --  2  -SD     Eating meals  --  1  -SD     Score  --  8  -SD        Functional Assessment    Outcome Measure Options  AM-PAC 6 Clicks Basic Mobility (PT)  -KR  AM-PAC 6 Clicks Daily Activity (OT)  -SD       User Key  (r) = Recorded By, (t) = Taken By, (c) = Cosigned By    Initials Name Provider Type    Kaitlin Silva, OT Occupational Therapist    Anna Carrera, PT Physical Therapist         Time Calculation:   PT Charges     Row Name 12/11/18 1028             Time Calculation    Start Time  1028  -KR      PT Received On  12/11/18  -KR      PT Goal Re-Cert Due Date  12/14/18  -KR         Time Calculation- PT    Total Timed Code Minutes- PT  23 minute(s)  -KR         Timed Charges    39134 - PT Therapeutic Exercise Minutes  8  -KR      82795 - PT Therapeutic Activity Minutes  15  -KR        User Key  (r) = Recorded By, (t) = Taken By, (c) = Cosigned By    Initials Name Provider Type    Anna Carrera, PT Physical Therapist        Therapy Suggested Charges     Code   Minutes Charges    54823 (CPT®) Hc Pt Neuromusc Re Education Ea 15 Min      12571 (CPT®) Hc Pt Ther Proc Ea 15 Min 8 1    38442 (CPT®) Hc Gait Training Ea 15 Min      47790 (CPT®) Hc Pt Therapeutic Act Ea 15 Min 15 1    31103 (CPT®) Hc Pt Manual Therapy Ea 15 Min      04402 (CPT®) Hc Pt Iontophoresis Ea 15 Min      14480 (CPT®) Hc Pt Elec Stim Ea-Per 15 Min      43542 (CPT®) Hc Pt Ultrasound Ea 15 Min      04560 (CPT®) Hc Pt Self Care/Mgmt/Train Ea 15 Min      74414 (CPT®)  Hc Pt Prosthetic (S) Train Initial Encounter, Each 15 Min      24046 (CPT®) Hc Pt Orthotic(S)/Prosthetic(S) Encounter, Each 15 Min      96549 (CPT®) Hc Orthotic(S) Mgmt/Train Initial Encounter, Each 15min      Total  23 2        Therapy Charges for Today     Code Description Service Date Service Provider Modifiers Qty    55498898581 HC PT THER PROC EA 15 MIN 12/11/2018 Anna Foster, PT GP 1    83125000461 HC PT THERAPEUTIC ACT EA 15 MIN 12/11/2018 Anna Foster, PT GP 1          PT G-Codes  Outcome Measure Options: AM-PAC 6 Clicks Basic Mobility (PT)  AM-PAC 6 Clicks Score: 6  Score: 8  Functional Limitation: Mobility: Walking and moving around  Mobility: Walking and Moving Around Current Status (): 100 percent impaired, limited or restricted  Mobility: Walking and Moving Around Goal Status (): At least 80 percent but less than 100 percent impaired, limited or restricted    Sue Foster PT  12/11/2018

## 2018-12-11 NOTE — THERAPY TREATMENT NOTE
Acute Care - Speech Language Pathology   Swallow Treatment Note [facility]     Patient Name: Leila Smith  : 1945  MRN: [id number]  Today's Date: 2018  Onset of Illness/Injury or Date of Surgery: 18     Referring Physician: SAUL Oscar MD      Admit Date: 2018    Visit Dx:      ICD-10-CM ICD-9-CM   1. Impaired functional mobility, balance, gait, and endurance Z74.09 V49.89   2. Dysphagia, unspecified type R13.10 787.20   3. Ventricular tachycardia (CMS/HCC) I47.2 427.1   4. Altered mental status, unspecified altered mental status type R41.82 780.97     Patient Active Problem List   Diagnosis   • Diabetes mellitus, type 2 (CMS/HCC)   • Hypertension   • Hyperlipidemia   • Hypothyroidism   • Chronic obstructive pulmonary disease (CMS/HCC)   • CAD (coronary artery disease)   • History of edema   • History of obesity   • Venous insufficiency   • Open wound of lower extremity   • Urinary tract infection   • T2DM (type 2 diabetes mellitus) (CMS/HCC)   • Dyspnea on exertion   • Peripheral vascular disease (CMS/HCC)   • Obstructive sleep apnea syndrome   • Ulcer of lower extremity (CMS/HCC)   • Hypoxemia   • Hematuria   • Diabetic peripheral neuropathy (CMS/HCC)   • Edema   • Chronic obstructive pulmonary disease with acute exacerbation (CMS/HCC)   • Congestive heart failure (CMS/HCC)   • Arthritis   • Neutrophilic leukocytosis   • Cystitis   • Acute renal failure superimposed on stage 3 chronic kidney disease (CMS/HCC)   • Hyperkalemia   • Leukocytosis   • Elevated troponin   • Altered mental status   • Ventricular tachyarrhythmia (CMS/HCC)   • Sepsis due to methicillin resistant Staphylococcus aureus (MRSA) (CMS/HCC)   • Acute parotitis       Therapy Treatment  Rehabilitation Treatment Summary     Row Name 18 1530 18 1028          Treatment Time/Intention    Discipline  speech language pathologist  -SG  physical therapist  -KR     Document Type  therapy note (daily note)  -SG   therapy note (daily note)  -KR     Subjective Information  no complaints  -SG  no complaints  -KR     Mode of Treatment  speech-language pathology;individual therapy  -SG  physical therapy  -KR     Patient/Family Observations  No family present  -SG  --     Care Plan Review  --  care plan/treatment goals reviewed;risks/benefits reviewed;patient/other agree to care plan  -KR     Therapy Frequency (PT Clinical Impression)  --  3 times/wk  -KR     Therapy Frequency (Swallow)  PRN;5 days per week  -SG  --     Patient Effort  adequate  -SG  fair  -KR     Existing Precautions/Restrictions  --  fall  -KR     Recorded by [SG] Swetha Franklin, MS CCC-SLP 12/11/18 1547 [KR] Anna Foster, PT 12/11/18 1454     Row Name 12/11/18 1028             Vital Signs    Pre Systolic BP Rehab  -- RN cleared for treatment; VSS  -KR      O2 Delivery Pre Treatment  room air  -KR      O2 Delivery Post Treatment  room air  -KR      Pre Patient Position  Supine  -KR      Intra Patient Position  Supine  -KR      Post Patient Position  Sitting  -KR      Recorded by [KR] Anna Foster, PT 12/11/18 1454      Row Name 12/11/18 1028             Cognitive Assessment/Intervention    Additional Documentation  Cognitive Assessment/Intervention (Group)  -KR      Recorded by [KR] Anna Foster, PT 12/11/18 1454      Row Name 12/11/18 1028             Cognitive Assessment/Intervention- PT/OT    Affect/Mental Status (Cognitive)  confused;flat/blunted affect  -KR      Behavioral Issues (Cognitive)  overwhelmed easily  -KR      Orientation Status (Cognition)  oriented to;person  -KR      Follows Commands (Cognition)  follows one step commands;0-24% accuracy;repetition of directions required;verbal cues/prompting required  -KR      Cognitive Function (Cognitive)  safety deficit  -KR      Safety Deficit (Cognitive)  severe deficit;ability to follow commands;awareness of need for assistance;insight into deficits/self awareness;safety  precautions awareness;safety precautions follow-through/compliance  -KR      Personal Safety Interventions  nonskid shoes/slippers when out of bed;supervised activity  -KR      Recorded by [KR] Anna Foster, PT 12/11/18 0904      Row Name 12/11/18 1028             Safety Issues, Functional Mobility    Safety Issues Affecting Function (Mobility)  ability to follow commands;awareness of need for assistance;insight into deficits/self awareness;safety precaution awareness;safety precautions follow-through/compliance  -KR      Impairments Affecting Function (Mobility)  cognition;endurance/activity tolerance;strength  -KR      Recorded by [KR] Anna Foster, PT 12/11/18 1454      Row Name 12/11/18 1028             Bed Mobility Assessment/Treatment    Bed Mobility Assessment/Treatment  rolling left;rolling right  -KR      Rolling Left [place] (Bed Mobility)  dependent (less than 25% patient effort);verbal cues  -KR      Rolling Right [place] (Bed Mobility)  dependent (less than 25% patient effort);verbal cues  -KR      Bed Mobility, Safety Issues  cognitive deficits limit understanding;decreased use of arms for pushing/pulling;decreased use of legs for bridging/pushing  -KR      Assistive Device (Bed Mobility)  draw sheet  -KR      Comment (Bed Mobility)  Pt rolled L and R for placement of sling; pt with increased fear during initiation of rolling  -KR      Recorded by [KR] Anna Foster, PT 12/11/18 1874      Row Name 12/11/18 1028             Transfer Assessment/Treatment    Transfer Assessment/Treatment  bed-chair transfer  -KR      Comment (Transfers)  Pt transferred from bed to chair with mechanical lift.   -KR      Recorded by [KR] Anna Foster, PT 12/11/18 1459      Row Name 12/11/18 1028             Bed-Chair Transfer    Bed-Chair [place] (Transfers)  dependent (less than 25% patient effort);2 person assist  -KR      Assistive Device (Bed-Chair Transfers)  mechanical lift/aid  -KR       Recorded by [KR] Anna Foster, PT 12/11/18 1454      Row Name 12/11/18 1028             Gait/Stairs Assessment/Training    Bandy Level (Gait)  unable to assess  -KR      Comment (Gait/Stairs)  Ambulation deferred. Not appropriate to assess.   -KR      Recorded by [KR] Anna Foster, PT 12/11/18 1454      Row Name 12/11/18 1028             Motor Skills Assessment/Interventions    Additional Documentation  Balance (Group);Therapeutic Exercise (Group)  -KR      Recorded by [KR] Anna Foster, PT 12/11/18 1454      Row Name 12/11/18 1028             Therapeutic Exercise    Therapeutic Exercise  supine, lower extremities  -KR      Additional Documentation  Therapeutic Exercise (Row)  -KR      26495 - PT Therapeutic Exercise Minutes  8  -KR      67033 - PT Therapeutic Activity Minutes  15  -KR      Recorded by [KR] Anna Foster, PT 12/11/18 1454      Row Name 12/11/18 1028             Lower Extremity Supine Therapeutic Exercise    Performed, Supine Lower Extremity (Therapeutic Exercise)  hip abduction/adduction;SLR (straight leg raise);ankle pumps;heel slides  -KR      Exercise Type, Supine Lower Extremity (Therapeutic Exercise)  PROM (passive range of motion)  -KR      Sets/Reps Detail, Supine Lower Extremity (Therapeutic Exercise)  BLE 1/10  -KR      Comment, Supine Lower Extremity (Therapeutic Exercise)  performed in long sitting  -KR      Recorded by [KR] Anna Foster, PT 12/11/18 1454      Row Name 12/11/18 1028             Balance    Balance  static sitting balance  -KR      Recorded by [KR] Anna Foster, PT 12/11/18 1454      Row Name 12/11/18 1028             Static Sitting Balance    Level of Bandy (Unsupported Sitting, Static Balance)  maximal assist, 25 to 49% patient effort  -KR      Sitting Position (Unsupported Sitting, Static Balance)  sitting in chair  -KR      Recorded by [KR] Anna Foster, PT 12/11/18 1454      Row Name 12/11/18 1028             Positioning and Restraints     Pre-Treatment Position  in bed  -KR      Post Treatment Position  chair  -KR      In Chair  notified nsg;reclined;call light within reach;encouraged to call for assist;exit alarm on;waffle cushion;on mechanical lift sling;legs elevated  -KR      Recorded by [KR] Anna Foster, PT 12/11/18 1454      Row Name 12/11/18 1028             Pain Assessment    Additional Documentation  Pain Scale: FACES Pre/Post-Treatment (Group)  -KR      Recorded by [KR] Anna Foster, PT 12/11/18 1454      Row Name 12/11/18 1028             Pain Scale: FACES Pre/Post-Treatment    Pain: FACES Scale, Pretreatment  0-->no hurt  -KR      Pain: FACES Scale, Post-Treatment  0-->no hurt  -KR      Recorded by [KR] Anna Foster, PT 12/11/18 1454      Row Name                Wound 11/29/18 0915 Right medial gluteal other (see comments)    Wound - Properties Group Date first assessed: 11/29/18 [CP] Time first assessed: 0915 [CP] Present On Admission : yes;picture taken [CP] Side: Right [CP] Orientation: medial [CP] Location: gluteal [CP] Type: other (see comments) [CP] Additional Comments: shearing [CP] Recorded by:  [CP] Angélica Gavin APRN 11/29/18 1100    Row Name                Wound 11/29/18 1730 Right anterior;lower leg incision    Wound - Properties Group Date first assessed: 11/29/18 [AB] Time first assessed: 1730 [AB] Present On Admission : other (see comments) [AB], wound present, excised by Dr. Gimenez  Side: Right [AB] Orientation: anterior;lower [AB] Location: leg [AB] Type: incision [AB] Stage, Pressure Injury: other (see comments) [AB] Recorded by:  [AB] Jag Amaral RN 11/29/18 1753    Row Name                Wound 12/01/18 1400 Left lower leg skin tear    Wound - Properties Group Date first assessed: 12/01/18 [AB] Time first assessed: 1400 [AB] Present On Admission : no [AB] Side: Left [AB] Orientation: lower [AB] Location: leg [AB] Type: skin tear [AB] Recorded by:  [AB] Jag Amaral RN 12/01/18 2052    Row  Name 12/11/18 1028             Plan of Care Review    Plan of Care Reviewed With  patient  -KR      Recorded by [KR] Anna Foster, PT 12/11/18 1454      Row Name 12/11/18 1028             Outcome Summary/Treatment Plan (PT)    Daily Summary of Progress (PT)  progress toward functional goals is gradual  -KR      Recorded by [KR] Anna Foster, PT 12/11/18 1454      Row Name 12/11/18 1530             Outcome Summary/Treatment Plan (SLP)    Daily Summary of Progress (SLP)  progress toward functional goals as expected  -SG      Barriers to Overall Progress (SLP)  AMS/cog status  -SG      Plan for Continued Treatment (SLP)  Con't dys tx for possible diet upgrade x1 and if appropriate, SLP will sign off.   -SG      Anticipated Dischage Disposition  skilled nursing facility;anticipate therapy at next level of care  -SG      Recorded by [SG] Swetha Franklin, MS CCC-SLP 12/11/18 1547        User Key  (r) = Recorded By, (t) = Taken By, (c) = Cosigned By    Initials Name Effective Dates Discipline    SG Swetha Franklin, MS CCC-SLP 06/22/15 -  SLP    Angélica Coppola APRN 11/05/18 -  Nurse    Jag Gould RN 02/02/17 -  Nurse    Anna Carrera, PT 04/03/18 -  PT          Outcome Summary  Outcome Summary/Treatment Plan (SLP)  Daily Summary of Progress (SLP): progress toward functional goals as expected (12/11/18 1530 : Swetha Franklin, MS CCC-SLP)  Barriers to Overall Progress (SLP): AMS/cog status (12/11/18 1530 : Swetha Franklin, MS CCC-SLP)  Plan for Continued Treatment (SLP): Con't dys tx for possible diet upgrade x1 and if appropriate, SLP will sign off.  (12/11/18 1530 : Swetha Franklin, [facility]-SLP)  Anticipated Dischage Disposition: skilled nursing facility, anticipate therapy at next level of care (12/11/18 1530 : Swetha Franklin, MS CCC-SLP)      SLP GOALS     Row Name 12/11/18 1530             Oral Nutrition/Hydration  Goal 1 (SLP)    Oral Nutrition/Hydration Goal 1, SLP  LTG: Tolerate soft solids and thin liquids without s/s aspiration or difficulty with 100% accuracy and caregiver assistance  -SG      Time Frame (Oral Nutrition/Hydration Goal 1, SLP)  by discharge  -SG      Barriers (Oral Nutrition/Hydration Goal 1, SLP)  No s/s w/ pt's current diet, based on PO trials, not appropraite for diet upgrade beyond level 3 at this time. SLP will con't to follow  -SG      Progress/Outcomes (Oral Nutrition/Hydration Goal 1, SLP)  good progress toward goal  -SG         Oral Nutrition/Hydration Goal 2 (SLP)    Oral Nutrition/Hydration Goal 2, SLP  Tolerate trials of soft solids without signs of difficulty with 100% accuracy and cues  -SG      Time Frame (Oral Nutrition/Hydration Goal 2, SLP)  short term goal (STG);by discharge  -SG      Barriers (Oral Nutrition/Hydration Goal 2, SLP)  Disorganized masitcation and delayed coughing noted w/ small bite of cracker. Multiple liquid washes required for pt to clear oral cavity. Not appropraite for diet upgrade at this time.   -SG      Progress/Outcomes (Oral Nutrition/Hydration Goal 2, SLP)  good progress toward goal  -SG         Swallow Management Recall Goal 1 (SLP)    Activity (Swallow Management Recall Goal 1, SLP)  safe diet level/texture  -SG      [place]/Accuracy (Swallow Management Recall Goal 1, SLP)  with minimal cues (75-90% accuracy)  -SG      Time Frame (Swallow Management Recall Goal 1, SLP)  short term goal (STG);by discharge  -SG      Barriers (Swallow Management Recall Goal 1, SLP)  Cueing and re-education required for pt to recall recommended diet texture. Pt unable to demonstrate recall of safe diet at this time, cognitive status impacting.   -SG      Progress/Outcomes (Swallow Management Recall Goal 1, SLP)  continuing progress toward goal  -SG        User Key  (r) = Recorded By, (t) = Taken By, (c) = Cosigned By    Initials Name Provider Type    Swetha García  MS [place] CCC-SLP Speech and Language Pathologist          EDUCATION  The patient has been educated in the following areas:   Dysphagia (Swallowing Impairment) Oral Care/Hydration Modified Diet Instruction.    SLP Recommendation and Plan                       Anticipated Dischage Disposition: skilled nursing facility, anticipate therapy at next level of care     Therapy Frequency (Swallow): PRN, 5 days per week          Plan of Care Reviewed With: patient  Plan of Care Review  Plan of Care Reviewed With: patient  Daily Summary of Progress (SLP): progress toward functional goals as expected  Plan for Continued Treatment (SLP): Con't dys tx for possible diet upgrade x1 and if appropriate, SLP will sign off.   Progress: no change           Time Calculation:   Time Calculation- SLP     Row Name 12/11/18 1551             Time Calculation- SLP    SLP Start Time  1530  -SG      SLP Received On  12/11/18  -        User Key  (r) = Recorded By, (t) = Taken By, (c) = Cosigned By    Initials Name Provider Type    Swetha García MS CCC-SLP Speech and Language Pathologist          Therapy Charges for Today     Code Description Service Date Service Provider Modifiers Qty    [id number]  ST TREATMENT SWALLOW 2 12/11/2018 Swetha Franklin MS CCC-SLP GN 1                 Swetha Franklin MS CCC-SLP  12/11/2018

## 2018-12-12 PROBLEM — Z93.2 HIGH OUTPUT ILEOSTOMY (HCC): Status: ACTIVE | Noted: 2018-01-01

## 2018-12-12 PROBLEM — E87.20 NORMAL ANION GAP METABOLIC ACIDOSIS: Status: ACTIVE | Noted: 2018-01-01

## 2018-12-12 PROBLEM — R19.8 HIGH OUTPUT ILEOSTOMY (HCC): Status: ACTIVE | Noted: 2018-01-01

## 2018-12-12 NOTE — PLAN OF CARE
"Problem: Patient Care Overview  Goal: Plan of Care Review  Outcome: Ongoing (interventions implemented as appropriate)   12/12/18 0920   Coping/Psychosocial   Plan of Care Reviewed With patient   Plan of Care Review   Progress improving   OTHER   Outcome Summary Appliance change performed. Placed Chris new image 2 1/4\" shallow convexity. Peristomal skin is intact and issues have resolved. Patient continues with liquid output. No issues at this time.          "

## 2018-12-12 NOTE — PROGRESS NOTES
Continued Stay Note  [facility]     Patient Name: Leila Smith  MRN: [id number]  Today's Date: 12/12/2018    Admit Date: 11/28/2018    Discharge Plan     Row Name 12/12/18 1432       Plan    Plan  update    Patient/Family in Agreement with Plan  yes    Plan Comments  Spoke with patient at bedside and advised that she may be able to go to [facility] Friday patient pleasant and smiling.  CM following.  Patient plan is to discharge to [facility] via ambulance for transport when medically ready.      Final Discharge Disposition Code  03 - skilled nursing facility (SNF)        Discharge Codes    No documentation.       Expected Discharge Date and Time     Expected Discharge Date Expected Discharge Time    Dec 14, 2018             Priti Caceres, RN

## 2018-12-12 NOTE — PROGRESS NOTES
[facility] Medicine Services  PROGRESS NOTE    Patient Name: Leila Smith  : 1945  MRN: [id number]    Date of Admission: 2018  Length of Stay: 14  Primary Care Physician: Ciaran Wakefield MD    Subjective   Subjective     CC:  F/U multiple issues/MRSA bacteremia    HPI:  Still lots of ostomy output. Although confused patient denies pain or dyspnea    Review of Systems  Unobtainable due to cognitive impairment    Objective   Objective     Vital Signs:   Temp:  [96.4 °F (35.8 °C)-97.1 °F (36.2 °C)] 96.4 °F (35.8 °C)  Heart Rate:  [64-74] 64  Resp:  [16-18] 16  BP: (122-137)/(74-92) 137/74        Physical Exam: Unchanged from yesterday  GEN- elderly, frail but nontoxic, answers simple questions and follows commands but confused to details  HEENT- atraumatic, normocephlic,  NECK- supple, trachea midline  RESP: CTAB, normal effort, resp non-labored  CV: RRR, no murmurs, s1/s2 normal  MSK: no LE edema noted, pos pressure dressing RUE, mild edema of forearm, good pulses, PICC RUE   NEURO:   face grossly symmetric, moves all ext  SKIN: Thick  scales/scarring on feet, bilat shin wounds, + mild drainage   Abd: nontender, ostomy w/ liquid brown stool    Results Reviewed:  I have personally reviewed current lab, radiology, and data and agree.    Results from last 7 days   Lab Units  18   0532  12/10/18   0847  1837   18   WBC 10*3/mm3  13.15*  9.59  9.17   --    --    HEMOGLOBIN g/dL  11.5  12.4  11.1*   < >   --    HEMATOCRIT %  38.8  41.2  37.6   < >   --    PLATELETS 10*3/mm3  462*  398  331   --    --    INR    --    --    --    --   1.01    < > = values in this interval not displayed.     Results from last 7 days   Lab Units  18   0532  12/10/18   0847  18   SODIUM mmol/L  135  141  141   POTASSIUM mmol/L  4.8  4.5  4.1   CHLORIDE mmol/L  114*  118*  116*   CO2 mmol/L  12.0*  13.0*  17.0*   BUN mg/dL  34*  30*  25*   CREATININE  mg/dL  1.71*  1.47*  1.09   GLUCOSE mg/dL  125*  101*  90   CALCIUM mg/dL  7.9*  8.0*  8.4*   ALT (SGPT) U/L   --    --   26   AST (SGOT) U/L   --    --   24     Estimated Creatinine Clearance: 28.7 mL/min (A) (by C-G formula based on SCr of 1.71 mg/dL (H)).  No results found for: BNP    Microbiology Results Abnormal     Procedure Component Value - Date/Time    Clostridium Difficile Toxin - Stool, Per Rectum [[id number]] Collected:  12/09/18 0536    Lab Status:  Final result Specimen:  Stool from Per Rectum Updated:  12/09/18 1104    Narrative:       The following orders were created for panel order Clostridium Difficile Toxin - Stool, Per Rectum.  Procedure                               Abnormality         Status                     ---------                               -----------         ------                     Clostridium Difficile To...[[id number]]  Normal              Final result                 Please view results for these tests on the individual orders.    Clostridium Difficile Toxin, PCR - Stool, Per Rectum [[id number]]  (Normal) Collected:  12/09/18 0536    Lab Status:  Final result Specimen:  Stool from Per Rectum Updated:  12/09/18 1104     C. Difficile Toxins by PCR Not Detected    Narrative:       Performance characteristics of test not established for patients <2 years of age.  Performance characteristics of test not established for patients <2 years of age.  Negative for Toxigenic C. Difficile    Tissue / Bone Culture - Tissue, Leg, Right [[id number]] Collected:  11/29/18 1801    Lab Status:  Edited Result - FINAL Specimen:  Tissue from Leg, Right Updated:  12/06/18 1130     Tissue Culture No growth at 1 week     Gram Stain Many (4+) Red blood cells      Many (4+) WBCs seen      No organisms seen    Blood Culture - Blood, Hand, Left [[id number]] Collected:  11/30/18 1005    Lab Status:  Final result Specimen:  Blood from Hand, Left Updated:  12/05/18 1030     Blood Culture No growth at 5 days     Blood Culture - Blood, Hand, Right [[id number]] Collected:  11/30/18 1005    Lab Status:  Final result Specimen:  Blood from Hand, Right Updated:  12/05/18 1030     Blood Culture No growth at 5 days    Wound Culture - Drainage, Ear, Right [[id number]]  (Abnormal) Collected:  11/30/18 1647    Lab Status:  Final result Specimen:  Drainage from Ear, Right Updated:  12/03/18 0852     Wound Culture Scant growth (1+) Staphylococcus, coagulase negative     Comment: Susceptibility will not be done unless Doctor notifies Lab            Gram Stain No WBCs or organisms seen    Blood Culture - Blood, Arm, Left [[id number]]  (Abnormal) Collected:  11/28/18 1353    Lab Status:  Final result Specimen:  Blood from Arm, Left Updated:  12/01/18 0823     Blood Culture Staphylococcus aureus, MRSA     Comment:   Consider infectious disease consult to rule out distant focus of infection.  Methicillin resistant Staphylococcus aureus, Patient may be an isolation risk.        Isolated from Aerobic and Anaerobic Bottles     Gram Stain Aerobic Bottle Gram positive cocci in groups      Anaerobic Bottle Gram positive cocci in groups    Narrative:       PRIOR POSITIVE CULTURE WITH SAME MORPHOLOGY CALLED  Refer Culture to #[id number]    Blood Culture - Blood, Arm, Left [[id number]]  (Abnormal)  (Susceptibility) Collected:  11/28/18 1340    Lab Status:  Final result Specimen:  Blood from Arm, Left Updated:  12/01/18 0822     Blood Culture Staphylococcus aureus, MRSA     Comment:   Consider infectious disease consult to rule out distant focus of infection.  Methicillin resistant Staphylococcus aureus, Patient may be an isolation risk.        Isolated from Aerobic and Anaerobic Bottles     Gram Stain Aerobic Bottle Gram positive cocci in groups      Anaerobic Bottle Gram positive cocci in clusters    Susceptibility      Staphylococcus aureus, MRSA     DIMITRIS     Ciprofloxacin Resistant     Clindamycin Susceptible     Daptomycin Susceptible     Erythromycin  Susceptible     Gentamicin Susceptible     Levofloxacin Intermediate [1]      Linezolid Susceptible     Oxacillin Resistant     Penicillin G Resistant     Quinupristin + Dalfopristin Susceptible     Rifampin Susceptible     Tetracycline Susceptible     Trimethoprim + Sulfamethoxazole Susceptible     Vancomycin Susceptible            [1]   Staphylococcus species may develop resistance during prolonged therapy with quinolones.  Isolates that are initially susceptible may become resistant within three to four days after initiation of therapy. Testing of repeat isolates may be warranted.                 Blood Culture ID, PCR - Blood, Arm, Left [[id number]]  (Abnormal) Collected:  11/28/18 1340    Lab Status:  Final result Specimen:  Blood from Arm, Left Updated:  11/29/18 8707     BCID, PCR Staphylococcus aureus. mecA (methicillin resistance gene) detected. Identification by BCID PCR.          Imaging Results (last 24 hours)     ** No results found for the last 24 hours. **        Results for orders placed during the hospital encounter of 11/28/18   Adult Transesophageal Echo (YANNA) W/ Cont if Necessary Per Protocol    Narrative · Left ventricular systolic function is normal.  · Left ventricular wall thickness is consistent with severe concentric   hypertrophy.  · Left atrial cavity size is mildly dilated.  · Mild dilation of the ascending aorta is present. 4.2cm..  · No evidence of a left atrial appendage thrombus was present.  · No echocardiographic evidence of endocarditis          I have reviewed the medications.      apixaban 2.5 mg Oral Q12H   aspirin 81 mg Oral Daily   castor oil-balsam peru  Topical Q12H   [START ON 12/14/2018] DAPTOmycin 8 mg/kg (Adjusted) Intravenous Q48H   diltiaZEM 60 mg Oral Q6H   insulin detemir 15 Units Subcutaneous Daily   insulin detemir 5 Units Subcutaneous Nightly   loperamide 2 mg Oral TID   metoprolol tartrate 50 mg Oral Q12H   sodium chloride 10 mL Intravenous Q12H   thiamine 100 mg  Oral Daily         Assessment/Plan   Assessment / Plan     Active Hospital Problems    Diagnosis   • **Altered mental status   • High output ileostomy (CMS/HCC)   • Normal anion gap metabolic acidosis   • Sepsis due to methicillin resistant Staphylococcus aureus (MRSA) (CMS/[facility])   • Acute parotitis   • Ventricular tachyarrhythmia (CMS/[facility])   • Acute renal failure superimposed on stage 3 chronic kidney disease (CMS/[facility])   • Hyperkalemia   • Congestive heart failure (CMS/[facility])   • Chronic obstructive pulmonary disease with acute exacerbation (CMS/[facility])   • CAD (coronary artery disease)     History of  Coronary Artery Disease V12.59     • Diabetes mellitus, type 2 (CMS/[facility])   • Hypertension   • Hypothyroidism   • Hyperlipidemia          Brief Hospital Course to date:  Leila Smith is a 73 y.o. female nursing home resident who was admitted to the ICU with Vtach/hyperkalemia, also found to have acute on chronic renal failure. Noted to have UTI on admission as well as MRSA bacteremia. Also treated with parotid sialadenitis evaluated by ENT, right leg hematoma evaluated and drained by surgery 11/29.       Assessment    -mrsa bacteremia/sepsis (likely from parotidis)   -Anil negative; source likely parotitis  -right parotitis/sialadenitis   -s/p ent eval; warm compresses; no surgical intervention (s/p dr. Macedo consult)  -s/p v-tach due to hyperkalemia   -s/p cards consult: ultimately stopped amiodarone; currently tolerating metoprolol and diltiazem  -hx parox afib (chronic eliquis)    -ANIL 12/3/18: normal LF systolic function, severe clvh, no atrial appendage thrombus nor echocardiographic evidence of endocarditis  -s/p chung    -s/p nephrology consultation  -ckd 3 (baseline 1.2-1.4)  -high output ostomy/diarrhea   -c.diff pcr negative 12/9/18  -encephalopathy, improved  -cognitive impairment  -superficial thrombophlebitis   -duplex rue 12/6/18: superficial thrombophlebitis cephlic (forearm) vein  -right leg hematoma (s/p  I&D)   -growing coag neg staph  -dysphagia    -previously on tube feeds, now on modified diet per SLP  -DM   -difficult iv access, s/p picc line w/ bleeding at insertion site   -eliquis restarted 12/10/18  -urinary retention   -s/p voiding trial which she failed; orosco placed again evening of 12/9; voiding trial once more mobile (likely at rehab facility)    --------------------------------------------------------------------------------------  Plan:  -abx per ID (dapto for now; likely change to p.o. doxy x 2 weeks on 12/13/18)  -compresses right parotid  -schedule & prn imodium (high output ostomy)  -normal saline 100cc/hr, wean fluids as acidosis improves and ostomy output improves  -adjust insulins prn  -continue dilt & metoprolol (s/p cards evaluation)  -continue eliquis (restarted 12/10/18 after it was held for bleeding around picc site)  -voiding trial in next few days (or at rehab in which case would benefit from outpatient utology consult)    -cbc, bmp (baseline cr 1.2-1.4), mag     -to [facility], possibly by 12/14/18 if clinically improved and labs improved    DVT Prophylaxis:  eliquis    CODE STATUS:   Code Status and Medical Interventions:   Ordered at: 11/28/18 1335     Code Status:    CPR     Medical Interventions (Level of Support Prior to Arrest):    Full       Disposition: I expect the patient to be discharged back to SNF pending improvement and final abx plans.      Electronically signed by Rod Flores MD, 12/12/18, 4:46 PM.

## 2018-12-12 NOTE — PROGRESS NOTES
"                  Clinical Nutrition     Reason for Visit:   Calorie count - day 3    Patient Name: Leila Smith  YOB: 1945  MRN: [id number]  Date of Encounter: 12/12/18 2:39 PM  Admission date: 11/28/2018      Comments: No calorie count sheet.     Nutrition Assessment   Assessment      Admission diagnosis     Altered mental status     Additional applicable diagnosis/conditions/procedures  Metabolic acidosis  Acute renal failure superimposed on stage 3 chronic kidney disease (CMS/HCC)-non oliguric   Hyperkalemia-improved at present   Skin: Per WOCN 11/29: Pt has blanchable area of shearing right gluteus  (12/9) 3 Day Calorie Count started        Applicable PMH:  Hypertension  Hyperlipidemia  Hypothyroidism  Diabetes mellitus, type 2 (CMS/HCC)  CAD (coronary artery disease)  Chronic obstructive pulmonary disease with acute exacerbation (CMS/HCC)  Congestive heart failure (CMS/HCC)   Ventricular tachyarrhythmia (CMS/HCC)  Colostomy-remains in place     Reported/Observed/Food/Nutrition Related History:     Calorie count sheet missing. RN feeding pt lunch at time of RD visit. States that pt loves chocolate.     Anthropometrics     Height: 162.6 cm (64\")  Last filed wt: Weight: 77.6 kg (171 lb) (12/08/18 0640)  Weight Method: Bed scale    BMI: BMI (Calculated): 29.35 kg/m²  Overweight: 25.0-29.9kg/m2       Medications reviewed   Pertinent: Yes    Current Nutrition Prescription     PO: Diet Dysphagia; III - Pureed With Some Mashed; Thin; Cardiac, Consistent Carbohydrate  Oral nutrition supplements: chocolate Boost Glucose Control 3x/day    Intake from Day 3 of Calorie Count:   Sheet missing -- unable to calculate    Evaluation of Received Nutrient/Fluid Intake:  41%/8 meals    Nutrition Intervention   1.  Follow treatment progress, Care plan reviewed, Interview for preferences, Encourage intake       Goal:   General: Nutrition support treatment  PO: Tolerate PO, Increase intake, Continue positive " trend    Monitoring/Evaluation:   Per protocol, PO intake, Supplement intake, GI status, Symptoms, POC/GOC    Will Continue to follow per protocol    Rosita Ibanez, MS RD/LD CNSC  Time Spent: 25 minutes

## 2018-12-12 NOTE — PROGRESS NOTES
INFECTIOUS DISEASE PROGRESS NOTE    Leila Smith  1945  [id number]    Date: 12/12/2018    Admission Date: 11/28/2018    CC: MRSA bacteremia    History of present illness:    Patient is a 73 y.o. female presents from [facility] with ventricular tachycardia and hyperkalemia from  acute on chronic renal failure;  PAtient admitted to ICU and has been worked up for parotitis, leg abscess and has been found to have MRSA bacteremia.. Blood cultures are positive for MRSA. Hematoma on right leg was drained by surgery on 11/29. 2-dimensional Echocardiogram negative for vegetations.     Patient is obtunded and is a poor historian no family by bedside.  Opens eyes during exam but is nonverbal.    12/3/18: Patient is a poor historian with no family at bedside.  She says yes to any question.  She is more alert today. She remains afebrile. Right parotid gland area with swelling and pain as she swatted my hand away when palpated.     12/4/18; doing well; no events overnight; no complaints, poor historian, ros unobtainable    12/5/18; no events overnight;  Per nurse no fever, rash, poor historian, picc placed    12/6/18; doing well; no events overnight; ros unobtainable    12/7/18; doing fair, poor historian, no complaints, nonverbal; ros unobtainable    12/10/18; no events overnight; feels well; no complaints, no diarrhea, rash, sore throat; awake says a few words    12/11/18 doing well; more awake, no complaints, no fever, rash, sore throat or diarrhea  Denies right parotid pain.    12/12/18; no events overnight; no complaints; feels well; no fever, rash, sore throat, less pain in right parotid.    Past Medical History:   Diagnosis Date   • Atrial fibrillation (CMS/HCC)    • Chronic ulcer of right leg (CMS/HCC)    • Cognitive communication deficit    • COPD (chronic obstructive pulmonary disease) (CMS/HCC)    • Diabetes mellitus (CMS/HCC)    • Difficulty in walking    • Encephalopathy    • Generalized muscle  weakness    • Hematuria    • Hyperlipidemia    • Hypertension    • Hypothyroidism    • Urinary tract infection        Past Surgical History:   Procedure Laterality Date   • CATARACT EXTRACTION     • CHOLECYSTECTOMY     • COLOSTOMY     • FOOT SURGERY Right    • HERNIA REPAIR     • HYSTERECTOMY     • TOTAL KNEE ARTHROPLASTY Right        Family History   Problem Relation Age of Onset   • Stomach cancer Mother    • Alcohol abuse Other    • Cancer Other    • Diabetes Other    • Hypertension Other    • Other Other        Social History     Socioeconomic History   • Marital status:      Spouse name: Not on file   • Number of children: Not on file   • Years of education: Not on file   • Highest education level: Not on file   Social Needs   • Financial resource strain: Not on file   • Food insecurity - worry: Not on file   • Food insecurity - inability: Not on file   • Transportation needs - medical: Not on file   • Transportation needs - non-medical: Not on file   Occupational History   • Not on file   Tobacco Use   • Smoking status: Former Smoker     Packs/day: 0.00     Years: 50.00     Pack years: 0.00     Types: Cigarettes   • Smokeless tobacco: Never Used   Substance and Sexual Activity   • Alcohol use: No   • Drug use: No   • Sexual activity: Defer   Other Topics Concern   • Not on file   Social History Narrative   • Not on file       Allergies   Allergen Reactions   • Codeine    • Tetracyclines & Related          Medication:    Current Facility-Administered Medications:   •  apixaban (ELIQUIS) tablet 2.5 mg, 2.5 mg, Oral, Q12H, Yumiko Sandoval L, DO, 2.5 mg at 12/12/18 1039  •  aspirin EC tablet 81 mg, 81 mg, Oral, Daily, Jory Macedo APRN, 81 mg at 12/12/18 1039  •  castor oil-balsam peru (VENELEX) ointment, , Topical, Q12H, Julien Carcamo APRN  •  [START ON 12/14/2018] DAPTOmycin (CUBICIN) 500 mg in sodium chloride 0.9 % 50 mL IVPB, 8 mg/kg (Adjusted), Intravenous, Q48H, Evert Cid, [facility]  •   dextrose (D50W) 25 g/ 50mL Intravenous Solution 25 g, 25 g, Intravenous, Q15 Min PRN, Yumiko Sandoval L, DO  •  dextrose (GLUTOSE) oral gel 15 g, 15 g, Oral, Q15 Min PRN, Yumiko Sandoval L, DO  •  diltiaZEM (CARDIZEM) tablet 60 mg, 60 mg, Oral, Q6H, Balwinder Marquez III, MD, 60 mg at 12/12/18 1256  •  glucagon (human recombinant) (GLUCAGEN DIAGNOSTIC) injection 1 mg, 1 mg, Subcutaneous, PRN, Yumiko Sandoval L, DO  •  hydrALAZINE (APRESOLINE) tablet 10 mg, 10 mg, Oral, Q8H PRN, Jasmina Maier, DO  •  insulin detemir (LEVEMIR) injection 15 Units, 15 Units, Subcutaneous, Daily, Rod Flores MD, 15 Units at 12/11/18 0924  •  insulin detemir (LEVEMIR) injection 5 Units, 5 Units, Subcutaneous, Nightly, Rod Flores MD, 5 Units at 12/11/18 2228  •  loperamide (IMODIUM) capsule 2 mg, 2 mg, Oral, TID PRN, Rod Flores MD, 2 mg at 12/11/18 2224  •  loperamide (IMODIUM) capsule 2 mg, 2 mg, Oral, TID, Rod Flores MD, 2 mg at 12/12/18 1039  •  Magnesium Sulfate 2 gram Bolus, followed by 8 gram infusion (total Mg dose 10 grams)- Mg less than or equal to 1mg/dL, 2 g, Intravenous, PRN **OR** Magnesium Sulfate 2 gram / 50mL Infusion (GIVE X 3 BAGS TO EQUAL 6GM TOTAL DOSE) - Mg 1.1 - 1.5 mg/dl, 2 g, Intravenous, PRN **OR** Magnesium Sulfate 4 gram infusion- Mg 1.6-1.9 mg/dL, 4 g, Intravenous, PRN, Balwinder Mccall, [facility], Last Rate: 25 mL/hr at 12/03/18 1218, 4 g at 12/03/18 1218  •  metoprolol tartrate (LOPRESSOR) tablet 50 mg, 50 mg, Oral, Q12H, Balwinder Marquez III, MD, 50 mg at 12/12/18 1039  •  potassium chloride (MICRO-K) CR capsule 40 mEq, 40 mEq, Oral, PRN **OR** potassium chloride (KLOR-CON) packet 40 mEq, 40 mEq, Oral, PRN, 40 mEq at 12/03/18 1714 **OR** potassium chloride 10 mEq in 100 mL IVPB, 10 mEq, Intravenous, Q1H PRN, Balwinder Mccall, [facility]  •  potassium phosphate 45 mmol in sodium chloride 0.9 % 500 mL infusion, 45 mmol, Intravenous, PRN, Last Rate: 62.5 mL/hr at 12/03/18 1713, 45 mmol at  12/03/18 1713 **OR** potassium phosphate 30 mmol in sodium chloride 0.9 % 250 mL infusion, 30 mmol, Intravenous, PRN **OR** potassium phosphate 15 mmol in sodium chloride 0.9 % 100 mL infusion, 15 mmol, Intravenous, PRN **OR** sodium glycerophosphate 40 mmol in sodium chloride 0.9 % 500 mL IVPB, 40 mmol, Intravenous, PRN **OR** sodium glycerophosphate 20 mmol in sodium chloride 0.9 % 250 mL IVPB, 20 mmol, Intravenous, PRN, Balwinder Mccall RPH  •  sodium chloride 0.9 % flush 10 mL, 10 mL, Intravenous, PRN, Rohan Ceballos MD  •  sodium chloride 0.9 % flush 10 mL, 10 mL, Intravenous, Q12H, Shandra Oscar MD, 10 mL at 12/10/18 0911  •  sodium chloride 0.9 % flush 10 mL, 10 mL, Intravenous, PRN, Shandra Oscar MD  •  sodium chloride 0.9 % flush 3-10 mL, 3-10 mL, Intravenous, PRN, Julien Carcamo, APRN, 10 mL at 12/08/18 0835  •  sodium chloride 0.9 % infusion, 100 mL/hr, Intravenous, Continuous, Rod Flores MD, Last Rate: 100 mL/hr at 12/12/18 0904, 100 mL/hr at 12/12/18 0904  •  thiamine (VITAMIN B-1) tablet 100 mg, 100 mg, Oral, Daily, Case, Jasmina V., DO, 100 mg at 12/12/18 1039    Antibiotics:  Anti-Infectives (From admission, onward)    Ordered     Dose/Rate Route Frequency Start Stop    12/12/18 1305  DAPTOmycin (CUBICIN) 500 mg in sodium chloride 0.9 % 50 mL IVPB     Ordering Provider:  Evert Cid RPH    8 mg/kg × 63.6 kg (Adjusted)  100 mL/hr over 30 Minutes Intravenous Every 48 Hours 12/14/18 1200 12/18/18 1159    11/29/18 1128  vancomycin (VANCOCIN) in iso-osmotic dextrose IVPB 1 g (premix) 200 mL     Ordering Provider:  Aysha Rocha RPH    1,000 mg  over 60 Minutes Intravenous Once 11/29/18 1400 11/29/18 1448    11/28/18 1522  piperacillin-tazobactam (ZOSYN) 3.375 g in iso-osmotic dextrose 50 ml (premix)     Eran Huang [facility] reviewed the order on 12/01/18 3349.   Ordering Provider:  Darrion, Jasmina RACHEL, DO    3.375 g  over 4 Hours Intravenous Every 12 Hours  18 2200 18 1200    18 1332  vancomycin 1750 mg/500 mL 0.9% NS IVPB ([facility])     Ordering Provider:  Rohan Ceballos MD    20 mg/kg × 90.7 kg  over 120 Minutes Intravenous Once 18 1334 18 1642    18 1332  piperacillin-tazobactam (ZOSYN) 3.375 g in iso-osmotic dextrose 50 ml (premix)     Ordering Provider:  Rohan Ceballos MD    3.375 g  over 30 Minutes Intravenous Once 18 1334 18 1720            Review of Systems:  See hpi      Physical Exam:   Vital Signs  Temp (24hrs), Av °F (36.1 °C), Min:96.4 °F (35.8 °C), Max:97.6 °F (36.4 °C)    Temp  Min: 96.4 °F (35.8 °C)  Max: 97.6 °F (36.4 °C)  BP  Min: 121/62  Max: 137/74  Pulse  Min: 64  Max: 74  Resp  Min: 16  Max: 18  No Data Recorded    GENERAL: resting quietly opens eyes to exam, more alert.  HEENT: Normocephalic, atraumatic.  PERRL. EOMI. No conjunctival injection. No icterus. Oropharynx clear without evidence of thrush or exudate. No evidence of peridontal disease.  No ext oral lesions    HEART: RRR; systolic murmur loudest at base left  LUNGS: Clear to auscultation bilaterally   ABDOMEN: Soft, nontender, nondistended. Positive bowel sounds.   EXT:  No cyanosis, clubbing or edema. No cord.  MSK: FROM without joint effusions noted arms/legs.    SKIN: keloid like scarring on toes, fore feet bilaterally    NEURO: ;opens eyes, removes stethoscope from heart during exam      Laboratory Data    Results from last 7 days   Lab Units  18   0532  12/10/18   0847  18   0637   WBC 10*3/mm3  13.15*  9.59  9.17   HEMOGLOBIN g/dL  11.5  12.4  11.1*   HEMATOCRIT %  38.8  41.2  37.6   PLATELETS 10*3/mm3  462*  398  331     Results from last 7 days   Lab Units  18   0532   SODIUM mmol/L  135   POTASSIUM mmol/L  4.8   CHLORIDE mmol/L  114*   CO2 mmol/L  12.0*   BUN mg/dL  34*   CREATININE mg/dL  1.71*   GLUCOSE mg/dL  125*   CALCIUM mg/dL  7.9*     Results from last 7 days   Lab Units  18   0637   ALK PHOS U/L   206*   BILIRUBIN mg/dL  0.4   ALT (SGPT) U/L  26   AST (SGOT) U/L  24                         Estimated Creatinine Clearance: 28.7 mL/min (A) (by C-G formula based on SCr of 1.71 mg/dL (H)).      Microbiology:  Blood Culture   Date Value Ref Range Status   11/30/2018 No growth at 2 days  Preliminary   11/30/2018 No growth at 2 days  Preliminary   11/28/2018 Staphylococcus aureus, MRSA (A)  Final     Comment:       Consider infectious disease consult to rule out distant focus of infection.  Methicillin resistant Staphylococcus aureus, Patient may be an isolation risk.   11/28/2018 Staphylococcus aureus, MRSA (A)  Final     Comment:       Consider infectious disease consult to rule out distant focus of infection.  Methicillin resistant Staphylococcus aureus, Patient may be an isolation risk.       BCID, PCR   Date Value Ref Range Status   11/28/2018 (C) No organism detected by BCID PCR. Final    Staphylococcus aureus. mecA (methicillin resistance gene) detected. Identification by BCID PCR.                 Wound Culture   Date Value Ref Range Status   11/30/2018 (A)  Final    Scant growth (1+) Staphylococcus, coagulase negative     Comment:     Susceptibility will not be done unless Doctor notifies Lab             Radiology:  Fl Video Swallow With Speech    Result Date: 12/7/2018  Fluoroscopy provided for a modified barium swallow. Please see speech therapy report for full details and recommendations.    This report was finalized on 12/7/2018 10:25 PM by DR. Brian Cosme MD.          Impression:   MRSA bactertemia  Left leg abscess   Parotitis right side  Encephalopathy  COPD  Acute renal failure on chronic      PLAN/RECOMMENDATIONS:     Estimated Creatinine Clearance: 28.7 mL/min (A) (by C-G formula based on SCr of 1.71 mg/dL (H)).    Source of MRSA bacteremia could be from the parotid gland; no abscess seen on initial imaging;  Will be interesting to see if leg culures grow MRSA; a leg abscess can spontaneously develop  and usually doesn't lead to positive blood cultures.    Regardless; high grade bacteremia is still concerning for endovascular involvement.    YANNA -ve for endocarditis    Given lack of  pulmonary involvement probably not active concern would change abx:    Continue daptomycin 8 mg/kg iv now/daily unless creatinine clearance decreases below 30 mL/min continue through 12/13/18; may change to oral abx thereafter  Repeat blood cultures are ngtd from 11/30/18  Warm compresses to right parotid gland    F/u wound cultures    Patient is complexly ill    Possible change to oral abx(doxy 100 mg po bid x 2 weeks) after 12/13/18    Clinically improving    D/w Dr. Flores;  Dp/cm time 20 minutes    Olivier Gtz MD  12/12/2018  2:20 PM

## 2018-12-12 NOTE — PLAN OF CARE
Problem: Patient Care Overview  Goal: Plan of Care Review  Outcome: Ongoing (interventions implemented as appropriate)   12/12/18 7314   Coping/Psychosocial   Plan of Care Reviewed With patient   Plan of Care Review   Progress no change   OTHER   Outcome Summary Pt rested during the shift, VSS. Pt confused and combative with staff. Pt continues with large amounts of stool via colostomy and leaking, requiring multiple appliance changes. Will cont. to monitor.

## 2018-12-13 NOTE — THERAPY TREATMENT NOTE
Acute Care - Occupational Therapy Treatment Note  [facility]     Patient Name: Leila Smith  : 1945  MRN: [id number]  Today's Date: 2018  Onset of Illness/Injury or Date of Surgery: 18  Date of Referral to OT: 18  Referring Physician: SAUL Oscar MD    Admit Date: 2018       ICD-10-CM ICD-9-CM   1. Impaired functional mobility, balance, gait, and endurance Z74.09 V49.89   2. Dysphagia, unspecified type R13.10 787.20   3. Ventricular tachycardia (CMS/[facility]) I47.2 427.1   4. Altered mental status, unspecified altered mental status type R41.82 780.97     Patient Active Problem List   Diagnosis   • Diabetes mellitus, type 2 (CMS/[facility])   • Hypertension   • Hyperlipidemia   • Hypothyroidism   • Chronic obstructive pulmonary disease (CMS/[facility])   • CAD (coronary artery disease)   • History of edema   • History of obesity   • Venous insufficiency   • Open wound of lower extremity   • Urinary tract infection   • T2DM (type 2 diabetes mellitus) (CMS/[facility])   • Dyspnea on exertion   • Peripheral vascular disease (CMS/[facility])   • Obstructive sleep apnea syndrome   • Ulcer of lower extremity (CMS/[facility])   • Hypoxemia   • Hematuria   • Diabetic peripheral neuropathy (CMS/[facility])   • Edema   • Chronic obstructive pulmonary disease with acute exacerbation (CMS/[facility])   • Congestive heart failure (CMS/[facility])   • Arthritis   • Neutrophilic leukocytosis   • Cystitis   • Acute renal failure superimposed on stage 3 chronic kidney disease (CMS/[facility])   • Hyperkalemia   • Leukocytosis   • Elevated troponin   • Altered mental status   • Ventricular tachyarrhythmia (CMS/[facility])   • Sepsis due to methicillin resistant Staphylococcus aureus (MRSA) (CMS/[facility])   • Acute parotitis   • High output ileostomy (CMS/[facility])   • Normal anion gap metabolic acidosis     Past Medical History:   Diagnosis Date   • Atrial fibrillation (CMS/[facility])    • Chronic ulcer of right leg (CMS/[facility])    • Cognitive communication deficit    • COPD (chronic  obstructive pulmonary disease) (CMS/[facility])    • Diabetes mellitus (CMS/[facility])    • Difficulty in walking    • Encephalopathy    • Generalized muscle weakness    • Hematuria    • Hyperlipidemia    • Hypertension    • Hypothyroidism    • Urinary tract infection      Past Surgical History:   Procedure Laterality Date   • CATARACT EXTRACTION     • CHOLECYSTECTOMY     • COLOSTOMY     • FOOT SURGERY Right    • HERNIA REPAIR     • HYSTERECTOMY     • TOTAL KNEE ARTHROPLASTY Right        Therapy Treatment    Rehabilitation Treatment Summary     Row Name 12/13/18 0824             Treatment Time/Intention    Discipline  occupational therapist  -SMITH      Document Type  therapy note (daily note)  -SMITH      Subjective Information  no complaints  -SMITH      Patient/Family Observations  No family present.  Pt. supine in bed on tele with IV RUE.  -SMITH      Care Plan Review  care plan/treatment goals reviewed;risks/benefits reviewed;current/potential barriers reviewed  -SMITH      Patient Effort  adequate  -SMITH      Comment  Pt. aggitated yelling out no when aid in to do BP and check blood sugar otherwise pleasant.  -SMITH      Existing Precautions/Restrictions  fall  -SMITH      Recorded by [SMITH] Poonam Cook, OT 12/13/18 0900      Row Name 12/13/18 0824             Vital Signs    Pre Systolic BP Rehab  113  -SMITH      Pre Treatment Diastolic BP  61 earlier BP  -SMITH      Intra Systolic BP Rehab  164  -SMITH      Intra Treatment Diastolic BP  82 when aid in mid session and took BP pt. yelling out/aggitate  -SMITH      Pretreatment Heart Rate (beats/min)  82  -SMITH      Intratreatment Heart Rate (beats/min)  111 elevated when aid getting BS and BP  -SMITH      Pre SpO2 (%)  100  -SMITH      O2 Delivery Pre Treatment  room air  -SMITH      Pre Patient Position  Supine  -SMITH      Intra Patient Position  Supine  -SMITH      Post Patient Position  Supine  -SMITH      Recorded by [SMITH] Poonam Cook, OT 12/13/18 0900      Row Name 12/13/18 0824             Cognitive  Assessment/Intervention- PT/OT    Affect/Mental Status (Cognitive)  confused;flat/blunted affect  -SMITH      Behavioral Issues (Cognitive)  difficulty managing stress;overwhelmed easily  -SMITH      Orientation Status (Cognition)  oriented to;person pt. would not even try to answer other questions  -SMITH      Follows Commands (Cognition)  follows one step commands;25-49% accuracy;physical/tactile prompts required;repetition of directions required;verbal cues/prompting required appears often by choice  -SMITH      Cognitive Function (Cognitive)  safety deficit;attention deficit  -SMITH      Attention Deficit (Cognitive)  moderate deficit  -SMITH      Safety Deficit (Cognitive)  severe deficit  -SMITH      Cognitive Interventions (Cognitive)  -- pt. stating lives in [place] and one dtr stating her name  -SMITH      Personal Safety Interventions  fall prevention program maintained;gait belt;nonskid shoes/slippers when out of bed  -SMITH      Recorded by [SMITH] Poonam Cook, OT 12/13/18 0900      Row Name 12/13/18 0824             Safety Issues, Functional Mobility    Safety Issues Affecting Function (Mobility)  insight into deficits/self awareness;judgment;problem solving;safety precautions follow-through/compliance;safety precaution awareness;sequencing abilities  -SMITH      Impairments Affecting Function (Mobility)  cognition;strength;range of motion (ROM)  -SMITH      Recorded by [SMITH] Poonam Cook, OT 12/13/18 0900      Row Name 12/13/18 0824             Bed Mobility Assessment/Treatment    Comment (Bed Mobility)  deferred to PT  -SMITH      Recorded by [SMITH] Poonam Cook, OT 12/13/18 0900      Row Name 12/13/18 0824             Functional Mobility    Functional Mobility- Comment  deferred to PT  -SMITH      Recorded by [SMITH] Poonam Cook, OT 12/13/18 0900      Row Name 12/13/18 0824             Transfer Assessment/Treatment    Comment (Transfers)  Pt. has only been getting up with lift.  Pt. with poor command following so unable to attempt  on own  -SMITH      Recorded by [SMITH] Poonam Cook, OT 12/13/18 0900      Row Name 12/13/18 08             ADL Assessment/Intervention    88392 - OT Self Care/Mgmt Minutes  13  -SMITH      BADL Assessment/Intervention  grooming;feeding  -SMITH      Recorded by [SMITH] Poonam Cook, OT 12/13/18 0900      Row Name 12/13/18 08             Grooming Assessment/Training    [place] Level (Grooming)  wash face, hands;minimum assist (75% patient effort);oral care regimen;moderate assist (50% patient effort);verbal cues;nonverbal cues (demo/gesture);set up  -SMITH      Assistive Devices (Grooming)  hand over hand  -SMITH      Grooming Position  supported sitting  -SMITH      Comment (Grooming)  Pt. handed pt. wash cloth and fair accuracy washing face.  Pt. resisted hand over hand and OT had to wash around L eye.  Pt. post setup with very little tooth paste placed on brush was able to brush teeth with poor accuracy.  Pt. resisted any assist saying it was done.  Pt. could not swish and spit despite max cues only drinking water.    -SMITH      Recorded by [SMITH] Poonam Cook, OT 12/13/18 0900      Row Name 12/13/18 08             Self-Feeding Assessment/Training    [place] Level (Feeding)  liquids to mouth;set up;independent  -SMITH      Position (Self-Feeding)  supported sitting  -SMITH      Recorded by [SMITH] Poonam Cook, OT 12/13/18 0900      Row Name 12/13/18 08             BADL Safety/Performance    Impairments, BADL Safety/Performance  balance;cognition;strength;range of motion;coordination  -SMITH      Cognitive Impairments, BADL Safety/Performance  insight into deficits/self awareness;judgment;problem solving/reasoning;sequencing abilities;attention  -SMITH      Skilled BADL Treatment/Intervention  cognitive/safety deficit modifications;hand-over-hand training/cues  -SMITH      Progress in BADL Status  effort and initiation  -SMITH      Recorded by [SMITH] Poonam Cook, OT 12/13/18 0900      Row Name 12/13/18 08              Therapeutic Exercise    75829 - OT Therapeutic Exercise Minutes  10  -SMITH      Recorded by [SMITH] Poonam Cook, OT 12/13/18 0900      Row Name 12/13/18 0824             Therapeutic Exercise    Upper Extremity Range of Motion (Therapeutic Exercise)  shoulder flexion/extension, bilateral;shoulder abduction/adduction, bilateral;elbow flexion/extension, bilateral;forearm supination/pronation, bilateral;wrist flexion/extension, bilateral scapula elevation/depression  -SMITH      Hand (Therapeutic Exercise)  finger flexion/extension, bilateral  -SMITH      Position (Therapeutic Exercise)  supine  -SMITH      Sets/Reps (Therapeutic Exercise)  1/10  -SMITH      Expected Outcome (Therapeutic Exercise)  improve functional tolerance, self-care activity;increase active range of motion;increase passive range of motion  -SMITH      Comment (Therapeutic Exercise)  Pt. at times would assist and other times resist mvmt.  Limited abilty to attend.  -SMITH      Recorded by [SMITH] Poonam Cook, OT 12/13/18 0900      Row Name 12/13/18 0824             Positioning and Restraints    Pre-Treatment Position  in bed  -SMITH      Post Treatment Position  bed  -SMITH      In Bed  supine;call light within reach;encouraged to call for assist;exit alarm on lunch tray setup for pt.   -SMITH      Recorded by [SMITH] Poonam Cook, OT 12/13/18 0900      Row Name 12/13/18 0824             Pain Scale: Numbers Pre/Post-Treatment    Pain Scale: Numbers, Pretreatment  0/10 - no pain  -SMITH      Pain Scale: Numbers, Post-Treatment  0/10 - no pain  -SMITH      Recorded by [SMITH] Poonam Cook, OT 12/13/18 0900      Row Name                Wound 11/29/18 0915 Right medial gluteal other (see comments)    Wound - Properties Group Date first assessed: 11/29/18 [CP] Time first assessed: 0915 [CP] Present On Admission : yes;picture taken [CP] Side: Right [CP] Orientation: medial [CP] Location: gluteal [CP] Type: other (see comments) [CP] Additional Comments: shearing [CP] Recorded by:  [CP]  Angélica Gavin, APRN 11/29/18 1100    Row Name                Wound 11/29/18 1730 Right anterior;lower leg incision    Wound - Properties Group Date first assessed: 11/29/18 [AB] Time first assessed: 1730 [AB] Present On Admission : other (see comments) [AB], wound present, excised by Dr. Gimenez  Side: Right [AB] Orientation: anterior;lower [AB] Location: leg [AB] Type: incision [AB] Stage, Pressure Injury: other (see comments) [AB] Recorded by:  [AB] Jag Amaral, RN 11/29/18 1753    Row Name                Wound 12/01/18 1400 Left lower leg skin tear    Wound - Properties Group Date first assessed: 12/01/18 [AB] Time first assessed: 1400 [AB] Present On Admission : no [AB] Side: Left [AB] Orientation: lower [AB] Location: leg [AB] Type: skin tear [AB] Recorded by:  [AB] Jag Amaral RN 12/01/18 1430    Row Name 12/13/18 0824             Plan of Care Review    Plan of Care Reviewed With  patient  -SMITH      Recorded by [SMITH] Poonam Cook, OT 12/13/18 0900      Row Name 12/13/18 0824             Outcome Summary/Treatment Plan (OT)    Daily Summary of Progress (OT)  progress toward functional goals is gradual  -SMITH      Barriers to Overall Progress (OT)  confusion and easily agitated.  -SMITH      Plan for Continued Treatment (OT)  cont OT POC  -SMITH      Recorded by [SMITH] Poonam Cook, OT 12/13/18 0900        User Key  (r) = Recorded By, (t) = Taken By, (c) = Cosigned By    Initials Name Effective Dates Discipline    SMITH Poonam Cook, OT 06/08/18 -  OT    CP Angélica Gavin, APRN 11/05/18 -  Nurse    AB Jag Amaral, RN 02/02/17 -  Nurse        Wound 11/29/18 0915 Right medial gluteal other (see comments) (Active)   Dressing Appearance dry;intact 12/13/2018  6:00 AM   Base dressing in place, unable to visualize 12/13/2018  6:00 AM   Drainage Amount none 12/13/2018  6:00 AM       Wound 11/29/18 1730 Right anterior;lower leg incision (Active)   Dressing Appearance dry;intact 12/13/2018  6:00 AM    Base dressing in place, unable to visualize 12/13/2018  6:00 AM   Periwound intact;warm 12/12/2018  8:00 PM   Edges irregular;open 12/12/2018  8:00 PM   Drainage Amount none 12/13/2018  6:00 AM   Care, Wound cleansed with;sterile normal saline 12/12/2018  8:00 PM   Dressing Care, Wound dressing changed;gauze, wet-to-dry 12/12/2018  8:00 PM       Wound 12/01/18 1400 Left lower leg skin tear (Active)   Dressing Appearance dry;intact 12/13/2018  6:00 AM   Base dressing in place, unable to visualize 12/13/2018  6:00 AM   Edges irregular;open 12/12/2018  8:00 PM   Drainage Amount none 12/13/2018  6:00 AM   Care, Wound cleansed with;sterile normal saline 12/12/2018  8:00 PM   Dressing Care, Wound dressing changed 12/12/2018  8:00 PM     Rehab Goal Summary     Row Name 12/13/18 0824             Bed Mobility Goal 1 (OT)    Progress/Outcomes (Bed Mobility Goal 1, OT)  goal ongoing  -SMITH         Transfer Goal 1 (OT)    Progress/Outcome (Transfer Goal 1, OT)  goal ongoing  -SMITH         Grooming Goal 1 (OT)    Progress/Outcome (Grooming Goal 1, OT)  goal partially met met washing face only  -SMITH        User Key  (r) = Recorded By, (t) = Taken By, (c) = Cosigned By    Initials Name Provider Type Discipline    Poonam Luz, OT Occupational Therapist OT        Occupational Therapy Education     Title: PT OT SLP Therapies (In Progress)     Topic: Occupational Therapy (In Progress)     Point: ADL training (In Progress)     Description: Instruct learner(s) on proper safety adaptation and remediation techniques during self care or transfers.   Instruct in proper use of assistive devices.    Learning Progress Summary           Patient Acceptance, E, NR by SMITH at 12/13/2018  8:24 AM    Comment:  Sequencing ADL task, benefits of activity    Acceptance, E, NR by TB at 12/4/2018 11:13 AM    Comment:  Education initiated for benefits of activity/therapy and role of OT                   Point: Home exercise program (In Progress)      Description: Instruct learner(s) on appropriate technique for monitoring, assisting and/or progressing therapeutic exercises/activities.    Learning Progress Summary           Patient Acceptance, E, NR by SMITH at 12/13/2018  8:24 AM    Comment:  Sequencing ADL task, benefits of activity                               User Key     Initials Effective Dates Name Provider Type Discipline     06/08/18 -  Brianne Yeung, OT Occupational Therapist OT    SMITH 06/08/18 -  Poonam Cook, OT Occupational Therapist OT                OT Recommendation and Plan  Outcome Summary/Treatment Plan (OT)  Daily Summary of Progress (OT): progress toward functional goals is gradual  Barriers to Overall Progress (OT): confusion and easily agitated.  Plan for Continued Treatment (OT): cont OT POC  Daily Summary of Progress (OT): progress toward functional goals is gradual  Plan of Care Review  Plan of Care Reviewed With: patient  Plan of Care Reviewed With: patient  Outcome Summary: Slow progress.  Pt. limited by confusion and easy aggitation.  Pt. able to participate in some grooming with fair accuracy and some small about with ROM exercises.  Cont OT POC as pt. willing to participate.  Outcome Measures     Row Name 12/13/18 0824 12/11/18 1028 12/10/18 1042       How much help from another person do you currently need...    Turning from your back to your side while in flat bed without using bedrails?  --  1  -KR  --    Moving from lying on back to sitting on the side of a flat bed without bedrails?  --  1  -KR  --    Moving to and from a bed to a chair (including a wheelchair)?  --  1  -KR  --    Standing up from a chair using your arms (e.g., wheelchair, bedside chair)?  --  1  -KR  --    Climbing 3-5 steps with a railing?  --  1  -KR  --    To walk in hospital room?  --  1  -KR  --    AM-PAC 6 Clicks Score  --  6  -KR  --       How much help from another is currently needed...    Putting on and taking off regular lower body  clothing?  1  -SMITH  --  1  -SD    Bathing (including washing, rinsing, and drying)  1  -SMITH  --  1  -SD    Toileting (which includes using toilet bed pan or urinal)  1  -SMITH  --  1  -SD    Putting on and taking off regular upper body clothing  1  -SMITH  --  2  -SD    Taking care of personal grooming (such as brushing teeth)  2  -SMITH  --  2  -SD    Eating meals  3  -SMITH  --  1  -SD    Score  9  -SMITH  --  8  -SD       Functional Assessment    Outcome Measure Options  AM-PAC 6 Clicks Daily Activity (OT)  -SMITH  AM-PAC 6 Clicks Basic Mobility (PT)  -KR  AM-PAC 6 Clicks Daily Activity (OT)  -SD      User Key  (r) = Recorded By, (t) = Taken By, (c) = Cosigned By    Initials Name Provider Type    Poonam Luz, OT Occupational Therapist    Kaitlin Silva, OT Occupational Therapist    Anna Carrera, PT Physical Therapist           Time Calculation:   Time Calculation- OT     Row Name 12/13/18 0824             Time Calculation- OT    OT Start Time  0824  -SMITH      OT Received On  12/13/18  -SMITH      OT Goal Re-Cert Due Date  12/14/18  -SMITH         Timed Charges    24087 - OT Therapeutic Exercise Minutes  10  -SMITH      49589 - OT Self Care/Mgmt Minutes  13  -SMITH        User Key  (r) = Recorded By, (t) = Taken By, (c) = Cosigned By    Initials Name Provider Type    Poonam Luz, OT Occupational Therapist           Therapy Suggested Charges     Code   Minutes Charges    82759 (CPT®) Hc Ot Neuromusc Re Education Ea 15 Min      64239 (CPT®) Hc Ot Ther Proc Ea 15 Min 10 1    59266 (CPT®) Hc Ot Therapeutic Act Ea 15 Min      05514 (CPT®) Hc Ot Manual Therapy Ea 15 Min      36314 (CPT®) Hc Ot Iontophoresis Ea 15 Min      45885 (CPT®) Hc Ot Elec Stim Ea-Per 15 Min      11459 (CPT®) Hc Ot Ultrasound Ea 15 Min      14485 (CPT®) Hc Ot Self Care/Mgmt/Train Ea 15 Min 13 1    Total  23 2        Therapy Charges for Today     Code Description Service Date Service Provider Modifiers Qty    92487182438 HC OT THER PROC EA 15 MIN  12/13/2018 Poonam Cook, OT GO 1    97080255468 HC OT SELF CARE/MGMT/TRAIN EA 15 MIN 12/13/2018 Poonam Cook, OT GO 1               Poonam Cook, OT  12/13/2018

## 2018-12-13 NOTE — PLAN OF CARE
Problem: Patient Care Overview  Goal: Plan of Care Review  Outcome: Ongoing (interventions implemented as appropriate)   12/13/18 4079   Coping/Psychosocial   Plan of Care Reviewed With patient   Plan of Care Review   Progress no change;  Patient has been non-compliant with taking medications today;  Has attempted to bite, has struck and pinched staff.

## 2018-12-13 NOTE — PLAN OF CARE
Problem: Patient Care Overview  Goal: Plan of Care Review  Outcome: Ongoing (interventions implemented as appropriate)   12/13/18 4241   Coping/Psychosocial   Plan of Care Reviewed With patient   SLP treatment completed. Will continue to address assessment of diet tolerance before signing off. Please see note for further details and recommendations.

## 2018-12-13 NOTE — PROGRESS NOTES
INFECTIOUS DISEASE PROGRESS NOTE    Leila Smith  1945  [id number]    Date: 12/13/2018    Admission Date: 11/28/2018    CC: MRSA bacteremia    History of present illness:    Patient is a 73 y.o. female presents from [facility] with ventricular tachycardia and hyperkalemia from  acute on chronic renal failure;  PAtient admitted to ICU and has been worked up for parotitis, leg abscess and has been found to have MRSA bacteremia.. Blood cultures are positive for MRSA. Hematoma on right leg was drained by surgery on 11/29. 2-dimensional Echocardiogram negative for vegetations.     Patient is obtunded and is a poor historian no family by bedside.  Opens eyes during exam but is nonverbal.    12/3/18: Patient is a poor historian with no family at bedside.  She says yes to any question.  She is more alert today. She remains afebrile. Right parotid gland area with swelling and pain as she swatted my hand away when palpated.     12/4/18; doing well; no events overnight; no complaints, poor historian, ros unobtainable    12/5/18; no events overnight;  Per nurse no fever, rash, poor historian, picc placed    12/6/18; doing well; no events overnight; ros unobtainable    12/7/18; doing fair, poor historian, no complaints, nonverbal; ros unobtainable    12/10/18; no events overnight; feels well; no complaints, no diarrhea, rash, sore throat; awake says a few words    12/11/18 doing well; more awake, no complaints, no fever, rash, sore throat or diarrhea  Denies right parotid pain.    12/12/18; no events overnight; no complaints; feels well; no fever, rash, sore throat, less pain in right parotid.  12/13/18; no events overnight; no complaints; resting quietly    Past Medical History:   Diagnosis Date   • Atrial fibrillation (CMS/HCC)    • Chronic ulcer of right leg (CMS/HCC)    • Cognitive communication deficit    • COPD (chronic obstructive pulmonary disease) (CMS/HCC)    • Diabetes mellitus (CMS/HCC)    •  Difficulty in walking    • Encephalopathy    • Generalized muscle weakness    • Hematuria    • Hyperlipidemia    • Hypertension    • Hypothyroidism    • Urinary tract infection        Past Surgical History:   Procedure Laterality Date   • CATARACT EXTRACTION     • CHOLECYSTECTOMY     • COLOSTOMY     • FOOT SURGERY Right    • HERNIA REPAIR     • HYSTERECTOMY     • TOTAL KNEE ARTHROPLASTY Right        Family History   Problem Relation Age of Onset   • Stomach cancer Mother    • Alcohol abuse Other    • Cancer Other    • Diabetes Other    • Hypertension Other    • Other Other        Social History     Socioeconomic History   • Marital status:      Spouse name: Not on file   • Number of children: Not on file   • Years of education: Not on file   • Highest education level: Not on file   Social Needs   • Financial resource strain: Not on file   • Food insecurity - worry: Not on file   • Food insecurity - inability: Not on file   • Transportation needs - medical: Not on file   • Transportation needs - non-medical: Not on file   Occupational History   • Not on file   Tobacco Use   • Smoking status: Former Smoker     Packs/day: 0.00     Years: 50.00     Pack years: 0.00     Types: Cigarettes   • Smokeless tobacco: Never Used   Substance and Sexual Activity   • Alcohol use: No   • Drug use: No   • Sexual activity: Defer   Other Topics Concern   • Not on file   Social History Narrative   • Not on file       Allergies   Allergen Reactions   • Codeine    • Tetracyclines & Related          Medication:    Current Facility-Administered Medications:   •  apixaban (ELIQUIS) tablet 2.5 mg, 2.5 mg, Oral, Q12H, Yumiko Sandoval, DO, 2.5 mg at 12/12/18 2238  •  aspirin EC tablet 81 mg, 81 mg, Oral, Daily, Jory Macedo APRN, 81 mg at 12/13/18 1057  •  castor oil-balsam peru (VENELEX) ointment, , Topical, Q12H, Julien Carcamo APRN  •  [START ON 12/14/2018] DAPTOmycin (CUBICIN) 500 mg in sodium chloride 0.9 % 50 mL IVPB,  8 mg/kg (Adjusted), Intravenous, Q48H, Evert Cid, [facility]  •  dextrose (D50W) 25 g/ 50mL Intravenous Solution 25 g, 25 g, Intravenous, Q15 Min PRN, AtYumiko galvin, DO  •  dextrose (GLUTOSE) oral gel 15 g, 15 g, Oral, Q15 Min PRN, Atkins, Yumiko L, DO  •  diltiaZEM (CARDIZEM) tablet 60 mg, 60 mg, Oral, Q6H, Balwinder Marquez III, MD, 60 mg at 12/13/18 0617  •  doxycycline (MONODOX) capsule 100 mg, 100 mg, Oral, Q12H, Keo Hess MD  •  glucagon (human recombinant) (GLUCAGEN DIAGNOSTIC) injection 1 mg, 1 mg, Subcutaneous, PRN, Yumiko Sandoval, DO  •  hydrALAZINE (APRESOLINE) tablet 10 mg, 10 mg, Oral, Q8H PRN, Jasmina Maier, DO  •  insulin detemir (LEVEMIR) injection 15 Units, 15 Units, Subcutaneous, Daily, Rod Flores MD, 15 Units at 12/13/18 1059  •  insulin detemir (LEVEMIR) injection 5 Units, 5 Units, Subcutaneous, Nightly, Rod Flores MD, 5 Units at 12/12/18 2309  •  loperamide (IMODIUM) capsule 2 mg, 2 mg, Oral, TID PRN, Rod Folres MD, 2 mg at 12/11/18 2224  •  loperamide (IMODIUM) capsule 2 mg, 2 mg, Oral, TID, Rod Flores MD, 2 mg at 12/12/18 2237  •  Magnesium Sulfate 2 gram Bolus, followed by 8 gram infusion (total Mg dose 10 grams)- Mg less than or equal to 1mg/dL, 2 g, Intravenous, PRN **OR** Magnesium Sulfate 2 gram / 50mL Infusion (GIVE X 3 BAGS TO EQUAL 6GM TOTAL DOSE) - Mg 1.1 - 1.5 mg/dl, 2 g, Intravenous, PRN **OR** Magnesium Sulfate 4 gram infusion- Mg 1.6-1.9 mg/dL, 4 g, Intravenous, PRN, Balwinder Mccall [facility], Last Rate: 25 mL/hr at 12/03/18 1218, 4 g at 12/03/18 1218  •  metoprolol tartrate (LOPRESSOR) tablet 50 mg, 50 mg, Oral, Q12H, Balwinder Marquez III, MD, 50 mg at 12/12/18 2237  •  potassium chloride (MICRO-K) CR capsule 40 mEq, 40 mEq, Oral, PRN **OR** potassium chloride (KLOR-CON) packet 40 mEq, 40 mEq, Oral, PRN, 40 mEq at 12/03/18 1714 **OR** potassium chloride 10 mEq in 100 mL IVPB, 10 mEq, Intravenous, Q1H PRN, Balwinder Mccall, [facility]  •   potassium phosphate 45 mmol in sodium chloride 0.9 % 500 mL infusion, 45 mmol, Intravenous, PRN, Last Rate: 62.5 mL/hr at 12/03/18 1713, 45 mmol at 12/03/18 1713 **OR** potassium phosphate 30 mmol in sodium chloride 0.9 % 250 mL infusion, 30 mmol, Intravenous, PRN **OR** potassium phosphate 15 mmol in sodium chloride 0.9 % 100 mL infusion, 15 mmol, Intravenous, PRN **OR** sodium glycerophosphate 40 mmol in sodium chloride 0.9 % 500 mL IVPB, 40 mmol, Intravenous, PRN **OR** sodium glycerophosphate 20 mmol in sodium chloride 0.9 % 250 mL IVPB, 20 mmol, Intravenous, PRN, Balwinder Mccall RP  •  sodium chloride 0.9 % flush 10 mL, 10 mL, Intravenous, PRN, Rohan Ceballos MD  •  sodium chloride 0.9 % flush 10 mL, 10 mL, Intravenous, Q12H, Shandra Oscar MD, 10 mL at 12/13/18 1058  •  sodium chloride 0.9 % flush 10 mL, 10 mL, Intravenous, PRN, Shandra Oscar MD  •  sodium chloride 0.9 % flush 3-10 mL, 3-10 mL, Intravenous, PRN, Julien Carcamo APRN, 10 mL at 12/08/18 0835  •  sodium chloride 0.9 % infusion, 100 mL/hr, Intravenous, Continuous, Rod Flores MD, Last Rate: 100 mL/hr at 12/13/18 1217, 100 mL/hr at 12/13/18 1217  •  thiamine (VITAMIN B-1) tablet 100 mg, 100 mg, Oral, Daily, Case, Jasmina V., DO, 100 mg at 12/12/18 1039    Antibiotics:  Anti-Infectives (From admission, onward)    Ordered     Dose/Rate Route Frequency Start Stop    12/12/18 1305  DAPTOmycin (CUBICIN) 500 mg in sodium chloride 0.9 % 50 mL IVPB     Ordering Provider:  Evert Cid RPH    8 mg/kg × 63.6 kg (Adjusted)  100 mL/hr over 30 Minutes Intravenous Every 48 Hours 12/14/18 1200 12/18/18 1159    12/13/18 1232  doxycycline (MONODOX) capsule 100 mg     Ordering Provider:  Keo Hess MD    100 mg Oral Every 12 Hours Scheduled 12/13/18 1315 01/10/19 0859    11/29/18 1128  vancomycin (VANCOCIN) in iso-osmotic dextrose IVPB 1 g (premix) 200 mL     Ordering Provider:  Aysha Rocha, [facility]    1,000  mg  over 60 Minutes Intravenous Once 18 1400 18 1448    18 1522  piperacillin-tazobactam (ZOSYN) 3.375 g in iso-osmotic dextrose 50 ml (premix)     Eran Huang [facility] reviewed the order on 18 0942.   Ordering Provider:  Jasmina Maier., DO    3.375 g  over 4 Hours Intravenous Every 12 Hours 18 2200 18 1200    18 1332  vancomycin 1750 mg/500 mL 0.9% NS IVPB ([facility])     Ordering Provider:  Rohan Ceballos MD    20 mg/kg × 90.7 kg  over 120 Minutes Intravenous Once 18 1334 18 1642    18 1332  piperacillin-tazobactam (ZOSYN) 3.375 g in iso-osmotic dextrose 50 ml (premix)     Ordering Provider:  Rohan Ceballos MD    3.375 g  over 30 Minutes Intravenous Once 18 1334 18 1720            Review of Systems:  See hpi      Physical Exam:   Vital Signs  Temp (24hrs), Av.6 °F (36.4 °C), Min:97.4 °F (36.3 °C), Max:97.8 °F (36.6 °C)    Temp  Min: 97.4 °F (36.3 °C)  Max: 97.8 °F (36.6 °C)  BP  Min: 113/61  Max: 164/82  Pulse  Min: 84  Max: 111  Resp  Min: 18  Max: 18  SpO2  Min: 98 %  Max: 100 %    GENERAL: resting quietly opens eyes to exam, more alert.  HEENT: Normocephalic, atraumatic.  PERRL. EOMI. No conjunctival injection. No icterus. Oropharynx clear without evidence of thrush or exudate. No evidence of peridontal disease.  No ext oral lesions    HEART: RRR; systolic murmur loudest at base left  LUNGS: Clear to auscultation bilaterally   ABDOMEN: Soft, nontender, nondistended. Positive bowel sounds.   EXT:  No cyanosis, clubbing or edema. No cord.  MSK: FROM without joint effusions noted arms/legs.    SKIN: keloid like scarring on toes, fore feet bilaterally    NEURO: ;opens eyes, removes stethoscope from heart during exam      Laboratory Data    Results from last 7 days   Lab Units  18   0708  18   0532  12/10/18   0847   WBC 10*3/mm3  13.06*  13.15*  9.59   HEMOGLOBIN g/dL  10.4*  11.5  12.4   HEMATOCRIT %  34.3*  38.8  41.2    PLATELETS 10*3/mm3  455*  462*  398     Results from last 7 days   Lab Units  12/13/18   0708   SODIUM mmol/L  138   POTASSIUM mmol/L  4.4   CHLORIDE mmol/L  120*   CO2 mmol/L  11.0*   BUN mg/dL  32*   CREATININE mg/dL  1.55*   GLUCOSE mg/dL  182*   CALCIUM mg/dL  8.1*     Results from last 7 days   Lab Units  12/08/18   0637   ALK PHOS U/L  206*   BILIRUBIN mg/dL  0.4   ALT (SGPT) U/L  26   AST (SGOT) U/L  24                         Estimated Creatinine Clearance: 31.6 mL/min (A) (by C-G formula based on SCr of 1.55 mg/dL (H)).      Microbiology:  Blood Culture   Date Value Ref Range Status   11/30/2018 No growth at 2 days  Preliminary   11/30/2018 No growth at 2 days  Preliminary   11/28/2018 Staphylococcus aureus, MRSA (A)  Final     Comment:       Consider infectious disease consult to rule out distant focus of infection.  Methicillin resistant Staphylococcus aureus, Patient may be an isolation risk.   11/28/2018 Staphylococcus aureus, MRSA (A)  Final     Comment:       Consider infectious disease consult to rule out distant focus of infection.  Methicillin resistant Staphylococcus aureus, Patient may be an isolation risk.       BCID, PCR   Date Value Ref Range Status   11/28/2018 (C) No organism detected by BCID PCR. Final    Staphylococcus aureus. mecA (methicillin resistance gene) detected. Identification by BCID PCR.                 Wound Culture   Date Value Ref Range Status   11/30/2018 (A)  Final    Scant growth (1+) Staphylococcus, coagulase negative     Comment:     Susceptibility will not be done unless Doctor notifies Lab             Radiology:  Fl Video Swallow With Speech    Result Date: 12/7/2018  Fluoroscopy provided for a modified barium swallow. Please see speech therapy report for full details and recommendations.    This report was finalized on 12/7/2018 10:25 PM by DR. Brian Cosme MD.          Impression:   MRSA bactertemia  Left leg abscess   Parotitis right  side  Encephalopathy  COPD  Acute renal failure on chronic      PLAN/RECOMMENDATIONS:     Estimated Creatinine Clearance: 31.6 mL/min (A) (by C-G formula based on SCr of 1.55 mg/dL (H)).    Source of MRSA bacteremia could be from the parotid gland; no abscess seen on initial imaging;  Will be interesting to see if leg culures grow MRSA; a leg abscess can spontaneously develop and usually doesn't lead to positive blood cultures.    Regardless; high grade bacteremia is still concerning for endovascular involvement.    YANNA -ve for endocarditis    Given lack of  pulmonary involvement probably not active concern would change abx:    D/c daptomycin    start oral abx(bactrim 400/80 mg bid x 2 weeks)   Follow creatinine, potassium  Clinically improving    D/w Dr. Flores;  Dp/cm time 20 minutes    Olivier Gtz MD  12/13/2018  4:01 PM

## 2018-12-13 NOTE — PLAN OF CARE
Problem: Patient Care Overview  Goal: Plan of Care Review  Outcome: Ongoing (interventions implemented as appropriate)   12/13/18 2435   Coping/Psychosocial   Plan of Care Reviewed With patient   Plan of Care Review   Progress no change   OTHER   Outcome Summary Pt had minimal participation in PT activities in bed with assist. Pt actively resisting gentle PROM with yelling and cursing. Continue skilled PT as appropriate and as pt is willing to participate. Recommend return to SNF when appropriate.

## 2018-12-13 NOTE — PLAN OF CARE
Problem: Patient Care Overview  Goal: Plan of Care Review  Outcome: Ongoing (interventions implemented as appropriate)   12/13/18 8476   Coping/Psychosocial   Plan of Care Reviewed With patient   Plan of Care Review   Progress improving   OTHER   Outcome Summary WOC nurse f/u for ileostomy. Appliance intact with no leakage; stoma red and moist. 400 cc watery brown stool output emptied from bag. Pt not educational candidate at this time. WOC nurse will f/u. Please contact WOC nurse as needed for concerns.

## 2018-12-13 NOTE — THERAPY TREATMENT NOTE
Acute Care - Physical Therapy Treatment Note  [facility]     Patient Name: Leila Smith  : 1945  MRN: [id number]  Today's Date: 2018  Onset of Illness/Injury or Date of Surgery: 18  Date of Referral to PT: 18  Referring Physician: SAUL Oscar MD    Admit Date: 2018    Visit Dx:    ICD-10-CM ICD-9-CM   1. Impaired functional mobility, balance, gait, and endurance Z74.09 V49.89   2. Dysphagia, unspecified type R13.10 787.20   3. Ventricular tachycardia (CMS/HCC) I47.2 427.1   4. Altered mental status, unspecified altered mental status type R41.82 780.97     Patient Active Problem List   Diagnosis   • Diabetes mellitus, type 2 (CMS/HCC)   • Hypertension   • Hyperlipidemia   • Hypothyroidism   • Chronic obstructive pulmonary disease (CMS/HCC)   • CAD (coronary artery disease)   • History of edema   • History of obesity   • Venous insufficiency   • Open wound of lower extremity   • Urinary tract infection   • T2DM (type 2 diabetes mellitus) (CMS/HCC)   • Dyspnea on exertion   • Peripheral vascular disease (CMS/HCC)   • Obstructive sleep apnea syndrome   • Ulcer of lower extremity (CMS/HCC)   • Hypoxemia   • Hematuria   • Diabetic peripheral neuropathy (CMS/HCC)   • Edema   • Chronic obstructive pulmonary disease with acute exacerbation (CMS/HCC)   • Congestive heart failure (CMS/HCC)   • Arthritis   • Neutrophilic leukocytosis   • Cystitis   • Acute renal failure superimposed on stage 3 chronic kidney disease (CMS/HCC)   • Hyperkalemia   • Leukocytosis   • Elevated troponin   • Altered mental status   • Ventricular tachyarrhythmia (CMS/HCC)   • Sepsis due to methicillin resistant Staphylococcus aureus (MRSA) (CMS/HCC)   • Acute parotitis   • High output ileostomy (CMS/HCC)   • Normal anion gap metabolic acidosis       Therapy Treatment    Rehabilitation Treatment Summary     Row Name 18 0921 18 0824          Treatment Time/Intention    Discipline  physical therapist   -LF  occupational therapist  -SMITH     Document Type  therapy note (daily note)  -LF  therapy note (daily note)  -SMITH     Subjective Information  no complaints  -LF  no complaints  -SMITH     Mode of Treatment  physical therapy;individual therapy  -LF  --     Patient/Family Observations  Pt in bed with HOB elevated and slightly turned to the right, no visitors or family present.  -LF  No family present.  Pt. supine in bed on tele with IV RUE.  -SMITH     Care Plan Review  care plan/treatment goals reviewed;risks/benefits reviewed;current/potential barriers reviewed;patient/other agree to care plan  -LF  care plan/treatment goals reviewed;risks/benefits reviewed;current/potential barriers reviewed  -SMITH     Therapy Frequency (PT Clinical Impression)  daily  -LF  --     Patient Effort  poor  -LF  adequate  -SMITH     Comment  Pt did not respond to orientation questions at beginning of session.  During PT session pt yelling and cursing.  -LF  Pt. aggitated yelling out no when aid in to do BP and check blood sugar otherwise pleasant.  -SMITH     Existing Precautions/Restrictions  fall  -LF  fall  -SMITH     Recorded by [LF] Stacy Person, PT 12/13/18 1017 [SMITH] Poonam Cook, OT 12/13/18 0900     Row Name 12/13/18 0921 12/13/18 0824          Vital Signs    Pre Systolic BP Rehab  164  -LF  113  -SMITH     Pre Treatment Diastolic BP  82  -LF  61 earlier BP  -SMITH     Intra Systolic BP Rehab  --  164  -SMITH     Intra Treatment Diastolic BP  --  82 when aid in mid session and took BP pt. yelling out/aggitate  -SMITH     Post Systolic BP Rehab  117  -LF  --     Post Treatment Diastolic BP  60  -LF  --     Pretreatment Heart Rate (beats/min)  93  -LF  82  -SMITH     Intratreatment Heart Rate (beats/min)  --  111 elevated when aid getting BS and BP  -SMITH     Posttreatment Heart Rate (beats/min)  94  -LF  --     Pre SpO2 (%)  100  -LF  100  -SMITH     O2 Delivery Pre Treatment  room air  -LF  room air  -SMITH     O2 Delivery Intra Treatment  room air  -LF  --      Post SpO2 (%)  100  -LF  --     O2 Delivery Post Treatment  room air  -LF  --     Pre Patient Position  Supine  -LF  Supine  -SMITH     Intra Patient Position  Supine  -LF  Supine  -SMITH     Post Patient Position  --  Supine  -SMITH     Recorded by [LF] Stacy Person, PT 12/13/18 1017 [SMITH] Joye Poonam Gini, OT 12/13/18 0900     Row Name 12/13/18 0921             Cognitive Assessment/Intervention    Additional Documentation  Cognitive Assessment/Intervention (Group)  -LF      Recorded by [LF] Stacy Person, PT 12/13/18 1017      Row Name 12/13/18 0921 12/13/18 0824          Cognitive Assessment/Intervention- PT/OT    Affect/Mental Status (Cognitive)  confused;agitated  -LF  confused;flat/blunted affect  -SMITH     Behavioral Issues (Cognitive)  difficulty managing stress;overwhelmed easily;uncooperative;withdrawn;verbal outbursts  -LF  difficulty managing stress;overwhelmed easily  -SMITH     Orientation Status (Cognition)  disoriented to;person;other (see comments) oriented to hospital with cues.  Not oriented to month/yr.  -  oriented to;person pt. would not even try to answer other questions  -SMITH     Follows Commands (Cognition)  does not follow one step commands;physical/tactile prompts required;repetition of directions required;verbal cues/prompting required;other (see comments) appears to be ignoring or avoiding questions and directions  -  follows one step commands;25-49% accuracy;physical/tactile prompts required;repetition of directions required;verbal cues/prompting required appears often by choice  -SMITH     Cognitive Function (Cognitive)  safety deficit;memory deficit;attention deficit  -LF  safety deficit;attention deficit  -SMITH     Attention Deficit (Cognitive)  moderate deficit;selective attention  -LF  moderate deficit  -SMITH     Memory Deficit (Cognitive)  moderate deficit  -LF  --     Safety Deficit (Cognitive)  severe deficit;ability to follow commands;at risk behavior observed;awareness of need for  assistance;insight into deficits/self awareness;judgment;safety precautions awareness  -LF  severe deficit  -SMITH     Cognitive Interventions (Cognitive)  --  -- pt. stating lives in [place] and one dtr stating her name  -SMITH     Personal Safety Interventions  fall prevention program maintained  -  fall prevention program maintained;gait belt;nonskid shoes/slippers when out of bed  -SMITH     Recorded by [LF] Stacy Person, PT 12/13/18 1017 [SMITH] Poonam Cook, OT 12/13/18 0900     Row Name 12/13/18 0921 12/13/18 0824          Safety Issues, Functional Mobility    Safety Issues Affecting Function (Mobility)  at risk behavior observed;ability to follow commands;awareness of need for assistance;friction/shear risk;insight into deficits/self awareness;safety precaution awareness;safety precautions follow-through/compliance  -  insight into deficits/self awareness;judgment;problem solving;safety precautions follow-through/compliance;safety precaution awareness;sequencing abilities  -SMITH     Impairments Affecting Function (Mobility)  cognition;motor control;pain;postural/trunk control;range of motion (ROM);strength;other (see comments) A/PROM  -  cognition;strength;range of motion (ROM)  -SMITH     Recorded by [LF] Stacy Person, PT 12/13/18 1017 [SMITH] Poonam Cook, OT 12/13/18 0900     Row Name 12/13/18 0921 12/13/18 0824          Bed Mobility Assessment/Treatment    Rolling Left [place] (Bed Mobility)  dependent (less than 25% patient effort)  -  --     Rolling Right [place] (Bed Mobility)  dependent (less than 25% patient effort)  -  --     Bed Mobility, Safety Issues  cognitive deficits limit understanding;decreased use of arms for pushing/pulling;decreased use of legs for bridging/pushing;impaired trunk control for bed mobility  -  --     Comment (Bed Mobility)  Pt refused to participate in bed mobility  -LF  deferred to PT  -SMITH     Recorded by [LF] Stacy Person, PT 12/13/18 1017 [SMITH]  Poonam Cook, OT 12/13/18 0900     Row Name 12/13/18 0824             Functional Mobility    Functional Mobility- Comment  deferred to PT  -SMITH      Recorded by [SMITH] Poonam Cook, OT 12/13/18 0900      Row Name 12/13/18 0921 12/13/18 0824          Transfer Assessment/Treatment    Comment (Transfers)  Pt refused.  -LF  Pt. has only been getting up with lift.  Pt. with poor command following so unable to attempt on own  -SMITH     Recorded by [LF] Stacy Person, PT 12/13/18 1017 [SMITH] Poonam Cook, OT 12/13/18 0900     Row Name 12/13/18 0921             Bed-Chair Transfer    Bed-Chair Walker (Transfers)  dependent (less than 25% patient effort)  -LF      Assistive Device (Bed-Chair Transfers)  other (see comments) pt refused  -LF      Recorded by [LF] Stacy Person, PT 12/13/18 1017      Row Name 12/13/18 0921             Gait/Stairs Assessment/Training    [place] Level (Gait)  unable to assess  -LF      Comment (Gait/Stairs)  Amb not appropriate  -LF      Recorded by [LF] Stacy Person, PT 12/13/18 1017      Row Name 12/13/18 0824             ADL Assessment/Intervention    23375 - OT Self Care/Mgmt Minutes  13  -SMITH      BADL Assessment/Intervention  grooming;feeding  -SMITH      Recorded by [SMITH] Poonam Cook, OT 12/13/18 0900      Row Name 12/13/18 0824             Grooming Assessment/Training    [place] Level (Grooming)  wash face, hands;minimum assist (75% patient effort);oral care regimen;moderate assist (50% patient effort);verbal cues;nonverbal cues (demo/gesture);set up  -SMITH      Assistive Devices (Grooming)  hand over hand  -SMITH      Grooming Position  supported sitting  -SMITH      Comment (Grooming)  Pt. handed pt. wash cloth and fair accuracy washing face.  Pt. resisted hand over hand and OT had to wash around L eye.  Pt. post setup with very little tooth paste placed on brush was able to brush teeth with poor accuracy.  Pt. resisted any assist saying it was done.  Pt. could not  swish and spit despite max cues only drinking water.    -SMITH      Recorded by [SMITH] Poonam Cook, OT 12/13/18 0900      Row Name 12/13/18 0824             Self-Feeding Assessment/Training    [place] Level (Feeding)  liquids to mouth;set up;independent  -SMITH      Position (Self-Feeding)  supported sitting  -SMITH      Recorded by [SMITH] Poonam Cook, OT 12/13/18 0900      Row Name 12/13/18 0824             BADL Safety/Performance    Impairments, BADL Safety/Performance  balance;cognition;strength;range of motion;coordination  -SMITH      Cognitive Impairments, BADL Safety/Performance  insight into deficits/self awareness;judgment;problem solving/reasoning;sequencing abilities;attention  -SMITH      Skilled BADL Treatment/Intervention  cognitive/safety deficit modifications;hand-over-hand training/cues  -SMITH      Progress in BADL Status  effort and initiation  -SMITH      Recorded by [SMITH] Poonam Cook, OT 12/13/18 0900      Row Name 12/13/18 0921             General ROM    GENERAL ROM COMMENTS  Pt resisting gentle PROM juan UE and LE  -LF      Recorded by [LF] Stacy Person, PT 12/13/18 1017      Row Name 12/13/18 0921 12/13/18 0824          Therapeutic Exercise    Therapeutic Exercise  supine, lower extremities  -LF  --     Additional Documentation  Therapeutic Exercise (Row)  -LF  --     33169 - PT Therapeutic Exercise Minutes  15  -LF  --     21052 - PT Therapeutic Activity Minutes  8  -LF  --     37343 - OT Therapeutic Exercise Minutes  --  10  -SMITH     Recorded by [LF] Stacy Person, PT 12/13/18 1017 [SMITH] Poonam Cook, OT 12/13/18 0900     Row Name 12/13/18 1000 12/13/18 0921          Lower Extremity Supine Therapeutic Exercise    Performed, Supine Lower Extremity (Therapeutic Exercise)  --  hip flexion/extension;hip abduction/adduction;knee flexion/extension;ankle dorsiflexion/plantarflexion;SLR (straight leg raise);quadriceps sets;ankle pumps;other (see comments) AAROM, pt actively resisting gentle ROM  activities.  -LF     Exercise Type, Supine Lower Extremity (Therapeutic Exercise)  --  PROM (passive range of motion);AAROM (active assistive range of motion)  -LF     Sets/Reps Detail, Supine Lower Extremity (Therapeutic Exercise)   r  -LF  juan LE x10 reps  -LF     Transfers Skills, Training to Functional Activity, Supine Lower Extremity (Therapeutic Exercise)  --  other (see comments) pt refused  -LF     Comment, Supine Lower Extremity (Therapeutic Exercise)  --  performed in luis's position, slightly turned right.    -LF     Recorded by [LF] Stacy Person, PT 12/13/18 1017 [LF] Stacy Person, PT 12/13/18 1017     Row Name 12/13/18 0824             Therapeutic Exercise    Upper Extremity Range of Motion (Therapeutic Exercise)  shoulder flexion/extension, bilateral;shoulder abduction/adduction, bilateral;elbow flexion/extension, bilateral;forearm supination/pronation, bilateral;wrist flexion/extension, bilateral scapula elevation/depression  -SMITH      Hand (Therapeutic Exercise)  finger flexion/extension, bilateral  -SMITH      Position (Therapeutic Exercise)  supine  -SMITH      Sets/Reps (Therapeutic Exercise)  1/10  -SMITH      Expected Outcome (Therapeutic Exercise)  improve functional tolerance, self-care activity;increase active range of motion;increase passive range of motion  -SMITH      Comment (Therapeutic Exercise)  Pt. at times would assist and other times resist mvmt.  Limited abilty to attend.  -SMITH      Recorded by [SMITH] Poonam Cook, OT 12/13/18 0900      Row Name 12/13/18 0921 12/13/18 0824          Positioning and Restraints    Pre-Treatment Position  in bed  -LF  in bed  -SMITH     Post Treatment Position  bed  -LF  bed  -SMITH     In Bed  notified nsg;fowlers;call light within reach;encouraged to call for assist;side rails up x3;waffle boots/both  -LF  supine;call light within reach;encouraged to call for assist;exit alarm on lunch tray setup for pt.   -SMITH     Recorded by [LF] Stacy Person, PT  12/13/18 1017 [SMITH] Poonam Cook, OT 12/13/18 0900     Row Name 12/13/18 0921 12/13/18 0824          Pain Scale: Numbers Pre/Post-Treatment    Pain Scale: Numbers, Pretreatment  0/10 - no pain  -LF  0/10 - no pain  -SMITH     Pain Scale: Numbers, Post-Treatment  -- pt reported pain with activity, but would not describe/rate   -LF  0/10 - no pain  -SMITH     Pain Intervention(s)  Repositioned;Ambulation/increased activity  -LF  --     Recorded by [LF] Stacy Person, PT 12/13/18 1017 [SMITH] Poonam Cook, OT 12/13/18 0900     Row Name 12/13/18 0921             Health Promotion    Additional Documentation  Coping (Group)  -LF      Recorded by [LF] Stacy Person, PT 12/13/18 1017      Row Name                Wound 11/29/18 0915 Right medial gluteal other (see comments)    Wound - Properties Group Date first assessed: 11/29/18 [CP] Time first assessed: 0915 [CP] Present On Admission : yes;picture taken [CP] Side: Right [CP] Orientation: medial [CP] Location: gluteal [CP] Type: other (see comments) [CP] Additional Comments: shearing [CP] Recorded by:  [CP] Angélica Gavin APRN 11/29/18 1100    Row Name                Wound 11/29/18 1730 Right anterior;lower leg incision    Wound - Properties Group Date first assessed: 11/29/18 [AB] Time first assessed: 1730 [AB] Present On Admission : other (see comments) [AB], wound present, excised by Dr. Gimenez  Side: Right [AB] Orientation: anterior;lower [AB] Location: leg [AB] Type: incision [AB] Stage, Pressure Injury: other (see comments) [AB] Recorded by:  [AB] Jag Amaral RN 11/29/18 1753    Row Name                Wound 12/01/18 1400 Left lower leg skin tear    Wound - Properties Group Date first assessed: 12/01/18 [AB] Time first assessed: 1400 [AB] Present On Admission : no [AB] Side: Left [AB] Orientation: lower [AB] Location: leg [AB] Type: skin tear [AB] Recorded by:  [AB] Jag Amaral RN 12/01/18 1430    Row Name 12/13/18 0921             Coping     Observed Emotional State  agitated;frustrated;irritable;overwhelmed;uncooperative;withdrawn  -LF      Recorded by [LF] Stacy Person, PT 12/13/18 1017      Row Name 12/13/18 0921 12/13/18 0824          Plan of Care Review    Plan of Care Reviewed With  patient  -LF  patient  -SMITH     Recorded by [LF] Stacy Person, PT 12/13/18 1017 [SMITH] Poonam Cook, OT 12/13/18 0900     Row Name 12/13/18 0824             Outcome Summary/Treatment Plan (OT)    Daily Summary of Progress (OT)  progress toward functional goals is gradual  -SMITH      Barriers to Overall Progress (OT)  confusion and easily agitated.  -SMITH      Plan for Continued Treatment (OT)  cont OT POC  -SMITH      Recorded by [SMITH] Poonam Cook, OT 12/13/18 0900      Row Name 12/13/18 0921             Outcome Summary/Treatment Plan (PT)    Daily Summary of Progress (PT)  progress toward functional goals is gradual  -LF      Barriers to Overall Progress (PT)  pt participation  -LF      Anticipated Discharge Disposition (PT)  skilled nursing facility;extended care facility;other (see comments) return to [facility]  -LF      Recorded by [LF] Stacy Person, PT 12/13/18 1017        User Key  (r) = Recorded By, (t) = Taken By, (c) = Cosigned By    Initials Name Effective Dates Discipline    Poonam Luz, OT 06/08/18 -  OT    CP Angélica Gavin, APRN 11/05/18 -  Nurse    Jag Gould, RN 02/02/17 -  Nurse    Stacy Fofana, PT 06/08/18 -  PT          Wound 11/29/18 0915 Right medial gluteal other (see comments) (Active)   Dressing Appearance dry;intact 12/13/2018  6:00 AM   Base dressing in place, unable to visualize 12/13/2018  6:00 AM   Drainage Amount none 12/13/2018  6:00 AM       Wound 11/29/18 1730 Right anterior;lower leg incision (Active)   Dressing Appearance dry;intact 12/13/2018  6:00 AM   Base dressing in place, unable to visualize 12/13/2018  6:00 AM   Periwound intact;warm 12/12/2018  8:00 PM   Edges  irregular;open 12/12/2018  8:00 PM   Drainage Amount none 12/13/2018  6:00 AM   Care, Wound cleansed with;sterile normal saline 12/12/2018  8:00 PM   Dressing Care, Wound dressing changed;gauze, wet-to-dry 12/12/2018  8:00 PM       Wound 12/01/18 1400 Left lower leg skin tear (Active)   Dressing Appearance dry;intact 12/13/2018  6:00 AM   Base dressing in place, unable to visualize 12/13/2018  6:00 AM   Edges irregular;open 12/12/2018  8:00 PM   Drainage Amount none 12/13/2018  6:00 AM   Care, Wound cleansed with;sterile normal saline 12/12/2018  8:00 PM   Dressing Care, Wound dressing changed 12/12/2018  8:00 PM       Rehab Goal Summary     Row Name 12/13/18 0824             Bed Mobility Goal 1 (OT)    Progress/Outcomes (Bed Mobility Goal 1, OT)  goal ongoing  -SMITH         Transfer Goal 1 (OT)    Progress/Outcome (Transfer Goal 1, OT)  goal ongoing  -SMITH         Grooming Goal 1 (OT)    Progress/Outcome (Grooming Goal 1, OT)  goal partially met met washing face only  -SMITH        User Key  (r) = Recorded By, (t) = Taken By, (c) = Cosigned By    Initials Name Provider Type Discipline    Poonam Luz, OT Occupational Therapist OT          Physical Therapy Education     Title: PT OT SLP Therapies (In Progress)     Topic: Physical Therapy (In Progress)     Point: Mobility training (In Progress)     Learning Progress Summary           Patient Refuses, E, NR,RT by LF at 12/13/2018 10:17 AM    Acceptance, E, NR by KR at 12/11/2018 10:28 AM                   Point: Home exercise program (In Progress)     Learning Progress Summary           Patient Refuses, E, NR,RT by LF at 12/13/2018 10:17 AM    Acceptance, E, NR by KR at 12/11/2018 10:28 AM    Acceptance, E, NR by KR at 12/6/2018 10:28 AM                   Point: Body mechanics (In Progress)     Learning Progress Summary           Patient Refuses, E, NR,RT by LF at 12/13/2018 10:17 AM    Acceptance, E, NR by KR at 12/11/2018 10:28 AM    Acceptance, E, NR by KR at  12/6/2018 10:28 AM                   Point: Precautions (In Progress)     Learning Progress Summary           Patient Refuses, E, NR,RT by LF at 12/13/2018 10:17 AM    Acceptance, E, NR by DAVID at 12/11/2018 10:28 AM    Acceptance, E, NR by KR at 12/6/2018 10:28 AM                               User Key     Initials Effective Dates Name Provider Type Discipline     04/03/18 -  Anna Foster, PT Physical Therapist PT     06/08/18 -  Stacy Person, PT Physical Therapist PT                PT Recommendation and Plan  Anticipated Discharge Disposition (PT): skilled nursing facility, extended care facility, other (see comments)(return to [facility])  Therapy Frequency (PT Clinical Impression): daily  Outcome Summary/Treatment Plan (PT)  Daily Summary of Progress (PT): progress toward functional goals is gradual  Barriers to Overall Progress (PT): pt participation  Anticipated Discharge Disposition (PT): skilled nursing facility, extended care facility, other (see comments)(return to [facility])  Plan of Care Reviewed With: patient  Progress: no change  Outcome Summary: Pt had minimal participation in PT activities in bed with assist. Pt actively resisting gentle PROM with yelling and cursing.  Continue skilled PT as appropriate and as pt is willing to participate.  Recommend return to SNF when appropriate.  Outcome Measures     Row Name 12/13/18 0824 12/11/18 1028 12/10/18 1042       How much help from another person do you currently need...    Turning from your back to your side while in flat bed without using bedrails?  --  1  -KR  --    Moving from lying on back to sitting on the side of a flat bed without bedrails?  --  1  -KR  --    Moving to and from a bed to a chair (including a wheelchair)?  --  1  -KR  --    Standing up from a chair using your arms (e.g., wheelchair, bedside chair)?  --  1  -KR  --    Climbing 3-5 steps with a railing?  --  1  -KR  --    To walk in hospital room?   --  1  -KR  --    AM-PAC 6 Clicks Score  --  6  -KR  --       How much help from another is currently needed...    Putting on and taking off regular lower body clothing?  1  -SMITH  --  1  -SD    Bathing (including washing, rinsing, and drying)  1  -SMITH  --  1  -SD    Toileting (which includes using toilet bed pan or urinal)  1  -SMITH  --  1  -SD    Putting on and taking off regular upper body clothing  1  -SMITH  --  2  -SD    Taking care of personal grooming (such as brushing teeth)  2  -SMITH  --  2  -SD    Eating meals  3  -SMITH  --  1  -SD    Score  9  -SMITH  --  8  -SD       Functional Assessment    Outcome Measure Options  AM-PAC 6 Clicks Daily Activity (OT)  -SMITH  AM-PAC 6 Clicks Basic Mobility (PT)  -KR  AM-PAC 6 Clicks Daily Activity (OT)  -SD      User Key  (r) = Recorded By, (t) = Taken By, (c) = Cosigned By    Initials Name Provider Type    Poonam Luz, OT Occupational Therapist    Kaitlin Silva, OT Occupational Therapist    Anna Carrera, PT Physical Therapist         Time Calculation:   PT Charges     Row Name 12/13/18 0921             Time Calculation    Start Time  0921  -LF      PT Received On  12/13/18  -      PT Goal Re-Cert Due Date  12/14/18  -LF         Timed Charges    21546 - PT Therapeutic Exercise Minutes  15  -LF      03139 - PT Therapeutic Activity Minutes  8  -LF        User Key  (r) = Recorded By, (t) = Taken By, (c) = Cosigned By    Initials Name Provider Type    Stacy Fofana, PT Physical Therapist        Therapy Suggested Charges     Code   Minutes Charges    47195 (CPT®) Hc Pt Neuromusc Re Education Ea 15 Min      62034 (CPT®) Hc Pt Ther Proc Ea 15 Min 15 1    79880 (CPT®) Hc Gait Training Ea 15 Min      70301 (CPT®) Hc Pt Therapeutic Act Ea 15 Min 8 1    51359 (CPT®) Hc Pt Manual Therapy Ea 15 Min      61343 (CPT®) Hc Pt Iontophoresis Ea 15 Min      61911 (CPT®) Hc Pt Elec Stim Ea-Per 15 Min      20649 (CPT®) Hc Pt Ultrasound Ea 15 Min      70812 (CPT®) Hc Pt Self  Care/Mgmt/Train Ea 15 Min      29646 (CPT®) Hc Pt Prosthetic (S) Train Initial Encounter, Each 15 Min      53624 (CPT®) Hc Pt Orthotic(S)/Prosthetic(S) Encounter, Each 15 Min      94640 (CPT®) Hc Orthotic(S) Mgmt/Train Initial Encounter, Each 15min      Total  23 2        Therapy Charges for Today     Code Description Service Date Service Provider Modifiers Qty    09981923095 HC PT THER PROC EA 15 MIN 12/13/2018 Stacy Person, PT GP 1    40726602243 HC PT THERAPEUTIC ACT EA 15 MIN 12/13/2018 Stacy Person, PT GP 1          PT G-Codes  Outcome Measure Options: AM-PAC 6 Clicks Daily Activity (OT)  AM-PAC 6 Clicks Score: 6  Score: 9  Functional Limitation: Mobility: Walking and moving around  Mobility: Walking and Moving Around Current Status (): 100 percent impaired, limited or restricted  Mobility: Walking and Moving Around Goal Status (): At least 80 percent but less than 100 percent impaired, limited or restricted    Stacy Person, PT  12/13/2018

## 2018-12-13 NOTE — THERAPY TREATMENT NOTE
Acute Care - Speech Language Pathology   Swallow Treatment Note [facility]     Patient Name: Leila Smith  : 1945  MRN: [id number]  Today's Date: 2018  Onset of Illness/Injury or Date of Surgery: 18     Referring Physician: SAUL Oscra MD      Admit Date: 2018    Visit Dx:      ICD-10-CM ICD-9-CM   1. Impaired functional mobility, balance, gait, and endurance Z74.09 V49.89   2. Dysphagia, unspecified type R13.10 787.20   3. Ventricular tachycardia (CMS/HCC) I47.2 427.1   4. Altered mental status, unspecified altered mental status type R41.82 780.97     Patient Active Problem List   Diagnosis   • Diabetes mellitus, type 2 (CMS/HCC)   • Hypertension   • Hyperlipidemia   • Hypothyroidism   • Chronic obstructive pulmonary disease (CMS/HCC)   • CAD (coronary artery disease)   • History of edema   • History of obesity   • Venous insufficiency   • Open wound of lower extremity   • Urinary tract infection   • T2DM (type 2 diabetes mellitus) (CMS/HCC)   • Dyspnea on exertion   • Peripheral vascular disease (CMS/HCC)   • Obstructive sleep apnea syndrome   • Ulcer of lower extremity (CMS/HCC)   • Hypoxemia   • Hematuria   • Diabetic peripheral neuropathy (CMS/HCC)   • Edema   • Chronic obstructive pulmonary disease with acute exacerbation (CMS/HCC)   • Congestive heart failure (CMS/HCC)   • Arthritis   • Neutrophilic leukocytosis   • Cystitis   • Acute renal failure superimposed on stage 3 chronic kidney disease (CMS/HCC)   • Hyperkalemia   • Leukocytosis   • Elevated troponin   • Altered mental status   • Ventricular tachyarrhythmia (CMS/HCC)   • Sepsis due to methicillin resistant Staphylococcus aureus (MRSA) (CMS/HCC)   • Acute parotitis   • High output ileostomy (CMS/HCC)   • Normal anion gap metabolic acidosis       Therapy Treatment  Rehabilitation Treatment Summary     Row Name 18 1530 18 0921 18 0824       Treatment Time/Intention    Discipline  speech language  pathologist  -AC  physical therapist  -LF  occupational therapist  -SMITH    Document Type  therapy note (daily note)  -AC  therapy note (daily note)  -LF  therapy note (daily note)  -SMITH    Subjective Information  no complaints  -AC  no complaints  -LF  no complaints  -SMITH    Mode of Treatment  --  physical therapy;individual therapy  -LF  --    Patient/Family Observations  Pt alert, minimally conversant. Moans w/ repositioning. Requesting cracker. No family present.  -AC  Pt in bed with HOB elevated and slightly turned to the right, no visitors or family present.  -LF  No family present.  Pt. supine in bed on tele with IV RUE.  -SMITH    Care Plan Review  evaluation/treatment results reviewed;care plan/treatment goals reviewed;risks/benefits reviewed;current/potential barriers reviewed  -AC  care plan/treatment goals reviewed;risks/benefits reviewed;current/potential barriers reviewed;patient/other agree to care plan  -LF  care plan/treatment goals reviewed;risks/benefits reviewed;current/potential barriers reviewed  -SMITH    Therapy Frequency (PT Clinical Impression)  --  daily  -LF  --    Therapy Frequency (Swallow)  PRN  -  --  --    Patient Effort  fair  -AC  poor  -LF  adequate  -SMITH    Comment  --  Pt did not respond to orientation questions at beginning of session.  During PT session pt yelling and cursing.  -LF  Pt. aggitated yelling out no when aid in to do BP and check blood sugar otherwise pleasant.  -SMITH    Existing Precautions/Restrictions  --  fall  -LF  fall  -SMITH    Recorded by [AC] Dinora Sanchez, MS CCC-SLP 12/13/18 1629 [LF] Stacy Person, PT 12/13/18 1017 [SMITH] Poonam Cook, OT 12/13/18 0900    Row Name 12/13/18 0921 12/13/18 0824          Vital Signs    Pre Systolic BP Rehab  164  -LF  113  -SMITH     Pre Treatment Diastolic BP  82  -LF  61 earlier BP  -SMITH     Intra Systolic BP Rehab  --  164  -SMITH     Intra Treatment Diastolic BP  --  82 when aid in mid session and took BP pt. yelling out/aggitate   -SMITH     Post Systolic BP Rehab  117  -LF  --     Post Treatment Diastolic BP  60  -LF  --     Pretreatment Heart Rate (beats/min)  93  -LF  82  -SMITH     Intratreatment Heart Rate (beats/min)  --  111 elevated when aid getting BS and BP  -SMITH     Posttreatment Heart Rate (beats/min)  94  -LF  --     Pre SpO2 (%)  100  -LF  100  -SMITH     O2 Delivery Pre Treatment  room air  -LF  room air  -SMITH     O2 Delivery Intra Treatment  room air  -LF  --     Post SpO2 (%)  100  -LF  --     O2 Delivery Post Treatment  room air  -LF  --     Pre Patient Position  Supine  -LF  Supine  -SMITH     Intra Patient Position  Supine  -LF  Supine  -SMITH     Post Patient Position  --  Supine  -SMITH     Recorded by [LF] Stacy Person, PT 12/13/18 1017 [SMITH] Poonam Cook, OT 12/13/18 0900     Row Name 12/13/18 0921             Cognitive Assessment/Intervention    Additional Documentation  Cognitive Assessment/Intervention (Group)  -LF      Recorded by [LF] Stacy Person, PT 12/13/18 1017      Row Name 12/13/18 0921 12/13/18 0824          Cognitive Assessment/Intervention- PT/OT    Affect/Mental Status (Cognitive)  confused;agitated  -LF  confused;flat/blunted affect  -SMITH     Behavioral Issues (Cognitive)  difficulty managing stress;overwhelmed easily;uncooperative;withdrawn;verbal outbursts  -  difficulty managing stress;overwhelmed easily  -SMITH     Orientation Status (Cognition)  disoriented to;person;other (see comments) oriented to hospital with cues.  Not oriented to month/yr.  -LF  oriented to;person pt. would not even try to answer other questions  -SMITH     Follows Commands (Cognition)  does not follow one step commands;physical/tactile prompts required;repetition of directions required;verbal cues/prompting required;other (see comments) appears to be ignoring or avoiding questions and directions  -LF  follows one step commands;25-49% accuracy;physical/tactile prompts required;repetition of directions required;verbal cues/prompting  required appears often by choice  -SMITH     Cognitive Function (Cognitive)  safety deficit;memory deficit;attention deficit  -  safety deficit;attention deficit  -SMITH     Attention Deficit (Cognitive)  moderate deficit;selective attention  -  moderate deficit  -SMITH     Memory Deficit (Cognitive)  moderate deficit  -  --     Safety Deficit (Cognitive)  severe deficit;ability to follow commands;at risk behavior observed;awareness of need for assistance;insight into deficits/self awareness;judgment;safety precautions awareness  -  severe deficit  -SMITH     Cognitive Interventions (Cognitive)  --  -- pt. stating lives in [place] and one dtr stating her name  -SMITH     Personal Safety Interventions  fall prevention program maintained  -  fall prevention program maintained;gait belt;nonskid shoes/slippers when out of bed  -SMITH     Recorded by [LF] Stacy Person, PT 12/13/18 1017 [SMITH] Poonam Cook, OT 12/13/18 0900     Row Name 12/13/18 0921 12/13/18 0824          Safety Issues, Functional Mobility    Safety Issues Affecting Function (Mobility)  at risk behavior observed;ability to follow commands;awareness of need for assistance;friction/shear risk;insight into deficits/self awareness;safety precaution awareness;safety precautions follow-through/compliance  -  insight into deficits/self awareness;judgment;problem solving;safety precautions follow-through/compliance;safety precaution awareness;sequencing abilities  -     Impairments Affecting Function (Mobility)  cognition;motor control;pain;postural/trunk control;range of motion (ROM);strength;other (see comments) A/PROM  -  cognition;strength;range of motion (ROM)  -SMITH     Recorded by [LF] Stacy Person, PT 12/13/18 1017 [SMITH] Poonam Cook, OT 12/13/18 0900     Row Name 12/13/18 0921 12/13/18 0824          Bed Mobility Assessment/Treatment    Rolling Left [place] (Bed Mobility)  dependent (less than 25% patient effort)  -  --     Rolling  Right [place] (Bed Mobility)  dependent (less than 25% patient effort)  -LF  --     Bed Mobility, Safety Issues  cognitive deficits limit understanding;decreased use of arms for pushing/pulling;decreased use of legs for bridging/pushing;impaired trunk control for bed mobility  -LF  --     Comment (Bed Mobility)  Pt refused to participate in bed mobility  -LF  deferred to PT  -SMITH     Recorded by [LF] Stacy Person, PT 12/13/18 1017 [SMITH] Poonam Cook, OT 12/13/18 0900     Row Name 12/13/18 0824             Functional Mobility    Functional Mobility- Comment  deferred to PT  -SMITH      Recorded by [SMITH] Poonam Cook, OT 12/13/18 0900      Row Name 12/13/18 0921 12/13/18 0824          Transfer Assessment/Treatment    Comment (Transfers)  Pt refused.  -LF  Pt. has only been getting up with lift.  Pt. with poor command following so unable to attempt on own  -SMITH     Recorded by [LF] Stacy Person, PT 12/13/18 1017 [SMITH] Poonam Cook, OT 12/13/18 0900     Row Name 12/13/18 0921             Bed-Chair Transfer    Bed-Chair [place] (Transfers)  dependent (less than 25% patient effort)  -LF      Assistive Device (Bed-Chair Transfers)  other (see comments) pt refused  -LF      Recorded by [LF] Stacy Person, PT 12/13/18 1017      Row Name 12/13/18 0921             Gait/Stairs Assessment/Training    [place] Level (Gait)  unable to assess  -LF      Comment (Gait/Stairs)  Amb not appropriate  -LF      Recorded by [LF] Stacy Person, PT 12/13/18 1017      Row Name 12/13/18 0824             ADL Assessment/Intervention    15793 - OT Self Care/Mgmt Minutes  13  -SMITH      BADL Assessment/Intervention  grooming;feeding  -SMITH      Recorded by [SMITH] Poonam Cook, OT 12/13/18 0900      Row Name 12/13/18 0824             Grooming Assessment/Training    [place] Level (Grooming)  wash face, hands;minimum assist (75% patient effort);oral care regimen;moderate assist (50% patient effort);verbal  cues;nonverbal cues (demo/gesture);set up  -SMITH      Assistive Devices (Grooming)  hand over hand  -SMITH      Grooming Position  supported sitting  -SMITH      Comment (Grooming)  Pt. handed pt. wash cloth and fair accuracy washing face.  Pt. resisted hand over hand and OT had to wash around L eye.  Pt. post setup with very little tooth paste placed on brush was able to brush teeth with poor accuracy.  Pt. resisted any assist saying it was done.  Pt. could not swish and spit despite max cues only drinking water.    -SMITH      Recorded by [SMITH] Poonam Cook, OT 12/13/18 0900      Row Name 12/13/18 0824             Self-Feeding Assessment/Training    [place] Level (Feeding)  liquids to mouth;set up;independent  -SMITH      Position (Self-Feeding)  supported sitting  -SMITH      Recorded by [SMITH] Poonam Cook, OT 12/13/18 0900      Row Name 12/13/18 0824             BADL Safety/Performance    Impairments, BADL Safety/Performance  balance;cognition;strength;range of motion;coordination  -SMITH      Cognitive Impairments, BADL Safety/Performance  insight into deficits/self awareness;judgment;problem solving/reasoning;sequencing abilities;attention  -SMITH      Skilled BADL Treatment/Intervention  cognitive/safety deficit modifications;hand-over-hand training/cues  -SMITH      Progress in BADL Status  effort and initiation  -SMITH      Recorded by [SMITH] Poonam Cook, OT 12/13/18 0900      Row Name 12/13/18 0921             General ROM    GENERAL ROM COMMENTS  Pt resisting gentle PROM juan UE and LE  -LF      Recorded by [LF] Stacy Person, PT 12/13/18 1017      Row Name 12/13/18 0921 12/13/18 0824          Therapeutic Exercise    Therapeutic Exercise  supine, lower extremities  -LF  --     Additional Documentation  Therapeutic Exercise (Row)  -LF  --     51349 - PT Therapeutic Exercise Minutes  15  -LF  --     99702 - PT Therapeutic Activity Minutes  8  -LF  --     04101 - OT Therapeutic Exercise Minutes  --  10  -SMITH     Recorded by  [LF] Stacy Person, PT 12/13/18 1017 [SMITH] Poonam Cook, OT 12/13/18 0900     Row Name 12/13/18 1000 12/13/18 0921          Lower Extremity Supine Therapeutic Exercise    Performed, Supine Lower Extremity (Therapeutic Exercise)  --  hip flexion/extension;hip abduction/adduction;knee flexion/extension;ankle dorsiflexion/plantarflexion;SLR (straight leg raise);quadriceps sets;ankle pumps;other (see comments) AAROM, pt actively resisting gentle ROM activities.  -LF     Exercise Type, Supine Lower Extremity (Therapeutic Exercise)  --  PROM (passive range of motion);AAROM (active assistive range of motion)  -LF     Sets/Reps Detail, Supine Lower Extremity (Therapeutic Exercise)   r  -LF  juan LE x10 reps  -LF     Transfers Skills, Training to Functional Activity, Supine Lower Extremity (Therapeutic Exercise)  --  other (see comments) pt refused  -LF     Comment, Supine Lower Extremity (Therapeutic Exercise)  --  performed in luis's position, slightly turned right.    -LF     Recorded by [LF] Stacy Person, PT 12/13/18 1017 [LF] Stacy Person, PT 12/13/18 1017     Row Name 12/13/18 0824             Therapeutic Exercise    Upper Extremity Range of Motion (Therapeutic Exercise)  shoulder flexion/extension, bilateral;shoulder abduction/adduction, bilateral;elbow flexion/extension, bilateral;forearm supination/pronation, bilateral;wrist flexion/extension, bilateral scapula elevation/depression  -SMITH      Hand (Therapeutic Exercise)  finger flexion/extension, bilateral  -SMITH      Position (Therapeutic Exercise)  supine  -SMITH      Sets/Reps (Therapeutic Exercise)  1/10  -SMITH      Expected Outcome (Therapeutic Exercise)  improve functional tolerance, self-care activity;increase active range of motion;increase passive range of motion  -SMITH      Comment (Therapeutic Exercise)  Pt. at times would assist and other times resist mvmt.  Limited abilty to attend.  -SMITH      Recorded by [SMITH] Poonam Cook, OT 12/13/18 0900       Row Name 12/13/18 0921 12/13/18 0824          Positioning and Restraints    Pre-Treatment Position  in bed  -LF  in bed  -SMITH     Post Treatment Position  bed  -LF  bed  -SMITH     In Bed  notified nsg;fowlers;call light within reach;encouraged to call for assist;side rails up x3;waffle boots/both  -LF  supine;call light within reach;encouraged to call for assist;exit alarm on lunch tray setup for pt.   -SMITH     Recorded by [LF] Stacy Person, PT 12/13/18 1017 [SMITH] Poonam Cook, OT 12/13/18 0900     Row Name 12/13/18 1530             Pain Assessment    Additional Documentation  Pain Scale: FACES Pre/Post-Treatment (Group)  -AC      Recorded by [AC] Dinora Sanchez, MS CCC-SLP 12/13/18 1629      Row Name 12/13/18 0921 12/13/18 0824          Pain Scale: Numbers Pre/Post-Treatment    Pain Scale: Numbers, Pretreatment  0/10 - no pain  -LF  0/10 - no pain  -SMITH     Pain Scale: Numbers, Post-Treatment  -- pt reported pain with activity, but would not describe/rate   -LF  0/10 - no pain  -SMITH     Pain Intervention(s)  Repositioned;Ambulation/increased activity  -LF  --     Recorded by [LF] Stacy Person, PT 12/13/18 1017 [SMITH] Poonam Cook, OT 12/13/18 0900     Row Name 12/13/18 1530             Pain Scale: FACES Pre/Post-Treatment    Pain: FACES Scale, Pretreatment  0-->no hurt  -AC      Pain: FACES Scale, Post-Treatment  0-->no hurt  -AC      Recorded by [AC] Dinora Sanchez, MS CCC-SLP 12/13/18 1629      Row Name 12/13/18 0921             Health Promotion    Additional Documentation  Coping (Group)  -LF      Recorded by [LF] Stacy Person, PT 12/13/18 1017      Row Name                Wound 11/29/18 0915 Right medial gluteal other (see comments)    Wound - Properties Group Date first assessed: 11/29/18 [CP] Time first assessed: 0915 [CP] Present On Admission : yes;picture taken [CP] Side: Right [CP] Orientation: medial [CP] Location: gluteal [CP] Type: other (see comments) [CP] Additional Comments: shearing  [CP] Recorded by:  [CP] Angélica Gavin APRN 11/29/18 1100    Row Name                Wound 11/29/18 1730 Right anterior;lower leg incision    Wound - Properties Group Date first assessed: 11/29/18 [AB] Time first assessed: 1730 [AB] Present On Admission : other (see comments) [AB], wound present, excised by Dr. Gimenez  Side: Right [AB] Orientation: anterior;lower [AB] Location: leg [AB] Type: incision [AB] Stage, Pressure Injury: other (see comments) [AB] Recorded by:  [AB] Jag Amaral RN 11/29/18 1753    Row Name                Wound 12/01/18 1400 Left lower leg skin tear    Wound - Properties Group Date first assessed: 12/01/18 [AB] Time first assessed: 1400 [AB] Present On Admission : no [AB] Side: Left [AB] Orientation: lower [AB] Location: leg [AB] Type: skin tear [AB] Recorded by:  [AB] Jag Amaral RN 12/01/18 1430    Row Name 12/13/18 0921             Coping    Observed Emotional State  agitated;frustrated;irritable;overwhelmed;uncooperative;withdrawn  -LF      Recorded by [LF] Stacy Person, PT 12/13/18 1017      Row Name 12/13/18 0921 12/13/18 0824          Plan of Care Review    Plan of Care Reviewed With  patient  -LF  patient  -SMITH     Recorded by [LF] Stacy Person, PT 12/13/18 1017 [SMITH] Poonam Cook, OT 12/13/18 0900     Row Name 12/13/18 0824             Outcome Summary/Treatment Plan (OT)    Daily Summary of Progress (OT)  progress toward functional goals is gradual  -SMITH      Barriers to Overall Progress (OT)  confusion and easily agitated.  -SMITH      Plan for Continued Treatment (OT)  cont OT POC  -SMITH      Recorded by [SMITH] Poonam Cook, OT 12/13/18 0900      Row Name 12/13/18 0921             Outcome Summary/Treatment Plan (PT)    Daily Summary of Progress (PT)  progress toward functional goals is gradual  -LF      Barriers to Overall Progress (PT)  pt participation  -LF      Anticipated Discharge Disposition (PT)  skilled nursing facility;extended care facility;other  (see comments) return to [facility] [place]  -LF      Recorded by [LF] Stacy Person, PT 12/13/18 1017      Row Name 12/13/18 1530             Outcome Summary/Treatment Plan (SLP)    Daily Summary of Progress (SLP)  progress toward functional goals is good  -AC      Barriers to Overall Progress (SLP)  Cog status. Suspect pt may be approaching baseline status.  -AC      Plan for Continued Treatment (SLP)  Upgrade to dysphagia level IV diet (mech soft, no mixed consistencies), cont thin liquids. Aspiration precautions, assist w/ PO. If pt u/a tolerate this diet, may need to downgrade back to dysphagia level III (puree/some mashed). Will f/u to ensure tolerating this diet before signing off.   -AC      Anticipated Dischage Disposition  skilled nursing facility  -AC      Recorded by [AC] Dinora Sanchez, MS CCC-SLP 12/13/18 1622        User Key  (r) = Recorded By, (t) = Taken By, (c) = Cosigned By    Initials Name Effective Dates Discipline    Poonam Luz, OT 06/08/18 -  OT    Angélica Coppola, APRN 11/05/18 -  Nurse    AC Dinora Sanchez, MS CCC-SLP 07/27/17 -  SLP    AB Jag Amaral, RN 02/02/17 -  Nurse    LF Stacy Person, PT 06/08/18 -  PT          Outcome Summary  Outcome Summary/Treatment Plan (SLP)  Daily Summary of Progress (SLP): progress toward functional goals is good (12/13/18 1530 : Dinora Sanchez, MS CCC-SLP)  Barriers to Overall Progress (SLP): Cog status. Suspect pt may be approaching baseline status. (12/13/18 1530 : Dinora Sanchez, MS CCC-SLP)  Plan for Continued Treatment (SLP): Upgrade to dysphagia level IV diet (mech soft, no mixed consistencies), cont thin liquids. Aspiration precautions, assist w/ PO. If pt u/a tolerate this diet, may need to downgrade back to dysphagia level III (puree/some mashed). Will f/u to ensure tolerating this diet before signing off.  (12/13/18 1530 : Dinora Sanchez, MS CCC-SLP)  Anticipated Dischage Disposition: skilled nursing facility  (12/13/18 1530 : Dinora Sanchez, MS CCC-SLP)      SLP GOALS     Row Name 12/13/18 1530 12/11/18 1530          Oral Nutrition/Hydration Goal 1 (SLP)    Oral Nutrition/Hydration Goal 1, SLP  LTG: Tolerate soft solids and thin liquids without s/s aspiration or difficulty with 100% accuracy and caregiver assistance  -AC  LTG: Tolerate soft solids and thin liquids without s/s aspiration or difficulty with 100% accuracy and caregiver assistance  -SG     Time Frame (Oral Nutrition/Hydration Goal 1, SLP)  by discharge  -AC  by discharge  -SG     Barriers (Oral Nutrition/Hydration Goal 1, SLP)  --  No s/s w/ pt's current diet, based on PO trials, not appropraite for diet upgrade beyond level 3 at this time. SLP will con't to follow  -SG     Progress/Outcomes (Oral Nutrition/Hydration Goal 1, SLP)  continuing progress toward goal  -AC  good progress toward goal  -SG        Oral Nutrition/Hydration Goal 2 (SLP)    Oral Nutrition/Hydration Goal 2, SLP  Tolerate trials of soft solids without signs of difficulty with 100% accuracy and cues  -AC  Tolerate trials of soft solids without signs of difficulty with 100% accuracy and cues  -SG     Time Frame (Oral Nutrition/Hydration Goal 2, SLP)  short term goal (STG);by discharge  -AC  short term goal (STG);by discharge  -SG     Barriers (Oral Nutrition/Hydration Goal 2, SLP)  Trialed yudy cracker. Pt very interested in eating cracker. Incr'd mastication time, but was adequate and no evidence of significant oral residue.   -AC  Disorganized masitcation and delayed coughing noted w/ small bite of cracker. Multiple liquid washes required for pt to clear oral cavity. Not appropraite for diet upgrade at this time.   -SG     Progress/Outcomes (Oral Nutrition/Hydration Goal 2, SLP)  continuing progress toward goal  -AC  good progress toward goal  -SG        Swallow Management Recall Goal 1 (SLP)    Activity (Swallow Management Recall Goal 1, SLP)  safe diet level/texture  -AC  safe diet  level/texture  -SG     [place]/Accuracy (Swallow Management Recall Goal 1, SLP)  with minimal cues (75-90% accuracy)  -AC  with minimal cues (75-90% accuracy)  -SG     Time Frame (Swallow Management Recall Goal 1, SLP)  short term goal (STG);by discharge  -AC  short term goal (STG);by discharge  -SG     Barriers (Swallow Management Recall Goal 1, SLP)  Pt u/a to recall safe diet/liquid recs 2' cog status. Pt attempts to take very large drinks of thin liquid. No overt s/sxs aspiration noted w/ soft solid & thin liquid via cup/straw.  -AC  Cueing and re-education required for pt to recall recommended diet texture. Pt unable to demonstrate recall of safe diet at this time, cognitive status impacting.   -SG     Progress/Outcomes (Swallow Management Recall Goal 1, SLP)  continuing progress toward goal  -AC  continuing progress toward goal  -SG       User Key  (r) = Recorded By, (t) = Taken By, (c) = Cosigned By    Initials Name Provider Type    SG Swetha Franklin, MS CCC-SLP Speech and Language Pathologist    Dinora Rice MS CCC-SLP Speech and Language Pathologist          EDUCATION  The patient has been educated in the following areas:   Dysphagia (Swallowing Impairment) Oral Care/Hydration Modified Diet Instruction.    SLP Recommendation and Plan      Anticipated Dischage Disposition: skilled nursing facility     Therapy Frequency (Swallow): PRN        Plan of Care Reviewed With: patient  Plan of Care Review  Plan of Care Reviewed With: patient  Daily Summary of Progress (SLP): progress toward functional goals is good  Plan for Continued Treatment (SLP): Upgrade to dysphagia level IV diet (mech soft, no mixed consistencies), cont thin liquids. Aspiration precautions, assist w/ PO. If pt u/a tolerate this diet, may need to downgrade back to dysphagia level III (puree/some mashed). Will f/u to ensure tolerating this diet before signing off.       Time Calculation:   Time Calculation- SLP     Row  Name 12/13/18 1633             Time Calculation- SLP    SLP Start Time  1530  -      SLP Received On  12/13/18  -        User Key  (r) = Recorded By, (t) = Taken By, (c) = Cosigned By    Initials Name Provider Type    Dionra Rice, MS CCC-SLP Speech and Language Pathologist          Therapy Charges for Today     Code Description Service Date Service Provider Modifiers Qty    [id number]  ST TREATMENT SWALLOW 2 12/13/2018 Dinora Sanchez MS CCC-SLP GN 1                 Dinora Sanchez MS CCC-SLP  12/13/2018

## 2018-12-13 NOTE — PLAN OF CARE
Problem: Patient Care Overview  Goal: Plan of Care Review  Outcome: Ongoing (interventions implemented as appropriate)   12/13/18 0879   Coping/Psychosocial   Plan of Care Reviewed With patient   Plan of Care Review   Progress improving   OTHER   Outcome Summary Slow progress. Pt. limited by confusion and easy aggitation. Pt. able to participate in some grooming with fair accuracy and some small about with ROM exercises. Cont OT POC as pt. willing to participate.

## 2018-12-13 NOTE — PROGRESS NOTES
[facility] Medicine Services  PROGRESS NOTE    Patient Name: Leila Smith  : 1945  MRN: [id number]    Date of Admission: 2018  Length of Stay: 15  Primary Care Physician: Ciaran Wakefield MD    Subjective   Subjective     CC: f/u MRSA bacteremia and multiple issues    HPI:No acute events overnight, she is pleasantly confused, she states that she is doing ok, has no new complain    Review of Systems  UTO complete ROS sec to dementia    Objective   Objective     Vital Signs:   Temp:  [97.4 °F (36.3 °C)-97.8 °F (36.6 °C)] 97.7 °F (36.5 °C)  Heart Rate:  [] 111  Resp:  [16-18] 18  BP: (113-164)/(61-90) 164/82        Physical Exam:  Constitutional: No acute distress, awake, alert, seated in bed having breakfast  HENT: NCAT, mucous membranes moist  Respiratory: Clear to auscultation bilaterally, respiratory effort normal   Cardiovascular: RRR, no murmurs, rubs, or gallops, palpable pedal pulses bilaterally  Gastrointestinal: Positive bowel sounds, soft, nontender, nondistended, colostomy in place  Musculoskeletal: No bilateral ankle edema  Psychiatric: flat affect, cooperative  Neurologic: not oriented, pleasantly confused, no focal deficits, speech is slow  Skin: No rashes    Results Reviewed:  I have personally reviewed current lab, radiology, and data and agree.    Results from last 7 days   Lab Units  18   0708  18   0532  12/10/18   0847   18   2022   WBC 10*3/mm3  13.06*  13.15*  9.59   < >   --    HEMOGLOBIN g/dL  10.4*  11.5  12.4   < >   --    HEMATOCRIT %  34.3*  38.8  41.2   < >   --    PLATELETS 10*3/mm3  455*  462*  398   < >   --    INR    --    --    --    --   1.01    < > = values in this interval not displayed.     Results from last 7 days   Lab Units  18   0708  18   0532  12/10/18   0847  18   0637   SODIUM mmol/L  138  135  141  141   POTASSIUM mmol/L  4.4  4.8  4.5  4.1   CHLORIDE mmol/L  120*  114*  118*  116*    CO2 mmol/L  11.0*  12.0*  13.0*  17.0*   BUN mg/dL  32*  34*  30*  25*   CREATININE mg/dL  1.55*  1.71*  1.47*  1.09   GLUCOSE mg/dL  182*  125*  101*  90   CALCIUM mg/dL  8.1*  7.9*  8.0*  8.4*   ALT (SGPT) U/L   --    --    --   26   AST (SGOT) U/L   --    --    --   24     Estimated Creatinine Clearance: 31.6 mL/min (A) (by C-G formula based on SCr of 1.55 mg/dL (H)).  No results found for: BNP    Microbiology Results Abnormal     Procedure Component Value - Date/Time    Clostridium Difficile Toxin - Stool, Per Rectum [[id number]] Collected:  12/09/18 0536    Lab Status:  Final result Specimen:  Stool from Per Rectum Updated:  12/09/18 1104    Narrative:       The following orders were created for panel order Clostridium Difficile Toxin - Stool, Per Rectum.  Procedure                               Abnormality         Status                     ---------                               -----------         ------                     Clostridium Difficile To...[[id number]]  Normal              Final result                 Please view results for these tests on the individual orders.    Clostridium Difficile Toxin, PCR - Stool, Per Rectum [[id number]]  (Normal) Collected:  12/09/18 0536    Lab Status:  Final result Specimen:  Stool from Per Rectum Updated:  12/09/18 1104     C. Difficile Toxins by PCR Not Detected    Narrative:       Performance characteristics of test not established for patients <2 years of age.  Performance characteristics of test not established for patients <2 years of age.  Negative for Toxigenic C. Difficile    Tissue / Bone Culture - Tissue, Leg, Right [[id number]] Collected:  11/29/18 1801    Lab Status:  Edited Result - FINAL Specimen:  Tissue from Leg, Right Updated:  12/06/18 1130     Tissue Culture No growth at 1 week     Gram Stain Many (4+) Red blood cells      Many (4+) WBCs seen      No organisms seen    Blood Culture - Blood, Hand, Left [[id number]] Collected:  11/30/18 1005    Lab  Status:  Final result Specimen:  Blood from Hand, Left Updated:  12/05/18 1030     Blood Culture No growth at 5 days    Blood Culture - Blood, Hand, Right [[id number]] Collected:  11/30/18 1005    Lab Status:  Final result Specimen:  Blood from Hand, Right Updated:  12/05/18 1030     Blood Culture No growth at 5 days    Wound Culture - Drainage, Ear, Right [[id number]]  (Abnormal) Collected:  11/30/18 1647    Lab Status:  Final result Specimen:  Drainage from Ear, Right Updated:  12/03/18 0852     Wound Culture Scant growth (1+) Staphylococcus, coagulase negative     Comment: Susceptibility will not be done unless Doctor notifies Lab            Gram Stain No WBCs or organisms seen    Blood Culture - Blood, Arm, Left [[id number]]  (Abnormal) Collected:  11/28/18 1353    Lab Status:  Final result Specimen:  Blood from Arm, Left Updated:  12/01/18 0823     Blood Culture Staphylococcus aureus, MRSA     Comment:   Consider infectious disease consult to rule out distant focus of infection.  Methicillin resistant Staphylococcus aureus, Patient may be an isolation risk.        Isolated from Aerobic and Anaerobic Bottles     Gram Stain Aerobic Bottle Gram positive cocci in groups      Anaerobic Bottle Gram positive cocci in groups    Narrative:       PRIOR POSITIVE CULTURE WITH SAME MORPHOLOGY CALLED  Refer Culture to #[id number]    Blood Culture - Blood, Arm, Left [[id number]]  (Abnormal)  (Susceptibility) Collected:  11/28/18 1340    Lab Status:  Final result Specimen:  Blood from Arm, Left Updated:  12/01/18 0822     Blood Culture Staphylococcus aureus, MRSA     Comment:   Consider infectious disease consult to rule out distant focus of infection.  Methicillin resistant Staphylococcus aureus, Patient may be an isolation risk.        Isolated from Aerobic and Anaerobic Bottles     Gram Stain Aerobic Bottle Gram positive cocci in groups      Anaerobic Bottle Gram positive cocci in clusters    Susceptibility      Staphylococcus  aureus, MRSA     DIMITRIS     Ciprofloxacin Resistant     Clindamycin Susceptible     Daptomycin Susceptible     Erythromycin Susceptible     Gentamicin Susceptible     Levofloxacin Intermediate [1]      Linezolid Susceptible     Oxacillin Resistant     Penicillin G Resistant     Quinupristin + Dalfopristin Susceptible     Rifampin Susceptible     Tetracycline Susceptible     Trimethoprim + Sulfamethoxazole Susceptible     Vancomycin Susceptible            [1]   Staphylococcus species may develop resistance during prolonged therapy with quinolones.  Isolates that are initially susceptible may become resistant within three to four days after initiation of therapy. Testing of repeat isolates may be warranted.                 Blood Culture ID, PCR - Blood, Arm, Left [[id number]]  (Abnormal) Collected:  11/28/18 1340    Lab Status:  Final result Specimen:  Blood from Arm, Left Updated:  11/29/18 0499     BCID, PCR Staphylococcus aureus. mecA (methicillin resistance gene) detected. Identification by BCID PCR.          Imaging Results (last 24 hours)     ** No results found for the last 24 hours. **        Results for orders placed during the hospital encounter of 11/28/18   Adult Transesophageal Echo (YANNA) W/ Cont if Necessary Per Protocol    Narrative · Left ventricular systolic function is normal.  · Left ventricular wall thickness is consistent with severe concentric   hypertrophy.  · Left atrial cavity size is mildly dilated.  · Mild dilation of the ascending aorta is present. 4.2cm..  · No evidence of a left atrial appendage thrombus was present.  · No echocardiographic evidence of endocarditis          I have reviewed the medications.      apixaban 2.5 mg Oral Q12H   aspirin 81 mg Oral Daily   castor oil-balsam peru  Topical Q12H   [START ON 12/14/2018] DAPTOmycin 8 mg/kg (Adjusted) Intravenous Q48H   diltiaZEM 60 mg Oral Q6H   insulin detemir 15 Units Subcutaneous Daily   insulin detemir 5 Units Subcutaneous Nightly    loperamide 2 mg Oral TID   metoprolol tartrate 50 mg Oral Q12H   sodium chloride 10 mL Intravenous Q12H   thiamine 100 mg Oral Daily         Assessment/Plan   Assessment / Plan     Active Hospital Problems    Diagnosis   • **Altered mental status   • High output ileostomy (CMS/HCC)   • Normal anion gap metabolic acidosis   • Sepsis due to methicillin resistant Staphylococcus aureus (MRSA) (CMS/[facility])   • Acute parotitis   • Ventricular tachyarrhythmia (CMS/[facility])   • Acute renal failure superimposed on stage 3 chronic kidney disease (CMS/HCC)   • Hyperkalemia   • Congestive heart failure (CMS/HCC)   • Chronic obstructive pulmonary disease with acute exacerbation (CMS/[facility])   • CAD (coronary artery disease)     History of  Coronary Artery Disease V12.59     • Diabetes mellitus, type 2 (CMS/[facility])   • Hypertension   • Hypothyroidism   • Hyperlipidemia       Brief Hospital Course to date:  Leila Smith is a 73 y.o. female nursing home resident who was admitted to the ICU with Vtach/hyperkalemia, also found to have acute on chronic renal failure. Noted to have UTI on admission as well as MRSA bacteremia. Also treated with parotid sialadenitis evaluated by ENT, right leg hematoma evaluated and drained by surgery 11/29.       Assessment and plan     -mrsa bacteremia/sepsis (likely from parotidis)  -Anil negative; source likely parotitis, compresses right parotid    -right parotitis/sialadenitis  -s/p ent eval; warm compresses; no surgical intervention (s/p dr. Macedo consult)    -s/p v-tach due to hyperkalemia  -s/p cards consult: ultimately stopped amiodarone; currently tolerating metoprolol and diltiazem    -hx parox afib (chronic eliquis) -ANIL 12/3/18: normal LF systolic function, severe clvh, no atrial appendage thrombus nor echocardiographic evidence of endocarditis    -s/p chung on ckd 3 (baseline 1.2-1.4) -s/p nephrology consultation    -high output ostomy/diarrhea improving -c.diff pcr negative 12/9/18,schedule & prn  imodium (high output ostomy),normal saline 100cc/hr, wean fluids as acidosis improves and ostomy output improves    -encephalopathy, improved  -cognitive impairment  -superficial thrombophlebitis -duplex rue 12/6/18: superficial thrombophlebitis cephlic (forearm) vein  -right leg hematoma (s/p I&D) -growing coag neg staph  -dysphagia s/p on tube feeds, now on modified diet per SLP  -DM -adjust insulins prn  -difficult iv access, s/p picc line w/ bleeding at insertion site -eliquis restarted 12/10/18  -urinary retention -s/p voiding trial which she failed; orosco placed again evening of 12/9; voiding trial once more mobile (likely at rehab facility) with outpatient utology consult    -repeat labs in AM    DVT Prophylaxis:  eliquis    CODE STATUS:   Code Status and Medical Interventions:   Ordered at: 11/28/18 1331     Code Status:    CPR     Medical Interventions (Level of Support Prior to Arrest):    Full       Disposition: anticipate d/c to [facility] 12/14/18 if clinically improved and labs improved, CM following    Electronically signed by Keo Hess MD, 12/13/18, 9:14 AM.

## 2018-12-13 NOTE — PROGRESS NOTES
Continued Stay Note  [facility]     Patient Name: Leila Smith  MRN: [id number]  Today's Date: 12/13/2018    Admit Date: 11/28/2018    Discharge Plan     Row Name 12/13/18 1125       Plan    Plan  update    Patient/Family in Agreement with Plan  yes    Plan Comments  Per discussion in rounds today patient ready for discharge tomorrow.  Spoke with Senait with [facility] who advises that they have insurance approval and can return to facility.  Spoke with patient daughter, Latonia Funez, to advise of pending transfer back to facility tomorrow who verbalizes agreement with plan.  Spoke with patient to advise.  Called [facility] ambulance service and spoke to Harjinder who has scheduled patient transport for tomorrow 12/14/2018 at 1200.  CM following.  Patient plan is to discharge to [facility] via ambulace for transport tomorrow if medically ready.     Final Discharge Disposition Code  03 - skilled nursing facility (SNF)        Discharge Codes    No documentation.       Expected Discharge Date and Time     Expected Discharge Date Expected Discharge Time    Dec 14, 2018             Priti Caceres, RN

## 2018-12-14 PROBLEM — A41.02 SEPSIS DUE TO METHICILLIN RESISTANT STAPHYLOCOCCUS AUREUS (MRSA) (HCC): Status: RESOLVED | Noted: 2018-01-01 | Resolved: 2018-01-01

## 2018-12-14 PROBLEM — E87.5 HYPERKALEMIA: Status: RESOLVED | Noted: 2018-01-01 | Resolved: 2018-01-01

## 2018-12-14 NOTE — DISCHARGE INSTR - APPOINTMENTS
You have an appointment with Olivier Gtz MD  on January 7, 2019 @ 9:15 AM.   Call them if you have any questions. Phone: [phone] [address]  [place] [id number]

## 2018-12-14 NOTE — PLAN OF CARE
Problem: Patient Care Overview  Goal: Plan of Care Review  Outcome: Ongoing (interventions implemented as appropriate)   12/14/18 1015   Coping/Psychosocial   Plan of Care Reviewed With patient;other (see comments)  (Kim RN)   Plan of Care Review   Progress improving   OTHER   Outcome Summary WOC nurse f/u for colostomy. Appliance changed due to leaking using Chris #[id number] with convex wafer; stomal paste in medial crease and around stoma. Stoma red and moist; small amount liquid brown stool in bag. D/C supplies given to pt for likely discharge today. Pt not educational candidate. Please contact [facility] nurse as needed for concerns.

## 2018-12-14 NOTE — PROGRESS NOTES
Continued Stay Note  [facility]     Patient Name: Leila Smith  MRN: [id number]  Today's Date: 12/14/2018    Admit Date: 11/28/2018    Discharge Plan     Row Name 12/14/18 1016       Plan    Plan  update    Patient/Family in Agreement with Plan  yes    Plan Comments  Received voicemail from daughter who would like to speak with CM.  Called Latonia Funez, daughter, who was at work and unable to talk at that time but will call CM back.  CM following.      Final Discharge Disposition Code  03 - skilled nursing facility (SNF)        Discharge Codes    No documentation.       Expected Discharge Date and Time     Expected Discharge Date Expected Discharge Time    Dec 14, 2018             Priti Caceres, RN

## 2018-12-14 NOTE — PROGRESS NOTES
Continued Stay Note  [facility]     Patient Name: Leila Smith  MRN: [id number]  Today's Date: 12/14/2018    Admit Date: 11/28/2018    Discharge Plan     Row Name 12/14/18 1322       Plan    Plan  update    Plan Comments  Received a call from Oanh with [facility] ambulance service who advises that their current ETA is 1445.  RN notified.    Row Name 12/14/18 1024       Plan    Plan  [facility]    Patient/Family in Agreement with Plan  yes    Plan Comments  Patient ready for discharge today.  Transport arrangements made.  Called Senait with [facility] to confirm time and left message with callback number.  Patient plan is to discharge back to [facility] today via ambulance scheduled for 1200.  Nursing to call report to [phone]    Final Discharge Disposition Code  04 - intermediate care facility    Final Note  Patient ready for discharge today.  Transport arrangements made.  Called Senait with [facility] to confirm time and left message with callback number.  Patient plan is to discharge back to [facility] today via ambulance scheduled for 1200.  Nursing to call report to [phone]        Discharge Codes    No documentation.       Expected Discharge Date and Time     Expected Discharge Date Expected Discharge Time    Dec 14, 2018             Priti Caceres, RN

## 2018-12-14 NOTE — THERAPY DISCHARGE NOTE
Acute Care - Speech Language Pathology   Swallow Progress Note/Discharge   [facility]     Patient Name: Leila Smith  : 1945  MRN: [id number]  Today's Date: 2018  Onset of Illness/Injury or Date of Surgery: 18     Referring Physician: SAUL Oscar MD      Admit Date: 2018    Visit Dx:      ICD-10-CM ICD-9-CM   1. Impaired functional mobility, balance, gait, and endurance Z74.09 V49.89   2. Dysphagia, unspecified type R13.10 787.20   3. Ventricular tachycardia (CMS/HCC) I47.2 427.1   4. Altered mental status, unspecified altered mental status type R41.82 780.97     Patient Active Problem List   Diagnosis   • Diabetes mellitus, type 2 (CMS/HCC)   • Hypertension   • Hyperlipidemia   • Hypothyroidism   • Chronic obstructive pulmonary disease (CMS/HCC)   • CAD (coronary artery disease)   • History of edema   • History of obesity   • Venous insufficiency   • Open wound of lower extremity   • Urinary tract infection   • T2DM (type 2 diabetes mellitus) (CMS/HCC)   • Dyspnea on exertion   • Peripheral vascular disease (CMS/HCC)   • Obstructive sleep apnea syndrome   • Ulcer of lower extremity (CMS/HCC)   • Hypoxemia   • Hematuria   • Diabetic peripheral neuropathy (CMS/HCC)   • Edema   • Congestive heart failure (CMS/HCC)   • Arthritis   • Neutrophilic leukocytosis   • Cystitis   • Acute renal failure superimposed on stage 3 chronic kidney disease (CMS/HCC)   • Leukocytosis   • Elevated troponin   • Altered mental status   • Ventricular tachyarrhythmia (CMS/HCC)   • Acute parotitis   • High output ileostomy (CMS/HCC)   • Normal anion gap metabolic acidosis       Therapy Treatment  Rehabilitation Treatment Summary     Row Name 18 1015             Treatment Time/Intention    Discipline  speech language pathologist  -VO      Document Type  therapy note (daily note)  -VO      Subjective Information  no complaints  -VO      Mode of Treatment  speech-language pathology  -VO       Patient/Family Observations  Pt alert and answering questions. No family present.   -VO      Care Plan Review  care plan/treatment goals reviewed;risks/benefits reviewed;current/potential barriers reviewed;patient/other agree to care plan  -VO      Therapy Frequency (Swallow)  PRN  -VO      Patient Effort  good  -VO      Recorded by [VO] Justine Alvarado MA,CCC-SLP 12/14/18 1152      Row Name 12/14/18 1015             Pain Assessment    Additional Documentation  Pain Scale: FACES Pre/Post-Treatment (Group)  -VO      Recorded by [VO] Justine Alvarado MA,CCC-SLP 12/14/18 1152      Row Name 12/14/18 1015             Pain Scale: Numbers Pre/Post-Treatment    Pain Scale: Numbers, Pretreatment  0/10 - no pain  -VO      Pain Scale: Numbers, Post-Treatment  0/10 - no pain  -VO      Recorded by [VO] Justine Alvarado MA,CCC-SLP 12/14/18 1152      Row Name                Wound 11/29/18 0915 Right medial gluteal other (see comments)    Wound - Properties Group Date first assessed: 11/29/18 [CP] Time first assessed: 0915 [CP] Present On Admission : yes;picture taken [CP] Side: Right [CP] Orientation: medial [CP] Location: gluteal [CP] Type: other (see comments) [CP] Additional Comments: shearing [CP] Recorded by:  [CP] Angélica Gavin APRN 11/29/18 1100    Row Name                Wound 11/29/18 1730 Right anterior;lower leg incision    Wound - Properties Group Date first assessed: 11/29/18 [AB] Time first assessed: 1730 [AB] Present On Admission : other (see comments) [AB], wound present, excised by Dr. Gimenez  Side: Right [AB] Orientation: anterior;lower [AB] Location: leg [AB] Type: incision [AB] Stage, Pressure Injury: other (see comments) [AB] Recorded by:  [AB] Jag Amaral RN 11/29/18 1753    Row Name                Wound 12/01/18 1400 Left lower leg skin tear    Wound - Properties Group Date first assessed: 12/01/18 [AB] Time first assessed: 1400 [AB] Present On Admission : no [AB] Side: Left [AB]  Orientation: lower [AB] Location: leg [AB] Type: skin tear [AB] Recorded by:  [AB] Jag Amaral, RN 12/01/18 1430    Row Name 12/14/18 1015             Outcome Summary/Treatment Plan (SLP)    Daily Summary of Progress (SLP)  prepare for discharge  -VO      Plan for Continued Treatment (SLP)  Pt tolerating diet of mechanical soft and thin liquids. Trialed thins via cup/strw and cracker. Cough w/ large consecutive sips w/ straw (suspect presentation impacted as straw was slipping out of pt's oral cavity). Provided edu on small sips. Cog seems to be improving/returning to baseline. Recommend continue current diet. No further speech services warranted at this time.   -VO      Anticipated Dischage Disposition  skilled nursing facility  -VO      Recorded by [VO] Justine Alvarado MA,CCC-SLP 12/14/18 1152        User Key  (r) = Recorded By, (t) = Taken By, (c) = Cosigned By    Initials Name Effective Dates Discipline    CP Angélica Gavin APRN 11/05/18 -  Nurse    AB Jag Amaral, RN 02/02/17 -  Nurse    VO Justine Alvarado MA,CCC-SLP 10/24/18 -  SLP        Outcome Summary  Outcome Summary/Treatment Plan (SLP)  Daily Summary of Progress (SLP): prepare for discharge (12/14/18 1015 : Justine Alvarado MA,CCC-SLP)  Plan for Continued Treatment (SLP): Pt tolerating diet of mechanical soft and thin liquids. Trialed thins via cup/strw and cracker. Cough w/ large consecutive sips w/ straw (suspect presentation impacted as straw was slipping out of pt's oral cavity). Provided edu on small sips. Cog seems to be improving/returning to baseline. Recommend continue current diet. No further speech services warranted at this time.  (12/14/18 1015 : Justine Alvarado MA,CCC-SLP)  Anticipated Dischage Disposition: skilled nursing facility (12/14/18 1015 : Justine Alvarado MA,CCC-SLP)  SLP GOALS     Row Name 12/14/18 1015 12/13/18 1530 12/11/18 1530       Oral Nutrition/Hydration Goal 1 (SLP)    Oral  Nutrition/Hydration Goal 1, SLP  LTG: Tolerate soft solids and thin liquids without s/s aspiration or difficulty with 100% accuracy and caregiver assistance  -VO  LTG: Tolerate soft solids and thin liquids without s/s aspiration or difficulty with 100% accuracy and caregiver assistance  -AC  LTG: Tolerate soft solids and thin liquids without s/s aspiration or difficulty with 100% accuracy and caregiver assistance  -SG    Time Frame (Oral Nutrition/Hydration Goal 1, SLP)  by discharge  -VO  by discharge  -AC  by discharge  -SG    Barriers (Oral Nutrition/Hydration Goal 1, SLP)  --  --  No s/s w/ pt's current diet, based on PO trials, not appropraite for diet upgrade beyond level 3 at this time. SLP will con't to follow  -SG    Progress/Outcomes (Oral Nutrition/Hydration Goal 1, SLP)  goal met  -VO  continuing progress toward goal  -AC  good progress toward goal  -SG       Oral Nutrition/Hydration Goal 2 (SLP)    Oral Nutrition/Hydration Goal 2, SLP  Tolerate trials of soft solids without signs of difficulty with 100% accuracy and cues  -VO  Tolerate trials of soft solids without signs of difficulty with 100% accuracy and cues  -AC  Tolerate trials of soft solids without signs of difficulty with 100% accuracy and cues  -SG    Time Frame (Oral Nutrition/Hydration Goal 2, SLP)  short term goal (STG);by discharge  -VO  short term goal (STG);by discharge  -AC  short term goal (STG);by discharge  -SG    Barriers (Oral Nutrition/Hydration Goal 2, SLP)  No overt s/sxs w/ cracker.  -VO  Trialed yudy cracker. Pt very interested in eating cracker. Incr'd mastication time, but was adequate and no evidence of significant oral residue.   -AC  Disorganized masitcation and delayed coughing noted w/ small bite of cracker. Multiple liquid washes required for pt to clear oral cavity. Not appropraite for diet upgrade at this time.   -SG    Progress/Outcomes (Oral Nutrition/Hydration Goal 2, SLP)  goal met  -VO  continuing progress  toward goal  -AC  good progress toward goal  -SG       Swallow Management Recall Goal 1 (SLP)    Activity (Swallow Management Recall Goal 1, SLP)  safe diet level/texture  -VO  safe diet level/texture  -AC  safe diet level/texture  -SG    Coffee/Accuracy (Swallow Management Recall Goal 1, SLP)  with minimal cues (75-90% accuracy)  -VO  with minimal cues (75-90% accuracy)  -AC  with minimal cues (75-90% accuracy)  -SG    Time Frame (Swallow Management Recall Goal 1, SLP)  short term goal (STG);by discharge  -VO  short term goal (STG);by discharge  -AC  short term goal (STG);by discharge  -SG    Barriers (Swallow Management Recall Goal 1, SLP)  Pt able to repeat recommendations back to SLP.  -VO  Pt u/a to recall safe diet/liquid recs 2' cog status. Pt attempts to take very large drinks of thin liquid. No overt s/sxs aspiration noted w/ soft solid & thin liquid via cup/straw.  -AC  Cueing and re-education required for pt to recall recommended diet texture. Pt unable to demonstrate recall of safe diet at this time, cognitive status impacting.   -SG    Progress/Outcomes (Swallow Management Recall Goal 1, SLP)  goal met  -VO  continuing progress toward goal  -AC  continuing progress toward goal  -SG      User Key  (r) = Recorded By, (t) = Taken By, (c) = Cosigned By    Initials Name Provider Type    SG Swetha Franklin, MS CCC-SLP Speech and Language Pathologist    Dinora Rice, MS CCC-SLP Speech and Language Pathologist    VO Justine Alvarado MA,CCC-SLP Speech and Language Pathologist          EDUCATION  The patient has been educated in the following areas:   Dysphagia (Swallowing Impairment) Oral Care/Hydration Modified Diet Instruction.    SLP Recommendation and Plan                    Anticipated Dischage Disposition: skilled nursing facility     Therapy Frequency (Swallow): PRN          Daily Summary of Progress (SLP): prepare for discharge  Plan for Continued Treatment (SLP): Pt  tolerating diet of mechanical soft and thin liquids. Trialed thins via cup/strw and cracker. Cough w/ large consecutive sips w/ straw (suspect presentation impacted as straw was slipping out of pt's oral cavity). Provided edu on small sips. Cog seems to be improving/returning to baseline. Recommend continue current diet. No further speech services warranted at this time.   Plan of Care Reviewed With: patient  Plan of Care Reviewed With: patient           Time Calculation:   Time Calculation- SLP     Row Name 12/14/18 1154             Time Calculation- SLP    SLP Start Time  1015  -VO      SLP Received On  12/14/18  -VO        User Key  (r) = Recorded By, (t) = Taken By, (c) = Cosigned By    Initials Name Provider Type    VO Justine Alvarado MA,CCC-SLP Speech and Language Pathologist          Therapy Charges for Today     Code Description Service Date Service Provider Modifiers Qty    [id number] HC ST TREATMENT SWALLOW 3 12/14/2018 Justine Alvarado MA,CCC-SLP GN 1               SLP Discharge Summary  Anticipated Dischage Disposition: skilled nursing facility    Justine Alvarado MA,CCC-SLP  12/14/2018

## 2018-12-14 NOTE — PLAN OF CARE
Problem: Patient Care Overview  Goal: Plan of Care Review  Outcome: Ongoing (interventions implemented as appropriate)   12/14/18 1154   Coping/Psychosocial   Plan of Care Reviewed With patient   SLP treatment completed. Will sign-off on dysphagia concerns at this time. Please see note for further details and recommendations.

## 2018-12-14 NOTE — DISCHARGE SUMMARY
[facility] Medicine Services  DISCHARGE SUMMARY    Patient Name: Leila Smith  : 1945  MRN: [id number]    Date of Admission: 2018  Date of Discharge: 2018  Primary Care Physician: Ciaran Wakefield MD    Consults     Date and Time Order Name Status Description    2018 0742 Inpatient Infectious Diseases Consult Completed     2018 0926 Inpatient General Surgery Consult Completed     2018 1614 Inpatient ENT Consult Completed     2018 1331 Inpatient Nephrology Consult Completed         Hospital Course     Presenting Problem:   Altered mental status [R41.82]    Active Hospital Problems    Diagnosis Date Noted   • **Altered mental status [R41.82] 2018   • High output ileostomy (CMS/HCC) [R19.8, Z93.2] 2018   • Normal anion gap metabolic acidosis [E87.2] 2018   • Acute parotitis [K11.21] 2018   • Ventricular tachyarrhythmia (CMS/HCC) [I47.2] 2018   • Acute renal failure superimposed on stage 3 chronic kidney disease (CMS/HCC) [N17.9, N18.3] 2018   • Congestive heart failure (CMS/HCC) [I50.9] 2016   • CAD (coronary artery disease) [I25.10] 07/15/2016   • Diabetes mellitus, type 2 (CMS/HCC) [E11.9] 07/15/2016   • Hypertension [I10] 07/15/2016   • Hypothyroidism [E03.9] 07/15/2016   • Hyperlipidemia [E78.5] 07/15/2016      Resolved Hospital Problems    Diagnosis Date Noted Date Resolved   • Sepsis due to methicillin resistant Staphylococcus aureus (MRSA) (CMS/HCC) [A41.02] 2018   • Hyperkalemia [E87.5] 2018   • Atrial fibrillation with rapid ventricular response (CMS/HCC) [I48.91] 2017   • Chronic obstructive pulmonary disease with acute exacerbation (CMS/HCC) [J44.1] 2016          Hospital Course:  Leila Smith is a 73 y.o. female who presented to ED after she was found minimally responsive and in V-tach.  She was cardioverted on scene by EMS  and brought to the ED.  ED eval revealed hyperkalemia and ANGELA with creatinine of 4.8.  She was admitted to the ICU.  Cardiology and nephrology were consulted.  It was felt her v-tach was due to her hyperkalemia and this was corrected and she was eventually able to be resumed on her diltiazem and metoprolol.  Her kidney function improved with hydration and appears her baseline creatinine is between 1.2-1.4.  She did not require HD while hospitalized.  Additionally on admission, she was noted to have right parotid swelling and a RLE abscess.  ENT was consulted and recommended parotid massage gland 5 times daily, warm compresses to gland qid. Sialagogue with lemon juice qid.  She was taken to the OR for drainage of her RLE abscess and micro was negative from drainage but blood cultures were positive.  ID was consulted for assistance with antibiotic therapy.  She was placed on IV antibiotics on admission.  It was unclear what the source of her bacteremia was as there was no abscess seen on imaging in her parotid and typically leg abscesses don't lead to positive blood cultures.  She remained on IV antibiotics and was transitioned to oral bactrim for 2 weeks at discharge.  She has continued to show improvement.  She continues to have high output from her ostomy and cdiff toxin was negative.  IVF via her PICC are recommended as below.  She can follow up with ID in 2 weeks.        Discharge Follow Up Recommendations for labs/diagnostics:  Parotitis--massage gland 5 times daily, warm compresses to gland qid. Sialagogue with lemon juice qid. Agree with iv abx. No surgical intervention indicated  Recommend IVF q2-3 days for 1 week until ostomy output improves and watch creatinie.  BMP in 2-3 days  Leave orosco in.  Voiding trial at facility  Leave PICC line in for intermittent IVF    Day of Discharge     HPI:   No issues overnight  No complaints per patient  No change in ostomy output per nursing    Review of Systems   Unable  to perform ROS: Mental status change     Otherwise ROS is negative except as mentioned in the HPI.    Vital Signs:   Temp:  [97.1 °F (36.2 °C)-98.2 °F (36.8 °C)] 97.5 °F (36.4 °C)  Heart Rate:  [] 107  Resp:  [18] 18  BP: ()/(40-93) 102/68     Physical Exam:  Constitutional: No acute distress, awake, alert, wound care at bedside  HENT: NCAT, mucous membranes moist  Respiratory: Clear to auscultation bilaterally, respiratory effort normal   Cardiovascular: irregular, no murmurs, rubs, or gallops, palpable pedal pulses bilaterally  Gastrointestinal: Positive bowel sounds, soft, nontender, nondistended, ostomy with green output  Musculoskeletal: No bilateral ankle edema  Psychiatric: Flat affect, cooperative  Neurologic: Oriented x 1, speech slow      Pertinent  and/or Most Recent Results     Results from last 7 days   Lab Units  12/14/18   0508  12/13/18   0708  12/12/18   0532  12/10/18   0847  12/08/18   0637   WBC 10*3/mm3  11.38*  13.06*  13.15*  9.59  9.17   HEMOGLOBIN g/dL  10.5*  10.4*  11.5  12.4  11.1*   HEMATOCRIT %  34.3*  34.3*  38.8  41.2  37.6   PLATELETS 10*3/mm3  505*  455*  462*  398  331   SODIUM mmol/L  139  138  135  141  141   POTASSIUM mmol/L  4.4  4.4  4.8  4.5  4.1   CHLORIDE mmol/L  119*  120*  114*  118*  116*   CO2 mmol/L  13.0*  11.0*  12.0*  13.0*  17.0*   BUN mg/dL  30*  32*  34*  30*  25*   CREATININE mg/dL  1.42*  1.55*  1.71*  1.47*  1.09   GLUCOSE mg/dL  172*  182*  125*  101*  90   CALCIUM mg/dL  8.7  8.1*  7.9*  8.0*  8.4*     Results from last 7 days   Lab Units  12/08/18   0637   BILIRUBIN mg/dL  0.4   ALK PHOS U/L  206*   ALT (SGPT) U/L  26   AST (SGOT) U/L  24         Brief Urine Lab Results  (Last result in the past 365 days)      Color   Clarity   Blood   Leuk Est   Nitrite   Protein   CREAT   Urine HCG        11/28/18 1309 Yellow Turbid Large (3+) Large (3+) Negative 100 mg/dL (2+)               Microbiology Results Abnormal     Procedure Component Value -  Date/Time    Clostridium Difficile Toxin - Stool, Per Rectum [[id number]] Collected:  12/09/18 0536    Lab Status:  Final result Specimen:  Stool from Per Rectum Updated:  12/09/18 1104    Narrative:       The following orders were created for panel order Clostridium Difficile Toxin - Stool, Per Rectum.  Procedure                               Abnormality         Status                     ---------                               -----------         ------                     Clostridium Difficile To...[[id number]]  Normal              Final result                 Please view results for these tests on the individual orders.    Clostridium Difficile Toxin, PCR - Stool, Per Rectum [[id number]]  (Normal) Collected:  12/09/18 0536    Lab Status:  Final result Specimen:  Stool from Per Rectum Updated:  12/09/18 1104     C. Difficile Toxins by PCR Not Detected    Narrative:       Performance characteristics of test not established for patients <2 years of age.  Performance characteristics of test not established for patients <2 years of age.  Negative for Toxigenic C. Difficile    Tissue / Bone Culture - Tissue, Leg, Right [[id number]] Collected:  11/29/18 1801    Lab Status:  Edited Result - FINAL Specimen:  Tissue from Leg, Right Updated:  12/06/18 1130     Tissue Culture No growth at 1 week     Gram Stain Many (4+) Red blood cells      Many (4+) WBCs seen      No organisms seen    Blood Culture - Blood, Hand, Left [[id number]] Collected:  11/30/18 1005    Lab Status:  Final result Specimen:  Blood from Hand, Left Updated:  12/05/18 1030     Blood Culture No growth at 5 days    Blood Culture - Blood, Hand, Right [[id number]] Collected:  11/30/18 1005    Lab Status:  Final result Specimen:  Blood from Hand, Right Updated:  12/05/18 1030     Blood Culture No growth at 5 days    Wound Culture - Drainage, Ear, Right [[id number]]  (Abnormal) Collected:  11/30/18 1647    Lab Status:  Final result Specimen:  Drainage from Ear, Right  Updated:  12/03/18 0852     Wound Culture Scant growth (1+) Staphylococcus, coagulase negative     Comment: Susceptibility will not be done unless Doctor notifies Lab            Gram Stain No WBCs or organisms seen    Blood Culture - Blood, Arm, Left [[id number]]  (Abnormal) Collected:  11/28/18 1353    Lab Status:  Final result Specimen:  Blood from Arm, Left Updated:  12/01/18 0823     Blood Culture Staphylococcus aureus, MRSA     Comment:   Consider infectious disease consult to rule out distant focus of infection.  Methicillin resistant Staphylococcus aureus, Patient may be an isolation risk.        Isolated from Aerobic and Anaerobic Bottles     Gram Stain Aerobic Bottle Gram positive cocci in groups      Anaerobic Bottle Gram positive cocci in groups    Narrative:       PRIOR POSITIVE CULTURE WITH SAME MORPHOLOGY CALLED  Refer Culture to #[id number]    Blood Culture - Blood, Arm, Left [[id number]]  (Abnormal)  (Susceptibility) Collected:  11/28/18 1340    Lab Status:  Final result Specimen:  Blood from Arm, Left Updated:  12/01/18 0822     Blood Culture Staphylococcus aureus, MRSA     Comment:   Consider infectious disease consult to rule out distant focus of infection.  Methicillin resistant Staphylococcus aureus, Patient may be an isolation risk.        Isolated from Aerobic and Anaerobic Bottles     Gram Stain Aerobic Bottle Gram positive cocci in groups      Anaerobic Bottle Gram positive cocci in clusters    Susceptibility      Staphylococcus aureus, MRSA     DIMITRIS     Ciprofloxacin Resistant     Clindamycin Susceptible     Daptomycin Susceptible     Erythromycin Susceptible     Gentamicin Susceptible     Levofloxacin Intermediate [1]      Linezolid Susceptible     Oxacillin Resistant     Penicillin G Resistant     Quinupristin + Dalfopristin Susceptible     Rifampin Susceptible     Tetracycline Susceptible     Trimethoprim + Sulfamethoxazole Susceptible     Vancomycin Susceptible            [1]    Staphylococcus species may develop resistance during prolonged therapy with quinolones.  Isolates that are initially susceptible may become resistant within three to four days after initiation of therapy. Testing of repeat isolates may be warranted.                 Blood Culture ID, PCR - Blood, Arm, Left [[id number]]  (Abnormal) Collected:  11/28/18 1340    Lab Status:  Final result Specimen:  Blood from Arm, Left Updated:  11/29/18 0967     BCID, PCR Staphylococcus aureus. mecA (methicillin resistance gene) detected. Identification by BCID PCR.          Imaging Results (all)     Procedure Component Value Units Date/Time    FL Video Swallow With Speech [[id number]] Collected:  12/07/18 1543     Updated:  12/07/18 2228    Narrative:       EXAMINATION: FL VIDEO SWALLOW W SPEECH-     INDICATION: DYSPHAGIA, OROPHARYNGEAL, HAS ATTRIBUTABLE CAUSE     TECHNIQUE: 12 seconds of fluoroscopic time was used for this exam. 1  associated image was saved. The patient was evaluated in the seated  lateral position while taking a variety of consistencies of barium by  mouth under the direction of speech pathology.     COMPARISON: NONE     FINDINGS: There was penetration that cleared without aspiration with  sips of thin barium. There was no penetration and no aspiration with  pudding consistency barium.          Impression:       Fluoroscopy provided for a modified barium swallow. Please  see speech therapy report for full details and recommendations.         This report was finalized on 12/7/2018 10:25 PM by DR. Brian Cosme MD.       CT Soft Tissue Neck Without Contrast [[id number]] Collected:  11/28/18 1503     Updated:  12/01/18 1053    Narrative:       EXAMINATION: CT NECK SOFT TISSUE WO CONTRAST - 11/28/2018      INDICATION: Neck swelling. Evaluate for abscess.      TECHNIQUE: Unenhanced 3 mm axial images through the neck with sagittal  and coronal reconstructions The radiation dose reduction device was  turned on for each scan per  the ALARA (As Low as Reasonably Achievable)  protocol.     COMPARISON: None.     FINDINGS: Patient history indicates rapidly enlarging right neck mass.  The mass in question appears to be a markedly enlarged right parotid  gland, somewhat denser than the much smaller left parotid gland,  presumably due to edema, but otherwise with typical internal  architecture and no visible underlying abscess. There is no evidence of  abscess elsewhere. There is diffuse subcutaneous edema of the right  neck. The adjacent right masseter appears only slightly larger than the  left, and is probably mildly secondarily inflamed. The pterygoids, other  deep musculature, and the deep neck intermuscular fatty tissue shows no  evidence of inflammation. Submandibular glands are normal in size.  Prevertebral soft tissues and parapharyngeal soft tissues appear normal.  No parotid calculus is identified. No other inflammatory process is  identified elsewhere.       Impression:       Markedly enlarged right parotid gland and associated  inflammation presumably acute parotitis. No visible abscess.     DICTATED:   11/28/2018  EDITED/ls :   11/28/2018         This report was finalized on 12/1/2018 10:51 AM by DR. Brian Cosme MD.       US Nonvascular Extremity Limited [[id number]] Collected:  11/29/18 0826     Updated:  11/29/18 1016    Narrative:       EXAMINATION:  US NONVASCULAR EXTREMITY LIMITED-      INDICATION:  Concern for abscess on right lower extremity     TECHNIQUE:  Multiplanar static and cine mode grayscale and color Doppler  sonographic images of the area of interest in the right lower extremity  were obtained with a high frequency transducer.     COMPARISONS:  None.     FINDINGS:  In the area of concern, there is a very superficial complex  fluid collection with low-level internal echoes and posterior through  transmission that measures 5.4 x 1.7 x 1.3 cm. Posteriorly there is  echogenic debris without shadowing.       Impression:        Imaging characteristics are compatible with superficial  abscess or other complex fluid collection, just over 5 cm maximally.     D:  11/29/2018  E:  11/29/2018     This report was finalized on 11/29/2018 10:14 AM by Scott Sauer.       XR Chest 1 View [[id number]] Collected:  11/28/18 1346     Updated:  11/29/18 0917    Narrative:       EXAMINATION: XR CHEST 1 VW-11/28/2018:      INDICATION: Dysrhythmia, triage protocol.      COMPARISON: 01/31/2018.     FINDINGS: The heart is enlarged. The vasculature is slightly cephalized.  There is no evidence of overt pulmonary edema. Mild coarsening of the  interstitial lung markings is stable. No consolidation, effusion or  pneumothorax is seen.           Impression:       Cardiomegaly and mild pulmonary venous hypertension.     D:  11/28/2018  E:  11/28/2018     This report was finalized on 11/29/2018 9:15 AM by DR. Brian Cosme MD.       XR Abdomen KUB [[id number]] Collected:  11/28/18 1914     Updated:  11/28/18 2008    Narrative:       EXAM:    XR Abdomen, 1 View     EXAM DATE/TIME:    11/28/2018 7:14 PM     CLINICAL HISTORY:    73 years old, female; Device placement; Gi device; Nasogastric tube;   Additional info: Ng placement     TECHNIQUE:    Frontal supine view of the abdomen/pelvis.     COMPARISON:    US RENAL BILATERAL 7/3/2018 3:47 PM     FINDINGS:     Tip of NASOGASTRIC/ENTERIC tube distal to the gastroesophageal junction in   the gastric fundus.       Impression:       Tip of NASOGASTRIC/ENTERIC tube distal to the gastroesophageal junction in the   gastric fundus.     THIS DOCUMENT HAS BEEN ELECTRONICALLY SIGNED BY NIRAJ SANDOVAL MD          Results for orders placed during the hospital encounter of 11/28/18   Duplex Venous Upper Extremity - Right CAR    Narrative · Acute right upper extremity superficial thrombophlebitis noted in the   cephalic (forearm).  · All other right sided vessels appear normal.          Results for orders placed during the hospital encounter  of 11/28/18   Duplex Venous Upper Extremity - Right CAR    Narrative · Acute right upper extremity superficial thrombophlebitis noted in the   cephalic (forearm).  · All other right sided vessels appear normal.          Results for orders placed during the hospital encounter of 11/28/18   Adult Transesophageal Echo (YANNA) W/ Cont if Necessary Per Protocol    Narrative · Left ventricular systolic function is normal.  · Left ventricular wall thickness is consistent with severe concentric   hypertrophy.  · Left atrial cavity size is mildly dilated.  · Mild dilation of the ascending aorta is present. 4.2cm..  · No evidence of a left atrial appendage thrombus was present.  · No echocardiographic evidence of endocarditis            Discharge Details        Discharge Medications      New Medications      Instructions Start Date   castor oil-balsam peru ointment   1 each, Topical, Every 12 Hours Scheduled      loperamide 2 MG capsule  Commonly known as:  IMODIUM   2 mg, Oral, 3 Times Daily      sulfamethoxazole-trimethoprim 200-40 MG/5ML suspension  Commonly known as:  BACTRIM,SEPTRA   10 mL, Oral, 2 Times Daily      thiamine 100 MG tablet  Commonly known as:  VITAMIN B1   100 mg, Oral, Daily         Changes to Medications      Instructions Start Date   apixaban 2.5 MG tablet tablet  Commonly known as:  ELIQUIS  What changed:    · medication strength  · how much to take   2.5 mg, Oral, Every 12 Hours Scheduled      insulin detemir 100 UNIT/ML injection  Commonly known as:  LEVEMIR  What changed:  You were already taking a medication with the same name, and this prescription was added. Make sure you understand how and when to take each.   5 Units, Subcutaneous, Nightly      insulin detemir 100 UNIT/ML injection  Commonly known as:  LEVEMIR  What changed:    · how much to take  · when to take this   15 Units, Subcutaneous, Daily         Continue These Medications      Instructions Start Date   acetaminophen 325 MG  tablet  Commonly known as:  TYLENOL   650 mg, Oral, Every 6 Hours PRN      ammonium lactate 12 % lotion  Commonly known as:  LAC-HYDRIN   1 application, Topical, 2 Times Daily PRN      aspirin 81 MG chewable tablet   81 mg, Oral, Daily      atorvastatin 10 MG tablet  Commonly known as:  LIPITOR   10 mg, Oral, Nightly      BREO ELLIPTA 100-25 MCG/INH aerosol powder   Generic drug:  Fluticasone Furoate-Vilanterol   1 puff, Inhalation, Daily      diltiaZEM 60 MG tablet  Commonly known as:  CARDIZEM   60 mg, Oral, Every 6 Hours Scheduled      Divalproex Sodium 125 MG capsule  Commonly known as:  DEPAKOTE SPRINKLE   125 mg, Oral, Nightly      escitalopram 20 MG tablet  Commonly known as:  LEXAPRO   20 mg, Oral, Daily      insulin lispro 100 UNIT/ML injection  Commonly known as:  humaLOG   0-14 Units, Subcutaneous, 4 Times Daily With Meals & Nightly      Melatonin 3 MG tablet   3 mg, Oral, Nightly      metoprolol tartrate 50 MG tablet  Commonly known as:  LOPRESSOR   50 mg, Oral, Every 12 Hours Scheduled      MULTIVITAMIN PO   1 tablet, Oral, Daily      ondansetron 4 MG tablet  Commonly known as:  ZOFRAN   4 mg, Oral, Every 8 Hours PRN      TEARS NATURALE OP   1 application, Ophthalmic, 2 Times Daily         Stop These Medications    magnesium hydroxide 400 MG/5ML suspension  Commonly known as:  MILK OF MAGNESIA     raNITIdine 150 MG tablet  Commonly known as:  ZANTAC              Discharge Disposition:  Skilled Nursing Facility (DC - External)    Discharge Diet:  Diet Instructions     Diet: Dysphagia; Thin Liquids, No Restrictions; Mechanical Soft      Discharge Diet:  Dysphagia    Fluid Consistency:  Thin Liquids, No Restrictions    Pureed Options:  Mechanical Soft    No mixed consistencies        MBS/VFSS/FEES 12/7/18    Reason for Referral  Patient was referred for a MBS to assess the efficiency of his/her swallow function, rule out aspiration and make recommendations regarding safe dietary consistencies, effective  compensatory strategies, and safe eating environment.        Referring Physician: SAUL Oscar MD          Recommendations/Treatment  Oral Phase, Comment: Limited trials accepted. Mild prolonged with pudding though adequate              SLP Swallowing Diagnosis: mild, oral dysfunction  Functional Impact: risk of malnutrition  Rehab Potential/Prognosis, Swallowing: good, to achieve stated therapy goals  Swallow Criteria for Skilled Therapeutic Interventions Met: demonstrates skilled criteria    Therapy Frequency (Swallow): PRN, 5 days per week  Predicted Duration Therapy Intervention (Days): until discharge  SLP Diet Recommendation: puree with some mashed, thin liquids  Recommended Precautions and Strategies: upright posture during/after eating, small bites of food and sips of liquid, other (see comments)(encourage as able)  SLP Rec. for Method of Medication Administration: meds whole, meds crushed, with pudding or applesauce, as tolerated      Oral Preparation/ Oral Phase            Pharyngeal Phase  Pharyngeal Phase: functional pharyngeal phase of swallowing  Pharyngeal Phase, Comment: No aspiration with any consistency, would not accept solid trial. Penetration with thin liquids and mild pharyngeal residue with pudding, though no obvious impact to swallow safety.              Cervical Esophageal Phase              Prognosis                              Discharge Activity:   Activity Instructions     Activity as Tolerated      Other Instructions (Specify)      Leave FC in at dc.  Attempt voiding trial in a few days at facility  Recommend 1 L IVF every 2-3 days for next week due to high ostomy output.  Recheck BMP to watch creatinine          Code Status/Level of Support:  Code Status and Medical Interventions:   Ordered at: 11/28/18 0421     Code Status:    CPR     Medical Interventions (Level of Support Prior to Arrest):    Full       No future appointments.    Additional Instructions for the Follow-ups that  You Need to Schedule     Discharge Follow-up with PCP   As directed       Currently Documented PCP:    Ciaran Wakefield MD    PCP Phone Number:    [phone]     Follow Up Details:  1 week               Time Spent on Discharge:  50 minutes    Electronically signed by DANIEL Suarez, 12/14/18, 11:12 AM.

## 2018-12-14 NOTE — PROGRESS NOTES
INFECTIOUS DISEASE PROGRESS NOTE    Leila Smith  1945  [id number]    Date: 12/14/2018    Admission Date: 11/28/2018    CC: MRSA bacteremia    History of present illness:    Patient is a 73 y.o. female presents from [facility] with ventricular tachycardia and hyperkalemia from  acute on chronic renal failure;  PAtient admitted to ICU and has been worked up for parotitis, leg abscess and has been found to have MRSA bacteremia.. Blood cultures are positive for MRSA. Hematoma on right leg was drained by surgery on 11/29. 2-dimensional Echocardiogram negative for vegetations.     Patient is obtunded and is a poor historian no family by bedside.  Opens eyes during exam but is nonverbal.    12/3/18: Patient is a poor historian with no family at bedside.  She says yes to any question.  She is more alert today. She remains afebrile. Right parotid gland area with swelling and pain as she swatted my hand away when palpated.     12/4/18; doing well; no events overnight; no complaints, poor historian, ros unobtainable    12/5/18; no events overnight;  Per nurse no fever, rash, poor historian, picc placed    12/6/18; doing well; no events overnight; ros unobtainable    12/7/18; doing fair, poor historian, no complaints, nonverbal; ros unobtainable    12/10/18; no events overnight; feels well; no complaints, no diarrhea, rash, sore throat; awake says a few words    12/11/18 doing well; more awake, no complaints, no fever, rash, sore throat or diarrhea  Denies right parotid pain.    12/12/18; no events overnight; no complaints; feels well; no fever, rash, sore throat, less pain in right parotid.  12/13/18; no events overnight; no complaints; resting quietly    12/14/18: Plans for discharge today. She is going to [facility] for ongoing rehab and antibiotics with wound care. No new complaints. Currently on Bactrim and Doxycycline     Past Medical History:   Diagnosis Date   • Atrial fibrillation (CMS/HCC)    •  Chronic ulcer of right leg (CMS/HCC)    • Cognitive communication deficit    • COPD (chronic obstructive pulmonary disease) (CMS/HCC)    • Diabetes mellitus (CMS/HCC)    • Difficulty in walking    • Encephalopathy    • Generalized muscle weakness    • Hematuria    • Hyperlipidemia    • Hypertension    • Hypothyroidism    • Urinary tract infection        Past Surgical History:   Procedure Laterality Date   • CATARACT EXTRACTION     • CHOLECYSTECTOMY     • COLOSTOMY     • FOOT SURGERY Right    • HERNIA REPAIR     • HYSTERECTOMY     • TOTAL KNEE ARTHROPLASTY Right        Family History   Problem Relation Age of Onset   • Stomach cancer Mother    • Alcohol abuse Other    • Cancer Other    • Diabetes Other    • Hypertension Other    • Other Other        Social History     Socioeconomic History   • Marital status:      Spouse name: Not on file   • Number of children: Not on file   • Years of education: Not on file   • Highest education level: Not on file   Social Needs   • Financial resource strain: Not on file   • Food insecurity - worry: Not on file   • Food insecurity - inability: Not on file   • Transportation needs - medical: Not on file   • Transportation needs - non-medical: Not on file   Occupational History   • Not on file   Tobacco Use   • Smoking status: Former Smoker     Packs/day: 0.00     Years: 50.00     Pack years: 0.00     Types: Cigarettes   • Smokeless tobacco: Never Used   Substance and Sexual Activity   • Alcohol use: No   • Drug use: No   • Sexual activity: Defer   Other Topics Concern   • Not on file   Social History Narrative   • Not on file       Allergies   Allergen Reactions   • Codeine    • Tetracyclines & Related          Medication:    Current Facility-Administered Medications:   •  apixaban (ELIQUIS) tablet 2.5 mg, 2.5 mg, Oral, Q12H, Yumiko Sandoval L, DO, 2.5 mg at 12/14/18 1043  •  aspirin EC tablet 81 mg, 81 mg, Oral, Daily, Jory Macedo APRN, 81 mg at 12/14/18 1044  •   castor oil-balsam peru (VENELEX) ointment, , Topical, Q12H, Julien Carcamo, DANIEL  •  dextrose (D50W) 25 g/ 50mL Intravenous Solution 25 g, 25 g, Intravenous, Q15 Min PRN, Atkins, Yumiko L, DO  •  dextrose (GLUTOSE) oral gel 15 g, 15 g, Oral, Q15 Min PRN, Atkins, Yumiko L, DO  •  diltiaZEM (CARDIZEM) tablet 60 mg, 60 mg, Oral, Q6H, Balwinder Marquez III, MD, 60 mg at 12/14/18 1043  •  doxycycline (MONODOX) capsule 100 mg, 100 mg, Oral, Q12H, Keo Hess MD, 100 mg at 12/14/18 1043  •  glucagon (human recombinant) (GLUCAGEN DIAGNOSTIC) injection 1 mg, 1 mg, Subcutaneous, PRN, Atkins, Yumiko L, DO  •  hydrALAZINE (APRESOLINE) tablet 10 mg, 10 mg, Oral, Q8H PRN, Darrion, Jasmina V., DO  •  insulin detemir (LEVEMIR) injection 15 Units, 15 Units, Subcutaneous, Daily, Rod Flores MD, 15 Units at 12/13/18 1059  •  insulin detemir (LEVEMIR) injection 5 Units, 5 Units, Subcutaneous, Nightly, Rod Flores MD, 5 Units at 12/13/18 2126  •  loperamide (IMODIUM) capsule 2 mg, 2 mg, Oral, TID PRN, Rod Flores MD, 2 mg at 12/11/18 2224  •  loperamide (IMODIUM) capsule 2 mg, 2 mg, Oral, TID, Rod Flores MD, 2 mg at 12/14/18 1044  •  Magnesium Sulfate 2 gram Bolus, followed by 8 gram infusion (total Mg dose 10 grams)- Mg less than or equal to 1mg/dL, 2 g, Intravenous, PRN **OR** Magnesium Sulfate 2 gram / 50mL Infusion (GIVE X 3 BAGS TO EQUAL 6GM TOTAL DOSE) - Mg 1.1 - 1.5 mg/dl, 2 g, Intravenous, PRN **OR** Magnesium Sulfate 4 gram infusion- Mg 1.6-1.9 mg/dL, 4 g, Intravenous, PRN, Balwinder Mccall, [facility], Last Rate: 25 mL/hr at 12/03/18 1218, 4 g at 12/03/18 1218  •  metoprolol tartrate (LOPRESSOR) tablet 50 mg, 50 mg, Oral, Q12H, Balwinder Marquez III, MD, 50 mg at 12/14/18 1044  •  potassium chloride (MICRO-K) CR capsule 40 mEq, 40 mEq, Oral, PRN **OR** potassium chloride (KLOR-CON) packet 40 mEq, 40 mEq, Oral, PRN, 40 mEq at 12/03/18 1714 **OR** potassium chloride 10 mEq in 100 mL IVPB, 10 mEq,  Intravenous, Q1H PRN, Balwinder Mccall, [facility]  •  potassium phosphate 45 mmol in sodium chloride 0.9 % 500 mL infusion, 45 mmol, Intravenous, PRN, Last Rate: 62.5 mL/hr at 12/03/18 1713, 45 mmol at 12/03/18 1713 **OR** potassium phosphate 30 mmol in sodium chloride 0.9 % 250 mL infusion, 30 mmol, Intravenous, PRN **OR** potassium phosphate 15 mmol in sodium chloride 0.9 % 100 mL infusion, 15 mmol, Intravenous, PRN **OR** sodium glycerophosphate 40 mmol in sodium chloride 0.9 % 500 mL IVPB, 40 mmol, Intravenous, PRN **OR** sodium glycerophosphate 20 mmol in sodium chloride 0.9 % 250 mL IVPB, 20 mmol, Intravenous, PRN, Balwinder Mccall, [facility]  •  sodium chloride 0.9 % flush 10 mL, 10 mL, Intravenous, PRN, Rohan Ceballos MD  •  sodium chloride 0.9 % flush 10 mL, 10 mL, Intravenous, Q12H, Shandra Oscar MD, 10 mL at 12/14/18 1028  •  sodium chloride 0.9 % flush 10 mL, 10 mL, Intravenous, PRN, Shandra Oscar MD  •  sodium chloride 0.9 % flush 3-10 mL, 3-10 mL, Intravenous, PRN, Julien Carcamo, APRN, 10 mL at 12/08/18 0835  •  sodium chloride 0.9 % infusion, 100 mL/hr, Intravenous, Continuous, Rod Flores MD, Last Rate: 100 mL/hr at 12/13/18 1217, 100 mL/hr at 12/13/18 1217  •  sulfamethoxazole-trimethoprim (BACTRIM DS,SEPTRA DS) 800-160 MG per tablet 80 mg, 0.5 tablet, Oral, Q12H, Olivier Gtz MD  •  thiamine (VITAMIN B-1) tablet 100 mg, 100 mg, Oral, Daily, Case, Jasmina V., DO, 100 mg at 12/14/18 1043    Antibiotics:  Anti-Infectives (From admission, onward)    Ordered     Dose/Rate Route Frequency Start Stop    12/13/18 1606  sulfamethoxazole-trimethoprim (BACTRIM DS,SEPTRA DS) 800-160 MG per tablet 80 mg     Ordering Provider:  Olivier Gtz MD    0.5 tablet Oral Every 12 Hours 12/13/18 1800 12/27/18 1759    12/13/18 1232  doxycycline (MONODOX) capsule 100 mg     Ordering Provider:  Keo Hess MD    100 mg Oral Every 12 Hours Scheduled 12/13/18 1315 01/10/19 0802     18 1128  vancomycin (VANCOCIN) in iso-osmotic dextrose IVPB 1 g (premix) 200 mL     Ordering Provider:  Aysha Rocha [facility]    1,000 mg  over 60 Minutes Intravenous Once 18 1400 18 1448    18 1522  piperacillin-tazobactam (ZOSYN) 3.375 g in iso-osmotic dextrose 50 ml (premix)     Eran Huang [facility] reviewed the order on 18 0942.   Ordering Provider:  Jasmina Maier DO    3.375 g  over 4 Hours Intravenous Every 12 Hours 18 2200 18 1200    18 1332  vancomycin 1750 mg/500 mL 0.9% NS IVPB ([facility])     Ordering Provider:  Rohan Ceballos MD    20 mg/kg × 90.7 kg  over 120 Minutes Intravenous Once 18 1334 18 1642    18 1332  piperacillin-tazobactam (ZOSYN) 3.375 g in iso-osmotic dextrose 50 ml (premix)     Ordering Provider:  Rohan Ceballos MD    3.375 g  over 30 Minutes Intravenous Once 18 1334 18 1720            Review of Systems:  See hpi      Physical Exam:   Vital Signs  Temp (24hrs), Av.7 °F (36.5 °C), Min:97.1 °F (36.2 °C), Max:98.2 °F (36.8 °C)    Temp  Min: 97.1 °F (36.2 °C)  Max: 98.2 °F (36.8 °C)  BP  Min: 95/40  Max: 135/89  Pulse  Min: 95  Max: 107  Resp  Min: 18  Max: 18  SpO2  Min: 98 %  Max: 98 %    GENERAL: resting quietly opens eyes to exam, more alert. Talkative.   HEENT: Normocephalic, atraumatic.  PERRL. EOMI. No conjunctival injection. No icterus. Oropharynx clear without evidence of thrush or exudate. No evidence of peridontal disease.  No ext oral lesions    HEART: RRR; systolic murmur loudest at base left  LUNGS: Clear to auscultation bilaterally   ABDOMEN: Soft, nontender, nondistended. Positive bowel sounds.   : Bone with clear urine  EXT:  No cyanosis, clubbing or edema. No cord.  MSK: FROM without joint effusions noted arms/legs.    SKIN: keloid like scarring on toes, fore feet bilaterally. Bilateral pre-tibial wounds tender to touch     NEURO: ;opens eyes, removes stethoscope from heart during exam.  Poor historian      Laboratory Data    Results from last 7 days   Lab Units  12/14/18   0508  12/13/18   0708  12/12/18   0532   WBC 10*3/mm3  11.38*  13.06*  13.15*   HEMOGLOBIN g/dL  10.5*  10.4*  11.5   HEMATOCRIT %  34.3*  34.3*  38.8   PLATELETS 10*3/mm3  505*  455*  462*     Results from last 7 days   Lab Units  12/14/18   0508   SODIUM mmol/L  139   POTASSIUM mmol/L  4.4   CHLORIDE mmol/L  119*   CO2 mmol/L  13.0*   BUN mg/dL  30*   CREATININE mg/dL  1.42*   GLUCOSE mg/dL  172*   CALCIUM mg/dL  8.7     Results from last 7 days   Lab Units  12/08/18   0637   ALK PHOS U/L  206*   BILIRUBIN mg/dL  0.4   ALT (SGPT) U/L  26   AST (SGOT) U/L  24                         Estimated Creatinine Clearance: 34.5 mL/min (A) (by C-G formula based on SCr of 1.42 mg/dL (H)).      Microbiology:  C diff negative  Blood Culture   Date Value Ref Range Status   11/30/2018 No growth at 2 days  Preliminary   11/30/2018 No growth at 2 days  Preliminary   11/28/2018 Staphylococcus aureus, MRSA (A)  Final     Comment:       Consider infectious disease consult to rule out distant focus of infection.  Methicillin resistant Staphylococcus aureus, Patient may be an isolation risk.   11/28/2018 Staphylococcus aureus, MRSA (A)  Final     Comment:       Consider infectious disease consult to rule out distant focus of infection.  Methicillin resistant Staphylococcus aureus, Patient may be an isolation risk.       BCID, PCR   Date Value Ref Range Status   11/28/2018 (C) No organism detected by BCID PCR. Final    Staphylococcus aureus. mecA (methicillin resistance gene) detected. Identification by BCID PCR.                 Wound Culture   Date Value Ref Range Status   11/30/2018 (A)  Final    Scant growth (1+) Staphylococcus, coagulase negative     Comment:     Susceptibility will not be done unless Doctor notifies Lab             Radiology:  Fl Video Swallow With Speech    Result Date: 12/7/2018  Fluoroscopy provided for a modified barium  swallow. Please see speech therapy report for full details and recommendations.    This report was finalized on 12/7/2018 10:25 PM by DR. Brian Cosme MD.          Impression:   MRSA bactertemia  Left leg abscess   Parotitis right side  Encephalopathy  COPD  Acute renal failure on chronic      PLAN/RECOMMENDATIONS:     Estimated Creatinine Clearance: 34.5 mL/min (A) (by C-G formula based on SCr of 1.42 mg/dL (H)).    Source of MRSA bacteremia could be from the parotid gland; no abscess seen on initial imaging;  Will be interesting to see if leg culures grow MRSA; a leg abscess can spontaneously develop and usually doesn't lead to positive blood cultures.    Regardless; high grade bacteremia is still concerning for endovascular involvement.    YANNA -ve for endocarditis    Given lack of  pulmonary involvement probably not active concern would change abx:     start oral abx(bactrim 400/80 mg bid x 2 weeks)    Will offer a 2 week f/u appt with Dr. Gtz for repeat monitoring and blood cultures.   Follow creatinine, potassium  Ok to go to SNF today  Clinically improving       HOUSTON CAMARENA saw and examined patient and orchestrated above note  I have reviewed the above and agree    MIGUE Encarnacion  12/14/2018  10:49 AM

## 2018-12-14 NOTE — PLAN OF CARE
Problem: Patient Care Overview  Goal: Plan of Care Review  Outcome: Ongoing (interventions implemented as appropriate)   12/14/18 1121   Coping/Psychosocial   Plan of Care Reviewed With patient   Plan of Care Review   Progress no change;  Cooperative with taking PO medication this morning and cooperative for colostomy appliance change.

## 2018-12-14 NOTE — PROGRESS NOTES
Case Management Discharge Note    Final Note: Patient ready for discharge today.  Transport arrangements made.  Called Senait with [facility] to confirm time and left message with callback number.  Patient plan is to discharge back to [facility] today via ambulance scheduled for 1200.  Nursing to call report to [phone]    Destination - Selection Complete      Service Provider Request Status Selected Services Address Phone Number Fax Number    [facility] Selected Skilled Nursing [address] [id number] [phone]      Durable Medical Equipment      No service has been selected for the patient.      Dialysis/Infusion      No service has been selected for the patient.      Home Medical Care      No service has been selected for the patient.      Community Resources      No service has been selected for the patient.             Final Discharge Disposition Code: 04 - intermediate care facility

## 2018-12-15 NOTE — PROGRESS NOTES
Nutrition Services    Patient Name:  Leila Smith  YOB: 1945  MRN: [id number]  Admit Date:  11/28/2018    Pt discharged by time of visit. RN rpt total feed pt takes food inconsistently. (Roughly 40% of 10 meals consumed per charting.) On Dysphagia IV diet w nectar thick liq at time of discharge.     Electronically signed by:  Galina Mccall RD  12/14/18 8:12 PM

## 2019-01-01 ENCOUNTER — LAB REQUISITION (OUTPATIENT)
Dept: LAB | Facility: HOSPITAL | Age: 74
End: 2019-01-01

## 2019-01-01 ENCOUNTER — APPOINTMENT (OUTPATIENT)
Dept: GENERAL RADIOLOGY | Facility: HOSPITAL | Age: 74
End: 2019-01-01

## 2019-01-01 ENCOUNTER — HOSPITAL ENCOUNTER (INPATIENT)
Facility: HOSPITAL | Age: 74
LOS: 5 days | End: 2019-01-20
Attending: EMERGENCY MEDICINE | Admitting: INTERNAL MEDICINE

## 2019-01-01 ENCOUNTER — APPOINTMENT (OUTPATIENT)
Dept: CT IMAGING | Facility: HOSPITAL | Age: 74
End: 2019-01-01

## 2019-01-01 VITALS
HEART RATE: 88 BPM | TEMPERATURE: 96.7 F | OXYGEN SATURATION: 73 % | BODY MASS INDEX: 22.28 KG/M2 | RESPIRATION RATE: 16 BRPM | HEIGHT: 70 IN | WEIGHT: 155.65 LBS | DIASTOLIC BLOOD PRESSURE: 39 MMHG | SYSTOLIC BLOOD PRESSURE: 85 MMHG

## 2019-01-01 DIAGNOSIS — Z00.00 ROUTINE GENERAL MEDICAL EXAMINATION AT A HEALTH CARE FACILITY: ICD-10-CM

## 2019-01-01 DIAGNOSIS — A41.9 SEPSIS, DUE TO UNSPECIFIED ORGANISM: Primary | ICD-10-CM

## 2019-01-01 DIAGNOSIS — E87.29 METABOLIC ACIDOSIS, INCREASED ANION GAP: ICD-10-CM

## 2019-01-01 DIAGNOSIS — N17.9 ACUTE RENAL FAILURE, UNSPECIFIED ACUTE RENAL FAILURE TYPE (HCC): ICD-10-CM

## 2019-01-01 DIAGNOSIS — E87.5 ACUTE HYPERKALEMIA: ICD-10-CM

## 2019-01-01 DIAGNOSIS — E86.0 DEHYDRATION: ICD-10-CM

## 2019-01-01 LAB
ALBUMIN SERPL-MCNC: 3.11 G/DL (ref 3.2–4.8)
ALBUMIN SERPL-MCNC: 3.15 G/DL (ref 3.2–4.8)
ALBUMIN SERPL-MCNC: 3.33 G/DL (ref 3.2–4.8)
ALBUMIN SERPL-MCNC: 3.46 G/DL (ref 3.2–4.8)
ALBUMIN/GLOB SERPL: 1.3 G/DL (ref 1.5–2.5)
ALP SERPL-CCNC: 104 U/L (ref 25–100)
ALP SERPL-CCNC: 106 U/L (ref 25–100)
ALP SERPL-CCNC: 117 U/L (ref 25–100)
ALP SERPL-CCNC: 127 U/L (ref 25–100)
ALT SERPL W P-5'-P-CCNC: 25 U/L (ref 7–40)
ALT SERPL W P-5'-P-CCNC: 26 U/L (ref 7–40)
ALT SERPL W P-5'-P-CCNC: 28 U/L (ref 7–40)
ALT SERPL W P-5'-P-CCNC: 28 U/L (ref 7–40)
AMMONIA BLD-SCNC: 121 UMOL/L (ref 19–60)
AMMONIA BLD-SCNC: 130 UMOL/L (ref 19–60)
AMORPH URATE CRY URNS QL MICRO: ABNORMAL /HPF
ANION GAP SERPL CALCULATED.3IONS-SCNC: ABNORMAL MMOL/L (ref 3–11)
AST SERPL-CCNC: 17 U/L (ref 0–33)
AST SERPL-CCNC: 20 U/L (ref 0–33)
AST SERPL-CCNC: 23 U/L (ref 0–33)
AST SERPL-CCNC: 29 U/L (ref 0–33)
BACTERIA SPEC AEROBE CULT: ABNORMAL
BACTERIA SPEC AEROBE CULT: NORMAL
BACTERIA SPEC AEROBE CULT: NORMAL
BACTERIA SPEC RESP CULT: ABNORMAL
BACTERIA UR QL AUTO: ABNORMAL /HPF
BASOPHILS # BLD AUTO: 0.01 10*3/MM3 (ref 0–0.2)
BASOPHILS # BLD MANUAL: 0 10*3/MM3 (ref 0–0.2)
BASOPHILS # BLD MANUAL: 0 10*3/MM3 (ref 0–0.2)
BASOPHILS NFR BLD AUTO: 0 % (ref 0–1)
BILIRUB SERPL-MCNC: 0.1 MG/DL (ref 0.3–1.2)
BILIRUB SERPL-MCNC: 0.3 MG/DL (ref 0.3–1.2)
BILIRUB UR QL STRIP: NEGATIVE
BUN BLD-MCNC: 147 MG/DL (ref 9–23)
BUN BLD-MCNC: 182 MG/DL (ref 9–23)
BUN BLD-MCNC: 183 MG/DL (ref 9–23)
BUN BLD-MCNC: 185 MG/DL (ref 9–23)
BUN BLD-MCNC: 216 MG/DL (ref 9–23)
BUN BLD-MCNC: 239 MG/DL (ref 9–23)
BUN BLD-MCNC: 245 MG/DL (ref 9–23)
BUN/CREAT SERPL: 19.1 (ref 7–25)
BUN/CREAT SERPL: 23.7 (ref 7–25)
BUN/CREAT SERPL: 24.3 (ref 7–25)
BUN/CREAT SERPL: 26.5 (ref 7–25)
BUN/CREAT SERPL: 27.3 (ref 7–25)
BUN/CREAT SERPL: 27.5 (ref 7–25)
BURR CELLS BLD QL SMEAR: ABNORMAL
BURR CELLS BLD QL SMEAR: ABNORMAL
BURR CELLS BLD QL SMEAR: NORMAL
BURR CELLS BLD QL SMEAR: NORMAL
C3 FRG RBC-MCNC: ABNORMAL
CA-I SERPL ISE-MCNC: 1.23 MMOL/L (ref 1.12–1.32)
CA-I SERPL ISE-MCNC: 1.25 MMOL/L (ref 1.12–1.32)
CA-I SERPL ISE-MCNC: 1.25 MMOL/L (ref 1.12–1.32)
CA-I SERPL ISE-MCNC: 1.3 MMOL/L (ref 1.12–1.32)
CALCIUM SPEC-SCNC: 8 MG/DL (ref 8.7–10.4)
CALCIUM SPEC-SCNC: 8.1 MG/DL (ref 8.7–10.4)
CALCIUM SPEC-SCNC: 8.1 MG/DL (ref 8.7–10.4)
CALCIUM SPEC-SCNC: 8.2 MG/DL (ref 8.7–10.4)
CALCIUM SPEC-SCNC: 8.3 MG/DL (ref 8.7–10.4)
CALCIUM SPEC-SCNC: 8.6 MG/DL (ref 8.7–10.4)
CALCIUM SPEC-SCNC: 8.9 MG/DL (ref 8.7–10.4)
CHLORIDE SERPL-SCNC: 123 MMOL/L (ref 99–109)
CHLORIDE SERPL-SCNC: 124 MMOL/L (ref 99–109)
CHLORIDE SERPL-SCNC: 126 MMOL/L (ref 99–109)
CHLORIDE SERPL-SCNC: 128 MMOL/L (ref 99–109)
CHLORIDE SERPL-SCNC: 130 MMOL/L (ref 99–109)
CHLORIDE SERPL-SCNC: 131 MMOL/L (ref 99–109)
CHLORIDE SERPL-SCNC: 134 MMOL/L (ref 99–109)
CHOLEST SERPL-MCNC: 76 MG/DL (ref 0–200)
CLARITY UR: ABNORMAL
CO2 SERPL-SCNC: <10 MMOL/L (ref 20–31)
COLOR UR: ABNORMAL
CREAT BLD-MCNC: 7.52 MG/DL (ref 0.6–1.3)
CREAT BLD-MCNC: 7.71 MG/DL (ref 0.6–1.3)
CREAT BLD-MCNC: 7.81 MG/DL (ref 0.6–1.3)
CREAT BLD-MCNC: 7.93 MG/DL (ref 0.6–1.3)
CREAT BLD-MCNC: 8.15 MG/DL (ref 0.6–1.3)
CREAT BLD-MCNC: 8.74 MG/DL (ref 0.6–1.3)
CREAT BLD-MCNC: 8.91 MG/DL (ref 0.6–1.3)
CREAT UR-MCNC: 32.7 MG/DL
CRP SERPL-MCNC: 3.47 MG/DL (ref 0–1)
D-LACTATE SERPL-SCNC: 1.1 MMOL/L (ref 0.5–2)
D-LACTATE SERPL-SCNC: 1.3 MMOL/L (ref 0.5–2)
D-LACTATE SERPL-SCNC: 1.4 MMOL/L (ref 0.5–2)
DEPRECATED RDW RBC AUTO: 65.7 FL (ref 37–54)
DEPRECATED RDW RBC AUTO: 66.9 FL (ref 37–54)
DEPRECATED RDW RBC AUTO: 67.5 FL (ref 37–54)
DEPRECATED RDW RBC AUTO: 67.9 FL (ref 37–54)
DEPRECATED RDW RBC AUTO: 68.9 FL (ref 37–54)
DEPRECATED RDW RBC AUTO: 70 FL (ref 37–54)
DEPRECATED RDW RBC AUTO: 70.4 FL (ref 37–54)
EOSINOPHIL # BLD AUTO: 0 10*3/MM3 (ref 0–0.3)
EOSINOPHIL # BLD AUTO: 0.01 10*3/MM3 (ref 0–0.3)
EOSINOPHIL # BLD AUTO: 0.01 10*3/MM3 (ref 0–0.3)
EOSINOPHIL # BLD MANUAL: 0 10*3/MM3 (ref 0.1–0.3)
EOSINOPHIL # BLD MANUAL: 0 10*3/MM3 (ref 0.1–0.3)
EOSINOPHIL NFR BLD AUTO: 0 % (ref 0–3)
EOSINOPHIL NFR BLD MANUAL: 0 % (ref 0–3)
EOSINOPHIL NFR BLD MANUAL: 0 % (ref 0–3)
EOSINOPHIL SPEC QL MICRO: 0 % EOS/100 CELLS (ref 0–0)
ERYTHROCYTE [DISTWIDTH] IN BLOOD BY AUTOMATED COUNT: 20.8 % (ref 11.3–14.5)
ERYTHROCYTE [DISTWIDTH] IN BLOOD BY AUTOMATED COUNT: 20.8 % (ref 11.3–14.5)
ERYTHROCYTE [DISTWIDTH] IN BLOOD BY AUTOMATED COUNT: 21.2 % (ref 11.3–14.5)
ERYTHROCYTE [DISTWIDTH] IN BLOOD BY AUTOMATED COUNT: 21.7 % (ref 11.3–14.5)
ERYTHROCYTE [DISTWIDTH] IN BLOOD BY AUTOMATED COUNT: 21.8 % (ref 11.3–14.5)
ERYTHROCYTE [DISTWIDTH] IN BLOOD BY AUTOMATED COUNT: 22 % (ref 11.3–14.5)
ERYTHROCYTE [DISTWIDTH] IN BLOOD BY AUTOMATED COUNT: 22.5 % (ref 11.3–14.5)
FLUAV AG NPH QL: NEGATIVE
FLUBV AG NPH QL IA: NEGATIVE
GFR SERPL CREATININE-BSD FRML MDRD: 5 ML/MIN/1.73
GFR SERPL CREATININE-BSD FRML MDRD: 5 ML/MIN/1.73
GFR SERPL CREATININE-BSD FRML MDRD: 6 ML/MIN/1.73
GLOBULIN UR ELPH-MCNC: 2.4 GM/DL
GLOBULIN UR ELPH-MCNC: 2.6 GM/DL
GLOBULIN UR ELPH-MCNC: 2.7 GM/DL
GLUCOSE BLD-MCNC: 102 MG/DL (ref 70–100)
GLUCOSE BLD-MCNC: 103 MG/DL (ref 70–100)
GLUCOSE BLD-MCNC: 106 MG/DL (ref 70–100)
GLUCOSE BLD-MCNC: 170 MG/DL (ref 70–100)
GLUCOSE BLD-MCNC: 212 MG/DL (ref 70–100)
GLUCOSE BLD-MCNC: 214 MG/DL (ref 70–100)
GLUCOSE BLD-MCNC: 90 MG/DL (ref 70–100)
GLUCOSE BLDC GLUCOMTR-MCNC: 101 MG/DL (ref 70–130)
GLUCOSE BLDC GLUCOMTR-MCNC: 102 MG/DL (ref 70–130)
GLUCOSE BLDC GLUCOMTR-MCNC: 103 MG/DL (ref 70–130)
GLUCOSE BLDC GLUCOMTR-MCNC: 103 MG/DL (ref 70–130)
GLUCOSE BLDC GLUCOMTR-MCNC: 107 MG/DL (ref 70–130)
GLUCOSE BLDC GLUCOMTR-MCNC: 111 MG/DL (ref 70–130)
GLUCOSE BLDC GLUCOMTR-MCNC: 114 MG/DL (ref 70–130)
GLUCOSE BLDC GLUCOMTR-MCNC: 116 MG/DL (ref 70–130)
GLUCOSE BLDC GLUCOMTR-MCNC: 122 MG/DL (ref 70–130)
GLUCOSE BLDC GLUCOMTR-MCNC: 124 MG/DL (ref 70–130)
GLUCOSE BLDC GLUCOMTR-MCNC: 126 MG/DL (ref 70–130)
GLUCOSE BLDC GLUCOMTR-MCNC: 129 MG/DL (ref 70–130)
GLUCOSE BLDC GLUCOMTR-MCNC: 134 MG/DL (ref 70–130)
GLUCOSE BLDC GLUCOMTR-MCNC: 139 MG/DL (ref 70–130)
GLUCOSE BLDC GLUCOMTR-MCNC: 147 MG/DL (ref 70–130)
GLUCOSE BLDC GLUCOMTR-MCNC: 149 MG/DL (ref 70–130)
GLUCOSE BLDC GLUCOMTR-MCNC: 151 MG/DL (ref 70–130)
GLUCOSE BLDC GLUCOMTR-MCNC: 151 MG/DL (ref 70–130)
GLUCOSE BLDC GLUCOMTR-MCNC: 153 MG/DL (ref 70–130)
GLUCOSE BLDC GLUCOMTR-MCNC: 155 MG/DL (ref 70–130)
GLUCOSE BLDC GLUCOMTR-MCNC: 172 MG/DL (ref 70–130)
GLUCOSE BLDC GLUCOMTR-MCNC: 174 MG/DL (ref 70–130)
GLUCOSE UR STRIP-MCNC: NEGATIVE MG/DL
GRAM STN SPEC: ABNORMAL
HCT VFR BLD AUTO: 24.7 % (ref 34.5–44)
HCT VFR BLD AUTO: 25.1 % (ref 34.5–44)
HCT VFR BLD AUTO: 26.7 % (ref 34.5–44)
HCT VFR BLD AUTO: 28.1 % (ref 34.5–44)
HCT VFR BLD AUTO: 30.9 % (ref 34.5–44)
HCT VFR BLD AUTO: 32.5 % (ref 34.5–44)
HCT VFR BLD AUTO: 34.6 % (ref 34.5–44)
HGB BLD-MCNC: 10.2 G/DL (ref 11.5–15.5)
HGB BLD-MCNC: 10.8 G/DL (ref 11.5–15.5)
HGB BLD-MCNC: 7.8 G/DL (ref 11.5–15.5)
HGB BLD-MCNC: 8 G/DL (ref 11.5–15.5)
HGB BLD-MCNC: 8.2 G/DL (ref 11.5–15.5)
HGB BLD-MCNC: 8.5 G/DL (ref 11.5–15.5)
HGB BLD-MCNC: 9.6 G/DL (ref 11.5–15.5)
HGB UR QL STRIP.AUTO: ABNORMAL
HOLD SPECIMEN: NORMAL
HYALINE CASTS UR QL AUTO: ABNORMAL /LPF
IMM GRANULOCYTES # BLD AUTO: 0.26 10*3/MM3 (ref 0–0.03)
IMM GRANULOCYTES # BLD AUTO: 0.3 10*3/MM3 (ref 0–0.03)
IMM GRANULOCYTES # BLD AUTO: 0.33 10*3/MM3 (ref 0–0.03)
IMM GRANULOCYTES NFR BLD AUTO: 0.7 % (ref 0–0.6)
IMM GRANULOCYTES NFR BLD AUTO: 0.9 % (ref 0–0.6)
IMM GRANULOCYTES NFR BLD AUTO: 0.9 % (ref 0–0.6)
KETONES UR QL STRIP: NEGATIVE
LEUKOCYTE ESTERASE UR QL STRIP.AUTO: ABNORMAL
LYMPHOCYTES # BLD AUTO: 0.99 10*3/MM3 (ref 0.6–4.8)
LYMPHOCYTES # BLD AUTO: 1.52 10*3/MM3 (ref 0.6–4.8)
LYMPHOCYTES # BLD AUTO: 1.73 10*3/MM3 (ref 0.6–4.8)
LYMPHOCYTES # BLD MANUAL: 0.7 10*3/MM3 (ref 0.6–4.8)
LYMPHOCYTES # BLD MANUAL: 2.35 10*3/MM3 (ref 0.6–4.8)
LYMPHOCYTES NFR BLD AUTO: 3 % (ref 24–44)
LYMPHOCYTES NFR BLD AUTO: 4.1 % (ref 24–44)
LYMPHOCYTES NFR BLD AUTO: 4.7 % (ref 24–44)
LYMPHOCYTES NFR BLD MANUAL: 2 % (ref 0–12)
LYMPHOCYTES NFR BLD MANUAL: 2 % (ref 24–44)
LYMPHOCYTES NFR BLD MANUAL: 5 % (ref 0–12)
LYMPHOCYTES NFR BLD MANUAL: 6 % (ref 24–44)
MAGNESIUM SERPL-MCNC: 1.9 MG/DL (ref 1.3–2.7)
MAGNESIUM SERPL-MCNC: 2 MG/DL (ref 1.3–2.7)
MAGNESIUM SERPL-MCNC: 2.1 MG/DL (ref 1.3–2.7)
MAGNESIUM SERPL-MCNC: 2.2 MG/DL (ref 1.3–2.7)
MAGNESIUM SERPL-MCNC: 2.3 MG/DL (ref 1.3–2.7)
MCH RBC QN AUTO: 26.8 PG (ref 27–31)
MCH RBC QN AUTO: 27.1 PG (ref 27–31)
MCH RBC QN AUTO: 27.2 PG (ref 27–31)
MCH RBC QN AUTO: 27.2 PG (ref 27–31)
MCH RBC QN AUTO: 27.3 PG (ref 27–31)
MCH RBC QN AUTO: 27.3 PG (ref 27–31)
MCH RBC QN AUTO: 27.4 PG (ref 27–31)
MCHC RBC AUTO-ENTMCNC: 30.2 G/DL (ref 32–36)
MCHC RBC AUTO-ENTMCNC: 30.7 G/DL (ref 32–36)
MCHC RBC AUTO-ENTMCNC: 31.1 G/DL (ref 32–36)
MCHC RBC AUTO-ENTMCNC: 31.2 G/DL (ref 32–36)
MCHC RBC AUTO-ENTMCNC: 31.4 G/DL (ref 32–36)
MCHC RBC AUTO-ENTMCNC: 31.6 G/DL (ref 32–36)
MCHC RBC AUTO-ENTMCNC: 31.9 G/DL (ref 32–36)
MCV RBC AUTO: 84.9 FL (ref 80–99)
MCV RBC AUTO: 85.4 FL (ref 80–99)
MCV RBC AUTO: 86.9 FL (ref 80–99)
MCV RBC AUTO: 87.6 FL (ref 80–99)
MCV RBC AUTO: 88 FL (ref 80–99)
MCV RBC AUTO: 88.4 FL (ref 80–99)
MCV RBC AUTO: 89.5 FL (ref 80–99)
MONOCYTES # BLD AUTO: 0.78 10*3/MM3 (ref 0–1)
MONOCYTES # BLD AUTO: 0.9 10*3/MM3 (ref 0–1)
MONOCYTES # BLD AUTO: 1.47 10*3/MM3 (ref 0–1)
MONOCYTES # BLD AUTO: 1.48 10*3/MM3 (ref 0–1)
MONOCYTES # BLD AUTO: 1.74 10*3/MM3 (ref 0–1)
MONOCYTES NFR BLD AUTO: 2.4 % (ref 0–12)
MONOCYTES NFR BLD AUTO: 4 % (ref 0–12)
MONOCYTES NFR BLD AUTO: 4.5 % (ref 0–12)
NEUTROPHILS # BLD AUTO: 30.09 10*3/MM3 (ref 1.5–8.3)
NEUTROPHILS # BLD AUTO: 32.36 10*3/MM3 (ref 1.5–8.3)
NEUTROPHILS # BLD AUTO: 33.95 10*3/MM3 (ref 1.5–8.3)
NEUTROPHILS # BLD AUTO: 34.36 10*3/MM3 (ref 1.5–8.3)
NEUTROPHILS # BLD AUTO: 35.98 10*3/MM3 (ref 1.5–8.3)
NEUTROPHILS NFR BLD AUTO: 91.3 % (ref 41–71)
NEUTROPHILS NFR BLD AUTO: 91.6 % (ref 41–71)
NEUTROPHILS NFR BLD AUTO: 93.5 % (ref 41–71)
NEUTROPHILS NFR BLD MANUAL: 88 % (ref 41–71)
NEUTROPHILS NFR BLD MANUAL: 92 % (ref 41–71)
NEUTS BAND NFR BLD MANUAL: 1 % (ref 0–5)
NEUTS BAND NFR BLD MANUAL: 4 % (ref 0–5)
NITRITE UR QL STRIP: NEGATIVE
NRBC SPEC MANUAL: 2 /100 WBC (ref 0–0)
OSMOLALITY UR: 384 MOSM/KG (ref 300–1100)
PH UR STRIP.AUTO: <=5 [PH] (ref 5–8)
PHOSPHATE SERPL-MCNC: 4.8 MG/DL (ref 2.4–5.1)
PHOSPHATE SERPL-MCNC: 6 MG/DL (ref 2.4–5.1)
PHOSPHATE SERPL-MCNC: 7.3 MG/DL (ref 2.4–5.1)
PLAT MORPH BLD: NORMAL
PLATELET # BLD AUTO: 207 10*3/MM3 (ref 150–450)
PLATELET # BLD AUTO: 244 10*3/MM3 (ref 150–450)
PLATELET # BLD AUTO: 305 10*3/MM3 (ref 150–450)
PLATELET # BLD AUTO: 355 10*3/MM3 (ref 150–450)
PLATELET # BLD AUTO: 368 10*3/MM3 (ref 150–450)
PLATELET # BLD AUTO: 390 10*3/MM3 (ref 150–450)
PLATELET # BLD AUTO: 453 10*3/MM3 (ref 150–450)
PMV BLD AUTO: 10 FL (ref 6–12)
PMV BLD AUTO: 10.1 FL (ref 6–12)
PMV BLD AUTO: 10.5 FL (ref 6–12)
PMV BLD AUTO: 10.6 FL (ref 6–12)
PMV BLD AUTO: 11.2 FL (ref 6–12)
PMV BLD AUTO: 9.5 FL (ref 6–12)
PMV BLD AUTO: 9.7 FL (ref 6–12)
POTASSIUM BLD-SCNC: 3.8 MMOL/L (ref 3.5–5.5)
POTASSIUM BLD-SCNC: 4 MMOL/L (ref 3.5–5.5)
POTASSIUM BLD-SCNC: 4.5 MMOL/L (ref 3.5–5.5)
POTASSIUM BLD-SCNC: 4.5 MMOL/L (ref 3.5–5.5)
POTASSIUM BLD-SCNC: 4.6 MMOL/L (ref 3.5–5.5)
POTASSIUM BLD-SCNC: 5 MMOL/L (ref 3.5–5.5)
POTASSIUM BLD-SCNC: 5.8 MMOL/L (ref 3.5–5.5)
POTASSIUM BLD-SCNC: 5.8 MMOL/L (ref 3.5–5.5)
POTASSIUM BLD-SCNC: 6.1 MMOL/L (ref 3.5–5.5)
POTASSIUM BLD-SCNC: 6.3 MMOL/L (ref 3.5–5.5)
POTASSIUM BLD-SCNC: 6.4 MMOL/L (ref 3.5–5.5)
POTASSIUM BLD-SCNC: 6.5 MMOL/L (ref 3.5–5.5)
PREALB SERPL-MCNC: 22 MG/DL (ref 10–40)
PROCALCITONIN SERPL-MCNC: 0.26 NG/ML
PROT SERPL-MCNC: 5.5 G/DL (ref 5.7–8.2)
PROT SERPL-MCNC: 5.6 G/DL (ref 5.7–8.2)
PROT SERPL-MCNC: 5.9 G/DL (ref 5.7–8.2)
PROT SERPL-MCNC: 6.2 G/DL (ref 5.7–8.2)
PROT UR QL STRIP: ABNORMAL
RBC # BLD AUTO: 2.91 10*6/MM3 (ref 3.89–5.14)
RBC # BLD AUTO: 2.94 10*6/MM3 (ref 3.89–5.14)
RBC # BLD AUTO: 3.02 10*6/MM3 (ref 3.89–5.14)
RBC # BLD AUTO: 3.14 10*6/MM3 (ref 3.89–5.14)
RBC # BLD AUTO: 3.51 10*6/MM3 (ref 3.89–5.14)
RBC # BLD AUTO: 3.74 10*6/MM3 (ref 3.89–5.14)
RBC # BLD AUTO: 3.95 10*6/MM3 (ref 3.89–5.14)
RBC # UR: ABNORMAL /HPF
RBC MORPH BLD: NORMAL
REF LAB TEST METHOD: ABNORMAL
RENAL EPI CELLS #/AREA URNS HPF: ABNORMAL /HPF
SCHISTOCYTES BLD QL SMEAR: ABNORMAL
SODIUM BLD-SCNC: 147 MMOL/L (ref 132–146)
SODIUM BLD-SCNC: 149 MMOL/L (ref 132–146)
SODIUM BLD-SCNC: 150 MMOL/L (ref 132–146)
SODIUM BLD-SCNC: 151 MMOL/L (ref 132–146)
SODIUM BLD-SCNC: 151 MMOL/L (ref 132–146)
SODIUM BLD-SCNC: 153 MMOL/L (ref 132–146)
SODIUM BLD-SCNC: 154 MMOL/L (ref 132–146)
SODIUM UR-SCNC: 56 MMOL/L (ref 30–90)
SP GR UR STRIP: 1.02 (ref 1–1.03)
SQUAMOUS #/AREA URNS HPF: ABNORMAL /HPF
TARGETS BLD QL SMEAR: ABNORMAL
TARGETS BLD QL SMEAR: NORMAL
TRANS CELLS #/AREA URNS HPF: ABNORMAL /HPF
TRIGL SERPL-MCNC: 98 MG/DL (ref 0–150)
UROBILINOGEN UR QL STRIP: ABNORMAL
WBC MORPH BLD: NORMAL
WBC NRBC COR # BLD: 28.2 10*3/MM3 (ref 3.5–10.8)
WBC NRBC COR # BLD: 28.64 10*3/MM3 (ref 3.5–10.8)
WBC NRBC COR # BLD: 32.87 10*3/MM3 (ref 3.5–10.8)
WBC NRBC COR # BLD: 34.8 10*3/MM3 (ref 3.5–10.8)
WBC NRBC COR # BLD: 36.8 10*3/MM3 (ref 3.5–10.8)
WBC NRBC COR # BLD: 37.17 10*3/MM3 (ref 3.5–10.8)
WBC NRBC COR # BLD: 39.11 10*3/MM3 (ref 3.5–10.8)
WBC UR QL AUTO: ABNORMAL /HPF
WHOLE BLOOD HOLD SPECIMEN: NORMAL
WHOLE BLOOD HOLD SPECIMEN: NORMAL
YEAST URNS QL MICRO: ABNORMAL /HPF

## 2019-01-01 PROCEDURE — 85007 BL SMEAR W/DIFF WBC COUNT: CPT | Performed by: INTERNAL MEDICINE

## 2019-01-01 PROCEDURE — 74018 RADEX ABDOMEN 1 VIEW: CPT

## 2019-01-01 PROCEDURE — 94640 AIRWAY INHALATION TREATMENT: CPT

## 2019-01-01 PROCEDURE — 71045 X-RAY EXAM CHEST 1 VIEW: CPT

## 2019-01-01 PROCEDURE — 94760 N-INVAS EAR/PLS OXIMETRY 1: CPT

## 2019-01-01 PROCEDURE — 94799 UNLISTED PULMONARY SVC/PX: CPT

## 2019-01-01 PROCEDURE — 82140 ASSAY OF AMMONIA: CPT | Performed by: INTERNAL MEDICINE

## 2019-01-01 PROCEDURE — 25010000002 CALCIUM GLUCONATE PER 10 ML: Performed by: EMERGENCY MEDICINE

## 2019-01-01 PROCEDURE — 93010 ELECTROCARDIOGRAM REPORT: CPT | Performed by: INTERNAL MEDICINE

## 2019-01-01 PROCEDURE — 82962 GLUCOSE BLOOD TEST: CPT

## 2019-01-01 PROCEDURE — 99233 SBSQ HOSP IP/OBS HIGH 50: CPT | Performed by: INTERNAL MEDICINE

## 2019-01-01 PROCEDURE — 85007 BL SMEAR W/DIFF WBC COUNT: CPT | Performed by: EMERGENCY MEDICINE

## 2019-01-01 PROCEDURE — 83735 ASSAY OF MAGNESIUM: CPT

## 2019-01-01 PROCEDURE — 87070 CULTURE OTHR SPECIMN AEROBIC: CPT | Performed by: INTERNAL MEDICINE

## 2019-01-01 PROCEDURE — 85027 COMPLETE CBC AUTOMATED: CPT | Performed by: INTERNAL MEDICINE

## 2019-01-01 PROCEDURE — 25010000002 LORAZEPAM PER 2 MG: Performed by: INTERNAL MEDICINE

## 2019-01-01 PROCEDURE — 85025 COMPLETE CBC W/AUTO DIFF WBC: CPT

## 2019-01-01 PROCEDURE — 25010000002 PIPERACILLIN SOD-TAZOBACTAM PER 1 G: Performed by: INTERNAL MEDICINE

## 2019-01-01 PROCEDURE — 84100 ASSAY OF PHOSPHORUS: CPT

## 2019-01-01 PROCEDURE — 93005 ELECTROCARDIOGRAM TRACING: CPT | Performed by: INTERNAL MEDICINE

## 2019-01-01 PROCEDURE — 86140 C-REACTIVE PROTEIN: CPT

## 2019-01-01 PROCEDURE — 84132 ASSAY OF SERUM POTASSIUM: CPT | Performed by: INTERNAL MEDICINE

## 2019-01-01 PROCEDURE — 74176 CT ABD & PELVIS W/O CONTRAST: CPT

## 2019-01-01 PROCEDURE — 82330 ASSAY OF CALCIUM: CPT | Performed by: INTERNAL MEDICINE

## 2019-01-01 PROCEDURE — 84300 ASSAY OF URINE SODIUM: CPT | Performed by: INTERNAL MEDICINE

## 2019-01-01 PROCEDURE — 93005 ELECTROCARDIOGRAM TRACING: CPT

## 2019-01-01 PROCEDURE — 83735 ASSAY OF MAGNESIUM: CPT | Performed by: INTERNAL MEDICINE

## 2019-01-01 PROCEDURE — 80048 BASIC METABOLIC PNL TOTAL CA: CPT | Performed by: INTERNAL MEDICINE

## 2019-01-01 PROCEDURE — 25010000002 HYDROMORPHONE PER 4 MG: Performed by: FAMILY MEDICINE

## 2019-01-01 PROCEDURE — 83605 ASSAY OF LACTIC ACID: CPT | Performed by: INTERNAL MEDICINE

## 2019-01-01 PROCEDURE — 87205 SMEAR GRAM STAIN: CPT | Performed by: INTERNAL MEDICINE

## 2019-01-01 PROCEDURE — 84145 PROCALCITONIN (PCT): CPT | Performed by: EMERGENCY MEDICINE

## 2019-01-01 PROCEDURE — 87040 BLOOD CULTURE FOR BACTERIA: CPT | Performed by: EMERGENCY MEDICINE

## 2019-01-01 PROCEDURE — 85025 COMPLETE CBC W/AUTO DIFF WBC: CPT | Performed by: INTERNAL MEDICINE

## 2019-01-01 PROCEDURE — 80048 BASIC METABOLIC PNL TOTAL CA: CPT

## 2019-01-01 PROCEDURE — 84100 ASSAY OF PHOSPHORUS: CPT | Performed by: INTERNAL MEDICINE

## 2019-01-01 PROCEDURE — 83935 ASSAY OF URINE OSMOLALITY: CPT | Performed by: INTERNAL MEDICINE

## 2019-01-01 PROCEDURE — 84478 ASSAY OF TRIGLYCERIDES: CPT

## 2019-01-01 PROCEDURE — 25010000002 LINEZOLID 600 MG/300ML SOLUTION: Performed by: INTERNAL MEDICINE

## 2019-01-01 PROCEDURE — 83605 ASSAY OF LACTIC ACID: CPT | Performed by: EMERGENCY MEDICINE

## 2019-01-01 PROCEDURE — 80053 COMPREHEN METABOLIC PANEL: CPT | Performed by: INTERNAL MEDICINE

## 2019-01-01 PROCEDURE — 80053 COMPREHEN METABOLIC PANEL: CPT

## 2019-01-01 PROCEDURE — 87077 CULTURE AEROBIC IDENTIFY: CPT | Performed by: EMERGENCY MEDICINE

## 2019-01-01 PROCEDURE — 81001 URINALYSIS AUTO W/SCOPE: CPT | Performed by: EMERGENCY MEDICINE

## 2019-01-01 PROCEDURE — P9612 CATHETERIZE FOR URINE SPEC: HCPCS

## 2019-01-01 PROCEDURE — 80053 COMPREHEN METABOLIC PANEL: CPT | Performed by: EMERGENCY MEDICINE

## 2019-01-01 PROCEDURE — 84134 ASSAY OF PREALBUMIN: CPT

## 2019-01-01 PROCEDURE — 87804 INFLUENZA ASSAY W/OPTIC: CPT | Performed by: EMERGENCY MEDICINE

## 2019-01-01 PROCEDURE — 82465 ASSAY BLD/SERUM CHOLESTEROL: CPT

## 2019-01-01 PROCEDURE — 99285 EMERGENCY DEPT VISIT HI MDM: CPT

## 2019-01-01 PROCEDURE — 70450 CT HEAD/BRAIN W/O DYE: CPT

## 2019-01-01 PROCEDURE — 93005 ELECTROCARDIOGRAM TRACING: CPT | Performed by: NURSE PRACTITIONER

## 2019-01-01 PROCEDURE — 85007 BL SMEAR W/DIFF WBC COUNT: CPT

## 2019-01-01 PROCEDURE — 25010000002 VANCOMYCIN 10 G RECONSTITUTED SOLUTION: Performed by: EMERGENCY MEDICINE

## 2019-01-01 PROCEDURE — 99291 CRITICAL CARE FIRST HOUR: CPT | Performed by: INTERNAL MEDICINE

## 2019-01-01 PROCEDURE — 85025 COMPLETE CBC W/AUTO DIFF WBC: CPT | Performed by: EMERGENCY MEDICINE

## 2019-01-01 PROCEDURE — 36415 COLL VENOUS BLD VENIPUNCTURE: CPT

## 2019-01-01 PROCEDURE — 93005 ELECTROCARDIOGRAM TRACING: CPT | Performed by: EMERGENCY MEDICINE

## 2019-01-01 PROCEDURE — 99292 CRITICAL CARE ADDL 30 MIN: CPT | Performed by: INTERNAL MEDICINE

## 2019-01-01 PROCEDURE — 82570 ASSAY OF URINE CREATININE: CPT | Performed by: INTERNAL MEDICINE

## 2019-01-01 PROCEDURE — 87086 URINE CULTURE/COLONY COUNT: CPT | Performed by: EMERGENCY MEDICINE

## 2019-01-01 PROCEDURE — 25010000002 PIPERACILLIN SOD-TAZOBACTAM PER 1 G: Performed by: EMERGENCY MEDICINE

## 2019-01-01 PROCEDURE — 63710000001 INSULIN REGULAR HUMAN PER 5 UNITS: Performed by: INTERNAL MEDICINE

## 2019-01-01 PROCEDURE — 87186 SC STD MICRODIL/AGAR DIL: CPT | Performed by: EMERGENCY MEDICINE

## 2019-01-01 RX ORDER — CASTOR OIL AND BALSAM, PERU 788; 87 MG/G; MG/G
OINTMENT TOPICAL EVERY 12 HOURS SCHEDULED
Status: DISCONTINUED | OUTPATIENT
Start: 2019-01-01 | End: 2019-01-01

## 2019-01-01 RX ORDER — SODIUM BICARBONATE 650 MG/1
650 TABLET ORAL 3 TIMES DAILY
Status: DISCONTINUED | OUTPATIENT
Start: 2019-01-01 | End: 2019-01-01

## 2019-01-01 RX ORDER — ASCORBIC ACID 500 MG
1000 TABLET ORAL 2 TIMES DAILY
COMMUNITY

## 2019-01-01 RX ORDER — METHENAMINE HIPPURATE 1000 MG/1
1 TABLET ORAL 2 TIMES DAILY WITH MEALS
COMMUNITY

## 2019-01-01 RX ORDER — BUMETANIDE 0.25 MG/ML
1 INJECTION INTRAMUSCULAR; INTRAVENOUS ONCE
Status: COMPLETED | OUTPATIENT
Start: 2019-01-01 | End: 2019-01-01

## 2019-01-01 RX ORDER — ESCITALOPRAM OXALATE 20 MG/1
20 TABLET ORAL DAILY
COMMUNITY

## 2019-01-01 RX ORDER — SODIUM BICARBONATE 650 MG/1
1300 TABLET ORAL ONCE
Status: COMPLETED | OUTPATIENT
Start: 2019-01-01 | End: 2019-01-01

## 2019-01-01 RX ORDER — LINEZOLID 2 MG/ML
600 INJECTION, SOLUTION INTRAVENOUS EVERY 12 HOURS
Status: DISCONTINUED | OUTPATIENT
Start: 2019-01-01 | End: 2019-01-01

## 2019-01-01 RX ORDER — ATORVASTATIN CALCIUM 10 MG/1
10 TABLET, FILM COATED ORAL NIGHTLY
Status: DISCONTINUED | OUTPATIENT
Start: 2019-01-01 | End: 2019-01-01

## 2019-01-01 RX ORDER — SODIUM POLYSTYRENE SULFONATE 15 G/60ML
15 SUSPENSION ORAL; RECTAL ONCE
Status: DISCONTINUED | OUTPATIENT
Start: 2019-01-01 | End: 2019-01-01 | Stop reason: CLARIF

## 2019-01-01 RX ORDER — DEXTROSE MONOHYDRATE 50 MG/ML
100 INJECTION, SOLUTION INTRAVENOUS CONTINUOUS
Status: ACTIVE | OUTPATIENT
Start: 2019-01-01 | End: 2019-01-01

## 2019-01-01 RX ORDER — NYSTATIN 100000 [USP'U]/G
POWDER TOPICAL EVERY 12 HOURS SCHEDULED
Status: DISCONTINUED | OUTPATIENT
Start: 2019-01-01 | End: 2019-01-01

## 2019-01-01 RX ORDER — IPRATROPIUM BROMIDE AND ALBUTEROL SULFATE 2.5; .5 MG/3ML; MG/3ML
3 SOLUTION RESPIRATORY (INHALATION)
Status: DISCONTINUED | OUTPATIENT
Start: 2019-01-01 | End: 2019-01-01 | Stop reason: HOSPADM

## 2019-01-01 RX ORDER — SODIUM CHLORIDE 450 MG/100ML
75 INJECTION, SOLUTION INTRAVENOUS CONTINUOUS
Status: DISCONTINUED | OUTPATIENT
Start: 2019-01-01 | End: 2019-01-01 | Stop reason: HOSPADM

## 2019-01-01 RX ORDER — ASPIRIN 81 MG/1
81 TABLET, CHEWABLE ORAL DAILY
Status: DISCONTINUED | OUTPATIENT
Start: 2019-01-01 | End: 2019-01-01

## 2019-01-01 RX ORDER — LORAZEPAM 2 MG/ML
2 INJECTION INTRAMUSCULAR EVERY 4 HOURS PRN
Status: DISCONTINUED | OUTPATIENT
Start: 2019-01-01 | End: 2019-01-01 | Stop reason: HOSPADM

## 2019-01-01 RX ORDER — NITROFURANTOIN 25; 75 MG/1; MG/1
100 CAPSULE ORAL EVERY 12 HOURS SCHEDULED
Status: DISCONTINUED | OUTPATIENT
Start: 2019-01-01 | End: 2019-01-01 | Stop reason: HOSPADM

## 2019-01-01 RX ORDER — LACTULOSE 10 G/15ML
30 SOLUTION ORAL 2 TIMES DAILY
Status: DISCONTINUED | OUTPATIENT
Start: 2019-01-01 | End: 2019-01-01

## 2019-01-01 RX ORDER — DIVALPROEX SODIUM 125 MG/1
125 CAPSULE, COATED PELLETS ORAL NIGHTLY
Status: DISCONTINUED | OUTPATIENT
Start: 2019-01-01 | End: 2019-01-01 | Stop reason: HOSPADM

## 2019-01-01 RX ORDER — SODIUM CHLORIDE 9 MG/ML
150 INJECTION, SOLUTION INTRAVENOUS CONTINUOUS
Status: DISCONTINUED | OUTPATIENT
Start: 2019-01-01 | End: 2019-01-01

## 2019-01-01 RX ORDER — SODIUM CHLORIDE 0.9 % (FLUSH) 0.9 %
3-10 SYRINGE (ML) INJECTION AS NEEDED
Status: DISCONTINUED | OUTPATIENT
Start: 2019-01-01 | End: 2019-01-01 | Stop reason: HOSPADM

## 2019-01-01 RX ORDER — SODIUM CHLORIDE 0.9 % (FLUSH) 0.9 %
3 SYRINGE (ML) INJECTION EVERY 12 HOURS SCHEDULED
Status: DISCONTINUED | OUTPATIENT
Start: 2019-01-01 | End: 2019-01-01

## 2019-01-01 RX ORDER — DIVALPROEX SODIUM 125 MG/1
125 TABLET, DELAYED RELEASE ORAL 2 TIMES DAILY
COMMUNITY

## 2019-01-01 RX ORDER — HYDROMORPHONE HYDROCHLORIDE 1 MG/ML
0.25 INJECTION, SOLUTION INTRAMUSCULAR; INTRAVENOUS; SUBCUTANEOUS
Status: DISCONTINUED | OUTPATIENT
Start: 2019-01-01 | End: 2019-01-01 | Stop reason: HOSPADM

## 2019-01-01 RX ORDER — SODIUM CHLORIDE, SODIUM LACTATE, POTASSIUM CHLORIDE, CALCIUM CHLORIDE 600; 310; 30; 20 MG/100ML; MG/100ML; MG/100ML; MG/100ML
75 INJECTION, SOLUTION INTRAVENOUS CONTINUOUS
Status: DISCONTINUED | OUTPATIENT
Start: 2019-01-01 | End: 2019-01-01

## 2019-01-01 RX ORDER — SULFAMETHOXAZOLE AND TRIMETHOPRIM 200; 40 MG/5ML; MG/5ML
10 SUSPENSION ORAL 2 TIMES DAILY
COMMUNITY

## 2019-01-01 RX ORDER — SODIUM CHLORIDE 0.9 % (FLUSH) 0.9 %
10 SYRINGE (ML) INJECTION AS NEEDED
Status: DISCONTINUED | OUTPATIENT
Start: 2019-01-01 | End: 2019-01-01 | Stop reason: HOSPADM

## 2019-01-01 RX ORDER — LACTOBACILLUS RHAMNOSUS GG 10B CELL
1 CAPSULE ORAL 2 TIMES DAILY
COMMUNITY

## 2019-01-01 RX ADMIN — LACTULOSE 30 G: 10 SOLUTION ORAL at 09:27

## 2019-01-01 RX ADMIN — IPRATROPIUM BROMIDE AND ALBUTEROL SULFATE 3 ML: 2.5; .5 SOLUTION RESPIRATORY (INHALATION) at 16:08

## 2019-01-01 RX ADMIN — NYSTATIN 1 APPLICATION: 100000 POWDER TOPICAL at 20:29

## 2019-01-01 RX ADMIN — DEXTROSE 100 ML/HR: 5 SOLUTION INTRAVENOUS at 11:26

## 2019-01-01 RX ADMIN — SODIUM BICARBONATE 650 MG TABLET 650 MG: at 17:07

## 2019-01-01 RX ADMIN — LACTULOSE 30 G: 10 SOLUTION ORAL at 08:05

## 2019-01-01 RX ADMIN — CASTOR OIL AND BALSAM, PERU: 788; 87 OINTMENT TOPICAL at 10:05

## 2019-01-01 RX ADMIN — IPRATROPIUM BROMIDE AND ALBUTEROL SULFATE 3 ML: 2.5; .5 SOLUTION RESPIRATORY (INHALATION) at 12:30

## 2019-01-01 RX ADMIN — SODIUM BICARBONATE 650 MG TABLET 650 MG: at 08:05

## 2019-01-01 RX ADMIN — TAZOBACTAM SODIUM AND PIPERACILLIN SODIUM 3.38 G: 375; 3 INJECTION, SOLUTION INTRAVENOUS at 11:35

## 2019-01-01 RX ADMIN — LINEZOLID 600 MG: 600 INJECTION, SOLUTION INTRAVENOUS at 11:42

## 2019-01-01 RX ADMIN — TAZOBACTAM SODIUM AND PIPERACILLIN SODIUM 3.38 G: 375; 3 INJECTION, SOLUTION INTRAVENOUS at 00:00

## 2019-01-01 RX ADMIN — DIVALPROEX SODIUM 125 MG: 125 CAPSULE ORAL at 20:53

## 2019-01-01 RX ADMIN — TAZOBACTAM SODIUM AND PIPERACILLIN SODIUM 3.38 G: 375; 3 INJECTION, SOLUTION INTRAVENOUS at 14:09

## 2019-01-01 RX ADMIN — SODIUM CHLORIDE 75 ML/HR: 4.5 INJECTION, SOLUTION INTRAVENOUS at 03:24

## 2019-01-01 RX ADMIN — SODIUM BICARBONATE 650 MG TABLET 650 MG: at 16:45

## 2019-01-01 RX ADMIN — CALCIUM GLUCONATE: 98 INJECTION, SOLUTION INTRAVENOUS at 00:58

## 2019-01-01 RX ADMIN — METOPROLOL TARTRATE 6.25 MG: 25 TABLET, FILM COATED ORAL at 08:14

## 2019-01-01 RX ADMIN — LACTULOSE 30 G: 10 SOLUTION ORAL at 20:56

## 2019-01-01 RX ADMIN — DEXTROSE MONOHYDRATE 100 ML/HR: 50 INJECTION, SOLUTION INTRAVENOUS at 10:52

## 2019-01-01 RX ADMIN — IPRATROPIUM BROMIDE AND ALBUTEROL SULFATE 3 ML: 2.5; .5 SOLUTION RESPIRATORY (INHALATION) at 16:52

## 2019-01-01 RX ADMIN — INSULIN HUMAN 2 UNITS: 100 INJECTION, SOLUTION PARENTERAL at 19:00

## 2019-01-01 RX ADMIN — LACTULOSE 30 G: 10 SOLUTION ORAL at 12:55

## 2019-01-01 RX ADMIN — SODIUM BICARBONATE 650 MG TABLET 650 MG: at 09:27

## 2019-01-01 RX ADMIN — TAZOBACTAM SODIUM AND PIPERACILLIN SODIUM 3.38 G: 375; 3 INJECTION, SOLUTION INTRAVENOUS at 13:29

## 2019-01-01 RX ADMIN — Medication: at 16:45

## 2019-01-01 RX ADMIN — DIVALPROEX SODIUM 125 MG: 125 CAPSULE ORAL at 21:22

## 2019-01-01 RX ADMIN — SODIUM CHLORIDE, PRESERVATIVE FREE 3 ML: 5 INJECTION INTRAVENOUS at 04:38

## 2019-01-01 RX ADMIN — SODIUM CHLORIDE 1000 ML: 9 INJECTION, SOLUTION INTRAVENOUS at 21:56

## 2019-01-01 RX ADMIN — SODIUM CHLORIDE 150 ML/HR: 4.5 INJECTION, SOLUTION INTRAVENOUS at 12:39

## 2019-01-01 RX ADMIN — IPRATROPIUM BROMIDE AND ALBUTEROL SULFATE 3 ML: 2.5; .5 SOLUTION RESPIRATORY (INHALATION) at 08:15

## 2019-01-01 RX ADMIN — SODIUM BICARBONATE 650 MG TABLET 650 MG: at 15:53

## 2019-01-01 RX ADMIN — LINEZOLID 600 MG: 600 INJECTION, SOLUTION INTRAVENOUS at 23:49

## 2019-01-01 RX ADMIN — Medication: at 15:41

## 2019-01-01 RX ADMIN — SODIUM BICARBONATE 650 MG TABLET 650 MG: at 08:13

## 2019-01-01 RX ADMIN — DIVALPROEX SODIUM 125 MG: 125 CAPSULE ORAL at 04:37

## 2019-01-01 RX ADMIN — CASTOR OIL AND BALSAM, PERU: 788; 87 OINTMENT TOPICAL at 21:23

## 2019-01-01 RX ADMIN — INSULIN HUMAN 2 UNITS: 100 INJECTION, SOLUTION PARENTERAL at 04:36

## 2019-01-01 RX ADMIN — SODIUM CHLORIDE 75 ML/HR: 4.5 INJECTION, SOLUTION INTRAVENOUS at 22:34

## 2019-01-01 RX ADMIN — LACTULOSE 30 G: 10 SOLUTION ORAL at 20:29

## 2019-01-01 RX ADMIN — SODIUM CHLORIDE, PRESERVATIVE FREE 3 ML: 5 INJECTION INTRAVENOUS at 20:53

## 2019-01-01 RX ADMIN — SODIUM BICARBONATE 650 MG TABLET 1300 MG: at 05:57

## 2019-01-01 RX ADMIN — CASTOR OIL AND BALSAM, PERU: 788; 87 OINTMENT TOPICAL at 08:13

## 2019-01-01 RX ADMIN — IPRATROPIUM BROMIDE AND ALBUTEROL SULFATE 3 ML: 2.5; .5 SOLUTION RESPIRATORY (INHALATION) at 08:11

## 2019-01-01 RX ADMIN — NYSTATIN: 100000 POWDER TOPICAL at 08:06

## 2019-01-01 RX ADMIN — TAZOBACTAM SODIUM AND PIPERACILLIN SODIUM 3.38 G: 375; 3 INJECTION, SOLUTION INTRAVENOUS at 00:01

## 2019-01-01 RX ADMIN — ASPIRIN 81 MG CHEWABLE TABLET 81 MG: 81 TABLET CHEWABLE at 08:19

## 2019-01-01 RX ADMIN — TAZOBACTAM SODIUM AND PIPERACILLIN SODIUM 3.38 G: 375; 3 INJECTION, SOLUTION INTRAVENOUS at 03:35

## 2019-01-01 RX ADMIN — SODIUM CHLORIDE 150 ML/HR: 9 INJECTION, SOLUTION INTRAVENOUS at 11:35

## 2019-01-01 RX ADMIN — IPRATROPIUM BROMIDE AND ALBUTEROL SULFATE 3 ML: 2.5; .5 SOLUTION RESPIRATORY (INHALATION) at 11:50

## 2019-01-01 RX ADMIN — TAZOBACTAM SODIUM AND PIPERACILLIN SODIUM 3.38 G: 375; 3 INJECTION, SOLUTION INTRAVENOUS at 01:40

## 2019-01-01 RX ADMIN — LACTULOSE 30 G: 10 SOLUTION ORAL at 21:23

## 2019-01-01 RX ADMIN — TAZOBACTAM SODIUM AND PIPERACILLIN SODIUM 3.38 G: 375; 3 INJECTION, SOLUTION INTRAVENOUS at 13:08

## 2019-01-01 RX ADMIN — IPRATROPIUM BROMIDE AND ALBUTEROL SULFATE 3 ML: 2.5; .5 SOLUTION RESPIRATORY (INHALATION) at 12:29

## 2019-01-01 RX ADMIN — SODIUM CHLORIDE 150 ML/HR: 4.5 INJECTION, SOLUTION INTRAVENOUS at 05:37

## 2019-01-01 RX ADMIN — Medication: at 21:23

## 2019-01-01 RX ADMIN — METOPROLOL TARTRATE 6.25 MG: 25 TABLET, FILM COATED ORAL at 20:57

## 2019-01-01 RX ADMIN — CASTOR OIL AND BALSAM, PERU: 788; 87 OINTMENT TOPICAL at 08:06

## 2019-01-01 RX ADMIN — SODIUM CHLORIDE, POTASSIUM CHLORIDE, SODIUM LACTATE AND CALCIUM CHLORIDE 75 ML/HR: 600; 310; 30; 20 INJECTION, SOLUTION INTRAVENOUS at 03:19

## 2019-01-01 RX ADMIN — FAMOTIDINE 20 MG: 10 INJECTION, SOLUTION INTRAVENOUS at 09:52

## 2019-01-01 RX ADMIN — IPRATROPIUM BROMIDE AND ALBUTEROL SULFATE 3 ML: 2.5; .5 SOLUTION RESPIRATORY (INHALATION) at 07:42

## 2019-01-01 RX ADMIN — NYSTATIN: 100000 POWDER TOPICAL at 08:13

## 2019-01-01 RX ADMIN — FAMOTIDINE 20 MG: 10 INJECTION, SOLUTION INTRAVENOUS at 08:05

## 2019-01-01 RX ADMIN — LACTULOSE 30 G: 10 SOLUTION ORAL at 08:13

## 2019-01-01 RX ADMIN — FAMOTIDINE 20 MG: 10 INJECTION, SOLUTION INTRAVENOUS at 08:19

## 2019-01-01 RX ADMIN — SODIUM BICARBONATE 650 MG TABLET 650 MG: at 20:32

## 2019-01-01 RX ADMIN — HYDROMORPHONE HYDROCHLORIDE 0.25 MG: 1 INJECTION, SOLUTION INTRAMUSCULAR; INTRAVENOUS; SUBCUTANEOUS at 04:01

## 2019-01-01 RX ADMIN — HYDROMORPHONE HYDROCHLORIDE 0.25 MG: 1 INJECTION, SOLUTION INTRAMUSCULAR; INTRAVENOUS; SUBCUTANEOUS at 10:35

## 2019-01-01 RX ADMIN — IPRATROPIUM BROMIDE AND ALBUTEROL SULFATE 3 ML: 2.5; .5 SOLUTION RESPIRATORY (INHALATION) at 08:42

## 2019-01-01 RX ADMIN — SODIUM BICARBONATE 650 MG TABLET 650 MG: at 20:28

## 2019-01-01 RX ADMIN — Medication: at 08:06

## 2019-01-01 RX ADMIN — ASPIRIN 81 MG CHEWABLE TABLET 81 MG: 81 TABLET CHEWABLE at 09:27

## 2019-01-01 RX ADMIN — DEXTROSE MONOHYDRATE 100 ML/HR: 50 INJECTION, SOLUTION INTRAVENOUS at 21:35

## 2019-01-01 RX ADMIN — BUMETANIDE 1 MG: 0.25 INJECTION INTRAMUSCULAR; INTRAVENOUS at 13:07

## 2019-01-01 RX ADMIN — TAZOBACTAM SODIUM AND PIPERACILLIN SODIUM 3.38 G: 375; 3 INJECTION, SOLUTION INTRAVENOUS at 00:57

## 2019-01-01 RX ADMIN — LACTULOSE 30 G: 10 SOLUTION ORAL at 20:32

## 2019-01-01 RX ADMIN — SODIUM CHLORIDE, PRESERVATIVE FREE 3 ML: 5 INJECTION INTRAVENOUS at 08:07

## 2019-01-01 RX ADMIN — CASTOR OIL AND BALSAM, PERU: 788; 87 OINTMENT TOPICAL at 09:38

## 2019-01-01 RX ADMIN — SODIUM CHLORIDE, PRESERVATIVE FREE 3 ML: 5 INJECTION INTRAVENOUS at 20:33

## 2019-01-01 RX ADMIN — ASPIRIN 81 MG CHEWABLE TABLET 81 MG: 81 TABLET CHEWABLE at 09:53

## 2019-01-01 RX ADMIN — VANCOMYCIN HYDROCHLORIDE 1500 MG: 10 INJECTION, POWDER, LYOPHILIZED, FOR SOLUTION INTRAVENOUS at 02:17

## 2019-01-01 RX ADMIN — ASPIRIN 81 MG CHEWABLE TABLET 81 MG: 81 TABLET CHEWABLE at 08:05

## 2019-01-01 RX ADMIN — SODIUM CHLORIDE 150 ML/HR: 4.5 INJECTION, SOLUTION INTRAVENOUS at 13:03

## 2019-01-01 RX ADMIN — IPRATROPIUM BROMIDE AND ALBUTEROL SULFATE 3 ML: 2.5; .5 SOLUTION RESPIRATORY (INHALATION) at 07:52

## 2019-01-01 RX ADMIN — IPRATROPIUM BROMIDE AND ALBUTEROL SULFATE 3 ML: 2.5; .5 SOLUTION RESPIRATORY (INHALATION) at 07:54

## 2019-01-01 RX ADMIN — HYDROMORPHONE HYDROCHLORIDE 0.25 MG: 1 INJECTION, SOLUTION INTRAMUSCULAR; INTRAVENOUS; SUBCUTANEOUS at 11:14

## 2019-01-01 RX ADMIN — SODIUM BICARBONATE 650 MG TABLET 650 MG: at 20:57

## 2019-01-01 RX ADMIN — ATORVASTATIN CALCIUM 10 MG: 10 TABLET, FILM COATED ORAL at 04:37

## 2019-01-01 RX ADMIN — SODIUM CHLORIDE, PRESERVATIVE FREE 3 ML: 5 INJECTION INTRAVENOUS at 20:58

## 2019-01-01 RX ADMIN — IPRATROPIUM BROMIDE AND ALBUTEROL SULFATE 3 ML: 2.5; .5 SOLUTION RESPIRATORY (INHALATION) at 20:00

## 2019-01-01 RX ADMIN — IPRATROPIUM BROMIDE AND ALBUTEROL SULFATE 3 ML: 2.5; .5 SOLUTION RESPIRATORY (INHALATION) at 20:30

## 2019-01-01 RX ADMIN — LINEZOLID 600 MG: 600 INJECTION, SOLUTION INTRAVENOUS at 00:25

## 2019-01-01 RX ADMIN — IPRATROPIUM BROMIDE AND ALBUTEROL SULFATE 3 ML: 2.5; .5 SOLUTION RESPIRATORY (INHALATION) at 20:42

## 2019-01-01 RX ADMIN — IPRATROPIUM BROMIDE AND ALBUTEROL SULFATE 3 ML: 2.5; .5 SOLUTION RESPIRATORY (INHALATION) at 16:23

## 2019-01-01 RX ADMIN — NYSTATIN: 100000 POWDER TOPICAL at 13:27

## 2019-01-01 RX ADMIN — SODIUM CHLORIDE, PRESERVATIVE FREE 3 ML: 5 INJECTION INTRAVENOUS at 20:29

## 2019-01-01 RX ADMIN — IPRATROPIUM BROMIDE AND ALBUTEROL SULFATE 3 ML: 2.5; .5 SOLUTION RESPIRATORY (INHALATION) at 15:44

## 2019-01-01 RX ADMIN — LINEZOLID 600 MG: 600 INJECTION, SOLUTION INTRAVENOUS at 11:14

## 2019-01-01 RX ADMIN — NYSTATIN: 100000 POWDER TOPICAL at 20:57

## 2019-01-01 RX ADMIN — LORAZEPAM 2 MG: 2 INJECTION INTRAMUSCULAR; INTRAVENOUS at 10:35

## 2019-01-01 RX ADMIN — Medication: at 20:28

## 2019-01-01 RX ADMIN — NYSTATIN: 100000 POWDER TOPICAL at 21:23

## 2019-01-01 RX ADMIN — DIVALPROEX SODIUM 125 MG: 125 CAPSULE ORAL at 20:28

## 2019-01-01 RX ADMIN — IPRATROPIUM BROMIDE AND ALBUTEROL SULFATE 3 ML: 2.5; .5 SOLUTION RESPIRATORY (INHALATION) at 12:38

## 2019-01-01 RX ADMIN — DIVALPROEX SODIUM 125 MG: 125 CAPSULE ORAL at 20:32

## 2019-01-01 RX ADMIN — IPRATROPIUM BROMIDE AND ALBUTEROL SULFATE 3 ML: 2.5; .5 SOLUTION RESPIRATORY (INHALATION) at 20:02

## 2019-01-01 RX ADMIN — INSULIN HUMAN 2 UNITS: 100 INJECTION, SOLUTION PARENTERAL at 05:48

## 2019-01-01 RX ADMIN — SODIUM CHLORIDE, PRESERVATIVE FREE 3 ML: 5 INJECTION INTRAVENOUS at 09:28

## 2019-01-01 RX ADMIN — SODIUM BICARBONATE 650 MG TABLET 650 MG: at 10:05

## 2019-01-01 RX ADMIN — SODIUM BICARBONATE 650 MG TABLET 650 MG: at 15:41

## 2019-01-01 RX ADMIN — CASTOR OIL AND BALSAM, PERU: 788; 87 OINTMENT TOPICAL at 20:28

## 2019-01-01 RX ADMIN — DIVALPROEX SODIUM 125 MG: 125 CAPSULE ORAL at 20:57

## 2019-01-01 RX ADMIN — ASPIRIN 81 MG CHEWABLE TABLET 81 MG: 81 TABLET CHEWABLE at 08:14

## 2019-01-01 RX ADMIN — FAMOTIDINE 20 MG: 10 INJECTION, SOLUTION INTRAVENOUS at 08:13

## 2019-01-01 RX ADMIN — CASTOR OIL AND BALSAM, PERU: 788; 87 OINTMENT TOPICAL at 20:33

## 2019-01-01 RX ADMIN — Medication: at 20:57

## 2019-01-01 RX ADMIN — TAZOBACTAM SODIUM AND PIPERACILLIN SODIUM 3.38 G: 375; 3 INJECTION, SOLUTION INTRAVENOUS at 13:27

## 2019-01-01 RX ADMIN — INSULIN HUMAN 2 UNITS: 100 INJECTION, SOLUTION PARENTERAL at 00:01

## 2019-01-01 RX ADMIN — PATIROMER 1 PACKET: 8.4 POWDER, FOR SUSPENSION ORAL at 02:21

## 2019-01-01 RX ADMIN — IPRATROPIUM BROMIDE AND ALBUTEROL SULFATE 3 ML: 2.5; .5 SOLUTION RESPIRATORY (INHALATION) at 11:31

## 2019-01-01 RX ADMIN — FAMOTIDINE 20 MG: 10 INJECTION, SOLUTION INTRAVENOUS at 09:28

## 2019-01-01 RX ADMIN — CASTOR OIL AND BALSAM, PERU: 788; 87 OINTMENT TOPICAL at 20:57

## 2019-01-01 RX ADMIN — SODIUM CHLORIDE, PRESERVATIVE FREE 3 ML: 5 INJECTION INTRAVENOUS at 21:22

## 2019-01-01 RX ADMIN — TAZOBACTAM SODIUM AND PIPERACILLIN SODIUM 3.38 G: 375; 3 INJECTION, SOLUTION INTRAVENOUS at 02:30

## 2019-01-01 RX ADMIN — SODIUM BICARBONATE 650 MG TABLET 650 MG: at 21:22

## 2019-01-01 RX ADMIN — SODIUM CHLORIDE, PRESERVATIVE FREE 3 ML: 5 INJECTION INTRAVENOUS at 02:18

## 2019-01-01 RX ADMIN — HYDROMORPHONE HYDROCHLORIDE 0.25 MG: 1 INJECTION, SOLUTION INTRAMUSCULAR; INTRAVENOUS; SUBCUTANEOUS at 10:19

## 2019-01-01 RX ADMIN — Medication: at 08:14

## 2019-01-01 RX ADMIN — SODIUM CHLORIDE 150 ML/HR: 9 INJECTION, SOLUTION INTRAVENOUS at 23:55

## 2019-01-01 RX ADMIN — IPRATROPIUM BROMIDE AND ALBUTEROL SULFATE 3 ML: 2.5; .5 SOLUTION RESPIRATORY (INHALATION) at 16:26

## 2019-01-01 RX ADMIN — SODIUM CHLORIDE 1000 ML: 9 INJECTION, SOLUTION INTRAVENOUS at 23:46

## 2019-01-01 RX ADMIN — Medication: at 10:36

## 2019-01-01 RX ADMIN — Medication: at 10:00

## 2019-01-15 PROBLEM — E87.5 HYPERKALEMIA: Status: ACTIVE | Noted: 2019-01-01

## 2019-01-15 PROBLEM — F01.50 VASCULAR DEMENTIA (HCC): Status: ACTIVE | Noted: 2019-01-01

## 2019-01-15 PROBLEM — N20.0 BILATERAL NEPHROLITHIASIS: Status: ACTIVE | Noted: 2019-01-01

## 2019-01-15 PROBLEM — G93.40 ENCEPHALOPATHY: Status: ACTIVE | Noted: 2019-01-01

## 2019-01-15 PROBLEM — I48.91 ATRIAL FIBRILLATION (HCC): Status: ACTIVE | Noted: 2019-01-01

## 2019-01-17 PROBLEM — R91.8 PULMONARY INFILTRATE: Status: ACTIVE | Noted: 2019-01-01

## 2019-01-17 PROBLEM — E87.0 HYPERNATREMIA: Status: ACTIVE | Noted: 2019-01-01

## 2019-11-24 NOTE — PROGRESS NOTES
Case Management Discharge Note    Final Note: Efaxed d/c summary to [facility] at [phone]. Nurse to call report to [facility] at [phone]. CM called [facility] and spoke to Julia and she states they do accept pt's [place] Medicare insurance. Left vm for Kanwal at [facility] to make a new pt appt with [facility] number placed in AVS for [facility] to arrange. Notified Victorina at [facility]. CM will cont to follow.     Destination - Selection Complete     Service Request Status Selected Specialties Address Phone Number Fax Number    [facility] Selected Skilled Nursing Facility [address] [place] [address] [phone]      Durable Medical Equipment     No service has been selected for the patient.      Dialysis/Infusion     No service has been selected for the patient.      Home Medical Care     No service has been selected for the patient.      Social Care     No service has been selected for the patient.             Final Discharge Disposition Code: 03 - skilled nursing facility (SNF)   3